# Patient Record
Sex: MALE | Race: WHITE
[De-identification: names, ages, dates, MRNs, and addresses within clinical notes are randomized per-mention and may not be internally consistent; named-entity substitution may affect disease eponyms.]

---

## 2018-06-17 ENCOUNTER — HOSPITAL ENCOUNTER (EMERGENCY)
Dept: HOSPITAL 54 - ER | Age: 68
LOS: 1 days | Discharge: HOME | End: 2018-06-18
Payer: MEDICARE

## 2018-06-17 VITALS — HEIGHT: 72 IN | BODY MASS INDEX: 20.32 KG/M2 | WEIGHT: 150 LBS

## 2018-06-17 VITALS — SYSTOLIC BLOOD PRESSURE: 132 MMHG | DIASTOLIC BLOOD PRESSURE: 82 MMHG
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
## 2018-06-17 DIAGNOSIS — S32.029A: Primary | ICD-10-CM
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
## 2018-06-17 DIAGNOSIS — S22.069A: ICD-10-CM
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
## 2018-06-17 PROCEDURE — A4606 OXYGEN PROBE USED W OXIMETER: HCPCS

## 2018-06-17 PROCEDURE — 99284 EMERGENCY DEPT VISIT MOD MDM: CPT

## 2018-06-17 PROCEDURE — 72131 CT LUMBAR SPINE W/O DYE: CPT

## 2018-06-17 PROCEDURE — Z7610: HCPCS

## 2018-06-17 PROCEDURE — 72128 CT CHEST SPINE W/O DYE: CPT

## 2018-06-17 NOTE — NUR
PT TO ER BED 14. BIB EMS FROM [facility] FOR LOW BACK PAIN S/P FALL INTO 
WHEELCHAIR. -LOC/DENIES HEAD TRAUMA. PT PLACED IN GOWN ON CARDIAC MONITOR. PT 
VSS/NAD NOTED/RESP EVEN UNLABORED/SKIN WARM AND DRY/DENIES N-V-D/AFEBRILE/AOX4. 
AWAITING MD HUFF.

## 2018-07-02 ENCOUNTER — HOSPITAL ENCOUNTER (INPATIENT)
Dept: HOSPITAL 54 - ER | Age: 68
LOS: 3 days | Discharge: NURSING, REHAB OR LONG-TERM CARE | DRG: 383 | End: 2018-07-05
Attending: INTERNAL MEDICINE | Admitting: INTERNAL MEDICINE
Payer: COMMERCIAL

## 2018-07-02 VITALS — BODY MASS INDEX: 23.1 KG/M2 | HEIGHT: 71 IN | WEIGHT: 165 LBS

## 2018-07-02 VITALS — DIASTOLIC BLOOD PRESSURE: 74 MMHG | SYSTOLIC BLOOD PRESSURE: 127 MMHG
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
## 2018-07-02 DIAGNOSIS — Z86.73: ICD-10-CM

## 2018-07-02 DIAGNOSIS — I25.10: ICD-10-CM

## 2018-07-02 DIAGNOSIS — D63.8: ICD-10-CM

## 2018-07-02 DIAGNOSIS — J44.9: ICD-10-CM

## 2018-07-02 DIAGNOSIS — Z79.899: ICD-10-CM

## 2018-07-02 DIAGNOSIS — N40.0: ICD-10-CM

## 2018-07-02 DIAGNOSIS — Z79.82: ICD-10-CM

## 2018-07-02 DIAGNOSIS — M48.54XA: ICD-10-CM

## 2018-07-02 DIAGNOSIS — G40.409: ICD-10-CM

## 2018-07-02 DIAGNOSIS — F03.90: ICD-10-CM

## 2018-07-02 DIAGNOSIS — Z79.51: ICD-10-CM

## 2018-07-02 DIAGNOSIS — L03.114: Primary | ICD-10-CM

## 2018-07-02 LAB
BASOPHILS # BLD AUTO: 0 /CMM (ref 0–0.2)
BASOPHILS NFR BLD AUTO: 0.6 % (ref 0–2)
BUN SERPL-MCNC: 13 MG/DL (ref 7–18)
CALCIUM SERPL-MCNC: 9.1 MG/DL (ref 8.5–10.1)
CHLORIDE SERPL-SCNC: 105 MMOL/L (ref 98–107)
CO2 SERPL-SCNC: 26 MMOL/L (ref 21–32)
CREAT SERPL-MCNC: 1.3 MG/DL (ref 0.6–1.3)
EOSINOPHIL NFR BLD AUTO: 2 % (ref 0–6)
GLUCOSE SERPL-MCNC: 92 MG/DL (ref 74–106)
HCT VFR BLD AUTO: 35 % (ref 39–51)
HGB BLD-MCNC: 11.7 G/DL (ref 13.5–17.5)
LYMPHOCYTES NFR BLD AUTO: 1.4 /CMM (ref 0.8–4.8)
LYMPHOCYTES NFR BLD AUTO: 17.6 % (ref 20–44)
MCH RBC QN AUTO: 30 PG (ref 26–33)
MCHC RBC AUTO-ENTMCNC: 34 G/DL (ref 31–36)
MCV RBC AUTO: 89 FL (ref 80–96)
MONOCYTES NFR BLD AUTO: 0.6 /CMM (ref 0.1–1.3)
MONOCYTES NFR BLD AUTO: 8.1 % (ref 2–12)
NEUTROPHILS # BLD AUTO: 5.6 /CMM (ref 1.8–8.9)
NEUTROPHILS NFR BLD AUTO: 71.7 % (ref 43–81)
PLATELET # BLD AUTO: 184 /CMM (ref 150–450)
POTASSIUM SERPL-SCNC: 4 MMOL/L (ref 3.5–5.1)
RBC # BLD AUTO: 3.92 MIL/UL (ref 4.5–6)
RDW COEFFICIENT OF VARIATION: 14.9 (ref 11.5–15)
SODIUM SERPL-SCNC: 139 MMOL/L (ref 136–145)
WBC NRBC COR # BLD AUTO: 7.8 K/UL (ref 4.3–11)

## 2018-07-02 PROCEDURE — A6402 STERILE GAUZE <= 16 SQ IN: HCPCS

## 2018-07-02 PROCEDURE — Z7610: HCPCS

## 2018-07-02 PROCEDURE — A4606 OXYGEN PROBE USED W OXIMETER: HCPCS

## 2018-07-02 RX ADMIN — OXYCODONE HYDROCHLORIDE AND ACETAMINOPHEN PRN UDTAB: 5; 325 TABLET ORAL at 22:18

## 2018-07-02 NOTE — NUR
MS RN NOTE - Percocet



Patient requested pain medication for 8/10 back pain (s/p fall and vertebral fracture 2-3 
wks ago). PO Percocet 5/325mg was administered as ordered. Will continue to monitor.

## 2018-07-02 NOTE — NUR
MS RN NOTE - call to Dr. Wolfe's Office



Phone call was made to Dr. Wolfe's Office to obtain orders for new admission. Spoke with NP 
Salvador Powers. Per Salvador Powers, continue all home medications, renew order for Rocephin that was 
given in ED, start on regular diet, order wound consult. Otherwise, wait for Dr. Wolfe to 
see patient tomorrow (7/3/18) morning.

## 2018-07-02 NOTE — NUR
MS RN OPENING NOTE



Patient was admitted to the unit just prior to shift change. Patient was seen in bed AAOx3, 
breathing on 2.5L O2 NC with no SOB and no signs of acute distress. SL IV is intact in the 
right forearm. Patient has multiple wounds and redness of his left arm/hand (admitted for 
cellulitis). Complete physical assessment and past medical history was obtained from the 
patient. Patient was oriented to the room and made comfortable. Bed is in the low/locked 
position, two side rails up, and call bell within reach. All patient's needs have been met 
at this time. Will continue to monitor.

## 2018-07-02 NOTE — NUR
REPORT GIVEN TO RNROWENA. PATIENT TRANSPORTED TO [facility] VIA STRETCHER.  
ROWENA TO PROVIDE ALLAN.

## 2018-07-02 NOTE — NUR
[facility] FOR LEFT HAND PAIN W/ SWELLING AND REDNESS WITH DRY WOUNDS. 
PATIENT IS A/OX 2, BREATHING EVEN AND UNLABORED. NO SOB, NAD, VITALS STABLE. 
SAFETY AND COMFORT MEAURES IN PLACE, AWAITING MD ORDERS.

## 2018-07-03 VITALS — SYSTOLIC BLOOD PRESSURE: 120 MMHG | DIASTOLIC BLOOD PRESSURE: 61 MMHG

## 2018-07-03 VITALS — SYSTOLIC BLOOD PRESSURE: 144 MMHG | DIASTOLIC BLOOD PRESSURE: 77 MMHG

## 2018-07-03 VITALS — DIASTOLIC BLOOD PRESSURE: 74 MMHG | SYSTOLIC BLOOD PRESSURE: 118 MMHG

## 2018-07-03 RX ADMIN — OXYCODONE HYDROCHLORIDE AND ACETAMINOPHEN PRN UDTAB: 5; 325 TABLET ORAL at 13:10

## 2018-07-03 RX ADMIN — OXYCODONE HYDROCHLORIDE AND ACETAMINOPHEN PRN UDTAB: 5; 325 TABLET ORAL at 23:59

## 2018-07-03 RX ADMIN — DEXTROSE MONOHYDRATE SCH MLS/HR: 50 INJECTION, SOLUTION INTRAVENOUS at 19:35

## 2018-07-03 RX ADMIN — MONTELUKAST SODIUM SCH MG: 10 TABLET, FILM COATED ORAL at 21:30

## 2018-07-03 RX ADMIN — OXYCODONE HYDROCHLORIDE AND ACETAMINOPHEN PRN UDTAB: 5; 325 TABLET ORAL at 05:58

## 2018-07-03 RX ADMIN — PREDNISOLONE ACETATE SCH ML: 10 SUSPENSION/ DROPS OPHTHALMIC at 19:35

## 2018-07-03 RX ADMIN — LEVETIRACETAM SCH MG: 250 TABLET, FILM COATED ORAL at 18:11

## 2018-07-03 RX ADMIN — OXYCODONE HYDROCHLORIDE AND ACETAMINOPHEN PRN UDTAB: 5; 325 TABLET ORAL at 19:28

## 2018-07-03 NOTE — NUR
FOLLOWED UP WITH PHARMACY REGARDING PREDISOLONE ACETATE EYEDROPS AND ANCEF ABX. PER ROWAN, 
THEY WILL BRING IT UP. WILL ENDORSED MEDICATION ADMIN TO NIGHT RN.

## 2018-07-03 NOTE — NUR
MS RN NOTE - wound care



I asked patient several times this shift if he was will to let me provide minor wound care 
(aka cleansing with normal saline, covering open wounds). Patient declined this shift.

## 2018-07-03 NOTE — NUR
MS RN NOTE - Percocet



Patient requested pain medication for back pain 8/10. PO Percocet 5/325mg administered as 
ordered. Will continue to monitor.

## 2018-07-03 NOTE — NUR
MS RN CLOSING NOTE



Patient was seen in bed AAOx3, breathing on 2.5L of O2 via simple mask with no SOB, and no 
signs of acute distress. Patient slept well overnight with no complaints or events. Patient 
remains in stable condition and has no immediate needs at this time. Bed is in the 
low/locked position, two side rails up, call bell within reach. Patient care is endorsed to 
day shift RN.

## 2018-07-03 NOTE — NUR
MS RN NOTE - O2



Patient asked if he could use an O2 mask instead of the nasal cannula. I educated him on the 
difference between the two, and also told him that a mask may not be necessary. The patient 
said that he tends to be a "mouth breather" and would feel more comfortable with a mask for 
now. Simple face mask was provided per patient request. O2 at 2 L.

## 2018-07-03 NOTE — NUR
RN CLOSING NOTES



PATIENT IN STABLE CONDITION. ALL NEEDS ATTENDED AND PROVIDED. KEPT PATIENT SAFE AND 
COMFORTABLE. BED IN LOW/LOCKED POSITION, CALL LIGHT IN REACH. ENDORSED TO NIGHT RN FOR ALLAN.

## 2018-07-03 NOTE — NUR
MS RN OPENING NOTE



Patient was seen lying in bed AAOx3, breathing on 2 L of O2 NC with no SOB, and no signs of 
acute distress. SL IV in right forearm remains intact. Urinal and bedside commode are within 
reach. Patient states that is having back pain and would like pain medication, otherwise, 
patient has no other concerns. Bed is in the low/locked position, two side rails up, and 
call bell within reach. Will continue to monitor.

## 2018-07-03 NOTE — NUR
MS RN NOTE - Call to assisted living facility



Call was made to [facility] on behalf of patient. Patient stated that his Case 
Manager (CM) Mark Tam was scheduled to see him there today; this CM travels to see him, 
and the patient was concerned that he would miss the visit. Per  at [facility], they will try to contact Mark Tam and ask that he visit the patient at [facility].

## 2018-07-03 NOTE — NUR
RN OPENING NOTES



RECEIVED PATIENT IN BED RESTING. A/OX4. NO ACUTE DISTRESS, NO SOB NOTED. IV SITE INTACT AND 
PATENT. KEPT PATIENT SAFE AND COMFORTABLE. BED IN LOW/LOCKED POSITION, SIDERAILS UPX2, CALL 
LIGHT IN REACH. WILL CONTINUE TO MONITOR ACCORDINGLY.

## 2018-07-03 NOTE — NUR
WOUND CARE CONSULT: PT PRESENTS WITH MULTIPLE DRY SCABS AND DRY ABRASIONS PRESENT ON 
ADMISSION. DRY LESIONS TO UPPER BACK, SHOULDER AREA AND ABDOMEN NOTED, PRESENT ON ADMISSION. 
DEFER TO MD. PT INDEPENDENT WITH BED MOBILITY AND CONTINENT. WILL SEE PRN.

## 2018-07-04 VITALS — DIASTOLIC BLOOD PRESSURE: 78 MMHG | SYSTOLIC BLOOD PRESSURE: 139 MMHG

## 2018-07-04 VITALS — SYSTOLIC BLOOD PRESSURE: 115 MMHG | DIASTOLIC BLOOD PRESSURE: 65 MMHG

## 2018-07-04 RX ADMIN — LEVETIRACETAM SCH MG: 250 TABLET, FILM COATED ORAL at 16:43

## 2018-07-04 RX ADMIN — DEXTROSE MONOHYDRATE SCH MLS/HR: 50 INJECTION, SOLUTION INTRAVENOUS at 21:24

## 2018-07-04 RX ADMIN — ASPIRIN 81 MG SCH MG: 81 TABLET ORAL at 08:19

## 2018-07-04 RX ADMIN — DIVALPROEX SODIUM SCH MG: 500 TABLET, DELAYED RELEASE ORAL at 08:19

## 2018-07-04 RX ADMIN — FLUTICASONE FUROATE AND VILANTEROL TRIFENATATE SCH EACH: 100; 25 POWDER RESPIRATORY (INHALATION) at 08:18

## 2018-07-04 RX ADMIN — OXYCODONE HYDROCHLORIDE AND ACETAMINOPHEN PRN UDTAB: 5; 325 TABLET ORAL at 05:50

## 2018-07-04 RX ADMIN — OXYCODONE HYDROCHLORIDE AND ACETAMINOPHEN PRN UDTAB: 5; 325 TABLET ORAL at 21:24

## 2018-07-04 RX ADMIN — MONTELUKAST SODIUM SCH MG: 10 TABLET, FILM COATED ORAL at 21:24

## 2018-07-04 RX ADMIN — TAMSULOSIN HYDROCHLORIDE SCH MG: 0.4 CAPSULE ORAL at 08:19

## 2018-07-04 RX ADMIN — PREDNISOLONE ACETATE SCH ML: 10 SUSPENSION/ DROPS OPHTHALMIC at 08:19

## 2018-07-04 RX ADMIN — OXYCODONE HYDROCHLORIDE AND ACETAMINOPHEN PRN UDTAB: 5; 325 TABLET ORAL at 11:09

## 2018-07-04 RX ADMIN — DEXTROSE MONOHYDRATE SCH MLS/HR: 50 INJECTION, SOLUTION INTRAVENOUS at 13:04

## 2018-07-04 RX ADMIN — Medication SCH MG: at 08:20

## 2018-07-04 RX ADMIN — PREDNISOLONE ACETATE SCH ML: 10 SUSPENSION/ DROPS OPHTHALMIC at 16:43

## 2018-07-04 RX ADMIN — DEXTROSE MONOHYDRATE SCH MLS/HR: 50 INJECTION, SOLUTION INTRAVENOUS at 05:37

## 2018-07-04 RX ADMIN — LEVETIRACETAM SCH MG: 250 TABLET, FILM COATED ORAL at 08:20

## 2018-07-04 NOTE — NUR
MS RN OPENING NOTES:



RECEIVED PT AWAKE IN BED IN NO ACUTE SIGNS OF DISTRESS. HOB ELEVATED. A/O X4. ABLE TO MAKE 
NEEDS KNOWN, DENIES ANY PAIN OR DISCOMFORTS AT THIS TIME. URINAL AND BEDSIDE COMMODE ARE 
WITHIN EASY REACH OF PT. ON ROOM AIR,  RESPIRATION EVEN AND UNLABORED. IV ACCESS ON RIGHT FA 
INTACT  AND PATENT, FLUSHES WELL. SAFETY PRECAUTIONS MAINTAINED. BED IN LOW/LOCKED POSITION 
WITH SIDE-RAILS UP X2. CALL LIGHT IN REACH. WILL CONTINUE MONITOR PT ACCORDINGLY.

## 2018-07-04 NOTE — NUR
MS RN NOTE - Pain



Patient requested pain medication for back pain 8/10. PO Percocet 5/325mg administered as 
ordered. Patient states that the use of Percocet has been successful thus far at relieving 
back pain to a tolerable level (about 4/10). Will continue to monitor.

## 2018-07-04 NOTE — NUR
RN NOTES



RECEIVED CALL FROM SONA [facility] OF [facility] REPORTING THAT PT 
IS POSITIVE FOR MRSA OF NARES. CONTACT ISOLATION ENFORCED IMMEDIATELY. MD AND CHARGE NURSE 
MADE AWARE. WILL CONTINUE TO MONITOR.

## 2018-07-04 NOTE — NUR
MS RN NOTE - Pain, Percocet



Patient reported 8/10 back pain due to lumbar fracture. PO Percocet 5/325mg was administered 
as ordered for pain relief. Will continue to monitor.

## 2018-07-04 NOTE — NUR
RN NOTES



PATIENT COMPLAINED OF ACHING PAIN ON HIS LOWER BACK WITH INTENSITY OF 8/10, REQUESTED PAIN 
MED, PRN PERCOCET 5/325MG TAB GIVEN AS ORDERED. WILL CONTINUE TO MONITOR

## 2018-07-04 NOTE — NUR
MS LVN INITIAL NOTES

 SEEN PT IN BED AFTER GOT REPORT FROM AM NURSE. PT RESTING AT THIS TIME WITH EYES CLOSED BUT 
AROUSE TO TOUCH. NOTICED WOUNDS AND SCABS ON HIS BODY, RESPIRATION EVEN AND NON-LABORED, NOT 
IN ANY ACUTE DISTRESS NOTED. KEPT HIM WARM AND COMFORTABLE AT ALL TIMES. HE'S ON ISOLATION 
PRECAUTION IMPLEMENTED AND OBSERVED. WILL CONTINUE MONITORING. PLACE CALL LIGHT AT REACH.

## 2018-07-04 NOTE — NUR
MS RN CLOSING NOTE



Patient is lying in bed AAOx3, breathing on 3L of O2 NC with no SOB, and no signs of acute 
distress. Patient slept well overnight, with no complications, and only requesting pain 
medication as needed. Patient remains in stable condition and has no immediate needs or 
concerns at this time. Bed is in the low/locked position, two side rails up, call bell 
within reach. Patient care endorsed to day shift nurse.

## 2018-07-04 NOTE — NUR
MS RN CLOSING NOTES:



PATIENT IN BED AWAKE AND RESTING AT MODERATE HIGH BACKREST POSITION. A/O X4, SAME VERBALLY 
RESPONSIVE. PT ON 02 VIA N/C AT 2LPM,  TOLERATING WELL WITH NO SIGNS OF ACUTE RESPIRATORY 
DISTRESS NOTED. CONTACT PRECAUTIONS MAINTAINED. IV ACCESS ON RIGHT FA INTACT AND PATENT, 
FLUSHES WELL. SAFETY PRECAUTIONS MAINTAINED. BED IN LOW/LOCKED POSITION WITH SIDE-RAILS UP 
X2. CALL LIGHT AND BEDSIDE TABLE WITHIN REACH OF PT. URINAL AND BEDSIDE COMMODE PLACED 
WITHIN EASY REACH OF PT. ALL NEEDS AND CARE PROVIDED WELL. WILL ENDORSE TO NIGHT SHIFT NURSE 
FOR ALLAN.

## 2018-07-05 VITALS — SYSTOLIC BLOOD PRESSURE: 119 MMHG | DIASTOLIC BLOOD PRESSURE: 64 MMHG

## 2018-07-05 RX ADMIN — PREDNISOLONE ACETATE SCH DROP: 10 SUSPENSION/ DROPS OPHTHALMIC at 08:38

## 2018-07-05 RX ADMIN — FLUTICASONE FUROATE AND VILANTEROL TRIFENATATE SCH EACH: 100; 25 POWDER RESPIRATORY (INHALATION) at 08:38

## 2018-07-05 RX ADMIN — OXYCODONE HYDROCHLORIDE AND ACETAMINOPHEN PRN UDTAB: 5; 325 TABLET ORAL at 04:58

## 2018-07-05 RX ADMIN — DEXTROSE MONOHYDRATE SCH MLS/HR: 50 INJECTION, SOLUTION INTRAVENOUS at 05:23

## 2018-07-05 RX ADMIN — DIVALPROEX SODIUM SCH MG: 500 TABLET, DELAYED RELEASE ORAL at 08:38

## 2018-07-05 RX ADMIN — DEXTROSE MONOHYDRATE SCH MLS/HR: 50 INJECTION, SOLUTION INTRAVENOUS at 12:36

## 2018-07-05 RX ADMIN — ASPIRIN 81 MG SCH MG: 81 TABLET ORAL at 08:38

## 2018-07-05 RX ADMIN — TAMSULOSIN HYDROCHLORIDE SCH MG: 0.4 CAPSULE ORAL at 08:38

## 2018-07-05 RX ADMIN — LEVETIRACETAM SCH MG: 250 TABLET, FILM COATED ORAL at 08:38

## 2018-07-05 RX ADMIN — Medication SCH MG: at 08:38

## 2018-07-05 RX ADMIN — OXYCODONE HYDROCHLORIDE AND ACETAMINOPHEN PRN UDTAB: 5; 325 TABLET ORAL at 13:03

## 2018-07-05 NOTE — NUR
MS RN NOTES



PATIENT SEEN AND EXAMINED BY DR. MYERS. WITH ORDERS TO DISCHARGE PATIENT TO [facility]. PATIENT AT BEDSIDE, AWARE AND VERBALIZED UNDERSTANDING. 
ORDER NOTED AND CARRIED OUT. CASE MANAGEMENT AWARE. WITH ARRANGED TRANSPORTATION  AT 
1400. PLACED CALL TO [facility] ([phone]) AND SPOKE WITH RODRÍGUEZ, REPORT GIVEN. 
WILL CONTINUE TO MONITOR

## 2018-07-05 NOTE — NUR
MS RN NOTES



PATIENT FOR DISCHARGE TODAY. DISCHARGE TEACHING AND EDUCATION PROVIDED TO PATIENT AND 
VERBALIZED UNDERSTANDING. ALL BELONGINGS COMPLETE, NO REPORT OF MISSING BELONGINGS. IV 
REMOVED WITH MINIMAL BLEEDING NOTED, PRESSURE DRESSING PLACED. PATIENT LEFT VIA GURNEY AT 
1400 IN STABLE CONDITION. BREATHING EVEN AND UNLABORED. NO SOB OR ACUTE DISTRESS NOTED. 
DENIES ANY PAIN OR DISCOMFORT. NO CHANGES IN LOC NOTED. PATIENT CALM AND RELAXED. NO EPISODE 
OF FALL OR INJURY DURING TRANSFER/DISCHARGE. MD MADE AWARE OF DISCHARGE.

## 2018-07-05 NOTE — NUR
MS LVN NOTES

 C/O PAIN ON HIS LOWER BACK , PERCOCET GIVEN AS ORDERED AND PT REQUESTED. PT REFUSED TO BE 
CLEAN UP AND STATED HE DOESN'T WANT TO BE CHANGE HE WANTS TO SLEEP MORE. JUST DO IT BY 8AM 
AFTER MY BREAKFAST AS HE STATED. CHARGE NURSE AWARE. KEPT HIM COMFORTABLE AT ALL TIMES. WILL 
CONTINUE TO MONITOR. PLACE CALL LIGHT AT REACH.

## 2018-07-05 NOTE — NUR
MS RN NOTES



PATIENT RECEIVED RESTING INSIDE ROOM, AWAKE, ALERT AND ORIENTED. VERBALLY RESPONSIVE AND 
RESPONDS TO VERBAL AND TACTILE STIMULI. PATIENT BREATHING EVEN AND UNLABORED. NO SOB OR 
ACUTE DISTRESS NOTED AT THIS TIME. PATIENT DENIES ANY PAIN OR DISCOMFORT. IV SITE INTACT AND 
PATENT. MAINTAINED ISOLATION PRECAUTION. WILL CONTINUE TO MONITOR. BED LOCKED AND IN LOW 
POSITION. BILATERAL UPPER SIDE RAILS UP AND LOCKED. CALL LIGHT WITHIN EASY REACH

## 2018-07-05 NOTE — NUR
MS LVN CLOSING NOTES 

 PT AWAKE AND ALERT , RESPIRATION EVEN AND NON-LABORED. NO SIGNS OF ANY DISCOMFORT OR ANY 
ACUTE DISTRESS NOTED. ALL DUE MEDS GIVEN . STILL REFUSING TO DO HIS BEDDINSG AND SPONGE 
BATH. HE REQUESTED TO DO IT BY 8 AM AFTER HIS BREAKFAST ENDORSE.

## 2019-01-21 ENCOUNTER — HOSPITAL ENCOUNTER (INPATIENT)
Dept: HOSPITAL 91 - 2NE | Age: 69
LOS: 29 days | DRG: 870 | End: 2019-02-19
Payer: MEDICARE

## 2019-01-21 ENCOUNTER — HOSPITAL ENCOUNTER (INPATIENT)
Dept: HOSPITAL 10 - E/R | Age: 69
LOS: 29 days | DRG: 870 | End: 2019-02-19
Attending: INTERNAL MEDICINE | Admitting: INTERNAL MEDICINE
Payer: MEDICARE

## 2019-01-21 VITALS
BODY MASS INDEX: 18.66 KG/M2 | BODY MASS INDEX: 18.66 KG/M2 | HEIGHT: 72 IN | WEIGHT: 137.79 LBS | HEIGHT: 72 IN | WEIGHT: 137.79 LBS

## 2019-01-21 DIAGNOSIS — G40.909: ICD-10-CM

## 2019-01-21 DIAGNOSIS — A41.9: Primary | ICD-10-CM

## 2019-01-21 DIAGNOSIS — E87.0: ICD-10-CM

## 2019-01-21 DIAGNOSIS — E87.2: ICD-10-CM

## 2019-01-21 DIAGNOSIS — J43.9: ICD-10-CM

## 2019-01-21 DIAGNOSIS — N39.0: ICD-10-CM

## 2019-01-21 DIAGNOSIS — I46.8: ICD-10-CM

## 2019-01-21 DIAGNOSIS — J69.0: ICD-10-CM

## 2019-01-21 DIAGNOSIS — G92: ICD-10-CM

## 2019-01-21 DIAGNOSIS — E87.5: ICD-10-CM

## 2019-01-21 DIAGNOSIS — D61.818: ICD-10-CM

## 2019-01-21 DIAGNOSIS — N40.0: ICD-10-CM

## 2019-01-21 DIAGNOSIS — E44.0: ICD-10-CM

## 2019-01-21 DIAGNOSIS — K66.8: ICD-10-CM

## 2019-01-21 DIAGNOSIS — J96.02: ICD-10-CM

## 2019-01-21 DIAGNOSIS — I82.622: ICD-10-CM

## 2019-01-21 DIAGNOSIS — N17.0: ICD-10-CM

## 2019-01-21 DIAGNOSIS — I21.4: ICD-10-CM

## 2019-01-21 DIAGNOSIS — E78.5: ICD-10-CM

## 2019-01-21 DIAGNOSIS — D69.6: ICD-10-CM

## 2019-01-21 DIAGNOSIS — J96.01: ICD-10-CM

## 2019-01-21 DIAGNOSIS — N18.9: ICD-10-CM

## 2019-01-21 DIAGNOSIS — F25.1: ICD-10-CM

## 2019-01-21 DIAGNOSIS — I46.9: ICD-10-CM

## 2019-01-21 DIAGNOSIS — R65.21: ICD-10-CM

## 2019-01-21 DIAGNOSIS — N25.1: ICD-10-CM

## 2019-01-21 DIAGNOSIS — R13.10: ICD-10-CM

## 2019-01-21 DIAGNOSIS — J93.9: ICD-10-CM

## 2019-01-21 DIAGNOSIS — I82.A12: ICD-10-CM

## 2019-01-21 DIAGNOSIS — D63.1: ICD-10-CM

## 2019-01-21 DIAGNOSIS — Z66: ICD-10-CM

## 2019-01-21 DIAGNOSIS — I50.30: ICD-10-CM

## 2019-01-21 LAB
ADD MAN DIFF?: NO
ADD UMIC: YES
ALANINE AMINOTRANSFERASE: 122 IU/L (ref 13–69)
ALBUMIN/GLOBULIN RATIO: 1.05
ALBUMIN: 4 G/DL (ref 3.3–4.9)
ALKALINE PHOSPHATASE: 86 IU/L (ref 42–121)
ANION GAP: 8 (ref 5–13)
ARTERIAL BASE EXCESS: -1.1 MMOL/L (ref -3–3)
ARTERIAL BLOOD GAS OXYGEN SAT: 98.9 MMHG (ref 95–98)
ARTERIAL COHB: 2.3 % (ref 0–3)
ARTERIAL FRACTION OF OXYHGB: 96.5 % (ref 93–99)
ARTERIAL HCO3: 28.7 MMOL/L (ref 22–26)
ARTERIAL METHB: 0.1 % (ref 0–1.5)
ARTERIAL PCO2: 74.7 MMHG (ref 35–45)
ASPARTATE AMINO TRANSFERASE: 173 IU/L (ref 15–46)
BASOPHIL #: 0.1 10^3/UL (ref 0–0.1)
BASOPHILS %: 0.4 % (ref 0–2)
BILIRUBIN,DIRECT: 0 MG/DL (ref 0–0.2)
BILIRUBIN,TOTAL: 0 MG/DL (ref 0.2–1.3)
BLOOD GAS TIDAL VOLUME: 500 ML
BLOOD UREA NITROGEN: 38 MG/DL (ref 7–20)
CALCIUM: 9.4 MG/DL (ref 8.4–10.2)
CARBON DIOXIDE: 34 MMOL/L (ref 21–31)
CHLORIDE: 94 MMOL/L (ref 97–110)
CREATININE: 1.68 MG/DL (ref 0.61–1.24)
EOSINOPHILS #: 0 10^3/UL (ref 0–0.5)
EOSINOPHILS %: 0 % (ref 0–7)
FIO2: 100 %
GLOBULIN: 3.8 G/DL (ref 1.3–3.2)
GLUCOSE: 173 MG/DL (ref 70–220)
HEMATOCRIT: 46.5 % (ref 42–52)
HEMOGLOBIN: 13.5 G/DL (ref 14–18)
IMMATURE GRANS #M: 0.54 10^3/UL (ref 0–0.03)
IMMATURE GRANS % (M): 2.7 % (ref 0–0.43)
INR: 0.91
LYMPHOCYTES #: 1 10^3/UL (ref 0.8–2.9)
LYMPHOCYTES %: 4.8 % (ref 15–51)
MEAN CORPUSCULAR HEMOGLOBIN: 28 PG (ref 29–33)
MEAN CORPUSCULAR HGB CONC: 29 G/DL (ref 32–37)
MEAN CORPUSCULAR VOLUME: 96.3 FL (ref 82–101)
MEAN PLATELET VOLUME: 11.6 FL (ref 7.4–10.4)
MODE: (no result)
MONOCYTE #: 1 10^3/UL (ref 0.3–0.9)
MONOCYTES %: 4.8 % (ref 0–11)
NEUTROPHIL #: 17.7 10^3/UL (ref 1.6–7.5)
NEUTROPHILS %: 87.3 % (ref 39–77)
NUCLEATED RED BLOOD CELLS #: 0 10^3/UL (ref 0–0)
NUCLEATED RED BLOOD CELLS%: 0.1 /100WBC (ref 0–0)
O2 A-A PPRESDIFF RESPIRATORY: 481.4 MMHG (ref 7–24)
PARTIAL THROMBOPLASTIN TIME: 40.8 SEC (ref 23–35)
PLATELET COUNT: 124 10^3/UL (ref 140–415)
POSITIVE DIFF: (no result)
POTASSIUM: 5.4 MMOL/L (ref 3.5–5.1)
PROTIME: 12.4 SEC (ref 11.9–14.9)
PT RATIO: 1
RED BLOOD COUNT: 4.83 10^6/UL (ref 4.7–6.1)
RED CELL DISTRIBUTION WIDTH: 15.2 % (ref 11.5–14.5)
SODIUM: 136 MMOL/L (ref 135–144)
TOTAL PROTEIN: 7.8 G/DL (ref 6.1–8.1)
TROPONIN-I: 0.25 NG/ML (ref 0–0.12)
UR AMORPHOUS CRYSTAL: (no result) /HPF
UR ASCORBIC ACID: 40 MG/DL
UR BACTERIA: (no result) /HPF
UR BILIRUBIN (DIP): NEGATIVE MG/DL
UR BLOOD (DIP): NEGATIVE MG/DL
UR CLARITY: (no result)
UR COLOR: (no result)
UR GLUCOSE (DIP): NEGATIVE MG/DL
UR HYALINE CAST: (no result) /HPF
UR KETONES (DIP): NEGATIVE MG/DL
UR LEUKOCYTE ESTERASE (DIP): (no result) LEU/UL
UR MUCUS: (no result) /HPF
UR NITRITE (DIP): NEGATIVE MG/DL
UR PH (DIP): 5 (ref 5–9)
UR RBC: 4 /HPF (ref 0–5)
UR SPECIFIC GRAVITY (DIP): 1.02 (ref 1–1.03)
UR TOTAL PROTEIN (DIP): (no result) MG/DL
UR UROBILINOGEN (DIP): (no result) MG/DL
UR WBC: 33 /HPF (ref 0–5)
WHITE BLOOD COUNT: 20.2 10^3/UL (ref 4.8–10.8)

## 2019-01-21 PROCEDURE — 36600 WITHDRAWAL OF ARTERIAL BLOOD: CPT

## 2019-01-21 PROCEDURE — C9113 INJ PANTOPRAZOLE SODIUM, VIA: HCPCS

## 2019-01-21 PROCEDURE — 94640 AIRWAY INHALATION TREATMENT: CPT

## 2019-01-21 PROCEDURE — 93005 ELECTROCARDIOGRAM TRACING: CPT

## 2019-01-21 PROCEDURE — 80048 BASIC METABOLIC PNL TOTAL CA: CPT

## 2019-01-21 PROCEDURE — 85025 COMPLETE CBC W/AUTO DIFF WBC: CPT

## 2019-01-21 PROCEDURE — 89220 SPUTUM SPECIMEN COLLECTION: CPT

## 2019-01-21 PROCEDURE — 31500 INSERT EMERGENCY AIRWAY: CPT

## 2019-01-21 PROCEDURE — L3260 AMBULATORY SURGICAL BOOT EAC: HCPCS

## 2019-01-21 PROCEDURE — 80202 ASSAY OF VANCOMYCIN: CPT

## 2019-01-21 PROCEDURE — 36430 TRANSFUSION BLD/BLD COMPNT: CPT

## 2019-01-21 PROCEDURE — 99291 CRITICAL CARE FIRST HOUR: CPT

## 2019-01-21 PROCEDURE — 84295 ASSAY OF SERUM SODIUM: CPT

## 2019-01-21 PROCEDURE — 97161 PT EVAL LOW COMPLEX 20 MIN: CPT

## 2019-01-21 PROCEDURE — 94002 VENT MGMT INPAT INIT DAY: CPT

## 2019-01-21 PROCEDURE — 94770: CPT

## 2019-01-21 PROCEDURE — 83605 ASSAY OF LACTIC ACID: CPT

## 2019-01-21 PROCEDURE — 74176 CT ABD & PELVIS W/O CONTRAST: CPT

## 2019-01-21 PROCEDURE — 86900 BLOOD TYPING SEROLOGIC ABO: CPT

## 2019-01-21 PROCEDURE — 82803 BLOOD GASES ANY COMBINATION: CPT

## 2019-01-21 PROCEDURE — 97164 PT RE-EVAL EST PLAN CARE: CPT

## 2019-01-21 PROCEDURE — 02HV33Z INSERTION OF INFUSION DEVICE INTO SUPERIOR VENA CAVA, PERCUTANEOUS APPROACH: ICD-10-PCS

## 2019-01-21 PROCEDURE — 81001 URINALYSIS AUTO W/SCOPE: CPT

## 2019-01-21 PROCEDURE — 94664 DEMO&/EVAL PT USE INHALER: CPT

## 2019-01-21 PROCEDURE — 93306 TTE W/DOPPLER COMPLETE: CPT

## 2019-01-21 PROCEDURE — 81003 URINALYSIS AUTO W/O SCOPE: CPT

## 2019-01-21 PROCEDURE — 92950 HEART/LUNG RESUSCITATION CPR: CPT

## 2019-01-21 PROCEDURE — 0W9B30Z DRAINAGE OF LEFT PLEURAL CAVITY WITH DRAINAGE DEVICE, PERCUTANEOUS APPROACH: ICD-10-PCS

## 2019-01-21 PROCEDURE — 83735 ASSAY OF MAGNESIUM: CPT

## 2019-01-21 PROCEDURE — 96366 THER/PROPH/DIAG IV INF ADDON: CPT

## 2019-01-21 PROCEDURE — 0BH18EZ INSERTION OF ENDOTRACHEAL AIRWAY INTO TRACHEA, VIA NATURAL OR ARTIFICIAL OPENING ENDOSCOPIC: ICD-10-PCS

## 2019-01-21 PROCEDURE — 83935 ASSAY OF URINE OSMOLALITY: CPT

## 2019-01-21 PROCEDURE — 97110 THERAPEUTIC EXERCISES: CPT

## 2019-01-21 PROCEDURE — 86901 BLOOD TYPING SEROLOGIC RH(D): CPT

## 2019-01-21 PROCEDURE — 74018 RADEX ABDOMEN 1 VIEW: CPT

## 2019-01-21 PROCEDURE — 83036 HEMOGLOBIN GLYCOSYLATED A1C: CPT

## 2019-01-21 PROCEDURE — 94003 VENT MGMT INPAT SUBQ DAY: CPT

## 2019-01-21 PROCEDURE — 70450 CT HEAD/BRAIN W/O DYE: CPT

## 2019-01-21 PROCEDURE — 97530 THERAPEUTIC ACTIVITIES: CPT

## 2019-01-21 PROCEDURE — 90686 IIV4 VACC NO PRSV 0.5 ML IM: CPT

## 2019-01-21 PROCEDURE — 82043 UR ALBUMIN QUANTITATIVE: CPT

## 2019-01-21 PROCEDURE — 86850 RBC ANTIBODY SCREEN: CPT

## 2019-01-21 PROCEDURE — 85730 THROMBOPLASTIN TIME PARTIAL: CPT

## 2019-01-21 PROCEDURE — 36415 COLL VENOUS BLD VENIPUNCTURE: CPT

## 2019-01-21 PROCEDURE — 87086 URINE CULTURE/COLONY COUNT: CPT

## 2019-01-21 PROCEDURE — 87040 BLOOD CULTURE FOR BACTERIA: CPT

## 2019-01-21 PROCEDURE — 87070 CULTURE OTHR SPECIMN AEROBIC: CPT

## 2019-01-21 PROCEDURE — 96367 TX/PROPH/DG ADDL SEQ IV INF: CPT

## 2019-01-21 PROCEDURE — 84100 ASSAY OF PHOSPHORUS: CPT

## 2019-01-21 PROCEDURE — P9035 PLATELET PHERES LEUKOREDUCED: HCPCS

## 2019-01-21 PROCEDURE — 80053 COMPREHEN METABOLIC PANEL: CPT

## 2019-01-21 PROCEDURE — 94668 MNPJ CHEST WALL SBSQ: CPT

## 2019-01-21 PROCEDURE — 93971 EXTREMITY STUDY: CPT

## 2019-01-21 PROCEDURE — 84484 ASSAY OF TROPONIN QUANT: CPT

## 2019-01-21 PROCEDURE — 96365 THER/PROPH/DIAG IV INF INIT: CPT

## 2019-01-21 PROCEDURE — 85610 PROTHROMBIN TIME: CPT

## 2019-01-21 PROCEDURE — 92526 ORAL FUNCTION THERAPY: CPT

## 2019-01-21 PROCEDURE — 84300 ASSAY OF URINE SODIUM: CPT

## 2019-01-21 PROCEDURE — 71045 X-RAY EXAM CHEST 1 VIEW: CPT

## 2019-01-21 PROCEDURE — 74177 CT ABD & PELVIS W/CONTRAST: CPT

## 2019-01-21 PROCEDURE — 84155 ASSAY OF PROTEIN SERUM: CPT

## 2019-01-21 PROCEDURE — 92610 EVALUATE SWALLOWING FUNCTION: CPT

## 2019-01-21 PROCEDURE — 5A1955Z RESPIRATORY VENTILATION, GREATER THAN 96 CONSECUTIVE HOURS: ICD-10-PCS

## 2019-01-21 PROCEDURE — 87081 CULTURE SCREEN ONLY: CPT

## 2019-01-21 PROCEDURE — P9047 ALBUMIN (HUMAN), 25%, 50ML: HCPCS

## 2019-01-21 PROCEDURE — 5A12012 PERFORMANCE OF CARDIAC OUTPUT, SINGLE, MANUAL: ICD-10-PCS

## 2019-01-21 RX ADMIN — THIAMINE HYDROCHLORIDE 1 ML: 100 INJECTION, SOLUTION INTRAMUSCULAR; INTRAVENOUS at 18:50

## 2019-01-21 RX ADMIN — VANCOMYCIN HYDROCHLORIDE 1 MLS/HR: 1 INJECTION, POWDER, LYOPHILIZED, FOR SOLUTION INTRAVENOUS at 19:37

## 2019-01-21 RX ADMIN — PIPERACILLIN SODIUM AND TAZOBACTAM SODIUM 1 MLS/HR: 3; .375 INJECTION, POWDER, LYOPHILIZED, FOR SOLUTION INTRAVENOUS at 18:50

## 2019-01-21 RX ADMIN — PANTOPRAZOLE SODIUM 1 MLS/HR: 40 INJECTION, POWDER, FOR SOLUTION INTRAVENOUS at 00:08

## 2019-01-21 NOTE — ERD
ER Documentation


Chief Complaint


Chief Complaint





aloc/ resp distress





HPI


68-year-old male brought in from his boarding care and respiratory distress.  


Reportedly the patient has been more confused than usual for the past 3 hours.  


Patient was hypoxic upon EMS arrival.  Other vitals were stable.





ROS


Unable to obtain due to altered mental status





Medications


Home Meds


Reported Medications


Docusate Sodium* (Colace*) 100 Mg Capsule, 100 MG PO Q24H PRN for CONSTIPATION, 


#30 CAP


   1/21/19


Polyethylene Glycol* (Miralax*) 17 Gm Powd.pack, 17 GM PO DAILY PRN for AS 


NEEDED, #30 PACKET


   1/21/19


Acetaminophen* (Acetaminophen*) 325 Mg Tablet, 650 MG PO Q4H PRN for PAIN 1-


10/10, #30 TAB


   AND FEVER>101F


   1/21/19


Sennosides* (Senna Lax*) 8.6 Mg Tablet, 1 TAB PO AS NEEDED, TAB


   1/21/19


Mv,Minerals/Fa/Lycopene/Ginkgo (ONE DAILY MEN'S 50+ TABLET) 1 Each Tablet, 1 


EACH PO DAILY, TAB


   1/21/19


Divalproex Sodium* (Depakote*) 125 Mg Tablet.dr, 250 MG PO Q8H, #90 TAB


   1/21/19


Quetiapine Fumarate* (Seroquel*) 50 Mg Tablet, 50 MG PO Q8H, TAB


   1/21/19


Ipratropium-Albuterol (Ipratropium-Albuterol) 0.5-3 Mg/3 Ml Ampul.neb, 3 ML 


INHALATION Q4H PRN for WHEEZING AND SOB, #30 VIAL


   1/21/19


Budesonide-Formoterol Fumarate* (Symbicort*) 160-4.5 Hfa.aer.ad, 2 PUFF 


INHALATION BID, #1 EACH


   1/21/19





Allergies


Allergies:  


Coded Allergies:  


     No Known Allergy (Unverified , 1/21/19)





PMhx/Soc


Anesthesia Reaction:  No


Hx Cardiac Disorders:  No


Hx Psychiatric Problems:  Yes (bipolar disorder, depressive disorder, psychosis)


Hx Miscellaneous Medical Probl:  Yes (hyperlipidemia, BPH, weakness, abnormal 


posture)


Smoking Status:  Unknown if ever smoked





FmHx


Unable to obtain





Physical Exam


Vitals





Vital Signs


  Date      Temp  Pulse  Resp  B/P (MAP)   Pulse Ox  O2          O2 Flow    FiO2


Time                                                 Delivery    Rate


   1/21/19           56    22      117/84       100  Mechanical


     22:30                           (95)            Ventilator


   1/21/19           59    26                   100                          100


     22:25


   1/21/19  93.2     56    16      106/81       100  Mechanical


     21:45                           (89)            Ventilator


   1/21/19           62    16      111/86       100  Mechanical


     21:30                           (94)            Ventilator


   1/21/19           66    16  88/76 (80)        97  Mechanical


     20:30                                           Ventilator


   1/21/19           45    16                   100                          100


     20:15


   1/21/19           74            106/56       100  Mechanical


     19:32                           (73)            Ventilator


   1/21/19          101    16                   100                          100


     18:42


   1/21/19                                           Non                15


     18:34                                           Rebreather


   1/21/19  94.9


     18:30


   1/21/19  94.9     92    34      114/79        90


     18:30                           (91)





Physical Exam


INITIAL VITAL SIGNS: Reviewed by me


GENERAL: Patient is lying on gurney, agonal respirations.  Cachectic


HEAD: Normocephalic


EYES:  PERRL. No icterus. No pallor. Lids, lashes, and conjunctiva clear.


ENT:  dry mucous membranes, airway patent 


NECK: Supple. No masses. Full range of motion.  No meningismus


RESPIRATORY: Tachypneic with agonal respirations.  No obvious wheezing, rales, 


or rhonchi.  Using accessory muscles.  


CV: Regular rate and rhythm. No murmurs, No rubs or gallops.  


ABDOMEN: Soft, non-distended


BACK: No wounds noted.  Rectal exam without blood.


EXTREMITIES: No edema. No clubbing or cyanosis. Pulses symmetric.


SKIN: Very cool, dry.  Pale.  Decreased turgor. No obvious rash, petechiae or 


purpura. 


NEUROLOGIC: Obtunded, not arousable, occasionally withdraws to painful stimuli. 


No verbal response.  No eye opening.


Result Diagram:  


1/21/19 1825 1/21/19 1825





Results 24 hrs





Laboratory Tests


Test
             1/21/19
18:25    1/21/19
19:13  1/21/19
19:23    1/21/19
20:30


White Blood       20.2 10^3/ul


Count


Red Blood Count   4.83 10^6/ul


Hemoglobin           13.5 g/dl


Hematocrit              46.5 %


Mean                   96.3 fl


Corpuscular


Volume


Mean                   28.0 pg


Corpuscular


Hemoglobin


Mean                29.0 g/dl 
  
                
              



Corpuscular


Hemoglobin
Conc


ent


Red Cell                15.2 %


Distribution


Width


Platelet Count     124 10^3/UL


Mean Platelet          11.6 fl


Volume


Immature               2.700 %


Granulocytes %


Neutrophils %           87.3 %


Lymphocytes %            4.8 %


Monocytes %              4.8 %


Eosinophils %            0.0 %


Basophils %              0.4 %


Nucleated Red      0.1 /100WBC


Blood Cells %


Immature         0.540 10^3/ul


Granulocytes #


Neutrophils #     17.7 10^3/ul


Lymphocytes #      1.0 10^3/ul


Monocytes #        1.0 10^3/ul


Eosinophils #      0.0 10^3/ul


Basophils #        0.1 10^3/ul


Nucleated Red      0.0 10^3/ul


Blood Cells #


Prothrombin           12.4 Sec


Time


Prothrombin                1.0


Time Ratio


INR                      0.91 
  
                
              



International


Normalized
Rati


o


Activated            40.8 Sec 
  
                
              



Partial
Thrombo


plast Time


Sodium Level        136 mmol/L


Potassium Level     5.4 mmol/L


Chloride Level       94 mmol/L


Carbon Dioxide       34 mmol/L


Level


Anion Gap                    8


Blood Urea            38 mg/dl


Nitrogen


Creatinine          1.68 mg/dl


Est Glomerular      41 mL/min 
  
                
              



Filtrat


Rate
mL/min


Glucose Level        173 mg/dl


POC Venous          2.0 mmol/L                      2.0 mmol/L


Lactate


Calcium Level        9.4 mg/dl


Total Bilirubin      0.0 mg/dl


Direct              0.00 mg/dl


Bilirubin


Indirect             0.0 mg/dl


Bilirubin


Aspartate Amino      173 IU/L 
  
                
              



Transf
(AST/SGO


T)


Alanine              122 IU/L 
  
                
              



Aminotransferas


e
(ALT/SGPT)


Alkaline               86 IU/L


Phosphatase


Troponin I         0.248 ng/ml


Total Protein         7.8 g/dl


Albumin               4.0 g/dl


Globulin             3.80 g/dl


Albumin/Globuli           1.05


n Ratio


Urine Color                      TEVIN


Urine Clarity                    CLOUDY


Urine pH                                    5.0


Urine Specific                            1.016


Gravity


Urine Ketones                    NEGATIVE mg/dL


Urine Nitrite                    NEGATIVE mg/dL


Urine Bilirubin                  NEGATIVE mg/dL


Urine                                  1+ mg/dL


Urobilinogen


Urine Leukocyte                       1+ Willy/ul


Esterase


Urine                                    4 /HPF


Microscopic RBC


Urine                                   33 /HPF


Microscopic WBC


Urine Amorphous                  MANY /HPF


Crystals


Urine Bacteria                   FEW /HPF


Urine Hyaline                    FEW /HPF


Casts


Urine Mucus                      FEW /HPF


Urine                            NEGATIVE mg/dL


Hemoglobin


Urine Glucose                    NEGATIVE mg/dL


Urine Total                            2+ mg/dl


Protein


Blood Gas                                                        Blood arterial


Specimen Source


Arterial Blood   
               
                
              1/21/2019
10:00


Date Drawn
                                                               :42 PM


Arterial Blood   
               
                
                      7.203 



pH


(Temp
corrected


)


Arterial Blood   
               
                
                  74.7 mmhg 



pCO2


(Temp
correct)


Arterial Blood   
               
                
                 156.9 mmHG 



pO2


(Temp
corrected


)


Arterial Blood                                                      28.7 mmol/L


HCO3


Arterial Blood                                                      -1.1 mmol/L


Base Excess


Arterial Blood   
               
                
                  98.9 mmHG 



Oxygen
Saturati


on


Antoine Test                                                       N/A


Arterial Blood   
               
                
              Right Brachial


Gas                                                              



Puncture
Site


Arterial         
               
                
                      2.3 % 



Blood
Carboxyhe


moglobin


Arterial Blood                                                            0.1 %


Methemoglobin


Blood Gas A-a                                                        481.4 mmHg


O2 Differential


Oxyhemoglobin                                                            96.5 %


Percent


Blood Gas                                                                37.0 C


Temperature


Blood Gas                                                                  16.0


Respiration


Rate


Blood Gas        
               
                
                         16 



Actual


Respiration
Rat


e


Blood Gas                                                        VENT - AC


Modality


FiO2                                                                    100.0 %


Blood Gas Tidal                                                        500.0 mL


Volume


Blood Gas        
               
                
              N JOSÉ MIGUEL TINSLEY


Critical Value                                                   



Read
Back


Blood Gas                                                        UP


Notified Whom


Blood Gas        
               
                
              1/21/2019
10:14


Notified Time
                                                            :35 PM





Current Medications


 Medications
   Dose
          Sig/Ceci
       Start Time
   Status  Last


 (Trade)       Ordered        Route
 PRN     Stop Time              Admin
Dose


                              Reason                                Admin


 Sodium         2,250 ml       BOLUS OVER 2   1/21/19       DC           1/21/19


Chloride
                     HOURS STAT
    18:29
                       18:50



(NS)                          IV*
           1/21/19 18:33


 Vancomycin     250 ml @ 
     ONCE  STAT
    1/21/19       DC           1/21/19


HCl            125 mls/hr     IVPB
          18:29
                       19:37



                                             1/21/19 20:28


 Piperacillin   100 ml @ 
     ONCE  STAT
    1/21/19       DC           1/21/19


Sod/
          200 mls/hr     IVPB
          18:29
                       18:50



Tazobactam                                   1/21/19 18:58


Sod


 Pantoprazole   100 ml @ 
     ONCE  STAT
    1/21/19       DC       



80
 mg/Sodium  400 mls/hr     IVPB
          19:07



Chloride                                     1/21/19 19:21


 Sodium         1,000 ml @ 
   Q10H
 IV
      1/21/19       UNV      



Chloride       100 mls/hr                    23:03



 Ondansetron    4 mg           Q6H  PRN
      1/21/19       UNV      



HCl
  (Zofran                 IV
 NAUSEA     23:30



Inj)                          AND/OR


                              VOMITING


                650 mg         Q4H  PRN
      1/21/19       UNV      



Acetaminophen                 [place]
 PAIN       23:30




  (Tylenol                   LEVEL 1-3 OR


Supp)                         FEVER


 Morphine       2 mg           Q4H  PRN
      1/21/19       UNV      



Sulfate
                      IV
 PAIN       23:30



(morphine)                    LEVEL 7-10


 Famotidine
    20 mg          Q12
 IV
       1/22/19       UNV      



(Pepcid Iv)                                  09:00



 Enoxaparin     30 mg          DAILY
 SC
     1/22/19       UNV      



Sodium
                                      09:00



(Lovenox)


 Piperacillin   100 ml @ 
     Q6
 IVPB
      1/22/19       UNV      



Sod/
          200 mls/hr                    00:00



Tazobactam


Sod








Procedures/MDM


EMERGENT LABS AND DIAGNOSTIC STUDIES: 


Lab Results above were reviewed and interpreted by me. 





CBC: Leukocytosis, consistent with acute infection.  Thrombocytopenia, unclear 


etiology


CMP: Evidence of acute renal failure with elevated BUN and creatinine with mild 


hyperkalemia.


ABG shows evidence of respiratory acidosis


Troponin elevated, consistent with acute myocardial ischemia


Lactate within normal limits without evidence of sepsis or tissue hypoperfusion


UA: Possibly consistent with infection





12-lead EKG was interpreted by NEERAJ Carroll MD: 


Sinus tachycardia at 109 bpm


Right bundle branch block


Septal Q waves


No acute ST or T wave changes suggestive of acute ischemia or STEMI. 


___________________________________________________________ 


Radiology Results as interpreted by Radiology below were reviewed by VASQUEZ Carroll MD: 





Chest x-ray post-intubation:





IMPRESSION:


New endotracheal tube in appropriate position.


Right lower lobe infiltrate and moderate right pleural effusion.


Patchy linear atelectatic changes and increased interstitial changes in the left


lung base.


_____________________________________________ 


.Trino Wallace MD, MD           Date    Time 


Electronically viewed and signed by .Trino Wallace MD, MD on 01/21/2019 


18:48 





KUB: 


IMPRESSION:


New large left-sided pneumothorax.


New large amount of free intraperitoneal air.


Nasogastric tube extending below the diaphragm, likely within the stomach.





Chest Xray post-chest tube:


IMPRESSION:


Placement of a left-sided chest tube with re-expansion of the left lung. No 


evidence of pneumothorax.


Free intraperitoneal air again noted.





___________________________________________________________ 


Initial Nursing notes reviewed. 


Previous Medical Records requested via the Electronic Health Record. 





EMERGENCY DEPARTMENT COURSE / MEDICAL DECISION MAKING: 





Endotracheal Intubation by me: 


Pre assessment performed.  See preceding note for details.  


Pre-oxygenation performed with 100% oxygen


   RSI:            Performed w/o complication or hypoxic events. Medications as 


ordered.


   Blade:         Mac 4


   ET Tube:      7.5 cm


   Depth:         22 cm at the lip





Intubation confirmed by colorimetric CO2, equal breath sounds, quiet over the 


stomach.





Central Line Placement by me:


Patient consented, sterilely draped, full prep, gown, glove, mask, time out 


performed.  





Anesthesia:     1% lidocaine locally


Location:     Right IJ


Device:      Multiple lumen


Technique:     Seldinger technique. Secured with suture. 


Results:         Venous return from all ports with easy saline flush. No 


complications.  





Guide wire retrieved and disposed of.





Chest X-ray 1V Interpreted by me: Central line in SVC, Normal soft tissue, No 


evidence of pneumothorax.








MDM





Patient is presenting with hypothermia and altered mental status with evidence 


of acute respiratory failure.  He was intubated due to his mental status and for


airway protection.  NG tube was placed with subsequent dark large amount of 


output.  This is concerning for possible GI bleed.  Sepsis workup was initiated.


 Warming measures were also initiated.  During his ED course, the patient did 


suffer from a cardiac arrest and after ACLS was performed, had return of 


spontaneous circulation.  Subsequent to this, his abdomen became distended.  KUB


was ordered to evaluate for NG tube placement and the pneumothorax was found.  


Chest tube was placed on the left side with improvement of his distention, 


however there was evidence of possible intra-abdominal free air.  For this reaso


n CT abdomen and pelvis was ordered.  Patient was started on a Levophed drip.  


He does have evidence of NSTEMI but no clear evidence of STEMI.  Aspirin was not


given as the patient has signs of a GI bleed. 





 Patient's infectious symptoms have not stabilized and the patient is at risk of


rapid decompensation.  The patient will be admitted for careful hydration, 


antibiotic therapy, and infectious source control.  Based on my assessment, this


patient's prognosis seems to be very poor.  He has not required any sedation 


after being intubated.





I did try to get a hold of the emergency contact but the number in his record is


not the correct working number.  I am unable to get a hold of any family.





Severe Sepsis Assessment:


Infectious Source:   Pneumonia


End organ damage indicated by:


Hypotension( SBP < 90 or >40 mmHG drop or MAP < 65)


Acute Resp Failure (sat < 92% w/o oxygen)





Severe Sepsis Managment: Blood Cultures X 2 before broad spectrum antibiotics 


initiated within 3 hours of recognition.


   


30 ml/kg NS bolus   Completed


Initial Lactate:           normal


Repeat Lactate        normal at 2.0





Critical Care:


   Time:          70 minutes


   Treatments/Evaluations:    Emergent fluid management, while maintaining close


respiratory support.  Immediate broad spectrum antibiotic therapy.  Simultaneous


assessment for possible sources in order to direct therapy.  Consideration for 


invasive and chemical support to prevent respiratory or cardiac collapse.


                                                           


Septic Shock Assessment (1 hour post 30 ml/kg fluid bolus):


Hypotension (SBP < 90 or 40 mmHg drop, MAP < 65):        yes


Lactic acid > 4.0                                                              


   No





Perfusion Reassessment for Septic Shock:


Temp 93.2F, Pulse 56,  RR 16, /81


Heart Exam:             Bradycardic


Lung Exam:              No Crackles


Capillary Refill:          Delayed


Peripheral Pulses:   Radially present


Skin:                         Mottled, pale





Hypotensive Treatment (not required for isolated lactic acid elevation):


Comfort Care:             No


Central LIne:                 yes


Vasopressor started:   norepinephrine   





Accepting Care Team:


Current data and ongoing care discussed.


Time:                      Time of admission


Primary Provider:      Dr. Anju Bolton





Departure


Diagnosis:  


   Primary Impression:  


   Septic shock


   Additional Impressions:  


   Altered level of consciousness


   Acute respiratory failure


   Respiratory failure complication:  hypoxia and hypercapnia  Qualified Codes: 


   J96.01 - Acute respiratory failure with hypoxia; J96.02 - Acute respiratory f


   ailure with hypercapnia


   Pneumonia


   Pneumonia type:  due to unspecified organism  Laterality:  unspecified 


   laterality  Lung location:  unspecified part of lung  Qualified Codes:  J18.9


   - Pneumonia, unspecified organism


   Acute renal failure


   Acute renal failure type:  unspecified  Qualified Codes:  N17.9 - Acute 


   kidney failure, unspecified


   Hyperkalemia


   Hypothermia


   Encounter type:  initial encounter  Qualified Codes:  T68.XXXA - Hypothermia,


   initial encounter


   Cardiac arrest with successful resuscitation


   Pneumothorax on left


Condition:  Critical











LEO CARROLL MD         Jan 21, 2019 20:04

## 2019-01-22 VITALS — HEART RATE: 65 BPM | SYSTOLIC BLOOD PRESSURE: 107 MMHG | RESPIRATION RATE: 26 BRPM | DIASTOLIC BLOOD PRESSURE: 79 MMHG

## 2019-01-22 VITALS — RESPIRATION RATE: 26 BRPM | HEART RATE: 54 BPM | DIASTOLIC BLOOD PRESSURE: 88 MMHG | SYSTOLIC BLOOD PRESSURE: 130 MMHG

## 2019-01-22 VITALS — SYSTOLIC BLOOD PRESSURE: 99 MMHG | DIASTOLIC BLOOD PRESSURE: 73 MMHG | HEART RATE: 86 BPM | RESPIRATION RATE: 26 BRPM

## 2019-01-22 VITALS — DIASTOLIC BLOOD PRESSURE: 79 MMHG | RESPIRATION RATE: 26 BRPM | HEART RATE: 71 BPM | SYSTOLIC BLOOD PRESSURE: 114 MMHG

## 2019-01-22 VITALS — SYSTOLIC BLOOD PRESSURE: 107 MMHG | HEART RATE: 81 BPM | RESPIRATION RATE: 26 BRPM | DIASTOLIC BLOOD PRESSURE: 77 MMHG

## 2019-01-22 VITALS — HEART RATE: 68 BPM | DIASTOLIC BLOOD PRESSURE: 77 MMHG | SYSTOLIC BLOOD PRESSURE: 116 MMHG | RESPIRATION RATE: 26 BRPM

## 2019-01-22 VITALS — HEART RATE: 75 BPM | SYSTOLIC BLOOD PRESSURE: 112 MMHG | RESPIRATION RATE: 26 BRPM | DIASTOLIC BLOOD PRESSURE: 76 MMHG

## 2019-01-22 VITALS — RESPIRATION RATE: 26 BRPM | SYSTOLIC BLOOD PRESSURE: 108 MMHG | DIASTOLIC BLOOD PRESSURE: 78 MMHG | HEART RATE: 80 BPM

## 2019-01-22 VITALS — HEART RATE: 81 BPM | RESPIRATION RATE: 26 BRPM | DIASTOLIC BLOOD PRESSURE: 82 MMHG | SYSTOLIC BLOOD PRESSURE: 101 MMHG

## 2019-01-22 VITALS — DIASTOLIC BLOOD PRESSURE: 72 MMHG | SYSTOLIC BLOOD PRESSURE: 97 MMHG | HEART RATE: 86 BPM | RESPIRATION RATE: 26 BRPM

## 2019-01-22 VITALS — RESPIRATION RATE: 26 BRPM | HEART RATE: 79 BPM | SYSTOLIC BLOOD PRESSURE: 106 MMHG | DIASTOLIC BLOOD PRESSURE: 82 MMHG

## 2019-01-22 VITALS — RESPIRATION RATE: 26 BRPM | HEART RATE: 65 BPM | SYSTOLIC BLOOD PRESSURE: 102 MMHG | DIASTOLIC BLOOD PRESSURE: 75 MMHG

## 2019-01-22 VITALS — HEART RATE: 65 BPM | RESPIRATION RATE: 26 BRPM | DIASTOLIC BLOOD PRESSURE: 57 MMHG | SYSTOLIC BLOOD PRESSURE: 79 MMHG

## 2019-01-22 VITALS — SYSTOLIC BLOOD PRESSURE: 109 MMHG | RESPIRATION RATE: 26 BRPM | HEART RATE: 79 BPM | DIASTOLIC BLOOD PRESSURE: 77 MMHG

## 2019-01-22 VITALS — SYSTOLIC BLOOD PRESSURE: 108 MMHG | HEART RATE: 79 BPM | RESPIRATION RATE: 26 BRPM | DIASTOLIC BLOOD PRESSURE: 79 MMHG

## 2019-01-22 VITALS — SYSTOLIC BLOOD PRESSURE: 102 MMHG | DIASTOLIC BLOOD PRESSURE: 83 MMHG | RESPIRATION RATE: 26 BRPM | HEART RATE: 83 BPM

## 2019-01-22 VITALS — HEART RATE: 73 BPM | DIASTOLIC BLOOD PRESSURE: 78 MMHG | RESPIRATION RATE: 26 BRPM | SYSTOLIC BLOOD PRESSURE: 117 MMHG

## 2019-01-22 VITALS — SYSTOLIC BLOOD PRESSURE: 99 MMHG | HEART RATE: 81 BPM | RESPIRATION RATE: 26 BRPM | DIASTOLIC BLOOD PRESSURE: 78 MMHG

## 2019-01-22 VITALS — HEART RATE: 61 BPM | DIASTOLIC BLOOD PRESSURE: 80 MMHG | SYSTOLIC BLOOD PRESSURE: 131 MMHG | RESPIRATION RATE: 26 BRPM

## 2019-01-22 VITALS — DIASTOLIC BLOOD PRESSURE: 76 MMHG | HEART RATE: 73 BPM | RESPIRATION RATE: 26 BRPM | SYSTOLIC BLOOD PRESSURE: 98 MMHG

## 2019-01-22 VITALS — HEART RATE: 76 BPM | DIASTOLIC BLOOD PRESSURE: 96 MMHG | RESPIRATION RATE: 22 BRPM | SYSTOLIC BLOOD PRESSURE: 108 MMHG

## 2019-01-22 VITALS — HEART RATE: 74 BPM | RESPIRATION RATE: 26 BRPM | DIASTOLIC BLOOD PRESSURE: 76 MMHG | SYSTOLIC BLOOD PRESSURE: 108 MMHG

## 2019-01-22 VITALS — HEART RATE: 68 BPM | SYSTOLIC BLOOD PRESSURE: 103 MMHG | DIASTOLIC BLOOD PRESSURE: 74 MMHG | RESPIRATION RATE: 17 BRPM

## 2019-01-22 VITALS — HEART RATE: 69 BPM | SYSTOLIC BLOOD PRESSURE: 109 MMHG | DIASTOLIC BLOOD PRESSURE: 77 MMHG | RESPIRATION RATE: 26 BRPM

## 2019-01-22 VITALS — HEART RATE: 78 BPM | RESPIRATION RATE: 26 BRPM | DIASTOLIC BLOOD PRESSURE: 84 MMHG | SYSTOLIC BLOOD PRESSURE: 113 MMHG

## 2019-01-22 VITALS — DIASTOLIC BLOOD PRESSURE: 73 MMHG | SYSTOLIC BLOOD PRESSURE: 105 MMHG | RESPIRATION RATE: 26 BRPM | HEART RATE: 60 BPM

## 2019-01-22 VITALS — HEART RATE: 78 BPM | DIASTOLIC BLOOD PRESSURE: 77 MMHG | RESPIRATION RATE: 26 BRPM | SYSTOLIC BLOOD PRESSURE: 106 MMHG

## 2019-01-22 VITALS — HEART RATE: 79 BPM | SYSTOLIC BLOOD PRESSURE: 101 MMHG | DIASTOLIC BLOOD PRESSURE: 77 MMHG | RESPIRATION RATE: 26 BRPM

## 2019-01-22 VITALS — HEART RATE: 70 BPM | RESPIRATION RATE: 30 BRPM | SYSTOLIC BLOOD PRESSURE: 117 MMHG | DIASTOLIC BLOOD PRESSURE: 78 MMHG

## 2019-01-22 VITALS — SYSTOLIC BLOOD PRESSURE: 104 MMHG | RESPIRATION RATE: 26 BRPM | DIASTOLIC BLOOD PRESSURE: 82 MMHG | HEART RATE: 86 BPM

## 2019-01-22 VITALS — RESPIRATION RATE: 26 BRPM | DIASTOLIC BLOOD PRESSURE: 78 MMHG | SYSTOLIC BLOOD PRESSURE: 113 MMHG | HEART RATE: 68 BPM

## 2019-01-22 VITALS — DIASTOLIC BLOOD PRESSURE: 91 MMHG | RESPIRATION RATE: 26 BRPM | SYSTOLIC BLOOD PRESSURE: 136 MMHG

## 2019-01-22 VITALS — HEART RATE: 84 BPM | DIASTOLIC BLOOD PRESSURE: 78 MMHG | SYSTOLIC BLOOD PRESSURE: 111 MMHG | RESPIRATION RATE: 26 BRPM

## 2019-01-22 VITALS — SYSTOLIC BLOOD PRESSURE: 99 MMHG | DIASTOLIC BLOOD PRESSURE: 73 MMHG | RESPIRATION RATE: 26 BRPM | HEART RATE: 72 BPM

## 2019-01-22 VITALS — SYSTOLIC BLOOD PRESSURE: 110 MMHG | DIASTOLIC BLOOD PRESSURE: 79 MMHG | HEART RATE: 81 BPM | RESPIRATION RATE: 26 BRPM

## 2019-01-22 VITALS — HEART RATE: 65 BPM | SYSTOLIC BLOOD PRESSURE: 107 MMHG | DIASTOLIC BLOOD PRESSURE: 76 MMHG | RESPIRATION RATE: 26 BRPM

## 2019-01-22 VITALS — HEART RATE: 77 BPM | SYSTOLIC BLOOD PRESSURE: 98 MMHG | DIASTOLIC BLOOD PRESSURE: 77 MMHG | RESPIRATION RATE: 26 BRPM

## 2019-01-22 VITALS — DIASTOLIC BLOOD PRESSURE: 80 MMHG | HEART RATE: 63 BPM | SYSTOLIC BLOOD PRESSURE: 104 MMHG | RESPIRATION RATE: 26 BRPM

## 2019-01-22 VITALS — RESPIRATION RATE: 26 BRPM | SYSTOLIC BLOOD PRESSURE: 106 MMHG | HEART RATE: 81 BPM | DIASTOLIC BLOOD PRESSURE: 78 MMHG

## 2019-01-22 VITALS — RESPIRATION RATE: 26 BRPM

## 2019-01-22 VITALS — DIASTOLIC BLOOD PRESSURE: 79 MMHG | RESPIRATION RATE: 26 BRPM | HEART RATE: 80 BPM | SYSTOLIC BLOOD PRESSURE: 104 MMHG

## 2019-01-22 VITALS — HEART RATE: 74 BPM | DIASTOLIC BLOOD PRESSURE: 79 MMHG | SYSTOLIC BLOOD PRESSURE: 113 MMHG | RESPIRATION RATE: 26 BRPM

## 2019-01-22 VITALS — SYSTOLIC BLOOD PRESSURE: 119 MMHG | DIASTOLIC BLOOD PRESSURE: 83 MMHG | HEART RATE: 77 BPM | RESPIRATION RATE: 26 BRPM

## 2019-01-22 VITALS — RESPIRATION RATE: 26 BRPM | HEART RATE: 87 BPM | SYSTOLIC BLOOD PRESSURE: 109 MMHG | DIASTOLIC BLOOD PRESSURE: 79 MMHG

## 2019-01-22 VITALS — SYSTOLIC BLOOD PRESSURE: 101 MMHG | DIASTOLIC BLOOD PRESSURE: 74 MMHG | HEART RATE: 67 BPM | RESPIRATION RATE: 26 BRPM

## 2019-01-22 VITALS — SYSTOLIC BLOOD PRESSURE: 106 MMHG | RESPIRATION RATE: 26 BRPM | DIASTOLIC BLOOD PRESSURE: 80 MMHG | HEART RATE: 78 BPM

## 2019-01-22 VITALS — HEART RATE: 77 BPM | SYSTOLIC BLOOD PRESSURE: 103 MMHG | DIASTOLIC BLOOD PRESSURE: 87 MMHG | RESPIRATION RATE: 26 BRPM

## 2019-01-22 VITALS — HEART RATE: 73 BPM | DIASTOLIC BLOOD PRESSURE: 76 MMHG | SYSTOLIC BLOOD PRESSURE: 102 MMHG | RESPIRATION RATE: 26 BRPM

## 2019-01-22 VITALS — DIASTOLIC BLOOD PRESSURE: 87 MMHG | HEART RATE: 57 BPM | RESPIRATION RATE: 26 BRPM | SYSTOLIC BLOOD PRESSURE: 136 MMHG

## 2019-01-22 VITALS — DIASTOLIC BLOOD PRESSURE: 85 MMHG | RESPIRATION RATE: 26 BRPM | SYSTOLIC BLOOD PRESSURE: 125 MMHG | HEART RATE: 76 BPM

## 2019-01-22 VITALS — RESPIRATION RATE: 26 BRPM | DIASTOLIC BLOOD PRESSURE: 79 MMHG | SYSTOLIC BLOOD PRESSURE: 108 MMHG | HEART RATE: 75 BPM

## 2019-01-22 VITALS — DIASTOLIC BLOOD PRESSURE: 76 MMHG | HEART RATE: 65 BPM | RESPIRATION RATE: 26 BRPM | SYSTOLIC BLOOD PRESSURE: 113 MMHG

## 2019-01-22 VITALS — DIASTOLIC BLOOD PRESSURE: 80 MMHG | SYSTOLIC BLOOD PRESSURE: 103 MMHG | HEART RATE: 87 BPM | RESPIRATION RATE: 26 BRPM

## 2019-01-22 VITALS — DIASTOLIC BLOOD PRESSURE: 75 MMHG | SYSTOLIC BLOOD PRESSURE: 95 MMHG | HEART RATE: 83 BPM | RESPIRATION RATE: 26 BRPM

## 2019-01-22 VITALS — RESPIRATION RATE: 26 BRPM | DIASTOLIC BLOOD PRESSURE: 87 MMHG | SYSTOLIC BLOOD PRESSURE: 111 MMHG | HEART RATE: 81 BPM

## 2019-01-22 VITALS — SYSTOLIC BLOOD PRESSURE: 131 MMHG | HEART RATE: 59 BPM | DIASTOLIC BLOOD PRESSURE: 83 MMHG | RESPIRATION RATE: 26 BRPM

## 2019-01-22 VITALS — DIASTOLIC BLOOD PRESSURE: 70 MMHG | SYSTOLIC BLOOD PRESSURE: 101 MMHG | RESPIRATION RATE: 17 BRPM | HEART RATE: 72 BPM

## 2019-01-22 VITALS — DIASTOLIC BLOOD PRESSURE: 70 MMHG | SYSTOLIC BLOOD PRESSURE: 82 MMHG | RESPIRATION RATE: 28 BRPM | HEART RATE: 85 BPM

## 2019-01-22 VITALS — HEART RATE: 72 BPM | SYSTOLIC BLOOD PRESSURE: 108 MMHG | DIASTOLIC BLOOD PRESSURE: 75 MMHG | RESPIRATION RATE: 26 BRPM

## 2019-01-22 VITALS — DIASTOLIC BLOOD PRESSURE: 79 MMHG | HEART RATE: 80 BPM | SYSTOLIC BLOOD PRESSURE: 112 MMHG | RESPIRATION RATE: 26 BRPM

## 2019-01-22 VITALS — SYSTOLIC BLOOD PRESSURE: 106 MMHG | RESPIRATION RATE: 26 BRPM | HEART RATE: 82 BPM | DIASTOLIC BLOOD PRESSURE: 78 MMHG

## 2019-01-22 VITALS — HEART RATE: 78 BPM | DIASTOLIC BLOOD PRESSURE: 86 MMHG | RESPIRATION RATE: 26 BRPM | SYSTOLIC BLOOD PRESSURE: 106 MMHG

## 2019-01-22 VITALS — SYSTOLIC BLOOD PRESSURE: 110 MMHG | HEART RATE: 85 BPM | RESPIRATION RATE: 26 BRPM | DIASTOLIC BLOOD PRESSURE: 79 MMHG

## 2019-01-22 VITALS — SYSTOLIC BLOOD PRESSURE: 105 MMHG | DIASTOLIC BLOOD PRESSURE: 75 MMHG | HEART RATE: 70 BPM

## 2019-01-22 VITALS — HEART RATE: 64 BPM | RESPIRATION RATE: 26 BRPM | SYSTOLIC BLOOD PRESSURE: 103 MMHG | DIASTOLIC BLOOD PRESSURE: 73 MMHG

## 2019-01-22 VITALS — SYSTOLIC BLOOD PRESSURE: 104 MMHG | DIASTOLIC BLOOD PRESSURE: 77 MMHG | HEART RATE: 63 BPM | RESPIRATION RATE: 26 BRPM

## 2019-01-22 VITALS — DIASTOLIC BLOOD PRESSURE: 82 MMHG | HEART RATE: 66 BPM | SYSTOLIC BLOOD PRESSURE: 109 MMHG | RESPIRATION RATE: 26 BRPM

## 2019-01-22 VITALS — DIASTOLIC BLOOD PRESSURE: 83 MMHG | HEART RATE: 82 BPM | SYSTOLIC BLOOD PRESSURE: 109 MMHG | RESPIRATION RATE: 26 BRPM

## 2019-01-22 VITALS — DIASTOLIC BLOOD PRESSURE: 80 MMHG | HEART RATE: 66 BPM | RESPIRATION RATE: 23 BRPM | SYSTOLIC BLOOD PRESSURE: 110 MMHG

## 2019-01-22 VITALS — HEART RATE: 65 BPM | RESPIRATION RATE: 18 BRPM | DIASTOLIC BLOOD PRESSURE: 71 MMHG | SYSTOLIC BLOOD PRESSURE: 100 MMHG

## 2019-01-22 VITALS — SYSTOLIC BLOOD PRESSURE: 115 MMHG | HEART RATE: 71 BPM | DIASTOLIC BLOOD PRESSURE: 77 MMHG | RESPIRATION RATE: 26 BRPM

## 2019-01-22 VITALS — RESPIRATION RATE: 13 BRPM | SYSTOLIC BLOOD PRESSURE: 100 MMHG | DIASTOLIC BLOOD PRESSURE: 77 MMHG | HEART RATE: 66 BPM

## 2019-01-22 VITALS — HEART RATE: 64 BPM | DIASTOLIC BLOOD PRESSURE: 73 MMHG | RESPIRATION RATE: 26 BRPM | SYSTOLIC BLOOD PRESSURE: 100 MMHG

## 2019-01-22 VITALS — HEART RATE: 86 BPM | DIASTOLIC BLOOD PRESSURE: 79 MMHG | RESPIRATION RATE: 26 BRPM | SYSTOLIC BLOOD PRESSURE: 107 MMHG

## 2019-01-22 VITALS — HEART RATE: 65 BPM | RESPIRATION RATE: 26 BRPM | DIASTOLIC BLOOD PRESSURE: 75 MMHG | SYSTOLIC BLOOD PRESSURE: 103 MMHG

## 2019-01-22 VITALS — DIASTOLIC BLOOD PRESSURE: 76 MMHG | SYSTOLIC BLOOD PRESSURE: 133 MMHG | RESPIRATION RATE: 26 BRPM | HEART RATE: 87 BPM

## 2019-01-22 VITALS — SYSTOLIC BLOOD PRESSURE: 120 MMHG | RESPIRATION RATE: 26 BRPM | HEART RATE: 71 BPM | DIASTOLIC BLOOD PRESSURE: 90 MMHG

## 2019-01-22 VITALS — DIASTOLIC BLOOD PRESSURE: 74 MMHG | HEART RATE: 72 BPM | RESPIRATION RATE: 20 BRPM | SYSTOLIC BLOOD PRESSURE: 109 MMHG

## 2019-01-22 VITALS — DIASTOLIC BLOOD PRESSURE: 79 MMHG | HEART RATE: 81 BPM | SYSTOLIC BLOOD PRESSURE: 99 MMHG | RESPIRATION RATE: 26 BRPM

## 2019-01-22 VITALS — SYSTOLIC BLOOD PRESSURE: 107 MMHG | RESPIRATION RATE: 26 BRPM | HEART RATE: 79 BPM | DIASTOLIC BLOOD PRESSURE: 75 MMHG

## 2019-01-22 VITALS — RESPIRATION RATE: 26 BRPM | SYSTOLIC BLOOD PRESSURE: 136 MMHG | DIASTOLIC BLOOD PRESSURE: 82 MMHG | HEART RATE: 61 BPM

## 2019-01-22 VITALS — SYSTOLIC BLOOD PRESSURE: 104 MMHG | HEART RATE: 64 BPM | DIASTOLIC BLOOD PRESSURE: 77 MMHG | RESPIRATION RATE: 26 BRPM

## 2019-01-22 LAB
ABNORMAL IP MESSAGE: 1
ADD MAN DIFF?: NO
ALLEN TEST: (no result)
ALLEN TEST: (no result)
ANION GAP: 6 (ref 5–13)
ARTERIAL BASE EXCESS: 0.2 MMOL/L (ref -3–3)
ARTERIAL BASE EXCESS: 1.1 MMOL/L (ref -3–3)
ARTERIAL BLOOD GAS OXYGEN SAT: 77.2 MMHG (ref 95–98)
ARTERIAL BLOOD GAS OXYGEN SAT: 99.7 MMHG (ref 95–98)
ARTERIAL COHB: 0.8 % (ref 0–3)
ARTERIAL COHB: 1.6 % (ref 0–3)
ARTERIAL FRACTION OF OXYHGB: 76 % (ref 93–99)
ARTERIAL FRACTION OF OXYHGB: 98.6 % (ref 93–99)
ARTERIAL HCO3: 26.1 MMOL/L (ref 22–26)
ARTERIAL HCO3: 26.3 MMOL/L (ref 22–26)
ARTERIAL METHB: 0 % (ref 0–1.5)
ARTERIAL METHB: 0.3 % (ref 0–1.5)
ARTERIAL PCO2: 44.3 MMHG (ref 35–45)
ARTERIAL PCO2: 47.1 MMHG (ref 35–45)
BASOPHIL #: 0 10^3/UL (ref 0–0.1)
BASOPHILS %: 0.1 % (ref 0–2)
BLOOD GAS LOW PEEP SETTING: 5 CMH2O
BLOOD GAS LOW PEEP SETTING: 5 CMH2O
BLOOD GAS TIDAL VOLUME: 500 ML
BLOOD GAS TIDAL VOLUME: 500 ML
BLOOD UREA NITROGEN: 41 MG/DL (ref 7–20)
CALCIUM: 9.2 MG/DL (ref 8.4–10.2)
CARBON DIOXIDE: 30 MMOL/L (ref 21–31)
CHLORIDE: 103 MMOL/L (ref 97–110)
CREATININE: 1.47 MG/DL (ref 0.61–1.24)
EOSINOPHILS #: 0 10^3/UL (ref 0–0.5)
EOSINOPHILS %: 0 % (ref 0–7)
FIO2: 100 %
FIO2: 100 %
GLUCOSE: 138 MG/DL (ref 70–220)
HEMATOCRIT: 40 % (ref 42–52)
HEMOGLOBIN A1C: 5.6 % (ref 0–5.9)
HEMOGLOBIN: 12.1 G/DL (ref 14–18)
IMMATURE GRANS #M: 0.22 10^3/UL (ref 0–0.03)
IMMATURE GRANS % (M): 1 % (ref 0–0.43)
LACTIC ACID: 2.6 MMOL/L (ref 0.5–2)
LYMPHOCYTES #: 1.9 10^3/UL (ref 0.8–2.9)
LYMPHOCYTES %: 8.8 % (ref 15–51)
MEAN CORPUSCULAR HEMOGLOBIN: 27.9 PG (ref 29–33)
MEAN CORPUSCULAR HGB CONC: 30.3 G/DL (ref 32–37)
MEAN CORPUSCULAR VOLUME: 92.4 FL (ref 82–101)
MEAN PLATELET VOLUME: 11 FL (ref 7.4–10.4)
MODE: (no result)
MODE: (no result)
MONOCYTE #: 1.8 10^3/UL (ref 0.3–0.9)
MONOCYTES %: 8.5 % (ref 0–11)
NEUTROPHIL #: 17.3 10^3/UL (ref 1.6–7.5)
NEUTROPHILS %: 81.6 % (ref 39–77)
NUCLEATED RED BLOOD CELLS #: 0.1 10^3/UL (ref 0–0)
NUCLEATED RED BLOOD CELLS%: 0.4 /100WBC (ref 0–0)
O2 A-A PPRESDIFF RESPIRATORY: 404.3 MMHG (ref 7–24)
O2 A-A PPRESDIFF RESPIRATORY: 624 MMHG (ref 7–24)
PLATELET COUNT: 138 10^3/UL (ref 140–415)
POSITIVE DIFF: (no result)
POTASSIUM: 4.7 MMOL/L (ref 3.5–5.1)
RED BLOOD COUNT: 4.33 10^6/UL (ref 4.7–6.1)
RED CELL DISTRIBUTION WIDTH: 15.4 % (ref 11.5–14.5)
SODIUM: 139 MMOL/L (ref 135–144)
TROPONIN-I: 0.31 NG/ML (ref 0–0.12)
WHITE BLOOD COUNT: 21.3 10^3/UL (ref 4.8–10.8)

## 2019-01-22 RX ADMIN — IOHEXOL 1 ML: 300 INJECTION, SOLUTION INTRAVENOUS at 00:56

## 2019-01-22 RX ADMIN — Medication SCH MLS/HR: at 09:48

## 2019-01-22 RX ADMIN — FOLIC ACID SCH MLS/HR: 5 INJECTION, SOLUTION INTRAMUSCULAR; INTRAVENOUS; SUBCUTANEOUS at 18:37

## 2019-01-22 RX ADMIN — VASOPRESSIN 1 ML/SEC: 20 INJECTION, SOLUTION INTRAMUSCULAR; SUBCUTANEOUS at 00:56

## 2019-01-22 RX ADMIN — PIPERACILLIN SODIUM AND TAZOBACTAM SODIUM 1 MLS/HR: 3; .375 INJECTION, POWDER, LYOPHILIZED, FOR SOLUTION INTRAVENOUS at 11:31

## 2019-01-22 RX ADMIN — PROPOFOL SCH MLS/HR: 10 INJECTION, EMULSION INTRAVENOUS at 15:07

## 2019-01-22 RX ADMIN — THIAMINE HYDROCHLORIDE 1 MLS/HR: 100 INJECTION, SOLUTION INTRAMUSCULAR; INTRAVENOUS at 17:11

## 2019-01-22 RX ADMIN — THIAMINE HYDROCHLORIDE 1 MLS/HR: 100 INJECTION, SOLUTION INTRAMUSCULAR; INTRAVENOUS at 01:59

## 2019-01-22 RX ADMIN — FAMOTIDINE 1 MG: 10 INJECTION, SOLUTION INTRAVENOUS at 08:24

## 2019-01-22 RX ADMIN — ENOXAPARIN SODIUM 1 MG: 100 INJECTION SUBCUTANEOUS at 09:00

## 2019-01-22 RX ADMIN — IOHEXOL 1 ML: 300 INJECTION, SOLUTION INTRAVENOUS at 08:06

## 2019-01-22 RX ADMIN — PIPERACILLIN SODIUM AND TAZOBACTAM SODIUM 1 MLS/HR: 3; .375 INJECTION, POWDER, LYOPHILIZED, FOR SOLUTION INTRAVENOUS at 17:24

## 2019-01-22 RX ADMIN — PIPERACILLIN SODIUM AND TAZOBACTAM SODIUM 1 MLS/HR: 3; .375 INJECTION, POWDER, LYOPHILIZED, FOR SOLUTION INTRAVENOUS at 23:51

## 2019-01-22 RX ADMIN — PIPERACILLIN SODIUM AND TAZOBACTAM SODIUM SCH MLS/HR: 3; .375 INJECTION, POWDER, LYOPHILIZED, FOR SOLUTION INTRAVENOUS at 17:24

## 2019-01-22 RX ADMIN — PIPERACILLIN SODIUM AND TAZOBACTAM SODIUM SCH MLS/HR: 3; .375 INJECTION, POWDER, LYOPHILIZED, FOR SOLUTION INTRAVENOUS at 06:06

## 2019-01-22 RX ADMIN — PROPOFOL 1 MLS/HR: 10 INJECTION, EMULSION INTRAVENOUS at 00:09

## 2019-01-22 RX ADMIN — PROPOFOL SCH MLS/HR: 10 INJECTION, EMULSION INTRAVENOUS at 06:20

## 2019-01-22 RX ADMIN — Medication 1 MLS/HR: at 00:22

## 2019-01-22 RX ADMIN — PIPERACILLIN SODIUM AND TAZOBACTAM SODIUM SCH MLS/HR: 3; .375 INJECTION, POWDER, LYOPHILIZED, FOR SOLUTION INTRAVENOUS at 11:31

## 2019-01-22 RX ADMIN — PROPOFOL 1 MLS/HR: 10 INJECTION, EMULSION INTRAVENOUS at 22:56

## 2019-01-22 RX ADMIN — VANCOMYCIN HYDROCHLORIDE SCH MLS/HR: 1 INJECTION, POWDER, LYOPHILIZED, FOR SOLUTION INTRAVENOUS at 17:24

## 2019-01-22 RX ADMIN — PROPOFOL 1 MLS/HR: 10 INJECTION, EMULSION INTRAVENOUS at 18:30

## 2019-01-22 RX ADMIN — PIPERACILLIN SODIUM AND TAZOBACTAM SODIUM SCH MLS/HR: 3; .375 INJECTION, POWDER, LYOPHILIZED, FOR SOLUTION INTRAVENOUS at 00:00

## 2019-01-22 RX ADMIN — DOPAMINE HYDROCHLORIDE 1 MLS/HR: 160 INJECTION, SOLUTION INTRAVENOUS at 18:00

## 2019-01-22 RX ADMIN — Medication 1 MLS/HR: at 09:48

## 2019-01-22 RX ADMIN — IOHEXOL 1 ML: 300 INJECTION, SOLUTION INTRAVENOUS at 08:21

## 2019-01-22 RX ADMIN — FAMOTIDINE 1 MG: 10 INJECTION, SOLUTION INTRAVENOUS at 21:16

## 2019-01-22 RX ADMIN — PIPERACILLIN SODIUM AND TAZOBACTAM SODIUM 1 MLS/HR: 3; .375 INJECTION, POWDER, LYOPHILIZED, FOR SOLUTION INTRAVENOUS at 00:00

## 2019-01-22 RX ADMIN — DOPAMINE HYDROCHLORIDE SCH MLS/HR: 160 INJECTION, SOLUTION INTRAVENOUS at 01:58

## 2019-01-22 RX ADMIN — DIPHENHYDRAMINE HYDROCHLORIDE SCH MG: 50 INJECTION, SOLUTION INTRAMUSCULAR; INTRAVENOUS at 08:24

## 2019-01-22 RX ADMIN — PIPERACILLIN SODIUM AND TAZOBACTAM SODIUM SCH MLS/HR: 3; .375 INJECTION, POWDER, LYOPHILIZED, FOR SOLUTION INTRAVENOUS at 23:51

## 2019-01-22 RX ADMIN — PROPOFOL SCH MLS/HR: 10 INJECTION, EMULSION INTRAVENOUS at 22:56

## 2019-01-22 RX ADMIN — PIPERACILLIN SODIUM AND TAZOBACTAM SODIUM 1 MLS/HR: 3; .375 INJECTION, POWDER, LYOPHILIZED, FOR SOLUTION INTRAVENOUS at 06:06

## 2019-01-22 RX ADMIN — PROPOFOL 1 MLS/HR: 10 INJECTION, EMULSION INTRAVENOUS at 15:07

## 2019-01-22 RX ADMIN — FOLIC ACID SCH MLS/HR: 5 INJECTION, SOLUTION INTRAMUSCULAR; INTRAVENOUS; SUBCUTANEOUS at 06:08

## 2019-01-22 RX ADMIN — VANCOMYCIN HYDROCHLORIDE 1 MLS/HR: 1 INJECTION, POWDER, LYOPHILIZED, FOR SOLUTION INTRAVENOUS at 17:24

## 2019-01-22 RX ADMIN — PROPOFOL SCH MLS/HR: 10 INJECTION, EMULSION INTRAVENOUS at 18:30

## 2019-01-22 RX ADMIN — FOLIC ACID SCH MLS/HR: 5 INJECTION, SOLUTION INTRAMUSCULAR; INTRAVENOUS; SUBCUTANEOUS at 17:11

## 2019-01-22 RX ADMIN — ENOXAPARIN SODIUM SCH MG: 100 INJECTION SUBCUTANEOUS at 09:00

## 2019-01-22 RX ADMIN — THIAMINE HYDROCHLORIDE 1 MLS/HR: 100 INJECTION, SOLUTION INTRAMUSCULAR; INTRAVENOUS at 18:37

## 2019-01-22 RX ADMIN — FOLIC ACID SCH MLS/HR: 5 INJECTION, SOLUTION INTRAMUSCULAR; INTRAVENOUS; SUBCUTANEOUS at 01:59

## 2019-01-22 RX ADMIN — Medication SCH MLS/HR: at 00:22

## 2019-01-22 RX ADMIN — DOPAMINE HYDROCHLORIDE SCH MLS/HR: 160 INJECTION, SOLUTION INTRAVENOUS at 18:00

## 2019-01-22 RX ADMIN — DIPHENHYDRAMINE HYDROCHLORIDE SCH MG: 50 INJECTION, SOLUTION INTRAMUSCULAR; INTRAVENOUS at 21:16

## 2019-01-22 RX ADMIN — DOPAMINE HYDROCHLORIDE 1 MLS/HR: 160 INJECTION, SOLUTION INTRAVENOUS at 01:58

## 2019-01-22 RX ADMIN — PROPOFOL 1 MLS/HR: 10 INJECTION, EMULSION INTRAVENOUS at 06:20

## 2019-01-22 RX ADMIN — THIAMINE HYDROCHLORIDE 1 MLS/HR: 100 INJECTION, SOLUTION INTRAMUSCULAR; INTRAVENOUS at 06:08

## 2019-01-22 NOTE — EN
Date/Time of Note


Date/Time of Note


DATE: 1/22/19 


TIME: 01:49





ER Progress Note


Discussed CT findings with Dr. Aponte who is on-call for surgery.  Made aware.  


Patient will be admitted to ICU.











LION HURST             Jan 22, 2019 01:50

## 2019-01-22 NOTE — CONS
Date/Time of Note


Date/Time of Note


DATE: 1/22/19 


TIME: 10:35





Assessment/Plan


Multiple chest x-rays as well as CT imaging of the abdomen were reviewed.  


Patient does have pneumoperitoneum.  Severe bullous emphysema is present 


bilaterally.


Ventilator setting; AC of 26, tidal volume 500, PEEP of 10, 100% FiO2.





Assessment and recommendations;





1.  Patient admitted with altered mental status and hypothermia possibly bowel 


perforation as the patient did have significant coffee-ground material suctioned


through a nasogastric tube with pneumoperitoneum seen on CT imaging of the 


abdomen.


2.  Significant leukocytosis.  Patient currently on appropriate antimicrobial 


regimen.


3.  Status post placement of left-sided chest tube .


4.  Severe bullous emphysema.


5.  Persistent shock.  Status post adequate fluid resuscitation.


6.  Bibasilar infiltrates seen on CT imaging of the abdomen.


Possibly aspiration pneumonia.


7.  Severe hypoxemia.





Continue current supportive care.  Patient likely will need abdominal 


exploration.  Meanwhile obtain a stat ABG.  Continue current antibiotics and pr


essor support.  Obtain daily chest x-ray.  Further recommendations once ABG is 


performed.


40 minutes of critical care time was spent evaluating the patient.


Result Diagram:  


1/22/19 0218                                                                    


           1/22/19 0218





Results 24hrs





Laboratory Tests


Test
               1/21/19
18:25  1/21/19
19:13  1/21/19
19:23    1/21/19
20:30


White Blood               20.2  H


Count


Red Blood Count            4.83


Hemoglobin                13.5  L


Hematocrit                 46.5


Mean Corpuscular           96.3


Volume


Mean Corpuscular          28.0  L


Hemoglobin


Mean Corpuscular         29.0  L
  
              
              



Hemoglobin
Marisel


nt


Red Cell                  15.2  H


Distribution


Width


Platelet Count             124  L


Mean Platelet             11.6  H


Volume


Immature                 2.700  H


Granulocytes %


Neutrophils %             87.3  H


Lymphocytes %              4.8  L


Monocytes %                 4.8


Eosinophils %               0.0


Basophils %                 0.4


Nucleated Red              0.1  H


Blood Cells %


Immature                 0.540  H


Granulocytes #


Neutrophils #             17.7  H


Lymphocytes #               1.0


Monocytes #                1.0  H


Eosinophils #               0.0


Basophils #                 0.1


Nucleated Red               0.0


Blood Cells #


Prothrombin Time           12.4


Prothrombin Time            1.0


Ratio


INR                       0.91  
  
              
              



International


Normalized
Ratio


Activated                40.8  H
  
              
              



Partial
Thrombop


last Time


Sodium Level                136


Potassium Level            5.4  H


Chloride Level              94  L


Carbon Dioxide              34  H


Level


Anion Gap                     8


Blood Urea                  38  H


Nitrogen


Creatinine                1.68  H


Est Glomerular             41  L
  
              
              



Filtrat


Rate
mL/min


Glucose Level               173


POC Venous                  2.0                           2.0


Lactate


Calcium Level               9.4


Total Bilirubin            0.0  L


Direct Bilirubin           0.00


Indirect                    0.0


Bilirubin


Aspartate Amino           173  H
  
              
              



Transf
(AST/SGOT


)


Alanine                   122  H
  
              
              



Aminotransferase



(ALT/SGPT)


Alkaline                     86


Phosphatase


Troponin I              0.248  *H


Total Protein               7.8


Albumin                     4.0


Globulin                  3.80  H


Albumin/Globulin           1.05


Ratio


Urine Color                        TEVIN


Urine Clarity                      CLOUDY  A


Urine pH                                   5.0


Urine Specific                           1.016


Gravity


Urine Ketones                      NEGATIVE


Urine Nitrite                      NEGATIVE


Urine Bilirubin                    NEGATIVE


Urine                                      1+  H


Urobilinogen


Urine Leukocyte                            1+  H


Esterase


Urine                                        4


Microscopic RBC


Urine                                      33  H


Microscopic WBC


Urine Amorphous                    MANY  A


Crystals


Urine Bacteria                     FEW  A


Urine Hyaline                      FEW  A


Casts


Urine Mucus                        FEW  A


Urine Hemoglobin                   NEGATIVE


Urine Glucose                      NEGATIVE


Urine Total                                2+  H


Protein


Blood Gas         
                
              
              Blood arterial



Specimen Source



Arterial Blood    
                
              
              1/21/2019
10:00


Date Drawn
                                                               :42 PM


Arterial Blood    
                
              
                   7.203  *L



pH


(Temp
corrected)


Arterial Blood    
                
              
                     74.7  H



pCO2


(Temp
correct)


Arterial Blood    
                
              
                    156.9  H



pO2


(Temp
corrected)


Arterial Blood                                                           28.7  H


HCO3


Arterial Blood                                                            -1.1


Base Excess


Arterial Blood    
                
              
                     98.9  H



Oxygen
Saturatio


n


Antoine Test                                                       N/A


Arterial Blood    
                
              
              Right Brachial



Gas


Puncture
Site


Arterial          
                
              
                       2.3  



Blood
Carboxyhem


oglobin


Arterial Blood                                                             0.1


Methemoglobin


Blood Gas A-a O2                                                        481.4  H


Differential


Oxyhemoglobin                                                             96.5


Percent


Blood Gas                                                                 37.0


Temperature


Blood Gas                                                                 16.0


Respiration Rate


Blood Gas Actual  
                
              
                        16  



Respiration
Rate


Blood Gas                                                        VENT - AC


Modality


FiO2                                                                     100.0


Blood Gas Tidal                                                          500.0


Volume


Blood Gas         
                
              
              N JOSÉ MIGUEL TINSLEY



Critical Value


Read
Back


Blood Gas                                                        UP


Notified Whom


Blood Gas         
                
              
              1/21/2019
10:14


Notified Time
                                                            :35 PM


Test
               1/22/19
00:50  1/22/19
02:18  
              



Blood Gas         Blood arterial
  
              
              



Specimen Source



Arterial Blood    1/22/2019
1:15:  
              
              



Date Drawn
                 59 AM


Arterial Blood           7.362  
  
              
              



pH


(Temp
corrected)


Arterial Blood           47.1  H
  
              
              



pCO2


(Temp
correct)


Arterial Blood          41.9  *L
  
              
              



pO2


(Temp
corrected)


Arterial Blood            26.1  H


HCO3


Arterial Blood              0.2


Base Excess


Arterial Blood           77.2  L
  
              
              



Oxygen
Saturatio


n


Antoine Test        ACCEPTAB


Arterial Blood    Right Radial  
  
              
              



Gas


Puncture
Site


Arterial                   1.6  
  
              
              



Blood
Carboxyhem


oglobin


Arterial Blood                0


Methemoglobin


Blood Gas A-a O2         624.0  H


Differential


Oxyhemoglobin             76.0  L


Percent


Blood Gas                  37.0


Temperature


Blood Gas                  26.0


Respiration Rate


Blood Gas Actual            26  
  
              
              



Respiration
Rate


Blood Gas         VENT - AC


Modality


FiO2                      100.0


Blood Gas Tidal           500.0


Volume


Blood Gas Low               5.0


PEEP Setting


Blood Gas         D NATALI TINSLEY    
              
              



Critical Value    



Read
Back


Blood Gas         UP


Notified Whom


Blood Gas         1/22/2019
1:30:  
              
              



Notified Time
              16 AM


White Blood                              21.3  H


Count


Red Blood Count                          4.33  L


Hemoglobin                               12.1  L


Hematocrit                               40.0  L


Mean Corpuscular                          92.4


Volume


Mean Corpuscular                         27.9  L


Hemoglobin


Mean Corpuscular  
                     30.3  L
  
              



Hemoglobin
Marisel


nt


Red Cell                                 15.4  H


Distribution


Width


Platelet Count                            138  L


Mean Platelet                            11.0  H


Volume


Immature                                1.000  H


Granulocytes %


Neutrophils %                            81.6  H


Lymphocytes %                             8.8  L


Monocytes %                                8.5


Eosinophils %                              0.0


Basophils %                                0.1


Nucleated Red                             0.4  H


Blood Cells %


Immature                                0.220  H


Granulocytes #


Neutrophils #                            17.3  H


Lymphocytes #                              1.9


Monocytes #                               1.8  H


Eosinophils #                              0.0


Basophils #                                0.0


Nucleated Red                             0.1  H


Blood Cells #


Sodium Level                               139


Potassium Level                            4.7


Chloride Level                             103


Carbon Dioxide                              30


Level


Anion Gap                                    6


Blood Urea                                 41  H


Nitrogen


Creatinine                               1.47  H


Est Glomerular    
                       48  L
  
              



Filtrat


Rate
mL/min


Glucose Level                              138


Hemoglobin A1c                             5.6


Lactic Acid                              2.6  *H


Level


Calcium Level                              9.2








Consultation Date/Type/Reason


Admit Date/Time


Jan 22, 2019 at 00:03


Date of Consultation:  Jan 22, 2019


Type of Consult


Pulmonary/critical care


Patient is a 68-year-old male who is a nursing home resident was sent over to 


the hospital because of hypothermia and altered mental status.  In the emergency


room, patient developed cardiac arrest requiring CPR and intubation.  In the 


course of workup the patient also required a chest tube placement on the left 


side.  CT imaging of the abdomen showed pneumoperitoneum with possibly 


perforated viscus.  By the time I saw the patient in ICU, patient is orally 


intubated and sedated.  History was obtained from medical records.





Past medical history; essentially unremarkable except for possibly COPD.


Medications; reviewed.  Patient is currently on dopamine at 6 mics per kilogram 


per minute, Levophed 20 mics per minute, propofol 25 mics per kilogram per 


minute.  Other medications were also reviewed.


Allergies; none.


Social history; not available.


Family history and occupational history is also not available.


Review of system; unable to be obtained.


General exam; elderly male, orally intubated, sedated, currently in no distress.





Past Medical History


Medications





Current Medications


Sodium Chloride 1,000 ml @  100 mls/hr Q10H IV  Last administered on 1/22/19at 


06:08; Admin Dose 100 MLS/HR;  Start 1/21/19 at 23:03


Ondansetron HCl (Zofran Inj) 4 mg Q6H  PRN IV NAUSEA AND/OR VOMITING;  Start 


1/21/19 at 23:30


Acetaminophen (Tylenol Supp) 650 mg Q4H  PRN SC PAIN LEVEL 1-3 OR FEVER;  Start 


1/21/19 at 23:30


Morphine Sulfate (morphine) 2 mg Q4H  PRN IV PAIN LEVEL 7-10;  Start 1/21/19 at 


23:30


Famotidine (Pepcid Iv) 20 mg Q12 IV  Last administered on 1/22/19at 08:24; Admin


Dose 20 MG;  Start 1/22/19 at 09:00


Enoxaparin Sodium (Lovenox) 30 mg DAILY SC ;  Start 1/22/19 at 09:00


Piperacillin Sod/ Tazobactam Sod 100 ml @  200 mls/hr Q6 IVPB  Last administered


on 1/22/19at 06:06; Admin Dose 200 MLS/HR;  Start 1/22/19 at 00:00


Norepinephrine 250 ml @  1.875 mls/ hr TITRATE IV  Last administered on 1/22/19a


t 09:48; Admin Dose 37.5 MLS/HR;  Start 1/22/19 at 00:30


Dopamine HCl/ Dextrose 250 ml @  5.625 mls/ hr TITRATE IV  Last administered on 


1/22/19at 01:58; Admin Dose 5.625 MLS/HR;  Start 1/22/19 at 02:00


Influenza Virus Vaccine Quadrival (Fluzone) 0.5 ml ONCE ONCE IM* ;  Start 


1/23/19 at 10:00;  Stop 1/23/19 at 10:01


Propofol 100 ml @  1.875 mls/ hr Q12H IV  Last administered on 1/22/19at 06:20; 


Admin Dose 9.375 MLS/HR;  Start 1/22/19 at 06:30


Allergies:  


Coded Allergies:  


     No Known Allergy (Unverified , 1/21/19)





Social History


Smoking Status:  Unknown if ever smoked





Exam/Review of Systems


Vital Signs


Vitals





Vital Signs


  Date      Temp  Pulse  Resp  B/P (MAP)   Pulse Ox  O2          O2 Flow    FiO2


Time                                                 Delivery    Rate


   1/22/19           57    26      136/87       100  Mechanical


     10:15                          (103)            Ventilator


   1/22/19  97.6


     08:00


   1/22/19                                                                   100


     08:00


   1/21/19                                                              15


     18:34








Intake and Output





1/21/19 1/21/19 1/22/19





1515:00


23:00


07:00





IntakeIntake Total


745.625 ml





OutputOutput Total


375 ml





BalanceBalance


370.625 ml














Exam


H EENT exam; supple neck, no JVD.  No lymphadenopathy.  Midline trachea.  No 


thyromegaly.  Patient is edentulous.  Orally intubated.  Pupils are midsize.  No


neck masses.


Chest exam; diminished breath sounds bilaterally.  S1-S2 audible, no murmurs.  


Regular rhythm.  Left-sided chest tube in place.


Abdomen exam; soft, no organomegaly.  Bowel sounds are sluggish.


Extremity exam; no peripheral edema.


CNS exam; patient is sedated.





Medications


Medications





Current Medications


Sodium Chloride 1,000 ml @  100 mls/hr Q10H IV  Last administered on 1/22/19at 


06:08; Admin Dose 100 MLS/HR;  Start 1/21/19 at 23:03


Ondansetron HCl (Zofran Inj) 4 mg Q6H  PRN IV NAUSEA AND/OR VOMITING;  Start 


1/21/19 at 23:30


Acetaminophen (Tylenol Supp) 650 mg Q4H  PRN SC PAIN LEVEL 1-3 OR FEVER;  Start 


1/21/19 at 23:30


Morphine Sulfate (morphine) 2 mg Q4H  PRN IV PAIN LEVEL 7-10;  Start 1/21/19 at 


23:30


Famotidine (Pepcid Iv) 20 mg Q12 IV  Last administered on 1/22/19at 08:24; Admin


Dose 20 MG;  Start 1/22/19 at 09:00


Enoxaparin Sodium (Lovenox) 30 mg DAILY SC ;  Start 1/22/19 at 09:00


Piperacillin Sod/ Tazobactam Sod 100 ml @  200 mls/hr Q6 IVPB  Last administered


on 1/22/19at 06:06; Admin Dose 200 MLS/HR;  Start 1/22/19 at 00:00


Norepinephrine 250 ml @  1.875 mls/ hr TITRATE IV  Last administered on 


1/22/19at 09:48; Admin Dose 37.5 MLS/HR;  Start 1/22/19 at 00:30


Dopamine HCl/ Dextrose 250 ml @  5.625 mls/ hr TITRATE IV  Last administered on 


1/22/19at 01:58; Admin Dose 5.625 MLS/HR;  Start 1/22/19 at 02:00


Influenza Virus Vaccine Quadrival (Fluzone) 0.5 ml ONCE ONCE IM* ;  Start 


1/23/19 at 10:00;  Stop 1/23/19 at 10:01


Propofol 100 ml @  1.875 mls/ hr Q12H IV  Last administered on 1/22/19at 06:20; 


Admin Dose 9.375 MLS/HR;  Start 1/22/19 at 06:30











SHAGUFTA HUANG                    Jan 22, 2019 10:40

## 2019-01-22 NOTE — CONS
DATE OF ADMISSION: 01/22/2019

DATE OF CONSULTATION:  01/22/2019

 

 

 

TYPE OF CONSULTATION:  Nephrology.

 

REASON FOR CONSULTATION:  Acute kidney injury.

 

PHYSICIAN REQUESTING CONSULT:  Donaldo Miller MD

 

HISTORY OF PRESENT ILLNESS:  This is a 68-year-old male with a past medical history of COPD, history 
of bipolar disorder, BPH, depression, seizure disorder, dyslipidemia, who was brought in from his [facility] facility to [facility] Room for confusion and hypoxemia.  The patient 
in the emergency room underwent cardiac arrest, requiring CPR, intubation with eventual spontaneous r
eturn of circulation.  The patient also had a chest tube placed.  CT scan of the abdomen and pelvis w
as performed which showed evidence of pneumoperitoneum.  The patient was seen by general surgery, Dr. Aponte, who felt that the pneumoperitoneum was likely due to cardiac resuscitation.  The patient fol
lowing a code arrest was noted to be in shock, was admitted to intensive care unit where he has been 
on IV fluids and pressor support.

 

In terms of patient's renal history, the patient was on admission noted to have a creatinine of 1.68 
mg/dL.  Baseline renal function is currently unknown.  The patient has had excellent urinary output. 
 There have been no reports of any hemoptysis, hematemesis or hematochezia.

 

PAST MEDICAL HISTORY:  As stated above, history of BPH, history of depression, history of COPD, bipol
ar disorder, history of dyslipidemia.

 

PAST SURGICAL HISTORY:  Unknown.

 

SOCIAL HISTORY:  Unknown.

 

FAMILY HISTORY:  Unknown.

 

REVIEW OF SYSTEMS:  Unable to do adequate review of systems as the patient is intubated and sedated. 
 Pertinent positives as obtained by reviewing medical records, speaking to hospital staff, stated in 
HPI, otherwise negative.

 

PHYSICAL EXAMINATION:

VITAL SIGNS:  Blood pressure is 112/76, respiration 26, pulse 75, temperature 98.6.

HEENT:  Head is normocephalic.

NECK:  Supple.

HEART:  Regular rate.

LUNGS:  Show diminished breath sounds at the base.

ABDOMEN:  Soft, nontender to palpation without rebound or guarding.

EXTREMITIES:  Negative for clubbing, cyanosis.  No edema.

DERMATOLOGIC:  No rashes.

MUSCULOSKELETAL:  No joint effusions.

NEUROLOGIC:  Limited exam as the patient is sedated and intubated.

 

LABORATORY DATA:  Show sodium of 139, potassium 4.7, chloride 103, bicarb 30, BUN 41, creatinine 1.47
.  Lactic acid 2.6.  The patient's ABG was reviewed, which showed pH of 7.32, pCO2 of 44.  White coun
t 21.3, hemoglobin 10.1, platelet count is 138.

 

IMAGING STUDIES:  CT abdomen and pelvis was reviewed.  Chest x-ray shows left-sided chest tube, hazy 
airspace disease and pneumoperitoneum.

 

ASSESSMENT AND PLAN:  This is a 68-year-old male who presents with:

1.  Nonoliguric acute kidney injury with unknown baseline creatinine.  Etiology of acute kidney injur
y is multifactorial, likely secondary to hemodynamics, acute tubular necrosis due to shock, septic ac
mayco kidney injury.  The patient's initial urinalysis does show evidence of hyaline casts and proteinu
varun which can be seen in tubular injury.  The patient does have underlying pyuria but no hematuria.  
Lower suspicion for acute glomerulonephritis or vasculitis given patient's clinical presentation.  Re
nal function has been improving with IV hydration.  Additionally, the patient's imaging studies did s
uggest increased echogenicity of bilateral kidneys consistent with possible chronic kidney disease.  
Recommendation at this point is to continue current medical management.  Continue pressor support to 
maintain MAP above 65.  Continue IV fluids.  Continue antibiotic therapy.  We will continue supportiv
e care, renally dose meds, avoid nephrotoxins.

2.  Anemia.  Monitor hemoglobin and hematocrit levels.

3.  Mineral bone disorder.  Monitor calcium and phosphorus levels.

4.  Lactic acidosis.  Etiology is likely secondary to septic shock.  The patient's ABG was reviewed, 
currently compensated.  No need for bicarbonate therapy.  We will continue to monitor.  Continue to t
reat underlying shock.

5.  Ventilator-dependent respiratory failure.  Vent settings and ABG was reviewed.  Continue to monit
or.

6.  Septic shock secondary to pneumonia.  Continue medical management with IV fluids and pressor supp
ort.

7.  Status post cardiac arrest, likely secondary to sepsis.  Continue to monitor.  Follow up with car
diology.

8.  Left-sided pneumothorax, status post chest tube placement.

9.  Pneumoperitoneum, questionable perforated viscus.  The patient was seen by general surgery.  Cont
inue to monitor.

10.  Encephalopathy, etiology is toxic metabolic.

 

Thank you, Dr. Miller, for this interesting consult.  It will be a pleasure to follow the patient w
ith you throughout the hospital course.

 

 

Dictated By: MARYAN RUCKER/PEREZ

DD:    01/22/2019 18:26:15

DT:    01/22/2019 18:45:30

Conf#: [id number]

DID#:  [id number]

CC: MIMI ZAVALA MD; MAURI GRIGSBY MD;*End*

## 2019-01-22 NOTE — CONS
Date/Time of Note


Date/Time of Note


DATE: 19 


TIME: 14:14





Assessment/Plan


Pneumoperitoneum of uncertain etiology


Based on the history and clinical exam, I do not feel that this patient has a 


perforated viscus.  Pneumoperitoneum likely from trauma of chest compressions 


versus ruptured bleb


Will follow with close follow-up and repeat imaging in the morning.


Continue  current care


Result Diagram:  


19 0218                                                                    


           19 0218





Results 24hrs





Laboratory Tests


Test
              19
18:25  19
19:13    19
19:23   19
20:30


White Blood              20.2  H


Count


Red Blood Count           4.83


Hemoglobin               13.5  L


Hematocrit                46.5


Mean                      96.3


Corpuscular


Volume


Mean                     28.0  L


Corpuscular


Hemoglobin


Mean                    29.0  L
  
              
                



Corpuscular


Hemoglobin
Conc


ent


Red Cell                 15.2  H


Distribution


Width


Platelet Count            124  L


Mean Platelet            11.6  H


Volume


Immature                2.700  H


Granulocytes %


Neutrophils %            87.3  H


Lymphocytes %             4.8  L


Monocytes %                4.8


Eosinophils %              0.0


Basophils %                0.4


Nucleated Red             0.1  H


Blood Cells %


Immature                0.540  H


Granulocytes #


Neutrophils #            17.7  H


Lymphocytes #              1.0


Monocytes #               1.0  H


Eosinophils #              0.0


Basophils #                0.1


Nucleated Red              0.0


Blood Cells #


Prothrombin               12.4


Time


Prothrombin                1.0


Time Ratio


INR                      0.91  
  
              
                



International


Normalized
Rati


o


Activated               40.8  H
  
              
                



Partial
Thrombo


plast Time


Sodium Level               136


Potassium Level           5.4  H


Chloride Level             94  L


Carbon Dioxide             34  H


Level


Anion Gap                    8


Blood Urea                 38  H


Nitrogen


Creatinine               1.68  H


Est Glomerular            41  L
  
              
                



Filtrat


Rate
mL/min


Glucose Level              173


POC Venous                 2.0                             2.0


Lactate


Calcium Level              9.4


Total Bilirubin           0.0  L


Direct                    0.00


Bilirubin


Indirect                   0.0


Bilirubin


Aspartate Amino          173  H
  
              
                



Transf
(AST/SGO


T)


Alanine                  122  H
  
              
                



Aminotransferas


e
(ALT/SGPT)


Alkaline                    86


Phosphatase


Troponin I             0.248  *H


Total Protein              7.8


Albumin                    4.0


Globulin                 3.80  H


Albumin/Globuli           1.05


n Ratio


Urine Color                       TEVIN


Urine Clarity                     CLOUDY  A


Urine pH                                  5.0


Urine Specific                          1.016


Gravity


Urine Ketones                     NEGATIVE


Urine Nitrite                     NEGATIVE


Urine Bilirubin                   NEGATIVE


Urine                                     1+  H


Urobilinogen


Urine Leukocyte                           1+  H


Esterase


Urine                                       4


Microscopic RBC


Urine                                     33  H


Microscopic WBC


Urine Amorphous                   MANY  A


Crystals


Urine Bacteria                    FEW  A


Urine Hyaline                     FEW  A


Casts


Urine Mucus                       FEW  A


Urine                             NEGATIVE


Hemoglobin


Urine Glucose                     NEGATIVE


Urine Total                               2+  H


Protein


Blood Gas        
                
              
                Blood


Specimen                                                          arterial



Source



Arterial Blood   
                
              
                2019
10:0


Date Drawn
                                                              0:42 PM


Arterial Blood   
                
              
                    7.203  *L



pH


(Temp
corrected


)


Arterial Blood   
                
              
                      74.7  H



pCO2


(Temp
correct)


Arterial Blood   
                
              
                     156.9  H



pO2


(Temp
corrected


)


Arterial Blood                                                           28.7  H


HCO3


Arterial Blood                                                            -1.1


Base Excess


Arterial Blood   
                
              
                      98.9  H



Oxygen
Saturati


on


Antoine Test                                                        N/A


Arterial Blood   
                
              
                Right


Gas                                                               Brachial



Puncture
Site


Arterial         
                
              
                        2.3  



Blood
Carboxyhe


moglobin


Arterial Blood                                                             0.1


Methemoglobin


Blood Gas A-a                                                           481.4  H


O2 Differential


Oxyhemoglobin                                                             96.5


Percent


Blood Gas                                                                 37.0


Temperature


Blood Gas                                                                 16.0


Respiration


Rate


Blood Gas        
                
              
                         16  



Actual


Respiration
Rat


e


Blood Gas                                                         VENT - AC


Modality


FiO2                                                                     100.0


Blood Gas Tidal                                                          500.0


Volume


Blood Gas        
                
              
                N EKJASON


Critical Value                                                    MD



Read
Back


Blood Gas                                                         UP


Notified Whom


Blood Gas        
                
              
                2019
10:1


Notified Time
                                                           4:35 PM


Test
              19
00:50  19
02:18    19
10:31  



Blood Gas        Blood arterial
  
              Blood arterial
  



Specimen


Source



Arterial Blood   2019
1:15:  
              2019
10:54  



Date Drawn
                59 AM                          :53 AM


Arterial Blood          7.362  
  
                     7.392  
  



pH


(Temp
corrected


)


Arterial Blood          47.1  H
  
                      44.3  
  



pCO2


(Temp
correct)


Arterial Blood         41.9  *L
  
                    264.4  H
  



pO2


(Temp
corrected


)


Arterial Blood           26.1  H                         26.3  H


HCO3


Arterial Blood             0.2                             1.1


Base Excess


Arterial Blood          77.2  L
  
                     99.7  H
  



Oxygen
Saturati


on


Antoine Test       ACCEPTAB                        ACCEPTAB


Arterial Blood   Right Radial  
  
              Right Radial  
  



Gas


Puncture
Site


Arterial                  1.6  
  
                       0.8  
  



Blood
Carboxyhe


moglobin


Arterial Blood               0                             0.3


Methemoglobin


Blood Gas A-a           624.0  H                        404.3  H


O2 Differential


Oxyhemoglobin            76.0  L                          98.6


Percent


Blood Gas                 37.0                            37.0


Temperature


Blood Gas                 26.0                            26.0


Respiration


Rate


Blood Gas                  26  
  
                        26  
  



Actual


Respiration
Rat


e


Blood Gas        VENT - AC                       VENT - AC


Modality


FiO2                     100.0                           100.0


Blood Gas Tidal          500.0                           500.0


Volume


Blood Gas Low              5.0                             5.0


PEEP Setting


Blood Gas        D NATALI TINSLEY    
              
                



Critical Value   



Read
Back


Blood Gas        UP                              TM


Notified Whom


Blood Gas        2019
1:30:  
              2019
11:12  



Notified Time
             16 AM                          :36 AM


White Blood                             21.3  H


Count


Red Blood Count                         4.33  L


Hemoglobin                              12.1  L


Hematocrit                              40.0  L


Mean                                     92.4


Corpuscular


Volume


Mean                                    27.9  L


Corpuscular


Hemoglobin


Mean             
                     30.3  L
  
                



Corpuscular


Hemoglobin
Conc


ent


Red Cell                                15.4  H


Distribution


Width


Platelet Count                           138  L


Mean Platelet                           11.0  H


Volume


Immature                               1.000  H


Granulocytes %


Neutrophils %                           81.6  H


Lymphocytes %                            8.8  L


Monocytes %                               8.5


Eosinophils %                             0.0


Basophils %                               0.1


Nucleated Red                            0.4  H


Blood Cells %


Immature                               0.220  H


Granulocytes #


Neutrophils #                           17.3  H


Lymphocytes #                             1.9


Monocytes #                              1.8  H


Eosinophils #                             0.0


Basophils #                               0.0


Nucleated Red                            0.1  H


Blood Cells #


Sodium Level                              139


Potassium Level                           4.7


Chloride Level                            103


Carbon Dioxide                             30


Level


Anion Gap                                   6


Blood Urea                                41  H


Nitrogen


Creatinine                              1.47  H


Est Glomerular   
                       48  L
  
                



Filtrat


Rate
mL/min


Glucose Level                             138


Hemoglobin A1c                            5.6


Lactic Acid                             2.6  *H


Level


Calcium Level                             9.2








Consultation Date/Type/Reason


Admit Date/Time


2019 at 00:03





Hx of Present Illness


The patient is a 68-year-old male recently discharged from assisted living due 


to home O2 requirements.  Patient came to the ER and was immediately intubated 


for saturation of 50%.  No history is obtainable.  Patient does not have family.


 Patient then had cardiac arrest and required chest compressions.  A CT after 


chest compressions did revealed a pneumothorax and a pneumoperitoneum.  A chest 


x-ray prior to intubation did not reveal a pneumoperitoneum.


14 point review of systems was performed.  Pertinent negatives a possible HPI





Past Medical History


Medical History:  other (COPD, BPH, schizophrenia)


Medications





Current Medications


Sodium Chloride 1,000 ml @  100 mls/hr Q10H IV  Last administered on 19at 


06:08; Admin Dose 100 MLS/HR;  Start 19 at 23:03


Ondansetron HCl (Zofran Inj) 4 mg Q6H  PRN IV NAUSEA AND/OR VOMITING;  Start 


19 at 23:30


Acetaminophen (Tylenol Supp) 650 mg Q4H  PRN ID PAIN LEVEL 1-3 OR FEVER;  Start 


19 at 23:30


Morphine Sulfate (morphine) 2 mg Q4H  PRN IV PAIN LEVEL 7-10;  Start 19 at 


23:30


Famotidine (Pepcid Iv) 20 mg Q12 IV  Last administered on 19at 08:24; Admin


Dose 20 MG;  Start 19 at 09:00


Enoxaparin Sodium (Lovenox) 30 mg DAILY SC ;  Start 19 at 09:00


Piperacillin Sod/ Tazobactam Sod 100 ml @  200 mls/hr Q6 IVPB  Last administered


on 19at 11:31; Admin Dose 200 MLS/HR;  Start 19 at 00:00


Norepinephrine 250 ml @  1.875 mls/ hr TITRATE IV  Last administered on 


19at 09:48; Admin Dose 37.5 MLS/HR;  Start 19 at 00:30


Dopamine HCl/ Dextrose 250 ml @  5.625 mls/ hr TITRATE IV  Last administered on 


19at 01:58; Admin Dose 5.625 MLS/HR;  Start 19 at 02:00


Influenza Virus Vaccine Quadrival (Fluzone) 0.5 ml ONCE ONCE IM* ;  Start 


19 at 10:00;  Stop 19 at 10:01


Propofol 100 ml @  1.875 mls/ hr Q12H IV  Last administered on 19at 06:20; 


Admin Dose 9.375 MLS/HR;  Start 19 at 06:30


Vancomycin HCl (Vanco Iv Per Pharmacy) VANCOMYCIN PER PHARMACY PER PROTOCOL XX ;


 Start 19 at 14:00;  Status UNV


Allergies:  


Coded Allergies:  


     No Known Allergy (Unverified , 19)





Social History


Smoking Status:  Unknown if ever smoked





Exam/Review of Systems


Vital Signs


Vitals





Vital Signs


  Date      Temp  Pulse  Resp  B/P (MAP)   Pulse Ox  O2          O2 Flow    FiO2


Time                                                 Delivery    Rate


   19           82    26      109/83        98  Mechanical


     13:00                           (92)            Ventilator


   19                                                                   100


     12:55


   19  97.6


     08:00


   19                                                              15


     18:34








Intake and Output





19





1515:00


23:00


07:00





IntakeIntake Total


745.625 ml





OutputOutput Total


435 ml





BalanceBalance


310.625 ml














Exam


Constitutional:  other (Intubated and sedated but respondeds to painful stimuli)


Head:  normocephalic


Eyes:  nl conjunctiva


ENMT:  nl external ears & nose


Neck:  supple, non-tender


Respiratory:  other (Creased breath sounds bilaterally)


Cardiovascular:  regular rate and rhythm


Gastrointestinal:  soft, other (Elderly distended but nontender and specifically


no peritoneal findings)


Extremities:  normal pulses


Neurological:  CNS II-XII intact, nl mental status


Skin:  nl turgor, rash or lesions


Lymph:  nl lymph nodes





Medications


Medications





Current Medications


Sodium Chloride 1,000 ml @  100 mls/hr Q10H IV  Last administered on 19at 


06:08; Admin Dose 100 MLS/HR;  Start 19 at 23:03


Ondansetron HCl (Zofran Inj) 4 mg Q6H  PRN IV NAUSEA AND/OR VOMITING;  Start 


19 at 23:30


Acetaminophen (Tylenol Supp) 650 mg Q4H  PRN ID PAIN LEVEL 1-3 OR FEVER;  Start 


19 at 23:30


Morphine Sulfate (morphine) 2 mg Q4H  PRN IV PAIN LEVEL 7-10;  Start 19 at 


23:30


Famotidine (Pepcid Iv) 20 mg Q12 IV  Last administered on 19at 08:24; Admin


Dose 20 MG;  Start 19 at 09:00


Enoxaparin Sodium (Lovenox) 30 mg DAILY SC ;  Start 19 at 09:00


Piperacillin Sod/ Tazobactam Sod 100 ml @  200 mls/hr Q6 IVPB  Last administered


on 19at 11:31; Admin Dose 200 MLS/HR;  Start 19 at 00:00


Norepinephrine 250 ml @  1.875 mls/ hr TITRATE IV  Last administered on 


19at 09:48; Admin Dose 37.5 MLS/HR;  Start 19 at 00:30


Dopamine HCl/ Dextrose 250 ml @  5.625 mls/ hr TITRATE IV  Last administered on 


19at 01:58; Admin Dose 5.625 MLS/HR;  Start 19 at 02:00


Influenza Virus Vaccine Quadrival (Fluzone) 0.5 ml ONCE ONCE IM* ;  Start 


19 at 10:00;  Stop 19 at 10:01


Propofol 100 ml @  1.875 mls/ hr Q12H IV  Last administered on 19at 06:20; 


Admin Dose 9.375 MLS/HR;  Start 19 at 06:30


Vancomycin HCl (Vanco Iv Per Pharmacy) VANCOMYCIN PER PHARMACY PER PROTOCOL XX ;


 Start 19 at 14:00;  Status UNV





Imaging


Imaging





Patient: CHRISSY NAJERA   : 1950   Age: 68  Sex: M                      


 


       MR #:    [id number]   Acct #:   [id number]    DOS: 19 0000


Ordering MD: LEELA WOODALL MD   Location:  ICU   Room/Bed:  [facility]          


                               


                                        


AMENDMENT:


2019 10:42:24 AM Lei Ramachandran M.D


Following discussion with Dr. Woodall, it is noted that oral contrast 


administered reaches the mid small bowel level. There is no evidence of oral 


contrast extravasation or leakage. The possibility of pneumoperitoneum 


developing from dissection of air from the chest/pneumothorax through the 


retroperitoneum into the abdomen during code blue rather than a perforated 


hollow viscus was raised.


 


 


Call report:  Findings discussed with Dr. Woodall on 2019 10:39:19 AM.


 


 


PROCEDURE:   CT Abdomen and Pelvis without intravenous contrast. 


 


CLINICAL INDICATION:   Pneumoperitoneum . Rule out perforated viscus. 


 


TECHNIQUE:   CT scan of the abdomen and pelvis without intravenous contrast was 


performed on a multi-slice CT scanner. Coronal and sagittal reformatted images 


were obtained from the axial source images. Images were reviewed on a high-


resolution PACS workstation. DICOM images are available.


 


Total DLP =  683.4 mGy-cm. CTDIvol = 10.9 mGy.


One or more of the following dose reduction techniques were used:


Automated exposure control.


Adjustment of the mA and/or kV according to patient size.


Use of iterative reconstruction technique.


 


COMPARISON:   CT 2019 


 


FINDINGS:


 


CT abdomen and pelvis:


 


The lung bases demonstrates extensive bullous emphysematous changes at the lung 


bases. There is a left-sided pleural catheter in place with trace pneumothorax. 


Bilateral basilar atelectasis or consolidations are seen. Calcified pleural 


plaque is noted in the right lung base..  The heart size is normal in size.  The


liver is normal in size and density without focal mass or intrahepatic biliary 


dilatation. There is a small cyst in the left lobe of the liver..  The spleen is


normal in size and homogeneous in density.   The pancreas as visualized is 


normal.  The gallbladder  shows no evidence of stones or distension .  The 


adrenal glands demonstrates bilateral hypertrophy or nodularity..  The kidneys 


are atrophic with cortical scarring and thinning. There is no evidence of 


obstructive uropathy or urolithiasis..  


 


The stomach is partially collapsed, but is grossly unremarkable. There is a 


nasogastric tube in place. Oral contrast is seen in the stomach and proximal sma


ll bowel loops.. The small bowels are unremarkable. The colon and rectum are 


normal.   There is no evidence of appendicitis or diverticulitis. The prostate 


is enlarged.. The bladder is decompressed with balloon tip Blake catheter in 


place.. There is large pneumoperitoneum in the anterior abdomen similar to prior


exam. Interstitial area in the left omental and peritoneal fat is again noted. 


There is no abdominal or pelvic adenopathy. Trace free fluid is seen in the 


abdomen.


 


The aorta is normal in caliber with calcific atherosclerosis . The osseous 


structures  showing degenerative enthesopathy of the spine. No osteolytic or 


osteoblastic lesions are identified.  There are moderate compression fractures 


of the T12 and L2 vertebra unchanged. There is a right hip arthroplasty in 


place. There is sclerosis of the left femoral head consistent with avascular 


necrosis.. Soft tissue emphysema is seen in the left lower anterior pelvis 


extending into the left scrotum unchanged.  Small amount of subcutaneous 


emphysema is also seen in the left upper quadrant of the abdominal wall. Lack of


IV and oral contrast limits sensitivity of exam. 


 


IMPRESSION:


 


1.  Large pneumoperitoneum with interstitial air in the left omental fat 


unchanged. Findings are consistent with perforated hollow viscus. Site of 


perforation is unclear.


2.  Soft tissue emphysema in the left inguinal region extending into the scrotum


as well as the left upper quadrant of the anterior abdominal wall.


3.  Left-sided chest tube in place with small left pneumothorax.


4.  Extensive bullous emphysematous changes of the lung bases with bibasilar 


consolidations and infiltrates.


5.  Bilateral adrenal hyperplasia and nodularity.


6.  Moderate compression fractures of the T12 and L2 vertebra unchanged.


7.  Nasogastric tube and Blake catheter in place.


8.  Avascular necrosis of the left femoral head. Status post right hip arthr


oplasty.


9.  Prostatomegaly.


 


RPTAT: [facility]


_____________________________________________ 


.Lei Ramachandran MD, MD           Date    Time 


Electronically viewed and signed by .Lei Ramachandran MD, MD on 2019 10:42 


 


D:  2019 10:42  T:  2019 10:42


.L/





CC: LEELA WOODALL MD





[id number]











LEELA WOODALL MD           2019 14:18

## 2019-01-22 NOTE — CONS
DATE OF ADMISSION: 01/22/2019

DATE OF CONSULTATION:  01/22/2019

 

 

 

TYPE OF CONSULTATION:  Cardiology.

 

REFERRING PHYSICIAN:  Donaldo Miller MD

 

REASON FOR EVALUATION:  Elevated troponins, tachycardia, hypertension.

 

HISTORY OF PRESENT ILLNESS:  Mr. Johnson is a 68-year-old gentleman who comes from a nursing home af
ter cardiac arrest.  The etiology of his arrest is unclear as the patient is currently intubated and 
not able to provide any history.  Apparently, he received CPR in the emergency department.  The patie
nt had a pneumothorax to follow which required a chest tube.  On presentation, the patient was with r
ight bundle branch block and had some nonspecific ST-T changes somewhat suggestive ST elevation in se
ptal leads.  The patient's initial troponin was borderline and there is no followup troponin done.  W
e are going to obtain one shortly.  There is another EKG here which shows very deep T-wave inversions
 also suggestive of ____.  In the setting of this event, the plan is for patient to have continuous m
edical surveillance.  He will get another set of troponin.  We will obtain a 2D echo now to establish
 ejection fraction and follow the patient expectantly.  There is also concern for perforated viscus, 
which is being addressed by the primary team.  I think for now conservative therapy and optimization 
will be needed.

 

PAST MEDICAL HISTORY:  Psychiatric illness, history of hypertension, possible history of tobacco use,
 ____, acute on chronic.

 

ALLERGIES:  NO KNOWN DRUG ALLERGIES.

 

SOCIAL HISTORY:  The patient does not smoke, does not drink, does not use any drugs.

 

FAMILY HISTORY:  Negative for sudden cardiac death.

 

MEDICATIONS:  Here include:

1.  Vancomycin.

2.  Famotidine.

3.  Subcutaneous Lovenox.

4.  Propofol.

5.  Dopamine.

6.  Norepinephrine.

7.  Zosyn.

8.  Ondansetron.

9.  Sodium chloride.

 

REVIEW OF SYSTEMS:

CONSTITUTIONAL:  No fevers, no chills.  Per discussion with nurses, the patient has cardiac arrest in
 the emergency room.

HEENT:  No changes in vision or hearing.

CARDIAC:  No chest pain currently.

RESPIRATORY:  The patient is with chest tube with some drainage.

GASTROINTESTINAL:  No nausea, vomiting noted.

GENITOURINARY:  No dysuria, hematuria.  Decreased urine output.

NEUROLOGIC:  Now sedated.

PSYCHIATRIC:  History of psychiatric disorder.

 

PHYSICAL EXAMINATION:

VITAL SIGNS:  Temperature is 98.2, heart rate is 82, blood pressure 109/83.

GENERAL:  He is a well-nourished gentleman in no acute distress, alert and oriented, does not appear 
to be aware of his condition.

HEENT:  Head is normocephalic.  Extraocular muscles are intact.  He is intubated.

NECK:  Supple.  JVD is 6 to 7 cm.  There is no lymphadenopathy.

HEART:  Regular, soft I/VI systolic murmur.  PMI is nondisplaced.

LUNGS:  Coarse to bases.

ABDOMEN:  Distended, bowel sounds are present.  There is no hepatosplenomegaly.

GENITOURINARY:  Grossly intact.

EXTREMITIES:  No clubbing, cyanosis.  Trace edema.

 

LABORATORY DATA:  Sodium 139, potassium 4.7, BUN 41, creatinine 1.4.  Hemoglobin A1c is 5.6.  Lactic 
acid is 2.6.  His initial troponin is 0.248.  INR is 0.9.  White blood cell 21.4, hemoglobin 7.4, amadeo
telets 138.

 

ASSESSMENT AND PLAN:

1.  Elevated troponin.  The patient has elevated troponin, etiology of that is still unclear to me.  
We will check another set of troponins.  He had some nonspecific ST-T changes in his septal leads, wh
ich is discordant to the bundle branch block and the patient also has some ST depression on prior EKG
s which could be just recovery changes.  I think for now, another set of troponins will be warranted.
  We will continue to follow with significant titrating dopamine as quickly as possible.

2.  Hypertension.  Continue Levophed now.

3.  Pneumothorax, which was treated with chest tube.  Continue respiratory support.  The patient is i
ntubated ____ unclear whether viscus perforation noted.  Surgical team follows.

4.  Psychiatric illness.  Defer to primary team.

 

I would like to thank Dr. Miller for referring this patient for my evaluation.

 

 

Dictated By: HARDY GUZMÁN MD

 

ML/NTS

DD:    01/22/2019 14:00:04

DT:    01/22/2019 16:59:24

Conf#: [id number]

DID#:  [id number]

CC: MAURI GRIGSBY MD; MIMI ZAVALA MD;*End*

## 2019-01-22 NOTE — CONS
DATE OF ADMISSION: 01/22/2019

DATE OF CONSULTATION:  01/22/2019

 

 

 

INFECTIOUS DISEASE CONSULTATION

 

REASON FOR CONSULTATION:  Antibiotic management.

 

HISTORY OF PRESENT ILLNESS:  Travis Johnson is a 68-year-old male who was admitted on the 21st with al
tered levels of consciousness and respiratory distress.  He was brought in from a board and care in r
espiratory distress, confused and hypoxic.  His past problems include:

1.  Bipolar disorder.

2.  Psychosis with depressive disorder.

3.  Hyperlipidemia.

4.  BPH.

5.  Weakness.

 

On admission, his white count was 20.2, H and H of 13.5 and 46.5, platelet count 124.  BUN and creati
nine 38/1.68.  The patient was lying in the gurney in agonal respirations, cachectic.  He was started
 on vancomycin and Zosyn.  As noted, his white count was 20.2 with 87% polys.  The patient was intuba
alexus.  Chest x-ray showed new endotracheal tube in an appropriate position, right lower lobe infiltrat
e, moderate right pleural effusion, patchy linear atelectatic changes and increased interstitial wallace
ges in the left lung.  The patient had a new large left pneumothorax on the KUB, new large amount of 
free intraperitoneal air.  An NG tube was extending below the diaphragm, actually an OG tube within t
he stomach.  There was, therefore, placement of a left-sided chest tube with reexpansion of the left 
lung, no evidence of pneumothorax, free intraperitoneal air again present.  A central line was placed
 as well.  The patient was presenting with hypothermia, altered mental status with evidence of acute 
respiratory failure.  As noted, he has an ET tube and an NG tube.  He has a chest tube and currently 
is on Levophed.  He has evidence of a non-STEMI.  The patient's symptoms did not stabilize.

 

HOSPITAL COURSE:  The patient comes in in septic shock, respiratory failure with pneumonia, hyperkale
jose, hypothermia.

 

PAST MEDICAL HISTORY:  As outlined.

 

FAMILY HISTORY:  Noncontributory.

 

SOCIAL HISTORY:  He does not smoke, drink or abuse drugs.

 

ALLERGIES:  NONE TO PENICILLIN, SULFA OR FOODS.

 

MEDICATIONS:  Per chart.

 

REVIEW OF SYSTEMS:  Noncontributory.

 

PHYSICAL EXAMINATION:

GENERAL:  The patient is a cachectic-appearing male who is intubated on a respirator.  He has an ET t
ube, NG tube, Blake catheter.  He has a central line and a left chest tube.

SKIN:  Without generalized rash.

HEENT:  Within normal limits.

NECK:  Supple.

LYMPH NODES:  None palpable.

CHEST:  Decreased breath sounds at the bases.

HEART:  Tachycardic without murmur or gallop.

ABDOMEN:  Soft, nontender, without organosplenomegaly or masses.

EXTREMITIES:  Without cyanosis, clubbing or edema.

RECTAL/GENITAL:  Deferred.

NEUROLOGIC:  The patient is obtunded and he is sedated.

 

IMPRESSION AND PLAN:  The patient comes in with essentially cardiac arrest with septic shock, current
ly on norepinephrine, dopamine, propofol and, of course, vancomycin and Zosyn.  We will await his cul
tures.  He currently has large pneumoperitoneum and interstitial air within the left abdominal omentu
m and mesentery.  According to Dr. Aponte, he does not feel that the patient has a ruptured viscus.  
Pneumoperitoneum likely from the trauma of chest compressions versus ruptured bleb.  I will dictate m
y findings to Dr. Miller and Nurse Talon as well as the consultants,  _____, Dr. Grande and 
Dr. Aponte.

 

 

Dictated By: JERROLD DREYER MD JD/PEREZ

DD:    01/22/2019 14:42:06

DT:    01/22/2019 17:44:27

Conf#: [id number]

DID#:  [id number]

CC: MAURI GRIGSBY MD;*EndCC*

## 2019-01-22 NOTE — CONS
Assessment/Plan


S/p cardiac arrest - etiology unclear - con'r r/o MI, echo - wean off dopa gtt 


asap # [id number]


Result Diagram:  


1/22/19 0218                                                                    


           1/22/19 0218





Results 24hrs





Laboratory Tests


Test
              1/21/19
18:25  1/21/19
19:13    1/21/19
19:23   1/21/19
20:30


White Blood              20.2  H


Count


Red Blood Count           4.83


Hemoglobin               13.5  L


Hematocrit                46.5


Mean                      96.3


Corpuscular


Volume


Mean                     28.0  L


Corpuscular


Hemoglobin


Mean                    29.0  L
  
              
                



Corpuscular


Hemoglobin
Conc


ent


Red Cell                 15.2  H


Distribution


Width


Platelet Count            124  L


Mean Platelet            11.6  H


Volume


Immature                2.700  H


Granulocytes %


Neutrophils %            87.3  H


Lymphocytes %             4.8  L


Monocytes %                4.8


Eosinophils %              0.0


Basophils %                0.4


Nucleated Red             0.1  H


Blood Cells %


Immature                0.540  H


Granulocytes #


Neutrophils #            17.7  H


Lymphocytes #              1.0


Monocytes #               1.0  H


Eosinophils #              0.0


Basophils #                0.1


Nucleated Red              0.0


Blood Cells #


Prothrombin               12.4


Time


Prothrombin                1.0


Time Ratio


INR                      0.91  
  
              
                



International


Normalized
Rati


o


Activated               40.8  H
  
              
                



Partial
Thrombo


plast Time


Sodium Level               136


Potassium Level           5.4  H


Chloride Level             94  L


Carbon Dioxide             34  H


Level


Anion Gap                    8


Blood Urea                 38  H


Nitrogen


Creatinine               1.68  H


Est Glomerular            41  L
  
              
                



Filtrat


Rate
mL/min


Glucose Level              173


POC Venous                 2.0                             2.0


Lactate


Calcium Level              9.4


Total Bilirubin           0.0  L


Direct                    0.00


Bilirubin


Indirect                   0.0


Bilirubin


Aspartate Amino          173  H
  
              
                



Transf
(AST/SGO


T)


Alanine                  122  H
  
              
                



Aminotransferas


e
(ALT/SGPT)


Alkaline                    86


Phosphatase


Troponin I             0.248  *H


Total Protein              7.8


Albumin                    4.0


Globulin                 3.80  H


Albumin/Globuli           1.05


n Ratio


Urine Color                       TEVIN


Urine Clarity                     CLOUDY  A


Urine pH                                  5.0


Urine Specific                          1.016


Gravity


Urine Ketones                     NEGATIVE


Urine Nitrite                     NEGATIVE


Urine Bilirubin                   NEGATIVE


Urine                                     1+  H


Urobilinogen


Urine Leukocyte                           1+  H


Esterase


Urine                                       4


Microscopic RBC


Urine                                     33  H


Microscopic WBC


Urine Amorphous                   MANY  A


Crystals


Urine Bacteria                    FEW  A


Urine Hyaline                     FEW  A


Casts


Urine Mucus                       FEW  A


Urine                             NEGATIVE


Hemoglobin


Urine Glucose                     NEGATIVE


Urine Total                               2+  H


Protein


Blood Gas        
                
              
                Blood


Specimen                                                          arterial



Source



Arterial Blood   
                
              
                1/21/2019
10:0


Date Drawn
                                                              0:42 PM


Arterial Blood   
                
              
                    7.203  *L



pH


(Temp
corrected


)


Arterial Blood   
                
              
                      74.7  H



pCO2


(Temp
correct)


Arterial Blood   
                
              
                     156.9  H



pO2


(Temp
corrected


)


Arterial Blood                                                           28.7  H


HCO3


Arterial Blood                                                            -1.1


Base Excess


Arterial Blood   
                
              
                      98.9  H



Oxygen
Saturati


on


Antoine Test                                                        N/A


Arterial Blood   
                
              
                Right


Gas                                                               Brachial



Puncture
Site


Arterial         
                
              
                        2.3  



Blood
Carboxyhe


moglobin


Arterial Blood                                                             0.1


Methemoglobin


Blood Gas A-a                                                           481.4  H


O2 Differential


Oxyhemoglobin                                                             96.5


Percent


Blood Gas                                                                 37.0


Temperature


Blood Gas                                                                 16.0


Respiration


Rate


Blood Gas        
                
              
                         16  



Actual


Respiration
Rat


e


Blood Gas                                                         VENT - AC


Modality


FiO2                                                                     100.0


Blood Gas Tidal                                                          500.0


Volume


Blood Gas        
                
              
                N JOSÉ MIGUEL


Critical Value                                                    MD



Read
Back


Blood Gas                                                         UP


Notified Whom


Blood Gas        
                
              
                1/21/2019
10:1


Notified Time
                                                           4:35 PM


Test
              1/22/19
00:50  1/22/19
02:18    1/22/19
10:31  



Blood Gas        Blood arterial
  
              Blood arterial
  



Specimen


Source



Arterial Blood   1/22/2019
1:15:  
              1/22/2019
10:54  



Date Drawn
                59 AM                          :53 AM


Arterial Blood          7.362  
  
                     7.392  
  



pH


(Temp
corrected


)


Arterial Blood          47.1  H
  
                      44.3  
  



pCO2


(Temp
correct)


Arterial Blood         41.9  *L
  
                    264.4  H
  



pO2


(Temp
corrected


)


Arterial Blood           26.1  H                         26.3  H


HCO3


Arterial Blood             0.2                             1.1


Base Excess


Arterial Blood          77.2  L
  
                     99.7  H
  



Oxygen
Saturati


on


Antoine Test       ACCEPTAB                        ACCEPTAB


Arterial Blood   Right Radial  
  
              Right Radial  
  



Gas


Puncture
Site


Arterial                  1.6  
  
                       0.8  
  



Blood
Carboxyhe


moglobin


Arterial Blood               0                             0.3


Methemoglobin


Blood Gas A-a           624.0  H                        404.3  H


O2 Differential


Oxyhemoglobin            76.0  L                          98.6


Percent


Blood Gas                 37.0                            37.0


Temperature


Blood Gas                 26.0                            26.0


Respiration


Rate


Blood Gas                  26  
  
                        26  
  



Actual


Respiration
Rat


e


Blood Gas        VENT - AC                       VENT - AC


Modality


FiO2                     100.0                           100.0


Blood Gas Tidal          500.0                           500.0


Volume


Blood Gas Low              5.0                             5.0


PEEP Setting


Blood Gas        D NATALI TINSLEY    
              
                



Critical Value   



Read
Back


Blood Gas        UP                              TM


Notified Whom


Blood Gas        1/22/2019
1:30:  
              1/22/2019
11:12  



Notified Time
             16 AM                          :36 AM


White Blood                             21.3  H


Count


Red Blood Count                         4.33  L


Hemoglobin                              12.1  L


Hematocrit                              40.0  L


Mean                                     92.4


Corpuscular


Volume


Mean                                    27.9  L


Corpuscular


Hemoglobin


Mean             
                     30.3  L
  
                



Corpuscular


Hemoglobin
Conc


ent


Red Cell                                15.4  H


Distribution


Width


Platelet Count                           138  L


Mean Platelet                           11.0  H


Volume


Immature                               1.000  H


Granulocytes %


Neutrophils %                           81.6  H


Lymphocytes %                            8.8  L


Monocytes %                               8.5


Eosinophils %                             0.0


Basophils %                               0.1


Nucleated Red                            0.4  H


Blood Cells %


Immature                               0.220  H


Granulocytes #


Neutrophils #                           17.3  H


Lymphocytes #                             1.9


Monocytes #                              1.8  H


Eosinophils #                             0.0


Basophils #                               0.0


Nucleated Red                            0.1  H


Blood Cells #


Sodium Level                              139


Potassium Level                           4.7


Chloride Level                            103


Carbon Dioxide                             30


Level


Anion Gap                                   6


Blood Urea                                41  H


Nitrogen


Creatinine                              1.47  H


Est Glomerular   
                       48  L
  
                



Filtrat


Rate
mL/min


Glucose Level                             138


Hemoglobin A1c                            5.6


Lactic Acid                             2.6  *H


Level


Calcium Level                             9.2








Consultation Date/Type/Reason


Admit Date/Time


Jan 22, 2019 at 00:03


Initial Consult Date


1/22/19





Exam/Review of Systems


Vital Signs


Vitals





Vital Signs


  Date      Temp  Pulse  Resp  B/P (MAP)   Pulse Ox  O2          O2 Flow    FiO2


Time                                                 Delivery    Rate


   1/22/19           82    26      109/83        98  Mechanical


     13:00                           (92)            Ventilator


   1/22/19                                                                   100


     12:55


   1/22/19  97.6


     08:00


   1/21/19                                                              15


     18:34








Intake and Output





1/21/19 1/21/19 1/22/19





1515:00


23:00


07:00





IntakeIntake Total


745.625 ml





OutputOutput Total


435 ml





BalanceBalance


310.625 ml














Medications


Medications





Current Medications


Sodium Chloride 1,000 ml @  100 mls/hr Q10H IV  Last administered on 1/22/19at 


06:08; Admin Dose 100 MLS/HR;  Start 1/21/19 at 23:03


Ondansetron HCl (Zofran Inj) 4 mg Q6H  PRN IV NAUSEA AND/OR VOMITING;  Start 


1/21/19 at 23:30


Acetaminophen (Tylenol Supp) 650 mg Q4H  PRN MO PAIN LEVEL 1-3 OR FEVER;  Start 


1/21/19 at 23:30


Morphine Sulfate (morphine) 2 mg Q4H  PRN IV PAIN LEVEL 7-10;  Start 1/21/19 at 


23:30


Famotidine (Pepcid Iv) 20 mg Q12 IV  Last administered on 1/22/19at 08:24; Admin


Dose 20 MG;  Start 1/22/19 at 09:00


Enoxaparin Sodium (Lovenox) 30 mg DAILY SC ;  Start 1/22/19 at 09:00


Piperacillin Sod/ Tazobactam Sod 100 ml @  200 mls/hr Q6 IVPB  Last administered


on 1/22/19at 11:31; Admin Dose 200 MLS/HR;  Start 1/22/19 at 00:00


Norepinephrine 250 ml @  1.875 mls/ hr TITRATE IV  Last administered on 


1/22/19at 09:48; Admin Dose 37.5 MLS/HR;  Start 1/22/19 at 00:30


Dopamine HCl/ Dextrose 250 ml @  5.625 mls/ hr TITRATE IV  Last administered on 


1/22/19at 01:58; Admin Dose 5.625 MLS/HR;  Start 1/22/19 at 02:00


Influenza Virus Vaccine Quadrival (Fluzone) 0.5 ml ONCE ONCE IM* ;  Start 


1/23/19 at 10:00;  Stop 1/23/19 at 10:01


Propofol 100 ml @  1.875 mls/ hr Q12H IV  Last administered on 1/22/19at 06:20; 


Admin Dose 9.375 MLS/HR;  Start 1/22/19 at 06:30


Vancomycin HCl (Vanco Iv Per Pharmacy) VANCOMYCIN PER PHARMACY PER PROTOCOL XX ;


 Start 1/22/19 at 14:00;  Status UNV





Date/Time of Note


Date/Time of Note


DATE: 1/22/19 


TIME: 14:00











HARDY GUZMÁN MD                 Jan 22, 2019 14:01

## 2019-01-23 VITALS — RESPIRATION RATE: 18 BRPM | HEART RATE: 65 BPM | DIASTOLIC BLOOD PRESSURE: 61 MMHG | SYSTOLIC BLOOD PRESSURE: 89 MMHG

## 2019-01-23 VITALS — HEART RATE: 77 BPM | RESPIRATION RATE: 26 BRPM | DIASTOLIC BLOOD PRESSURE: 86 MMHG | SYSTOLIC BLOOD PRESSURE: 101 MMHG

## 2019-01-23 VITALS — RESPIRATION RATE: 26 BRPM | SYSTOLIC BLOOD PRESSURE: 118 MMHG | HEART RATE: 61 BPM | DIASTOLIC BLOOD PRESSURE: 86 MMHG

## 2019-01-23 VITALS — SYSTOLIC BLOOD PRESSURE: 109 MMHG | RESPIRATION RATE: 26 BRPM | HEART RATE: 74 BPM | DIASTOLIC BLOOD PRESSURE: 82 MMHG

## 2019-01-23 VITALS — SYSTOLIC BLOOD PRESSURE: 131 MMHG | RESPIRATION RATE: 18 BRPM | DIASTOLIC BLOOD PRESSURE: 80 MMHG | HEART RATE: 72 BPM

## 2019-01-23 VITALS — RESPIRATION RATE: 27 BRPM | SYSTOLIC BLOOD PRESSURE: 102 MMHG | HEART RATE: 61 BPM | DIASTOLIC BLOOD PRESSURE: 75 MMHG

## 2019-01-23 VITALS — HEART RATE: 58 BPM | SYSTOLIC BLOOD PRESSURE: 91 MMHG | DIASTOLIC BLOOD PRESSURE: 67 MMHG | RESPIRATION RATE: 20 BRPM

## 2019-01-23 VITALS — SYSTOLIC BLOOD PRESSURE: 100 MMHG | DIASTOLIC BLOOD PRESSURE: 76 MMHG | RESPIRATION RATE: 30 BRPM | HEART RATE: 78 BPM

## 2019-01-23 VITALS — HEART RATE: 74 BPM | RESPIRATION RATE: 18 BRPM | DIASTOLIC BLOOD PRESSURE: 81 MMHG | SYSTOLIC BLOOD PRESSURE: 128 MMHG

## 2019-01-23 VITALS — DIASTOLIC BLOOD PRESSURE: 82 MMHG | RESPIRATION RATE: 27 BRPM | HEART RATE: 68 BPM | SYSTOLIC BLOOD PRESSURE: 117 MMHG

## 2019-01-23 VITALS — SYSTOLIC BLOOD PRESSURE: 100 MMHG | DIASTOLIC BLOOD PRESSURE: 71 MMHG | RESPIRATION RATE: 21 BRPM | HEART RATE: 66 BPM

## 2019-01-23 VITALS — HEART RATE: 67 BPM | SYSTOLIC BLOOD PRESSURE: 90 MMHG | DIASTOLIC BLOOD PRESSURE: 68 MMHG | RESPIRATION RATE: 18 BRPM

## 2019-01-23 VITALS — RESPIRATION RATE: 26 BRPM | DIASTOLIC BLOOD PRESSURE: 74 MMHG | HEART RATE: 69 BPM | SYSTOLIC BLOOD PRESSURE: 92 MMHG

## 2019-01-23 VITALS — SYSTOLIC BLOOD PRESSURE: 121 MMHG | RESPIRATION RATE: 18 BRPM | HEART RATE: 77 BPM | DIASTOLIC BLOOD PRESSURE: 76 MMHG

## 2019-01-23 VITALS — RESPIRATION RATE: 18 BRPM

## 2019-01-23 VITALS — RESPIRATION RATE: 19 BRPM | DIASTOLIC BLOOD PRESSURE: 69 MMHG | HEART RATE: 59 BPM | SYSTOLIC BLOOD PRESSURE: 96 MMHG

## 2019-01-23 VITALS — SYSTOLIC BLOOD PRESSURE: 115 MMHG | RESPIRATION RATE: 26 BRPM | DIASTOLIC BLOOD PRESSURE: 90 MMHG | HEART RATE: 66 BPM

## 2019-01-23 VITALS — DIASTOLIC BLOOD PRESSURE: 87 MMHG | HEART RATE: 74 BPM | SYSTOLIC BLOOD PRESSURE: 111 MMHG | RESPIRATION RATE: 27 BRPM

## 2019-01-23 VITALS — DIASTOLIC BLOOD PRESSURE: 97 MMHG | SYSTOLIC BLOOD PRESSURE: 134 MMHG | HEART RATE: 63 BPM | RESPIRATION RATE: 26 BRPM

## 2019-01-23 VITALS — RESPIRATION RATE: 26 BRPM | DIASTOLIC BLOOD PRESSURE: 84 MMHG | SYSTOLIC BLOOD PRESSURE: 110 MMHG | HEART RATE: 68 BPM

## 2019-01-23 VITALS — SYSTOLIC BLOOD PRESSURE: 96 MMHG | HEART RATE: 80 BPM | DIASTOLIC BLOOD PRESSURE: 75 MMHG | RESPIRATION RATE: 26 BRPM

## 2019-01-23 VITALS — HEART RATE: 60 BPM | RESPIRATION RATE: 20 BRPM | DIASTOLIC BLOOD PRESSURE: 69 MMHG | SYSTOLIC BLOOD PRESSURE: 97 MMHG

## 2019-01-23 VITALS — HEART RATE: 77 BPM | SYSTOLIC BLOOD PRESSURE: 133 MMHG | DIASTOLIC BLOOD PRESSURE: 90 MMHG | RESPIRATION RATE: 16 BRPM

## 2019-01-23 VITALS — HEART RATE: 71 BPM | DIASTOLIC BLOOD PRESSURE: 110 MMHG | SYSTOLIC BLOOD PRESSURE: 128 MMHG | RESPIRATION RATE: 25 BRPM

## 2019-01-23 VITALS — HEART RATE: 66 BPM | DIASTOLIC BLOOD PRESSURE: 72 MMHG | SYSTOLIC BLOOD PRESSURE: 100 MMHG | RESPIRATION RATE: 20 BRPM

## 2019-01-23 VITALS — HEART RATE: 77 BPM | DIASTOLIC BLOOD PRESSURE: 71 MMHG | SYSTOLIC BLOOD PRESSURE: 96 MMHG | RESPIRATION RATE: 26 BRPM

## 2019-01-23 VITALS — DIASTOLIC BLOOD PRESSURE: 77 MMHG | RESPIRATION RATE: 18 BRPM | HEART RATE: 62 BPM | SYSTOLIC BLOOD PRESSURE: 108 MMHG

## 2019-01-23 VITALS — DIASTOLIC BLOOD PRESSURE: 76 MMHG | SYSTOLIC BLOOD PRESSURE: 105 MMHG | RESPIRATION RATE: 29 BRPM | HEART RATE: 70 BPM

## 2019-01-23 VITALS — RESPIRATION RATE: 26 BRPM

## 2019-01-23 VITALS — RESPIRATION RATE: 26 BRPM | HEART RATE: 76 BPM | SYSTOLIC BLOOD PRESSURE: 96 MMHG | DIASTOLIC BLOOD PRESSURE: 79 MMHG

## 2019-01-23 VITALS — SYSTOLIC BLOOD PRESSURE: 110 MMHG | DIASTOLIC BLOOD PRESSURE: 81 MMHG | RESPIRATION RATE: 23 BRPM | HEART RATE: 78 BPM

## 2019-01-23 VITALS — HEART RATE: 68 BPM | RESPIRATION RATE: 26 BRPM | DIASTOLIC BLOOD PRESSURE: 89 MMHG | SYSTOLIC BLOOD PRESSURE: 114 MMHG

## 2019-01-23 VITALS — RESPIRATION RATE: 18 BRPM | DIASTOLIC BLOOD PRESSURE: 65 MMHG | SYSTOLIC BLOOD PRESSURE: 99 MMHG | HEART RATE: 66 BPM

## 2019-01-23 VITALS — SYSTOLIC BLOOD PRESSURE: 120 MMHG | DIASTOLIC BLOOD PRESSURE: 73 MMHG | RESPIRATION RATE: 17 BRPM | HEART RATE: 67 BPM

## 2019-01-23 VITALS — HEART RATE: 69 BPM | RESPIRATION RATE: 21 BRPM | SYSTOLIC BLOOD PRESSURE: 106 MMHG | DIASTOLIC BLOOD PRESSURE: 68 MMHG

## 2019-01-23 VITALS — DIASTOLIC BLOOD PRESSURE: 75 MMHG | HEART RATE: 65 BPM | SYSTOLIC BLOOD PRESSURE: 108 MMHG | RESPIRATION RATE: 19 BRPM

## 2019-01-23 VITALS — DIASTOLIC BLOOD PRESSURE: 88 MMHG | HEART RATE: 63 BPM | SYSTOLIC BLOOD PRESSURE: 126 MMHG | RESPIRATION RATE: 26 BRPM

## 2019-01-23 VITALS — DIASTOLIC BLOOD PRESSURE: 72 MMHG | RESPIRATION RATE: 29 BRPM | SYSTOLIC BLOOD PRESSURE: 108 MMHG | HEART RATE: 60 BPM

## 2019-01-23 VITALS — DIASTOLIC BLOOD PRESSURE: 68 MMHG | HEART RATE: 69 BPM | SYSTOLIC BLOOD PRESSURE: 117 MMHG | RESPIRATION RATE: 19 BRPM

## 2019-01-23 VITALS — DIASTOLIC BLOOD PRESSURE: 69 MMHG | HEART RATE: 62 BPM | SYSTOLIC BLOOD PRESSURE: 99 MMHG | RESPIRATION RATE: 19 BRPM

## 2019-01-23 VITALS — HEART RATE: 80 BPM | DIASTOLIC BLOOD PRESSURE: 74 MMHG | SYSTOLIC BLOOD PRESSURE: 89 MMHG | RESPIRATION RATE: 26 BRPM

## 2019-01-23 VITALS — SYSTOLIC BLOOD PRESSURE: 108 MMHG | HEART RATE: 61 BPM | RESPIRATION RATE: 18 BRPM | DIASTOLIC BLOOD PRESSURE: 73 MMHG

## 2019-01-23 VITALS — HEART RATE: 58 BPM | SYSTOLIC BLOOD PRESSURE: 104 MMHG | RESPIRATION RATE: 19 BRPM | DIASTOLIC BLOOD PRESSURE: 71 MMHG

## 2019-01-23 VITALS — RESPIRATION RATE: 26 BRPM | SYSTOLIC BLOOD PRESSURE: 105 MMHG | DIASTOLIC BLOOD PRESSURE: 81 MMHG | HEART RATE: 74 BPM

## 2019-01-23 VITALS — DIASTOLIC BLOOD PRESSURE: 69 MMHG | SYSTOLIC BLOOD PRESSURE: 102 MMHG | HEART RATE: 63 BPM | RESPIRATION RATE: 21 BRPM

## 2019-01-23 VITALS — RESPIRATION RATE: 20 BRPM | DIASTOLIC BLOOD PRESSURE: 71 MMHG | SYSTOLIC BLOOD PRESSURE: 106 MMHG | HEART RATE: 59 BPM

## 2019-01-23 VITALS — RESPIRATION RATE: 26 BRPM | DIASTOLIC BLOOD PRESSURE: 79 MMHG | SYSTOLIC BLOOD PRESSURE: 104 MMHG | HEART RATE: 78 BPM

## 2019-01-23 VITALS — HEART RATE: 62 BPM | SYSTOLIC BLOOD PRESSURE: 99 MMHG | DIASTOLIC BLOOD PRESSURE: 72 MMHG | RESPIRATION RATE: 18 BRPM

## 2019-01-23 VITALS — DIASTOLIC BLOOD PRESSURE: 84 MMHG | SYSTOLIC BLOOD PRESSURE: 111 MMHG | HEART RATE: 62 BPM | RESPIRATION RATE: 26 BRPM

## 2019-01-23 VITALS — SYSTOLIC BLOOD PRESSURE: 104 MMHG | RESPIRATION RATE: 26 BRPM | HEART RATE: 73 BPM | DIASTOLIC BLOOD PRESSURE: 79 MMHG

## 2019-01-23 VITALS — RESPIRATION RATE: 22 BRPM | DIASTOLIC BLOOD PRESSURE: 73 MMHG | HEART RATE: 69 BPM | SYSTOLIC BLOOD PRESSURE: 116 MMHG

## 2019-01-23 VITALS — HEART RATE: 76 BPM | SYSTOLIC BLOOD PRESSURE: 116 MMHG | DIASTOLIC BLOOD PRESSURE: 81 MMHG | RESPIRATION RATE: 26 BRPM

## 2019-01-23 VITALS — DIASTOLIC BLOOD PRESSURE: 66 MMHG | SYSTOLIC BLOOD PRESSURE: 120 MMHG | RESPIRATION RATE: 18 BRPM | HEART RATE: 56 BPM

## 2019-01-23 VITALS — SYSTOLIC BLOOD PRESSURE: 116 MMHG | HEART RATE: 60 BPM | DIASTOLIC BLOOD PRESSURE: 78 MMHG | RESPIRATION RATE: 18 BRPM

## 2019-01-23 VITALS — SYSTOLIC BLOOD PRESSURE: 115 MMHG | RESPIRATION RATE: 26 BRPM | HEART RATE: 74 BPM | DIASTOLIC BLOOD PRESSURE: 94 MMHG

## 2019-01-23 VITALS — HEART RATE: 48 BPM | RESPIRATION RATE: 18 BRPM | SYSTOLIC BLOOD PRESSURE: 116 MMHG | DIASTOLIC BLOOD PRESSURE: 77 MMHG

## 2019-01-23 VITALS — DIASTOLIC BLOOD PRESSURE: 83 MMHG | RESPIRATION RATE: 26 BRPM | SYSTOLIC BLOOD PRESSURE: 103 MMHG | HEART RATE: 74 BPM

## 2019-01-23 VITALS — DIASTOLIC BLOOD PRESSURE: 68 MMHG | SYSTOLIC BLOOD PRESSURE: 109 MMHG | RESPIRATION RATE: 18 BRPM | HEART RATE: 68 BPM

## 2019-01-23 VITALS — HEART RATE: 72 BPM | DIASTOLIC BLOOD PRESSURE: 79 MMHG | SYSTOLIC BLOOD PRESSURE: 94 MMHG | RESPIRATION RATE: 26 BRPM

## 2019-01-23 VITALS — DIASTOLIC BLOOD PRESSURE: 83 MMHG | HEART RATE: 81 BPM | RESPIRATION RATE: 31 BRPM | SYSTOLIC BLOOD PRESSURE: 112 MMHG

## 2019-01-23 VITALS — RESPIRATION RATE: 18 BRPM | DIASTOLIC BLOOD PRESSURE: 73 MMHG | HEART RATE: 63 BPM | SYSTOLIC BLOOD PRESSURE: 106 MMHG

## 2019-01-23 VITALS — SYSTOLIC BLOOD PRESSURE: 100 MMHG | DIASTOLIC BLOOD PRESSURE: 80 MMHG | HEART RATE: 74 BPM | RESPIRATION RATE: 26 BRPM

## 2019-01-23 VITALS — DIASTOLIC BLOOD PRESSURE: 79 MMHG | HEART RATE: 74 BPM | SYSTOLIC BLOOD PRESSURE: 100 MMHG | RESPIRATION RATE: 26 BRPM

## 2019-01-23 VITALS — HEART RATE: 76 BPM | RESPIRATION RATE: 26 BRPM | DIASTOLIC BLOOD PRESSURE: 82 MMHG | SYSTOLIC BLOOD PRESSURE: 105 MMHG

## 2019-01-23 VITALS — DIASTOLIC BLOOD PRESSURE: 82 MMHG | SYSTOLIC BLOOD PRESSURE: 101 MMHG | RESPIRATION RATE: 26 BRPM | HEART RATE: 64 BPM

## 2019-01-23 VITALS — RESPIRATION RATE: 23 BRPM | HEART RATE: 62 BPM | DIASTOLIC BLOOD PRESSURE: 66 MMHG | SYSTOLIC BLOOD PRESSURE: 95 MMHG

## 2019-01-23 VITALS — HEART RATE: 67 BPM | RESPIRATION RATE: 21 BRPM | SYSTOLIC BLOOD PRESSURE: 110 MMHG | DIASTOLIC BLOOD PRESSURE: 75 MMHG

## 2019-01-23 VITALS — HEART RATE: 77 BPM | SYSTOLIC BLOOD PRESSURE: 87 MMHG | DIASTOLIC BLOOD PRESSURE: 77 MMHG | RESPIRATION RATE: 26 BRPM

## 2019-01-23 VITALS — HEART RATE: 54 BPM | RESPIRATION RATE: 17 BRPM | SYSTOLIC BLOOD PRESSURE: 133 MMHG | DIASTOLIC BLOOD PRESSURE: 81 MMHG

## 2019-01-23 VITALS — HEART RATE: 53 BPM | RESPIRATION RATE: 18 BRPM | DIASTOLIC BLOOD PRESSURE: 65 MMHG | SYSTOLIC BLOOD PRESSURE: 130 MMHG

## 2019-01-23 VITALS — DIASTOLIC BLOOD PRESSURE: 74 MMHG | SYSTOLIC BLOOD PRESSURE: 104 MMHG | RESPIRATION RATE: 21 BRPM | HEART RATE: 65 BPM

## 2019-01-23 VITALS — HEART RATE: 73 BPM | DIASTOLIC BLOOD PRESSURE: 81 MMHG | SYSTOLIC BLOOD PRESSURE: 113 MMHG | RESPIRATION RATE: 26 BRPM

## 2019-01-23 VITALS — DIASTOLIC BLOOD PRESSURE: 72 MMHG | HEART RATE: 72 BPM | RESPIRATION RATE: 20 BRPM | SYSTOLIC BLOOD PRESSURE: 106 MMHG

## 2019-01-23 VITALS — HEART RATE: 60 BPM | SYSTOLIC BLOOD PRESSURE: 113 MMHG | RESPIRATION RATE: 18 BRPM | DIASTOLIC BLOOD PRESSURE: 77 MMHG

## 2019-01-23 VITALS — HEART RATE: 61 BPM | SYSTOLIC BLOOD PRESSURE: 104 MMHG | DIASTOLIC BLOOD PRESSURE: 78 MMHG | RESPIRATION RATE: 26 BRPM

## 2019-01-23 VITALS — SYSTOLIC BLOOD PRESSURE: 123 MMHG | HEART RATE: 68 BPM | RESPIRATION RATE: 29 BRPM | DIASTOLIC BLOOD PRESSURE: 81 MMHG

## 2019-01-23 VITALS — HEART RATE: 73 BPM | DIASTOLIC BLOOD PRESSURE: 84 MMHG | RESPIRATION RATE: 26 BRPM | SYSTOLIC BLOOD PRESSURE: 113 MMHG

## 2019-01-23 VITALS — DIASTOLIC BLOOD PRESSURE: 97 MMHG | SYSTOLIC BLOOD PRESSURE: 126 MMHG | HEART RATE: 67 BPM | RESPIRATION RATE: 26 BRPM

## 2019-01-23 VITALS — HEART RATE: 74 BPM | DIASTOLIC BLOOD PRESSURE: 85 MMHG | SYSTOLIC BLOOD PRESSURE: 110 MMHG | RESPIRATION RATE: 26 BRPM

## 2019-01-23 VITALS — RESPIRATION RATE: 20 BRPM | DIASTOLIC BLOOD PRESSURE: 72 MMHG | SYSTOLIC BLOOD PRESSURE: 103 MMHG | HEART RATE: 54 BPM

## 2019-01-23 VITALS — SYSTOLIC BLOOD PRESSURE: 100 MMHG | HEART RATE: 65 BPM | DIASTOLIC BLOOD PRESSURE: 80 MMHG | RESPIRATION RATE: 26 BRPM

## 2019-01-23 VITALS — HEART RATE: 62 BPM | DIASTOLIC BLOOD PRESSURE: 84 MMHG | RESPIRATION RATE: 26 BRPM | SYSTOLIC BLOOD PRESSURE: 116 MMHG

## 2019-01-23 VITALS — SYSTOLIC BLOOD PRESSURE: 97 MMHG | RESPIRATION RATE: 18 BRPM | HEART RATE: 63 BPM | DIASTOLIC BLOOD PRESSURE: 64 MMHG

## 2019-01-23 VITALS — RESPIRATION RATE: 20 BRPM | HEART RATE: 67 BPM | SYSTOLIC BLOOD PRESSURE: 95 MMHG | DIASTOLIC BLOOD PRESSURE: 66 MMHG

## 2019-01-23 VITALS — HEART RATE: 84 BPM | SYSTOLIC BLOOD PRESSURE: 120 MMHG | RESPIRATION RATE: 15 BRPM | DIASTOLIC BLOOD PRESSURE: 76 MMHG

## 2019-01-23 VITALS — SYSTOLIC BLOOD PRESSURE: 95 MMHG | RESPIRATION RATE: 26 BRPM | HEART RATE: 70 BPM | DIASTOLIC BLOOD PRESSURE: 75 MMHG

## 2019-01-23 VITALS — HEART RATE: 73 BPM | DIASTOLIC BLOOD PRESSURE: 81 MMHG | SYSTOLIC BLOOD PRESSURE: 97 MMHG | RESPIRATION RATE: 26 BRPM

## 2019-01-23 VITALS — DIASTOLIC BLOOD PRESSURE: 74 MMHG | SYSTOLIC BLOOD PRESSURE: 130 MMHG | HEART RATE: 46 BPM | RESPIRATION RATE: 18 BRPM

## 2019-01-23 VITALS — RESPIRATION RATE: 19 BRPM | SYSTOLIC BLOOD PRESSURE: 110 MMHG | HEART RATE: 64 BPM | DIASTOLIC BLOOD PRESSURE: 68 MMHG

## 2019-01-23 VITALS — DIASTOLIC BLOOD PRESSURE: 76 MMHG | HEART RATE: 64 BPM | RESPIRATION RATE: 26 BRPM | SYSTOLIC BLOOD PRESSURE: 104 MMHG

## 2019-01-23 VITALS — DIASTOLIC BLOOD PRESSURE: 80 MMHG | RESPIRATION RATE: 19 BRPM | HEART RATE: 64 BPM | SYSTOLIC BLOOD PRESSURE: 124 MMHG

## 2019-01-23 VITALS — DIASTOLIC BLOOD PRESSURE: 88 MMHG | SYSTOLIC BLOOD PRESSURE: 107 MMHG | HEART RATE: 76 BPM | RESPIRATION RATE: 26 BRPM

## 2019-01-23 VITALS — SYSTOLIC BLOOD PRESSURE: 108 MMHG | DIASTOLIC BLOOD PRESSURE: 85 MMHG | HEART RATE: 70 BPM | RESPIRATION RATE: 26 BRPM

## 2019-01-23 VITALS — SYSTOLIC BLOOD PRESSURE: 127 MMHG | HEART RATE: 63 BPM | DIASTOLIC BLOOD PRESSURE: 66 MMHG | RESPIRATION RATE: 18 BRPM

## 2019-01-23 VITALS — HEART RATE: 70 BPM | RESPIRATION RATE: 23 BRPM | DIASTOLIC BLOOD PRESSURE: 93 MMHG | SYSTOLIC BLOOD PRESSURE: 123 MMHG

## 2019-01-23 VITALS — HEART RATE: 63 BPM | RESPIRATION RATE: 19 BRPM | SYSTOLIC BLOOD PRESSURE: 111 MMHG | DIASTOLIC BLOOD PRESSURE: 74 MMHG

## 2019-01-23 VITALS — SYSTOLIC BLOOD PRESSURE: 107 MMHG | HEART RATE: 50 BPM | RESPIRATION RATE: 19 BRPM | DIASTOLIC BLOOD PRESSURE: 72 MMHG

## 2019-01-23 VITALS — RESPIRATION RATE: 26 BRPM | SYSTOLIC BLOOD PRESSURE: 95 MMHG | HEART RATE: 75 BPM | DIASTOLIC BLOOD PRESSURE: 80 MMHG

## 2019-01-23 VITALS — HEART RATE: 53 BPM

## 2019-01-23 LAB
ADD MAN DIFF?: NO
ADD UMIC: NO
ALLEN TEST: (no result)
ANION GAP: 11 (ref 5–13)
ARTERIAL BASE EXCESS: -1.9 MMOL/L (ref -3–3)
ARTERIAL BLOOD GAS OXYGEN SAT: 94.9 MMHG (ref 95–98)
ARTERIAL COHB: 0.3 % (ref 0–3)
ARTERIAL FRACTION OF OXYHGB: 94.6 % (ref 93–99)
ARTERIAL HCO3: 24.4 MMOL/L (ref 22–26)
ARTERIAL METHB: 0 % (ref 0–1.5)
ARTERIAL PCO2: 48.3 MMHG (ref 35–45)
BASOPHIL #: 0 10^3/UL (ref 0–0.1)
BASOPHILS %: 0.4 % (ref 0–2)
BLOOD GAS LOW PEEP SETTING: 5 CMH2O
BLOOD GAS TIDAL VOLUME: 500 ML
BLOOD UREA NITROGEN: 34 MG/DL (ref 7–20)
CALCIUM: 8.6 MG/DL (ref 8.4–10.2)
CARBON DIOXIDE: 26 MMOL/L (ref 21–31)
CHLORIDE: 105 MMOL/L (ref 97–110)
CREATININE,URINE RANDOM: 37.61 MG/DL (ref 20–370)
CREATININE: 1.51 MG/DL (ref 0.61–1.24)
EOSINOPHILS #: 0 10^3/UL (ref 0–0.5)
EOSINOPHILS %: 0.1 % (ref 0–7)
FIO2: 50 %
GLUCOSE: 94 MG/DL (ref 70–220)
HEMATOCRIT: 32.9 % (ref 42–52)
HEMOGLOBIN: 10.5 G/DL (ref 14–18)
IMMATURE GRANS #M: 0.04 10^3/UL (ref 0–0.03)
IMMATURE GRANS % (M): 0.4 % (ref 0–0.43)
LACTIC ACID: 1.4 MMOL/L (ref 0.5–2)
LYMPHOCYTES #: 1.8 10^3/UL (ref 0.8–2.9)
LYMPHOCYTES %: 15.9 % (ref 15–51)
MAGNESIUM: 2.3 MG/DL (ref 1.7–2.5)
MEAN CORPUSCULAR HEMOGLOBIN: 28.3 PG (ref 29–33)
MEAN CORPUSCULAR HGB CONC: 31.9 G/DL (ref 32–37)
MEAN CORPUSCULAR VOLUME: 88.7 FL (ref 82–101)
MEAN PLATELET VOLUME: 10.7 FL (ref 7.4–10.4)
MODE: (no result)
MONOCYTE #: 1.1 10^3/UL (ref 0.3–0.9)
MONOCYTES %: 9.6 % (ref 0–11)
NEUTROPHIL #: 8.3 10^3/UL (ref 1.6–7.5)
NEUTROPHILS %: 73.6 % (ref 39–77)
NUCLEATED RED BLOOD CELLS #: 0 10^3/UL (ref 0–0)
NUCLEATED RED BLOOD CELLS%: 0 /100WBC (ref 0–0)
O2 A-A PPRESDIFF RESPIRATORY: 215.7 MMHG (ref 7–24)
PHOSPHORUS: 3.8 MG/DL (ref 2.5–4.9)
PLATELET COUNT: 128 10^3/UL (ref 140–415)
POTASSIUM: 4.5 MMOL/L (ref 3.5–5.1)
PROTEIN URINE: 30 MG/DL (ref 0–11.9)
RED BLOOD COUNT: 3.71 10^6/UL (ref 4.7–6.1)
RED CELL DISTRIBUTION WIDTH: 16 % (ref 11.5–14.5)
SODIUM,URINE RANDOM: 30 MMOL/L (ref 30–90)
SODIUM: 142 MMOL/L (ref 135–144)
UR ASCORBIC ACID: NEGATIVE MG/DL
UR BILIRUBIN (DIP): NEGATIVE MG/DL
UR BLOOD (DIP): NEGATIVE MG/DL
UR CLARITY: CLEAR
UR COLOR: (no result)
UR GLUCOSE (DIP): NEGATIVE MG/DL
UR KETONES (DIP): NEGATIVE MG/DL
UR LEUKOCYTE ESTERASE (DIP): NEGATIVE LEU/UL
UR NITRITE (DIP): NEGATIVE MG/DL
UR PH (DIP): 5 (ref 5–9)
UR SPECIFIC GRAVITY (DIP): 1.01 (ref 1–1.03)
UR TOTAL PROTEIN (DIP): NEGATIVE MG/DL
UR UROBILINOGEN (DIP): NEGATIVE MG/DL
WHITE BLOOD COUNT: 11.3 10^3/UL (ref 4.8–10.8)

## 2019-01-23 RX ADMIN — IPRATROPIUM BROMIDE 1 PUFF: 17 AEROSOL, METERED RESPIRATORY (INHALATION) at 19:33

## 2019-01-23 RX ADMIN — FAMOTIDINE 1 MG: 10 INJECTION, SOLUTION INTRAVENOUS at 20:14

## 2019-01-23 RX ADMIN — FOLIC ACID SCH MLS/HR: 5 INJECTION, SOLUTION INTRAMUSCULAR; INTRAVENOUS; SUBCUTANEOUS at 04:42

## 2019-01-23 RX ADMIN — PROPOFOL SCH MLS/HR: 10 INJECTION, EMULSION INTRAVENOUS at 21:02

## 2019-01-23 RX ADMIN — PIPERACILLIN SODIUM AND TAZOBACTAM SODIUM SCH MLS/HR: 3; .375 INJECTION, POWDER, LYOPHILIZED, FOR SOLUTION INTRAVENOUS at 17:04

## 2019-01-23 RX ADMIN — PROPOFOL 1 MLS/HR: 10 INJECTION, EMULSION INTRAVENOUS at 21:02

## 2019-01-23 RX ADMIN — ENOXAPARIN SODIUM 1 MG: 100 INJECTION SUBCUTANEOUS at 09:00

## 2019-01-23 RX ADMIN — DIPHENHYDRAMINE HYDROCHLORIDE SCH MG: 50 INJECTION, SOLUTION INTRAMUSCULAR; INTRAVENOUS at 08:45

## 2019-01-23 RX ADMIN — THIAMINE HYDROCHLORIDE 1 MLS/HR: 100 INJECTION, SOLUTION INTRAMUSCULAR; INTRAVENOUS at 04:42

## 2019-01-23 RX ADMIN — ALBUTEROL SULFATE 1 MG: 2.5 SOLUTION RESPIRATORY (INHALATION) at 19:33

## 2019-01-23 RX ADMIN — VANCOMYCIN HYDROCHLORIDE 1 MLS/HR: 1 INJECTION, POWDER, LYOPHILIZED, FOR SOLUTION INTRAVENOUS at 17:04

## 2019-01-23 RX ADMIN — PROPOFOL 1 MLS/HR: 10 INJECTION, EMULSION INTRAVENOUS at 12:57

## 2019-01-23 RX ADMIN — VECURONIUM BROMIDE 1 MLS/HR: 1 INJECTION, POWDER, LYOPHILIZED, FOR SOLUTION INTRAVENOUS at 11:44

## 2019-01-23 RX ADMIN — VASOPRESSIN SCH MLS/HR: 20 INJECTION, SOLUTION INTRAMUSCULAR; SUBCUTANEOUS at 11:44

## 2019-01-23 RX ADMIN — PROPOFOL SCH MLS/HR: 10 INJECTION, EMULSION INTRAVENOUS at 12:57

## 2019-01-23 RX ADMIN — THIAMINE HYDROCHLORIDE 1 MLS/HR: 100 INJECTION, SOLUTION INTRAMUSCULAR; INTRAVENOUS at 15:38

## 2019-01-23 RX ADMIN — ENOXAPARIN SODIUM SCH MG: 100 INJECTION SUBCUTANEOUS at 09:00

## 2019-01-23 RX ADMIN — PIPERACILLIN SODIUM AND TAZOBACTAM SODIUM SCH MLS/HR: 3; .375 INJECTION, POWDER, LYOPHILIZED, FOR SOLUTION INTRAVENOUS at 11:19

## 2019-01-23 RX ADMIN — PROPOFOL SCH MLS/HR: 10 INJECTION, EMULSION INTRAVENOUS at 04:42

## 2019-01-23 RX ADMIN — METHYLPREDNISOLONE SODIUM SUCCINATE 1 MG: 40 INJECTION, POWDER, FOR SOLUTION INTRAMUSCULAR; INTRAVENOUS at 11:31

## 2019-01-23 RX ADMIN — METHYLPREDNISOLONE SODIUM SUCCINATE 1 MG: 40 INJECTION, POWDER, FOR SOLUTION INTRAMUSCULAR; INTRAVENOUS at 17:32

## 2019-01-23 RX ADMIN — DIPHENHYDRAMINE HYDROCHLORIDE SCH MG: 50 INJECTION, SOLUTION INTRAMUSCULAR; INTRAVENOUS at 20:14

## 2019-01-23 RX ADMIN — PIPERACILLIN SODIUM AND TAZOBACTAM SODIUM 1 MLS/HR: 3; .375 INJECTION, POWDER, LYOPHILIZED, FOR SOLUTION INTRAVENOUS at 11:19

## 2019-01-23 RX ADMIN — PROPOFOL 1 MLS/HR: 10 INJECTION, EMULSION INTRAVENOUS at 04:42

## 2019-01-23 RX ADMIN — FOLIC ACID SCH MLS/HR: 5 INJECTION, SOLUTION INTRAMUSCULAR; INTRAVENOUS; SUBCUTANEOUS at 15:38

## 2019-01-23 RX ADMIN — VANCOMYCIN HYDROCHLORIDE SCH MLS/HR: 1 INJECTION, POWDER, LYOPHILIZED, FOR SOLUTION INTRAVENOUS at 17:04

## 2019-01-23 RX ADMIN — PIPERACILLIN SODIUM AND TAZOBACTAM SODIUM 1 MLS/HR: 3; .375 INJECTION, POWDER, LYOPHILIZED, FOR SOLUTION INTRAVENOUS at 06:12

## 2019-01-23 RX ADMIN — PIPERACILLIN SODIUM AND TAZOBACTAM SODIUM 1 MLS/HR: 3; .375 INJECTION, POWDER, LYOPHILIZED, FOR SOLUTION INTRAVENOUS at 17:04

## 2019-01-23 RX ADMIN — FAMOTIDINE 1 MG: 10 INJECTION, SOLUTION INTRAVENOUS at 08:45

## 2019-01-23 RX ADMIN — PIPERACILLIN SODIUM AND TAZOBACTAM SODIUM SCH MLS/HR: 3; .375 INJECTION, POWDER, LYOPHILIZED, FOR SOLUTION INTRAVENOUS at 06:12

## 2019-01-23 NOTE — PN
Date/Time of Note


Date/Time of Note


DATE: 1/23/19 


TIME: 12:38





Assessment/Plan


VTE Prophylaxis


Risk score (from Ns)>0 risk:  11


SCD applied (from Ns):  Yes


Pharmacological prophylaxis:  LMWH





Lines/Catheters


IV Catheter Type (from [facility]):  Central Line


Central line still needed:  Yes


Urinary Cath still in place:  Yes


Reason Cath still needed:  urinary retention





Assessment/Plan


Hospital Course


Patient is continues on ventilatory support and propofol patient gets agitated 


during sedation vacation most likely due to pain, in the process of getting 


started on fentanyl drip.  Patient is on Levophed drip for blood pressure 


support dopamine was stopped overnight.  Good urine output Via Blake catheter.  


Minimal amount of drainage through chest tube.  Continue ICU care.


Assessment/Plan


-Sepsis with shock, continue broad-spectrum antibiotics, IV fluids, pressors, 


ICU care.  Dr. Dreyer is following in infection disease consultation.


-Status post cardiac arrest.   Dr. Jon is following in cardiology 


consultation.


-Left-sided pneumothorax, status post left side chest tube placement.


-Acute respiratory failure, continue ventilatory support bronchodilators.  Dr. Lim is following in pulmonology consultation.


-Pneumoperitoneum most likely due to cardiac arrest.  Dr. Lai is following in 


general surgery consultation. 


-Left lower lobe pneumonia


-Severe emphysema


-NATALIE with possible chronic kidney disease. Dr Hanson is following in nephrology


consultation.





Further recommendations based on clinical course.  Plan of care discussed with 


Dr. Miller.


Result Diagram:  


1/23/19 0340                                                                    


           1/23/19 0340





Results 24hrs





Laboratory Tests


Test
                         1/22/19
14:03  1/23/19
03:40         1/23/19
09:11


Troponin I                        0.311  *H


White Blood Count                                 11.3  #H


Red Blood Count                                    3.71  L


Hemoglobin                                         10.5  L


Hematocrit                                         32.9  L


Mean Corpuscular Volume                             88.7


Mean Corpuscular Hemoglobin                        28.3  L


Mean Corpuscular              
                   31.9  L
  



Hemoglobin
Concent


Red Cell Distribution Width                        16.0  H


Platelet Count                                      128  L


Mean Platelet Volume                               10.7  H


Immature Granulocytes %                            0.400


Neutrophils %                                       73.6


Lymphocytes %                                       15.9


Monocytes %                                          9.6


Eosinophils %                                        0.1


Basophils %                                          0.4


Nucleated Red Blood Cells %                          0.0


Immature Granulocytes #                           0.040  H


Neutrophils #                                       8.3  H


Lymphocytes #                                        1.8


Monocytes #                                         1.1  H


Eosinophils #                                        0.0


Basophils #                                          0.0


Nucleated Red Blood Cells #                          0.0


Sodium Level                                         142


Potassium Level                                      4.5


Chloride Level                                       105


Carbon Dioxide Level                                  26


Anion Gap                                             11


Blood Urea Nitrogen                                  34  H


Creatinine                                         1.51  H


Est Glomerular Filtrat        
                     46  L
  



Rate
mL/min


Glucose Level                                        94  #


Lactic Acid Level                                    1.4


Calcium Level                                        8.6


Phosphorus Level                                     3.8


Magnesium Level                                      2.3


Blood Gas Specimen Source
    
              
              Blood arterial



Arterial Blood Date Drawn
    
              
              1/23/2019
9:44:19 AM


Arterial Blood pH             
              
                         7.322  L



(Temp
corrected)


Arterial Blood pCO2           
              
                          48.3  H



(Temp
correct)


Arterial Blood pO2            
              
                           86.5  



(Temp
corrected)


Arterial Blood HCO3                                                       24.4


Arterial Blood Base Excess                                                -1.9


Arterial Blood                
              
                          94.9  L



Oxygen
Saturation


Antoine Test                                                  ACCEPTAB


Arterial Blood Gas            
              
              Right Radial  



Puncture
Site


Arterial                      
              
                            0.3  



Blood
Carboxyhemoglobin


Arterial Blood Methemoglobin                                                 0


Blood Gas A-a O2                                                        215.7  H


Differential


Oxyhemoglobin Percent                                                     94.6


Blood Gas Temperature                                                     37.0


Blood Gas Respiration Rate                                                18.0


Blood Gas Actual              
              
                             18  



Respiration
Rate


Blood Gas Modality                                          VENT - AC


FiO2                                                                      50.0


Blood Gas Tidal Volume                                                   500.0


Blood Gas Low PEEP Setting                                                 5.0


Blood Gas Notified Whom                                     TM


Blood Gas Notified Time
      
              
              1/23/2019
9:59:08 AM








Exam/Review of Systems


Vital Signs


Vitals





Vital Signs


  Date      Temp  Pulse  Resp  B/P (MAP)   Pulse Ox  O2          O2 Flow    FiO2


Time                                                 Delivery    Rate


   1/23/19                                                                    50


     12:13


   1/23/19           77    26  96/71 (79)        98


     10:30


   1/23/19                                           Mechanical


     10:00                                           Ventilator


   1/23/19  98.5


     08:00


   1/21/19                                                              15


     18:34








Intake and Output





1/22/19 1/22/19 1/23/19





1515:00


23:00


07:00





IntakeIntake Total


1366.876 ml


1202.313 ml


1128.625 ml





OutputOutput Total


640 ml


700 ml


420 ml





BalanceBalance


726.876 ml


502.313 ml


708.625 ml














Exam


Constitutional:  frail, other (Sedated on propofol)


Head:  normocephalic


Eyes:  other (Right eye blind, left eye PERRLA)


Neck:  supple


Respiratory:  diminished breath sounds, other (Left chest tube)


Cardiovascular:  regular rate and rhythm


Gastrointestinal:  soft, other (hypoactive bowel sounds)


Genitourinary - Male:  other (Blake catheter)


Musculoskeletal:  nl extremities to inspection


Extremities:  normal pulses


Neurological:  other (sedated)





Medications


Medications





Current Medications


Sodium Chloride 1,000 ml @  100 mls/hr Q10H IV  Last administered on 1/23/19at 


04:42; Admin Dose 100 MLS/HR;  Start 1/21/19 at 23:03


Ondansetron HCl (Zofran Inj) 4 mg Q6H  PRN IV NAUSEA AND/OR VOMITING;  Start 


1/21/19 at 23:30


Acetaminophen (Tylenol Supp) 650 mg Q4H  PRN NE PAIN LEVEL 1-3 OR FEVER;  Start 


1/21/19 at 23:30


Morphine Sulfate (morphine) 2 mg Q4H  PRN IV PAIN LEVEL 7-10;  Start 1/21/19 at 


23:30


Famotidine (Pepcid Iv) 20 mg Q12 IV  Last administered on 1/23/19at 08:45; Admin


Dose 20 MG;  Start 1/22/19 at 09:00


Enoxaparin Sodium (Lovenox) 30 mg DAILY SC ;  Start 1/22/19 at 09:00


Piperacillin Sod/ Tazobactam Sod 100 ml @  200 mls/hr Q6 IVPB  Last administered


on 1/23/19at 11:19; Admin Dose 200 MLS/HR;  Start 1/22/19 at 00:00


Norepinephrine 250 ml @  1.875 mls/ hr TITRATE IV  Last administered on 


1/22/19at 09:48; Admin Dose 37.5 MLS/HR;  Start 1/22/19 at 00:30


Dopamine HCl/ Dextrose 250 ml @  5.625 mls/ hr TITRATE IV  Last administered on 


1/22/19at 18:00; Admin Dose 16.875 MLS/HR;  Start 1/22/19 at 02:00


Propofol 100 ml @  1.875 mls/ hr Q12H IV  Last administered on 1/23/19at 04:42; 


Admin Dose 15 MLS/HR;  Start 1/22/19 at 06:30


Vancomycin HCl (Vanco Iv Per Pharmacy) VANCOMYCIN PER PHARMACY PER PROTOCOL XX ;


 Start 1/22/19 at 14:00


Vancomycin HCl 250 ml @  125 mls/hr Q24H IVPB  Last administered on 1/22/19at 


17:24; Admin Dose 125 MLS/HR;  Start 1/22/19 at 17:00


Ipratropium Bromide (Atrovent Hfa) 4 puff Q6H RESP  THERAPY INH ;  Start 1/23/19


at 10:00


Albuterol (Proventil 0.083% (Neb)) 2.5 mg Q6HWA RESP  THERAPY HHN ;  Start 


1/23/19 at 14:00


Methylprednisolone Sodium Succinate (Solu-Medrol) 40 mg Q6 IV  Last administered


on 1/23/19at 11:31; Admin Dose 40 MG;  Start 1/23/19 at 12:00


Fentanyl 1000 mcg/ Dextrose 100 ml @  2.5 mls/hr TITRATE IV  Last administered 


on 1/23/19at 11:44; Admin Dose 2.5 MLS/HR;  Start 1/23/19 at 12:30


Aspirin (Aspirin) 81 mg DAILY PO ;  Start 1/24/19 at 09:00











EVARISTO BELTRAN             Jan 23, 2019 12:48

## 2019-01-23 NOTE — CONS
Assessment/Plan


Abdominal x-ray showing decreased pneumoperitoneum.


Chest x-ray is pending from today.  ABG is pending as well.


Patient is currently on Levophed 30 mics per minute, propofol 40 mics per 


kilogram per minute.





Assessment recommendations; next





1.  Patient admitted with acute respiratory failure due to acute hypercapnia 


likely on the basis of underlying severe emphysema required brief CPR with 


intubation.


2.  Pneumoperitoneum of uncertain etiology.  Possibly CPR related.  Based upon 


examination there is no evidence of any acute abdomen.


3.  Placement of left chest tube.


4.  Severe bullous emphysema.


5.  Ongoing sepsis, patient requiring high-dose Levophed.  Currently on 


appropriate antimicrobial regimen.


6.  Mixed acidosis with interval improvement.


7.  UTI.


8.  Likely baseline renal insufficiency.


9.  Anemia and thrombocytopenia.





Hold further sedation to assess mental status.  Decrease PEEP to 5.  Decrease 


FiO2 to keep O2 saturation around 94%.  Decrease assist control rate to 16.  


Obtain chest x-ray as well as ABG.  Continue current antimicrobial regimen.  


Further recommendations once chest x-ray and ABG are performed.


35 minutes of critical care time was spent evaluating the patient.


Result Diagram:  


1/23/19 0340                                                                    


           1/23/19 0340





Results 24hrs





Laboratory Tests


Test
                                1/22/19
10:31  1/22/19
14:03  1/23/19
03:40


Blood Gas Specimen Source
   Blood arterial
        
              



Arterial Blood Date Drawn
   1/22/2019
10:54:53 AM  
              



Arterial Blood pH                         7.392  
  
              



(Temp
corrected)


Arterial Blood pCO2                        44.3  
  
              



(Temp
correct)


Arterial Blood pO2                       264.4  H
  
              



(Temp
corrected)


Arterial Blood HCO3                        26.3  H


Arterial Blood Base Excess                   1.1


Arterial Blood                            99.7  H
  
              



Oxygen
Saturation


Antoine Test                   ACCEPTAB


Arterial Blood Gas           Right Radial  
        
              



Puncture
Site


Arterial                                    0.8  
  
              



Blood
Carboxyhemoglobin


Arterial Blood                               0.3


Methemoglobin


Blood Gas A-a O2                          404.3  H


Differential


Oxyhemoglobin Percent                       98.6


Blood Gas Temperature                       37.0


Blood Gas Respiration Rate                  26.0


Blood Gas Actual                             26  
  
              



Respiration
Rate


Blood Gas Modality           VENT - AC


FiO2                                       100.0


Blood Gas Tidal Volume                     500.0


Blood Gas Low PEEP Setting                   5.0


Blood Gas Notified Whom      TM


Blood Gas Notified Time
     1/22/2019
11:12:36 AM  
              



Troponin I                                              0.311  *H


White Blood Count                                                       11.3  #H


Red Blood Count                                                          3.71  L


Hemoglobin                                                               10.5  L


Hematocrit                                                               32.9  L


Mean Corpuscular Volume                                                   88.7


Mean Corpuscular Hemoglobin                                              28.3  L


Mean Corpuscular             
                      
                   31.9  L



Hemoglobin
Concent


Red Cell Distribution Width                                              16.0  H


Platelet Count                                                            128  L


Mean Platelet Volume                                                     10.7  H


Immature Granulocytes %                                                  0.400


Neutrophils %                                                             73.6


Lymphocytes %                                                             15.9


Monocytes %                                                                9.6


Eosinophils %                                                              0.1


Basophils %                                                                0.4


Nucleated Red Blood Cells %                                                0.0


Immature Granulocytes #                                                 0.040  H


Neutrophils #                                                             8.3  H


Lymphocytes #                                                              1.8


Monocytes #                                                               1.1  H


Eosinophils #                                                              0.0


Basophils #                                                                0.0


Nucleated Red Blood Cells #                                                0.0


Sodium Level                                                               142


Potassium Level                                                            4.5


Chloride Level                                                             105


Carbon Dioxide Level                                                        26


Anion Gap                                                                   11


Blood Urea Nitrogen                                                        34  H


Creatinine                                                               1.51  H


Est Glomerular Filtrat       
                      
                     46  L



Rate
mL/min


Glucose Level                                                              94  #


Lactic Acid Level                                                          1.4


Calcium Level                                                              8.6


Phosphorus Level                                                           3.8


Magnesium Level                                                            2.3








Consultation Date/Type/Reason


Admit Date/Time


Jan 22, 2019 at 00:03


Initial Consult Date


1/22/19


Type of Consult


Pulmonary/critical care


Patient is a 68-year-old male who is a nursing home resident was sent over to 


the hospital because of hypothermia and altered mental status.  In the emergency


room, patient developed cardiac arrest requiring CPR and intubation.  In the 


course of workup the patient also required a chest tube placement on the left 


side.  CT imaging of the abdomen showed pneumoperitoneum with possibly pe


rforated viscus.  By the time I saw the patient in ICU, patient is orally 


intubated and sedated.  History was obtained from medical records.





Past medical history; essentially unremarkable except for possibly COPD.


Medications; reviewed.  Patient is currently on dopamine at 6 mics per kilogram 


per minute, Levophed 20 mics per minute, propofol 25 mics per kilogram per 


minute.  Other medications were also reviewed.


Allergies; none.


Social history; not available.


Family history and occupational history is also not available.


Review of system; unable to be obtained.


General exam; elderly male, orally intubated, sedated, currently in no distress.





24 HR Interval Summary


Free Text/Dictation


Patient's condition remains critical.  Still requiring high-dose Levophed for 


hypotension.


General exam; elderly male, orally intubated.  Currently no distress.  Sedated.





Exam/Review of Systems


Vital Signs


Vitals





Vital Signs


  Date      Temp  Pulse  Resp  B/P (MAP)   Pulse Ox  O2          O2 Flow    FiO2


Time                                                 Delivery    Rate


   1/23/19           62


     08:00


   1/23/19  98.5           26      134/97       100  Mechanical


     08:00                          (109)            Ventilator


   1/23/19                                                                    50


     05:30


   1/21/19                                                              15


     18:34








Intake and Output





1/22/19 1/22/19 1/23/19





1515:00


23:00


07:00





IntakeIntake Total


1366.876 ml


1202.313 ml


1128.625 ml





OutputOutput Total


640 ml


700 ml


420 ml





BalanceBalance


726.876 ml


502.313 ml


708.625 ml














Exam


H EENT exam; supple neck, no JVD.  No lymphadenopathy.  Midline trachea.  No 


thyromegaly.  Patient is edentulous.  Orally intubated.  There is a right 


corneal opacity.  Patient has a left surgical pupil.


Chest exam; diminished but clear breath sounds.  S1-S2 audible, no murmurs.  


Regular rhythm.  Left-sided chest tube in place.  Has drained 20 mL overnight.


Abdomen exam; soft, no organomegaly.  Nondistended.  Bowel sounds audible.


Extremity exam; no peripheral edema or clubbing.  Pulses 1+.


CNS exam; patient is sedated.





Medications


Medications





Current Medications


Sodium Chloride 1,000 ml @  100 mls/hr Q10H IV  Last administered on 1/23/19at 


04:42; Admin Dose 100 MLS/HR;  Start 1/21/19 at 23:03


Ondansetron HCl (Zofran Inj) 4 mg Q6H  PRN IV NAUSEA AND/OR VOMITING;  Start 


1/21/19 at 23:30


Acetaminophen (Tylenol Supp) 650 mg Q4H  PRN [place] PAIN LEVEL 1-3 OR FEVER;  Start 


1/21/19 at 23:30


Morphine Sulfate (morphine) 2 mg Q4H  PRN IV PAIN LEVEL 7-10;  Start 1/21/19 at 


23:30


Famotidine (Pepcid Iv) 20 mg Q12 IV  Last administered on 1/23/19at 08:45; Admin


Dose 20 MG;  Start 1/22/19 at 09:00


Enoxaparin Sodium (Lovenox) 30 mg DAILY SC ;  Start 1/22/19 at 09:00


Piperacillin Sod/ Tazobactam Sod 100 ml @  200 mls/hr Q6 IVPB  Last administered


on 1/23/19at 06:12; Admin Dose 200 MLS/HR;  Start 1/22/19 at 00:00


Norepinephrine 250 ml @  1.875 mls/ hr TITRATE IV  Last administered on 


1/22/19at 09:48; Admin Dose 37.5 MLS/HR;  Start 1/22/19 at 00:30


Dopamine HCl/ Dextrose 250 ml @  5.625 mls/ hr TITRATE IV  Last administered on 


1/22/19at 18:00; Admin Dose 16.875 MLS/HR;  Start 1/22/19 at 02:00


Influenza Virus Vaccine Quadrival (Fluzone) 0.5 ml ONCE ONCE IM* ;  Start 


1/23/19 at 10:00;  Stop 1/23/19 at 10:01


Propofol 100 ml @  1.875 mls/ hr Q12H IV  Last administered on 1/23/19at 04:42; 


Admin Dose 15 MLS/HR;  Start 1/22/19 at 06:30


Vancomycin HCl (Vanco Iv Per Pharmacy) VANCOMYCIN PER PHARMACY PER PROTOCOL XX ;


 Start 1/22/19 at 14:00


Vancomycin HCl 250 ml @  125 mls/hr Q24H IVPB  Last administered on 1/22/19at 


17:24; Admin Dose 125 MLS/HR;  Start 1/22/19 at 17:00





Date/Time of Note


Date/Time of Note


DATE: 1/23/19 


TIME: 09:31











SHAGUFTA HUANG                    Jan 23, 2019 09:37

## 2019-01-23 NOTE — RADRPT
Echocardiogram Report

 

Patient Name:  CHRISSY NAJERA  Gender:       Male

MRN:           [id number]         Accession #:  [id number]

Birth Date:    1950     Study Date:   22-Jan-2019

Sonographer:   TRUNG Nix RDCS    Location:     [id number]

 

Ref. Physician: HARDY GUZMÁN

Quality: Technically Difficult Study

 

Procedures: Transthoracic echocardiogram with complete 2D, M-Mode, and 

doppler examination.

Indications: Chest Pain.

 

2D/M Mode                          Doppler

Measurement  Value  Normal Ranges  Measurement    Value  Normal Ranges

LVIDd 2D     3.3    3.5 - 5.6 cm   AV Peak Gerald    1.4    m/sec

LVIDs 2D     1.8    2.1 - 4.1 cm   AV Peak PG     8.0    mmHg

FS 2D        47.1   %              LVOT Peak Gerald  0.8    m/sec

LVPWd 2D     1.0    0.6 - 1.1 cm   LVOT Peak PG   2.0    mmHg

IVSd 2D      1.1    0.6 - 1.1 cm   MV E Peak Gerald  0.3    m/sec

IVS/LVPW 2D  1.1                   MV A Peak Gerald  0.7    m/sec

AoR Diam 2D  2.9    2.0 - 3.7 cm   MV E/A         0.5

LA/Ao 2D     1      0 - 1          MV Decel Time  215    msec

EDV 2D       36.9   cm3            MV E/A         0.5

ESV 2D       5.4    cm3            TR Peak Gerald    3.0    m/sec

LA Dimen 2D  2.7    2.3 - 4.0 cm   TR Peak PG     36.0   mmHg

RVSP           44.0   mmHg

RA Pressure    8.0

 

Findings

Left Ventricle: Normal left ventricular systolic function.  Normal left 

ventricular cavity size.  Mild concentric left ventricular hypertrophy.  

Ejection fraction is visually estimated at 60 %.

Right Ventricle: Normal right ventricular size.  Normal right 

ventricular systolic function.

Left Atrium: The left atrium is normal in size.

Right Atrium: The right atrium is normal in size.

Mitral Valve: Normal appearance and function of the mitral valve with 

trace physiologic regurgitation.

Aortic Valve: No significant aortic stenosis or insufficiency.  Aortic 

cusps appear mildly calcified.

Tricuspid Valve: Normal appearance of the tricuspid valve.  Estimated 

peak PA systolic pressure 44 mmHg.  There is mild tricuspid 

regurgitation.

Pulmonic Valve: Normal pulmonic valve appearance.

Pericardium: Normal pericardium with no significant pericardial effusion.

Aorta: Normal aortic root.

IVC: Inferior vena cava without respiratory collapse, however, patient 

on ventilator.

 

Conclusions

Normal left ventricular systolic function.  Normal left ventricular 

cavity size.  Mild concentric left ventricular hypertrophy.  Ejection 

fraction is visually estimated at 60 %.

Normal appearance and function of the mitral valve with trace 

physiologic regurgitation.

Normal appearance of the tricuspid valve.  Estimated peak PA systolic 

pressure 44 mmHg.  There is mild tricuspid regurgitation.

 

Electronically Signed By:

Carlos Jon

23-Jan-2019 16:08:25  -0800

 

Patient Name: CHRISSY NAJERA

MRN: [id number]

Study Date: 22-Jan-2019

 

[id number]

## 2019-01-23 NOTE — PN
DATE:  01/23/2019

 

 

SUBJECTIVE:  The patient remains critically ill on pressor support, on full ventilatory support.  Uri
nary output has been adequate.  No other events noted.

 

OBJECTIVE:

VITAL SIGNS:  Blood pressure is 101/82, respirations 26, pulse 64, temperature 98.6.

HEENT:  Head is normocephalic.

NECK:  Supple.

HEART:  Regular rate.

LUNGS:  Show diminished breath sounds at the base.

ABDOMEN:  Soft, nontender to palpation.  No rebound or guarding.

EXTREMITIES:  Negative for clubbing, cyanosis, no edema.

DERMATOLOGIC:  No rashes.

MUSCULOSKELETAL:  No joint effusions.

NEUROLOGIC:  No change in exam.

 

MEDICATIONS:  The patient's medications have been reviewed.

 

LABORATORY DATA:  Shows sodium 142, BUN 34, creatinine 1.51.  White count 11.3, hemoglobin 10.5, plat
elet count is 128.

 

ASSESSMENT AND PLAN:

1.  Nonoliguric acute kidney injury with unknown baseline creatinine, possible chronic kidney disease
.  Etiology is multifactorial, likely secondary to acute tubular necrosis due to septic acute kidney 
injury, shock causing ischemic hyperperfusion.  The patient's renal function has been fluctuating but
 overall stable in the last 72 hours.  At this point, we would continue current treatment plan.  Cont
inue pressor support to maintain MAP of 65.  Continue IV hydration.  At this time, we will deescalate
 as pressor support continues to be weaned off.  We would otherwise continue to renally dose all medi
cines and avoid nephrotoxins.  Please note we will also check renal ultrasound to better evaluate teofilo
al parenchyma.

2.  Anemia.  Continue to monitor hemoglobin and hematocrit levels.

3.  Mineral bone disorder.  Monitor calcium and phosphorus levels.

4.  Possible chronic kidney disease.  The patient's CT scan suggested increased echogenicity of kidne
ys consistent with chronic disease.  The patient is currently in acute kidney injury as stated above.
  Continue to monitor.

5.  Lactic acidosis secondary to septic shock.  The patient's lactic acid levels have resolved.  Cont
inue to monitor.  ABG was reviewed.  The patient is compensated.

6.  Ventilator dependent respiratory failure.  Vent settings and ABG was reviewed.  Continue to monit
or.

7.  Septic shock secondary to pneumonia.  Continue medical management.  Continue broad spectrum antib
iotics.  Continue wean off pressors.  Continue IV hydration.

8.  Left-sided pneumothorax, status post chest tube placement.

9.  Pneumoperitoneum likely due to cardiac arrest.  Continue to monitor.  The patient has been seen b
y general surgery without perforated viscous.

10.  Status post cardiac arrest.  Continue to monitor.

11.  Encephalopathy, etiology is toxic metabolic.

 

Please note I spent over 30 minutes of critical care time with this patient.

 

 

Dictated By: MARYAN CHAMPAGNE DO

 

NR/NTS

DD:    01/23/2019 07:28:16

DT:    01/23/2019 07:46:20

Conf#: [id number]

DID#:  [id number]

CC: DONALDO CARRILLO MD; MIMI ZAVALA MD; MAURI GRIGSBY MD;*EndCC*

## 2019-01-23 NOTE — CONS
Assessment/Plan


Assessment/Plan


Hospital Course (Demo Recall)


IMP:


1.Hypotension-on levo


2.resp failure s/p intubation


3.cardiac arrest


4.pneumoperitoneum


5.Positive troponin


6.renal failure


7.Leukocytosis


8. anemia


9, Thrombocytopenia





Recc:


-ICU


-wean pressors


-trend cardiac enzynes


-F/U echo


-start asa


-Continue abx's and f/u cx data


-ongoing surgical eval





Consultation Date/Type/Reason


Admit Date/Time


Jan 22, 2019 at 00:03


Initial Consult Date


1/22/19


Type of Consult


Cardiology


Reason for Consultation


cardiac arrest


Requesting Provider:  DONALDO CARRILLO MD


Date/Time of Note


DATE: 1/23/19 


TIME: 11:24





Exam/Review of Systems


Vital Signs


Vitals





Vital Signs


  Date      Temp  Pulse  Resp  B/P (MAP)   Pulse Ox  O2          O2 Flow    FiO2


Time                                                 Delivery    Rate


   1/23/19           77    26  96/71 (79)        98


     10:30


   1/23/19                                           Mechanical


     10:00                                           Ventilator


   1/23/19                                                                    50


     09:10


   1/23/19  98.5


     08:00


   1/21/19                                                              15


     18:34








Intake and Output





1/22/19 1/22/19 1/23/19





1414:59


22:59


06:59





IntakeIntake Total


1478.126 ml


1234.813 ml


1126.125 ml





OutputOutput Total


600 ml


740 ml


430 ml





BalanceBalance


878.126 ml


494.813 ml


696.125 ml














Exam


Exam


Review of Systems:


CONSTITUTIONAL:  No fevers, chills.


PULMONARY:  No sob


CARDIOVASCULAR: No chest pain/palpitations


GASTROINTESTINAL:  No nausea/vomiting.


GENITOURINARY:  No hematuria/dysuria.


MUSCULOSKELETAL:  No myagias/arthalgias.


PSYCHIATRIC:  The patient denies depression.


NEUROLOGIC:   No weakness


Constitutional:  alert


Psych:  no complaints


Head:  normocephalic


ENMT:  mucosa pink and moist


Neck:  supple, jvd (9 cm water)


Respiratory:  diminished breath sounds


Cardiovascular:  regular rate and rhythm


Gastrointestinal:  soft, non-tender


Musculoskeletal:  muscle tone (normal)


Extremities:  edema (none)


Neurological:  other (No focal deficits)





Labs


Result Diagram:  


1/23/19 0340                                                                    


           1/23/19 0340





Results 24hrs





Laboratory Tests


Test
                         1/22/19
14:03  1/23/19
03:40         1/23/19
09:11


Troponin I                        0.311  *H


White Blood Count                                 11.3  #H


Red Blood Count                                    3.71  L


Hemoglobin                                         10.5  L


Hematocrit                                         32.9  L


Mean Corpuscular Volume                             88.7


Mean Corpuscular Hemoglobin                        28.3  L


Mean Corpuscular              
                   31.9  L
  



Hemoglobin
Concent


Red Cell Distribution Width                        16.0  H


Platelet Count                                      128  L


Mean Platelet Volume                               10.7  H


Immature Granulocytes %                            0.400


Neutrophils %                                       73.6


Lymphocytes %                                       15.9


Monocytes %                                          9.6


Eosinophils %                                        0.1


Basophils %                                          0.4


Nucleated Red Blood Cells %                          0.0


Immature Granulocytes #                           0.040  H


Neutrophils #                                       8.3  H


Lymphocytes #                                        1.8


Monocytes #                                         1.1  H


Eosinophils #                                        0.0


Basophils #                                          0.0


Nucleated Red Blood Cells #                          0.0


Sodium Level                                         142


Potassium Level                                      4.5


Chloride Level                                       105


Carbon Dioxide Level                                  26


Anion Gap                                             11


Blood Urea Nitrogen                                  34  H


Creatinine                                         1.51  H


Est Glomerular Filtrat        
                     46  L
  



Rate
mL/min


Glucose Level                                        94  #


Lactic Acid Level                                    1.4


Calcium Level                                        8.6


Phosphorus Level                                     3.8


Magnesium Level                                      2.3


Blood Gas Specimen Source
    
              
              Blood arterial



Arterial Blood Date Drawn
    
              
              1/23/2019
9:44:19 AM


Arterial Blood pH             
              
                         7.322  L



(Temp
corrected)


Arterial Blood pCO2           
              
                          48.3  H



(Temp
correct)


Arterial Blood pO2            
              
                           86.5  



(Temp
corrected)


Arterial Blood HCO3                                                       24.4


Arterial Blood Base Excess                                                -1.9


Arterial Blood                
              
                          94.9  L



Oxygen
Saturation


Antoine Test                                                  ACCEPTAB


Arterial Blood Gas            
              
              Right Radial  



Puncture
Site


Arterial                      
              
                            0.3  



Blood
Carboxyhemoglobin


Arterial Blood Methemoglobin                                                 0


Blood Gas A-a O2                                                        215.7  H


Differential


Oxyhemoglobin Percent                                                     94.6


Blood Gas Temperature                                                     37.0


Blood Gas Respiration Rate                                                18.0


Blood Gas Actual              
              
                             18  



Respiration
Rate


Blood Gas Modality                                          VENT - AC


FiO2                                                                      50.0


Blood Gas Tidal Volume                                                   500.0


Blood Gas Low PEEP Setting                                                 5.0


Blood Gas Notified Whom                                     TM


Blood Gas Notified Time
      
              
              1/23/2019
9:59:08 AM














CARISA IZAGUIRRE               Jan 23, 2019 11:29

## 2019-01-23 NOTE — PN
Date/Time of Note


Date/Time of Note


DATE: 19 


TIME: 20:28





Assessment/Plan


Lines/Catheters


IV Catheter Type (from Nrs):  Central Line


Blake in Place (from Nrs):  Yes





Assessment/Plan


Assessment/Plan


Hospital day #1 for respiratory arrest


Abdominal series day shows contrast in the colon with decreased 


pneumoperitoneum.


No acute surgical issues


May start tube feeds at any time


Reconsult as needed





Subjective


24 Hr Interval Summary


Subjective hx not possible:  other (Intubated and sedated.  Responding minimally


to pain)





Exam/Review of Systems


Vital Signs


Vitals





Vital Signs


  Date      Temp  Pulse  Resp  B/P (MAP)   Pulse Ox  O2          O2 Flow    FiO2


Time                                                 Delivery    Rate


   19           62    23  95/66 (76)        96  Mechanical


     19:00                                           Ventilator


   19                                                                    40


     17:00


   19  98.4


     16:00


   19                                                              15


     18:34








Intake and Output





19





1515:00


23:00


07:00





IntakeIntake Total


1366.876 ml


1202.313 ml


1128.625 ml





OutputOutput Total


640 ml


700 ml


420 ml





BalanceBalance


726.876 ml


502.313 ml


708.625 ml














Exam


Respiratory:  other (Decreased breath sounds bilaterally)


Cardiovascular:  regular rate and rhythm


Gastrointestinal:  other (Soft, decreased distention)





Results


Free Text/Dictation


Patient: CHRISSY NAJERA   : 1950   Age: 68  Sex: M                      


 


       MR #:    [id number]   Acct #:   [id number]    DOS: 19 0700


Ordering MD: LEELA WOODALL MD   Location:  ICU   Room/Bed:  [facility]          


                               


                                        


PROCEDURE:   XR Abdomen. 


 


CLINICAL INDICATION:   Possible viscous perforation


 


TECHNIQUE:   AP abdomen x-ray. 


 


COMPARISON:    ABDOMEN 2019. 


 


FINDINGS:


There is pulmonary hyperinflation and blunting of the right costophrenic sulcus.


Chest tube positioned adjacent to the left hilum. Enteric tube projects below 


the diaphragm. There is residual oral contrast within the proximal large bowel. 


Bowel gas pattern is nonspecific. Generalized lucency throughout the abdomen is 


consistent with pneumoperitoneum. Both kidneys appear hyperdense.


 


IMPRESSION:


 


1. Generalized lucency throughout the abdomen consistent with pneumoperitoneum, 


diminished compared with previous radiograph of 2019.


2. Hyperdense appearance of both kidneys likely indicates retained contrast 


within the renal parenchyma, correlate for renal insufficiency or contrast 


nephropathy.


3. Left chest tube in place. Pulmonary hyperinflation.


 


 


RPTAT: HJBB


_____________________________________________ 


Physician Lashon           Date    Time 


Electronically viewed and signed by Physician Lashon on 2019 


07:58 


 


D:  2019 07:58  T:  2019 07:58


xB/





CC: LEELA WOODALL MD





[id number]


Result Diagram:  


19














LEELA WOODALL MD           2019 20:30

## 2019-01-24 VITALS — HEART RATE: 54 BPM | DIASTOLIC BLOOD PRESSURE: 76 MMHG | SYSTOLIC BLOOD PRESSURE: 118 MMHG | RESPIRATION RATE: 18 BRPM

## 2019-01-24 VITALS — SYSTOLIC BLOOD PRESSURE: 116 MMHG | RESPIRATION RATE: 18 BRPM | HEART RATE: 66 BPM | DIASTOLIC BLOOD PRESSURE: 83 MMHG

## 2019-01-24 VITALS — RESPIRATION RATE: 25 BRPM | DIASTOLIC BLOOD PRESSURE: 96 MMHG | SYSTOLIC BLOOD PRESSURE: 108 MMHG | HEART RATE: 70 BPM

## 2019-01-24 VITALS — DIASTOLIC BLOOD PRESSURE: 94 MMHG | HEART RATE: 65 BPM | SYSTOLIC BLOOD PRESSURE: 157 MMHG

## 2019-01-24 VITALS — HEART RATE: 58 BPM | DIASTOLIC BLOOD PRESSURE: 73 MMHG | SYSTOLIC BLOOD PRESSURE: 124 MMHG | RESPIRATION RATE: 18 BRPM

## 2019-01-24 VITALS — HEART RATE: 52 BPM | DIASTOLIC BLOOD PRESSURE: 78 MMHG | SYSTOLIC BLOOD PRESSURE: 139 MMHG | RESPIRATION RATE: 17 BRPM

## 2019-01-24 VITALS — RESPIRATION RATE: 22 BRPM | HEART RATE: 76 BPM | DIASTOLIC BLOOD PRESSURE: 83 MMHG | SYSTOLIC BLOOD PRESSURE: 124 MMHG

## 2019-01-24 VITALS — RESPIRATION RATE: 18 BRPM | DIASTOLIC BLOOD PRESSURE: 73 MMHG | SYSTOLIC BLOOD PRESSURE: 122 MMHG | HEART RATE: 46 BPM

## 2019-01-24 VITALS — DIASTOLIC BLOOD PRESSURE: 74 MMHG | HEART RATE: 62 BPM | SYSTOLIC BLOOD PRESSURE: 123 MMHG | RESPIRATION RATE: 17 BRPM

## 2019-01-24 VITALS — HEART RATE: 57 BPM | DIASTOLIC BLOOD PRESSURE: 74 MMHG | RESPIRATION RATE: 18 BRPM | SYSTOLIC BLOOD PRESSURE: 110 MMHG

## 2019-01-24 VITALS — SYSTOLIC BLOOD PRESSURE: 113 MMHG | HEART RATE: 57 BPM | RESPIRATION RATE: 20 BRPM | DIASTOLIC BLOOD PRESSURE: 77 MMHG

## 2019-01-24 VITALS — DIASTOLIC BLOOD PRESSURE: 71 MMHG | HEART RATE: 119 BPM | RESPIRATION RATE: 10 BRPM | SYSTOLIC BLOOD PRESSURE: 135 MMHG

## 2019-01-24 VITALS — RESPIRATION RATE: 18 BRPM | DIASTOLIC BLOOD PRESSURE: 70 MMHG | HEART RATE: 45 BPM | SYSTOLIC BLOOD PRESSURE: 130 MMHG

## 2019-01-24 VITALS — DIASTOLIC BLOOD PRESSURE: 75 MMHG | RESPIRATION RATE: 16 BRPM | HEART RATE: 77 BPM | SYSTOLIC BLOOD PRESSURE: 111 MMHG

## 2019-01-24 VITALS — DIASTOLIC BLOOD PRESSURE: 71 MMHG | RESPIRATION RATE: 18 BRPM | SYSTOLIC BLOOD PRESSURE: 134 MMHG | HEART RATE: 51 BPM

## 2019-01-24 VITALS — RESPIRATION RATE: 18 BRPM | HEART RATE: 39 BPM | DIASTOLIC BLOOD PRESSURE: 77 MMHG | SYSTOLIC BLOOD PRESSURE: 164 MMHG

## 2019-01-24 VITALS — DIASTOLIC BLOOD PRESSURE: 71 MMHG | HEART RATE: 58 BPM | RESPIRATION RATE: 18 BRPM | SYSTOLIC BLOOD PRESSURE: 100 MMHG

## 2019-01-24 VITALS — HEART RATE: 77 BPM | SYSTOLIC BLOOD PRESSURE: 116 MMHG | RESPIRATION RATE: 15 BRPM | DIASTOLIC BLOOD PRESSURE: 76 MMHG

## 2019-01-24 VITALS — SYSTOLIC BLOOD PRESSURE: 121 MMHG | HEART RATE: 44 BPM | DIASTOLIC BLOOD PRESSURE: 67 MMHG | RESPIRATION RATE: 10 BRPM

## 2019-01-24 VITALS — HEART RATE: 79 BPM | DIASTOLIC BLOOD PRESSURE: 76 MMHG | RESPIRATION RATE: 19 BRPM | SYSTOLIC BLOOD PRESSURE: 119 MMHG

## 2019-01-24 VITALS — RESPIRATION RATE: 19 BRPM | SYSTOLIC BLOOD PRESSURE: 138 MMHG | HEART RATE: 51 BPM | DIASTOLIC BLOOD PRESSURE: 78 MMHG

## 2019-01-24 VITALS — HEART RATE: 52 BPM | RESPIRATION RATE: 16 BRPM | DIASTOLIC BLOOD PRESSURE: 77 MMHG | SYSTOLIC BLOOD PRESSURE: 137 MMHG

## 2019-01-24 VITALS — SYSTOLIC BLOOD PRESSURE: 154 MMHG | DIASTOLIC BLOOD PRESSURE: 80 MMHG | RESPIRATION RATE: 15 BRPM | HEART RATE: 42 BPM

## 2019-01-24 VITALS — SYSTOLIC BLOOD PRESSURE: 132 MMHG | RESPIRATION RATE: 18 BRPM | DIASTOLIC BLOOD PRESSURE: 70 MMHG | HEART RATE: 42 BPM

## 2019-01-24 VITALS — DIASTOLIC BLOOD PRESSURE: 86 MMHG | SYSTOLIC BLOOD PRESSURE: 132 MMHG | HEART RATE: 61 BPM | RESPIRATION RATE: 18 BRPM

## 2019-01-24 VITALS — DIASTOLIC BLOOD PRESSURE: 78 MMHG | HEART RATE: 59 BPM | SYSTOLIC BLOOD PRESSURE: 120 MMHG | RESPIRATION RATE: 14 BRPM

## 2019-01-24 VITALS — DIASTOLIC BLOOD PRESSURE: 70 MMHG | SYSTOLIC BLOOD PRESSURE: 123 MMHG | HEART RATE: 44 BPM | RESPIRATION RATE: 18 BRPM

## 2019-01-24 VITALS — DIASTOLIC BLOOD PRESSURE: 72 MMHG | RESPIRATION RATE: 21 BRPM | HEART RATE: 53 BPM | SYSTOLIC BLOOD PRESSURE: 121 MMHG

## 2019-01-24 VITALS — RESPIRATION RATE: 16 BRPM | HEART RATE: 43 BPM | DIASTOLIC BLOOD PRESSURE: 76 MMHG | SYSTOLIC BLOOD PRESSURE: 140 MMHG

## 2019-01-24 VITALS — SYSTOLIC BLOOD PRESSURE: 147 MMHG | RESPIRATION RATE: 15 BRPM | DIASTOLIC BLOOD PRESSURE: 89 MMHG | HEART RATE: 64 BPM

## 2019-01-24 VITALS — DIASTOLIC BLOOD PRESSURE: 86 MMHG | SYSTOLIC BLOOD PRESSURE: 134 MMHG | RESPIRATION RATE: 12 BRPM | HEART RATE: 68 BPM

## 2019-01-24 VITALS — RESPIRATION RATE: 18 BRPM | HEART RATE: 66 BPM | SYSTOLIC BLOOD PRESSURE: 141 MMHG | DIASTOLIC BLOOD PRESSURE: 82 MMHG

## 2019-01-24 VITALS — HEART RATE: 50 BPM | SYSTOLIC BLOOD PRESSURE: 136 MMHG | RESPIRATION RATE: 12 BRPM | DIASTOLIC BLOOD PRESSURE: 79 MMHG

## 2019-01-24 VITALS — HEART RATE: 41 BPM | SYSTOLIC BLOOD PRESSURE: 127 MMHG | DIASTOLIC BLOOD PRESSURE: 65 MMHG | RESPIRATION RATE: 18 BRPM

## 2019-01-24 VITALS — HEART RATE: 63 BPM | SYSTOLIC BLOOD PRESSURE: 129 MMHG | RESPIRATION RATE: 18 BRPM | DIASTOLIC BLOOD PRESSURE: 80 MMHG

## 2019-01-24 VITALS — HEART RATE: 60 BPM | DIASTOLIC BLOOD PRESSURE: 73 MMHG | SYSTOLIC BLOOD PRESSURE: 124 MMHG

## 2019-01-24 VITALS — SYSTOLIC BLOOD PRESSURE: 132 MMHG | RESPIRATION RATE: 22 BRPM | DIASTOLIC BLOOD PRESSURE: 74 MMHG | HEART RATE: 71 BPM

## 2019-01-24 VITALS — HEART RATE: 45 BPM | DIASTOLIC BLOOD PRESSURE: 78 MMHG | RESPIRATION RATE: 21 BRPM | SYSTOLIC BLOOD PRESSURE: 133 MMHG

## 2019-01-24 VITALS — RESPIRATION RATE: 18 BRPM | SYSTOLIC BLOOD PRESSURE: 126 MMHG | HEART RATE: 57 BPM | DIASTOLIC BLOOD PRESSURE: 79 MMHG

## 2019-01-24 VITALS — DIASTOLIC BLOOD PRESSURE: 114 MMHG | SYSTOLIC BLOOD PRESSURE: 132 MMHG | HEART RATE: 67 BPM | RESPIRATION RATE: 17 BRPM

## 2019-01-24 VITALS — RESPIRATION RATE: 18 BRPM

## 2019-01-24 VITALS — RESPIRATION RATE: 18 BRPM | HEART RATE: 54 BPM | SYSTOLIC BLOOD PRESSURE: 119 MMHG | DIASTOLIC BLOOD PRESSURE: 67 MMHG

## 2019-01-24 VITALS — DIASTOLIC BLOOD PRESSURE: 73 MMHG | HEART RATE: 60 BPM | RESPIRATION RATE: 18 BRPM | SYSTOLIC BLOOD PRESSURE: 103 MMHG

## 2019-01-24 VITALS — DIASTOLIC BLOOD PRESSURE: 86 MMHG | SYSTOLIC BLOOD PRESSURE: 123 MMHG | RESPIRATION RATE: 18 BRPM | HEART RATE: 68 BPM

## 2019-01-24 VITALS — RESPIRATION RATE: 11 BRPM | HEART RATE: 55 BPM | SYSTOLIC BLOOD PRESSURE: 143 MMHG | DIASTOLIC BLOOD PRESSURE: 84 MMHG

## 2019-01-24 VITALS — RESPIRATION RATE: 17 BRPM | DIASTOLIC BLOOD PRESSURE: 77 MMHG | SYSTOLIC BLOOD PRESSURE: 119 MMHG | HEART RATE: 54 BPM

## 2019-01-24 VITALS — SYSTOLIC BLOOD PRESSURE: 112 MMHG | DIASTOLIC BLOOD PRESSURE: 74 MMHG | RESPIRATION RATE: 18 BRPM | HEART RATE: 60 BPM

## 2019-01-24 VITALS — DIASTOLIC BLOOD PRESSURE: 73 MMHG | SYSTOLIC BLOOD PRESSURE: 142 MMHG | HEART RATE: 38 BPM | RESPIRATION RATE: 18 BRPM

## 2019-01-24 VITALS — SYSTOLIC BLOOD PRESSURE: 133 MMHG | DIASTOLIC BLOOD PRESSURE: 77 MMHG | RESPIRATION RATE: 21 BRPM | HEART RATE: 66 BPM

## 2019-01-24 VITALS — HEART RATE: 42 BPM | RESPIRATION RATE: 18 BRPM | DIASTOLIC BLOOD PRESSURE: 67 MMHG | SYSTOLIC BLOOD PRESSURE: 127 MMHG

## 2019-01-24 VITALS — DIASTOLIC BLOOD PRESSURE: 70 MMHG | RESPIRATION RATE: 18 BRPM | SYSTOLIC BLOOD PRESSURE: 138 MMHG | HEART RATE: 42 BPM

## 2019-01-24 VITALS — SYSTOLIC BLOOD PRESSURE: 128 MMHG | RESPIRATION RATE: 18 BRPM | DIASTOLIC BLOOD PRESSURE: 69 MMHG | HEART RATE: 43 BPM

## 2019-01-24 VITALS — SYSTOLIC BLOOD PRESSURE: 144 MMHG | DIASTOLIC BLOOD PRESSURE: 86 MMHG | RESPIRATION RATE: 10 BRPM | HEART RATE: 68 BPM

## 2019-01-24 VITALS — RESPIRATION RATE: 19 BRPM | HEART RATE: 74 BPM | DIASTOLIC BLOOD PRESSURE: 82 MMHG | SYSTOLIC BLOOD PRESSURE: 117 MMHG

## 2019-01-24 VITALS — HEART RATE: 40 BPM

## 2019-01-24 VITALS — RESPIRATION RATE: 18 BRPM | DIASTOLIC BLOOD PRESSURE: 71 MMHG | SYSTOLIC BLOOD PRESSURE: 140 MMHG | HEART RATE: 41 BPM

## 2019-01-24 VITALS — DIASTOLIC BLOOD PRESSURE: 119 MMHG | HEART RATE: 68 BPM | SYSTOLIC BLOOD PRESSURE: 139 MMHG | RESPIRATION RATE: 19 BRPM

## 2019-01-24 VITALS — DIASTOLIC BLOOD PRESSURE: 89 MMHG | HEART RATE: 70 BPM | RESPIRATION RATE: 16 BRPM | SYSTOLIC BLOOD PRESSURE: 136 MMHG

## 2019-01-24 VITALS — SYSTOLIC BLOOD PRESSURE: 137 MMHG | RESPIRATION RATE: 18 BRPM | DIASTOLIC BLOOD PRESSURE: 70 MMHG | HEART RATE: 43 BPM

## 2019-01-24 VITALS — RESPIRATION RATE: 20 BRPM

## 2019-01-24 VITALS — SYSTOLIC BLOOD PRESSURE: 129 MMHG | HEART RATE: 46 BPM | RESPIRATION RATE: 18 BRPM | DIASTOLIC BLOOD PRESSURE: 75 MMHG

## 2019-01-24 VITALS — RESPIRATION RATE: 17 BRPM | HEART RATE: 71 BPM

## 2019-01-24 LAB
ABNORMAL IP MESSAGE: 1
ADD MAN DIFF?: NO
ALLEN TEST: (no result)
ALLEN TEST: (no result)
ANION GAP: 12 (ref 5–13)
ARTERIAL BASE EXCESS: -1.5 MMOL/L (ref -3–3)
ARTERIAL BASE EXCESS: -3.1 MMOL/L (ref -3–3)
ARTERIAL BLOOD GAS OXYGEN SAT: 95.9 MMHG (ref 95–98)
ARTERIAL BLOOD GAS OXYGEN SAT: 99.7 MMHG (ref 95–98)
ARTERIAL COHB: 0.1 % (ref 0–3)
ARTERIAL COHB: 0.2 % (ref 0–3)
ARTERIAL FRACTION OF OXYHGB: 95.6 % (ref 93–99)
ARTERIAL FRACTION OF OXYHGB: 99.4 % (ref 93–99)
ARTERIAL HCO3: 23.2 MMOL/L (ref 22–26)
ARTERIAL HCO3: 24.9 MMOL/L (ref 22–26)
ARTERIAL METHB: 0.1 % (ref 0–1.5)
ARTERIAL METHB: 0.2 % (ref 0–1.5)
ARTERIAL PCO2: 46.3 MMHG (ref 35–45)
ARTERIAL PCO2: 49.5 MMHG (ref 35–45)
BASOPHIL #: 0 10^3/UL (ref 0–0.1)
BASOPHILS %: 0 % (ref 0–2)
BLOOD GAS LOW PEEP SETTING: 5 CMH2O
BLOOD GAS TIDAL VOLUME: 500 ML
BLOOD UREA NITROGEN: 33 MG/DL (ref 7–20)
CALCIUM: 8.8 MG/DL (ref 8.4–10.2)
CARBON DIOXIDE: 26 MMOL/L (ref 21–31)
CHLORIDE: 110 MMOL/L (ref 97–110)
CREATININE: 1.29 MG/DL (ref 0.61–1.24)
EOSINOPHILS #: 0 10^3/UL (ref 0–0.5)
EOSINOPHILS %: 0 % (ref 0–7)
FIO2: 100 %
FIO2: 65 %
GLUCOSE: 120 MG/DL (ref 70–220)
HEMATOCRIT: 33.4 % (ref 42–52)
HEMOGLOBIN: 10.1 G/DL (ref 14–18)
IMMATURE GRANS #M: 0.01 10^3/UL (ref 0–0.03)
IMMATURE GRANS % (M): 0.3 % (ref 0–0.43)
LYMPHOCYTES #: 0.4 10^3/UL (ref 0.8–2.9)
LYMPHOCYTES %: 12.7 % (ref 15–51)
MAGNESIUM: 2.4 MG/DL (ref 1.7–2.5)
MEAN CORPUSCULAR HEMOGLOBIN: 27.5 PG (ref 29–33)
MEAN CORPUSCULAR HGB CONC: 30.2 G/DL (ref 32–37)
MEAN CORPUSCULAR VOLUME: 91 FL (ref 82–101)
MEAN PLATELET VOLUME: 10.5 FL (ref 7.4–10.4)
MICROALBUMIN/CREATININE RATIO: 55 (ref ?–30)
MICROALBUMIN: 2.2 MG/DL
MODE: (no result)
MODE: (no result)
MONOCYTE #: 0 10^3/UL (ref 0.3–0.9)
MONOCYTES %: 1.3 % (ref 0–11)
NEUTROPHIL #: 2.6 10^3/UL (ref 1.6–7.5)
NEUTROPHILS %: 85.7 % (ref 39–77)
NUCLEATED RED BLOOD CELLS #: 0 10^3/UL (ref 0–0)
NUCLEATED RED BLOOD CELLS%: 0 /100WBC (ref 0–0)
O2 A-A PPRESDIFF RESPIRATORY: 215.3 MMHG (ref 7–24)
O2 A-A PPRESDIFF RESPIRATORY: 320.2 MMHG (ref 7–24)
PHOSPHORUS: 4.6 MG/DL (ref 2.5–4.9)
PLATELET COUNT: 87 10^3/UL (ref 140–415)
POSITIVE DIFF: (no result)
POTASSIUM: 4.4 MMOL/L (ref 3.5–5.1)
RED BLOOD COUNT: 3.67 10^6/UL (ref 4.7–6.1)
RED CELL DISTRIBUTION WIDTH: 15.9 % (ref 11.5–14.5)
SODIUM: 148 MMOL/L (ref 135–144)
TROPONIN-I: 0.11 NG/ML (ref 0–0.12)
WHITE BLOOD COUNT: 3 10^3/UL (ref 4.8–10.8)

## 2019-01-24 RX ADMIN — VASOPRESSIN SCH MLS/HR: 20 INJECTION, SOLUTION INTRAMUSCULAR; SUBCUTANEOUS at 11:42

## 2019-01-24 RX ADMIN — ALBUTEROL SULFATE SCH PUFF: 90 AEROSOL, METERED RESPIRATORY (INHALATION) at 08:40

## 2019-01-24 RX ADMIN — PIPERACILLIN SODIUM AND TAZOBACTAM SODIUM 1 MLS/HR: 3; .375 INJECTION, POWDER, LYOPHILIZED, FOR SOLUTION INTRAVENOUS at 12:07

## 2019-01-24 RX ADMIN — PIPERACILLIN SODIUM AND TAZOBACTAM SODIUM SCH MLS/HR: 3; .375 INJECTION, POWDER, LYOPHILIZED, FOR SOLUTION INTRAVENOUS at 00:35

## 2019-01-24 RX ADMIN — PIPERACILLIN SODIUM AND TAZOBACTAM SODIUM 1 MLS/HR: 3; .375 INJECTION, POWDER, LYOPHILIZED, FOR SOLUTION INTRAVENOUS at 00:35

## 2019-01-24 RX ADMIN — METHYLPREDNISOLONE SODIUM SUCCINATE 1 MG: 40 INJECTION, POWDER, FOR SOLUTION INTRAMUSCULAR; INTRAVENOUS at 17:46

## 2019-01-24 RX ADMIN — DIPHENHYDRAMINE HYDROCHLORIDE SCH MG: 50 INJECTION, SOLUTION INTRAMUSCULAR; INTRAVENOUS at 08:31

## 2019-01-24 RX ADMIN — ALBUTEROL SULFATE 1 MG: 2.5 SOLUTION RESPIRATORY (INHALATION) at 23:15

## 2019-01-24 RX ADMIN — ALBUTEROL SULFATE 1 PUFF: 90 AEROSOL, METERED RESPIRATORY (INHALATION) at 08:40

## 2019-01-24 RX ADMIN — PIPERACILLIN SODIUM AND TAZOBACTAM SODIUM 1 MLS/HR: 3; .375 INJECTION, POWDER, LYOPHILIZED, FOR SOLUTION INTRAVENOUS at 05:22

## 2019-01-24 RX ADMIN — ENOXAPARIN SODIUM SCH MG: 100 INJECTION SUBCUTANEOUS at 09:52

## 2019-01-24 RX ADMIN — IPRATROPIUM BROMIDE 1 MG: 0.5 SOLUTION RESPIRATORY (INHALATION) at 21:53

## 2019-01-24 RX ADMIN — IPRATROPIUM BROMIDE 1 PUFF: 17 AEROSOL, METERED RESPIRATORY (INHALATION) at 20:00

## 2019-01-24 RX ADMIN — FAMOTIDINE 1 MG: 10 INJECTION, SOLUTION INTRAVENOUS at 08:31

## 2019-01-24 RX ADMIN — METHYLPREDNISOLONE SODIUM SUCCINATE 1 MG: 40 INJECTION, POWDER, FOR SOLUTION INTRAMUSCULAR; INTRAVENOUS at 00:00

## 2019-01-24 RX ADMIN — METHYLPREDNISOLONE SODIUM SUCCINATE 1 MG: 40 INJECTION, POWDER, FOR SOLUTION INTRAMUSCULAR; INTRAVENOUS at 05:22

## 2019-01-24 RX ADMIN — ALBUTEROL SULFATE 1 PUFF: 90 AEROSOL, METERED RESPIRATORY (INHALATION) at 20:00

## 2019-01-24 RX ADMIN — CALCIUM GLUCONATE SCH MLS/HR: 94 INJECTION, SOLUTION INTRAVENOUS at 22:03

## 2019-01-24 RX ADMIN — ENOXAPARIN SODIUM 1 MG: 100 INJECTION SUBCUTANEOUS at 09:52

## 2019-01-24 RX ADMIN — PIPERACILLIN SODIUM AND TAZOBACTAM SODIUM SCH MLS/HR: 3; .375 INJECTION, POWDER, LYOPHILIZED, FOR SOLUTION INTRAVENOUS at 05:22

## 2019-01-24 RX ADMIN — POTASSIUM ACETATE 1 MLS/HR: 3.93 INJECTION, SOLUTION, CONCENTRATE INTRAVENOUS at 22:03

## 2019-01-24 RX ADMIN — FAMOTIDINE 1 MG: 20 TABLET ORAL at 21:28

## 2019-01-24 RX ADMIN — METHYLPREDNISOLONE SODIUM SUCCINATE 1 MG: 40 INJECTION, POWDER, FOR SOLUTION INTRAMUSCULAR; INTRAVENOUS at 23:43

## 2019-01-24 RX ADMIN — MEROPENEM SCH MLS/HR: 1 INJECTION, POWDER, FOR SOLUTION INTRAVENOUS at 20:38

## 2019-01-24 RX ADMIN — PROPOFOL 1 MLS/HR: 10 INJECTION, EMULSION INTRAVENOUS at 09:45

## 2019-01-24 RX ADMIN — FOLIC ACID SCH MLS/HR: 5 INJECTION, SOLUTION INTRAMUSCULAR; INTRAVENOUS; SUBCUTANEOUS at 05:22

## 2019-01-24 RX ADMIN — VECURONIUM BROMIDE 1 MLS/HR: 1 INJECTION, POWDER, LYOPHILIZED, FOR SOLUTION INTRAVENOUS at 11:42

## 2019-01-24 RX ADMIN — IPRATROPIUM BROMIDE 1 PUFF: 17 AEROSOL, METERED RESPIRATORY (INHALATION) at 01:34

## 2019-01-24 RX ADMIN — PIPERACILLIN SODIUM AND TAZOBACTAM SODIUM SCH MLS/HR: 3; .375 INJECTION, POWDER, LYOPHILIZED, FOR SOLUTION INTRAVENOUS at 12:07

## 2019-01-24 RX ADMIN — MEROPENEM 1 MLS/HR: 1 INJECTION, POWDER, FOR SOLUTION INTRAVENOUS at 20:38

## 2019-01-24 RX ADMIN — POTASSIUM ACETATE 1 MLS/HR: 3.93 INJECTION, SOLUTION, CONCENTRATE INTRAVENOUS at 08:33

## 2019-01-24 RX ADMIN — PROPOFOL SCH MLS/HR: 10 INJECTION, EMULSION INTRAVENOUS at 09:45

## 2019-01-24 RX ADMIN — FAMOTIDINE SCH MG: 20 TABLET ORAL at 21:28

## 2019-01-24 RX ADMIN — ASPIRIN 81 MG CHEWABLE TABLET 1 MG: 81 TABLET CHEWABLE at 08:31

## 2019-01-24 RX ADMIN — ALBUTEROL SULFATE 1 PUFF: 90 AEROSOL, METERED RESPIRATORY (INHALATION) at 01:34

## 2019-01-24 RX ADMIN — THIAMINE HYDROCHLORIDE 1 MLS/HR: 100 INJECTION, SOLUTION INTRAMUSCULAR; INTRAVENOUS at 05:22

## 2019-01-24 RX ADMIN — METHYLPREDNISOLONE SODIUM SUCCINATE 1 MG: 40 INJECTION, POWDER, FOR SOLUTION INTRAMUSCULAR; INTRAVENOUS at 12:07

## 2019-01-24 RX ADMIN — CALCIUM GLUCONATE SCH MLS/HR: 94 INJECTION, SOLUTION INTRAVENOUS at 08:33

## 2019-01-24 RX ADMIN — ALBUTEROL SULFATE SCH PUFF: 90 AEROSOL, METERED RESPIRATORY (INHALATION) at 13:30

## 2019-01-24 RX ADMIN — ALBUTEROL SULFATE 1 MG: 2.5 SOLUTION RESPIRATORY (INHALATION) at 21:52

## 2019-01-24 RX ADMIN — ALBUTEROL SULFATE SCH PUFF: 90 AEROSOL, METERED RESPIRATORY (INHALATION) at 20:00

## 2019-01-24 RX ADMIN — ALBUTEROL SULFATE 1 PUFF: 90 AEROSOL, METERED RESPIRATORY (INHALATION) at 13:30

## 2019-01-24 RX ADMIN — ASPIRIN 81 MG SCH MG: 81 TABLET ORAL at 08:31

## 2019-01-24 RX ADMIN — IPRATROPIUM BROMIDE 1 PUFF: 17 AEROSOL, METERED RESPIRATORY (INHALATION) at 13:30

## 2019-01-24 RX ADMIN — ALBUTEROL SULFATE SCH PUFF: 90 AEROSOL, METERED RESPIRATORY (INHALATION) at 01:34

## 2019-01-24 RX ADMIN — IPRATROPIUM BROMIDE 1 PUFF: 17 AEROSOL, METERED RESPIRATORY (INHALATION) at 08:40

## 2019-01-24 NOTE — RADRPT
Vent Rate: 63 bpm

RR Interval: 0 msec

WA Interval: 140 msec

QRS Duration: 138 msec

QT Interval: 470 msec

QTC Interval: 480 msec

P-R-T Axis: 80 - 123 - 67 degrees

 

 Normal sinus rhythm

 Right bundle branch block

 Abnormal ECG

 

Electronically Signed By: Carlos Jon

[id number]

## 2019-01-24 NOTE — CONS
Assessment/Plan


Assessment/Plan


Hospital Course (Demo Recall)


No acute changes overnight patient is awake and comfortable on vent no fevers.  


He is on dopamine drip for hypotension and bradycardia.  





WBC today 3 H&H 10.1 and 33.4 platelets 87 BUN 33 creatinine 1.29





Chest x-ray this morning revealed stable position of the tubes and lines stable 


small right pleural effusion with atelectasis versus pneumonia stable left lower


lobe patchy airspace disease pneumoperitoneum is again noted





Microbiology: Blood cultures negative MRSA swab negative urine culture growing 


staph species





Indwelling: Right IJ triple-lumen catheter endotracheal tube NG tube left chest 


tube, Blake catheter





Antimicrobials: Vancomycin, Zosyn





Physical examination: Well-developed chronically ill-appearing fragile elderly 


man who is intubated sedated in no distress.  Head atraumatic normocephalic 


sclera nonicteric vehicle mucosa dry neck is supple chest rise symmetrical keerthi


th sounds diminished bases.  Heart: S1-S2.  Abdomen soft bowel sounds present.  


Extremities cyanotic





Assessment:


1.  Septic shock


2.  Symptomatic bradycardia, patient is on dopamine drip


3.  Acute hypoxemic respiratory failure with questionable aspiration


4.  Pneumoperitoneum of unclear etiology, no perforation per surgical note


5.  Pancytopenia


5.  Anemia


6.  Non-ST elevation MI


7.  Status post cardiac arrest





Plan: Clinically doing better, more awake, we will will change antibiotics to 


meropenem given significant pancytopenia





Consultation Date/Type/Reason


Admit Date/Time


Jan 22, 2019 at 00:03


Initial Consult Date


1/22/19


Type of Consult


ID


Requesting Provider:  DONALDO CARRILLO MD


Date/Time of Note


DATE: 1/24/19 


TIME: 14:43





Exam/Review of Systems


Exam


Vitals





Vital Signs


  Date      Temp  Pulse  Resp  B/P (MAP)   Pulse Ox  O2          O2 Flow    FiO2


Time                                                 Delivery    Rate


   1/24/19           42    18      132/70       100


     13:00                           (90)


   1/24/19                                                                    50


     12:00


   1/24/19  98.4                                     Mechanical


     08:00                                           Ventilator


   1/21/19                                                              15


     18:34








Intake and Output





1/23/19 1/23/19 1/24/19





1515:00


23:00


07:00





IntakeIntake Total


1059.750 ml


1113.125 ml


921.500 ml





OutputOutput Total


580 ml


745 ml


720 ml





BalanceBalance


479.750 ml


368.125 ml


201.500 ml














Results


Result Diagram:  


1/24/19 0435                                                                    


           1/24/19 0424





Results 24hrs





Laboratory Tests


Test
              1/24/19
04:24  1/24/19
04:35  1/24/19
04:43     1/24/19
08:17


Sodium Level              148  H


Potassium Level            4.4


Chloride Level             110


Carbon Dioxide              26


Level


Anion Gap                   12


Blood Urea                 33  H


Nitrogen


Creatinine               1.29  H


Est Glomerular            56  L
  
              
              



Filtrat


Rate
mL/min


Glucose Level              120


Calcium Level              8.8


Phosphorus Level           4.6


Magnesium Level            2.4


White Blood Count                       3.0  #L


Red Blood Count                         3.67  L


Hemoglobin                              10.1  L


Hematocrit                              33.4  L


Mean Corpuscular                         91.0


Volume


Mean Corpuscular                        27.5  L


Hemoglobin


Mean Corpuscular   
                   30.2  L
  
              



Hemoglobin
Concen


t


Red Cell                                15.9  H


Distribution


Width


Platelet Count                           87  #L


Mean Platelet                           10.5  H


Volume


Immature                                0.300


Granulocytes %


Neutrophils %                           85.7  H


Lymphocytes %                           12.7  L


Monocytes %                               1.3


Eosinophils %                             0.0


Basophils %                               0.0


Nucleated Red                             0.0


Blood Cells %


Immature                                0.010


Granulocytes #


Neutrophils #                             2.6


Lymphocytes #                            0.4  L


Monocytes #                              0.0  L


Eosinophils #                             0.0


Basophils #                               0.0


Nucleated Red                             0.0


Blood Cells #


Troponin I                                             0.110


Blood Gas          
              
              
              Blood arterial



Specimen Source



Arterial Blood     
              
              
              1/24/2019
8:30:0


Date Drawn
                                                                 3 AM


Arterial Blood pH  
              
              
                     7.317  L



(Temp
corrected)


Arterial Blood     
              
              
                      46.3  H



pCO2


(Temp
correct)


Arterial Blood     
              
              
                       92.9  



pO2


(Temp
corrected)


Arterial Blood                                                            23.2


HCO3


Arterial Blood                                                           -3.1  L


Base Excess


Arterial Blood     
              
              
                       95.9  



Oxygen
Saturation


Antoine Test                                                      ACCEPTAB


Arterial Blood     
              
              
              Right Radial  



Gas Puncture
Site


Arterial           
              
              
                        0.2  



Blood
Carboxyhemo


globin


Arterial Blood                                                             0.1


Methemoglobin


Blood Gas A-a O2                                                        320.2  H


Differential


Oxyhemoglobin                                                             95.6


Percent


Blood Gas                                                                 37.0


Temperature


Blood Gas                                                                 18.0


Respiration Rate


Blood Gas Actual   
              
              
                         18  



Respiration
Rate


Blood Gas                                                       VENT - AC


Modality


FiO2                                                                      65.0


Blood Gas Tidal                                                          500.0


Volume


Blood Gas Low                                                              5.0


PEEP Setting


Blood Gas                                                       TM


Notified Whom


Blood Gas          
              
              
              1/24/2019
8:37:3


Notified Time
                                                              3 AM














DAVID VÁZQUEZ NP            Jan 24, 2019 14:45

## 2019-01-24 NOTE — PN
Date/Time of Note


Date/Time of Note


DATE: 1/24/19 


TIME: 12:46





Assessment/Plan


VTE Prophylaxis


Risk score (from [facility])>0 risk:  10


SCD applied (from Ns):  Yes


Pharmacological prophylaxis:  LMWH





Lines/Catheters


IV Catheter Type (from [facility]):  Central Line


Central line still needed:  Yes


Urinary Cath still in place:  Yes


Reason Cath still needed:  urinary retention





Assessment/Plan


Hospital Course


Patient is restarted on dopamine drip due to bradycardia today in the morning, 


patient is "off levo fed, continued on propofol and fentanyl, a ventilatory 


support.  Patient follow commands during the sedation vacation.  Start tube 


feeding.


Assessment/Plan


-Sepsis with shock, continue broad-spectrum antibiotics, IV fluids, pressors, 


ICU care.  Dr. Dreyer is following in infection disease consultation.


-Status post cardiac arrest.   Dr. Jon is following in cardiology 


consultation.


-Left-sided pneumothorax, status post left side chest tube placement.


-Acute respiratory failure, continue ventilatory support bronchodilators.  Dr. Lim is following in pulmonology consultation.


-Pneumoperitoneum most likely due to cardiac arrest.  Dr. Lai is following in 


general surgery consultation. 


-Left lower lobe pneumonia


-Severe emphysema


-NATALIE with possible chronic kidney disease. Dr Hanson is following in nephrology


consultation.





Further recommendations based on clinical course.  Plan of care discussed with 


Dr. Miller.


Result Diagram:  


1/24/19 0435                                                                    


           1/24/19 0424





Results 24hrs





Laboratory Tests


Test
                 1/23/19
14:00  1/24/19
04:24  1/24/19
04:35  1/24/19
04:43


Urine Random                37.61


Creatinine


Urine Random                   30


Sodium


Urine Total                 30.0  H


Protein


Sodium Level                                148  H


Potassium Level                              4.4


Chloride Level                               110


Carbon Dioxide                                26


Level


Anion Gap                                     12


Blood Urea                                   33  H


Nitrogen


Creatinine                                 1.29  H


Est Glomerular     
                        56  L
  
              



Filtrat


Rate
mL/min


Glucose Level                                120


Calcium Level                                8.8


Phosphorus Level                             4.6


Magnesium Level                              2.4


White Blood Count                                         3.0  #L


Red Blood Count                                           3.67  L


Hemoglobin                                                10.1  L


Hematocrit                                                33.4  L


Mean Corpuscular                                           91.0


Volume


Mean Corpuscular                                          27.5  L


Hemoglobin


Mean Corpuscular   
                 
                   30.2  L
  



Hemoglobin
Concen


t


Red Cell                                                  15.9  H


Distribution


Width


Platelet Count                                             87  #L


Mean Platelet                                             10.5  H


Volume


Immature                                                  0.300


Granulocytes %


Neutrophils %                                             85.7  H


Lymphocytes %                                             12.7  L


Monocytes %                                                 1.3


Eosinophils %                                               0.0


Basophils %                                                 0.0


Nucleated Red                                               0.0


Blood Cells %


Immature                                                  0.010


Granulocytes #


Neutrophils #                                               2.6


Lymphocytes #                                              0.4  L


Monocytes #                                                0.0  L


Eosinophils #                                               0.0


Basophils #                                                 0.0


Nucleated Red                                               0.0


Blood Cells #


Troponin I                                                               0.110


Test
                 1/24/19
08:17  
              
              



Blood Gas          Blood arterial
   
              
              



Specimen Source



Arterial Blood     1/24/2019
8:30:0  
              
              



Date Drawn
                    3 AM


Arterial Blood pH         7.317  L
  
              
              



(Temp
corrected)


Arterial Blood             46.3  H
  
              
              



pCO2


(Temp
correct)


Arterial Blood              92.9  
  
              
              



pO2


(Temp
corrected)


Arterial Blood               23.2


HCO3


Arterial Blood              -3.1  L


Base Excess


Arterial Blood              95.9  
  
              
              



Oxygen
Saturation


Antoine Test         ACCEPTAB


Arterial Blood     Right Radial  
   
              
              



Gas Puncture
Site


Arterial                     0.2  
  
              
              



Blood
Carboxyhemo


globin


Arterial Blood                0.1


Methemoglobin


Blood Gas A-a O2           320.2  H


Differential


Oxyhemoglobin                95.6


Percent


Blood Gas                    37.0


Temperature


Blood Gas                    18.0


Respiration Rate


Blood Gas Actual              18  
  
              
              



Respiration
Rate


Blood Gas          VENT - AC


Modality


FiO2                         65.0


Blood Gas Tidal             500.0


Volume


Blood Gas Low                 5.0


PEEP Setting


Blood Gas          TM


Notified Whom


Blood Gas          1/24/2019
8:37:3  
              
              



Notified Time
                 3 AM








Subjective


24 Hr Interval Summary


Additional Comments


Constitutional:  frail, other (Sedated on propofol and Fentanyl)


Head:  normocephalic


Eyes:  other (Right eye blind, left eye PERRLA)


Neck:  supple


Respiratory:  diminished breath sounds, other (Left chest tube)


Cardiovascular:  regular rate and rhythm


Gastrointestinal:  soft, other (hypoactive bowel sounds)


Genitourinary - Male:  other (Blake catheter)


Musculoskeletal:  nl extremities to inspection


Extremities:  normal pulses


Neurological:  other (sedated)





Exam/Review of Systems


Exam


Vitals





Vital Signs


  Date      Temp  Pulse  Resp  B/P (MAP)   Pulse Ox  O2          O2 Flow    FiO2


Time                                                 Delivery    Rate


   1/24/19                                                                    65


     08:19


   1/24/19           52


     08:00


   1/24/19  98.4           18      126/79            Mechanical


     08:00                           (95)            Ventilator


   1/24/19                                      100


     07:00


   1/21/19                                                              15


     18:34








Intake and Output





1/23/19 1/23/19 1/24/19





1515:00


23:00


07:00





IntakeIntake Total


1059.750 ml


1113.125 ml


921.500 ml





OutputOutput Total


580 ml


745 ml


720 ml





BalanceBalance


479.750 ml


368.125 ml


201.500 ml














Results


Results 24hrs





Laboratory Tests


Test
                 1/23/19
14:00  1/24/19
04:24  1/24/19
04:35  1/24/19
04:43


Urine Random                37.61


Creatinine


Urine Random                   30


Sodium


Urine Total                 30.0  H


Protein


Sodium Level                                148  H


Potassium Level                              4.4


Chloride Level                               110


Carbon Dioxide                                26


Level


Anion Gap                                     12


Blood Urea                                   33  H


Nitrogen


Creatinine                                 1.29  H


Est Glomerular     
                        56  L
  
              



Filtrat


Rate
mL/min


Glucose Level                                120


Calcium Level                                8.8


Phosphorus Level                             4.6


Magnesium Level                              2.4


White Blood Count                                         3.0  #L


Red Blood Count                                           3.67  L


Hemoglobin                                                10.1  L


Hematocrit                                                33.4  L


Mean Corpuscular                                           91.0


Volume


Mean Corpuscular                                          27.5  L


Hemoglobin


Mean Corpuscular   
                 
                   30.2  L
  



Hemoglobin
Concen


t


Red Cell                                                  15.9  H


Distribution


Width


Platelet Count                                             87  #L


Mean Platelet                                             10.5  H


Volume


Immature                                                  0.300


Granulocytes %


Neutrophils %                                             85.7  H


Lymphocytes %                                             12.7  L


Monocytes %                                                 1.3


Eosinophils %                                               0.0


Basophils %                                                 0.0


Nucleated Red                                               0.0


Blood Cells %


Immature                                                  0.010


Granulocytes #


Neutrophils #                                               2.6


Lymphocytes #                                              0.4  L


Monocytes #                                                0.0  L


Eosinophils #                                               0.0


Basophils #                                                 0.0


Nucleated Red                                               0.0


Blood Cells #


Troponin I                                                               0.110


Test
                 1/24/19
08:17  
              
              



Blood Gas          Blood arterial
   
              
              



Specimen Source



Arterial Blood     1/24/2019
8:30:0  
              
              



Date Drawn
                    3 AM


Arterial Blood pH         7.317  L
  
              
              



(Temp
corrected)


Arterial Blood             46.3  H
  
              
              



pCO2


(Temp
correct)


Arterial Blood              92.9  
  
              
              



pO2


(Temp
corrected)


Arterial Blood               23.2


HCO3


Arterial Blood              -3.1  L


Base Excess


Arterial Blood              95.9  
  
              
              



Oxygen
Saturation


Antoine Test         ACCEPTAB


Arterial Blood     Right Radial  
   
              
              



Gas Puncture
Site


Arterial                     0.2  
  
              
              



Blood
Carboxyhemo


globin


Arterial Blood                0.1


Methemoglobin


Blood Gas A-a O2           320.2  H


Differential


Oxyhemoglobin                95.6


Percent


Blood Gas                    37.0


Temperature


Blood Gas                    18.0


Respiration Rate


Blood Gas Actual              18  
  
              
              



Respiration
Rate


Blood Gas          VENT - AC


Modality


FiO2                         65.0


Blood Gas Tidal             500.0


Volume


Blood Gas Low                 5.0


PEEP Setting


Blood Gas          TM


Notified Whom


Blood Gas          1/24/2019
8:37:3  
              
              



Notified Time
                 3 AM














EVARISTO BELTRAN             Jan 24, 2019 12:52

## 2019-01-24 NOTE — CONS
Assessment/Plan


Assessment/Plan


Hospital Course (Demo Recall)


IMP:


1.Hypotension-on levo. NL EF by echo this admit


2.resp failure s/p intubation


3.cardiac arrest


4.pneumoperitoneum


5.Positive troponin-now trended neg


6.renal failure


7.Leukocytosis


8. anemia


9, Thrombocytopenia





Recc:


-ICU


-wean pressors as tolerated


-start asa


-Continue abx's and f/u cx data


-ongoing surgical eval





Consultation Date/Type/Reason


Admit Date/Time


Jan 22, 2019 at 00:03


Initial Consult Date


1/22/19


Type of Consult


Cardiology


Reason for Consultation


hypotension


Requesting Provider:  DONALDO CARRILLO MD


Date/Time of Note


DATE: 1/24/19 


TIME: 11:34





Exam/Review of Systems


Vital Signs


Vitals





Vital Signs


  Date      Temp  Pulse  Resp  B/P (MAP)   Pulse Ox  O2          O2 Flow    FiO2


Time                                                 Delivery    Rate


   1/24/19                                                                    65


     08:19


   1/24/19           52


     08:00


   1/24/19  98.4           18      126/79            Mechanical


     08:00                           (95)            Ventilator


   1/24/19                                      100


     07:00


   1/21/19                                                              15


     18:34








Intake and Output





1/23/19 1/23/19 1/24/19





1515:00


23:00


07:00





IntakeIntake Total


1059.750 ml


1113.125 ml


921.500 ml





OutputOutput Total


580 ml


745 ml


720 ml





BalanceBalance


479.750 ml


368.125 ml


201.500 ml














Exam


Exam


Review of Systems:


CONSTITUTIONAL:  No fevers, chills.


PULMONARY:  No sob


CARDIOVASCULAR: No chest pain/palpitations


GASTROINTESTINAL:  No nausea/vomiting.


GENITOURINARY:  No hematuria/dysuria.


MUSCULOSKELETAL:  No myagias/arthalgias.


PSYCHIATRIC:  The patient denies depression.


NEUROLOGIC:   No weakness


Constitutional:  alert


Psych:  no complaints


Head:  normocephalic


ENMT:  mucosa pink and moist


Neck:  supple, jvd (9 cm water)


Respiratory:  diminished breath sounds


Cardiovascular:  regular rate and rhythm


Gastrointestinal:  soft, non-tender


Musculoskeletal:  muscle tone (normal)


Extremities:  edema (none)


Neurological:  other (No focal deficits)





Labs


Result Diagram:  


1/24/19 0435                                                                    


           1/24/19 0424





Results 24hrs





Laboratory Tests


Test
                 1/23/19
14:00  1/24/19
04:24  1/24/19
04:35  1/24/19
04:43


Urine Random                37.61


Creatinine


Urine Random                   30


Sodium


Urine Total                 30.0  H


Protein


Sodium Level                                148  H


Potassium Level                              4.4


Chloride Level                               110


Carbon Dioxide                                26


Level


Anion Gap                                     12


Blood Urea                                   33  H


Nitrogen


Creatinine                                 1.29  H


Est Glomerular     
                        56  L
  
              



Filtrat


Rate
mL/min


Glucose Level                                120


Calcium Level                                8.8


Phosphorus Level                             4.6


Magnesium Level                              2.4


White Blood Count                                         3.0  #L


Red Blood Count                                           3.67  L


Hemoglobin                                                10.1  L


Hematocrit                                                33.4  L


Mean Corpuscular                                           91.0


Volume


Mean Corpuscular                                          27.5  L


Hemoglobin


Mean Corpuscular   
                 
                   30.2  L
  



Hemoglobin
Concen


t


Red Cell                                                  15.9  H


Distribution


Width


Platelet Count                                             87  #L


Mean Platelet                                             10.5  H


Volume


Immature                                                  0.300


Granulocytes %


Neutrophils %                                             85.7  H


Lymphocytes %                                             12.7  L


Monocytes %                                                 1.3


Eosinophils %                                               0.0


Basophils %                                                 0.0


Nucleated Red                                               0.0


Blood Cells %


Immature                                                  0.010


Granulocytes #


Neutrophils #                                               2.6


Lymphocytes #                                              0.4  L


Monocytes #                                                0.0  L


Eosinophils #                                               0.0


Basophils #                                                 0.0


Nucleated Red                                               0.0


Blood Cells #


Troponin I                                                               0.110


Test
                 1/24/19
08:17  
              
              



Blood Gas          Blood arterial
   
              
              



Specimen Source



Arterial Blood     1/24/2019
8:30:0  
              
              



Date Drawn
                    3 AM


Arterial Blood pH         7.317  L
  
              
              



(Temp
corrected)


Arterial Blood             46.3  H
  
              
              



pCO2


(Temp
correct)


Arterial Blood              92.9  
  
              
              



pO2


(Temp
corrected)


Arterial Blood               23.2


HCO3


Arterial Blood              -3.1  L


Base Excess


Arterial Blood              95.9  
  
              
              



Oxygen
Saturation


Antoine Test         ACCEPTAB


Arterial Blood     Right Radial  
   
              
              



Gas Puncture
Site


Arterial                     0.2  
  
              
              



Blood
Carboxyhemo


globin


Arterial Blood                0.1


Methemoglobin


Blood Gas A-a O2           320.2  H


Differential


Oxyhemoglobin                95.6


Percent


Blood Gas                    37.0


Temperature


Blood Gas                    18.0


Respiration Rate


Blood Gas Actual              18  
  
              
              



Respiration
Rate


Blood Gas          VENT - AC


Modality


FiO2                         65.0


Blood Gas Tidal             500.0


Volume


Blood Gas Low                 5.0


PEEP Setting


Blood Gas          TM


Notified Whom


Blood Gas          1/24/2019
8:37:3  
              
              



Notified Time
                 3 AM














CARISA IZAGUIRRE               Jan 24, 2019 11:38

## 2019-01-24 NOTE — PN
DATE:  01/24/2019

 

 

SUBJECTIVE:  The patient is critically ill on full ventilatory support.  The patient's urinary output
 has been adequate.  There have been no reports of any hemoptysis, hematemesis or hematochezia.  The 
patient has been weaned off pressor support.

 

OBJECTIVE:

VITAL SIGNS:  Blood pressure is 100/71, respirations 18, pulse 58, temperature 98.6.

HEENT:  Head is normocephalic.

NECK:  Supple.

HEART:  Regular rate.

LUNGS:  Show diminished breath sounds at the base.

ABDOMEN:  Soft, nontender to palpation without rebound or guarding.

EXTREMITIES:  Negative for clubbing, cyanosis, no edema.

DERMATOLOGIC:  No rashes.

MUSCULOSKELETAL:  No joint effusion.

NEUROLOGIC:  No change in exam.

 

MEDICATIONS:  Reviewed.

 

LABORATORY DATA:  Shows sodium 148, potassium 4.4, BUN 33, creatinine 1.29.  White count 3.0, hemoglo
bin 10.1, platelet count is 87.  

 

IMAGING STUDIES:  The patient's imaging studies were reviewed.  

 

ASSESSMENT AND PLAN:

1.  Nonoliguric acute kidney injury with unknown baseline creatinine, possible chronic kidney disease
.

Etiology of acute kidney injury is secondary to hemodynamics, possible tubular injury, acute tubular 
necrosis due to shock, sepsis.  The patient's renal function has been fluctuating, but overall stable
.  Plan, at this point, is to continue current treatment plans, supportive care, renally dose all med
icines.  We will change IV fluids as the patient is currently off pressor support.  Continue IV antib
iotics, treat underlying sepsis.  Continue to monitor renal function closely.

2.  Hypernatremia.  The patient has free water deficit approximately 2 liters.  We will start the pat
ient on hypertonic fluids, monitor sodium levels closely.

3.  Anemia.  Continue to monitor hemoglobin and hematocrit levels.

4.  Mineral bone disorder.  Continue to monitor calcium and phosphorus levels.

5.  Chronic kidney disease.  The patient's CT scan shows increased echogenicity of the kidneys consis
tent with chronic kidney disease.  The patient is currently in acute kidney injury as stated above.  
Continue to monitor.

6.  Lactic acidosis secondary to septic shock, improved.  Continue to monitor.

7.  Ventilator dependent respiratory failure.  Vent settings and ABG was reviewed.  Continue to monit
or.

8.  Sepsis, status post shock secondary to pneumonia.  Continue current medical management.  Continue
 antibiotic therapy.  We will deescalate IV fluids.

9.  Pneumothorax status post chest tube placement.

10.  Pneumoperitoneum, likely due to cardiac arrest.  Continue to monitor.

11.  Status post cardiac arrest.

12.  Encephalopathy, etiology is toxic metabolic.

 

Please note, I spent over 30 minutes of critical care time with this patient.

 

 

Dictated By: MARYAN CHAMPAGNE DO

 

NR/NTS

DD:    01/24/2019 07:44:35

DT:    01/24/2019 08:19:24

Conf#: [id number]

DID#:  [id number]

CC: MAURI GRIGSBY MD; DONALDO CARRILLO MD; MIMI ZAVALA MD;*EndCC*

## 2019-01-24 NOTE — CONS
Assessment/Plan


Ventilator setting; AC of 18, tidal volume 500, PEEP of 5, 65% FiO2.


Chest x-ray is again showing severe emphysematous changes without any acute 


infiltrates.  Left-sided chest tube in place.  There is no pneumothorax.  


Endotracheal tube is at an adequate level.


Patient is currently on propofol 20 mics per kilogram per minute, fentanyl drip 


40 mics per hour.


Assessment and recommendations; 





1.  Patient admitted with hypoxemic and hypercapnic respiratory failure due to 


underlying severe bullous emphysema, status post CPR in ER requiring intubation.


2.  Persistent hypercapnia indicative of advanced end-stage COPD.


3.  Placement of left chest tube for pneumothorax.


4.  Pneumoperitoneum, etiology is unclear.  Possibly related to CPR.  However 


clinically there is no sign of any acute abdomen.


5.  Anemia and severe thrombocytopenia.


6.  Acute renal injury with continually improving renal function.  Possibly some


element of baseline renal insufficiency.


7.  Possibly right lower lobe pneumonia.





Continue current supportive care.  Obtain ABG.  Once ABG is obtained I will make


further recommendations.  Prognosis appears guarded.


35 minutes of critical care time was spent evaluating the patient.


Result Diagram:  


1/24/19 0435                                                                    


           1/24/19 0424





Results 24hrs





Laboratory Tests


Test
               1/23/19
09:11    1/23/19
14:00  1/24/19
04:24  1/24/19
04:35


Blood Gas         Blood arterial
  
                
              



Specimen Source



Arterial Blood    1/23/2019
9:44:  
                
              



Date Drawn
                 19 AM


Arterial Blood          7.322  L
  
                
              



pH


(Temp
corrected)


Arterial Blood           48.3  H
  
                
              



pCO2


(Temp
correct)


Arterial Blood            86.5  
  
                
              



pO2


(Temp
corrected)


Arterial Blood             24.4


HCO3


Arterial Blood             -1.9


Base Excess


Arterial Blood           94.9  L
  
                
              



Oxygen
Saturatio


n


Antoine Test        ACCEPTAB


Arterial Blood    Right Radial  
  
                
              



Gas


Puncture
Site


Arterial                   0.3  
  
                
              



Blood
Carboxyhem


oglobin


Arterial Blood                0


Methemoglobin


Blood Gas A-a O2         215.7  H


Differential


Oxyhemoglobin              94.6


Percent


Blood Gas                  37.0


Temperature


Blood Gas                  18.0


Respiration Rate


Blood Gas Actual            18  
  
                
              



Respiration
Rate


Blood Gas         VENT - AC


Modality


FiO2                       50.0


Blood Gas Tidal           500.0


Volume


Blood Gas Low               5.0


PEEP Setting


Blood Gas         TM


Notified Whom


Blood Gas         1/23/2019
9:59:  
                
              



Notified Time
              08 AM


Urine Random                               37.61


Creatinine


Urine Random                                  30


Sodium


Urine Total                                30.0  H


Protein


Sodium Level                                               148  H


Potassium Level                                             4.4


Chloride Level                                              110


Carbon Dioxide                                               26


Level


Anion Gap                                                    12


Blood Urea                                                  33  H


Nitrogen


Creatinine                                                1.29  H


Est Glomerular    
                
                       56  L
  



Filtrat


Rate
mL/min


Glucose Level                                               120


Calcium Level                                               8.8


Phosphorus Level                                            4.6


Magnesium Level                                             2.4


White Blood                                                              3.0  #L


Count


Red Blood Count                                                          3.67  L


Hemoglobin                                                               10.1  L


Hematocrit                                                               33.4  L


Mean Corpuscular                                                          91.0


Volume


Mean Corpuscular                                                         27.5  L


Hemoglobin


Mean Corpuscular  
                
                
                   30.2  L



Hemoglobin
Marisel


nt


Red Cell                                                                 15.9  H


Distribution


Width


Platelet Count                                                            87  #L


Mean Platelet                                                            10.5  H


Volume


Immature                                                                 0.300


Granulocytes %


Neutrophils %                                                            85.7  H


Lymphocytes %                                                            12.7  L


Monocytes %                                                                1.3


Eosinophils %                                                              0.0


Basophils %                                                                0.0


Nucleated Red                                                              0.0


Blood Cells %


Immature                                                                 0.010


Granulocytes #


Neutrophils #                                                              2.6


Lymphocytes #                                                             0.4  L


Monocytes #                                                               0.0  L


Eosinophils #                                                              0.0


Basophils #                                                                0.0


Nucleated Red                                                              0.0


Blood Cells #


Test
               1/24/19
04:43    1/24/19
08:17  
              



Troponin I                0.110


Blood Gas         
                Blood arterial
  
              



Specimen Source



Arterial Blood    
                1/24/2019
8:30:  
              



Date Drawn
                                  03 AM


Arterial Blood    
                      7.317  L
  
              



pH


(Temp
corrected)


Arterial Blood    
                       46.3  H
  
              



pCO2


(Temp
correct)


Arterial Blood    
                        92.9  
  
              



pO2


(Temp
corrected)


Arterial Blood                              23.2


HCO3


Arterial Blood                             -3.1  L


Base Excess


Arterial Blood    
                        95.9  
  
              



Oxygen
Saturatio


n


Antoine Test                         ACCEPTAB


Arterial Blood    
                Right Radial  
  
              



Gas


Puncture
Site


Arterial          
                         0.2  
  
              



Blood
Carboxyhem


oglobin


Arterial Blood                               0.1


Methemoglobin


Blood Gas A-a O2                          320.2  H


Differential


Oxyhemoglobin                               95.6


Percent


Blood Gas                                   37.0


Temperature


Blood Gas                                   18.0


Respiration Rate


Blood Gas Actual  
                          18  
  
              



Respiration
Rate


Blood Gas                          VENT - AC


Modality


FiO2                                        65.0


Blood Gas Tidal                            500.0


Volume


Blood Gas Low                                5.0


PEEP Setting


Blood Gas                          TM


Notified Whom


Blood Gas         
                1/24/2019
8:37:  
              



Notified Time
                               33 AM








Consultation Date/Type/Reason


Admit Date/Time


Jan 22, 2019 at 00:03


Initial Consult Date


1/22/19


Type of Consult


Pulmonary/critical care


Patient is a 68-year-old male who is a nursing home resident was sent over to 


the hospital because of hypothermia and altered mental status.  In the emergency


room, patient developed cardiac arrest requiring CPR and intubation.  In the 


course of workup the patient also required a chest tube placement on the left 


side.  CT imaging of the abdomen showed pneumoperitoneum with possibly 


perforated viscus.  By the time I saw the patient in ICU, patient is orally 


intubated and sedated.  History was obtained from medical records.





Past medical history; essentially unremarkable except for possibly COPD.


Medications; reviewed.  Patient is currently on dopamine at 6 mics per kilogram 


per minute, Levophed 20 mics per minute, propofol 25 mics per kilogram per 


minute.  Other medications were also reviewed.


Allergies; none.


Social history; not available.


Family history and occupational history is also not available.


Review of system; unable to be obtained.


General exam; elderly male, orally intubated, sedated, currently in no distress.


Requesting Provider:  DONALDO CARRILLO MD





24 HR Interval Summary


Free Text/Dictation


Patient's condition remains critical.  Patient however has remained 


hemodynamically stable.


General exam; elderly male, orally intubated, sedated, currently in no distress.





Exam/Review of Systems


Vital Signs


Vitals





Vital Signs


  Date      Temp  Pulse  Resp  B/P (MAP)   Pulse Ox  O2          O2 Flow    FiO2


Time                                                 Delivery    Rate


   1/24/19                                                                    65


     08:19


   1/24/19  98.4     57    18      126/79            Mechanical


     08:00                           (95)            Ventilator


   1/24/19                                      100


     07:00


   1/21/19                                                              15


     18:34








Intake and Output





1/23/19 1/23/19 1/24/19





1515:00


23:00


07:00





IntakeIntake Total


1059.750 ml


1113.125 ml


810.000 ml





OutputOutput Total


580 ml


745 ml


720 ml





BalanceBalance


479.750 ml


368.125 ml


90.000 ml














Exam


H EENT exam; supple neck, no JVD.  No lymphadenopathy.  Midline trachea.  No 


thyromegaly.  Tracheostomy in place.  Insertion site is clean.  Patient is 


edentulous.


Chest exam; diminished breath sounds throughout.  S1-S2 audible, no murmurs.  


Regular rhythm.  Left-sided chest tube in place.  No air leak in Pleur-evac 


chamber.


Abdomen exam; soft, non-distended, no organomegaly.  Bowel sounds audible.


Extremity exam; no peripheral edema or clubbing.


CNS exam; patient is sedated.





Medications


Medications





Current Medications


Ondansetron HCl (Zofran Inj) 4 mg Q6H  PRN IV NAUSEA AND/OR VOMITING;  Start 


1/21/19 at 23:30


Acetaminophen (Tylenol Supp) 650 mg Q4H  PRN TN PAIN LEVEL 1-3 OR FEVER;  Start 


1/21/19 at 23:30


Morphine Sulfate (morphine) 2 mg Q4H  PRN IV PAIN LEVEL 7-10;  Start 1/21/19 at 


23:30


Famotidine (Pepcid Iv) 20 mg Q12 IV  Last administered on 1/23/19at 20:14; Admin


Dose 20 MG;  Start 1/22/19 at 09:00


Enoxaparin Sodium (Lovenox) 30 mg DAILY SC ;  Start 1/22/19 at 09:00


Piperacillin Sod/ Tazobactam Sod 100 ml @  200 mls/hr Q6 IVPB  Last administered


on 1/24/19at 05:22; Admin Dose 200 MLS/HR;  Start 1/22/19 at 00:00


Norepinephrine 250 ml @  1.875 mls/ hr TITRATE IV  Last administered on 


1/22/19at 09:48; Admin Dose 37.5 MLS/HR;  Start 1/22/19 at 00:30


Dopamine HCl/ Dextrose 250 ml @  5.625 mls/ hr TITRATE IV  Last administered on 


1/22/19at 18:00; Admin Dose 16.875 MLS/HR;  Start 1/22/19 at 02:00


Propofol 100 ml @  1.875 mls/ hr Q12H IV  Last administered on 1/23/19at 21:02; 


Admin Dose 7.5 MLS/HR;  Start 1/22/19 at 06:30


Vancomycin HCl (Vanco Iv Per Pharmacy) VANCOMYCIN PER PHARMACY PER PROTOCOL XX ;


 Start 1/22/19 at 14:00


Vancomycin HCl 250 ml @  125 mls/hr Q24H IVPB  Last administered on 1/23/19at 


17:04; Admin Dose 125 MLS/HR;  Start 1/22/19 at 17:00


Ipratropium Bromide (Atrovent Hfa) 4 puff Q6H RESP  THERAPY INH  Last 


administered on 1/24/19at 01:34; Admin Dose 4 PUFF;  Start 1/23/19 at 10:00


Methylprednisolone Sodium Succinate (Solu-Medrol) 40 mg Q6 IV  Last administered


on 1/24/19at 05:22; Admin Dose 40 MG;  Start 1/23/19 at 12:00


Fentanyl 1000 mcg/ Dextrose 100 ml @  2.5 mls/hr TITRATE IV  Last administered 


on 1/23/19at 11:44; Admin Dose 2.5 MLS/HR;  Start 1/23/19 at 12:30


Aspirin (Aspirin) 81 mg DAILY PO ;  Start 1/24/19 at 09:00


Albuterol (Ventolin Hfa) 4 puff Q6H RESP  THERAPY INH  Last administered on 


1/24/19at 01:34; Admin Dose 4 PUFF;  Start 1/24/19 at 02:00


Sodium Chloride 1,000 ml @  75 mls/hr [id number] IV ;  Start 1/24/19 at 08:00





Date/Time of Note


Date/Time of Note


DATE: 1/24/19 


TIME: 08:39











SHAGUFTA HUANG                    Jan 24, 2019 08:42

## 2019-01-25 VITALS — DIASTOLIC BLOOD PRESSURE: 84 MMHG | SYSTOLIC BLOOD PRESSURE: 134 MMHG | HEART RATE: 58 BPM | RESPIRATION RATE: 10 BRPM

## 2019-01-25 VITALS — SYSTOLIC BLOOD PRESSURE: 121 MMHG | HEART RATE: 65 BPM | DIASTOLIC BLOOD PRESSURE: 71 MMHG | RESPIRATION RATE: 17 BRPM

## 2019-01-25 VITALS — HEART RATE: 61 BPM | DIASTOLIC BLOOD PRESSURE: 71 MMHG | SYSTOLIC BLOOD PRESSURE: 127 MMHG | RESPIRATION RATE: 18 BRPM

## 2019-01-25 VITALS — RESPIRATION RATE: 13 BRPM | DIASTOLIC BLOOD PRESSURE: 87 MMHG | HEART RATE: 73 BPM | SYSTOLIC BLOOD PRESSURE: 145 MMHG

## 2019-01-25 VITALS — DIASTOLIC BLOOD PRESSURE: 82 MMHG | HEART RATE: 65 BPM | RESPIRATION RATE: 14 BRPM | SYSTOLIC BLOOD PRESSURE: 131 MMHG

## 2019-01-25 VITALS — SYSTOLIC BLOOD PRESSURE: 154 MMHG | DIASTOLIC BLOOD PRESSURE: 83 MMHG | HEART RATE: 65 BPM | RESPIRATION RATE: 23 BRPM

## 2019-01-25 VITALS — RESPIRATION RATE: 22 BRPM | DIASTOLIC BLOOD PRESSURE: 86 MMHG | SYSTOLIC BLOOD PRESSURE: 142 MMHG | HEART RATE: 75 BPM

## 2019-01-25 VITALS — HEART RATE: 72 BPM | DIASTOLIC BLOOD PRESSURE: 83 MMHG | RESPIRATION RATE: 19 BRPM | SYSTOLIC BLOOD PRESSURE: 115 MMHG

## 2019-01-25 VITALS — RESPIRATION RATE: 14 BRPM | HEART RATE: 66 BPM | SYSTOLIC BLOOD PRESSURE: 139 MMHG | DIASTOLIC BLOOD PRESSURE: 84 MMHG

## 2019-01-25 VITALS — HEART RATE: 70 BPM | RESPIRATION RATE: 15 BRPM | DIASTOLIC BLOOD PRESSURE: 75 MMHG | SYSTOLIC BLOOD PRESSURE: 125 MMHG

## 2019-01-25 VITALS — SYSTOLIC BLOOD PRESSURE: 147 MMHG | DIASTOLIC BLOOD PRESSURE: 91 MMHG | RESPIRATION RATE: 16 BRPM | HEART RATE: 66 BPM

## 2019-01-25 VITALS — RESPIRATION RATE: 18 BRPM | DIASTOLIC BLOOD PRESSURE: 87 MMHG | HEART RATE: 62 BPM | SYSTOLIC BLOOD PRESSURE: 140 MMHG

## 2019-01-25 VITALS — HEART RATE: 65 BPM | SYSTOLIC BLOOD PRESSURE: 132 MMHG | DIASTOLIC BLOOD PRESSURE: 71 MMHG | RESPIRATION RATE: 15 BRPM

## 2019-01-25 VITALS — SYSTOLIC BLOOD PRESSURE: 133 MMHG | DIASTOLIC BLOOD PRESSURE: 80 MMHG | RESPIRATION RATE: 14 BRPM | HEART RATE: 58 BPM

## 2019-01-25 VITALS — SYSTOLIC BLOOD PRESSURE: 156 MMHG | RESPIRATION RATE: 11 BRPM | HEART RATE: 49 BPM | DIASTOLIC BLOOD PRESSURE: 75 MMHG

## 2019-01-25 VITALS — HEART RATE: 62 BPM | RESPIRATION RATE: 18 BRPM | DIASTOLIC BLOOD PRESSURE: 82 MMHG | SYSTOLIC BLOOD PRESSURE: 137 MMHG

## 2019-01-25 VITALS — HEART RATE: 64 BPM | SYSTOLIC BLOOD PRESSURE: 126 MMHG | DIASTOLIC BLOOD PRESSURE: 76 MMHG | RESPIRATION RATE: 23 BRPM

## 2019-01-25 VITALS — DIASTOLIC BLOOD PRESSURE: 86 MMHG | SYSTOLIC BLOOD PRESSURE: 143 MMHG | RESPIRATION RATE: 17 BRPM | HEART RATE: 65 BPM

## 2019-01-25 VITALS — DIASTOLIC BLOOD PRESSURE: 93 MMHG | HEART RATE: 61 BPM | RESPIRATION RATE: 14 BRPM | SYSTOLIC BLOOD PRESSURE: 153 MMHG

## 2019-01-25 VITALS — DIASTOLIC BLOOD PRESSURE: 73 MMHG | RESPIRATION RATE: 15 BRPM | SYSTOLIC BLOOD PRESSURE: 127 MMHG | HEART RATE: 64 BPM

## 2019-01-25 VITALS — DIASTOLIC BLOOD PRESSURE: 84 MMHG | SYSTOLIC BLOOD PRESSURE: 133 MMHG | HEART RATE: 83 BPM | RESPIRATION RATE: 20 BRPM

## 2019-01-25 VITALS — RESPIRATION RATE: 12 BRPM | SYSTOLIC BLOOD PRESSURE: 146 MMHG | DIASTOLIC BLOOD PRESSURE: 71 MMHG | HEART RATE: 48 BPM

## 2019-01-25 VITALS — HEART RATE: 58 BPM | SYSTOLIC BLOOD PRESSURE: 147 MMHG | DIASTOLIC BLOOD PRESSURE: 79 MMHG | RESPIRATION RATE: 20 BRPM

## 2019-01-25 VITALS — DIASTOLIC BLOOD PRESSURE: 74 MMHG | RESPIRATION RATE: 18 BRPM | SYSTOLIC BLOOD PRESSURE: 129 MMHG | HEART RATE: 60 BPM

## 2019-01-25 LAB
ABNORMAL IP MESSAGE: 1
ADD MAN DIFF?: NO
ALLEN TEST: (no result)
ANION GAP: 12 (ref 5–13)
ARTERIAL BASE EXCESS: -3 MMOL/L (ref -3–3)
ARTERIAL BLOOD GAS OXYGEN SAT: 91.1 MMHG (ref 95–98)
ARTERIAL COHB: 0.6 % (ref 0–3)
ARTERIAL FRACTION OF OXYHGB: 90.6 % (ref 93–99)
ARTERIAL HCO3: 21.6 MMOL/L (ref 22–26)
ARTERIAL METHB: 0 % (ref 0–1.5)
ARTERIAL PCO2: 36.8 MMHG (ref 35–45)
BASOPHIL #: 0 10^3/UL (ref 0–0.1)
BASOPHILS %: 0 % (ref 0–2)
BLOOD UREA NITROGEN: 38 MG/DL (ref 7–20)
CALCIUM: 9 MG/DL (ref 8.4–10.2)
CARBON DIOXIDE: 25 MMOL/L (ref 21–31)
CHLORIDE: 113 MMOL/L (ref 97–110)
CREATININE: 1.07 MG/DL (ref 0.61–1.24)
EOSINOPHILS #: 0 10^3/UL (ref 0–0.5)
EOSINOPHILS %: 0 % (ref 0–7)
FIO2: 36 %
GLUCOSE: 107 MG/DL (ref 70–220)
HEMATOCRIT: 35.3 % (ref 42–52)
HEMOGLOBIN: 10.9 G/DL (ref 14–18)
IMMATURE GRANS #M: 0.04 10^3/UL (ref 0–0.03)
IMMATURE GRANS % (M): 0.9 % (ref 0–0.43)
LYMPHOCYTES #: 0.4 10^3/UL (ref 0.8–2.9)
LYMPHOCYTES %: 7.6 % (ref 15–51)
MAGNESIUM: 2.4 MG/DL (ref 1.7–2.5)
MEAN CORPUSCULAR HEMOGLOBIN: 28 PG (ref 29–33)
MEAN CORPUSCULAR HGB CONC: 30.9 G/DL (ref 32–37)
MEAN CORPUSCULAR VOLUME: 90.7 FL (ref 82–101)
MEAN PLATELET VOLUME: 10.7 FL (ref 7.4–10.4)
MODE: (no result)
MONOCYTE #: 0.1 10^3/UL (ref 0.3–0.9)
MONOCYTES %: 3 % (ref 0–11)
NEUTROPHIL #: 4.1 10^3/UL (ref 1.6–7.5)
NEUTROPHILS %: 88.5 % (ref 39–77)
NUCLEATED RED BLOOD CELLS #: 0 10^3/UL (ref 0–0)
NUCLEATED RED BLOOD CELLS%: 0 /100WBC (ref 0–0)
O2 A-A PPRESDIFF RESPIRATORY: 147.7 MMHG (ref 7–24)
PHOSPHORUS: 3.4 MG/DL (ref 2.5–4.9)
PLATELET COUNT: 108 10^3/UL (ref 140–415)
POSITIVE DIFF: (no result)
POTASSIUM: 3.9 MMOL/L (ref 3.5–5.1)
RED BLOOD COUNT: 3.89 10^6/UL (ref 4.7–6.1)
RED CELL DISTRIBUTION WIDTH: 16.2 % (ref 11.5–14.5)
SODIUM: 150 MMOL/L (ref 135–144)
WHITE BLOOD COUNT: 4.6 10^3/UL (ref 4.8–10.8)

## 2019-01-25 RX ADMIN — MEROPENEM SCH MLS/HR: 1 INJECTION, POWDER, FOR SOLUTION INTRAVENOUS at 20:59

## 2019-01-25 RX ADMIN — POTASSIUM CHLORIDE SCH MLS/HR: 2 INJECTION, SOLUTION, CONCENTRATE INTRAVENOUS at 09:36

## 2019-01-25 RX ADMIN — ALBUTEROL SULFATE 1 MG: 2.5 SOLUTION RESPIRATORY (INHALATION) at 14:59

## 2019-01-25 RX ADMIN — FAMOTIDINE 1 MG: 20 TABLET ORAL at 09:00

## 2019-01-25 RX ADMIN — FAMOTIDINE SCH MG: 20 TABLET ORAL at 20:58

## 2019-01-25 RX ADMIN — ASPIRIN 81 MG SCH MG: 81 TABLET ORAL at 09:00

## 2019-01-25 RX ADMIN — ALBUTEROL SULFATE 1 MG: 2.5 SOLUTION RESPIRATORY (INHALATION) at 09:50

## 2019-01-25 RX ADMIN — ASCORBIC ACID, VITAMIN A PALMITATE, CHOLECALCIFEROL, THIAMINE HYDROCHLORIDE, RIBOFLAVIN-5 PHOSPHATE SODIUM, PYRIDOXINE HYDROCHLORIDE, NIACINAMIDE, DEXPANTHENOL, ALPHA-TOCOPHEROL ACETATE, VITAMIN K1, FOLIC ACID, BIOTIN, CYANOCOBALAMIN 1 MLS/HR: 200; 3300; 200; 6; 3.6; 6; 40; 15; 10; 150; 600; 60; 5 INJECTION, SOLUTION INTRAVENOUS at 09:36

## 2019-01-25 RX ADMIN — IPRATROPIUM BROMIDE 1 MG: 0.5 SOLUTION RESPIRATORY (INHALATION) at 09:50

## 2019-01-25 RX ADMIN — ASCORBIC ACID, VITAMIN A PALMITATE, CHOLECALCIFEROL, THIAMINE HYDROCHLORIDE, RIBOFLAVIN-5 PHOSPHATE SODIUM, PYRIDOXINE HYDROCHLORIDE, NIACINAMIDE, DEXPANTHENOL, ALPHA-TOCOPHEROL ACETATE, VITAMIN K1, FOLIC ACID, BIOTIN, CYANOCOBALAMIN 1 MLS/HR: 200; 3300; 200; 6; 3.6; 6; 40; 15; 10; 150; 600; 60; 5 INJECTION, SOLUTION INTRAVENOUS at 20:50

## 2019-01-25 RX ADMIN — ASPIRIN 81 MG CHEWABLE TABLET 1 MG: 81 TABLET CHEWABLE at 09:00

## 2019-01-25 RX ADMIN — ASCORBIC ACID, VITAMIN A PALMITATE, CHOLECALCIFEROL, THIAMINE HYDROCHLORIDE, RIBOFLAVIN-5 PHOSPHATE SODIUM, PYRIDOXINE HYDROCHLORIDE, NIACINAMIDE, DEXPANTHENOL, ALPHA-TOCOPHEROL ACETATE, VITAMIN K1, FOLIC ACID, BIOTIN, CYANOCOBALAMIN 1 MLS/HR: 200; 3300; 200; 6; 3.6; 6; 40; 15; 10; 150; 600; 60; 5 INJECTION, SOLUTION INTRAVENOUS at 22:54

## 2019-01-25 RX ADMIN — METHYLPREDNISOLONE SODIUM SUCCINATE 1 MG: 40 INJECTION, POWDER, FOR SOLUTION INTRAMUSCULAR; INTRAVENOUS at 05:18

## 2019-01-25 RX ADMIN — ALBUTEROL SULFATE 1 MG: 2.5 SOLUTION RESPIRATORY (INHALATION) at 02:21

## 2019-01-25 RX ADMIN — MEROPENEM 1 MLS/HR: 1 INJECTION, POWDER, FOR SOLUTION INTRAVENOUS at 09:28

## 2019-01-25 RX ADMIN — ENOXAPARIN SODIUM SCH MG: 100 INJECTION SUBCUTANEOUS at 09:29

## 2019-01-25 RX ADMIN — ALBUTEROL SULFATE 1 MG: 2.5 SOLUTION RESPIRATORY (INHALATION) at 20:09

## 2019-01-25 RX ADMIN — MEROPENEM SCH MLS/HR: 1 INJECTION, POWDER, FOR SOLUTION INTRAVENOUS at 09:28

## 2019-01-25 RX ADMIN — ENOXAPARIN SODIUM 1 MG: 100 INJECTION SUBCUTANEOUS at 09:29

## 2019-01-25 RX ADMIN — POTASSIUM CHLORIDE SCH MLS/HR: 2 INJECTION, SOLUTION, CONCENTRATE INTRAVENOUS at 22:54

## 2019-01-25 RX ADMIN — FAMOTIDINE SCH MG: 20 TABLET ORAL at 09:00

## 2019-01-25 RX ADMIN — FAMOTIDINE 1 MG: 20 TABLET ORAL at 20:58

## 2019-01-25 RX ADMIN — MEROPENEM 1 MLS/HR: 1 INJECTION, POWDER, FOR SOLUTION INTRAVENOUS at 20:59

## 2019-01-25 RX ADMIN — IPRATROPIUM BROMIDE 1 MG: 0.5 SOLUTION RESPIRATORY (INHALATION) at 14:59

## 2019-01-25 RX ADMIN — METHYLPREDNISOLONE SODIUM SUCCINATE 1 MG: 40 INJECTION, POWDER, FOR SOLUTION INTRAMUSCULAR; INTRAVENOUS at 20:58

## 2019-01-25 RX ADMIN — POTASSIUM CHLORIDE SCH MLS/HR: 2 INJECTION, SOLUTION, CONCENTRATE INTRAVENOUS at 20:50

## 2019-01-25 RX ADMIN — IPRATROPIUM BROMIDE 1 MG: 0.5 SOLUTION RESPIRATORY (INHALATION) at 20:09

## 2019-01-25 RX ADMIN — IPRATROPIUM BROMIDE 1 MG: 0.5 SOLUTION RESPIRATORY (INHALATION) at 00:07

## 2019-01-25 RX ADMIN — IPRATROPIUM BROMIDE 1 MG: 0.5 SOLUTION RESPIRATORY (INHALATION) at 02:21

## 2019-01-25 NOTE — PN
Date/Time of Note


Date/Time of Note


DATE: 1/25/19 


TIME: 20:49





Assessment/Plan


VTE Prophylaxis


[place] [facility] risk:  15


SCD applied ([place] [facility]  Yes


SCD contraindicated:  other


Pharmacological prophylaxis:  other





Lines/Catheters


IV Catheter Type (from [facility]):  Central Line


Urinary Cath still in place:  Yes


Reason Cath still needed:  urinary retention





Assessment/Plan


Assessment/Plan


-Sepsis with shock, continue broad-spectrum antibiotics, IV fluids, pressors, 


ICU care.  Dr. Dreyer is following in infection disease consultation.


-Status post cardiac arrest.   Dr. Jon is following in cardiology 


consultation.


-Left-sided pneumothorax, status post left side chest tube placement.


-Acute respiratory failure, continue ventilatory support bronchodilators.  Dr. Lim is following in pulmonology consultation.


-Pneumoperitoneum most likely due to cardiac arrest.  Dr. Lai is following in 


general surgery consultation. 


-Left lower lobe pneumonia


-Severe emphysema


-NATALIE with possible chronic kidney disease. Dr Hanson is following in nephrology


consultation.





Further recommendations based on clinical course.  Plan of care discussed with 


Dr. Miller.


Result Diagram:  


1/25/19 0425                                                                    


           1/25/19 0432





Results 24hrs





Laboratory Tests


Test
                         1/25/19
04:25  1/25/19
04:32         1/25/19
07:00


White Blood Count                   4.6  #L


Red Blood Count                     3.89  L


Hemoglobin                          10.9  L


Hematocrit                          35.3  L


Mean Corpuscular Volume              90.7


Mean Corpuscular Hemoglobin         28.0  L


Mean Corpuscular                   30.9  L
  
              



Hemoglobin
Concent


Red Cell Distribution Width         16.2  H


Platelet Count                      108  #L


Mean Platelet Volume                10.7  H


Immature Granulocytes %            0.900  H


Neutrophils %                       88.5  H


Lymphocytes %                        7.6  L


Monocytes %                           3.0


Eosinophils %                         0.0


Basophils %                           0.0


Nucleated Red Blood Cells %           0.0


Immature Granulocytes #            0.040  H


Neutrophils #                         4.1


Lymphocytes #                        0.4  L


Monocytes #                          0.1  L


Eosinophils #                         0.0


Basophils #                           0.0


Nucleated Red Blood Cells #           0.0


Sodium Level                                        150  H


Potassium Level                                      3.9


Chloride Level                                      113  H


Carbon Dioxide Level                                  25


Anion Gap                                             12


Blood Urea Nitrogen                                  38  H


Creatinine                                          1.07


Est Glomerular Filtrat        
              > 60  
        



Rate
mL/min


Glucose Level                                        107


Calcium Level                                        9.0


Phosphorus Level                                     3.4


Magnesium Level                                      2.4


Blood Gas Specimen Source
    
              
              Blood arterial



Arterial Blood Date Drawn
    
              
              1/25/2019
7:10:08 AM


Arterial Blood pH             
              
                          7.386  



(Temp
corrected)


Arterial Blood pCO2           
              
                           36.8  



(Temp
correct)


Arterial Blood pO2            
              
                          66.3  L



(Temp
corrected)


Arterial Blood HCO3                                                      21.6  L


Arterial Blood Base Excess                                                -3.0


Arterial Blood                
              
                          91.1  L



Oxygen
Saturation


Antoine Test                                                  ACCEPTAB


Arterial Blood Gas            
              
              Right Radial  



Puncture
Site


Arterial                      
              
                            0.6  



Blood
Carboxyhemoglobin


Arterial Blood Methemoglobin                                                 0


Blood Gas A-a O2                                                        147.7  H


Differential


Oxyhemoglobin Percent                                                    90.6  L


Blood Gas Temperature                                                     37.0


Blood Gas Modality                                          NASAL CANNULA


FiO2                                                                      36.0


Blood Gas Notified Whom                                     CW


Blood Gas Notified Time
      
              
              1/25/2019
7:30:05 AM








Subjective


24 Hr Interval Summary


Free Text/Dictation


ICU


- LUE- swelling , will do stat doppler- fu


Constitutional:  requiring O2


ENT:  no complaints


Respiratory:  no complaints


Cardiovascular:  no complaints


Gastrointestinal:  no complaints


Genitourinary:  no complaints


Musculoskeletal:  no complaints


Skin:  no complaints


Neurologic:  no complaints


Endocrine:  no complaints


Lymphatic:  no complaints





Exam/Review of Systems


Exam


Vitals





Vital Signs


  Date      Temp  Pulse  Resp  B/P (MAP)   Pulse Ox  O2          O2 Flow    FiO2


Time                                                 Delivery    Rate


   1/25/19  97.7     61    18      127/71        99  Mechanical


     20:00                           (89)            Ventilator


   1/25/19                                                             5.0


     16:25


   1/24/19                                                                    50


     15:30








Intake and Output





1/24/19 1/24/19 1/25/19





1515:00


23:00


07:00





IntakeIntake Total


841.02 ml


646 ml


452 ml





OutputOutput Total


730 ml


640 ml


805 ml





BalanceBalance


111.02 ml


6 ml


-353 ml











Constitutional:  alert, well developed


Psych:  nl mood/affect


Eyes:  nl lids


ENMT:  nl external ears & nose


Neck:  non-tender


Respiratory:  diminished breath sounds, other


Cardiovascular:  nl pulses


Gastrointestinal:  soft, non-tender


Musculoskeletal:  nl extremities to inspection


Extremities:  edema


Skin:  other





Results


Results 24hrs





Laboratory Tests


Test
                         1/25/19
04:25  1/25/19
04:32         1/25/19
07:00


White Blood Count                   4.6  #L


Red Blood Count                     3.89  L


Hemoglobin                          10.9  L


Hematocrit                          35.3  L


Mean Corpuscular Volume              90.7


Mean Corpuscular Hemoglobin         28.0  L


Mean Corpuscular                   30.9  L
  
              



Hemoglobin
Concent


Red Cell Distribution Width         16.2  H


Platelet Count                      108  #L


Mean Platelet Volume                10.7  H


Immature Granulocytes %            0.900  H


Neutrophils %                       88.5  H


Lymphocytes %                        7.6  L


Monocytes %                           3.0


Eosinophils %                         0.0


Basophils %                           0.0


Nucleated Red Blood Cells %           0.0


Immature Granulocytes #            0.040  H


Neutrophils #                         4.1


Lymphocytes #                        0.4  L


Monocytes #                          0.1  L


Eosinophils #                         0.0


Basophils #                           0.0


Nucleated Red Blood Cells #           0.0


Sodium Level                                        150  H


Potassium Level                                      3.9


Chloride Level                                      113  H


Carbon Dioxide Level                                  25


Anion Gap                                             12


Blood Urea Nitrogen                                  38  H


Creatinine                                          1.07


Est Glomerular Filtrat        
              > 60  
        



Rate
mL/min


Glucose Level                                        107


Calcium Level                                        9.0


Phosphorus Level                                     3.4


Magnesium Level                                      2.4


Blood Gas Specimen Source
    
              
              Blood arterial



Arterial Blood Date Drawn
    
              
              1/25/2019
7:10:08 AM


Arterial Blood pH             
              
                          7.386  



(Temp
corrected)


Arterial Blood pCO2           
              
                           36.8  



(Temp
correct)


Arterial Blood pO2            
              
                          66.3  L



(Temp
corrected)


Arterial Blood HCO3                                                      21.6  L


Arterial Blood Base Excess                                                -3.0


Arterial Blood                
              
                          91.1  L



Oxygen
Saturation


Antoine Test                                                  ACCEPTAB


Arterial Blood Gas            
              
              Right Radial  



Puncture
Site


Arterial                      
              
                            0.6  



Blood
Carboxyhemoglobin


Arterial Blood Methemoglobin                                                 0


Blood Gas A-a O2                                                        147.7  H


Differential


Oxyhemoglobin Percent                                                    90.6  L


Blood Gas Temperature                                                     37.0


Blood Gas Modality                                          NASAL CANNULA


FiO2                                                                      36.0


Blood Gas Notified Whom                                     CW


Blood Gas Notified Time
      
              
              1/25/2019
7:30:05 AM








Medications


Medication





Current Medications


Ondansetron HCl (Zofran Inj) 4 mg Q6H  PRN IV NAUSEA AND/OR VOMITING;  Start 


1/21/19 at 23:30


Acetaminophen (Tylenol Supp) 650 mg Q4H  PRN [place] PAIN LEVEL 1-3 OR FEVER;  Start 


1/21/19 at 23:30


Morphine Sulfate (morphine) 2 mg Q4H  PRN IV PAIN LEVEL 7-10;  Start 1/21/19 at 


23:30


Enoxaparin Sodium (Lovenox) 30 mg DAILY SC  Last administered on 1/25/19at 09:29


; Admin Dose 30 MG;  Start 1/22/19 at 09:00


Norepinephrine 250 ml @  1.875 mls/ hr TITRATE IV  Last administered on 


1/22/19at 09:48; Admin Dose 37.5 MLS/HR;  Start 1/22/19 at 00:30


Dopamine HCl/ Dextrose 250 ml @  5.625 mls/ hr TITRATE IV  Last administered on 


1/22/19at 18:00; Admin Dose 16.875 MLS/HR;  Start 1/22/19 at 02:00


Aspirin (Aspirin) 81 mg DAILY PO  Last administered on 1/24/19at 08:31; Admin 


Dose 81 MG;  Start 1/24/19 at 09:00


Famotidine (Pepcid) 20 mg Q12 PO  Last administered on 1/24/19at 21:28; Admin 


Dose 20 MG;  Start 1/24/19 at 21:00


Meropenem/Sodium Chloride 50 ml @  100 mls/hr Q12 IVPB  Last administered on 


1/25/19at 09:28; Admin Dose 100 MLS/HR;  Start 1/24/19 at 21:00


Albuterol (Proventil 0.083% (Neb)) 2.5 mg Q6H RESP  THERAPY HHN  Last 


administered on 1/25/19at 20:09; Admin Dose 2.5 MG;  Start 1/24/19 at 21:52


Ipratropium Bromide (Atrovent 0.02% (Neb)) 0.5 mg Q6H RESP  THERAPY HHN  Last 


administered on 1/25/19at 20:09; Admin Dose 0.5 MG;  Start 1/24/19 at 21:53


Dextrose 1,000 ml @  75 mls/hr [id number] IV  Last administered on 1/25/19at 09:36;


Admin Dose 75 MLS/HR;  Start 1/25/19 at 07:30


Methylprednisolone Sodium Succinate (Solu-Medrol) 40 mg Q12 IV ;  Start 1/25/19 


at 21:00











LETY MONSON                 Jan 25, 2019 20:53

## 2019-01-25 NOTE — CONS
Consult Date/Type/Reason


Admit Date/Time


Jan 22, 2019 at 00:03


Initial Consult Date


1/22/19


Type of Consult


Pulmonary


Requesting Provider:  DONALDO CARRILLO MD


Date/Time of Note


DATE: 1/25/19 


TIME: 09:20





Subjective


Patient awake alert oriented remained stable no respiratory distress





Objective





Vital Signs


  Date      Temp  Pulse  Resp  B/P (MAP)   Pulse Ox  O2          O2 Flow    FiO2


Time                                                 Delivery    Rate


   1/25/19           58


     08:00


   1/25/19                 11      156/75        97  Nasal


     06:00                          (102)            Cannula


   1/25/19  98.3


     04:00


   1/25/19                                                             5.0


     02:33


   1/24/19                                                                    50


     15:30








Intake and Output





1/24/19 1/24/19 1/25/19





1515:00


23:00


07:00





IntakeIntake Total


841.02 ml


646 ml


452 ml





OutputOutput Total


730 ml


640 ml


605 ml





BalanceBalance


111.02 ml


6 ml


-153 ml











Exam


GENERAL: Frail elderly  gentleman appears comfortable at rest no acute 


distress


VITAL SIGNS:  per chart


NECK:  Supple.  No JVD or lymphadenopathy.


CARDIAC EXAM:  S1, S2. No added sounds or murmurs.


CHEST: Diminished air entry bilaterally right side worse than left


ABDOMEN:  Soft, nontender.  No guarding or rebound.


EXTREMITIES:  No cyanosis, clubbing or edema.


NEUROLOGIC:  Generalized weakness.  No focal deficits.





Vent Setting


Fraction of Inspired Oxygen pe:  50


Positive End Expiratory Pressu:  5.0





Results/Medications


Result Diagram:  


1/25/19 0425                                                                    


           1/25/19 0432





Results 24 hrs


Chest x-ray shows right sided infiltrate








Laboratory Tests


Test
               1/24/19
19:53  1/25/19
04:25  1/25/19
04:32    1/25/19
07:00


Blood Gas         Blood arterial
  
              
              Blood arterial



Specimen Source



Arterial Blood    1/24/2019
8:00:  
              
              1/25/2019
7:10:


Date Drawn
                 52 PM                                          08 AM


Arterial Blood          7.320  L
  
              
                     7.386  



pH


(Temp
corrected)


Arterial Blood           49.5  H
  
              
                      36.8  



pCO2


(Temp
correct)


Arterial Blood          448.2  H
  
              
                     66.3  L



pO2


(Temp
corrected)


Arterial Blood             24.9                                          21.6  L


HCO3


Arterial Blood             -1.5                                           -3.0


Base Excess


Arterial Blood           99.7  H
  
              
                     91.1  L



Oxygen
Saturatio


n


Antoine Test        ACCEPTAB                                       ACCEPTAB


Arterial Blood    Right Radial  
  
              
              Right Radial  



Gas


Puncture
Site


Arterial                   0.1  
  
              
                       0.6  



Blood
Carboxyhem


oglobin


Arterial Blood              0.2                                              0


Methemoglobin


Blood Gas A-a O2         215.3  H                                       147.7  H


Differential


Oxyhemoglobin             99.4  H                                        90.6  L


Percent


Blood Gas                  37.0                                           37.0


Temperature


Blood Gas Actual            28  
  
              
              



Respiration
Rate


Blood Gas         MASK - NRB                                     NASAL CANNULA


Modality


FiO2                      100.0                                           36.0


Blood Gas         YAIMA ESTRADA


Notified Whom


Blood Gas         1/24/2019
8:07:  
              
              1/25/2019
7:30:


Notified Time
              36 PM                                          05 AM


White Blood                              4.6  #L


Count


Red Blood Count                          3.89  L


Hemoglobin                               10.9  L


Hematocrit                               35.3  L


Mean Corpuscular                          90.7


Volume


Mean Corpuscular                         28.0  L


Hemoglobin


Mean Corpuscular  
                     30.9  L
  
              



Hemoglobin
Marisel


nt


Red Cell                                 16.2  H


Distribution


Width


Platelet Count                           108  #L


Mean Platelet                            10.7  H


Volume


Immature                                0.900  H


Granulocytes %


Neutrophils %                            88.5  H


Lymphocytes %                             7.6  L


Monocytes %                                3.0


Eosinophils %                              0.0


Basophils %                                0.0


Nucleated Red                              0.0


Blood Cells %


Immature                                0.040  H


Granulocytes #


Neutrophils #                              4.1


Lymphocytes #                             0.4  L


Monocytes #                               0.1  L


Eosinophils #                              0.0


Basophils #                                0.0


Nucleated Red                              0.0


Blood Cells #


Sodium Level                                             150  H


Potassium Level                                           3.9


Chloride Level                                           113  H


Carbon Dioxide                                             25


Level


Anion Gap                                                  12


Blood Urea                                                38  H


Nitrogen


Creatinine                                               1.07


Est Glomerular    
                
              > 60  
        



Filtrat


Rate
mL/min


Glucose Level                                             107


Calcium Level                                             9.0


Phosphorus Level                                          3.4


Magnesium Level                                           2.4





Medications





Current Medications


Ondansetron HCl (Zofran Inj) 4 mg Q6H  PRN IV NAUSEA AND/OR VOMITING;  Start 


1/21/19 at 23:30


Acetaminophen (Tylenol Supp) 650 mg Q4H  PRN [place] PAIN LEVEL 1-3 OR FEVER;  Start 


1/21/19 at 23:30


Morphine Sulfate (morphine) 2 mg Q4H  PRN IV PAIN LEVEL 7-10;  Start 1/21/19 at 


23:30


Enoxaparin Sodium (Lovenox) 30 mg DAILY SC  Last administered on 1/24/19at 


09:52; Admin Dose 30 MG;  Start 1/22/19 at 09:00


Norepinephrine 250 ml @  1.875 mls/ hr TITRATE IV  Last administered on 


1/22/19at 09:48; Admin Dose 37.5 MLS/HR;  Start 1/22/19 at 00:30


Dopamine HCl/ Dextrose 250 ml @  5.625 mls/ hr TITRATE IV  Last administered on 


1/22/19at 18:00; Admin Dose 16.875 MLS/HR;  Start 1/22/19 at 02:00


Propofol 100 ml @  1.875 mls/ hr Q12H IV  Last administered on 1/24/19at 09:45; 


Admin Dose 7.5 MLS/HR;  Start 1/22/19 at 06:30


Methylprednisolone Sodium Succinate (Solu-Medrol) 40 mg Q6 IV  Last administered


on 1/25/19at 05:18; Admin Dose 40 MG;  Start 1/23/19 at 12:00


Fentanyl 1000 mcg/ Dextrose 100 ml @  2.5 mls/hr TITRATE IV  Last administered 


on 1/24/19at 11:42; Admin Dose 2.5 MLS/HR;  Start 1/23/19 at 12:30


Aspirin (Aspirin) 81 mg DAILY PO  Last administered on 1/24/19at 08:31; Admin 


Dose 81 MG;  Start 1/24/19 at 09:00


Famotidine (Pepcid) 20 mg Q12 PO  Last administered on 1/24/19at 21:28; Admin 


Dose 20 MG;  Start 1/24/19 at 21:00


Meropenem/Sodium Chloride 50 ml @  100 mls/hr Q12 IVPB  Last administered on 


1/24/19at 20:38; Admin Dose 100 MLS/HR;  Start 1/24/19 at 21:00


Albuterol (Proventil 0.083% (Neb)) 2.5 mg Q6H RESP  THERAPY HHN  Last 


administered on 1/25/19at 02:21; Admin Dose 2.5 MG;  Start 1/24/19 at 21:52


Ipratropium Bromide (Atrovent 0.02% (Neb)) 0.5 mg Q6H RESP  THERAPY HHN  Last 


administered on 1/25/19at 02:21; Admin Dose 0.5 MG;  Start 1/24/19 at 21:53


Dextrose 1,000 ml @  75 mls/hr [id number] IV ;  Start 1/25/19 at 07:30





Assessment/Plan


Hospital Course (Demo Recall)


Assessment





1.  Status post hypoxemic respiratory failure but no safely extubated


2.  Pneumoperitoneum status post chest tube which patient pulled out


3.  Encephalopathy toxic metabolic resolving


4.  Advanced COPD


5.  Hypernatremia likely free water deficit





Plan





1.  Encourage free water


2.  Incentive spirometry


3.  Bronchodilators


4.  PT eval


5.  Transfer to telemetry





Critical care time 40 minutes











MIMI ZAVALA MD, [facility]     Jan 25, 2019 09:22

## 2019-01-25 NOTE — CONS
Assessment/Plan


Assessment/Plan


Assessment/Plan (Daily)


1.Hypotension-on levo. NL EF by echo this admit - better now, con't to adjust 


med rx as needed. 


2.resp failure s/p intubation - now self extubated - better overall. 


3.cardiac arrest - etiology unclear - con't supportive RX now. 


4.Pneumoperitoneum - self d/c chest tube  - stable SOB now. 


5.Positive troponin-now trended neg - will monitor clinically for now. 


6.renal failure


7.Leukocytosis - on anti-Bx, con't med rx. 


8. anemia - H/H stable - no bleeding noted. 


9, Thrombocytopenia





Consultation Date/Type/Reason


Admit Date/Time


Jan 22, 2019 at 00:03


Initial Consult Date


1/22/19


Requesting Provider:  DONALDO CARRILLO MD


Date/Time of Note


DATE: 1/25/19 


TIME: 12:20





24 HR Interval Summary


Free Text/Dictation


No acute events - pt now extubated, chest tube out - no fevers. Con't med Rx. 





ROS: No fever, no chills, no nausea, no vomiting, no diarrhea/constipation


        No recent weight changes


        No chest pain, no PND, no orthopnea - improved SOB. 


        No dizziness, blurred vision


        No thirst, no heat or cold intolerance





Exam/Review of Systems


Exam


Vitals





Vital Signs


  Date      Temp  Pulse  Resp  B/P (MAP)   Pulse Ox  O2          O2 Flow    FiO2


Time                                                 Delivery    Rate


   1/25/19           66    16      147/91            Nasal


     10:00                          (109)            Cannula


   1/25/19                                                             6.0


     08:00


   1/25/19  97.8                                 95


     08:00


   1/24/19                                                                    50


     15:30








Intake and Output





1/24/19 1/24/19 1/25/19





1515:00


23:00


07:00





IntakeIntake Total


841.02 ml


646 ml


452 ml





OutputOutput Total


730 ml


640 ml


605 ml





BalanceBalance


111.02 ml


6 ml


-153 ml











Additional Comments


General: WN/WD/NAD, AOx 2-3


HEENT: Unicetric/atraumatic/EOMI (follow commands)


NECK: JVD elevated, no thyromegaly


Lymph: no lymphadenopathy


HEART: regular with no S3, II/VI systolic murmur at apex


LUNGS: Coarse sounds


ABD: soft, NT, ND, +BS


: Intact


Neuro: non focal


SKIN: chronic changes


EXT: trace edema





Results


Result Diagram:  


1/25/19 0425                                                                    


           1/25/19 0432





Results 24hrs





Laboratory Tests


Test
               1/24/19
19:53  1/25/19
04:25  1/25/19
04:32    1/25/19
07:00


Blood Gas         Blood arterial
  
              
              Blood arterial



Specimen Source



Arterial Blood    1/24/2019
8:00:  
              
              1/25/2019
7:10:


Date Drawn
                 52 PM                                          08 AM


Arterial Blood          7.320  L
  
              
                     7.386  



pH


(Temp
corrected)


Arterial Blood           49.5  H
  
              
                      36.8  



pCO2


(Temp
correct)


Arterial Blood          448.2  H
  
              
                     66.3  L



pO2


(Temp
corrected)


Arterial Blood             24.9                                          21.6  L


HCO3


Arterial Blood             -1.5                                           -3.0


Base Excess


Arterial Blood           99.7  H
  
              
                     91.1  L



Oxygen
Saturatio


n


Antoine Test        ACCEPTAB                                       ACCEPTAB


Arterial Blood    Right Radial  
  
              
              Right Radial  



Gas


Puncture
Site


Arterial                   0.1  
  
              
                       0.6  



Blood
Carboxyhem


oglobin


Arterial Blood              0.2                                              0


Methemoglobin


Blood Gas A-a O2         215.3  H                                       147.7  H


Differential


Oxyhemoglobin             99.4  H                                        90.6  L


Percent


Blood Gas                  37.0                                           37.0


Temperature


Blood Gas Actual            28  
  
              
              



Respiration
Rate


Blood Gas         MASK - NRB                                     NASAL CANNULA


Modality


FiO2                      100.0                                           36.0


Blood Gas         YAIMA ESTRADA                                   CW


Notified Whom


Blood Gas         1/24/2019
8:07:  
              
              1/25/2019
7:30:


Notified Time
              36 PM                                          05 AM


White Blood                              4.6  #L


Count


Red Blood Count                          3.89  L


Hemoglobin                               10.9  L


Hematocrit                               35.3  L


Mean Corpuscular                          90.7


Volume


Mean Corpuscular                         28.0  L


Hemoglobin


Mean Corpuscular  
                     30.9  L
  
              



Hemoglobin
Marisel


nt


Red Cell                                 16.2  H


Distribution


Width


Platelet Count                           108  #L


Mean Platelet                            10.7  H


Volume


Immature                                0.900  H


Granulocytes %


Neutrophils %                            88.5  H


Lymphocytes %                             7.6  L


Monocytes %                                3.0


Eosinophils %                              0.0


Basophils %                                0.0


Nucleated Red                              0.0


Blood Cells %


Immature                                0.040  H


Granulocytes #


Neutrophils #                              4.1


Lymphocytes #                             0.4  L


Monocytes #                               0.1  L


Eosinophils #                              0.0


Basophils #                                0.0


Nucleated Red                              0.0


Blood Cells #


Sodium Level                                             150  H


Potassium Level                                           3.9


Chloride Level                                           113  H


Carbon Dioxide                                             25


Level


Anion Gap                                                  12


Blood Urea                                                38  H


Nitrogen


Creatinine                                               1.07


Est Glomerular    
                
              > 60  
        



Filtrat


Rate
mL/min


Glucose Level                                             107


Calcium Level                                             9.0


Phosphorus Level                                          3.4


Magnesium Level                                           2.4














HARDY GUZMÁN MD                 Jan 25, 2019 12:29

## 2019-01-25 NOTE — PN
DATE:  01/25/2019

 

 

SUBJECTIVE:  The patient self-extubated himself yesterday and removed his chest tube.  No other acute
 events noted overnight.  No hemoptysis, hematemesis or hematochezia.

 

OBJECTIVE:

VITAL SIGNS:  Blood pressure is 156/75, respirations 18, pulse 49, temperature 98.3.

HEENT:  Head is normocephalic.

NECK:  Supple.

HEART:  Regular rate.

LUNGS:  Show diminished breath sounds at the base.

ABDOMEN:  Soft, nontender to palpation.  No rebound or guarding.

EXTREMITIES:  Negative for clubbing, cyanosis, no edema.

DERMATOLOGIC:  No rashes.

MUSCULOSKELETAL:  No joint effusion.

NEUROLOGIC:  No change in exam.

 

MEDICATIONS:  Reviewed.

 

LABORATORY DATA:  Shows a white count 4.6, hemoglobin 10.8, platelet count is 108.  Sodium 150, potas
sium 3.9, BUN 30, creatinine 1.7.

 

ASSESSMENT AND PLAN:

1.  Nonoliguric acute kidney injury with unknown baseline creatinine.  Etiology of acute kidney injur
y is secondary to hemodynamics, possible tubular injury.  The patient's renal function has slowly bee
n improving.  At this point, continue current treatment plan, supportive care, renally dose all medic
abril.  We will change the patient's IV fluids to hypertonic D5 water.  Continue IV antibiotics to russell
at underlying sepsis.

2.  Hypernatremia.  The patient has free water deficit of approximately 2.5 liters.  We will start th
e patient on D5 water at 75 mL an hour and monitor sodium levels closely.

3.  Anemia.  Continue to monitor hemoglobin and hematocrit levels.

4.  Mineral bone disorder.  Monitor calcium and phosphorus levels.

5.  Chronic kidney disease.  The patient is currently in acute kidney injury as stated above.  Contin
ue current treatment plan.  Otherwise, continue disease factor modification.

6.  Respiratory failure.  The patient is status post self-extubation.  Currently stable on nasal jennifer
earl.  Continue to monitor.

7.  Sepsis, status post shock secondary to pneumonia.  Continue current antibiotic regimen.

8.  Pneumothorax.  The patient's chest tube was removed.  Continue to monitor.  Follow up chest x-ray
.

9.  Pneumoperitoneum, likely due to cardiac arrest.

10.  Status post cardiac arrest.

11.  Encephalopathy.  Continue to monitor.

 

 

Dictated By: MARYAN RUCKER/PEREZ

DD:    01/25/2019 07:26:52

DT:    01/25/2019 07:51:45

Conf#: [id number]

DID#:  [id number]

CC: DONALDO CARRILLO MD; MIMI ZAVALA MD; MAURI GRIGSBY MD;*End*

## 2019-01-25 NOTE — CONS
Assessment/Plan


Assessment/Plan


Hospital Course (Demo Recall)


Patient self extubated himself and also pulled chest tube is awake looks 


comfortable in no distress no fevers. 





Chest x-ray this morning revealed no pneumothorax





Microbiology: Blood cultures negative MRSA swab negative urine culture growing 


staph species





Indwelling: Right IJ triple-lumen catheter endotracheal tube NG tube left chest 


tube, Blake catheter





Antimicrobials: Merrem





Physical examination: Well-developed chronically ill-appearing fragile elderly 


man who is intubated sedated in no distress.  Head atraumatic normocephalic 


sclera nonicteric vehicle mucosa dry neck is supple chest rise symmetrical 


breath sounds diminished bases.  Heart: S1-S2.  Abdomen soft bowel sounds presen


t.  Extremities cyanotic





Assessment:


1.  Septic shock


2.  Bradycardia 


3.  Acute hypoxemic respiratory failure with questionable aspiration


4.  Pneumoperitoneum of unclear etiology, no perforation per surgical note


5.  Pancytopenia


5.  Anemia


6.  Non-ST elevation MI


7.  Status post cardiac arrest





Plan: Stable status post self extubation, continue present care, aspiration 


precautions





Consultation Date/Type/Reason


Admit Date/Time


Jan 22, 2019 at 00:03


Initial Consult Date


1/22/19


Type of Consult


ID


Requesting Provider:  DONALDO CARRILLO MD


Date/Time of Note


DATE: 1/25/19 


TIME: 13:46





Exam/Review of Systems


Exam


Vitals





Vital Signs


  Date      Temp  Pulse  Resp  B/P (MAP)   Pulse Ox  O2          O2 Flow    FiO2


Time                                                 Delivery    Rate


   1/25/19           66    16      147/91            Nasal


     10:00                          (109)            Cannula


   1/25/19                                                             6.0


     08:00


   1/25/19  97.8                                 95


     08:00


   1/24/19                                                                    50


     15:30








Intake and Output





1/24/19 1/24/19 1/25/19





1515:00


23:00


07:00





IntakeIntake Total


841.02 ml


646 ml


452 ml





OutputOutput Total


730 ml


640 ml


805 ml





BalanceBalance


111.02 ml


6 ml


-353 ml














Results


Result Diagram:  


1/25/19 0425                                                                    


           1/25/19 0432





Results 24hrs





Laboratory Tests


Test
               1/24/19
19:53  1/25/19
04:25  1/25/19
04:32    1/25/19
07:00


Blood Gas         Blood arterial
  
              
              Blood arterial



Specimen Source



Arterial Blood    1/24/2019
8:00:  
              
              1/25/2019
7:10:


Date Drawn
                 52 PM                                          08 AM


Arterial Blood          7.320  L
  
              
                     7.386  



pH


(Temp
corrected)


Arterial Blood           49.5  H
  
              
                      36.8  



pCO2


(Temp
correct)


Arterial Blood          448.2  H
  
              
                     66.3  L



pO2


(Temp
corrected)


Arterial Blood             24.9                                          21.6  L


HCO3


Arterial Blood             -1.5                                           -3.0


Base Excess


Arterial Blood           99.7  H
  
              
                     91.1  L



Oxygen
Saturatio


n


Antoine Test        ACCEPTAB                                       ACCEPTAB


Arterial Blood    Right Radial  
  
              
              Right Radial  



Gas


Puncture
Site


Arterial                   0.1  
  
              
                       0.6  



Blood
Carboxyhem


oglobin


Arterial Blood              0.2                                              0


Methemoglobin


Blood Gas A-a O2         215.3  H                                       147.7  H


Differential


Oxyhemoglobin             99.4  H                                        90.6  L


Percent


Blood Gas                  37.0                                           37.0


Temperature


Blood Gas Actual            28  
  
              
              



Respiration
Rate


Blood Gas         MASK - NRB                                     NASAL CANNULA


Modality


FiO2                      100.0                                           36.0


Blood Gas         YAIMA ESTRADA                                   CW


Notified Whom


Blood Gas         1/24/2019
8:07:  
              
              1/25/2019
7:30:


Notified Time
              36 PM                                          05 AM


White Blood                              4.6  #L


Count


Red Blood Count                          3.89  L


Hemoglobin                               10.9  L


Hematocrit                               35.3  L


Mean Corpuscular                          90.7


Volume


Mean Corpuscular                         28.0  L


Hemoglobin


Mean Corpuscular  
                     30.9  L
  
              



Hemoglobin
Marisel


nt


Red Cell                                 16.2  H


Distribution


Width


Platelet Count                           108  #L


Mean Platelet                            10.7  H


Volume


Immature                                0.900  H


Granulocytes %


Neutrophils %                            88.5  H


Lymphocytes %                             7.6  L


Monocytes %                                3.0


Eosinophils %                              0.0


Basophils %                                0.0


Nucleated Red                              0.0


Blood Cells %


Immature                                0.040  H


Granulocytes #


Neutrophils #                              4.1


Lymphocytes #                             0.4  L


Monocytes #                               0.1  L


Eosinophils #                              0.0


Basophils #                                0.0


Nucleated Red                              0.0


Blood Cells #


Sodium Level                                             150  H


Potassium Level                                           3.9


Chloride Level                                           113  H


Carbon Dioxide                                             25


Level


Anion Gap                                                  12


Blood Urea                                                38  H


Nitrogen


Creatinine                                               1.07


Est Glomerular    
                
              > 60  
        



Filtrat


Rate
mL/min


Glucose Level                                             107


Calcium Level                                             9.0


Phosphorus Level                                          3.4


Magnesium Level                                           2.4














DAVID VÁZQUEZ NP            Jan 25, 2019 13:47

## 2019-01-26 VITALS — HEART RATE: 53 BPM | SYSTOLIC BLOOD PRESSURE: 124 MMHG | DIASTOLIC BLOOD PRESSURE: 76 MMHG | RESPIRATION RATE: 16 BRPM

## 2019-01-26 VITALS — SYSTOLIC BLOOD PRESSURE: 120 MMHG | RESPIRATION RATE: 15 BRPM | DIASTOLIC BLOOD PRESSURE: 65 MMHG | HEART RATE: 60 BPM

## 2019-01-26 VITALS — HEART RATE: 52 BPM | SYSTOLIC BLOOD PRESSURE: 122 MMHG | DIASTOLIC BLOOD PRESSURE: 76 MMHG | RESPIRATION RATE: 17 BRPM

## 2019-01-26 VITALS — RESPIRATION RATE: 20 BRPM | HEART RATE: 65 BPM | SYSTOLIC BLOOD PRESSURE: 134 MMHG | DIASTOLIC BLOOD PRESSURE: 108 MMHG

## 2019-01-26 VITALS — RESPIRATION RATE: 19 BRPM | SYSTOLIC BLOOD PRESSURE: 114 MMHG | DIASTOLIC BLOOD PRESSURE: 77 MMHG | HEART RATE: 57 BPM

## 2019-01-26 VITALS — RESPIRATION RATE: 18 BRPM | HEART RATE: 65 BPM | SYSTOLIC BLOOD PRESSURE: 145 MMHG | DIASTOLIC BLOOD PRESSURE: 77 MMHG

## 2019-01-26 VITALS — SYSTOLIC BLOOD PRESSURE: 134 MMHG | RESPIRATION RATE: 19 BRPM | HEART RATE: 65 BPM | DIASTOLIC BLOOD PRESSURE: 72 MMHG

## 2019-01-26 VITALS — RESPIRATION RATE: 17 BRPM | HEART RATE: 59 BPM | SYSTOLIC BLOOD PRESSURE: 133 MMHG | DIASTOLIC BLOOD PRESSURE: 79 MMHG

## 2019-01-26 VITALS — DIASTOLIC BLOOD PRESSURE: 75 MMHG | HEART RATE: 61 BPM | RESPIRATION RATE: 17 BRPM | SYSTOLIC BLOOD PRESSURE: 146 MMHG

## 2019-01-26 VITALS — DIASTOLIC BLOOD PRESSURE: 67 MMHG | SYSTOLIC BLOOD PRESSURE: 142 MMHG | RESPIRATION RATE: 18 BRPM | HEART RATE: 70 BPM

## 2019-01-26 VITALS — DIASTOLIC BLOOD PRESSURE: 71 MMHG | SYSTOLIC BLOOD PRESSURE: 150 MMHG | HEART RATE: 51 BPM | RESPIRATION RATE: 16 BRPM

## 2019-01-26 VITALS — SYSTOLIC BLOOD PRESSURE: 141 MMHG | RESPIRATION RATE: 20 BRPM | DIASTOLIC BLOOD PRESSURE: 80 MMHG | HEART RATE: 67 BPM

## 2019-01-26 VITALS — DIASTOLIC BLOOD PRESSURE: 69 MMHG | SYSTOLIC BLOOD PRESSURE: 124 MMHG | RESPIRATION RATE: 15 BRPM | HEART RATE: 57 BPM

## 2019-01-26 VITALS — SYSTOLIC BLOOD PRESSURE: 151 MMHG | HEART RATE: 56 BPM | RESPIRATION RATE: 24 BRPM | DIASTOLIC BLOOD PRESSURE: 92 MMHG

## 2019-01-26 VITALS — DIASTOLIC BLOOD PRESSURE: 71 MMHG | SYSTOLIC BLOOD PRESSURE: 120 MMHG | HEART RATE: 70 BPM | RESPIRATION RATE: 20 BRPM

## 2019-01-26 VITALS — HEART RATE: 50 BPM | DIASTOLIC BLOOD PRESSURE: 64 MMHG | SYSTOLIC BLOOD PRESSURE: 134 MMHG | RESPIRATION RATE: 21 BRPM

## 2019-01-26 VITALS — HEART RATE: 64 BPM | DIASTOLIC BLOOD PRESSURE: 83 MMHG | SYSTOLIC BLOOD PRESSURE: 160 MMHG | RESPIRATION RATE: 22 BRPM

## 2019-01-26 VITALS — HEART RATE: 72 BPM | RESPIRATION RATE: 19 BRPM | SYSTOLIC BLOOD PRESSURE: 134 MMHG | DIASTOLIC BLOOD PRESSURE: 76 MMHG

## 2019-01-26 VITALS — DIASTOLIC BLOOD PRESSURE: 73 MMHG | RESPIRATION RATE: 23 BRPM | HEART RATE: 70 BPM | SYSTOLIC BLOOD PRESSURE: 129 MMHG

## 2019-01-26 VITALS — DIASTOLIC BLOOD PRESSURE: 44 MMHG | HEART RATE: 55 BPM | RESPIRATION RATE: 16 BRPM | SYSTOLIC BLOOD PRESSURE: 114 MMHG

## 2019-01-26 LAB
ABNORMAL IP MESSAGE: 1
ADD MAN DIFF?: NO
ALANINE AMINOTRANSFERASE: 35 IU/L (ref 13–69)
ALBUMIN/GLOBULIN RATIO: 1
ALBUMIN: 2.7 G/DL (ref 3.3–4.9)
ALKALINE PHOSPHATASE: 46 IU/L (ref 42–121)
ANION GAP: 8 (ref 5–13)
ASPARTATE AMINO TRANSFERASE: 16 IU/L (ref 15–46)
BASOPHIL #: 0 10^3/UL (ref 0–0.1)
BASOPHILS %: 0 % (ref 0–2)
BILIRUBIN,DIRECT: 0 MG/DL (ref 0–0.2)
BILIRUBIN,TOTAL: 0 MG/DL (ref 0.2–1.3)
BLOOD UREA NITROGEN: 36 MG/DL (ref 7–20)
CALCIUM: 8.8 MG/DL (ref 8.4–10.2)
CARBON DIOXIDE: 28 MMOL/L (ref 21–31)
CHLORIDE: 109 MMOL/L (ref 97–110)
CREATININE: 0.95 MG/DL (ref 0.61–1.24)
EOSINOPHILS #: 0 10^3/UL (ref 0–0.5)
EOSINOPHILS %: 0 % (ref 0–7)
GLOBULIN: 2.7 G/DL (ref 1.3–3.2)
GLUCOSE: 200 MG/DL (ref 70–220)
HEMATOCRIT: 33.1 % (ref 42–52)
HEMOGLOBIN: 10.3 G/DL (ref 14–18)
IMMATURE GRANS #M: 0.01 10^3/UL (ref 0–0.03)
IMMATURE GRANS % (M): 0.3 % (ref 0–0.43)
LYMPHOCYTES #: 0.5 10^3/UL (ref 0.8–2.9)
LYMPHOCYTES %: 13.5 % (ref 15–51)
MAGNESIUM: 2.4 MG/DL (ref 1.7–2.5)
MEAN CORPUSCULAR HEMOGLOBIN: 28 PG (ref 29–33)
MEAN CORPUSCULAR HGB CONC: 31.1 G/DL (ref 32–37)
MEAN CORPUSCULAR VOLUME: 89.9 FL (ref 82–101)
MEAN PLATELET VOLUME: 10.8 FL (ref 7.4–10.4)
MONOCYTE #: 0.2 10^3/UL (ref 0.3–0.9)
MONOCYTES %: 4.4 % (ref 0–11)
NEUTROPHIL #: 2.8 10^3/UL (ref 1.6–7.5)
NEUTROPHILS %: 81.8 % (ref 39–77)
NUCLEATED RED BLOOD CELLS #: 0 10^3/UL (ref 0–0)
NUCLEATED RED BLOOD CELLS%: 0 /100WBC (ref 0–0)
PHOSPHORUS: 2.9 MG/DL (ref 2.5–4.9)
PLATELET COUNT: 104 10^3/UL (ref 140–415)
POSITIVE DIFF: (no result)
POTASSIUM: 3.6 MMOL/L (ref 3.5–5.1)
RED BLOOD COUNT: 3.68 10^6/UL (ref 4.7–6.1)
RED CELL DISTRIBUTION WIDTH: 16 % (ref 11.5–14.5)
SODIUM: 145 MMOL/L (ref 135–144)
TOTAL PROTEIN: 5.4 G/DL (ref 6.1–8.1)
WHITE BLOOD COUNT: 3.4 10^3/UL (ref 4.8–10.8)

## 2019-01-26 RX ADMIN — MEROPENEM SCH MLS/HR: 1 INJECTION, POWDER, FOR SOLUTION INTRAVENOUS at 20:46

## 2019-01-26 RX ADMIN — ASPIRIN 81 MG CHEWABLE TABLET 1 MG: 81 TABLET CHEWABLE at 09:55

## 2019-01-26 RX ADMIN — IPRATROPIUM BROMIDE 1 MG: 0.5 SOLUTION RESPIRATORY (INHALATION) at 14:00

## 2019-01-26 RX ADMIN — MEROPENEM 1 MLS/HR: 1 INJECTION, POWDER, FOR SOLUTION INTRAVENOUS at 20:46

## 2019-01-26 RX ADMIN — FAMOTIDINE SCH MG: 20 TABLET ORAL at 09:54

## 2019-01-26 RX ADMIN — FAMOTIDINE 1 MG: 20 TABLET ORAL at 09:54

## 2019-01-26 RX ADMIN — ENOXAPARIN SODIUM SCH MG: 100 INJECTION SUBCUTANEOUS at 09:55

## 2019-01-26 RX ADMIN — ASPIRIN 81 MG SCH MG: 81 TABLET ORAL at 09:55

## 2019-01-26 RX ADMIN — IPRATROPIUM BROMIDE 1 MG: 0.5 SOLUTION RESPIRATORY (INHALATION) at 10:14

## 2019-01-26 RX ADMIN — METHYLPREDNISOLONE SODIUM SUCCINATE 1 MG: 40 INJECTION, POWDER, FOR SOLUTION INTRAMUSCULAR; INTRAVENOUS at 20:46

## 2019-01-26 RX ADMIN — MEROPENEM SCH MLS/HR: 1 INJECTION, POWDER, FOR SOLUTION INTRAVENOUS at 09:54

## 2019-01-26 RX ADMIN — ALBUTEROL SULFATE 1 MG: 2.5 SOLUTION RESPIRATORY (INHALATION) at 19:37

## 2019-01-26 RX ADMIN — IPRATROPIUM BROMIDE 1 MG: 0.5 SOLUTION RESPIRATORY (INHALATION) at 02:21

## 2019-01-26 RX ADMIN — IPRATROPIUM BROMIDE 1 MG: 0.5 SOLUTION RESPIRATORY (INHALATION) at 19:37

## 2019-01-26 RX ADMIN — ENOXAPARIN SODIUM 1 MG: 100 INJECTION SUBCUTANEOUS at 09:55

## 2019-01-26 RX ADMIN — ALBUTEROL SULFATE 1 MG: 2.5 SOLUTION RESPIRATORY (INHALATION) at 14:00

## 2019-01-26 RX ADMIN — ALBUTEROL SULFATE 1 MG: 2.5 SOLUTION RESPIRATORY (INHALATION) at 10:14

## 2019-01-26 RX ADMIN — METHYLPREDNISOLONE SODIUM SUCCINATE 1 MG: 40 INJECTION, POWDER, FOR SOLUTION INTRAMUSCULAR; INTRAVENOUS at 09:54

## 2019-01-26 RX ADMIN — FAMOTIDINE SCH MG: 20 TABLET ORAL at 20:46

## 2019-01-26 RX ADMIN — MEROPENEM 1 MLS/HR: 1 INJECTION, POWDER, FOR SOLUTION INTRAVENOUS at 09:54

## 2019-01-26 RX ADMIN — FAMOTIDINE 1 MG: 20 TABLET ORAL at 20:46

## 2019-01-26 RX ADMIN — ALBUTEROL SULFATE 1 MG: 2.5 SOLUTION RESPIRATORY (INHALATION) at 02:21

## 2019-01-26 NOTE — CONS
Consultation Date/Type/Reason


Admit Date/Time


Jan 22, 2019 at 00:03


Initial Consult Date


SUBJECTIVE: Patient self extubated himself and also pulled chest tube out. 


Awake,afebrile, looks comfortable. 





VS: stable T: 98.2


LABS: reviewed. WBC- 3.4





CXR today:


IMPRESSION:


No alveolar pneumonia. Increased interstitial markings with confluence left pe


rihilar mid lung and right lung base unchanged. Right pleural effusion.


 No significant change





Microbiology: Blood cultures negative MRSA swab negative urine culture growing 


staph species





Indwelling: Right IJ triple-lumen catheter endotracheal tube NG tube left chest 


tube, Blake catheter





Antimicrobials: Merrem





Physical examination: 


GEN: Well-developed chronically ill-appearing fragile elderly man who is 


extubated now, in no distress.  


HENT: Head atraumatic normocephalic, sclera nonicteric vehicle mucosa dry, neck 


is supple


PULM:  chest rise symmetrical, breath sounds diminished bases.  


Heart: S1-S2.  


Abdomen soft bowel sounds present.  


Extremities cyanotic





Assessment:


1.  Septic shock


2.  Bradycardia 


3.  Acute hypoxemic respiratory failure with questionable aspiration


4.  Pneumoperitoneum of unclear etiology, no perforation per surgical note


5.  Pancytopenia


5.  Anemia


6.  Non-ST elevation MI


7.  Status post cardiac arrest





Plan: Stable status post self extubation.  Continue current antbx treatment. 


Aspiration precautions. CXR noted.


Requesting Provider:  DONALDO CARRILLO MD


Date/Time of Note


DATE: 1/26/19 


TIME: 14:48





Exam/Review of Systems


Exam


Vitals





Vital Signs


  Date      Temp  Pulse  Resp  B/P (MAP)   Pulse Ox  O2          O2 Flow    FiO2


Time                                                 Delivery    Rate


   1/26/19           67    20      141/80        95  Nasal


     12:30                          (100)            Cannula


   1/26/19  98.2


     12:00


   1/26/19                                                             2.0


     10:17


   1/24/19                                                                    50


     15:30








Intake and Output





1/25/19 1/25/19 1/26/19





1515:00


23:00


07:00





IntakeIntake Total


765 ml


1485 ml


1750 ml





OutputOutput Total


1550 ml


850 ml


1125 ml





BalanceBalance


-785 ml


635 ml


625 ml














Results


Result Diagram:  


1/26/19 0345                                                                    


           1/26/19 0345





Results 24hrs





Laboratory Tests


               Test
                                1/26/19
03:45


               White Blood Count                          3.4  #L


               Red Blood Count                            3.68  L


               Hemoglobin                                 10.3  L


               Hematocrit                                 33.1  L


               Mean Corpuscular Volume                     89.9


               Mean Corpuscular Hemoglobin                28.0  L


               Mean Corpuscular Hemoglobin
Concent       31.1  L



               Red Cell Distribution Width                16.0  H


               Platelet Count                              104  L


               Mean Platelet Volume                       10.8  H


               Immature Granulocytes %                    0.300


               Neutrophils %                              81.8  H


               Lymphocytes %                              13.5  L


               Monocytes %                                  4.4


               Eosinophils %                                0.0


               Basophils %                                  0.0


               Nucleated Red Blood Cells %                  0.0


               Immature Granulocytes #                    0.010


               Neutrophils #                                2.8


               Lymphocytes #                               0.5  L


               Monocytes #                                 0.2  L


               Eosinophils #                                0.0


               Basophils #                                  0.0


               Nucleated Red Blood Cells #                  0.0


               Sodium Level                                145  H


               Potassium Level                              3.6


               Chloride Level                               109


               Carbon Dioxide Level                          28


               Anion Gap                                      8


               Blood Urea Nitrogen                          36  H


               Creatinine                                  0.95


               Est Glomerular Filtrat Rate
mL/min   > 60  



               Glucose Level                                200


               Calcium Level                                8.8


               Phosphorus Level                             2.9


               Magnesium Level                              2.4


               Total Bilirubin                             0.0  L


               Direct Bilirubin                            0.00


               Indirect Bilirubin                           0.0


               Aspartate Amino Transf
(AST/SGOT)            16  



               Alanine Aminotransferase
(ALT/SGPT)          35  



               Alkaline Phosphatase                          46


               Total Protein                               5.4  L


               Albumin                                     2.7  L


               Globulin                                    2.70


               Albumin/Globulin Ratio                      1.00








Medications


Medication





Current Medications


Ondansetron HCl (Zofran Inj) 4 mg Q6H  PRN IV NAUSEA AND/OR VOMITING;  Start 


1/21/19 at 23:30


Acetaminophen (Tylenol Supp) 650 mg Q4H  PRN CO PAIN LEVEL 1-3 OR FEVER;  Start 


1/21/19 at 23:30


Morphine Sulfate (morphine) 2 mg Q4H  PRN IV PAIN LEVEL 7-10;  Start 1/21/19 at 


23:30


Enoxaparin Sodium (Lovenox) 30 mg DAILY SC  Last administered on 1/26/19at 


09:55; Admin Dose 30 MG;  Start 1/22/19 at 09:00


Dopamine HCl/ Dextrose 250 ml @  5.625 mls/ hr TITRATE IV  Last administered on 


1/22/19at 18:00; Admin Dose 16.875 MLS/HR;  Start 1/22/19 at 02:00


Aspirin (Aspirin) 81 mg DAILY PO  Last administered on 1/26/19at 09:55; Admin 


Dose 81 MG;  Start 1/24/19 at 09:00


Famotidine (Pepcid) 20 mg Q12 PO  Last administered on 1/26/19at 09:54; Admin 


Dose 20 MG;  Start 1/24/19 at 21:00


Meropenem/Sodium Chloride 50 ml @  100 mls/hr Q12 IVPB  Last administered on 


1/26/19at 09:54; Admin Dose 100 MLS/HR;  Start 1/24/19 at 21:00


Albuterol (Proventil 0.083% (Neb)) 2.5 mg Q6H RESP  THERAPY HHN  Last 


administered on 1/26/19at 10:14; Admin Dose 2.5 MG;  Start 1/24/19 at 21:52


Ipratropium Bromide (Atrovent 0.02% (Neb)) 0.5 mg Q6H RESP  THERAPY HHN  Last 


administered on 1/26/19at 10:14; Admin Dose 0.5 MG;  Start 1/24/19 at 21:53


Dextrose 1,000 ml @  50 mls/hr Q20H IV  Last administered on 1/25/19at 22:54; 


Admin Dose 75 MLS/HR;  Start 1/25/19 at 07:30


Methylprednisolone Sodium Succinate (Solu-Medrol) 40 mg Q12 IV  Last adminis


tered on 1/26/19at 09:54; Admin Dose 40 MG;  Start 1/25/19 at 21:00











MARY ORTIZ              Jan 26, 2019 14:52

## 2019-01-26 NOTE — PN
Date/Time of Note


Date/Time of Note


DATE: 1/26/19 


TIME: 11:11





Assessment/Plan


VTE Prophylaxis


[place] [facility] [place]:  11


[place]CD [place] [facility] [place]  [place]


Pharmacological prophylaxis:  LMWH





Lines/Catheters


IV Catheter Type (from [facility]):  Central Line


Central line still needed:  Yes


Urinary Cath still in place:  Yes


Reason Cath still needed:  skin wounds contaminated by urine





Assessment/Plan


Hospital Course


-Sepsis with shock, continue broad-spectrum antibiotics, IV fluids, pressors, 


ICU care.  Dr. Dreyer is following in infection disease consultation.


-Status post cardiac arrest.   Dr. Jon is following in cardiology 


consultation.


-Left-sided pneumothorax, status post left side chest tube placement.


-Acute respiratory failure, continue ventilatory support bronchodilators.  Dr. Lim is following in pulmonology consultation.


-Pneumoperitoneum most likely due to cardiac arrest.  Dr. Lai is following in 


general surgery consultation. 


-Left lower lobe pneumonia


-Severe emphysema


-NATALIE with possible chronic kidney disease. Dr Hanson is following in nephrology


consultation.


Result Diagram:  


1/26/19 0345                                                                    


           1/26/19 0345





Results 24hrs





Laboratory Tests


               Test
                                1/26/19
03:45


               White Blood Count                          3.4  #L


               Red Blood Count                            3.68  L


               Hemoglobin                                 10.3  L


               Hematocrit                                 33.1  L


               Mean Corpuscular Volume                     89.9


               Mean Corpuscular Hemoglobin                28.0  L


               Mean Corpuscular Hemoglobin
Concent       31.1  L



               Red Cell Distribution Width                16.0  H


               Platelet Count                              104  L


               Mean Platelet Volume                       10.8  H


               Immature Granulocytes %                    0.300


               Neutrophils %                              81.8  H


               Lymphocytes %                              13.5  L


               Monocytes %                                  4.4


               Eosinophils %                                0.0


               Basophils %                                  0.0


               Nucleated Red Blood Cells %                  0.0


               Immature Granulocytes #                    0.010


               Neutrophils #                                2.8


               Lymphocytes #                               0.5  L


               Monocytes #                                 0.2  L


               Eosinophils #                                0.0


               Basophils #                                  0.0


               Nucleated Red Blood Cells #                  0.0


               Sodium Level                                145  H


               Potassium Level                              3.6


               Chloride Level                               109


               Carbon Dioxide Level                          28


               Anion Gap                                      8


               Blood Urea Nitrogen                          36  H


               Creatinine                                  0.95


               Est Glomerular Filtrat Rate
mL/min   > 60  



               Glucose Level                                200


               Calcium Level                                8.8


               Phosphorus Level                             2.9


               Magnesium Level                              2.4


               Total Bilirubin                             0.0  L


               Direct Bilirubin                            0.00


               Indirect Bilirubin                           0.0


               Aspartate Amino Transf
(AST/SGOT)            16  



               Alanine Aminotransferase
(ALT/SGPT)          35  



               Alkaline Phosphatase                          46


               Total Protein                               5.4  L


               Albumin                                     2.7  L


               Globulin                                    2.70


               Albumin/Globulin Ratio                      1.00








Subjective


24 Hr Interval Summary


Free Text/Dictation


Patient awake, has no complaints





Exam/Review of Systems


Exam


Vitals





Vital Signs


  Date      Temp  Pulse  Resp  B/P (MAP)   Pulse Ox  O2          O2 Flow    FiO2


Time                                                 Delivery    Rate


   1/26/19           87    19                    95  Nasal             2.0


     10:17                                           Cannula


   1/26/19                         134/72


     09:30                           (92)


   1/26/19  98.0


     08:00


   1/24/19                                                                    50


     15:30








Intake and Output





1/25/19 1/25/19 1/26/19





1515:00


23:00


07:00





IntakeIntake Total


765 ml


1485 ml


1675 ml





OutputOutput Total


1550 ml


850 ml


1125 ml





BalanceBalance


-785 ml


635 ml


550 ml











Constitutional:  well developed


Head:  normocephalic, atraumatic


Neck:  supple


Respiratory:  diminished breath sounds


Cardiovascular:  regular rate and rhythm


Gastrointestinal:  soft, non-tender


Extremities:  normal pulses





Results


Results 24hrs





Laboratory Tests


               Test
                                1/26/19
03:45


               White Blood Count                          3.4  #L


               Red Blood Count                            3.68  L


               Hemoglobin                                 10.3  L


               Hematocrit                                 33.1  L


               Mean Corpuscular Volume                     89.9


               Mean Corpuscular Hemoglobin                28.0  L


               Mean Corpuscular Hemoglobin
Concent       31.1  L



               Red Cell Distribution Width                16.0  H


               Platelet Count                              104  L


               Mean Platelet Volume                       10.8  H


               Immature Granulocytes %                    0.300


               Neutrophils %                              81.8  H


               Lymphocytes %                              13.5  L


               Monocytes %                                  4.4


               Eosinophils %                                0.0


               Basophils %                                  0.0


               Nucleated Red Blood Cells %                  0.0


               Immature Granulocytes #                    0.010


               Neutrophils #                                2.8


               Lymphocytes #                               0.5  L


               Monocytes #                                 0.2  L


               Eosinophils #                                0.0


               Basophils #                                  0.0


               Nucleated Red Blood Cells #                  0.0


               Sodium Level                                145  H


               Potassium Level                              3.6


               Chloride Level                               109


               Carbon Dioxide Level                          28


               Anion Gap                                      8


               Blood Urea Nitrogen                          36  H


               Creatinine                                  0.95


               Est Glomerular Filtrat Rate
mL/min   > 60  



               Glucose Level                                200


               Calcium Level                                8.8


               Phosphorus Level                             2.9


               Magnesium Level                              2.4


               Total Bilirubin                             0.0  L


               Direct Bilirubin                            0.00


               Indirect Bilirubin                           0.0


               Aspartate Amino Transf
(AST/SGOT)            16  



               Alanine Aminotransferase
(ALT/SGPT)          35  



               Alkaline Phosphatase                          46


               Total Protein                               5.4  L


               Albumin                                     2.7  L


               Globulin                                    2.70


               Albumin/Globulin Ratio                      1.00








Medications


Medication





Current Medications


Ondansetron HCl (Zofran Inj) 4 mg Q6H  PRN IV NAUSEA AND/OR VOMITING;  Start 


1/21/19 at 23:30


Acetaminophen (Tylenol Supp) 650 mg Q4H  PRN [place] PAIN LEVEL 1-3 OR FEVER;  Start 


1/21/19 at 23:30


Morphine Sulfate (morphine) 2 mg Q4H  PRN IV PAIN LEVEL 7-10;  Start 1/21/19 at 


23:30


Enoxaparin Sodium (Lovenox) 30 mg DAILY SC  Last administered on 1/26/19at 


09:55; Admin Dose 30 MG;  Start 1/22/19 at 09:00


Norepinephrine 250 ml @  1.875 mls/ hr TITRATE IV  Last administered on 


1/22/19at 09:48; Admin Dose 37.5 MLS/HR;  Start 1/22/19 at 00:30


Dopamine HCl/ Dextrose 250 ml @  5.625 mls/ hr TITRATE IV  Last administered on 


1/22/19at 18:00; Admin Dose 16.875 MLS/HR;  Start 1/22/19 at 02:00


Aspirin (Aspirin) 81 mg DAILY PO  Last administered on 1/26/19at 09:55; Admin 


Dose 81 MG;  Start 1/24/19 at 09:00


Famotidine (Pepcid) 20 mg Q12 PO  Last administered on 1/26/19at 09:54; Admin 


Dose 20 MG;  Start 1/24/19 at 21:00


Meropenem/Sodium Chloride 50 ml @  100 mls/hr Q12 IVPB  Last administered on 


1/26/19at 09:54; Admin Dose 100 MLS/HR;  Start 1/24/19 at 21:00


Albuterol (Proventil 0.083% (Neb)) 2.5 mg Q6H RESP  THERAPY HHN  Last 


administered on 1/26/19at 10:14; Admin Dose 2.5 MG;  Start 1/24/19 at 21:52


Ipratropium Bromide (Atrovent 0.02% (Neb)) 0.5 mg Q6H RESP  THERAPY HHN  Last 


administered on 1/26/19at 10:14; Admin Dose 0.5 MG;  Start 1/24/19 at 21:53


Dextrose 1,000 ml @  50 mls/hr Q20H IV  Last administered on 1/25/19at 22:54; 


Admin Dose 75 MLS/HR;  Start 1/25/19 at 07:30


Methylprednisolone Sodium Succinate (Solu-Medrol) 40 mg Q12 IV  Last 


administered on 1/26/19at 09:54; Admin Dose 40 MG;  Start 1/25/19 at 21:00











MAURI GRIGSBY                   Jan 26, 2019 11:11

## 2019-01-26 NOTE — CONS
Assessment/Plan


Assessment/Plan


Assessment/Plan (Daily)


Hypotension resolved off pressors


Hypoxic respiratory failure


s/p cardiac arrest


pneumoperitoneum


renal failure


Leukocytosis


Anemia


Thrombocytopenia





Transfer out of ICU


Continue Antibiotics


Continue Nebs as scheduled


Continue GI and DVT Prophylaxis





Consultation Date/Type/Reason


Admit Date/Time


Jan 22, 2019 at 00:03


Date/Time of Note


DATE: 1/26/19 


TIME: 12:27


Constitutional:  no complaints


Eyes:  no complaints


Cardiovascular:  no complaints





Past Medical History


Medical History:  other (COPD, BPH, schizophrenia)


Home Meds


Reported Medications


Docusate Sodium* (Colace*) 100 Mg Capsule, 100 MG PO Q24H PRN for CONSTIPATION, 


#30 CAP


   1/21/19


Polyethylene Glycol* (Miralax*) 17 Gm Powd.pack, 17 GM PO DAILY PRN for AS 


NEEDED, #30 PACKET


   1/21/19


Acetaminophen* (Acetaminophen*) 325 Mg Tablet, 650 MG PO Q4H PRN for PAIN 1-


10/10, #30 TAB


   AND FEVER>101F


   1/21/19


Sennosides* (Senna Lax*) 8.6 Mg Tablet, 1 TAB PO AS NEEDED, TAB


   1/21/19


Mv,Minerals/Fa/Lycopene/Ginkgo (ONE DAILY MEN'S 50+ TABLET) 1 Each Tablet, 1 


EACH PO DAILY, TAB


   1/21/19


Divalproex Sodium* (Depakote*) 125 Mg Tablet.dr, 250 MG PO Q8H, #90 TAB


   1/21/19


Quetiapine Fumarate* (Seroquel*) 50 Mg Tablet, 50 MG PO Q8H, TAB


   1/21/19


Ipratropium-Albuterol (Ipratropium-Albuterol) 0.5-3 Mg/3 Ml Ampul.neb, 3 ML 


INHALATION Q4H PRN for WHEEZING AND SOB, #30 VIAL


   1/21/19


Budesonide-Formoterol Fumarate* (Symbicort*) 160-4.5 Hfa.aer.ad, 2 PUFF 


INHALATION BID, #1 EACH


   1/21/19


Medications





Current Medications


Ondansetron HCl (Zofran Inj) 4 mg Q6H  PRN IV NAUSEA AND/OR VOMITING;  Start 


1/21/19 at 23:30


Acetaminophen (Tylenol Supp) 650 mg Q4H  PRN [place] PAIN LEVEL 1-3 OR FEVER;  Start 


1/21/19 at 23:30


Morphine Sulfate (morphine) 2 mg Q4H  PRN IV PAIN LEVEL 7-10;  Start 1/21/19 at 


23:30


Enoxaparin Sodium (Lovenox) 30 mg DAILY SC  Last administered on 1/26/19at 


09:55; Admin Dose 30 MG;  Start 1/22/19 at 09:00


Dopamine HCl/ Dextrose 250 ml @  5.625 mls/ hr TITRATE IV  Last administered on 


1/22/19at 18:00; Admin Dose 16.875 MLS/HR;  Start 1/22/19 at 02:00


Aspirin (Aspirin) 81 mg DAILY PO  Last administered on 1/26/19at 09:55; Admin 


Dose 81 MG;  Start 1/24/19 at 09:00


Famotidine (Pepcid) 20 mg Q12 PO  Last administered on 1/26/19at 09:54; Admin 


Dose 20 MG;  Start 1/24/19 at 21:00


Meropenem/Sodium Chloride 50 ml @  100 mls/hr Q12 IVPB  Last administered on 


1/26/19at 09:54; Admin Dose 100 MLS/HR;  Start 1/24/19 at 21:00


Albuterol (Proventil 0.083% (Neb)) 2.5 mg Q6H RESP  THERAPY HHN  Last 


administered on 1/26/19at 10:14; Admin Dose 2.5 MG;  Start 1/24/19 at 21:52


Ipratropium Bromide (Atrovent 0.02% (Neb)) 0.5 mg Q6H RESP  THERAPY HHN  Last 


administered on 1/26/19at 10:14; Admin Dose 0.5 MG;  Start 1/24/19 at 21:53


Dextrose 1,000 ml @  50 mls/hr Q20H IV  Last administered on 1/25/19at 22:54; 


Admin Dose 75 MLS/HR;  Start 1/25/19 at 07:30


Methylprednisolone Sodium Succinate (Solu-Medrol) 40 mg Q12 IV  Last 


administered on 1/26/19at 09:54; Admin Dose 40 MG;  Start 1/25/19 at 21:00


Allergies:  


Coded Allergies:  


     No Known Allergy (Unverified , 1/21/19)





Social History


Smoking Status:  Unknown if ever smoked





Exam/Review of Systems


Exam


Vitals





Vital Signs


  Date      Temp  Pulse  Resp  B/P (MAP)   Pulse Ox  O2          O2 Flow    FiO2


Time                                                 Delivery    Rate


   1/26/19           65


     12:00


   1/26/19 19                    95  Nasal             2.0


     10:17                                           Cannula


   1/26/19                         134/72


     09:30                           (92)


   1/26/19  98.0


     08:00


   1/24/19                                                                    50


     15:30








Intake and Output





1/25/19 1/25/19 1/26/19





1515:00


23:00


07:00





IntakeIntake Total


765 ml


1485 ml


1675 ml





OutputOutput Total


1550 ml


850 ml


1125 ml





BalanceBalance


-785 ml


635 ml


550 ml











Constitutional:  alert


Neck:  supple, non-tender


Respiratory:  clear to auscultation


Cardiovascular:  regular rate and rhythm (no m/r/g)


Gastrointestinal:  soft, nl liver, spleen


Extremities:  normal pulses





Results


Result Diagram:  


1/26/19 0345                                                                    


           1/26/19 0345





Results 24hrs





Laboratory Tests


               Test
                                1/26/19
03:45


               White Blood Count                          3.4  #L


               Red Blood Count                            3.68  L


               Hemoglobin                                 10.3  L


               Hematocrit                                 33.1  L


               Mean Corpuscular Volume                     89.9


               Mean Corpuscular Hemoglobin                28.0  L


               Mean Corpuscular Hemoglobin
Concent       31.1  L



               Red Cell Distribution Width                16.0  H


               Platelet Count                              104  L


               Mean Platelet Volume                       10.8  H


               Immature Granulocytes %                    0.300


               Neutrophils %                              81.8  H


               Lymphocytes %                              13.5  L


               Monocytes %                                  4.4


               Eosinophils %                                0.0


               Basophils %                                  0.0


               Nucleated Red Blood Cells %                  0.0


               Immature Granulocytes #                    0.010


               Neutrophils #                                2.8


               Lymphocytes #                               0.5  L


               Monocytes #                                 0.2  L


               Eosinophils #                                0.0


               Basophils #                                  0.0


               Nucleated Red Blood Cells #                  0.0


               Sodium Level                                145  H


               Potassium Level                              3.6


               Chloride Level                               109


               Carbon Dioxide Level                          28


               Anion Gap                                      8


               Blood Urea Nitrogen                          36  H


               Creatinine                                  0.95


               Est Glomerular Filtrat Rate
mL/min   > 60  



               Glucose Level                                200


               Calcium Level                                8.8


               Phosphorus Level                             2.9


               Magnesium Level                              2.4


               Total Bilirubin                             0.0  L


               Direct Bilirubin                            0.00


               Indirect Bilirubin                           0.0


               Aspartate Amino Transf
(AST/SGOT)            16  



               Alanine Aminotransferase
(ALT/SGPT)          35  



               Alkaline Phosphatase                          46


               Total Protein                               5.4  L


               Albumin                                     2.7  L


               Globulin                                    2.70


               Albumin/Globulin Ratio                      1.00








Medications


Medication





Current Medications


Ondansetron HCl (Zofran Inj) 4 mg Q6H  PRN IV NAUSEA AND/OR VOMITING;  Start 


1/21/19 at 23:30


Acetaminophen (Tylenol Supp) 650 mg Q4H  PRN [place] PAIN LEVEL 1-3 OR FEVER;  Start 


1/21/19 at 23:30


Morphine Sulfate (morphine) 2 mg Q4H  PRN IV PAIN LEVEL 7-10;  Start 1/21/19 at 


23:30


Enoxaparin Sodium (Lovenox) 30 mg DAILY SC  Last administered on 1/26/19at 


09:55; Admin Dose 30 MG;  Start 1/22/19 at 09:00


Dopamine HCl/ Dextrose 250 ml @  5.625 mls/ hr TITRATE IV  Last administered on 


1/22/19at 18:00; Admin Dose 16.875 MLS/HR;  Start 1/22/19 at 02:00


Aspirin (Aspirin) 81 mg DAILY PO  Last administered on 1/26/19at 09:55; Admin 


Dose 81 MG;  Start 1/24/19 at 09:00


Famotidine (Pepcid) 20 mg Q12 PO  Last administered on 1/26/19at 09:54; Admin 


Dose 20 MG;  Start 1/24/19 at 21:00


Meropenem/Sodium Chloride 50 ml @  100 mls/hr Q12 IVPB  Last administered on 


1/26/19at 09:54; Admin Dose 100 MLS/HR;  Start 1/24/19 at 21:00


Albuterol (Proventil 0.083% (Neb)) 2.5 mg Q6H RESP  THERAPY HHN  Last 


administered on 1/26/19at 10:14; Admin Dose 2.5 MG;  Start 1/24/19 at 21:52


Ipratropium Bromide (Atrovent 0.02% (Neb)) 0.5 mg Q6H RESP  THERAPY HHN  Last 


administered on 1/26/19at 10:14; Admin Dose 0.5 MG;  Start 1/24/19 at 21:53


Dextrose 1,000 ml @  50 mls/hr Q20H IV  Last administered on 1/25/19at 22:54; 


Admin Dose 75 MLS/HR;  Start 1/25/19 at 07:30


Methylprednisolone Sodium Succinate (Solu-Medrol) 40 mg Q12 IV  Last 


administered on 1/26/19at 09:54; Admin Dose 40 MG;  Start 1/25/19 at 21:00











JAVAD PINTO M.D.            Jan 26, 2019 12:30

## 2019-01-26 NOTE — PN
DATE:  01/26/2019

 

 

SUBJECTIVE:  The patient is clinically stable, remains in intensive care unit.  The patient is tolera
ting p.o., remains on D5 water.  No other events noted.

 

OBJECTIVE:

VITAL SIGNS:  Blood pressure is 113/79, respirations 17, pulse 59, temperature 97.7.

HEENT:  Head is normocephalic.

NECK:  Supple.

HEART:  Regular rate.

LUNGS:  Show diminished breath sounds at the base.

ABDOMEN:  Soft, nontender to palpation without rebound or guarding.

EXTREMITIES:  Negative for clubbing, cyanosis, no edema.

DERMATOLOGIC:  No rashes.

MUSCULOSKELETAL:  No joint effusion.

NEUROLOGIC:  No change in exam.

 

MEDICATIONS:  Reviewed.

 

LABORATORY DATA:  Shows sodium 145, potassium 3.6, BUN 36, creatinine 0.95.  White count is 3.4, hemo
globin 10.3, platelet count is 104.

 

IMAGING STUDIES:  The patient's Doppler study shows a DVT on the left axillary and brachial veins.

 

ASSESSMENT AND PLAN:

1.  Nonoliguric acute kidney injury with unknown baseline creatinine.  Etiology was secondary to hemo
dynamics, possible tubular injury.  Renal function continues to improve.  Continue current treatment 
plan, supportive care, renally dose all medication.

2.  Hypernatremia.  The patient has a free water deficit of approximately 1 liter.  Continue to encou
rage free water intake.  Will continue D5 water, monitor sodium levels closely.

3.  Anemia.  Monitor hemoglobin and hematocrit levels.

4.  Mineral bone disorder, monitor calcium and phosphorus levels.

5.  History of chronic kidney disease.  The patient is currently in acute kidney injury as stated abo
ve.  Continue current treatment plan.  Continue disease factor modification.

6.  Respiratory failure.  Patient is status post extubation, currently stable on nasal cannula.

7.  Sepsis, status post shock.  Etiology secondary to pneumonia.  Patient completing antibiotic cours
e.

8.  Pneumothorax.  Continue to monitor.  Patient is status post chest tube removal.

9.  Pneumoperitoneum.

10.  Status post cardiac arrest.

11.  Encephalopathy, resolving.

 

 

Dictated By: MARYAN CHAMPAGNE DO

 

NR/NTS

DD:    01/26/2019 07:06:31

DT:    01/26/2019 08:04:50

Conf#: [id number]

DID#:  [id number]

CC: DONALDO CARRILLO MD;*EndCC* Patient presents for her routine OB visit. Patient would like communication of their results via:        Cell Phone:   Telephone Information:   Mobile [phone] to leave a message containing results? Yes  Patient's current [facility] status: Active.

## 2019-01-26 NOTE — CONS
Assessment/Plan


Assessment/Plan


Assessment/Plan (Daily)


Assessment and recommendations;





1.  Patient admitted with acute respiratory failure due to severe hypercapnia 


likely from underlying COPD exacerbation with marked overall interval 


improvement.  Status post self extubation with self removal of left side chest 


tube with stable clinical status.


2.  Underlying severe bullous emphysema.


3.  Right lower lobe pneumonia.


4.  Anemia and thrombocytopenia.


5.  Acute renal injury with continually improving renal function.





Continue current supportive care.  Further steroid taper in 24 hours.  Transfer 


to medical floor.





Consultation Date/Type/Reason


Admit Date/Time


Jan 22, 2019 at 00:03


Initial Consult Date


1/22/19


Type of Consult


Pulmonary/critical care


Patient is a 68-year-old male who is a nursing home resident was sent over to 


the hospital because of hypothermia and altered mental status.  In the emergency


room, patient developed cardiac arrest requiring CPR and intubation.  In the 


course of workup the patient also required a chest tube placement on the left 


side.  CT imaging of the abdomen showed pneumoperitoneum with possibly 


perforated viscus.  By the time I saw the patient in ICU, patient is orally 


intubated and sedated.  History was obtained from medical records.





Past medical history; essentially unremarkable except for possibly COPD.


Medications; reviewed.  Patient is currently on dopamine at 6 mics per kilogram 


per minute, Levophed 20 mics per minute, propofol 25 mics per kilogram per 


minute.  Other medications were also reviewed.


Allergies; none.


Social history; not available.


Family history and occupational history is also not available.


Review of system; unable to be obtained.


General exam; elderly male, orally intubated, sedated, currently in no distress.


Requesting Provider:  DONALDO CARRILLO MD


Date/Time of Note


DATE: 1/26/19 


TIME: 09:02





24 HR Interval Summary


Free Text/Dictation


Patient's condition is stable.  Remains completely awake and alert.  Denies any 


shortness of breath, coughing, wheezing.  Chest pain.


General exam; elderly male, awake alert, currently no distress.





Exam/Review of Systems


Exam


Vitals





Vital Signs


  Date      Temp  Pulse  Resp  B/P (MAP)   Pulse Ox  O2          O2 Flow    FiO2


Time                                                 Delivery    Rate


   1/26/19           54


     08:00


   1/26/19                 17      122/76        92  Mechanical


     07:00                           (91)            Ventilator


   1/26/19  97.7


     04:00


   1/26/19                                                             3.0


     02:31


   1/24/19                                                                    50


     15:30








Intake and Output





1/25/19 1/25/19 1/26/19





1414:59


22:59


06:59





IntakeIntake Total


690 ml


1485 ml


1650 ml





OutputOutput Total


1600 ml


900 ml


1075 ml





BalanceBalance


-910 ml


585 ml


575 ml











Exam


HEENT exam; supple neck, no JVD.  No lymphadenopathy.  Midline trachea.  No 


thyromegaly.  There is a right corneal opacity.


Chest exam; diminished but clear breath sounds.  S1-S2 audible, no murmurs.  


Regular rhythm.


Abdomen exam; soft, nontender.  Nondistended.  No organomegaly.  Bowel sounds 


audible.


Extremity exam; no peripheral edema or clubbing.


CNS exam; no focal deficit.





Results


Result Diagram:  


1/26/19 0345                                                                    


           1/26/19 0345





Results 24hrs





Laboratory Tests


               Test
                                1/26/19
03:45


               White Blood Count                          3.4  #L


               Red Blood Count                            3.68  L


               Hemoglobin                                 10.3  L


               Hematocrit                                 33.1  L


               Mean Corpuscular Volume                     89.9


               Mean Corpuscular Hemoglobin                28.0  L


               Mean Corpuscular Hemoglobin
Concent       31.1  L



               Red Cell Distribution Width                16.0  H


               Platelet Count                              104  L


               Mean Platelet Volume                       10.8  H


               Immature Granulocytes %                    0.300


               Neutrophils %                              81.8  H


               Lymphocytes %                              13.5  L


               Monocytes %                                  4.4


               Eosinophils %                                0.0


               Basophils %                                  0.0


               Nucleated Red Blood Cells %                  0.0


               Immature Granulocytes #                    0.010


               Neutrophils #                                2.8


               Lymphocytes #                               0.5  L


               Monocytes #                                 0.2  L


               Eosinophils #                                0.0


               Basophils #                                  0.0


               Nucleated Red Blood Cells #                  0.0


               Sodium Level                                145  H


               Potassium Level                              3.6


               Chloride Level                               109


               Carbon Dioxide Level                          28


               Anion Gap                                      8


               Blood Urea Nitrogen                          36  H


               Creatinine                                  0.95


               Est Glomerular Filtrat Rate
mL/min   > 60  



               Glucose Level                                200


               Calcium Level                                8.8


               Phosphorus Level                             2.9


               Magnesium Level                              2.4


               Total Bilirubin                             0.0  L


               Direct Bilirubin                            0.00


               Indirect Bilirubin                           0.0


               Aspartate Amino Transf
(AST/SGOT)            16  



               Alanine Aminotransferase
(ALT/SGPT)          35  



               Alkaline Phosphatase                          46


               Total Protein                               5.4  L


               Albumin                                     2.7  L


               Globulin                                    2.70


               Albumin/Globulin Ratio                      1.00








Medications


Medication





Current Medications


Ondansetron HCl (Zofran Inj) 4 mg Q6H  PRN IV NAUSEA AND/OR VOMITING;  Start 


1/21/19 at 23:30


Acetaminophen (Tylenol Supp) 650 mg Q4H  PRN [place] PAIN LEVEL 1-3 OR FEVER;  Start 


1/21/19 at 23:30


Morphine Sulfate (morphine) 2 mg Q4H  PRN IV PAIN LEVEL 7-10;  Start 1/21/19 at 


23:30


Enoxaparin Sodium (Lovenox) 30 mg DAILY SC  Last administered on 1/25/19at 


09:29; Admin Dose 30 MG;  Start 1/22/19 at 09:00


Norepinephrine 250 ml @  1.875 mls/ hr TITRATE IV  Last administered on 


1/22/19at 09:48; Admin Dose 37.5 MLS/HR;  Start 1/22/19 at 00:30


Dopamine HCl/ Dextrose 250 ml @  5.625 mls/ hr TITRATE IV  Last administered on 


1/22/19at 18:00; Admin Dose 16.875 MLS/HR;  Start 1/22/19 at 02:00


Aspirin (Aspirin) 81 mg DAILY PO  Last administered on 1/24/19at 08:31; Admin 


Dose 81 MG;  Start 1/24/19 at 09:00


Famotidine (Pepcid) 20 mg Q12 PO  Last administered on 1/25/19at 20:58; Admin 


Dose 20 MG;  Start 1/24/19 at 21:00


Meropenem/Sodium Chloride 50 ml @  100 mls/hr Q12 IVPB  Last administered on 


1/25/19at 20:59; Admin Dose 100 MLS/HR;  Start 1/24/19 at 21:00


Albuterol (Proventil 0.083% (Neb)) 2.5 mg Q6H RESP  THERAPY HHN  Last 


administered on 1/26/19at 02:21; Admin Dose 2.5 MG;  Start 1/24/19 at 21:52


Ipratropium Bromide (Atrovent 0.02% (Neb)) 0.5 mg Q6H RESP  THERAPY HHN  Last 


administered on 1/26/19at 02:21; Admin Dose 0.5 MG;  Start 1/24/19 at 21:53


Dextrose 1,000 ml @  50 mls/hr Q20H IV  Last administered on 1/25/19at 22:54; 


Admin Dose 75 MLS/HR;  Start 1/25/19 at 07:30


Methylprednisolone Sodium Succinate (Solu-Medrol) 40 mg Q12 IV  Last 


administered on 1/25/19at 20:58; Admin Dose 40 MG;  Start 1/25/19 at 21:00











SHAGUFTA HUANG                    Jan 26, 2019 09:04

## 2019-01-27 VITALS — HEART RATE: 60 BPM | RESPIRATION RATE: 18 BRPM | DIASTOLIC BLOOD PRESSURE: 80 MMHG | SYSTOLIC BLOOD PRESSURE: 135 MMHG

## 2019-01-27 VITALS — HEART RATE: 59 BPM | DIASTOLIC BLOOD PRESSURE: 77 MMHG | RESPIRATION RATE: 18 BRPM | SYSTOLIC BLOOD PRESSURE: 140 MMHG

## 2019-01-27 VITALS — HEART RATE: 76 BPM | DIASTOLIC BLOOD PRESSURE: 78 MMHG | SYSTOLIC BLOOD PRESSURE: 133 MMHG | RESPIRATION RATE: 18 BRPM

## 2019-01-27 VITALS — DIASTOLIC BLOOD PRESSURE: 89 MMHG | HEART RATE: 65 BPM | SYSTOLIC BLOOD PRESSURE: 153 MMHG | RESPIRATION RATE: 19 BRPM

## 2019-01-27 LAB
ANION GAP: 7 (ref 5–13)
BLOOD UREA NITROGEN: 31 MG/DL (ref 7–20)
CALCIUM: 8.9 MG/DL (ref 8.4–10.2)
CARBON DIOXIDE: 30 MMOL/L (ref 21–31)
CHLORIDE: 108 MMOL/L (ref 97–110)
CREATININE: 0.75 MG/DL (ref 0.61–1.24)
GLUCOSE: 133 MG/DL (ref 70–220)
MAGNESIUM: 2.3 MG/DL (ref 1.7–2.5)
PHOSPHORUS: 2.8 MG/DL (ref 2.5–4.9)
POTASSIUM: 4 MMOL/L (ref 3.5–5.1)
SODIUM: 145 MMOL/L (ref 135–144)

## 2019-01-27 RX ADMIN — ZOLPIDEM TARTRATE PRN MG: 5 TABLET, FILM COATED ORAL at 21:42

## 2019-01-27 RX ADMIN — ASPIRIN 81 MG CHEWABLE TABLET 1 MG: 81 TABLET CHEWABLE at 08:10

## 2019-01-27 RX ADMIN — METHYLPREDNISOLONE SODIUM SUCCINATE 1 MG: 40 INJECTION, POWDER, FOR SOLUTION INTRAMUSCULAR; INTRAVENOUS at 20:09

## 2019-01-27 RX ADMIN — MEROPENEM 1 MLS/HR: 1 INJECTION, POWDER, FOR SOLUTION INTRAVENOUS at 08:10

## 2019-01-27 RX ADMIN — AMLODIPINE BESYLATE SCH MG: 10 TABLET ORAL at 15:16

## 2019-01-27 RX ADMIN — FAMOTIDINE SCH MG: 20 TABLET ORAL at 08:10

## 2019-01-27 RX ADMIN — IPRATROPIUM BROMIDE 1 MG: 0.5 SOLUTION RESPIRATORY (INHALATION) at 02:27

## 2019-01-27 RX ADMIN — ZOLPIDEM TARTRATE PRN MG: 5 TABLET, FILM COATED ORAL at 00:08

## 2019-01-27 RX ADMIN — IPRATROPIUM BROMIDE 1 MG: 0.5 SOLUTION RESPIRATORY (INHALATION) at 07:30

## 2019-01-27 RX ADMIN — ASPIRIN 81 MG SCH MG: 81 TABLET ORAL at 08:10

## 2019-01-27 RX ADMIN — ALBUTEROL SULFATE 1 MG: 2.5 SOLUTION RESPIRATORY (INHALATION) at 19:19

## 2019-01-27 RX ADMIN — ZOLPIDEM TARTRATE 1 MG: 5 TABLET, FILM COATED ORAL at 21:42

## 2019-01-27 RX ADMIN — FAMOTIDINE 1 MG: 20 TABLET ORAL at 20:09

## 2019-01-27 RX ADMIN — ENOXAPARIN SODIUM 1 MG: 100 INJECTION SUBCUTANEOUS at 08:19

## 2019-01-27 RX ADMIN — ENOXAPARIN SODIUM SCH MG: 100 INJECTION SUBCUTANEOUS at 08:19

## 2019-01-27 RX ADMIN — METHYLPREDNISOLONE SODIUM SUCCINATE 1 MG: 40 INJECTION, POWDER, FOR SOLUTION INTRAMUSCULAR; INTRAVENOUS at 08:09

## 2019-01-27 RX ADMIN — IPRATROPIUM BROMIDE 1 MG: 0.5 SOLUTION RESPIRATORY (INHALATION) at 19:19

## 2019-01-27 RX ADMIN — ALBUTEROL SULFATE 1 MG: 2.5 SOLUTION RESPIRATORY (INHALATION) at 02:27

## 2019-01-27 RX ADMIN — FAMOTIDINE SCH MG: 20 TABLET ORAL at 20:09

## 2019-01-27 RX ADMIN — ALBUTEROL SULFATE 1 MG: 2.5 SOLUTION RESPIRATORY (INHALATION) at 13:27

## 2019-01-27 RX ADMIN — FAMOTIDINE 1 MG: 20 TABLET ORAL at 08:10

## 2019-01-27 RX ADMIN — MEROPENEM SCH MLS/HR: 1 INJECTION, POWDER, FOR SOLUTION INTRAVENOUS at 08:10

## 2019-01-27 RX ADMIN — GUAIFENESIN AND DEXTROMETHORPHAN 1 ML: 100; 10 SYRUP ORAL at 20:09

## 2019-01-27 RX ADMIN — IPRATROPIUM BROMIDE 1 MG: 0.5 SOLUTION RESPIRATORY (INHALATION) at 13:27

## 2019-01-27 RX ADMIN — ZOLPIDEM TARTRATE 1 MG: 5 TABLET, FILM COATED ORAL at 00:08

## 2019-01-27 RX ADMIN — ALBUTEROL SULFATE 1 MG: 2.5 SOLUTION RESPIRATORY (INHALATION) at 07:29

## 2019-01-27 RX ADMIN — AMLODIPINE BESYLATE 1 MG: 10 TABLET ORAL at 15:16

## 2019-01-27 NOTE — CONS
Consultation Date/Type/Reason


Admit Date/Time


Jan 22, 2019 at 00:03


Initial Consult Date


SUBJECTIVE: Patient is awake,afebrile, looks comfortable. 





VS: stable T: 97.8


LABS: reviewed. 





CXR today:


IMPRESSION:


No alveolar pneumonia. Increased interstitial markings with confluence left 


perihilar mid lung and right lung base unchanged. Right pleural effusion.


 No significant change





Microbiology: Blood cultures negative MRSA swab negative urine culture growing 


staph species


                                                                                


        


  GRAM STAIN  Final  


        POLYMORPH. LEUKOCYTE        1+


        EPITHELIAL CELLS            RARE


        GRAM POS COCCI IN PAIRS     RARE





  RESPIRATORY CULTURE  Final  


     Organism 1                     JEROD ALBICANS


        QUANTITY                    RARE





RINE CULTURE  Final  


     Organism 1                     COAGULASE NEGATIVE STAPH


        COLONY COUNT                <10,000 CFU/ml





                                    COAG  NEG        


                                    M.I.C.    RX     


                                    --------- ---    


     CEFAZOLIN                                 R     


     CIPROFLOXACIN                  4          R     


     DOXYCYCLINE                               S     


     LEVOFLOXACIN                   4          R     


     NITROFURANTOIN                 <=16       S     


     OXACILLIN                      >=4        R     


     PENICILLIN-G                   >=0.5      R     


     RIFAMPIN                       <=0.5      S     


     VANCOMYCIN                     2          S     


     TRIMETHOPRIM/SULFAMETHOXAZOLE  80         R     





Indwelling: Right IJ triple-lumen catheter endotracheal tube NG tube left chest 


tube, Blake catheter





Antimicrobials: Merrem





Physical examination: 


GEN: Well-developed chronically ill-appearing fragile elderly man who is 


extubated now, in no distress.  


HENT: Head atraumatic normocephalic, sclera nonicteric vehicle mucosa dry, neck 


is supple


PULM:  chest rise symmetrical, breath sounds diminished bases.  


Heart: S1-S2.  


Abdomen soft bowel sounds present.  


Extremities cyanotic





Assessment:


1.  Septic shock


2.  Bradycardia 


3.  Acute hypoxemic respiratory failure with questionable aspiration


4.  Pneumoperitoneum of unclear etiology, no perforation per surgical note


5.  Pancytopenia


5.  Anemia


6.  Non-ST elevation MI


7.  Status post cardiac arrest





Plan: Stable status post self extubation.  Will DC Merrem at this time and 


monitor off of antbx. Aspiration precautions. Pulm recommendations noted.


Requesting Provider:  DONALDO CARRILLO MD


Date/Time of Note


DATE: 1/27/19 


TIME: 12:54





Exam/Review of Systems


Exam


Vitals





Vital Signs


  Date      Temp  Pulse  Resp  B/P (MAP)   Pulse Ox  O2          O2 Flow    FiO2


Time                                                 Delivery    Rate


   1/27/19  97.8     59    18      140/77        99


     08:05                           (98)


   1/27/19                                           Nasal             3.0


     08:00                                           Cannula


   1/24/19                                                                    50


     15:30








Intake and Output





1/26/19 1/26/19 1/27/19





1414:59


22:59


06:59





IntakeIntake Total


920 ml


950 ml


840 ml





OutputOutput Total


1030 ml


3450 ml





BalanceBalance


-110 ml


950 ml


-2610 ml














Results


Result Diagram:  


1/26/19 0345                                                                    


           1/27/19 0444





Results 24hrs





Laboratory Tests


               Test
                               1/27/19
04:44


               Sodium Level                               145  H


               Potassium Level                             4.0


               Chloride Level                              108


               Carbon Dioxide Level                         30


               Anion Gap                                     7


               Blood Urea Nitrogen                         31  H


               Creatinine                                 0.75


               Est Glomerular Filtrat Rate
mL/min  > 60  



               Glucose Level                              133  #


               Calcium Level                               8.9


               Phosphorus Level                            2.8


               Magnesium Level                             2.3








Medications


Medication





Current Medications


Ondansetron HCl (Zofran Inj) 4 mg Q6H  PRN IV NAUSEA AND/OR VOMITING;  Start 


1/21/19 at 23:30


Acetaminophen (Tylenol Supp) 650 mg Q4H  PRN [place] PAIN LEVEL 1-3 OR FEVER;  Start 


1/21/19 at 23:30


Morphine Sulfate (morphine) 2 mg Q4H  PRN IV PAIN LEVEL 7-10;  Start 1/21/19 at 


23:30


Enoxaparin Sodium (Lovenox) 30 mg DAILY SC  Last administered on 1/27/19at 


08:19; Admin Dose 30 MG;  Start 1/22/19 at 09:00


Dopamine HCl/ Dextrose 250 ml @  5.625 mls/ hr TITRATE IV  Last administered on 


1/22/19at 18:00; Admin Dose 16.875 MLS/HR;  Start 1/22/19 at 02:00


Aspirin (Aspirin) 81 mg DAILY PO  Last administered on 1/27/19at 08:10; Admin 


Dose 81 MG;  Start 1/24/19 at 09:00


Famotidine (Pepcid) 20 mg Q12 PO  Last administered on 1/27/19at 08:10; Admin 


Dose 20 MG;  Start 1/24/19 at 21:00


Meropenem/Sodium Chloride 50 ml @  100 mls/hr Q12 IVPB  Last administered on 


1/27/19at 08:10; Admin Dose 100 MLS/HR;  Start 1/24/19 at 21:00


Albuterol (Proventil 0.083% (Neb)) 2.5 mg Q6H RESP  THERAPY HHN  Last 


administered on 1/27/19at 07:29; Admin Dose 2.5 MG;  Start 1/24/19 at 21:52


Ipratropium Bromide (Atrovent 0.02% (Neb)) 0.5 mg Q6H RESP  THERAPY HHN  Last 


administered on 1/27/19at 07:30; Admin Dose 0.5 MG;  Start 1/24/19 at 21:53


Methylprednisolone Sodium Succinate (Solu-Medrol) 40 mg Q12 IV  Last 


administered on 1/27/19at 08:09; Admin Dose 40 MG;  Start 1/25/19 at 21:00


Zolpidem Tartrate (Ambien) 5 mg HS  PRN PO INSOMNIA Last administered on 


1/27/19at 00:08; Admin Dose 5 MG;  Start 1/27/19 at 00:00











MARY ORTIZ              Jan 27, 2019 12:57

## 2019-01-27 NOTE — PN
Date/Time of Note


Date/Time of Note


DATE: 1/27/19 


TIME: 13:40





Assessment/Plan


VTE Prophylaxis


Risk score (from [facility])>0 risk:  11


SCD applied (from Ns):  Yes


Pharmacological prophylaxis:  LMWH





Lines/Catheters


IV Catheter Type (from [facility]):  Central Line


Central line still needed:  Yes


Urinary Cath still in place:  Yes


Reason Cath still needed:  skin wounds contaminated by urine





Assessment/Plan


Hospital Course


-Sepsis with shock, continue broad-spectrum antibiotics, IV fluids, pressors, 


ICU care.  Dr. Dreyer is following in infection disease consultation.


-Status post cardiac arrest.   Dr. Jon is following in cardiology 


consultation.


-Left-sided pneumothorax, status post left side chest tube placement.


-Acute respiratory failure, continue ventilatory support bronchodilators.  Dr. Lim is following in pulmonology consultation.


-Pneumoperitoneum most likely due to cardiac arrest.  Dr. Lai is following in 


general surgery consultation. 


-Left lower lobe pneumonia


-Severe emphysema


-NATALIE with possible chronic kidney disease. Dr Hanson is following in nephrology


consultation.


Result Diagram:  


1/26/19 0345                                                                    


           1/27/19 0444





Results 24hrs





Laboratory Tests


               Test
                               1/27/19
04:44


               Sodium Level                               145  H


               Potassium Level                             4.0


               Chloride Level                              108


               Carbon Dioxide Level                         30


               Anion Gap                                     7


               Blood Urea Nitrogen                         31  H


               Creatinine                                 0.75


               Est Glomerular Filtrat Rate
mL/min  > 60  



               Glucose Level                              133  #


               Calcium Level                               8.9


               Phosphorus Level                            2.8


               Magnesium Level                             2.3








Subjective


24 Hr Interval Summary


Free Text/Dictation


Patient doing well, has no complaints





Exam/Review of Systems


Exam


Vitals





Vital Signs


  Date      Temp  Pulse  Resp  B/P (MAP)   Pulse Ox  O2          O2 Flow    FiO2


Time                                                 Delivery    Rate


   1/27/19                                       98                    2.0


     13:27


   1/27/19           62    20                        Nasal


     13:27                                           Cannula


   1/27/19  97.8                   140/77


     08:05                           (98)


   1/24/19                                                                    50


     15:30








Intake and Output





1/26/19 1/26/19 1/27/19





1515:00


23:00


07:00





IntakeIntake Total


745 ml


950 ml


840 ml





OutputOutput Total


880 ml


3450 ml





BalanceBalance


-135 ml


950 ml


-2610 ml











Constitutional:  well developed


Head:  normocephalic, atraumatic


Neck:  supple


Respiratory:  diminished breath sounds


Cardiovascular:  regular rate and rhythm


Gastrointestinal:  soft, non-tender


Extremities:  normal pulses





Results


Results 24hrs





Laboratory Tests


               Test
                               1/27/19
04:44


               Sodium Level                               145  H


               Potassium Level                             4.0


               Chloride Level                              108


               Carbon Dioxide Level                         30


               Anion Gap                                     7


               Blood Urea Nitrogen                         31  H


               Creatinine                                 0.75


               Est Glomerular Filtrat Rate
mL/min  > 60  



               Glucose Level                              133  #


               Calcium Level                               8.9


               Phosphorus Level                            2.8


               Magnesium Level                             2.3








Medications


Medication





Current Medications


Ondansetron HCl (Zofran Inj) 4 mg Q6H  PRN IV NAUSEA AND/OR VOMITING;  Start 


1/21/19 at 23:30


Acetaminophen (Tylenol Supp) 650 mg Q4H  PRN [place] PAIN LEVEL 1-3 OR FEVER;  Start 


1/21/19 at 23:30


Morphine Sulfate (morphine) 2 mg Q4H  PRN IV PAIN LEVEL 7-10;  Start 1/21/19 at 


23:30


Enoxaparin Sodium (Lovenox) 30 mg DAILY SC  Last administered on 1/27/19at 08:


19; Admin Dose 30 MG;  Start 1/22/19 at 09:00


Dopamine HCl/ Dextrose 250 ml @  5.625 mls/ hr TITRATE IV  Last administered on 


1/22/19at 18:00; Admin Dose 16.875 MLS/HR;  Start 1/22/19 at 02:00


Aspirin (Aspirin) 81 mg DAILY PO  Last administered on 1/27/19at 08:10; Admin 


Dose 81 MG;  Start 1/24/19 at 09:00


Famotidine (Pepcid) 20 mg Q12 PO  Last administered on 1/27/19at 08:10; Admin 


Dose 20 MG;  Start 1/24/19 at 21:00


Albuterol (Proventil 0.083% (Neb)) 2.5 mg Q6H RESP  THERAPY HHN  Last administe


red on 1/27/19at 13:27; Admin Dose 2.5 MG;  Start 1/24/19 at 21:52


Ipratropium Bromide (Atrovent 0.02% (Neb)) 0.5 mg Q6H RESP  THERAPY HHN  Last 


administered on 1/27/19at 13:27; Admin Dose 0.5 MG;  Start 1/24/19 at 21:53


Methylprednisolone Sodium Succinate (Solu-Medrol) 40 mg Q12 IV  Last administer


ed on 1/27/19at 08:09; Admin Dose 40 MG;  Start 1/25/19 at 21:00


Zolpidem Tartrate (Ambien) 5 mg HS  PRN PO INSOMNIA Last administered on 


1/27/19at 00:08; Admin Dose 5 MG;  Start 1/27/19 at 00:00











MAURI GRIGSBY                   Jan 27, 2019 13:41

## 2019-01-27 NOTE — CONS
Assessment/Plan


Assessment/Plan


Assessment/Plan (Daily)


Assessment recommendations;





1.  Patient admitted with cardiac arrest status post CPR with marked overall 


clinical improvement.


2.  Pneumoperitoneum of unknown etiology.  Patient has not required any 


intervention, with marked overall clinical improvement.  Abdomen exam is totally


benign.


3.  Status post self removal of left-sided chest tube.


4.  Advanced emphysema.


5.  Possibly some element of pneumonia with significant clinical improvement as 


well.





Consider stopping antibiotics.. Consider discharge.





Consultation Date/Type/Reason


Admit Date/Time


Jan 22, 2019 at 00:03


Initial Consult Date


1/22/19


Type of Consult


Pulmonary/critical care


Patient is a 68-year-old male who is a nursing home resident was sent over to 


the hospital because of hypothermia and altered mental status.  In the emergency


room, patient developed cardiac arrest requiring CPR and intubation.  In the 


course of workup the patient also required a chest tube placement on the left 


side.  CT imaging of the abdomen showed pneumoperitoneum with possibly 


perforated viscus.  By the time I saw the patient in ICU, patient is orally 


intubated and sedated.  History was obtained from medical records.





Past medical history; essentially unremarkable except for possibly COPD.


Medications; reviewed.  Patient is currently on dopamine at 6 mics per kilogram 


per minute, Levophed 20 mics per minute, propofol 25 mics per kilogram per 


minute.  Other medications were also reviewed.


Allergies; none.


Social history; not available.


Family history and occupational history is also not available.


Review of system; unable to be obtained.


General exam; elderly male, orally intubated, sedated, currently in no distress.


Requesting Provider:  DONALDO CARRILLO MD


Date/Time of Note


DATE: 1/27/19 


TIME: 10:39





24 HR Interval Summary


Free Text/Dictation


Patient's condition is stable.  Has been transferred out of ICU to medical 


floor.  Patient denies any shortness of breath at rest.  Any coughing or 


wheezing.  


General exam; elderly male, awake alert, currently no distress.  Laying comf


ortably in bed.





Exam/Review of Systems


Exam


Vitals





Vital Signs


  Date      Temp  Pulse  Resp  B/P (MAP)   Pulse Ox  O2          O2 Flow    FiO2


Time                                                 Delivery    Rate


   1/27/19  97.8     59    18      140/77        99


     08:05                           (98)


   1/27/19                                           Nasal             3.0


     08:00                                           Cannula


   1/24/19                                                                    50


     15:30








Intake and Output





1/26/19 1/26/19 1/27/19





1515:00


23:00


07:00





IntakeIntake Total


745 ml


950 ml


840 ml





OutputOutput Total


880 ml


3450 ml





BalanceBalance


-135 ml


950 ml


-2610 ml











Exam


HEENT exam; supple neck, no JVD.  No lymphadenopathy.  Midline trachea.  No 


thyromegaly.  No neck masses.


Chest exam; diminished but clear breath sounds.  S1-S2 audible, no murmurs.  


Regular rhythm.


Abdomen exam; soft, no organomegaly.  Bowel sounds audible.


Extremity exam; no peripheral edema clubbing.


CNS exam; no focal deficit.





Results


Result Diagram:  


1/26/19 0345                                                                    


           1/27/19 0444





Results 24hrs





Laboratory Tests


               Test
                               1/27/19
04:44


               Sodium Level                               145  H


               Potassium Level                             4.0


               Chloride Level                              108


               Carbon Dioxide Level                         30


               Anion Gap                                     7


               Blood Urea Nitrogen                         31  H


               Creatinine                                 0.75


               Est Glomerular Filtrat Rate
mL/min  > 60  



               Glucose Level                              133  #


               Calcium Level                               8.9


               Phosphorus Level                            2.8


               Magnesium Level                             2.3








Medications


Medication





Current Medications


Ondansetron HCl (Zofran Inj) 4 mg Q6H  PRN IV NAUSEA AND/OR VOMITING;  Start 


1/21/19 at 23:30


Acetaminophen (Tylenol Supp) 650 mg Q4H  PRN AL PAIN LEVEL 1-3 OR FEVER;  Start 


1/21/19 at 23:30


Morphine Sulfate (morphine) 2 mg Q4H  PRN IV PAIN LEVEL 7-10;  Start 1/21/19 at 


23:30


Enoxaparin Sodium (Lovenox) 30 mg DAILY SC  Last administered on 1/27/19at 08:


19; Admin Dose 30 MG;  Start 1/22/19 at 09:00


Dopamine HCl/ Dextrose 250 ml @  5.625 mls/ hr TITRATE IV  Last administered on 


1/22/19at 18:00; Admin Dose 16.875 MLS/HR;  Start 1/22/19 at 02:00


Aspirin (Aspirin) 81 mg DAILY PO  Last administered on 1/27/19at 08:10; Admin 


Dose 81 MG;  Start 1/24/19 at 09:00


Famotidine (Pepcid) 20 mg Q12 PO  Last administered on 1/27/19at 08:10; Admin 


Dose 20 MG;  Start 1/24/19 at 21:00


Meropenem/Sodium Chloride 50 ml @  100 mls/hr Q12 IVPB  Last administered on 1/ 27/19at 08:10; Admin Dose 100 MLS/HR;  Start 1/24/19 at 21:00


Albuterol (Proventil 0.083% (Neb)) 2.5 mg Q6H RESP  THERAPY HHN  Last 


administered on 1/27/19at 07:29; Admin Dose 2.5 MG;  Start 1/24/19 at 21:52


Ipratropium Bromide (Atrovent 0.02% (Neb)) 0.5 mg Q6H RESP  THERAPY HHN  Last 


administered on 1/27/19at 07:30; Admin Dose 0.5 MG;  Start 1/24/19 at 21:53


Methylprednisolone Sodium Succinate (Solu-Medrol) 40 mg Q12 IV  Last 


administered on 1/27/19at 08:09; Admin Dose 40 MG;  Start 1/25/19 at 21:00


Zolpidem Tartrate (Ambien) 5 mg HS  PRN PO INSOMNIA Last administered on 


1/27/19at 00:08; Admin Dose 5 MG;  Start 1/27/19 at 00:00











SHAGUFTA HUANG                    Jan 27, 2019 10:41

## 2019-01-27 NOTE — PN
DATE:  01/27/2019

 

 

SUBJECTIVE:  The patient is stable, no events overnight.  No fevers, chills, nausea, vomiting.

 

OBJECTIVE:

VITAL SIGNS:  Blood pressure is 135/80, respiration 18, pulse 60, temperature 97.5.

HEENT:  Head is normocephalic.

NECK:  Supple.

HEART:  Regular rate.

LUNGS:  Show diminished breath sounds at the base.

ABDOMEN:  Soft, nontender to palpation without rebound or guarding.

EXTREMITIES:  Negative for clubbing, cyanosis, no edema.

DERMATOLOGIC:  No rashes.

MUSCULOSKELETAL:  No joint effusions.

NEUROLOGIC:  No change in exam.

 

MEDICATIONS:  The patient's medications have been reviewed.

 

LABORATORY DATA:  Shows sodium 145, BUN 31, creatinine 0.75.  White count 3.4, hemoglobin 10.3, plate
let count is 104.

 

ASSESSMENT AND PLAN:

1.  Nonoliguric acute kidney injury with previously unknown baseline creatinine.  Etiology of acute k
idney injury is secondary to hemodynamics.  Patient's renal function has improved with supportive car
e.  At this point, continue current treatment plan, supportive care, renally dose all meds.

2.  Hypernatremia.  The patient's sodium levels are improving.  Continue to encourage free water inta
ke.  The patient's D5 water has been discontinued.

3.  Anemia.  Monitor hemoglobin and hematocrit levels.

4.  Mineral bone disorder.  Monitor calcium and phosphorus levels.

5.  Respiratory failure.  The patient is status post extubation, currently stable on nasal cannula.  
Continue to monitor.

6.  Sepsis, status post shock.  Etiology secondary to pneumonia.  The patient is completing antibioti
c course.

7.  Status post pneumothorax.

8.  Pneumoperitoneum.  Continue to monitor.

9.  Status post cardiac arrest.

10.  Encephalopathy, resolving.

 

 

Dictated By: MARYAN CHAMPAGNE DO

 

NR/NTS

DD:    01/27/2019 07:56:55

DT:    01/27/2019 11:15:25

Conf#: [id number]

DID#:  [id number]

CC: MAURI GRIGSBY MD; MIMI ZAVALA MD; DONALDO CARRILLO MD;*EndCC*

## 2019-01-27 NOTE — CONS
Assessment/Plan


Assessment/Plan


Assessment/Plan (Daily)





Hypoxic respiratory failure


s/p cardiac arrest


Pneumoperitoneum


renal failure


Anemia


Thrombocytopenia





Hypertensive





Started Norvasc


Continue Antibiotics


Continue Nebs as scheduled


Continue GI and DVT Prophylaxis





Consultation Date/Type/Reason


Admit Date/Time


Jan 22, 2019 at 00:03


Initial Consult Date


1/22/19


Type of Consult


Cardiology


Requesting Provider:  DONALDO CARRILLO MD


Date/Time of Note


DATE: 1/27/19 


TIME: 14:55





Exam/Review of Systems


Vital Signs


Vitals





Vital Signs


  Date      Temp  Pulse  Resp  B/P (MAP)   Pulse Ox  O2          O2 Flow    FiO2


Time                                                 Delivery    Rate


   1/27/19  97.6     65    19      153/89        95


     14:19                          (110)


   1/27/19                                                             2.0


     13:27


   1/27/19                                           Nasal


     13:27                                           Cannula


   1/24/19                                                                    50


     15:30








Intake and Output





1/26/19 1/26/19 1/27/19





1414:59


22:59


06:59





IntakeIntake Total


920 ml


950 ml


840 ml





OutputOutput Total


1030 ml


3450 ml





BalanceBalance


-110 ml


950 ml


-2610 ml














Exam


Constitutional:  alert, oriented


Respiratory:  clear to auscultation


Cardiovascular:  regular rate and rhythm (no m/r/g)


Gastrointestinal:  soft, nl liver, spleen


Extremities:  normal pulses (no edema)





Labs


Result Diagram:  


1/26/19 0345                                                                    


           1/27/19 0444





Results 24hrs





Laboratory Tests


               Test
                               1/27/19
04:44


               Sodium Level                               145  H


               Potassium Level                             4.0


               Chloride Level                              108


               Carbon Dioxide Level                         30


               Anion Gap                                     7


               Blood Urea Nitrogen                         31  H


               Creatinine                                 0.75


               Est Glomerular Filtrat Rate
mL/min  > 60  



               Glucose Level                              133  #


               Calcium Level                               8.9


               Phosphorus Level                            2.8


               Magnesium Level                             2.3








Medications


Medications





Current Medications


Ondansetron HCl (Zofran Inj) 4 mg Q6H  PRN IV NAUSEA AND/OR VOMITING;  Start 


1/21/19 at 23:30


Acetaminophen (Tylenol Supp) 650 mg Q4H  PRN [place] PAIN LEVEL 1-3 OR FEVER;  Start 


1/21/19 at 23:30


Morphine Sulfate (morphine) 2 mg Q4H  PRN IV PAIN LEVEL 7-10;  Start 1/21/19 at 


23:30


Enoxaparin Sodium (Lovenox) 30 mg DAILY SC  Last administered on 1/27/19at 


08:19; Admin Dose 30 MG;  Start 1/22/19 at 09:00


Dopamine HCl/ Dextrose 250 ml @  5.625 mls/ hr TITRATE IV  Last administered on 


1/22/19at 18:00; Admin Dose 16.875 MLS/HR;  Start 1/22/19 at 02:00


Aspirin (Aspirin) 81 mg DAILY PO  Last administered on 1/27/19at 08:10; Admin Do


se 81 MG;  Start 1/24/19 at 09:00


Famotidine (Pepcid) 20 mg Q12 PO  Last administered on 1/27/19at 08:10; Admin 


Dose 20 MG;  Start 1/24/19 at 21:00


Albuterol (Proventil 0.083% (Neb)) 2.5 mg Q6H RESP  THERAPY HHN  Last 


administered on 1/27/19at 13:27; Admin Dose 2.5 MG;  Start 1/24/19 at 21:52


Ipratropium Bromide (Atrovent 0.02% (Neb)) 0.5 mg Q6H RESP  THERAPY HHN  Last 


administered on 1/27/19at 13:27; Admin Dose 0.5 MG;  Start 1/24/19 at 21:53


Methylprednisolone Sodium Succinate (Solu-Medrol) 40 mg Q12 IV  Last 


administered on 1/27/19at 08:09; Admin Dose 40 MG;  Start 1/25/19 at 21:00


Zolpidem Tartrate (Ambien) 5 mg HS  PRN PO INSOMNIA Last administered on 


1/27/19at 00:08; Admin Dose 5 MG;  Start 1/27/19 at 00:00











JAVAD PINTO M.D.            Jan 27, 2019 14:57

## 2019-01-28 VITALS — SYSTOLIC BLOOD PRESSURE: 154 MMHG | RESPIRATION RATE: 16 BRPM | DIASTOLIC BLOOD PRESSURE: 83 MMHG | HEART RATE: 69 BPM

## 2019-01-28 VITALS — DIASTOLIC BLOOD PRESSURE: 83 MMHG | RESPIRATION RATE: 20 BRPM | HEART RATE: 63 BPM | SYSTOLIC BLOOD PRESSURE: 139 MMHG

## 2019-01-28 VITALS — HEART RATE: 65 BPM | DIASTOLIC BLOOD PRESSURE: 91 MMHG | SYSTOLIC BLOOD PRESSURE: 143 MMHG | RESPIRATION RATE: 20 BRPM

## 2019-01-28 VITALS — DIASTOLIC BLOOD PRESSURE: 75 MMHG | HEART RATE: 61 BPM | RESPIRATION RATE: 18 BRPM | SYSTOLIC BLOOD PRESSURE: 136 MMHG

## 2019-01-28 VITALS — SYSTOLIC BLOOD PRESSURE: 134 MMHG | HEART RATE: 64 BPM | DIASTOLIC BLOOD PRESSURE: 99 MMHG | RESPIRATION RATE: 16 BRPM

## 2019-01-28 LAB
ANION GAP: 6 (ref 5–13)
BLOOD UREA NITROGEN: 26 MG/DL (ref 7–20)
CALCIUM: 9.1 MG/DL (ref 8.4–10.2)
CARBON DIOXIDE: 36 MMOL/L (ref 21–31)
CHLORIDE: 103 MMOL/L (ref 97–110)
CREATININE: 0.73 MG/DL (ref 0.61–1.24)
GLUCOSE: 101 MG/DL (ref 70–220)
MAGNESIUM: 2.2 MG/DL (ref 1.7–2.5)
PHOSPHORUS: 2.8 MG/DL (ref 2.5–4.9)
POTASSIUM: 4.3 MMOL/L (ref 3.5–5.1)
SODIUM: 145 MMOL/L (ref 135–144)

## 2019-01-28 RX ADMIN — FAMOTIDINE SCH MG: 20 TABLET ORAL at 20:18

## 2019-01-28 RX ADMIN — IPRATROPIUM BROMIDE 1 MG: 0.5 SOLUTION RESPIRATORY (INHALATION) at 20:00

## 2019-01-28 RX ADMIN — IPRATROPIUM BROMIDE 1 MG: 0.5 SOLUTION RESPIRATORY (INHALATION) at 07:51

## 2019-01-28 RX ADMIN — CASTOR OIL AND BALSAM, PERU 1 APPLIC: 788; 87 OINTMENT TOPICAL at 15:55

## 2019-01-28 RX ADMIN — FAMOTIDINE SCH MG: 20 TABLET ORAL at 08:25

## 2019-01-28 RX ADMIN — ALBUTEROL SULFATE 1 MG: 2.5 SOLUTION RESPIRATORY (INHALATION) at 20:00

## 2019-01-28 RX ADMIN — FAMOTIDINE 1 MG: 20 TABLET ORAL at 08:25

## 2019-01-28 RX ADMIN — IPRATROPIUM BROMIDE 1 MG: 0.5 SOLUTION RESPIRATORY (INHALATION) at 01:10

## 2019-01-28 RX ADMIN — ENOXAPARIN SODIUM 1 MG: 100 INJECTION SUBCUTANEOUS at 08:27

## 2019-01-28 RX ADMIN — ALBUTEROL SULFATE 1 MG: 2.5 SOLUTION RESPIRATORY (INHALATION) at 01:10

## 2019-01-28 RX ADMIN — AMLODIPINE BESYLATE SCH MG: 10 TABLET ORAL at 08:25

## 2019-01-28 RX ADMIN — METHYLPREDNISOLONE SODIUM SUCCINATE 1 MG: 40 INJECTION, POWDER, FOR SOLUTION INTRAMUSCULAR; INTRAVENOUS at 20:18

## 2019-01-28 RX ADMIN — ASPIRIN 81 MG SCH MG: 81 TABLET ORAL at 08:25

## 2019-01-28 RX ADMIN — ASPIRIN 81 MG CHEWABLE TABLET 1 MG: 81 TABLET CHEWABLE at 08:25

## 2019-01-28 RX ADMIN — ALBUTEROL SULFATE 1 MG: 2.5 SOLUTION RESPIRATORY (INHALATION) at 13:59

## 2019-01-28 RX ADMIN — IPRATROPIUM BROMIDE 1 MG: 0.5 SOLUTION RESPIRATORY (INHALATION) at 13:59

## 2019-01-28 RX ADMIN — DIVALPROEX SODIUM 1 MG: 250 TABLET, DELAYED RELEASE ORAL at 17:31

## 2019-01-28 RX ADMIN — DIVALPROEX SODIUM SCH MG: 250 TABLET, DELAYED RELEASE ORAL at 17:31

## 2019-01-28 RX ADMIN — ALBUTEROL SULFATE 1 MG: 2.5 SOLUTION RESPIRATORY (INHALATION) at 07:51

## 2019-01-28 RX ADMIN — GUAIFENESIN AND DEXTROMETHORPHAN 1 ML: 100; 10 SYRUP ORAL at 04:59

## 2019-01-28 RX ADMIN — METHYLPREDNISOLONE SODIUM SUCCINATE 1 MG: 40 INJECTION, POWDER, FOR SOLUTION INTRAMUSCULAR; INTRAVENOUS at 08:25

## 2019-01-28 RX ADMIN — AMLODIPINE BESYLATE 1 MG: 10 TABLET ORAL at 08:25

## 2019-01-28 RX ADMIN — FAMOTIDINE 1 MG: 20 TABLET ORAL at 20:18

## 2019-01-28 RX ADMIN — ENOXAPARIN SODIUM SCH MG: 100 INJECTION SUBCUTANEOUS at 08:27

## 2019-01-28 NOTE — PN
Date/Time of Note


Date/Time of Note


DATE: 1/28/19 


TIME: 16:48





Assessment/Plan


VTE Prophylaxis


Risk score (from Ns)>0 risk:  7


SCD applied (from Ns):  Yes


Pharmacological prophylaxis:  LMWH





Lines/Catheters


IV Catheter Type (from [facility]):  Central Line


Central line still needed:  Yes


Urinary Cath still in place:  Yes


Reason Cath still needed:  urinary retention





Assessment/Plan


Hospital Course


Patient is awake alert and oriented to name and situation, continues on 


supplemental oxygen via nasal cannula.  Patient self extubated himself and pull 


out chest tube while in intensive care unit.  Patient is currently on medical 


surgical floor.  Tolerates pured diet well, continue physical therapy.


Assessment/Plan


-Sepsis with shock, resolving.  Dr. Dreyer is following in infection disease 


consultation.


-Status post cardiac arrest.   Dr. Jon is following in cardiology 


consultation.


-Left-sided pneumothorax, status post left side chest tube placement, s/p self 


d/c CT.


-Acute respiratory failure, continue ventilatory support bronchodilators.  Dr. Lim is following in pulmonology consultation.


-Pneumoperitoneum most likely due to cardiac arrest.  Dr. Lai is following in 


general surgery consultation. 


-Left lower lobe pneumonia


-Severe emphysema


-NATALIE with possible chronic kidney disease. Dr Hanson is following in nephrology


consultation.





Further recommendations based on clinical course.  Plan of care discussed with 


Dr. Miller.


Result Diagram:  


1/26/19 0345                                                                    


           1/28/19 0508





Results 24hrs





Laboratory Tests


               Test
                               1/28/19
05:08


               Sodium Level                               145  H


               Potassium Level                             4.3


               Chloride Level                              103


               Carbon Dioxide Level                        36  H


               Anion Gap                                     6


               Blood Urea Nitrogen                         26  H


               Creatinine                                 0.73


               Est Glomerular Filtrat Rate
mL/min  > 60  



               Glucose Level                               101


               Calcium Level                               9.1


               Phosphorus Level                            2.8


               Magnesium Level                             2.2








Exam/Review of Systems


Exam


Vitals





Vital Signs


  Date      Temp  Pulse  Resp  B/P (MAP)   Pulse Ox  O2          O2 Flow    FiO2


Time                                                 Delivery    Rate


   1/28/19           74    20                    94  Nasal             3.0


     13:59                                           Cannula


   1/28/19  97.4                   143/91


     13:59                          (108)


   1/24/19                                                                    50


     15:30








Intake and Output





1/27/19 1/27/19 1/28/19





1515:00


23:00


07:00





IntakeIntake Total


1280 ml


720 ml


1780 ml





OutputOutput Total


900 ml


3600 ml





BalanceBalance


1280 ml


-180 ml


-1820 ml











Constitutional:  alert, oriented


Respiratory:  diminished breath sounds, wheezing


Cardiovascular:  nl pulses


Gastrointestinal:  soft, non-tender


Extremities:  normal pulses


Neurological:  nl mental status





Results


Results 24hrs





Laboratory Tests


               Test
                               1/28/19
05:08


               Sodium Level                               145  H


               Potassium Level                             4.3


               Chloride Level                              103


               Carbon Dioxide Level                        36  H


               Anion Gap                                     6


               Blood Urea Nitrogen                         26  H


               Creatinine                                 0.73


               Est Glomerular Filtrat Rate
mL/min  > 60  



               Glucose Level                               101


               Calcium Level                               9.1


               Phosphorus Level                            2.8


               Magnesium Level                             2.2








Medications


Medication





Current Medications


Ondansetron HCl (Zofran Inj) 4 mg Q6H  PRN IV NAUSEA AND/OR VOMITING;  Start 


1/21/19 at 23:30


Acetaminophen (Tylenol Supp) 650 mg Q4H  PRN [place] PAIN LEVEL 1-3 OR FEVER;  Start 


1/21/19 at 23:30


Enoxaparin Sodium (Lovenox) 30 mg DAILY SC  Last administered on 1/28/19at 


08:27; Admin Dose 30 MG;  Start 1/22/19 at 09:00


Aspirin (Aspirin) 81 mg DAILY PO  Last administered on 1/28/19at 08:25; Admin 


Dose 81 MG;  Start 1/24/19 at 09:00


Famotidine (Pepcid) 20 mg Q12 PO  Last administered on 1/28/19at 08:25; Admin 


Dose 20 MG;  Start 1/24/19 at 21:00


Albuterol (Proventil 0.083% (Neb)) 2.5 mg Q6H RESP  THERAPY HHN  Last 


administered on 1/28/19at 13:59; Admin Dose 2.5 MG;  Start 1/24/19 at 21:52


Ipratropium Bromide (Atrovent 0.02% (Neb)) 0.5 mg Q6H RESP  THERAPY HHN  Last 


administered on 1/28/19at 13:59; Admin Dose 0.5 MG;  Start 1/24/19 at 21:53


Methylprednisolone Sodium Succinate (Solu-Medrol) 40 mg Q12 IV  Last 


administered on 1/28/19at 08:25; Admin Dose 40 MG;  Start 1/25/19 at 21:00


Zolpidem Tartrate (Ambien) 5 mg HS  PRN PO INSOMNIA Last administered on 


1/27/19at 21:42; Admin Dose 5 MG;  Start 1/27/19 at 00:00


Amlodipine Besylate (Norvasc) 10 mg DAILY PO  Last administered on 1/28/19at 


08:25; Admin Dose 10 MG;  Start 1/27/19 at 15:00


Guaifenesin/ Dextromethorphan (Robitussin Dm Liquid Cup) 5 ml Q6H  PRN PO COUGH 


Last administered on 1/28/19at 04:59; Admin Dose 5 ML;  Start 1/27/19 at 20:00


Morphine Sulfate (morphine) 2 mg Q4H  PRN IV PAIN LEVEL 7-10 Last administered 


on 1/28/19at 00:35; Admin Dose 2 MG;  Start 1/27/19 at 20:30











EVARISTO BELTRAN             Jan 28, 2019 16:53

## 2019-01-28 NOTE — CONS
Assessment/Plan


Assessment/Plan


Hospital Course (Demo Recall)


Alert denies pain looks comfortable no fevers overnight.  No labs.  Patient is 


off antibiotics, remains on Solu-Medrol.


Physical examination: Well-developed chronically ill-appearing fragile elderly 


man who is intubated sedated in no distress.  Head atraumatic normocephalic 


sclera nonicteric vehicle mucosa dry neck is supple chest rise symmetrical 


breath sounds diminished bases.  Heart: S1-S2.  Abdomen soft bowel sounds 


present.  Extremities cyanotic





Assessment:


1.  S/p septic shock


2.  Bradycardia 


3.  Status post acute hypoxemic respiratory failure


4.  Pneumoperitoneum of unclear etiology, no perforation per surgical note


5.  Pancytopenia


5.  Anemia


6.  Non-ST elevation MI


7.  Status post cardiac arrest





Plan: Patient remained stable, continue present care, observe off antibiotics





Consultation Date/Type/Reason


Admit Date/Time


Jan 22, 2019 at 00:03


Initial Consult Date


1/22/19


Type of Consult


ID


Requesting Provider:  DONALDO CARRILLO MD


Date/Time of Note


DATE: 1/28/19 


TIME: 14:12





Exam/Review of Systems


Exam


Vitals





Vital Signs


  Date      Temp  Pulse  Resp  B/P (MAP)   Pulse Ox  O2          O2 Flow    FiO2


Time                                                 Delivery    Rate


   1/28/19           74    20                    94  Nasal             3.0


     13:59                                           Cannula


   1/28/19  97.4                   143/91


     13:59                          (108)


   1/24/19                                                                    50


     15:30








Intake and Output





1/27/19 1/27/19 1/28/19





1515:00


23:00


07:00





IntakeIntake Total


1280 ml


720 ml


1780 ml





OutputOutput Total


900 ml


3600 ml





BalanceBalance


1280 ml


-180 ml


-1820 ml














Results


Result Diagram:  


1/26/19 0345                                                                    


           1/28/19 0508





Results 24hrs





Laboratory Tests


               Test
                               1/28/19
05:08


               Sodium Level                               145  H


               Potassium Level                             4.3


               Chloride Level                              103


               Carbon Dioxide Level                        36  H


               Anion Gap                                     6


               Blood Urea Nitrogen                         26  H


               Creatinine                                 0.73


               Est Glomerular Filtrat Rate
mL/min  > 60  



               Glucose Level                               101


               Calcium Level                               9.1


               Phosphorus Level                            2.8


               Magnesium Level                             2.2








Medications


Medication





Current Medications


Ondansetron HCl (Zofran Inj) 4 mg Q6H  PRN IV NAUSEA AND/OR VOMITING;  Start 


1/21/19 at 23:30


Acetaminophen (Tylenol Supp) 650 mg Q4H  PRN SD PAIN LEVEL 1-3 OR FEVER;  Start 


1/21/19 at 23:30


Enoxaparin Sodium (Lovenox) 30 mg DAILY SC  Last administered on 1/28/19at 


08:27; Admin Dose 30 MG;  Start 1/22/19 at 09:00


Aspirin (Aspirin) 81 mg DAILY PO  Last administered on 1/28/19at 08:25; Admin 


Dose 81 MG;  Start 1/24/19 at 09:00


Famotidine (Pepcid) 20 mg Q12 PO  Last administered on 1/28/19at 08:25; Admin 


Dose 20 MG;  Start 1/24/19 at 21:00


Albuterol (Proventil 0.083% (Neb)) 2.5 mg Q6H RESP  THERAPY HHN  Last 


administered on 1/28/19at 13:59; Admin Dose 2.5 MG;  Start 1/24/19 at 21:52


Ipratropium Bromide (Atrovent 0.02% (Neb)) 0.5 mg Q6H RESP  THERAPY HHN  Last 


administered on 1/28/19at 13:59; Admin Dose 0.5 MG;  Start 1/24/19 at 21:53


Methylprednisolone Sodium Succinate (Solu-Medrol) 40 mg Q12 IV  Last 


administered on 1/28/19at 08:25; Admin Dose 40 MG;  Start 1/25/19 at 21:00


Zolpidem Tartrate (Ambien) 5 mg HS  PRN PO INSOMNIA Last administered on 


1/27/19at 21:42; Admin Dose 5 MG;  Start 1/27/19 at 00:00


Amlodipine Besylate (Norvasc) 10 mg DAILY PO  Last administered on 1/28/19at 


08:25; Admin Dose 10 MG;  Start 1/27/19 at 15:00


Guaifenesin/ Dextromethorphan (Robitussin Dm Liquid Cup) 5 ml Q6H  PRN PO COUGH 


Last administered on 1/28/19at 04:59; Admin Dose 5 ML;  Start 1/27/19 at 20:00


Morphine Sulfate (morphine) 2 mg Q4H  PRN IV PAIN LEVEL 7-10 Last administered 


on 1/28/19at 00:35; Admin Dose 2 MG;  Start 1/27/19 at 20:30











DAVID VÁZQUEZ NP            Jan 28, 2019 14:13

## 2019-01-29 VITALS — SYSTOLIC BLOOD PRESSURE: 124 MMHG | DIASTOLIC BLOOD PRESSURE: 87 MMHG | HEART RATE: 71 BPM | RESPIRATION RATE: 18 BRPM

## 2019-01-29 VITALS — SYSTOLIC BLOOD PRESSURE: 132 MMHG | HEART RATE: 86 BPM | DIASTOLIC BLOOD PRESSURE: 70 MMHG | RESPIRATION RATE: 16 BRPM

## 2019-01-29 VITALS — HEART RATE: 72 BPM | SYSTOLIC BLOOD PRESSURE: 123 MMHG | RESPIRATION RATE: 20 BRPM | DIASTOLIC BLOOD PRESSURE: 66 MMHG

## 2019-01-29 RX ADMIN — ENOXAPARIN SODIUM SCH MG: 100 INJECTION SUBCUTANEOUS at 08:45

## 2019-01-29 RX ADMIN — AMLODIPINE BESYLATE SCH MG: 10 TABLET ORAL at 08:44

## 2019-01-29 RX ADMIN — DIVALPROEX SODIUM 1 MG: 250 TABLET, DELAYED RELEASE ORAL at 08:40

## 2019-01-29 RX ADMIN — IPRATROPIUM BROMIDE 1 MG: 0.5 SOLUTION RESPIRATORY (INHALATION) at 14:00

## 2019-01-29 RX ADMIN — GUAIFENESIN AND DEXTROMETHORPHAN 1 ML: 100; 10 SYRUP ORAL at 02:20

## 2019-01-29 RX ADMIN — DIVALPROEX SODIUM 1 MG: 250 TABLET, DELAYED RELEASE ORAL at 00:07

## 2019-01-29 RX ADMIN — ALBUTEROL SULFATE 1 MG: 2.5 SOLUTION RESPIRATORY (INHALATION) at 14:00

## 2019-01-29 RX ADMIN — AMLODIPINE BESYLATE 1 MG: 10 TABLET ORAL at 08:44

## 2019-01-29 RX ADMIN — CASTOR OIL AND BALSAM, PERU 1 APPLIC: 788; 87 OINTMENT TOPICAL at 08:50

## 2019-01-29 RX ADMIN — METHYLPREDNISOLONE SODIUM SUCCINATE 1 MG: 40 INJECTION, POWDER, FOR SOLUTION INTRAMUSCULAR; INTRAVENOUS at 20:31

## 2019-01-29 RX ADMIN — ALBUTEROL SULFATE 1 MG: 2.5 SOLUTION RESPIRATORY (INHALATION) at 19:57

## 2019-01-29 RX ADMIN — FAMOTIDINE SCH MG: 20 TABLET ORAL at 20:44

## 2019-01-29 RX ADMIN — FAMOTIDINE 1 MG: 20 TABLET ORAL at 20:44

## 2019-01-29 RX ADMIN — DIVALPROEX SODIUM SCH MG: 250 TABLET, DELAYED RELEASE ORAL at 18:52

## 2019-01-29 RX ADMIN — ALBUTEROL SULFATE 1 MG: 2.5 SOLUTION RESPIRATORY (INHALATION) at 02:08

## 2019-01-29 RX ADMIN — ALBUTEROL SULFATE 1 MG: 2.5 SOLUTION RESPIRATORY (INHALATION) at 10:07

## 2019-01-29 RX ADMIN — ASPIRIN 81 MG CHEWABLE TABLET 1 MG: 81 TABLET CHEWABLE at 08:40

## 2019-01-29 RX ADMIN — IPRATROPIUM BROMIDE 1 MG: 0.5 SOLUTION RESPIRATORY (INHALATION) at 02:08

## 2019-01-29 RX ADMIN — DIVALPROEX SODIUM SCH MG: 250 TABLET, DELAYED RELEASE ORAL at 00:07

## 2019-01-29 RX ADMIN — IPRATROPIUM BROMIDE 1 MG: 0.5 SOLUTION RESPIRATORY (INHALATION) at 10:07

## 2019-01-29 RX ADMIN — DIVALPROEX SODIUM SCH MG: 250 TABLET, DELAYED RELEASE ORAL at 08:40

## 2019-01-29 RX ADMIN — DIVALPROEX SODIUM 1 MG: 250 TABLET, DELAYED RELEASE ORAL at 18:52

## 2019-01-29 RX ADMIN — ENOXAPARIN SODIUM 1 MG: 100 INJECTION SUBCUTANEOUS at 08:45

## 2019-01-29 RX ADMIN — ASPIRIN 81 MG SCH MG: 81 TABLET ORAL at 08:40

## 2019-01-29 RX ADMIN — FAMOTIDINE 1 MG: 20 TABLET ORAL at 08:41

## 2019-01-29 RX ADMIN — METHYLPREDNISOLONE SODIUM SUCCINATE 1 MG: 40 INJECTION, POWDER, FOR SOLUTION INTRAMUSCULAR; INTRAVENOUS at 08:40

## 2019-01-29 RX ADMIN — INFLUENZA A VIRUS A/MICHIGAN/45/2015 X-275 (H1N1) ANTIGEN (FORMALDEHYDE INACTIVATED), INFLUENZA A VIRUS A/HONG KONG/4801/2014 X-263B (H3N2) ANTIGEN (FORMALDEHYDE INACTIVATED), INFLUENZA B VIRUS B/PHUKET/3073/2013 ANTIGEN (FORMALDEHYDE INACTIVATED), AND INFLUENZA B VIRUS B/BRISBANE/60/2008 ANTIGEN (FORMALDEHYDE INACTIVATED) 1 ML: 15; 15; 15; 15 INJECTION, SUSPENSION INTRAMUSCULAR at 16:15

## 2019-01-29 RX ADMIN — FAMOTIDINE SCH MG: 20 TABLET ORAL at 08:41

## 2019-01-29 RX ADMIN — IPRATROPIUM BROMIDE 1 MG: 0.5 SOLUTION RESPIRATORY (INHALATION) at 19:57

## 2019-01-29 NOTE — PN
DATE:  01/29/2019

 

 

SUBJECTIVE:  The patient is stable.  No events overnight.

 

OBJECTIVE:

VITAL SIGNS:  Blood pressure is 154/83, pulse 86, respirations 24.

HEENT:  Head is normocephalic.

NECK:  Supple.

HEART:  Regular rate.

LUNGS:  Show diminished breath sounds at the base.

ABDOMEN:  Soft, nontender to palpation without rebound or guarding.

EXTREMITIES:  Negative for clubbing, cyanosis, no edema.

DERMATOLOGIC:  No rashes.

MUSCULOSKELETAL:  No joint effusion.

NEUROLOGIC:  No change in exam.

 

MEDICATIONS:  Reviewed.

 

LABORATORY DATA:  Reviewed.

 

ASSESSMENT AND PLAN:

1.  Nonoliguric acute kidney injury with unknown baseline creatinine.  Etiology is secondary to hemod
ynamics.  Renal function is improved.  Continue current treatment plan, supportive care, renally dose
 all medicines.

2.  Hypernatremia.  The patient's sodium levels remain elevated.  Continue to encourage free water in
take.

3.  Anemia.  Monitor hemoglobin and hematocrit levels. 

4.  Mineral bone disorder.  Monitor calcium and phosphorus levels.

5.  Sepsis, status post shock.  The patient is completing antibiotic course.

6.  Status post respiratory failure.

7.  Status post cardiac arrest.

8.  Encephalopathy, resolved.  Continue to monitor.

9.  Status post pneumothorax.

 

 

Dictated By: MARYAN CHAMPAGNE DO

 

NR/NTS

DD:    01/29/2019 07:45:12

DT:    01/29/2019 08:13:05

Conf#: [id number]

DID#:  [id number]

CC: MIMI ZAVALA MD; DONALDO CARRILLO MD; MAURI GRIGSBY MD;*EndCC*

## 2019-01-29 NOTE — CONS
Consult Date/Type/Reason


Admit Date/Time


Jan 22, 2019 at 00:03


Initial Consult Date


1/22/19


Requesting Provider:  DONALDO CARRILLO MD


Date/Time of Note


DATE: 1/29/19 


TIME: 10:59





Subjective


NO acute events - pt off tele now - agitated - BP well Rx. NO P noted. 





ROS: No fever, no chills, no nausea, no vomiting, no diarrhea/constipation


        No recent weight changes


        No chest pain, no PND, no orthopnea - mildSOB/chronic 


        No dizziness, blurred vision


        No thirst, no heat or cold intolerance





Objective


Vitals





Vital Signs


  Date      Temp  Pulse  Resp  B/P (MAP)   Pulse Ox  O2          O2 Flow    FiO2


Time                                                 Delivery    Rate


   1/29/19                                           Nasal             3.0


     09:12                                           Cannula


   1/29/19           83    24                    80


     02:09


   1/29/19  98.1                   132/70


     01:21                           (90)








Intake and Output





1/28/19 1/28/19 1/29/19





1515:00


23:00


07:00





IntakeIntake Total


1040 ml


1480 ml


800 ml





OutputOutput Total


3500 ml


3000 ml





BalanceBalance


1040 ml


-2020 ml


-2200 ml











Exam


General: WN/WD/NAD, AOx 1-2 Psych 


HEENT: Unicetric/atraumatic/EOMI (does not follow commands)


NECK: JVD elevated, no thyromegaly


Lymph: no lymphadenopathy


HEART: regular with no S3, II/VI systolic murmur at apex


LUNGS: Coarse sounds


ABD: soft, NT, ND, +BS


: Intact


Neuro: non focal


SKIN: chronic changes


EXT: trace edema





Results/Medications


Result Diagram:  


1/26/19 0345                                                                    


           1/28/19 0508





Home Meds


Reported Medications


Docusate Sodium* (Colace*) 100 Mg Capsule, 100 MG PO Q24H PRN for CONSTIPATION, 


#30 CAP


   1/21/19


Polyethylene Glycol* (Miralax*) 17 Gm Powd.pack, 17 GM PO DAILY PRN for AS 


NEEDED, #30 PACKET


   1/21/19


Acetaminophen* (Acetaminophen*) 325 Mg Tablet, 650 MG PO Q4H PRN for PAIN 1-


10/10, #30 TAB


   AND FEVER>101F


   1/21/19


Sennosides* (Senna Lax*) 8.6 Mg Tablet, 1 TAB PO AS NEEDED, TAB


   1/21/19


Mv,Minerals/Fa/Lycopene/Ginkgo (ONE DAILY MEN'S 50+ TABLET) 1 Each Tablet, 1 


EACH PO DAILY, TAB


   1/21/19


Divalproex Sodium* (Depakote*) 125 Mg Tablet.dr, 250 MG PO Q8H, #90 TAB


   1/21/19


Quetiapine Fumarate* (Seroquel*) 50 Mg Tablet, 50 MG PO Q8H, TAB


   1/21/19


Ipratropium-Albuterol (Ipratropium-Albuterol) 0.5-3 Mg/3 Ml Ampul.neb, 3 ML 


INHALATION Q4H PRN for WHEEZING AND SOB, #30 VIAL


   1/21/19


Budesonide-Formoterol Fumarate* (Symbicort*) 160-4.5 Hfa.aer.ad, 2 PUFF 


INHALATION BID, #1 EACH


   1/21/19


Medications





Current Medications


Ondansetron HCl (Zofran Inj) 4 mg Q6H  PRN IV NAUSEA AND/OR VOMITING;  Start 


1/21/19 at 23:30


Acetaminophen (Tylenol Supp) 650 mg Q4H  PRN [place] PAIN LEVEL 1-3 OR FEVER;  Start 


1/21/19 at 23:30


Enoxaparin Sodium (Lovenox) 30 mg DAILY SC  Last administered on 1/29/19at 08:


45; Admin Dose 30 MG;  Start 1/22/19 at 09:00


Aspirin (Aspirin) 81 mg DAILY PO  Last administered on 1/29/19at 08:40; Admin 


Dose 81 MG;  Start 1/24/19 at 09:00


Famotidine (Pepcid) 20 mg Q12 PO  Last administered on 1/29/19at 08:41; Admin 


Dose 20 MG;  Start 1/24/19 at 21:00


Albuterol (Proventil 0.083% (Neb)) 2.5 mg Q6H RESP  THERAPY HHN  Last 


administered on 1/29/19at 02:08; Admin Dose 2.5 MG;  Start 1/24/19 at 21:52


Ipratropium Bromide (Atrovent 0.02% (Neb)) 0.5 mg Q6H RESP  THERAPY HHN  Last ad


ministered on 1/29/19at 02:08; Admin Dose 0.5 MG;  Start 1/24/19 at 21:53


Methylprednisolone Sodium Succinate (Solu-Medrol) 40 mg Q12 IV  Last 


administered on 1/29/19at 08:40; Admin Dose 40 MG;  Start 1/25/19 at 21:00


Zolpidem Tartrate (Ambien) 5 mg HS  PRN PO INSOMNIA Last administered on 


1/27/19at 21:42; Admin Dose 5 MG;  Start 1/27/19 at 00:00


Amlodipine Besylate (Norvasc) 10 mg DAILY PO  Last administered on 1/29/19at 


08:44; Admin Dose 10 MG;  Start 1/27/19 at 15:00


Guaifenesin/ Dextromethorphan (Robitussin Dm Liquid Cup) 5 ml Q6H  PRN PO COUGH 


Last administered on 1/29/19at 02:20; Admin Dose 5 ML;  Start 1/27/19 at 20:00


Morphine Sulfate (morphine) 2 mg Q4H  PRN IV PAIN LEVEL 7-10 Last administered 


on 1/28/19at 00:35; Admin Dose 2 MG;  Start 1/27/19 at 20:30


Divalproex Sodium (Depakote) 250 mg Q8H PO  Last administered on 1/29/19at 


08:40; Admin Dose 250 MG;  Start 1/28/19 at 17:00





Assessment/Plan


Hospital Course (Demo Recall)


1.Hypotension-off levo - better now. 


2.resp failure s/p intubation - now self extubated - better overall.  Con't COPD


rx. 


3.cardiac arrest - etiology unclear - con't supportive RX now. Stable now - of 


tele. 


4.Pneumoperitoneum - self d/c chest tube  - stable SOB now. 


5.Positive troponin-now trended neg - will monitor clinically for now. No 


intervention planned. 


6.renal failure - good urine output. 


7.Leukocytosis - on anti-Bx, con't med rx. 


8. anemia - H/H stable - no bleeding noted. 


9, Thrombocytopenia











HARDY GUZMÁN MD                 Jan 29, 2019 11:00

## 2019-01-29 NOTE — PN
Date/Time of Note


Date/Time of Note


DATE: 1/29/19 


TIME: 22:43





Assessment/Plan


VTE Prophylaxis


Risk score (from Ns)>0 risk:  7


SCD applied (from Ns):  Yes


Pharmacological prophylaxis:  LMWH





Lines/Catheters


IV Catheter Type (from [facility]):  IJ


Urinary Cath still in place:  Yes


Reason Cath still needed:  urinary retention





Assessment/Plan


Hospital Course


Patient with diminished lung sounds, wheezing, continue breathing treatment, 


oxygen. Pt is awake, alert, able to answer questions appropriately however 


confused and delusional at times per nursing staff. Will obtain psychiatric 


evaluation.


Assessment/Plan


-Sepsis with shock, resolving.  Dr. Dreyer is following in infection disease 


consultation.


-Status post cardiac arrest.   Dr. Jon is following in cardiology 


consultation.


-Left-sided pneumothorax, status post left side chest tube placement, s/p self 


d/c CT.


-Acute respiratory failure, continue ventilatory support bronchodilators.  Dr. Lim is following in pulmonology consultation.


-Pneumoperitoneum most likely due to cardiac arrest.  Dr. Lai is following in 


general surgery consultation. 


-Left axillary and brachial DVT, continue Lovenox


-Left lower lobe pneumonia


-Severe emphysema


-NATALIE with possible chronic kidney disease. Dr Hanson is following in nephrology


consultation.


-Bipolar disorder





Further recommendations based on clinical course.  Plan of care discussed with 


Dr. Miller.


Result Diagram:  


1/26/19 0345                                                                    


           1/28/19 0508








Exam/Review of Systems


Exam


Vitals





Vital Signs


  Date      Temp  Pulse  Resp  B/P (MAP)   Pulse Ox  O2          O2 Flow    FiO2


Time                                                 Delivery    Rate


   1/29/19                                                             3.0


     19:57


   1/29/19           86    26                    99  Nasal


     19:57                                           Cannula


   1/29/19  97.9                   124/87


     14:00                           (99)








Intake and Output





1/28/19 1/28/19 1/29/19





1515:00


23:00


07:00





IntakeIntake Total


1040 ml


1480 ml


800 ml





OutputOutput Total


3500 ml


3000 ml





BalanceBalance


1040 ml


-2020 ml


-2200 ml











Exam


Constitutional:  alert, oriented


Respiratory:  diminished breath sounds, wheezing


Cardiovascular:  nl pulses


Gastrointestinal:  soft, non-tender


Extremities:  normal pulses


Neurological:  nl mental status





Medications


Medication





Current Medications


Ondansetron HCl (Zofran Inj) 4 mg Q6H  PRN IV NAUSEA AND/OR VOMITING;  Start 


1/21/19 at 23:30


Acetaminophen (Tylenol Supp) 650 mg Q4H  PRN [place] PAIN LEVEL 1-3 OR FEVER;  Start 


1/21/19 at 23:30


Enoxaparin Sodium (Lovenox) 30 mg DAILY SC  Last administered on 1/29/19at 


08:45; Admin Dose 30 MG;  Start 1/22/19 at 09:00


Aspirin (Aspirin) 81 mg DAILY PO  Last administered on 1/29/19at 08:40; Admin 


Dose 81 MG;  Start 1/24/19 at 09:00


Famotidine (Pepcid) 20 mg Q12 PO  Last administered on 1/29/19at 20:44; Admin 


Dose 20 MG;  Start 1/24/19 at 21:00


Albuterol (Proventil 0.083% (Neb)) 2.5 mg Q6H RESP  THERAPY HHN  Last 


administered on 1/29/19at 19:57; Admin Dose 2.5 MG;  Start 1/24/19 at 21:52


Ipratropium Bromide (Atrovent 0.02% (Neb)) 0.5 mg Q6H RESP  THERAPY HHN  Last 


administered on 1/29/19at 19:57; Admin Dose 0.5 MG;  Start 1/24/19 at 21:53


Methylprednisolone Sodium Succinate (Solu-Medrol) 40 mg Q12 IV  Last 


administered on 1/29/19at 20:31; Admin Dose 40 MG;  Start 1/25/19 at 21:00


Zolpidem Tartrate (Ambien) 5 mg HS  PRN PO INSOMNIA Last administered on 


1/27/19at 21:42; Admin Dose 5 MG;  Start 1/27/19 at 00:00


Amlodipine Besylate (Norvasc) 10 mg DAILY PO  Last administered on 1/29/19at 


08:44; Admin Dose 10 MG;  Start 1/27/19 at 15:00


Guaifenesin/ Dextromethorphan (Robitussin Dm Liquid Cup) 5 ml Q6H  PRN PO COUGH 


Last administered on 1/29/19at 02:20; Admin Dose 5 ML;  Start 1/27/19 at 20:00


Morphine Sulfate (morphine) 2 mg Q4H  PRN IV PAIN LEVEL 7-10 Last administered 


on 1/28/19at 00:35; Admin Dose 2 MG;  Start 1/27/19 at 20:30


Divalproex Sodium (Depakote) 250 mg Q8H PO  Last administered on 1/29/19at 


18:52; Admin Dose 250 MG;  Start 1/28/19 at 17:00











EVARISTO BELTRAN             Jan 29, 2019 22:47

## 2019-01-29 NOTE — CONS
Assessment/Plan


Assessment/Plan


Hospital Course (Demo Recall)


No events, no fevers





Physical examination: Well-developed chronically ill-appearing fragile elderly 


man who is intubated sedated in no distress.  Head atraumatic normocephalic 


sclera nonicteric vehicle mucosa dry neck is supple chest rise symmetrical 


breath sounds diminished bases.  Heart: S1-S2.  Abdomen soft bowel sounds 


present.  Extremities cyanotic





Assessment:


1.  S/p septic shock


2.  Bradycardia 


3.  Status post acute hypoxemic respiratory failure


4.  Pneumoperitoneum of unclear etiology, no perforation per surgical note


5.  Pancytopenia


5.  Anemia


6.  Non-ST elevation MI


7.  Status post cardiac arrest





Plan: Patient remained stable, continue present care, observe off antibiotics





Consultation Date/Type/Reason


Admit Date/Time


Jan 22, 2019 at 00:03


Initial Consult Date


1/22/19


Type of Consult


ID


Requesting Provider:  DONALDO CARRILLO MD


Date/Time of Note


DATE: 1/29/19 


TIME: 09:33





Exam/Review of Systems


Exam


Vitals





Vital Signs


  Date      Temp  Pulse  Resp  B/P (MAP)   Pulse Ox  O2          O2 Flow    FiO2


Time                                                 Delivery    Rate


   1/29/19                                           Nasal             3.0


     09:12                                           Cannula


   1/29/19           83    24                    80


     02:09


   1/29/19  98.1                   132/70


     01:21                           (90)








Intake and Output





1/28/19 1/28/19 1/29/19





1414:59


22:59


06:59





IntakeIntake Total


1040 ml


1480 ml


800 ml





OutputOutput Total


3500 ml


3000 ml





BalanceBalance


1040 ml


-2020 ml


-2200 ml














Results


Result Diagram:  


1/26/19 0345                                                                    


           1/28/19 0508








Medications


Medication





Current Medications


Ondansetron HCl (Zofran Inj) 4 mg Q6H  PRN IV NAUSEA AND/OR VOMITING;  Start 


1/21/19 at 23:30


Acetaminophen (Tylenol Supp) 650 mg Q4H  PRN [place] PAIN LEVEL 1-3 OR FEVER;  Start 


1/21/19 at 23:30


Enoxaparin Sodium (Lovenox) 30 mg DAILY SC  Last administered on 1/29/19at 


08:45; Admin Dose 30 MG;  Start 1/22/19 at 09:00


Aspirin (Aspirin) 81 mg DAILY PO  Last administered on 1/29/19at 08:40; Admin 


Dose 81 MG;  Start 1/24/19 at 09:00


Famotidine (Pepcid) 20 mg Q12 PO  Last administered on 1/29/19at 08:41; Admin 


Dose 20 MG;  Start 1/24/19 at 21:00


Albuterol (Proventil 0.083% (Neb)) 2.5 mg Q6H RESP  THERAPY HHN  Last 


administered on 1/29/19at 02:08; Admin Dose 2.5 MG;  Start 1/24/19 at 21:52


Ipratropium Bromide (Atrovent 0.02% (Neb)) 0.5 mg Q6H RESP  THERAPY HHN  Last 


administered on 1/29/19at 02:08; Admin Dose 0.5 MG;  Start 1/24/19 at 21:53


Methylprednisolone Sodium Succinate (Solu-Medrol) 40 mg Q12 IV  Last 


administered on 1/29/19at 08:40; Admin Dose 40 MG;  Start 1/25/19 at 21:00


Zolpidem Tartrate (Ambien) 5 mg HS  PRN PO INSOMNIA Last administered on 


1/27/19at 21:42; Admin Dose 5 MG;  Start 1/27/19 at 00:00


Amlodipine Besylate (Norvasc) 10 mg DAILY PO  Last administered on 1/29/19at 


08:44; Admin Dose 10 MG;  Start 1/27/19 at 15:00


Guaifenesin/ Dextromethorphan (Robitussin Dm Liquid Cup) 5 ml Q6H  PRN PO COUGH 


Last administered on 1/29/19at 02:20; Admin Dose 5 ML;  Start 1/27/19 at 20:00


Morphine Sulfate (morphine) 2 mg Q4H  PRN IV PAIN LEVEL 7-10 Last administered 


on 1/28/19at 00:35; Admin Dose 2 MG;  Start 1/27/19 at 20:30


Divalproex Sodium (Depakote) 250 mg Q8H PO  Last administered on 1/29/19at 


08:40; Admin Dose 250 MG;  Start 1/28/19 at 17:00











DAVID VÁZQUEZ NP            Jan 29, 2019 09:33

## 2019-01-30 VITALS — DIASTOLIC BLOOD PRESSURE: 75 MMHG | RESPIRATION RATE: 18 BRPM | SYSTOLIC BLOOD PRESSURE: 135 MMHG | HEART RATE: 79 BPM

## 2019-01-30 VITALS — RESPIRATION RATE: 20 BRPM | HEART RATE: 95 BPM | DIASTOLIC BLOOD PRESSURE: 73 MMHG | SYSTOLIC BLOOD PRESSURE: 131 MMHG

## 2019-01-30 VITALS — SYSTOLIC BLOOD PRESSURE: 128 MMHG | DIASTOLIC BLOOD PRESSURE: 82 MMHG | RESPIRATION RATE: 20 BRPM | HEART RATE: 70 BPM

## 2019-01-30 VITALS — DIASTOLIC BLOOD PRESSURE: 68 MMHG | RESPIRATION RATE: 18 BRPM | HEART RATE: 64 BPM | SYSTOLIC BLOOD PRESSURE: 114 MMHG

## 2019-01-30 LAB
ANION GAP: 7 (ref 5–13)
BLOOD UREA NITROGEN: 40 MG/DL (ref 7–20)
CALCIUM: 9.5 MG/DL (ref 8.4–10.2)
CARBON DIOXIDE: 33 MMOL/L (ref 21–31)
CHLORIDE: 102 MMOL/L (ref 97–110)
CREATININE: 0.84 MG/DL (ref 0.61–1.24)
GLUCOSE: 111 MG/DL (ref 70–220)
MAGNESIUM: 2.4 MG/DL (ref 1.7–2.5)
PHOSPHORUS: 3.7 MG/DL (ref 2.5–4.9)
POTASSIUM: 3.9 MMOL/L (ref 3.5–5.1)
SODIUM: 142 MMOL/L (ref 135–144)

## 2019-01-30 RX ADMIN — DIVALPROEX SODIUM SCH MG: 250 TABLET, DELAYED RELEASE ORAL at 08:21

## 2019-01-30 RX ADMIN — MORPHINE SULFATE 1 MG: 10 SOLUTION ORAL at 22:56

## 2019-01-30 RX ADMIN — FAMOTIDINE 1 MG: 20 TABLET ORAL at 20:53

## 2019-01-30 RX ADMIN — DIVALPROEX SODIUM 1 MG: 250 TABLET, DELAYED RELEASE ORAL at 17:12

## 2019-01-30 RX ADMIN — METHYLPREDNISOLONE SODIUM SUCCINATE 1 MG: 40 INJECTION, POWDER, FOR SOLUTION INTRAMUSCULAR; INTRAVENOUS at 08:21

## 2019-01-30 RX ADMIN — DIVALPROEX SODIUM 1 MG: 250 TABLET, DELAYED RELEASE ORAL at 01:54

## 2019-01-30 RX ADMIN — ASPIRIN 81 MG CHEWABLE TABLET 1 MG: 81 TABLET CHEWABLE at 08:21

## 2019-01-30 RX ADMIN — FAMOTIDINE SCH MG: 20 TABLET ORAL at 08:21

## 2019-01-30 RX ADMIN — IPRATROPIUM BROMIDE 1 MG: 0.5 SOLUTION RESPIRATORY (INHALATION) at 14:39

## 2019-01-30 RX ADMIN — ZOLPIDEM TARTRATE 1 MG: 5 TABLET, FILM COATED ORAL at 23:03

## 2019-01-30 RX ADMIN — DIVALPROEX SODIUM SCH MG: 250 TABLET, DELAYED RELEASE ORAL at 17:12

## 2019-01-30 RX ADMIN — FAMOTIDINE 1 MG: 20 TABLET ORAL at 08:21

## 2019-01-30 RX ADMIN — ENOXAPARIN SODIUM 1 MG: 100 INJECTION SUBCUTANEOUS at 08:25

## 2019-01-30 RX ADMIN — ASPIRIN 81 MG SCH MG: 81 TABLET ORAL at 08:21

## 2019-01-30 RX ADMIN — ALBUTEROL SULFATE 1 MG: 2.5 SOLUTION RESPIRATORY (INHALATION) at 02:04

## 2019-01-30 RX ADMIN — RISPERIDONE SCH MG: 0.25 TABLET ORAL at 20:53

## 2019-01-30 RX ADMIN — FAMOTIDINE SCH MG: 20 TABLET ORAL at 20:53

## 2019-01-30 RX ADMIN — ALBUTEROL SULFATE 1 MG: 2.5 SOLUTION RESPIRATORY (INHALATION) at 08:00

## 2019-01-30 RX ADMIN — ENOXAPARIN SODIUM SCH MG: 100 INJECTION SUBCUTANEOUS at 08:25

## 2019-01-30 RX ADMIN — DIVALPROEX SODIUM SCH MG: 250 TABLET, DELAYED RELEASE ORAL at 01:54

## 2019-01-30 RX ADMIN — MORPHINE SULFATE PRN MG: 10 SOLUTION ORAL at 22:56

## 2019-01-30 RX ADMIN — ZOLPIDEM TARTRATE PRN MG: 5 TABLET, FILM COATED ORAL at 23:03

## 2019-01-30 RX ADMIN — IPRATROPIUM BROMIDE 1 MG: 0.5 SOLUTION RESPIRATORY (INHALATION) at 08:00

## 2019-01-30 RX ADMIN — CASTOR OIL AND BALSAM, PERU 1 APPLIC: 788; 87 OINTMENT TOPICAL at 08:26

## 2019-01-30 RX ADMIN — IPRATROPIUM BROMIDE 1 MG: 0.5 SOLUTION RESPIRATORY (INHALATION) at 02:04

## 2019-01-30 RX ADMIN — IPRATROPIUM BROMIDE 1 MG: 0.5 SOLUTION RESPIRATORY (INHALATION) at 19:38

## 2019-01-30 RX ADMIN — AMLODIPINE BESYLATE 1 MG: 10 TABLET ORAL at 09:07

## 2019-01-30 RX ADMIN — RISPERIDONE 1 MG: 0.25 TABLET ORAL at 20:53

## 2019-01-30 RX ADMIN — DIVALPROEX SODIUM 1 MG: 250 TABLET, DELAYED RELEASE ORAL at 08:21

## 2019-01-30 RX ADMIN — AMLODIPINE BESYLATE SCH MG: 10 TABLET ORAL at 09:07

## 2019-01-30 RX ADMIN — ALBUTEROL SULFATE 1 MG: 2.5 SOLUTION RESPIRATORY (INHALATION) at 14:39

## 2019-01-30 RX ADMIN — ALBUTEROL SULFATE 1 MG: 2.5 SOLUTION RESPIRATORY (INHALATION) at 19:38

## 2019-01-30 NOTE — CONS
Assessment/Plan


Assessment/Plan


Hospital Course (Demo Recall)


IMP:


1.Hypotension-Now improved off levo and tolerating norvasc. NL EF by echo this 


admit


2.resp failure s/p extubation


3.cardiac arrest


4.pneumoperitoneum-resolved


5.Positive troponin-now trended neg


6.renal failure-improved


7.Leukocytosis


8. anemia


9, Thrombocytopenia-ongoing some mild improvement





Recc:


-ICU


-Continue norvasc


-start asa


-s/p abx's


-started on risperdal


-follow MS closely





Consultation Date/Type/Reason


Admit Date/Time


Jan 22, 2019 at 00:03


Initial Consult Date


1/22/19


Type of Consult


Cardiology


Reason for Consultation


positive troponin


Requesting Provider:  DONALDO CARRILLO MD


Date/Time of Note


DATE: 1/30/19 


TIME: 18:04





Exam/Review of Systems


Vital Signs


Vitals





Vital Signs


  Date      Temp  Pulse  Resp  B/P (MAP)   Pulse Ox  O2          O2 Flow    FiO2


Time                                                 Delivery    Rate


   1/30/19                                                             3.0


     14:43


   1/30/19           88    20                    95  Nasal                    32


     14:41                                           Cannula


   1/30/19  98.0                   135/75


     14:00                           (95)








Intake and Output





1/29/19 1/29/19 1/30/19





1414:59


22:59


06:59





IntakeIntake Total


480 ml


320 ml





OutputOutput Total


900 ml





BalanceBalance


-420 ml


320 ml














Exam


Exam


Review of Systems:


CONSTITUTIONAL:  No fevers, chills.


PULMONARY:  mild sob


CARDIOVASCULAR: No obvious chest pain/palpitations


GASTROINTESTINAL:  No nausea/vomiting.


GENITOURINARY:  No hematuria/dysuria.


MUSCULOSKELETAL:  No myagias/arthalgias.


PSYCHIATRIC:  The patient denies depression.


NEUROLOGIC: ? confused


Constitutional:  alert


Psych:  no complaints, confusion


Head:  normocephalic


ENMT:  mucosa pink and moist


Neck:  supple, jvd (8 cm watyer)


Respiratory:  diminished breath sounds (at bases/B)


Cardiovascular:  regular rate and rhythm


Gastrointestinal:  soft, non-tender


Musculoskeletal:  muscle weakness (mild generalized)


Extremities:  edema (none)





Labs


Result Diagram:  


1/26/19 0345                                                                    


           1/30/19 0622





Results 24hrs





Laboratory Tests


               Test
                               1/30/19
06:22


               Sodium Level                                142


               Potassium Level                             3.9


               Chloride Level                              102


               Carbon Dioxide Level                        33  H


               Anion Gap                                     7


               Blood Urea Nitrogen                         40  H


               Creatinine                                 0.84


               Est Glomerular Filtrat Rate
mL/min  > 60  



               Glucose Level                               111


               Calcium Level                               9.5


               Phosphorus Level                            3.7


               Magnesium Level                             2.4








Medications


Medications





Current Medications


Ondansetron HCl (Zofran Inj) 4 mg Q6H  PRN IV NAUSEA AND/OR VOMITING;  Start 


1/21/19 at 23:30


Acetaminophen (Tylenol Supp) 650 mg Q4H  PRN [place] PAIN LEVEL 1-3 OR FEVER;  Start 


1/21/19 at 23:30


Enoxaparin Sodium (Lovenox) 30 mg DAILY SC  Last administered on 1/30/19at 


08:25; Admin Dose 30 MG;  Start 1/22/19 at 09:00


Aspirin (Aspirin) 81 mg DAILY PO  Last administered on 1/30/19at 08:21; Admin 


Dose 81 MG;  Start 1/24/19 at 09:00


Famotidine (Pepcid) 20 mg Q12 PO  Last administered on 1/30/19at 08:21; Admin 


Dose 20 MG;  Start 1/24/19 at 21:00


Albuterol (Proventil 0.083% (Neb)) 2.5 mg Q6H RESP  THERAPY HHN  Last 


administered on 1/30/19at 14:39; Admin Dose 2.5 MG;  Start 1/24/19 at 21:52


Ipratropium Bromide (Atrovent 0.02% (Neb)) 0.5 mg Q6H RESP  THERAPY HHN  Last 


administered on 1/30/19at 14:39; Admin Dose 0.5 MG;  Start 1/24/19 at 21:53


Zolpidem Tartrate (Ambien) 5 mg HS  PRN PO INSOMNIA Last administered on 


1/27/19at 21:42; Admin Dose 5 MG;  Start 1/27/19 at 00:00


Amlodipine Besylate (Norvasc) 10 mg DAILY PO  Last administered on 1/30/19at 


09:07; Admin Dose 10 MG;  Start 1/27/19 at 15:00


Guaifenesin/ Dextromethorphan (Robitussin Dm Liquid Cup) 5 ml Q6H  PRN PO COUGH 


Last administered on 1/29/19at 02:20; Admin Dose 5 ML;  Start 1/27/19 at 20:00


Morphine Sulfate (morphine) 2 mg Q4H  PRN IV PAIN LEVEL 7-10 Last administered 


on 1/28/19at 00:35; Admin Dose 2 MG;  Start 1/27/19 at 20:30


Divalproex Sodium (Depakote) 250 mg Q8H PO  Last administered on 1/30/19at 


17:12; Admin Dose 250 MG;  Start 1/28/19 at 17:00


Risperidone (Risperdal) 0.5 mg BID PO ;  Start 1/30/19 at 21:00











CARISA IZAGUIRRE               Jan 30, 2019 18:07

## 2019-01-30 NOTE — PSY
Date/Time of Note


Date/Time of Note


DATE: 1/30/19 


TIME: 15:30





Psychiatric Subjective Eval


Subjective Evaluation


Chief Complaint:  aloc/ resp distress


History of present illness


Patient is a 68-year-old male with medical history of  COPD,BPH, seizure 


disorder and hyperlipidemia.  On a face-to-face evaluation, patient reports 


hearing voices and seeing things, patient states he has had hallucinations since


he was young and has been on psychiatric medication he however denies suicidal 


ideation and homicidal ideation and contracted for safety.  Patient reperfused 


to the to divulge the contents of his hallucination but agreed to take 


medications.  Risk and benefits of Risperdal explained and patient agreed that 


he will take the medication


Past psychiatric history


Long history of mental illness


Hospitalization:  other


Medical history


Problems


Medical Problems:


(1) Acute renal failure


Status: Acute  





(2) Acute respiratory failure


Status: Acute  





(3) Altered level of consciousness


Status: Acute  





(4) Cardiac arrest with successful resuscitation


Status: Acute  





(5) Hyperkalemia


Status: Acute  





(6) Hypothermia


Status: Acute  





(7) Pneumonia


Status: Acute  





(8) Pneumothorax on left


Status: Acute  





(9) Septic shock


Status: Acute  








Allergies:  


Coded Allergies:  


     No Known Allergy (Unverified , 1/21/19)





Substance Abuse


Substance abuse history:  No


Prior substance abuse treatmen:  No





Social History


Marital status:  single


DPA/Conservatorship:  No





Psychiatric Objective Eval


Review of Systems:


Review of Systems:  Not Applicable





Physical Examination:


Physical Examination:  Not Applicable


Appetite:  Decreased





Mental Status Examination:


Appearance:  Poor Hygiene


Eye Contact:  Fair


Behavior:  Cooperative


Speech:  Clear


AFFECT:  Constricted


Mood:  Anxious


Though Process:  Tangential


Thought Content:  Hallucinations


Orientation:  x2


Insight:  Mild


Judgement:  Mild


Attention Span:  Distractible


Laboratory Results





Laboratory Tests


               Test
                               1/30/19
06:22


               Sodium Level                          142 mmol/L


               Potassium Level                       3.9 mmol/L


               Chloride Level                        102 mmol/L


               Carbon Dioxide Level                   33 mmol/L


               Anion Gap                                      7


               Blood Urea Nitrogen                     40 mg/dl


               Creatinine                            0.84 mg/dl


               Est Glomerular Filtrat Rate
mL/min  > 60 mL/min 



               Glucose Level                          111 mg/dl


               Calcium Level                          9.5 mg/dl


               Phosphorus Level                       3.7 mg/dl


               Magnesium Level                        2.4 mg/dl








Assessment and Plan


Assessment/Diagnosis


Diagnosis


Schizoaffective disorder depressed type





Recommendation/Plan


Medication Management


Respiratory 0.5 mg twice daily, Depakote 250 mg every 8 hours, Ambien 5 mg 


nightly as needed.


Multiple antipsychotics:  No (Patient does not meets criteria for 5150 hold.)


Discharge Disposition:  Other


Legal Status:  Voluntary











LUIS ENRIQUE DIETRICH NP          Jan 30, 2019 15:33

## 2019-01-30 NOTE — PN
DATE:  01/30/2019

 

 

SUBJECTIVE:  The patient is stable, no events overnight.  No fevers, chills, nausea, or vomiting.

 

OBJECTIVE:

VITAL SIGNS:  Blood pressure is 140/68, respirations 18, pulse 64, temperature 97.6.

HEENT:  Head is normocephalic.

NECK:  Supple.

HEART:  Regular rate.

LUNGS:  Show diminished breath sounds at the base.

ABDOMEN:  Soft, nontender to palpation without rebound or guarding.

EXTREMITIES:  Negative for clubbing, cyanosis.  No edema.

DERMATOLOGIC:  No rashes.

MUSCULOSKELETAL:  No joint effusions.

NEUROLOGIC:  No change in exam.

 

MEDICATIONS:  The patient's medications have been reviewed.

 

LABORATORY DATA:  Laboratory data has been reviewed showed sodium 142, BUN 40, creatinine 0.84.  Whit
e count 3.4, hemoglobin 10.3, platelet count is 104.

 

ASSESSMENT AND PLAN:

1.  Nonoliguric acute kidney injury with unknown baseline creatinine.  Etiology is secondary to hemod
ynamics.  Renal function is improved.  Continue current treatment plans, supportive care, renally dos
e all medicines.

2.  Hypernatremia.  The patient's levels are improving.  Continue to encourage free water intake.

3.  Anemia.  Monitor hemoglobin and hematocrit levels.

4.  Mineral bone disorder, monitor calcium and phosphorus levels.

5.  Sepsis, status post shock.  The patient is completing antibiotic course.

6.  Status post respiratory failure.

7.  Status post cardiac arrest.

8.  Encephalopathy, resolved.

9.  Status post pneumothorax.

 

 

Dictated By: MARYAN RUCKER/NTS

DD:    01/30/2019 08:05:02

DT:    01/30/2019 08:43:24

Conf#: [id number]

DID#:  [id number]

CC: MAURI GRIGSBY MD; MIMI ZAVALA MD; DONALDO CARRILLO MD;*EndCC*

## 2019-01-30 NOTE — CONS
Assessment/Plan


Assessment/Plan


Assessment/Plan (Daily)


Assessment and recommendations;





1.  Patient admitted with respiratory failure status post extubation with marked


overall clinical improvement.


2.  Pneumoperitoneum of unknown etiology with spontaneous resolution.  Patient 


has not required any intervention.


3.  Advanced emphysema.


4.  Status post self removal of left-sided chest tube as well.





Continue current supportive care.  Discontinue Solu-Medrol.  Consider discharge.





Consultation Date/Type/Reason


Admit Date/Time


Jan 22, 2019 at 00:03


Initial Consult Date


1/22/19


Type of Consult


Pulmonary/critical care


Patient is a 68-year-old male who is a nursing home resident was sent over to 


the hospital because of hypothermia and altered mental status.  In the emergency


room, patient developed cardiac arrest requiring CPR and intubation.  In the 


course of workup the patient also required a chest tube placement on the left 


side.  CT imaging of the abdomen showed pneumoperitoneum with possibly 


perforated viscus.  By the time I saw the patient in ICU, patient is orally 


intubated and sedated.  History was obtained from medical records.





Past medical history; essentially unremarkable except for possibly COPD.


Medications; reviewed.  Patient is currently on dopamine at 6 mics per kilogram 


per minute, Levophed 20 mics per minute, propofol 25 mics per kilogram per josé miguel


te.  Other medications were also reviewed.


Allergies; none.


Social history; not available.


Family history and occupational history is also not available.


Review of system; unable to be obtained.


General exam; elderly male, orally intubated, sedated, currently in no distress.


Requesting Provider:  DONALDO CARRILLO MD


Date/Time of Note


DATE: 1/30/19 


TIME: 11:13





24 HR Interval Summary


Free Text/Dictation


Patient's condition is stable.  Denies any shortness of breath, chest pain, 


coughing or wheezing.


General exam; elderly male, laying comfortably in bed.  Awake and alert.  


Currently in no distress.





Exam/Review of Systems


Exam


Vitals





Vital Signs


  Date      Temp  Pulse  Resp  B/P (MAP)   Pulse Ox  O2          O2 Flow    FiO2


Time                                                 Delivery    Rate


   1/30/19           86    18                    96                           21


     08:34


   1/30/19  97.6                   114/68            Nasal


     08:00                           (83)            Cannula


   1/30/19                                                             3.0


     07:39








Intake and Output





1/29/19 1/29/19 1/30/19





1515:00


23:00


07:00





IntakeIntake Total


480 ml


320 ml





OutputOutput Total


900 ml





BalanceBalance


-420 ml


320 ml











Exam


H HEENT exam; supple neck, no JVD.  No lymphadenopathy.  Midline trachea.  No 


thyromegaly.


Chest exam; diminished but clear breath sounds.  S1-S2 audible, no murmurs.  


Regular rhythm.


Abdomen exam; soft, nontender.  No organomegaly.  Bowel sounds audible.


Extremity exam; no peripheral edema or clubbing.  


CNS exam; no focal deficit.





Results


Result Diagram:  


1/26/19 0345                                                                    


           1/30/19 0622





Results 24hrs





Laboratory Tests


               Test
                               1/30/19
06:22


               Sodium Level                                142


               Potassium Level                             3.9


               Chloride Level                              102


               Carbon Dioxide Level                        33  H


               Anion Gap                                     7


               Blood Urea Nitrogen                         40  H


               Creatinine                                 0.84


               Est Glomerular Filtrat Rate
mL/min  > 60  



               Glucose Level                               111


               Calcium Level                               9.5


               Phosphorus Level                            3.7


               Magnesium Level                             2.4








Medications


Medication





Current Medications


Ondansetron HCl (Zofran Inj) 4 mg Q6H  PRN IV NAUSEA AND/OR VOMITING;  Start 


1/21/19 at 23:30


Acetaminophen (Tylenol Supp) 650 mg Q4H  PRN [place] PAIN LEVEL 1-3 OR FEVER;  Start 


1/21/19 at 23:30


Enoxaparin Sodium (Lovenox) 30 mg DAILY SC  Last administered on 1/30/19at 


08:25; Admin Dose 30 MG;  Start 1/22/19 at 09:00


Aspirin (Aspirin) 81 mg DAILY PO  Last administered on 1/30/19at 08:21; Admin 


Dose 81 MG;  Start 1/24/19 at 09:00


Famotidine (Pepcid) 20 mg Q12 PO  Last administered on 1/30/19at 08:21; Admin Do


se 20 MG;  Start 1/24/19 at 21:00


Albuterol (Proventil 0.083% (Neb)) 2.5 mg Q6H RESP  THERAPY HHN  Last 


administered on 1/30/19at 02:04; Admin Dose 2.5 MG;  Start 1/24/19 at 21:52


Ipratropium Bromide (Atrovent 0.02% (Neb)) 0.5 mg Q6H RESP  THERAPY HHN  Last 


administered on 1/30/19at 02:04; Admin Dose 0.5 MG;  Start 1/24/19 at 21:53


Methylprednisolone Sodium Succinate (Solu-Medrol) 40 mg Q12 IV  Last 


administered on 1/30/19at 08:21; Admin Dose 40 MG;  Start 1/25/19 at 21:00


Zolpidem Tartrate (Ambien) 5 mg HS  PRN PO INSOMNIA Last administered on 


1/27/19at 21:42; Admin Dose 5 MG;  Start 1/27/19 at 00:00


Amlodipine Besylate (Norvasc) 10 mg DAILY PO  Last administered on 1/30/19at 


09:07; Admin Dose 10 MG;  Start 1/27/19 at 15:00


Guaifenesin/ Dextromethorphan (Robitussin Dm Liquid Cup) 5 ml Q6H  PRN PO COUGH 


Last administered on 1/29/19at 02:20; Admin Dose 5 ML;  Start 1/27/19 at 20:00


Morphine Sulfate (morphine) 2 mg Q4H  PRN [place] PAIN LEVEL 7-10 Last administered 


on 1/28/19at 00:35; Admin Dose 2 MG;  Start 1/27/19 at 20:30


Divalproex Sodium (Depakote) 250 mg Q8H PO  Last administered on 1/30/19at 


08:21; Admin Dose 250 MG;  Start 1/28/19 at 17:00











SHAGUFTA HUANG                    Jan 30, 2019 11:15

## 2019-01-30 NOTE — PN
Date/Time of Note


Date/Time of Note


DATE: 1/30/19 


TIME: 14:08





Assessment/Plan


VTE Prophylaxis


Risk score (from Ns)>0 risk:  5


SCD applied (from [facility]  No


SCD contraindicated:  low risk/ambulating


Pharmacological prophylaxis:  LMWH





Lines/Catheters


IV Catheter Type (from [facility]):  IJ CENTRAL LINE


Urinary Cath still in place:  Yes


Reason Cath still needed:  urinary retention





Assessment/Plan


Hospital Course


Patient is awake alert continues on supplemental oxygen without distress, 


pending psychiatric evaluation.


Assessment/Plan


-Sepsis with shock, resolving.  Dr. Dreyer is following in infection disease 


consultation.


-Status post cardiac arrest.   Dr. Jon is following in cardiology 


consultation.


-Left-sided pneumothorax, status post left side chest tube placement, s/p self 


d/c CT.


-Acute respiratory failure, continue ventilatory support bronchodilators.  Dr. Lim is following in pulmonology consultation.


-Pneumoperitoneum most likely due to cardiac arrest.  Dr. Lai is following in 


general surgery consultation. 


-Left axillary and brachial DVT, continue Lovenox


-Left lower lobe pneumonia


-Severe emphysema


-NATALIE with possible chronic kidney disease. Dr Hanson is following in nephrology


consultation.


-Bipolar disorder





Further recommendations based on clinical course.  Plan of care discussed with 


Dr. Miller.


Result Diagram:  


1/26/19 0345                                                                    


           1/30/19 0622





Results 24hrs





Laboratory Tests


               Test
                               1/30/19
06:22


               Sodium Level                                142


               Potassium Level                             3.9


               Chloride Level                              102


               Carbon Dioxide Level                        33  H


               Anion Gap                                     7


               Blood Urea Nitrogen                         40  H


               Creatinine                                 0.84


               Est Glomerular Filtrat Rate
mL/min  > 60  



               Glucose Level                               111


               Calcium Level                               9.5


               Phosphorus Level                            3.7


               Magnesium Level                             2.4








Exam/Review of Systems


Exam


Vitals





Vital Signs


  Date      Temp  Pulse  Resp  B/P (MAP)   Pulse Ox  O2          O2 Flow    FiO2


Time                                                 Delivery    Rate


   1/30/19           86    18                    96                           21


     08:34


   1/30/19  97.6                   114/68            Nasal


     08:00                           (83)            Cannula


   1/30/19                                                             3.0


     07:39








Intake and Output





1/29/19 1/29/19 1/30/19





1515:00


23:00


07:00





IntakeIntake Total


480 ml


320 ml





OutputOutput Total


900 ml





BalanceBalance


-420 ml


320 ml











Exam


Constitutional:  alert, oriented


Respiratory:  diminished breath sounds, wheezing


Cardiovascular:  nl pulses


Gastrointestinal:  soft, non-tender


Extremities:  normal pulses


Neurological:  nl mental status





Results


Results 24hrs





Laboratory Tests


               Test
                               1/30/19
06:22


               Sodium Level                                142


               Potassium Level                             3.9


               Chloride Level                              102


               Carbon Dioxide Level                        33  H


               Anion Gap                                     7


               Blood Urea Nitrogen                         40  H


               Creatinine                                 0.84


               Est Glomerular Filtrat Rate
mL/min  > 60  



               Glucose Level                               111


               Calcium Level                               9.5


               Phosphorus Level                            3.7


               Magnesium Level                             2.4








Medications


Medication





Current Medications


Ondansetron HCl (Zofran Inj) 4 mg Q6H  PRN IV NAUSEA AND/OR VOMITING;  Start 


1/21/19 at 23:30


Acetaminophen (Tylenol Supp) 650 mg Q4H  PRN [place] PAIN LEVEL 1-3 OR FEVER;  Start 


1/21/19 at 23:30


Enoxaparin Sodium (Lovenox) 30 mg DAILY SC  Last administered on 1/30/19at 


08:25; Admin Dose 30 MG;  Start 1/22/19 at 09:00


Aspirin (Aspirin) 81 mg DAILY PO  Last administered on 1/30/19at 08:21; Admin 


Dose 81 MG;  Start 1/24/19 at 09:00


Famotidine (Pepcid) 20 mg Q12 PO  Last administered on 1/30/19at 08:21; Admin 


Dose 20 MG;  Start 1/24/19 at 21:00


Albuterol (Proventil 0.083% (Neb)) 2.5 mg Q6H RESP  THERAPY HHN  Last ad


ministered on 1/30/19at 02:04; Admin Dose 2.5 MG;  Start 1/24/19 at 21:52


Ipratropium Bromide (Atrovent 0.02% (Neb)) 0.5 mg Q6H RESP  THERAPY HHN  Last 


administered on 1/30/19at 02:04; Admin Dose 0.5 MG;  Start 1/24/19 at 21:53


Zolpidem Tartrate (Ambien) 5 mg HS  PRN PO INSOMNIA Last administered on 


1/27/19at 21:42; Admin Dose 5 MG;  Start 1/27/19 at 00:00


Amlodipine Besylate (Norvasc) 10 mg DAILY PO  Last administered on 1/30/19at 


09:07; Admin Dose 10 MG;  Start 1/27/19 at 15:00


Guaifenesin/ Dextromethorphan (Robitussin Dm Liquid Cup) 5 ml Q6H  PRN PO COUGH 


Last administered on 1/29/19at 02:20; Admin Dose 5 ML;  Start 1/27/19 at 20:00


Morphine Sulfate (morphine) 2 mg Q4H  PRN [place] PAIN LEVEL 7-10 Last administered 


on 1/28/19at 00:35; Admin Dose 2 MG;  Start 1/27/19 at 20:30


Divalproex Sodium (Depakote) 250 mg Q8H PO  Last administered on 1/30/19at 


08:21; Admin Dose 250 MG;  Start 1/28/19 at 17:00











EVARISTO BELTRAN             Jan 30, 2019 14:10

## 2019-01-31 VITALS — SYSTOLIC BLOOD PRESSURE: 134 MMHG | HEART RATE: 75 BPM | RESPIRATION RATE: 20 BRPM | DIASTOLIC BLOOD PRESSURE: 69 MMHG

## 2019-01-31 VITALS — RESPIRATION RATE: 18 BRPM | HEART RATE: 76 BPM | DIASTOLIC BLOOD PRESSURE: 70 MMHG | SYSTOLIC BLOOD PRESSURE: 107 MMHG

## 2019-01-31 VITALS — RESPIRATION RATE: 20 BRPM | SYSTOLIC BLOOD PRESSURE: 103 MMHG | HEART RATE: 75 BPM | DIASTOLIC BLOOD PRESSURE: 61 MMHG

## 2019-01-31 VITALS — RESPIRATION RATE: 18 BRPM | DIASTOLIC BLOOD PRESSURE: 79 MMHG | SYSTOLIC BLOOD PRESSURE: 116 MMHG

## 2019-01-31 LAB
ADD MAN DIFF?: NO
ANION GAP: 5 (ref 5–13)
BASOPHIL #: 0 10^3/UL (ref 0–0.1)
BASOPHILS %: 0.1 % (ref 0–2)
BLOOD UREA NITROGEN: 34 MG/DL (ref 7–20)
CALCIUM: 9.2 MG/DL (ref 8.4–10.2)
CARBON DIOXIDE: 35 MMOL/L (ref 21–31)
CHLORIDE: 104 MMOL/L (ref 97–110)
CREATININE: 0.68 MG/DL (ref 0.61–1.24)
EOSINOPHILS #: 0 10^3/UL (ref 0–0.5)
EOSINOPHILS %: 0.1 % (ref 0–7)
GLUCOSE: 75 MG/DL (ref 70–220)
HEMATOCRIT: 38.8 % (ref 42–52)
HEMOGLOBIN: 12 G/DL (ref 14–18)
IMMATURE GRANS #M: 0.08 10^3/UL (ref 0–0.03)
IMMATURE GRANS % (M): 0.7 % (ref 0–0.43)
LYMPHOCYTES #: 1 10^3/UL (ref 0.8–2.9)
LYMPHOCYTES %: 9 % (ref 15–51)
MEAN CORPUSCULAR HEMOGLOBIN: 27.7 PG (ref 29–33)
MEAN CORPUSCULAR HGB CONC: 30.9 G/DL (ref 32–37)
MEAN CORPUSCULAR VOLUME: 89.6 FL (ref 82–101)
MEAN PLATELET VOLUME: 10.9 FL (ref 7.4–10.4)
MONOCYTE #: 1 10^3/UL (ref 0.3–0.9)
MONOCYTES %: 8.7 % (ref 0–11)
NEUTROPHIL #: 9 10^3/UL (ref 1.6–7.5)
NEUTROPHILS %: 81.4 % (ref 39–77)
NUCLEATED RED BLOOD CELLS #: 0 10^3/UL (ref 0–0)
NUCLEATED RED BLOOD CELLS%: 0 /100WBC (ref 0–0)
PLATELET COUNT: 141 10^3/UL (ref 140–415)
POTASSIUM: 4 MMOL/L (ref 3.5–5.1)
RED BLOOD COUNT: 4.33 10^6/UL (ref 4.7–6.1)
RED CELL DISTRIBUTION WIDTH: 16.2 % (ref 11.5–14.5)
SODIUM: 144 MMOL/L (ref 135–144)
WHITE BLOOD COUNT: 11 10^3/UL (ref 4.8–10.8)

## 2019-01-31 RX ADMIN — DIVALPROEX SODIUM 1 MG: 250 TABLET, DELAYED RELEASE ORAL at 01:57

## 2019-01-31 RX ADMIN — FUROSEMIDE 1 MG: 10 INJECTION, SOLUTION INTRAVENOUS at 09:05

## 2019-01-31 RX ADMIN — ALBUTEROL SULFATE 1 MG: 2.5 SOLUTION RESPIRATORY (INHALATION) at 07:41

## 2019-01-31 RX ADMIN — RISPERIDONE SCH MG: 0.25 TABLET ORAL at 20:33

## 2019-01-31 RX ADMIN — ALBUTEROL SULFATE 1 MG: 2.5 SOLUTION RESPIRATORY (INHALATION) at 21:00

## 2019-01-31 RX ADMIN — DIVALPROEX SODIUM SCH MG: 250 TABLET, DELAYED RELEASE ORAL at 01:57

## 2019-01-31 RX ADMIN — DIVALPROEX SODIUM 1 MG: 250 TABLET, DELAYED RELEASE ORAL at 09:05

## 2019-01-31 RX ADMIN — FAMOTIDINE 1 MG: 20 TABLET ORAL at 20:33

## 2019-01-31 RX ADMIN — ASPIRIN 81 MG SCH MG: 81 TABLET ORAL at 09:05

## 2019-01-31 RX ADMIN — GUAIFENESIN AND DEXTROMETHORPHAN 1 ML: 100; 10 SYRUP ORAL at 09:04

## 2019-01-31 RX ADMIN — RISPERIDONE 1 MG: 0.25 TABLET ORAL at 09:08

## 2019-01-31 RX ADMIN — FAMOTIDINE 1 MG: 20 TABLET ORAL at 09:05

## 2019-01-31 RX ADMIN — CASTOR OIL AND BALSAM, PERU 1 APPLIC: 788; 87 OINTMENT TOPICAL at 09:21

## 2019-01-31 RX ADMIN — RISPERIDONE 1 MG: 0.25 TABLET ORAL at 20:33

## 2019-01-31 RX ADMIN — ALBUTEROL SULFATE 1 MG: 2.5 SOLUTION RESPIRATORY (INHALATION) at 01:49

## 2019-01-31 RX ADMIN — ENOXAPARIN SODIUM SCH MG: 100 INJECTION SUBCUTANEOUS at 09:12

## 2019-01-31 RX ADMIN — ENOXAPARIN SODIUM 1 MG: 100 INJECTION SUBCUTANEOUS at 09:12

## 2019-01-31 RX ADMIN — FAMOTIDINE SCH MG: 20 TABLET ORAL at 20:33

## 2019-01-31 RX ADMIN — MORPHINE SULFATE 1 MG: 10 SOLUTION ORAL at 09:05

## 2019-01-31 RX ADMIN — MORPHINE SULFATE PRN MG: 10 SOLUTION ORAL at 09:05

## 2019-01-31 RX ADMIN — ASPIRIN 81 MG CHEWABLE TABLET 1 MG: 81 TABLET CHEWABLE at 09:05

## 2019-01-31 RX ADMIN — IPRATROPIUM BROMIDE 1 MG: 0.5 SOLUTION RESPIRATORY (INHALATION) at 14:49

## 2019-01-31 RX ADMIN — AMLODIPINE BESYLATE 1 MG: 10 TABLET ORAL at 09:07

## 2019-01-31 RX ADMIN — IPRATROPIUM BROMIDE 1 MG: 0.5 SOLUTION RESPIRATORY (INHALATION) at 01:49

## 2019-01-31 RX ADMIN — AMLODIPINE BESYLATE SCH MG: 10 TABLET ORAL at 09:07

## 2019-01-31 RX ADMIN — FAMOTIDINE SCH MG: 20 TABLET ORAL at 09:05

## 2019-01-31 RX ADMIN — RISPERIDONE SCH MG: 0.25 TABLET ORAL at 09:08

## 2019-01-31 RX ADMIN — DIVALPROEX SODIUM 1 MG: 250 TABLET, DELAYED RELEASE ORAL at 17:13

## 2019-01-31 RX ADMIN — IPRATROPIUM BROMIDE 1 MG: 0.5 SOLUTION RESPIRATORY (INHALATION) at 21:00

## 2019-01-31 RX ADMIN — DIVALPROEX SODIUM SCH MG: 250 TABLET, DELAYED RELEASE ORAL at 17:13

## 2019-01-31 RX ADMIN — ALBUTEROL SULFATE 1 MG: 2.5 SOLUTION RESPIRATORY (INHALATION) at 14:49

## 2019-01-31 RX ADMIN — IPRATROPIUM BROMIDE 1 MG: 0.5 SOLUTION RESPIRATORY (INHALATION) at 07:41

## 2019-01-31 RX ADMIN — DIVALPROEX SODIUM SCH MG: 250 TABLET, DELAYED RELEASE ORAL at 09:05

## 2019-01-31 NOTE — CONS
Assessment/Plan


Assessment/Plan


Hospital Course (Demo Recall)


IMP:


1.Hypotension-Now improved off levo and tolerating norvasc. NL EF by echo this 


admit


2.resp failure s/p extubation


3.cardiac arrest


4.pneumoperitoneum-resolved


5.Positive troponin-now trended neg


6.renal failure-improved


7.Leukocytosis


8. anemia


9, Thrombocytopenia-ongoing some mild improvement





Recc:


-Now on med-surg


-Continue norvasc


-Continue asa


-s/p abx's


-started on risperdal


-follow MS closely


-s/p dose of lasix with good output





Consultation Date/Type/Reason


Admit Date/Time


Jan 22, 2019 at 00:03


Initial Consult Date


1/22/19


Type of Consult


Cardiology


Reason for Consultation


positive troponin


Requesting Provider:  DONALDO CARRILLO MD


Date/Time of Note


DATE: 1/31/19 


TIME: 12:38





Exam/Review of Systems


Vital Signs


Vitals





Vital Signs


  Date      Temp  Pulse  Resp  B/P (MAP)   Pulse Ox  O2          O2 Flow    FiO2


Time                                                 Delivery    Rate


   1/31/19                                           Nasal             3.0


     08:00                                           Cannula


   1/31/19  97.9     76    18      107/70        96


     08:00                           (82)


   1/31/19                                                                    21


     07:41








Intake and Output





1/30/19 1/30/19 1/31/19





1515:00


23:00


07:00





IntakeIntake Total


620 ml


950 ml





OutputOutput Total


1300 ml


1600 ml


2250 ml





BalanceBalance


-680 ml


-1600 ml


-1300 ml














Exam


Exam


Review of Systems:


CONSTITUTIONAL:  No fevers, chills.


PULMONARY:  No sob


CARDIOVASCULAR: No chest pain/palpitations


GASTROINTESTINAL:  No nausea/vomiting.


GENITOURINARY:  No hematuria/dysuria.


MUSCULOSKELETAL:  No myagias/arthalgias.


PSYCHIATRIC:  The patient denies depression.


NEUROLOGIC:  confused


Constitutional:  alert, oriented


Psych:  no complaints


Head:  normocephalic


ENMT:  mucosa pink and moist


Neck:  supple, jvd (9 cm water)


Respiratory:  clear to auscultation


Cardiovascular:  regular rate and rhythm


Gastrointestinal:  soft, non-tender


Musculoskeletal:  muscle tone (normal)


Extremities:  edema (none)


Neurological:  confused





Labs


Result Diagram:  


1/31/19 0704                                                                    


           1/31/19 0704





Results 24hrs





Laboratory Tests


               Test
                                1/31/19
07:04


               White Blood Count                         11.0  #H


               Red Blood Count                            4.33  L


               Hemoglobin                                 12.0  L


               Hematocrit                                 38.8  L


               Mean Corpuscular Volume                     89.6


               Mean Corpuscular Hemoglobin                27.7  L


               Mean Corpuscular Hemoglobin
Concent       30.9  L



               Red Cell Distribution Width                16.2  H


               Platelet Count                              141  #


               Mean Platelet Volume                       10.9  H


               Immature Granulocytes %                   0.700  H


               Neutrophils %                              81.4  H


               Lymphocytes %                               9.0  L


               Monocytes %                                  8.7


               Eosinophils %                                0.1


               Basophils %                                  0.1


               Nucleated Red Blood Cells %                  0.0


               Immature Granulocytes #                   0.080  H


               Neutrophils #                               9.0  H


               Lymphocytes #                                1.0


               Monocytes #                                 1.0  H


               Eosinophils #                                0.0


               Basophils #                                  0.0


               Nucleated Red Blood Cells #                  0.0


               Sodium Level                                 144


               Potassium Level                              4.0


               Chloride Level                               104


               Carbon Dioxide Level                         35  H


               Anion Gap                                      5


               Blood Urea Nitrogen                          34  H


               Creatinine                                  0.68


               Est Glomerular Filtrat Rate
mL/min   > 60  



               Glucose Level                                 75


               Calcium Level                                9.2








Medications


Medications





Current Medications


Ondansetron HCl (Zofran Inj) 4 mg Q6H  PRN IV NAUSEA AND/OR VOMITING;  Start 


1/21/19 at 23:30


Acetaminophen (Tylenol Supp) 650 mg Q4H  PRN [place] PAIN LEVEL 1-3 OR FEVER;  Start 


1/21/19 at 23:30


Enoxaparin Sodium (Lovenox) 30 mg DAILY SC  Last administered on 1/31/19at 


09:12; Admin Dose 30 MG;  Start 1/22/19 at 09:00


Aspirin (Aspirin) 81 mg DAILY PO  Last administered on 1/31/19at 09:05; Admin 


Dose 81 MG;  Start 1/24/19 at 09:00


Famotidine (Pepcid) 20 mg Q12 PO  Last administered on 1/31/19at 09:05; Admin 


Dose 20 MG;  Start 1/24/19 at 21:00


Albuterol (Proventil 0.083% (Neb)) 2.5 mg Q6H RESP  THERAPY HHN  Last 


administered on 1/31/19at 07:41; Admin Dose 2.5 MG;  Start 1/24/19 at 21:52


Ipratropium Bromide (Atrovent 0.02% (Neb)) 0.5 mg Q6H RESP  THERAPY HHN  Last 


administered on 1/31/19at 07:41; Admin Dose 0.5 MG;  Start 1/24/19 at 21:53


Zolpidem Tartrate (Ambien) 5 mg HS  PRN PO INSOMNIA Last administered on 


1/30/19at 23:03; Admin Dose 5 MG;  Start 1/27/19 at 00:00


Amlodipine Besylate (Norvasc) 10 mg DAILY PO  Last administered on 1/31/19at 


09:07; Admin Dose 10 MG;  Start 1/27/19 at 15:00


Guaifenesin/ Dextromethorphan (Robitussin Dm Liquid Cup) 5 ml Q6H  PRN PO COUGH 


Last administered on 1/31/19at 09:04; Admin Dose 5 ML;  Start 1/27/19 at 20:00


Divalproex Sodium (Depakote) 250 mg Q8H PO  Last administered on 1/31/19at 


09:05; Admin Dose 250 MG;  Start 1/28/19 at 17:00


Risperidone (Risperdal) 0.5 mg BID PO  Last administered on 1/31/19at 09:08; 


Admin Dose 0.5 MG;  Start 1/30/19 at 21:00


Morphine Sulfate (morphine) 6 mg Q4H  PRN PO [place] PAIN LEVEL 7-10 Last 


administered on 1/31/19at 09:05; Admin Dose 6 MG;  Start 1/30/19 at 21:30











CARISA IZAGUIRRE               Jan 31, 2019 12:40

## 2019-01-31 NOTE — PN
DATE:  01/31/2019

 

 

SUBJECTIVE:  The patient is stable.  The patient remains on 3 liters nasal cannula.  No other events 
noted.

 

OBJECTIVE:

VITAL SIGNS:  Blood pressure is 107/70, respirations 18, pulse 76, temperature 97.9.

HEENT:  Head is normocephalic.

NECK:  Supple.

HEART:  Regular rate.

LUNGS:  Show diminished breath sounds at the base.

ABDOMEN:  Soft, nontender to palpation without rebound or guarding.

EXTREMITIES:  Negative for clubbing, cyanosis, positive edema.

DERMATOLOGIC:  No rashes.

MUSCULOSKELETAL:  No joint effusion.

NEUROLOGIC:  No change in exam.

 

MEDICATIONS:  The patient's medications have been reviewed.

 

LABORATORY DATA:  From 01/31/2019 is pending.

 

ASSESSMENT AND PLAN:

1.  Nonoliguric acute kidney injury with unknown baseline creatinine.  Etiology is secondary to hemod
ynamics.  Renal function is improved.  Continue current treatment plan.

2.  Hypernatremia, improved.  Continue current free water intake.

3.  Anemia.  Monitor hemoglobin and hematocrit levels.

4.  Mineral bone disorder.  Monitor calcium and phosphorus levels.

5.  Volume overload.  Etiology is secondary to recent IV hydration, questionable heart failure.  We w
ill start the patient on low-dose diuretic therapy, monitor I's and O's closely.

6.  Sepsis, status post shock.

7.  Respiratory failure.  The patient remains on 3 liters nasal cannula.  Continue to monitor.  We wi
ll check chest x-ray.  Continue diuretic therapy as stated above.  Unclear if this is a component of 
heart failure.

8.  Status post cardiac arrest.

9.  Encephalopathy.

10.  Status post pneumothorax.

 

 

Dictated By: MARYAN RUCKER/NTS

DD:    01/31/2019 08:19:46

DT:    01/31/2019 09:04:35

Conf#: [id number]

DID#:  [id number]

CC: KARLO DUDLEY NP; MAURI GRIGSBY MD; DONALDO CARRILLO MD;*EndCC*

## 2019-01-31 NOTE — PN
Date/Time of Note


Date/Time of Note


DATE: 1/31/19 


TIME: 19:40





Assessment/Plan


VTE Prophylaxis


[place] [facility] risk:  9


SCD applied [place] [facility]  No


SCD contraindicated:  other


Pharmacological prophylaxis:  LMWH





Lines/Catheters


IV Catheter Type (from [facility]):  IJ CENTRAL LINE


Urinary Cath still in place:  Yes


Reason Cath still needed:  urinary retention





Assessment/Plan


Hospital Course


Patient continues on supplemental oxygen and breathing treatments.  Chest x-ray 


with worsened aeration with increased bibasilar patchy opacities, worse on the 


right.  That is post Lasix tonight.  Continue to monitor.


Assessment/Plan


-Sepsis with shock, resolving.  Dr. Dreyer is following in infection disease 


consultation.


-Status post cardiac arrest.   Dr. Jon is following in cardiology 


consultation.


-Left-sided pneumothorax, status post left side chest tube placement, s/p self 


d/c CT.


-Acute respiratory failure, continue ventilatory support bronchodilators.  Dr. Lim is following in pulmonology consultation.


-Pneumoperitoneum most likely due to cardiac arrest.  Dr. Lai is following in 


general surgery consultation. 


-Left axillary and brachial DVT, continue Lovenox


-Left lower lobe pneumonia


-Severe emphysema


-NATALIE with possible chronic kidney disease. Dr Hanson is following in nephrology


consultation.


-Schizoaffective disorder depressed type.  Psychiatric consult is appreciated, 


continue Risperdal Depakote





Further recommendations based on clinical course.  Plan of care discussed with 


Dr. Miller.


Result Diagram:  


1/31/19 0704                                                                    


           1/31/19 0704





Results 24hrs





Laboratory Tests


               Test
                                1/31/19
07:04


               White Blood Count                         11.0  #H


               Red Blood Count                            4.33  L


               Hemoglobin                                 12.0  L


               Hematocrit                                 38.8  L


               Mean Corpuscular Volume                     89.6


               Mean Corpuscular Hemoglobin                27.7  L


               Mean Corpuscular Hemoglobin
Concent       30.9  L



               Red Cell Distribution Width                16.2  H


               Platelet Count                              141  #


               Mean Platelet Volume                       10.9  H


               Immature Granulocytes %                   0.700  H


               Neutrophils %                              81.4  H


               Lymphocytes %                               9.0  L


               Monocytes %                                  8.7


               Eosinophils %                                0.1


               Basophils %                                  0.1


               Nucleated Red Blood Cells %                  0.0


               Immature Granulocytes #                   0.080  H


               Neutrophils #                               9.0  H


               Lymphocytes #                                1.0


               Monocytes #                                 1.0  H


               Eosinophils #                                0.0


               Basophils #                                  0.0


               Nucleated Red Blood Cells #                  0.0


               Sodium Level                                 144


               Potassium Level                              4.0


               Chloride Level                               104


               Carbon Dioxide Level                         35  H


               Anion Gap                                      5


               Blood Urea Nitrogen                          34  H


               Creatinine                                  0.68


               Est Glomerular Filtrat Rate
mL/min   > 60  



               Glucose Level                                 75


               Calcium Level                                9.2








Exam/Review of Systems


Exam


Vitals





Vital Signs


  Date      Temp  Pulse  Resp  B/P (MAP)   Pulse Ox  O2          O2 Flow    FiO2


Time                                                 Delivery    Rate


   1/31/19           79    20                    92  Nasal             5.0


     14:50                                           Cannula


   1/31/19                         116/79


     14:00                           (91)


   1/31/19  97.9


     08:00


   1/31/19                                                                    21


     07:41








Intake and Output





1/30/19 1/30/19 1/31/19





1515:00


23:00


07:00





IntakeIntake Total


620 ml


950 ml





OutputOutput Total


1300 ml


1600 ml


2250 ml





BalanceBalance


-680 ml


-1600 ml


-1300 ml














Results


Results 24hrs





Laboratory Tests


               Test
                                1/31/19
07:04


               White Blood Count                         11.0  #H


               Red Blood Count                            4.33  L


               Hemoglobin                                 12.0  L


               Hematocrit                                 38.8  L


               Mean Corpuscular Volume                     89.6


               Mean Corpuscular Hemoglobin                27.7  L


               Mean Corpuscular Hemoglobin
Concent       30.9  L



               Red Cell Distribution Width                16.2  H


               Platelet Count                              141  #


               Mean Platelet Volume                       10.9  H


               Immature Granulocytes %                   0.700  H


               Neutrophils %                              81.4  H


               Lymphocytes %                               9.0  L


               Monocytes %                                  8.7


               Eosinophils %                                0.1


               Basophils %                                  0.1


               Nucleated Red Blood Cells %                  0.0


               Immature Granulocytes #                   0.080  H


               Neutrophils #                               9.0  H


               Lymphocytes #                                1.0


               Monocytes #                                 1.0  H


               Eosinophils #                                0.0


               Basophils #                                  0.0


               Nucleated Red Blood Cells #                  0.0


               Sodium Level                                 144


               Potassium Level                              4.0


               Chloride Level                               104


               Carbon Dioxide Level                         35  H


               Anion Gap                                      5


               Blood Urea Nitrogen                          34  H


               Creatinine                                  0.68


               Est Glomerular Filtrat Rate
mL/min   > 60  



               Glucose Level                                 75


               Calcium Level                                9.2








Medications


Medication





Current Medications


Ondansetron HCl (Zofran Inj) 4 mg Q6H  PRN IV NAUSEA AND/OR VOMITING;  Start 


1/21/19 at 23:30


Acetaminophen (Tylenol Supp) 650 mg Q4H  PRN [place] PAIN LEVEL 1-3 OR FEVER;  Start 


1/21/19 at 23:30


Enoxaparin Sodium (Lovenox) 30 mg DAILY SC  Last administered on 1/31/19at 


09:12; Admin Dose 30 MG;  Start 1/22/19 at 09:00


Aspirin (Aspirin) 81 mg DAILY PO  Last administered on 1/31/19at 09:05; Admin 


Dose 81 MG;  Start 1/24/19 at 09:00


Famotidine (Pepcid) 20 mg Q12 PO  Last administered on 1/31/19at 09:05; Admin 


Dose 20 MG;  Start 1/24/19 at 21:00


Albuterol (Proventil 0.083% (Neb)) 2.5 mg Q6H RESP  THERAPY HHN  Last 


administered on 1/31/19at 14:49; Admin Dose 2.5 MG;  Start 1/24/19 at 21:52


Ipratropium Bromide (Atrovent 0.02% (Neb)) 0.5 mg Q6H RESP  THERAPY HHN  Last 


administered on 1/31/19at 14:49; Admin Dose 0.5 MG;  Start 1/24/19 at 21:53


Zolpidem Tartrate (Ambien) 5 mg HS  PRN PO INSOMNIA Last administered on 


1/30/19at 23:03; Admin Dose 5 MG;  Start 1/27/19 at 00:00


Amlodipine Besylate (Norvasc) 10 mg DAILY PO  Last administered on 1/31/19at 


09:07; Admin Dose 10 MG;  Start 1/27/19 at 15:00


Guaifenesin/ Dextromethorphan (Robitussin Dm Liquid Cup) 5 ml Q6H  PRN PO COUGH 


Last administered on 1/31/19at 09:04; Admin Dose 5 ML;  Start 1/27/19 at 20:00


Divalproex Sodium (Depakote) 250 mg Q8H PO  Last administered on 1/31/19at 


17:13; Admin Dose 250 MG;  Start 1/28/19 at 17:00


Risperidone (Risperdal) 0.5 mg BID PO  Last administered on 1/31/19at 09:08; 


Admin Dose 0.5 MG;  Start 1/30/19 at 21:00


Morphine Sulfate (morphine) 6 mg Q4H  PRN PO SEVERE PAIN LEVEL 7-10 Last admin


istered on 1/31/19at 09:05; Admin Dose 6 MG;  Start 1/30/19 at 21:30











EVARISTO BELTRAN 31, 2019 19:46

## 2019-02-01 VITALS — RESPIRATION RATE: 20 BRPM | DIASTOLIC BLOOD PRESSURE: 79 MMHG | SYSTOLIC BLOOD PRESSURE: 119 MMHG | HEART RATE: 92 BPM

## 2019-02-01 VITALS — SYSTOLIC BLOOD PRESSURE: 108 MMHG | HEART RATE: 81 BPM | DIASTOLIC BLOOD PRESSURE: 60 MMHG

## 2019-02-01 VITALS — HEART RATE: 97 BPM | DIASTOLIC BLOOD PRESSURE: 76 MMHG | RESPIRATION RATE: 16 BRPM | SYSTOLIC BLOOD PRESSURE: 119 MMHG

## 2019-02-01 VITALS — SYSTOLIC BLOOD PRESSURE: 115 MMHG | DIASTOLIC BLOOD PRESSURE: 74 MMHG | RESPIRATION RATE: 18 BRPM | HEART RATE: 80 BPM

## 2019-02-01 LAB
ADD MAN DIFF?: NO
ANION GAP: 1 (ref 5–13)
BASOPHIL #: 0 10^3/UL (ref 0–0.1)
BASOPHILS %: 0.3 % (ref 0–2)
BLOOD UREA NITROGEN: 30 MG/DL (ref 7–20)
CALCIUM: 9.1 MG/DL (ref 8.4–10.2)
CARBON DIOXIDE: 35 MMOL/L (ref 21–31)
CHLORIDE: 102 MMOL/L (ref 97–110)
CREATININE: 0.75 MG/DL (ref 0.61–1.24)
EOSINOPHILS #: 0.2 10^3/UL (ref 0–0.5)
EOSINOPHILS %: 1.6 % (ref 0–7)
GLUCOSE: 77 MG/DL (ref 70–220)
HEMATOCRIT: 38.8 % (ref 42–52)
HEMOGLOBIN: 12.1 G/DL (ref 14–18)
IMMATURE GRANS #M: 0.04 10^3/UL (ref 0–0.03)
IMMATURE GRANS % (M): 0.4 % (ref 0–0.43)
LYMPHOCYTES #: 1.6 10^3/UL (ref 0.8–2.9)
LYMPHOCYTES %: 13.8 % (ref 15–51)
MAGNESIUM: 2.1 MG/DL (ref 1.7–2.5)
MEAN CORPUSCULAR HEMOGLOBIN: 28.1 PG (ref 29–33)
MEAN CORPUSCULAR HGB CONC: 31.2 G/DL (ref 32–37)
MEAN CORPUSCULAR VOLUME: 90.2 FL (ref 82–101)
MEAN PLATELET VOLUME: 11.1 FL (ref 7.4–10.4)
MONOCYTE #: 0.8 10^3/UL (ref 0.3–0.9)
MONOCYTES %: 7 % (ref 0–11)
NEUTROPHIL #: 8.7 10^3/UL (ref 1.6–7.5)
NEUTROPHILS %: 76.9 % (ref 39–77)
NUCLEATED RED BLOOD CELLS #: 0 10^3/UL (ref 0–0)
NUCLEATED RED BLOOD CELLS%: 0 /100WBC (ref 0–0)
PHOSPHORUS: 2.6 MG/DL (ref 2.5–4.9)
PLATELET COUNT: 132 10^3/UL (ref 140–415)
POSITIVE DIFF: (no result)
POTASSIUM: 4.4 MMOL/L (ref 3.5–5.1)
RED BLOOD COUNT: 4.3 10^6/UL (ref 4.7–6.1)
RED CELL DISTRIBUTION WIDTH: 16.3 % (ref 11.5–14.5)
SODIUM: 138 MMOL/L (ref 135–144)
WHITE BLOOD COUNT: 11.3 10^3/UL (ref 4.8–10.8)

## 2019-02-01 RX ADMIN — IPRATROPIUM BROMIDE 1 MG: 0.5 SOLUTION RESPIRATORY (INHALATION) at 19:21

## 2019-02-01 RX ADMIN — ALBUTEROL SULFATE 1 MG: 2.5 SOLUTION RESPIRATORY (INHALATION) at 19:21

## 2019-02-01 RX ADMIN — ALBUTEROL SULFATE 1 MG: 2.5 SOLUTION RESPIRATORY (INHALATION) at 09:04

## 2019-02-01 RX ADMIN — RISPERIDONE 1 MG: 0.25 TABLET ORAL at 08:35

## 2019-02-01 RX ADMIN — MEROPENEM SCH MLS/HR: 1 INJECTION, POWDER, FOR SOLUTION INTRAVENOUS at 20:51

## 2019-02-01 RX ADMIN — IPRATROPIUM BROMIDE 1 MG: 0.5 SOLUTION RESPIRATORY (INHALATION) at 02:14

## 2019-02-01 RX ADMIN — DIVALPROEX SODIUM 1 MG: 250 TABLET, DELAYED RELEASE ORAL at 01:36

## 2019-02-01 RX ADMIN — ONDANSETRON HYDROCHLORIDE 1 MG: 2 INJECTION, SOLUTION INTRAMUSCULAR; INTRAVENOUS at 18:46

## 2019-02-01 RX ADMIN — IPRATROPIUM BROMIDE 1 MG: 0.5 SOLUTION RESPIRATORY (INHALATION) at 09:04

## 2019-02-01 RX ADMIN — FAMOTIDINE SCH MG: 20 TABLET ORAL at 08:36

## 2019-02-01 RX ADMIN — DIVALPROEX SODIUM 1 MG: 250 TABLET, DELAYED RELEASE ORAL at 08:35

## 2019-02-01 RX ADMIN — MEROPENEM 1 MLS/HR: 1 INJECTION, POWDER, FOR SOLUTION INTRAVENOUS at 20:51

## 2019-02-01 RX ADMIN — IPRATROPIUM BROMIDE 1 MG: 0.5 SOLUTION RESPIRATORY (INHALATION) at 19:42

## 2019-02-01 RX ADMIN — ENOXAPARIN SODIUM SCH MG: 100 INJECTION SUBCUTANEOUS at 08:38

## 2019-02-01 RX ADMIN — DIVALPROEX SODIUM SCH MG: 250 TABLET, DELAYED RELEASE ORAL at 16:33

## 2019-02-01 RX ADMIN — FUROSEMIDE 1 MG: 10 INJECTION, SOLUTION INTRAVENOUS at 16:36

## 2019-02-01 RX ADMIN — FAMOTIDINE SCH MG: 20 TABLET ORAL at 20:51

## 2019-02-01 RX ADMIN — DIVALPROEX SODIUM 1 MG: 250 TABLET, DELAYED RELEASE ORAL at 16:33

## 2019-02-01 RX ADMIN — DIVALPROEX SODIUM SCH MG: 250 TABLET, DELAYED RELEASE ORAL at 01:36

## 2019-02-01 RX ADMIN — VANCOMYCIN HYDROCHLORIDE 1 MLS/HR: 500 INJECTION, POWDER, LYOPHILIZED, FOR SOLUTION INTRAVENOUS at 16:26

## 2019-02-01 RX ADMIN — CASTOR OIL AND BALSAM, PERU 1 APPLIC: 788; 87 OINTMENT TOPICAL at 08:36

## 2019-02-01 RX ADMIN — ALBUTEROL SULFATE 1 MG: 2.5 SOLUTION RESPIRATORY (INHALATION) at 15:09

## 2019-02-01 RX ADMIN — IPRATROPIUM BROMIDE 1 MG: 0.5 SOLUTION RESPIRATORY (INHALATION) at 15:09

## 2019-02-01 RX ADMIN — ALBUTEROL SULFATE 1 MG: 2.5 SOLUTION RESPIRATORY (INHALATION) at 02:14

## 2019-02-01 RX ADMIN — AMLODIPINE BESYLATE SCH MG: 10 TABLET ORAL at 08:36

## 2019-02-01 RX ADMIN — ASPIRIN 81 MG CHEWABLE TABLET 1 MG: 81 TABLET CHEWABLE at 08:36

## 2019-02-01 RX ADMIN — ENOXAPARIN SODIUM 1 MG: 100 INJECTION SUBCUTANEOUS at 08:38

## 2019-02-01 RX ADMIN — RISPERIDONE SCH MG: 0.25 TABLET ORAL at 08:35

## 2019-02-01 RX ADMIN — MEROPENEM 1 MLS/HR: 1 INJECTION, POWDER, FOR SOLUTION INTRAVENOUS at 15:34

## 2019-02-01 RX ADMIN — RISPERIDONE 1 MG: 1 TABLET ORAL at 20:51

## 2019-02-01 RX ADMIN — FAMOTIDINE 1 MG: 20 TABLET ORAL at 20:51

## 2019-02-01 RX ADMIN — MEROPENEM SCH MLS/HR: 1 INJECTION, POWDER, FOR SOLUTION INTRAVENOUS at 15:34

## 2019-02-01 RX ADMIN — ASPIRIN 81 MG SCH MG: 81 TABLET ORAL at 08:36

## 2019-02-01 RX ADMIN — DIVALPROEX SODIUM SCH MG: 250 TABLET, DELAYED RELEASE ORAL at 08:35

## 2019-02-01 RX ADMIN — FAMOTIDINE 1 MG: 20 TABLET ORAL at 08:36

## 2019-02-01 RX ADMIN — ALBUTEROL SULFATE 1 MG: 2.5 SOLUTION RESPIRATORY (INHALATION) at 19:42

## 2019-02-01 RX ADMIN — AMLODIPINE BESYLATE 1 MG: 10 TABLET ORAL at 08:36

## 2019-02-01 NOTE — PN
Date/Time of Note


Date/Time of Note


DATE: 2/1/19 


TIME: 17:50





Assessment/Plan


VTE Prophylaxis


Risk score (from [facility]0 risk:  10


SCD applied (from [facility]  No


SCD contraindicated:  other


Pharmacological prophylaxis:  other





Lines/Catheters


IV Catheter Type (from [facility]):  IJ CENTRAL LINE


Urinary Cath still in place:  Yes


Reason Cath still needed:  urinary retention





Assessment/Plan


Assessment/Plan


-Sepsis with shock, resolving.  Dr. Dreyer is following in infection disease 


consultation.


-Status post cardiac arrest.   Dr. Jon is following in cardiology 


consultation.


-Left-sided pneumothorax, status post left side chest tube placement, s/p self d


/c CT.


-Acute respiratory failure, continue ventilatory support bronchodilators.  Dr. Lim is following in pulmonology consultation.


-Pneumoperitoneum most likely due to cardiac arrest.  Dr. Lai is following in 


general surgery consultation. 


-Left axillary and brachial DVT, continue Lovenox


-Left lower lobe pneumonia


-Severe emphysema


-NATALIE with possible chronic kidney disease. Dr Hanson is following in nephrology


consultation.


-Schizoaffective disorder depressed type.  Psychiatric consult is appreciated, 


continue Risperdal Depakote





Further recommendations based on clinical course.  Plan of care discussed with 


Dr. Miller.


Result Diagram:  


2/1/19 0633                                                                     


          2/1/19 0633





Results 24hrs





Laboratory Tests


               Test
                                2/1/19
06:33


               White Blood Count                         11.3  H


               Red Blood Count                           4.30  L


               Hemoglobin                                12.1  L


               Hematocrit                                38.8  L


               Mean Corpuscular Volume                    90.2


               Mean Corpuscular Hemoglobin               28.1  L


               Mean Corpuscular Hemoglobin
Concent      31.2  L



               Red Cell Distribution Width               16.3  H


               Platelet Count                             132  L


               Mean Platelet Volume                      11.1  H


               Immature Granulocytes %                   0.400


               Neutrophils %                              76.9


               Lymphocytes %                             13.8  L


               Monocytes %                                 7.0


               Eosinophils %                               1.6


               Basophils %                                 0.3


               Nucleated Red Blood Cells %                 0.0


               Immature Granulocytes #                  0.040  H


               Neutrophils #                              8.7  H


               Lymphocytes #                               1.6


               Monocytes #                                 0.8


               Eosinophils #                               0.2


               Basophils #                                 0.0


               Nucleated Red Blood Cells #                 0.0


               Sodium Level                                138


               Potassium Level                             4.4


               Chloride Level                              102


               Carbon Dioxide Level                        35  H


               Anion Gap                                    1  L


               Blood Urea Nitrogen                         30  H


               Creatinine                                 0.75


               Est Glomerular Filtrat Rate
mL/min   > 60  



               Glucose Level                                77


               Calcium Level                               9.1


               Phosphorus Level                            2.6


               Magnesium Level                             2.1








Subjective


24 Hr Interval Summary


Constitutional:  requiring O2


Eyes:  no complaints


ENT:  no complaints


Respiratory:  shortness of breath


Cardiovascular:  no complaints


Gastrointestinal:  no complaints


Genitourinary:  no complaints





Exam/Review of Systems


Exam


Vitals





Vital Signs


  Date      Temp  Pulse  Resp  B/P (MAP)   Pulse Ox  O2          O2 Flow    FiO2


Time                                                 Delivery    Rate


    2/1/19           85    20                    88  Nasal             5.0


     15:11                                           Cannula


    2/1/19  98.8                   108/60


     14:47                           (76)


   1/31/19                                                                    21


     07:41








Intake and Output





1/31/19 1/31/19 2/1/19





1515:00


23:00


07:00





IntakeIntake Total


240 ml


380 ml





OutputOutput Total


1400 ml





BalanceBalance


-1160 ml


380 ml











Constitutional:  alert, frail


Psych:  no complaints


Head:  normocephalic


Eyes:  EOMI, nl lids, nl sclera


ENMT:  nl external ears & nose


Neck:  supple


Respiratory:  diminished breath sounds


Cardiovascular:  nl pulses, other (S1S2)


Gastrointestinal:  soft, non-tender


Musculoskeletal:  muscle weakness


Extremities:  normal pulses


Neurological:  nl speech, other (alert/responsive)


Lymph:  nontender





Results


Results 24hrs





Laboratory Tests


               Test
                                2/1/19
06:33


               White Blood Count                         11.3  H


               Red Blood Count                           4.30  L


               Hemoglobin                                12.1  L


               Hematocrit                                38.8  L


               Mean Corpuscular Volume                    90.2


               Mean Corpuscular Hemoglobin               28.1  L


               Mean Corpuscular Hemoglobin
Concent      31.2  L



               Red Cell Distribution Width               16.3  H


               Platelet Count                             132  L


               Mean Platelet Volume                      11.1  H


               Immature Granulocytes %                   0.400


               Neutrophils %                              76.9


               Lymphocytes %                             13.8  L


               Monocytes %                                 7.0


               Eosinophils %                               1.6


               Basophils %                                 0.3


               Nucleated Red Blood Cells %                 0.0


               Immature Granulocytes #                  0.040  H


               Neutrophils #                              8.7  H


               Lymphocytes #                               1.6


               Monocytes #                                 0.8


               Eosinophils #                               0.2


               Basophils #                                 0.0


               Nucleated Red Blood Cells #                 0.0


               Sodium Level                                138


               Potassium Level                             4.4


               Chloride Level                              102


               Carbon Dioxide Level                        35  H


               Anion Gap                                    1  L


               Blood Urea Nitrogen                         30  H


               Creatinine                                 0.75


               Est Glomerular Filtrat Rate
mL/min   > 60  



               Glucose Level                                77


               Calcium Level                               9.1


               Phosphorus Level                            2.6


               Magnesium Level                             2.1








Medications


Medication





Current Medications


Ondansetron HCl (Zofran Inj) 4 mg Q6H  PRN IV NAUSEA AND/OR VOMITING;  Start 


1/21/19 at 23:30


Acetaminophen (Tylenol Supp) 650 mg Q4H  PRN [place] PAIN LEVEL 1-3 OR FEVER;  Start 


1/21/19 at 23:30


Enoxaparin Sodium (Lovenox) 30 mg DAILY SC  Last administered on 2/1/19at 08:38;


Admin Dose 30 MG;  Start 1/22/19 at 09:00


Aspirin (Aspirin) 81 mg DAILY PO  Last administered on 2/1/19at 08:36; Admin 


Dose 81 MG;  Start 1/24/19 at 09:00


Famotidine (Pepcid) 20 mg Q12 PO  Last administered on 2/1/19at 08:36; Admin 


Dose 20 MG;  Start 1/24/19 at 21:00


Albuterol (Proventil 0.083% (Neb)) 2.5 mg Q6H RESP  THERAPY HHN  Last 


administered on 2/1/19at 15:09; Admin Dose 2.5 MG;  Start 1/24/19 at 21:52


Ipratropium Bromide (Atrovent 0.02% (Neb)) 0.5 mg Q6H RESP  THERAPY HHN  Last 


administered on 2/1/19at 15:09; Admin Dose 0.5 MG;  Start 1/24/19 at 21:53


Zolpidem Tartrate (Ambien) 5 mg HS  PRN PO INSOMNIA Last administered on 


1/30/19at 23:03; Admin Dose 5 MG;  Start 1/27/19 at 00:00


Amlodipine Besylate (Norvasc) 10 mg DAILY PO  Last administered on 2/1/19at 


08:36; Admin Dose 10 MG;  Start 1/27/19 at 15:00


Guaifenesin/ Dextromethorphan (Robitussin Dm Liquid Cup) 5 ml Q6H  PRN PO COUGH 


Last administered on 1/31/19at 09:04; Admin Dose 5 ML;  Start 1/27/19 at 20:00


Divalproex Sodium (Depakote) 250 mg Q8H PO  Last administered on 2/1/19at 16:33;


Admin Dose 250 MG;  Start 1/28/19 at 17:00


Morphine Sulfate (morphine) 6 mg Q4H  PRN PO SEVERE PAIN LEVEL 7-10 Last 


administered on 1/31/19at 09:05; Admin Dose 6 MG;  Start 1/30/19 at 21:30


Albuterol/ Ipratropium (Duoneb) 3 ml Q3H RESP THERAPY  PRN HHN SHORTNESS OF 


BREATH;  Start 1/31/19 at 20:00


Risperidone (Risperdal) 1 mg BID PO ;  Start 2/1/19 at 21:00


Vancomycin HCl (Vanco Iv Per Pharmacy) VANCOMYCIN PER PHARMACY PER PROTOCOL XX ;


 Start 2/1/19 at 15:00


Meropenem/Sodium Chloride 50 ml @  100 mls/hr Q12 IVPB  Last administered on 


2/1/19at 15:34; Admin Dose 100 MLS/HR;  Start 2/1/19 at 15:00


Vancomycin HCl 1.25 gm/Sodium Chloride 250 ml @  83.333 mls/ hr LOADING DOSE 


ONCE IVPB  Last administered on 2/1/19at 16:26; Admin Dose 83.333 MLS/HR;  Start


2/1/19 at 16:00;  Stop 2/1/19 at 18:59











LETY MONSON                  Feb 1, 2019 17:51

## 2019-02-01 NOTE — CONS
Assessment/Plan


Assessment/Plan


Hospital Course (Demo Recall)


IMP:


1.Hypotension-Now improved off levo and tolerating norvasc. NL EF by echo this 


admit


2.resp failure s/p extubation


3.cardiac arrest


4.pneumoperitoneum-resolved


5.Positive troponin-now trended neg


6.renal failure-improved


7.Leukocytosis


8. anemia


9, Thrombocytopenia-ongoing some mild improvement





Recc:


-Now on med-surg


-Continue norvasc


-Continue asa


-s/p abx's


-started on risperdal


-follow MS closely


-Follow volume status clsoely and give dose of lasix





Consultation Date/Type/Reason


Admit Date/Time


Jan 22, 2019 at 00:03


Initial Consult Date


1/22/19


Type of Consult


Cardiology


Reason for Consultation


positive troponin


Requesting Provider:  DONALDO CARRILLO MD


Date/Time of Note


DATE: 2/1/19 


TIME: 16:02





Exam/Review of Systems


Vital Signs


Vitals





Vital Signs


  Date      Temp  Pulse  Resp  B/P (MAP)   Pulse Ox  O2          O2 Flow    FiO2


Time                                                 Delivery    Rate


    2/1/19           85    20                    88  Nasal             5.0


     15:11                                           Cannula


    2/1/19  98.8                   108/60


     14:47                           (76)


   1/31/19                                                                    21


     07:41








Intake and Output





1/31/19 1/31/19 2/1/19





1515:00


23:00


07:00





IntakeIntake Total


240 ml


380 ml





OutputOutput Total


1400 ml





BalanceBalance


-1160 ml


380 ml














Exam


Exam


Review of Systems:


CONSTITUTIONAL:  No fevers, chills.


PULMONARY:  No sob


CARDIOVASCULAR: No chest pain/palpitations


GASTROINTESTINAL:  No nausea/vomiting.


GENITOURINARY:  No hematuria/dysuria.


MUSCULOSKELETAL:  No myagias/arthalgias.


PSYCHIATRIC:  The patient denies depression.


NEUROLOGIC:   No weakness


Constitutional:  other (sleeping)


Psych:  no complaints


Head:  normocephalic


ENMT:  mucosa pink and moist


Neck:  supple, jvd (9 cm water)


Respiratory:  clear to auscultation


Cardiovascular:  regular rate and rhythm


Gastrointestinal:  soft


Musculoskeletal:  muscle tone (normal)


Extremities:  edema (none)


Neurological:  other (No focal deficits)





Labs


Result Diagram:  


2/1/19 0633                                                                     


          2/1/19 0633





Results 24hrs





Laboratory Tests


               Test
                                2/1/19
06:33


               White Blood Count                         11.3  H


               Red Blood Count                           4.30  L


               Hemoglobin                                12.1  L


               Hematocrit                                38.8  L


               Mean Corpuscular Volume                    90.2


               Mean Corpuscular Hemoglobin               28.1  L


               Mean Corpuscular Hemoglobin
Concent      31.2  L



               Red Cell Distribution Width               16.3  H


               Platelet Count                             132  L


               Mean Platelet Volume                      11.1  H


               Immature Granulocytes %                   0.400


               Neutrophils %                              76.9


               Lymphocytes %                             13.8  L


               Monocytes %                                 7.0


               Eosinophils %                               1.6


               Basophils %                                 0.3


               Nucleated Red Blood Cells %                 0.0


               Immature Granulocytes #                  0.040  H


               Neutrophils #                              8.7  H


               Lymphocytes #                               1.6


               Monocytes #                                 0.8


               Eosinophils #                               0.2


               Basophils #                                 0.0


               Nucleated Red Blood Cells #                 0.0


               Sodium Level                                138


               Potassium Level                             4.4


               Chloride Level                              102


               Carbon Dioxide Level                        35  H


               Anion Gap                                    1  L


               Blood Urea Nitrogen                         30  H


               Creatinine                                 0.75


               Est Glomerular Filtrat Rate
mL/min   > 60  



               Glucose Level                                77


               Calcium Level                               9.1


               Phosphorus Level                            2.6


               Magnesium Level                             2.1








Medications


Medications





Current Medications


Ondansetron HCl (Zofran Inj) 4 mg Q6H  PRN IV NAUSEA AND/OR VOMITING;  Start 


1/21/19 at 23:30


Acetaminophen (Tylenol Supp) 650 mg Q4H  PRN MD PAIN LEVEL 1-3 OR FEVER;  Start 


1/21/19 at 23:30


Enoxaparin Sodium (Lovenox) 30 mg DAILY SC  Last administered on 2/1/19at 08:38;


Admin Dose 30 MG;  Start 1/22/19 at 09:00


Aspirin (Aspirin) 81 mg DAILY PO  Last administered on 2/1/19at 08:36; Admin 


Dose 81 MG;  Start 1/24/19 at 09:00


Famotidine (Pepcid) 20 mg Q12 PO  Last administered on 2/1/19at 08:36; Admin 


Dose 20 MG;  Start 1/24/19 at 21:00


Albuterol (Proventil 0.083% (Neb)) 2.5 mg Q6H RESP  THERAPY HHN  Last 


administered on 2/1/19at 15:09; Admin Dose 2.5 MG;  Start 1/24/19 at 21:52


Ipratropium Bromide (Atrovent 0.02% (Neb)) 0.5 mg Q6H RESP  THERAPY HHN  Last 


administered on 2/1/19at 15:09; Admin Dose 0.5 MG;  Start 1/24/19 at 21:53


Zolpidem Tartrate (Ambien) 5 mg HS  PRN PO INSOMNIA Last administered on 


1/30/19at 23:03; Admin Dose 5 MG;  Start 1/27/19 at 00:00


Amlodipine Besylate (Norvasc) 10 mg DAILY PO  Last administered on 2/1/19at 


08:36; Admin Dose 10 MG;  Start 1/27/19 at 15:00


Guaifenesin/ Dextromethorphan (Robitussin Dm Liquid Cup) 5 ml Q6H  PRN PO COUGH 


Last administered on 1/31/19at 09:04; Admin Dose 5 ML;  Start 1/27/19 at 20:00


Divalproex Sodium (Depakote) 250 mg Q8H PO  Last administered on 2/1/19at 08:35;


Admin Dose 250 MG;  Start 1/28/19 at 17:00


Morphine Sulfate (morphine) 6 mg Q4H  PRN PO SEVERE PAIN LEVEL 7-10 Last 


administered on 1/31/19at 09:05; Admin Dose 6 MG;  Start 1/30/19 at 21:30


Albuterol/ Ipratropium (Duoneb) 3 ml Q3H RESP THERAPY  PRN HHN SHORTNESS OF 


BREATH;  Start 1/31/19 at 20:00


Risperidone (Risperdal) 1 mg BID PO ;  Start 2/1/19 at 21:00


Vancomycin HCl (Vanco Iv Per Pharmacy) VANCOMYCIN PER PHARMACY PER PROTOCOL XX ;


 Start 2/1/19 at 15:00


Meropenem/Sodium Chloride 50 ml @  100 mls/hr Q12 IVPB  Last administered on 


2/1/19at 15:34; Admin Dose 100 MLS/HR;  Start 2/1/19 at 15:00


Vancomycin HCl 1.25 gm/Sodium Chloride 250 ml @  83.333 mls/ hr LOADING DOSE 


ONCE IVPB ;  Start 2/1/19 at 16:00;  Stop 2/1/19 at 18:59











CARISA IZAGUIRRE                Feb 1, 2019 16:05

## 2019-02-01 NOTE — CONS
Assessment/Plan


Assessment/Plan


Hospital Course (Demo Recall)


Patient is more confused today and more congested with lots of secretions.  No 


fevers is saturating 88-90% on nasal cannula WBC 11.3 platelets 132 neutrophils 


76.9 BUN 30 creatinine 0.75





Chest x-ray from yesterday revealed worsening aeration with increased bibasilar 


basilar patchy opacities, worse on the right





Physical examination: Well-developed chronically ill-appearing cachectic elderly


man who is very confused and sound congested.  Head atraumatic normocephalic 


sclera nonicteric vehicle mucosa dry neck is supple chest rise symmetrical 


breath sounds diminished bases.  Heart: S1-S2.  Abdomen soft bowel sounds 


present.  Extremities cyanotic





Assessment:


1.  Hypoxemia/HCAP, poss aspiration


1.  S/p septic shock


3.  Status post acute hypoxemic respiratory failure requiring intubation


4.  Pneumoperitoneum of unclear etiology, no perforation per surgical note


5.  Pancytopenia


5.  Anemia


6.  Non-ST elevation MI


7.  Status post cardiac arrest


8.  RIJ TLC





Plan: Start brd sp abx, consider dc central line, f/u cxr in am





Consultation Date/Type/Reason


Admit Date/Time


Jan 22, 2019 at 00:03


Initial Consult Date


1/22/19


Type of Consult


ID


Requesting Provider:  DONALDO CARRILLO MD


Date/Time of Note


DATE: 2/1/19 


TIME: 15:06





Exam/Review of Systems


Exam


Vitals





Vital Signs


  Date      Temp  Pulse  Resp  B/P (MAP)   Pulse Ox  O2          O2 Flow    FiO2


Time                                                 Delivery    Rate


    2/1/19  98.8     81            108/60        87  Nasal


     14:47                           (76)            Cannula


    2/1/19                                                             3.0


     09:09


    2/1/19                 18


     07:21


   1/31/19                                                                    21


     07:41








Intake and Output





1/31/19 1/31/19 2/1/19





1515:00


23:00


07:00





IntakeIntake Total


240 ml


380 ml





OutputOutput Total


1400 ml





BalanceBalance


-1160 ml


380 ml














Results


Result Diagram:  


2/1/19 0633                                                                     


          2/1/19 0633





Results 24hrs





Laboratory Tests


               Test
                                2/1/19
06:33


               White Blood Count                         11.3  H


               Red Blood Count                           4.30  L


               Hemoglobin                                12.1  L


               Hematocrit                                38.8  L


               Mean Corpuscular Volume                    90.2


               Mean Corpuscular Hemoglobin               28.1  L


               Mean Corpuscular Hemoglobin
Concent      31.2  L



               Red Cell Distribution Width               16.3  H


               Platelet Count                             132  L


               Mean Platelet Volume                      11.1  H


               Immature Granulocytes %                   0.400


               Neutrophils %                              76.9


               Lymphocytes %                             13.8  L


               Monocytes %                                 7.0


               Eosinophils %                               1.6


               Basophils %                                 0.3


               Nucleated Red Blood Cells %                 0.0


               Immature Granulocytes #                  0.040  H


               Neutrophils #                              8.7  H


               Lymphocytes #                               1.6


               Monocytes #                                 0.8


               Eosinophils #                               0.2


               Basophils #                                 0.0


               Nucleated Red Blood Cells #                 0.0


               Sodium Level                                138


               Potassium Level                             4.4


               Chloride Level                              102


               Carbon Dioxide Level                        35  H


               Anion Gap                                    1  L


               Blood Urea Nitrogen                         30  H


               Creatinine                                 0.75


               Est Glomerular Filtrat Rate
mL/min   > 60  



               Glucose Level                                77


               Calcium Level                               9.1


               Phosphorus Level                            2.6


               Magnesium Level                             2.1








Medications


Medication





Current Medications


Ondansetron HCl (Zofran Inj) 4 mg Q6H  PRN IV NAUSEA AND/OR VOMITING;  Start 


1/21/19 at 23:30


Acetaminophen (Tylenol Supp) 650 mg Q4H  PRN CA PAIN LEVEL 1-3 OR FEVER;  Start 


1/21/19 at 23:30


Enoxaparin Sodium (Lovenox) 30 mg DAILY SC  Last administered on 2/1/19at 08:38;


Admin Dose 30 MG;  Start 1/22/19 at 09:00


Aspirin (Aspirin) 81 mg DAILY PO  Last administered on 2/1/19at 08:36; Admin 


Dose 81 MG;  Start 1/24/19 at 09:00


Famotidine (Pepcid) 20 mg Q12 PO  Last administered on 2/1/19at 08:36; Admin 


Dose 20 MG;  Start 1/24/19 at 21:00


Albuterol (Proventil 0.083% (Neb)) 2.5 mg Q6H RESP  THERAPY HHN  Last a


dministered on 2/1/19at 09:04; Admin Dose 2.5 MG;  Start 1/24/19 at 21:52


Ipratropium Bromide (Atrovent 0.02% (Neb)) 0.5 mg Q6H RESP  THERAPY HHN  Last 


administered on 2/1/19at 09:04; Admin Dose 0.5 MG;  Start 1/24/19 at 21:53


Zolpidem Tartrate (Ambien) 5 mg HS  PRN PO INSOMNIA Last administered on 


1/30/19at 23:03; Admin Dose 5 MG;  Start 1/27/19 at 00:00


Amlodipine Besylate (Norvasc) 10 mg DAILY PO  Last administered on 2/1/19at 


08:36; Admin Dose 10 MG;  Start 1/27/19 at 15:00


Guaifenesin/ Dextromethorphan (Robitussin Dm Liquid Cup) 5 ml Q6H  PRN PO COUGH 


Last administered on 1/31/19at 09:04; Admin Dose 5 ML;  Start 1/27/19 at 20:00


Divalproex Sodium (Depakote) 250 mg Q8H PO  Last administered on 2/1/19at 08:35;


Admin Dose 250 MG;  Start 1/28/19 at 17:00


Morphine Sulfate (morphine) 6 mg Q4H  PRN PO SEVERE PAIN LEVEL 7-10 Last 


administered on 1/31/19at 09:05; Admin Dose 6 MG;  Start 1/30/19 at 21:30


Albuterol/ Ipratropium (Duoneb) 3 ml Q3H RESP THERAPY  PRN HHN SHORTNESS OF 


BREATH;  Start 1/31/19 at 20:00


Risperidone (Risperdal) 1 mg BID PO ;  Start 2/1/19 at 21:00


Vancomycin HCl (Vanco Iv Per Pharmacy) VANCOMYCIN PER PHARMACY PER PROTOCOL XX ;


 Start 2/1/19 at 15:00


Meropenem/Sodium Chloride 50 ml @  100 mls/hr Q12 IVPB ;  Start 2/1/19 at 15:00


Vancomycin HCl 1.25 gm/Sodium Chloride 250 ml @  83.333 mls/ hr LOADING DOSE 


ONCE IVPB ;  Start 2/1/19 at 16:00;  Stop 2/1/19 at 18:59











DAVID VÁZQUEZ NP             Feb 1, 2019 15:08

## 2019-02-01 NOTE — PN
DATE:  02/01/2019

 

 

SUBJECTIVE:  The patient is stable, no events noted.

 

OBJECTIVE:

VITAL SIGNS:  Blood pressure is 115/74, pulse 80, respiration 19, temperature 98.6.

HEENT:  Head is normocephalic.

NECK:  Supple.

HEART:  Regular rate.

LUNGS:  Show diminished breath sounds at base.

ABDOMEN:  Soft, nontender to palpation without rebound or guarding.

EXTREMITIES:  Negative for clubbing, cyanosis.  Trace edema.

DERMATOLOGIC:  No rashes.

MUSCULOSKELETAL:  No joint effusion.

NEUROLOGIC:  No change in exam.

 

MEDICATIONS:  The patient's medications have been reviewed.  Chest x-ray was reviewed.

 

LABORATORY DATA:  Shows white count 11.3, hemoglobin 12.1, platelet count is 132.  Sodium 144, potass
ium 4.0, BUN 34, creatinine 0.68.

 

ASSESSMENT AND PLAN:

1.  Nonoliguric acute kidney injury with unknown baseline creatinine.  Etiology is secondary to hemod
ynamics.  Renal function is improved.  Continue current treatment plan.

2.  Hyponatremia, improved.  Continue to encourage free water intake.

3.  Anemia.  Monitor hemoglobin and hematocrit levels.

4.  Mineral bone disorder.  Monitor calcium and phosphorus levels.

5.  Volume overload.  Etiology may be secondary to diastolic heart failure, recent IV hydration.  The
 patient is on intermittent diuretic therapy.  We will continue to monitor.

6.  Respiratory failure.  The patient is on 3 liters nasal cannula.  Continue current medical managem
ent.  Continue nebulizers, intermittent diuretic therapy.

7.  Sepsis, status post shock.  The patient is completing antibiotic course.

8.  Status post cardiac arrest.

9.  Encephalopathy.

10.  Status post pneumothorax.

 

 

Dictated By: MARYAN RUCKER/PEREZ

DD:    02/01/2019 08:21:28

DT:    02/01/2019 10:12:57

Conf#: [id number]

DID#:  [id number]

CC: MAURI GRIGSBY MD;*EndCC*

## 2019-02-02 VITALS — HEART RATE: 79 BPM | RESPIRATION RATE: 20 BRPM | SYSTOLIC BLOOD PRESSURE: 89 MMHG | DIASTOLIC BLOOD PRESSURE: 68 MMHG

## 2019-02-02 VITALS — SYSTOLIC BLOOD PRESSURE: 105 MMHG | DIASTOLIC BLOOD PRESSURE: 75 MMHG | HEART RATE: 70 BPM | RESPIRATION RATE: 20 BRPM

## 2019-02-02 VITALS — SYSTOLIC BLOOD PRESSURE: 105 MMHG | DIASTOLIC BLOOD PRESSURE: 79 MMHG | HEART RATE: 69 BPM | RESPIRATION RATE: 20 BRPM

## 2019-02-02 VITALS — DIASTOLIC BLOOD PRESSURE: 67 MMHG | HEART RATE: 72 BPM | RESPIRATION RATE: 20 BRPM | SYSTOLIC BLOOD PRESSURE: 96 MMHG

## 2019-02-02 VITALS — SYSTOLIC BLOOD PRESSURE: 84 MMHG | RESPIRATION RATE: 20 BRPM | HEART RATE: 71 BPM | DIASTOLIC BLOOD PRESSURE: 67 MMHG

## 2019-02-02 VITALS — HEART RATE: 68 BPM | RESPIRATION RATE: 16 BRPM | SYSTOLIC BLOOD PRESSURE: 85 MMHG | DIASTOLIC BLOOD PRESSURE: 69 MMHG

## 2019-02-02 VITALS — RESPIRATION RATE: 20 BRPM | DIASTOLIC BLOOD PRESSURE: 70 MMHG | SYSTOLIC BLOOD PRESSURE: 90 MMHG | HEART RATE: 78 BPM

## 2019-02-02 VITALS — RESPIRATION RATE: 16 BRPM | SYSTOLIC BLOOD PRESSURE: 96 MMHG | DIASTOLIC BLOOD PRESSURE: 70 MMHG | HEART RATE: 76 BPM

## 2019-02-02 VITALS — DIASTOLIC BLOOD PRESSURE: 69 MMHG | RESPIRATION RATE: 20 BRPM | SYSTOLIC BLOOD PRESSURE: 95 MMHG | HEART RATE: 81 BPM

## 2019-02-02 VITALS — RESPIRATION RATE: 24 BRPM | DIASTOLIC BLOOD PRESSURE: 68 MMHG | HEART RATE: 89 BPM | SYSTOLIC BLOOD PRESSURE: 98 MMHG

## 2019-02-02 VITALS — SYSTOLIC BLOOD PRESSURE: 91 MMHG | DIASTOLIC BLOOD PRESSURE: 74 MMHG | RESPIRATION RATE: 20 BRPM | HEART RATE: 72 BPM

## 2019-02-02 VITALS — RESPIRATION RATE: 16 BRPM | HEART RATE: 74 BPM

## 2019-02-02 VITALS — RESPIRATION RATE: 20 BRPM

## 2019-02-02 VITALS — DIASTOLIC BLOOD PRESSURE: 70 MMHG | RESPIRATION RATE: 20 BRPM | SYSTOLIC BLOOD PRESSURE: 92 MMHG | HEART RATE: 78 BPM

## 2019-02-02 VITALS — HEART RATE: 69 BPM | DIASTOLIC BLOOD PRESSURE: 74 MMHG | SYSTOLIC BLOOD PRESSURE: 102 MMHG | RESPIRATION RATE: 20 BRPM

## 2019-02-02 VITALS — DIASTOLIC BLOOD PRESSURE: 61 MMHG | SYSTOLIC BLOOD PRESSURE: 96 MMHG | RESPIRATION RATE: 20 BRPM | HEART RATE: 88 BPM

## 2019-02-02 VITALS — SYSTOLIC BLOOD PRESSURE: 84 MMHG | DIASTOLIC BLOOD PRESSURE: 66 MMHG | RESPIRATION RATE: 16 BRPM | HEART RATE: 68 BPM

## 2019-02-02 VITALS — DIASTOLIC BLOOD PRESSURE: 76 MMHG | SYSTOLIC BLOOD PRESSURE: 101 MMHG | HEART RATE: 76 BPM | RESPIRATION RATE: 15 BRPM

## 2019-02-02 VITALS — DIASTOLIC BLOOD PRESSURE: 62 MMHG | HEART RATE: 83 BPM | SYSTOLIC BLOOD PRESSURE: 95 MMHG | RESPIRATION RATE: 20 BRPM

## 2019-02-02 VITALS — SYSTOLIC BLOOD PRESSURE: 91 MMHG | DIASTOLIC BLOOD PRESSURE: 70 MMHG | HEART RATE: 62 BPM | RESPIRATION RATE: 16 BRPM

## 2019-02-02 VITALS — HEART RATE: 92 BPM | RESPIRATION RATE: 22 BRPM | SYSTOLIC BLOOD PRESSURE: 100 MMHG | DIASTOLIC BLOOD PRESSURE: 72 MMHG

## 2019-02-02 VITALS — HEART RATE: 72 BPM | SYSTOLIC BLOOD PRESSURE: 91 MMHG | DIASTOLIC BLOOD PRESSURE: 63 MMHG | RESPIRATION RATE: 20 BRPM

## 2019-02-02 VITALS — SYSTOLIC BLOOD PRESSURE: 82 MMHG | HEART RATE: 79 BPM | RESPIRATION RATE: 20 BRPM | DIASTOLIC BLOOD PRESSURE: 61 MMHG

## 2019-02-02 VITALS — SYSTOLIC BLOOD PRESSURE: 89 MMHG | RESPIRATION RATE: 20 BRPM | HEART RATE: 85 BPM | DIASTOLIC BLOOD PRESSURE: 65 MMHG

## 2019-02-02 VITALS — SYSTOLIC BLOOD PRESSURE: 84 MMHG | HEART RATE: 81 BPM | DIASTOLIC BLOOD PRESSURE: 70 MMHG | RESPIRATION RATE: 20 BRPM

## 2019-02-02 VITALS — SYSTOLIC BLOOD PRESSURE: 95 MMHG | HEART RATE: 75 BPM | DIASTOLIC BLOOD PRESSURE: 68 MMHG | RESPIRATION RATE: 20 BRPM

## 2019-02-02 VITALS — DIASTOLIC BLOOD PRESSURE: 76 MMHG | SYSTOLIC BLOOD PRESSURE: 106 MMHG | RESPIRATION RATE: 20 BRPM | HEART RATE: 69 BPM

## 2019-02-02 VITALS — SYSTOLIC BLOOD PRESSURE: 95 MMHG | HEART RATE: 72 BPM | DIASTOLIC BLOOD PRESSURE: 75 MMHG | RESPIRATION RATE: 20 BRPM

## 2019-02-02 VITALS — HEART RATE: 80 BPM | RESPIRATION RATE: 20 BRPM | DIASTOLIC BLOOD PRESSURE: 64 MMHG | SYSTOLIC BLOOD PRESSURE: 86 MMHG

## 2019-02-02 VITALS — SYSTOLIC BLOOD PRESSURE: 115 MMHG | DIASTOLIC BLOOD PRESSURE: 79 MMHG | HEART RATE: 69 BPM | RESPIRATION RATE: 20 BRPM

## 2019-02-02 VITALS — RESPIRATION RATE: 19 BRPM | HEART RATE: 83 BPM | DIASTOLIC BLOOD PRESSURE: 68 MMHG | SYSTOLIC BLOOD PRESSURE: 95 MMHG

## 2019-02-02 VITALS — HEART RATE: 70 BPM | RESPIRATION RATE: 16 BRPM | DIASTOLIC BLOOD PRESSURE: 57 MMHG | SYSTOLIC BLOOD PRESSURE: 77 MMHG

## 2019-02-02 VITALS — RESPIRATION RATE: 20 BRPM | SYSTOLIC BLOOD PRESSURE: 86 MMHG | DIASTOLIC BLOOD PRESSURE: 63 MMHG | HEART RATE: 79 BPM

## 2019-02-02 VITALS — RESPIRATION RATE: 18 BRPM | HEART RATE: 89 BPM | SYSTOLIC BLOOD PRESSURE: 112 MMHG | DIASTOLIC BLOOD PRESSURE: 77 MMHG

## 2019-02-02 VITALS — RESPIRATION RATE: 20 BRPM | SYSTOLIC BLOOD PRESSURE: 102 MMHG | DIASTOLIC BLOOD PRESSURE: 73 MMHG | HEART RATE: 70 BPM

## 2019-02-02 VITALS — HEART RATE: 65 BPM | SYSTOLIC BLOOD PRESSURE: 95 MMHG | RESPIRATION RATE: 16 BRPM | DIASTOLIC BLOOD PRESSURE: 74 MMHG

## 2019-02-02 VITALS — SYSTOLIC BLOOD PRESSURE: 99 MMHG | HEART RATE: 70 BPM | RESPIRATION RATE: 20 BRPM | DIASTOLIC BLOOD PRESSURE: 74 MMHG

## 2019-02-02 VITALS — DIASTOLIC BLOOD PRESSURE: 72 MMHG | SYSTOLIC BLOOD PRESSURE: 96 MMHG | RESPIRATION RATE: 20 BRPM | HEART RATE: 74 BPM

## 2019-02-02 VITALS — SYSTOLIC BLOOD PRESSURE: 87 MMHG | HEART RATE: 85 BPM | DIASTOLIC BLOOD PRESSURE: 64 MMHG | RESPIRATION RATE: 20 BRPM

## 2019-02-02 VITALS — SYSTOLIC BLOOD PRESSURE: 110 MMHG | RESPIRATION RATE: 18 BRPM | HEART RATE: 76 BPM | DIASTOLIC BLOOD PRESSURE: 64 MMHG

## 2019-02-02 VITALS — DIASTOLIC BLOOD PRESSURE: 62 MMHG | HEART RATE: 71 BPM | SYSTOLIC BLOOD PRESSURE: 150 MMHG | RESPIRATION RATE: 17 BRPM

## 2019-02-02 VITALS — RESPIRATION RATE: 20 BRPM | DIASTOLIC BLOOD PRESSURE: 57 MMHG | HEART RATE: 72 BPM | SYSTOLIC BLOOD PRESSURE: 71 MMHG

## 2019-02-02 VITALS — HEART RATE: 69 BPM | SYSTOLIC BLOOD PRESSURE: 75 MMHG | RESPIRATION RATE: 18 BRPM | DIASTOLIC BLOOD PRESSURE: 56 MMHG

## 2019-02-02 VITALS — RESPIRATION RATE: 20 BRPM | HEART RATE: 82 BPM | SYSTOLIC BLOOD PRESSURE: 93 MMHG | DIASTOLIC BLOOD PRESSURE: 63 MMHG

## 2019-02-02 VITALS — SYSTOLIC BLOOD PRESSURE: 89 MMHG | DIASTOLIC BLOOD PRESSURE: 62 MMHG | RESPIRATION RATE: 20 BRPM | HEART RATE: 75 BPM

## 2019-02-02 VITALS — HEART RATE: 75 BPM | RESPIRATION RATE: 20 BRPM | DIASTOLIC BLOOD PRESSURE: 69 MMHG | SYSTOLIC BLOOD PRESSURE: 104 MMHG

## 2019-02-02 VITALS — SYSTOLIC BLOOD PRESSURE: 124 MMHG | RESPIRATION RATE: 16 BRPM | DIASTOLIC BLOOD PRESSURE: 75 MMHG | HEART RATE: 72 BPM

## 2019-02-02 VITALS — SYSTOLIC BLOOD PRESSURE: 89 MMHG | RESPIRATION RATE: 20 BRPM | DIASTOLIC BLOOD PRESSURE: 66 MMHG | HEART RATE: 80 BPM

## 2019-02-02 VITALS — RESPIRATION RATE: 20 BRPM | HEART RATE: 74 BPM | DIASTOLIC BLOOD PRESSURE: 70 MMHG | SYSTOLIC BLOOD PRESSURE: 92 MMHG

## 2019-02-02 VITALS — DIASTOLIC BLOOD PRESSURE: 73 MMHG | SYSTOLIC BLOOD PRESSURE: 100 MMHG | HEART RATE: 72 BPM | RESPIRATION RATE: 20 BRPM

## 2019-02-02 VITALS — RESPIRATION RATE: 20 BRPM | DIASTOLIC BLOOD PRESSURE: 65 MMHG | HEART RATE: 77 BPM | SYSTOLIC BLOOD PRESSURE: 90 MMHG

## 2019-02-02 VITALS — SYSTOLIC BLOOD PRESSURE: 101 MMHG | DIASTOLIC BLOOD PRESSURE: 76 MMHG | RESPIRATION RATE: 20 BRPM | HEART RATE: 70 BPM

## 2019-02-02 VITALS — SYSTOLIC BLOOD PRESSURE: 104 MMHG | RESPIRATION RATE: 20 BRPM | DIASTOLIC BLOOD PRESSURE: 78 MMHG | HEART RATE: 69 BPM

## 2019-02-02 VITALS — SYSTOLIC BLOOD PRESSURE: 91 MMHG | HEART RATE: 80 BPM | RESPIRATION RATE: 20 BRPM | DIASTOLIC BLOOD PRESSURE: 62 MMHG

## 2019-02-02 VITALS — HEART RATE: 78 BPM | DIASTOLIC BLOOD PRESSURE: 67 MMHG | RESPIRATION RATE: 20 BRPM | SYSTOLIC BLOOD PRESSURE: 92 MMHG

## 2019-02-02 VITALS — DIASTOLIC BLOOD PRESSURE: 70 MMHG | HEART RATE: 75 BPM | SYSTOLIC BLOOD PRESSURE: 96 MMHG | RESPIRATION RATE: 20 BRPM

## 2019-02-02 VITALS — RESPIRATION RATE: 16 BRPM

## 2019-02-02 LAB
ADD MAN DIFF?: NO
ALLEN TEST: (no result)
ALLEN TEST: (no result)
ANION GAP: 2 (ref 5–13)
ARTERIAL BASE EXCESS: 3.2 MMOL/L (ref -3–3)
ARTERIAL BASE EXCESS: 5.2 MMOL/L (ref -3–3)
ARTERIAL BLOOD GAS OXYGEN SAT: 83.7 MMHG (ref 95–98)
ARTERIAL BLOOD GAS OXYGEN SAT: 99.4 MMHG (ref 95–98)
ARTERIAL COHB: 0.7 % (ref 0–3)
ARTERIAL COHB: 0.8 % (ref 0–3)
ARTERIAL FRACTION OF OXYHGB: 82.9 % (ref 93–99)
ARTERIAL FRACTION OF OXYHGB: 98.6 % (ref 93–99)
ARTERIAL HCO3: 34.2 MMOL/L (ref 22–26)
ARTERIAL HCO3: 34.4 MMOL/L (ref 22–26)
ARTERIAL METHB: 0.1 % (ref 0–1.5)
ARTERIAL METHB: 0.1 % (ref 0–1.5)
ARTERIAL PCO2: 75.2 MMHG (ref 35–45)
ARTERIAL PCO2: 94.6 MMHG (ref 35–45)
BASOPHIL #: 0 10^3/UL (ref 0–0.1)
BASOPHILS %: 0 % (ref 0–2)
BLOOD GAS LOW PEEP SETTING: 5 CMH2O
BLOOD GAS TIDAL VOLUME: 450 ML
BLOOD UREA NITROGEN: 32 MG/DL (ref 7–20)
CALCIUM: 9.2 MG/DL (ref 8.4–10.2)
CARBON DIOXIDE: 38 MMOL/L (ref 21–31)
CHLORIDE: 100 MMOL/L (ref 97–110)
CREATININE: 0.9 MG/DL (ref 0.61–1.24)
EOSINOPHILS #: 0.2 10^3/UL (ref 0–0.5)
EOSINOPHILS %: 2.9 % (ref 0–7)
FIO2: 100 %
FIO2: 61 %
GLUCOSE: 75 MG/DL (ref 70–220)
HEMATOCRIT: 35.2 % (ref 42–52)
HEMOGLOBIN: 10.7 G/DL (ref 14–18)
IMMATURE GRANS #M: 0.03 10^3/UL (ref 0–0.03)
IMMATURE GRANS % (M): 0.4 % (ref 0–0.43)
LYMPHOCYTES #: 1.1 10^3/UL (ref 0.8–2.9)
LYMPHOCYTES %: 15.4 % (ref 15–51)
MEAN CORPUSCULAR HEMOGLOBIN: 27.4 PG (ref 29–33)
MEAN CORPUSCULAR HGB CONC: 30.4 G/DL (ref 32–37)
MEAN CORPUSCULAR VOLUME: 90 FL (ref 82–101)
MEAN PLATELET VOLUME: 11.3 FL (ref 7.4–10.4)
MODE: (no result)
MODE: (no result)
MONOCYTE #: 0.5 10^3/UL (ref 0.3–0.9)
MONOCYTES %: 6.2 % (ref 0–11)
NEUTROPHIL #: 5.4 10^3/UL (ref 1.6–7.5)
NEUTROPHILS %: 75.1 % (ref 39–77)
NUCLEATED RED BLOOD CELLS #: 0 10^3/UL (ref 0–0)
NUCLEATED RED BLOOD CELLS%: 0 /100WBC (ref 0–0)
O2 A-A PPRESDIFF RESPIRATORY: 271.6 MMHG (ref 7–24)
O2 A-A PPRESDIFF RESPIRATORY: 397.8 MMHG (ref 7–24)
PLATELET COUNT: 102 10^3/UL (ref 140–415)
POTASSIUM: 4.8 MMOL/L (ref 3.5–5.1)
RED BLOOD COUNT: 3.91 10^6/UL (ref 4.7–6.1)
RED CELL DISTRIBUTION WIDTH: 16 % (ref 11.5–14.5)
SODIUM: 140 MMOL/L (ref 135–144)
WHITE BLOOD COUNT: 7.2 10^3/UL (ref 4.8–10.8)

## 2019-02-02 RX ADMIN — ENOXAPARIN SODIUM SCH MG: 100 INJECTION SUBCUTANEOUS at 08:36

## 2019-02-02 RX ADMIN — METHYLPREDNISOLONE SODIUM SUCCINATE 1 MG: 40 INJECTION, POWDER, FOR SOLUTION INTRAMUSCULAR; INTRAVENOUS at 17:24

## 2019-02-02 RX ADMIN — VANCOMYCIN HYDROCHLORIDE 1 MLS/HR: 750 INJECTION, POWDER, LYOPHILIZED, FOR SOLUTION INTRAVENOUS at 17:32

## 2019-02-02 RX ADMIN — ALBUTEROL SULFATE 1 MG: 2.5 SOLUTION RESPIRATORY (INHALATION) at 07:59

## 2019-02-02 RX ADMIN — ALBUTEROL SULFATE 1 MG: 2.5 SOLUTION RESPIRATORY (INHALATION) at 20:38

## 2019-02-02 RX ADMIN — METHYLPREDNISOLONE SODIUM SUCCINATE 1 MG: 40 INJECTION, POWDER, FOR SOLUTION INTRAMUSCULAR; INTRAVENOUS at 23:52

## 2019-02-02 RX ADMIN — FAMOTIDINE 1 MG: 20 TABLET ORAL at 08:29

## 2019-02-02 RX ADMIN — AMLODIPINE BESYLATE SCH MG: 10 TABLET ORAL at 08:36

## 2019-02-02 RX ADMIN — DIVALPROEX SODIUM 1 MG: 250 TABLET, DELAYED RELEASE ORAL at 08:29

## 2019-02-02 RX ADMIN — PROPOFOL SCH MLS/HR: 10 INJECTION, EMULSION INTRAVENOUS at 22:38

## 2019-02-02 RX ADMIN — DIVALPROEX SODIUM SCH MG: 250 TABLET, DELAYED RELEASE ORAL at 20:56

## 2019-02-02 RX ADMIN — PROPOFOL 1 MLS/HR: 10 INJECTION, EMULSION INTRAVENOUS at 22:38

## 2019-02-02 RX ADMIN — MEROPENEM 1 MLS/HR: 1 INJECTION, POWDER, FOR SOLUTION INTRAVENOUS at 08:29

## 2019-02-02 RX ADMIN — AMLODIPINE BESYLATE 1 MG: 10 TABLET ORAL at 08:35

## 2019-02-02 RX ADMIN — ASPIRIN 81 MG CHEWABLE TABLET 1 MG: 81 TABLET CHEWABLE at 08:29

## 2019-02-02 RX ADMIN — MORPHINE SULFATE 1 MG: 10 SOLUTION ORAL at 07:18

## 2019-02-02 RX ADMIN — ALBUTEROL SULFATE 1 MG: 2.5 SOLUTION RESPIRATORY (INHALATION) at 01:05

## 2019-02-02 RX ADMIN — IPRATROPIUM BROMIDE 1 MG: 0.5 SOLUTION RESPIRATORY (INHALATION) at 07:59

## 2019-02-02 RX ADMIN — IPRATROPIUM BROMIDE 1 MG: 0.5 SOLUTION RESPIRATORY (INHALATION) at 01:04

## 2019-02-02 RX ADMIN — FAMOTIDINE SCH MG: 20 TABLET ORAL at 20:56

## 2019-02-02 RX ADMIN — RISPERIDONE 1 MG: 1 TABLET ORAL at 20:57

## 2019-02-02 RX ADMIN — ASPIRIN 81 MG SCH MG: 81 TABLET ORAL at 08:29

## 2019-02-02 RX ADMIN — AMLODIPINE BESYLATE 1 MG: 10 TABLET ORAL at 08:36

## 2019-02-02 RX ADMIN — RISPERIDONE 1 MG: 1 TABLET ORAL at 08:29

## 2019-02-02 RX ADMIN — FUROSEMIDE 1 MG: 10 INJECTION, SOLUTION INTRAVENOUS at 10:00

## 2019-02-02 RX ADMIN — METHYLPREDNISOLONE SODIUM SUCCINATE 1 MG: 40 INJECTION, POWDER, FOR SOLUTION INTRAMUSCULAR; INTRAVENOUS at 12:14

## 2019-02-02 RX ADMIN — Medication SCH MLS/HR: at 11:56

## 2019-02-02 RX ADMIN — MORPHINE SULFATE PRN MG: 10 SOLUTION ORAL at 07:18

## 2019-02-02 RX ADMIN — DIVALPROEX SODIUM 1 MG: 250 TABLET, DELAYED RELEASE ORAL at 01:03

## 2019-02-02 RX ADMIN — FAMOTIDINE 1 MG: 20 TABLET ORAL at 20:56

## 2019-02-02 RX ADMIN — MEROPENEM SCH MLS/HR: 1 INJECTION, POWDER, FOR SOLUTION INTRAVENOUS at 20:57

## 2019-02-02 RX ADMIN — DIVALPROEX SODIUM SCH MG: 250 TABLET, DELAYED RELEASE ORAL at 01:03

## 2019-02-02 RX ADMIN — IPRATROPIUM BROMIDE 1 MG: 0.5 SOLUTION RESPIRATORY (INHALATION) at 20:38

## 2019-02-02 RX ADMIN — VANCOMYCIN HYDROCHLORIDE SCH MLS/HR: 750 INJECTION, POWDER, LYOPHILIZED, FOR SOLUTION INTRAVENOUS at 17:32

## 2019-02-02 RX ADMIN — DIVALPROEX SODIUM SCH MG: 250 TABLET, DELAYED RELEASE ORAL at 08:29

## 2019-02-02 RX ADMIN — FAMOTIDINE SCH MG: 20 TABLET ORAL at 08:29

## 2019-02-02 RX ADMIN — ENOXAPARIN SODIUM 1 MG: 100 INJECTION SUBCUTANEOUS at 08:36

## 2019-02-02 RX ADMIN — MEROPENEM SCH MLS/HR: 1 INJECTION, POWDER, FOR SOLUTION INTRAVENOUS at 08:29

## 2019-02-02 RX ADMIN — AMLODIPINE BESYLATE SCH MG: 10 TABLET ORAL at 08:35

## 2019-02-02 RX ADMIN — VANCOMYCIN HYDROCHLORIDE 1 MLS/HR: 750 INJECTION, POWDER, LYOPHILIZED, FOR SOLUTION INTRAVENOUS at 06:32

## 2019-02-02 RX ADMIN — DIVALPROEX SODIUM 1 MG: 250 TABLET, DELAYED RELEASE ORAL at 20:56

## 2019-02-02 RX ADMIN — IPRATROPIUM BROMIDE 1 MG: 0.5 SOLUTION RESPIRATORY (INHALATION) at 16:03

## 2019-02-02 RX ADMIN — CASTOR OIL AND BALSAM, PERU 1 APPLIC: 788; 87 OINTMENT TOPICAL at 08:35

## 2019-02-02 RX ADMIN — PROPOFOL 1 MLS/HR: 10 INJECTION, EMULSION INTRAVENOUS at 11:00

## 2019-02-02 RX ADMIN — MEROPENEM 1 MLS/HR: 1 INJECTION, POWDER, FOR SOLUTION INTRAVENOUS at 20:57

## 2019-02-02 RX ADMIN — Medication 1 MLS/HR: at 11:56

## 2019-02-02 RX ADMIN — ALBUTEROL SULFATE 1 MG: 2.5 SOLUTION RESPIRATORY (INHALATION) at 16:02

## 2019-02-02 RX ADMIN — Medication SCH MLS/HR: at 10:45

## 2019-02-02 RX ADMIN — Medication 1 MLS/HR: at 10:45

## 2019-02-02 RX ADMIN — PROPOFOL SCH MLS/HR: 10 INJECTION, EMULSION INTRAVENOUS at 11:00

## 2019-02-02 NOTE — CONS
Assessment/Plan


Assessment/Plan


Assessment/Plan (Daily)


VDRF


Sepsis


s/p cardiac arrest


Pneumothorax


Anemia


Thrombocytopenia





Continue Vent support


Continue Levophed


Hold  Norvasc


Continue Salumedrol


Continue Antibiotics


Contineu ASA and Lovenox


Continue Nebs as scheduled


Continue GI and DVT Prophylaxis


Monitor in ICU





Consultation Date/Type/Reason


Admit Date/Time


Jan 22, 2019 at 00:03


Type of Consult


Cardiology


Date/Time of Note


DATE: 2/2/19 


TIME: 13:11





Past Medical History


Home Meds


Reported Medications


Docusate Sodium* (Colace*) 100 Mg Capsule, 100 MG PO Q24H PRN for CONSTIPATION, 


#30 CAP


   1/21/19


Polyethylene Glycol* (Miralax*) 17 Gm Powd.pack, 17 GM PO DAILY PRN for AS 


NEEDED, #30 PACKET


   1/21/19


Acetaminophen* (Acetaminophen*) 325 Mg Tablet, 650 MG PO Q4H PRN for PAIN 1-


10/10, #30 TAB


   AND FEVER>101F


   1/21/19


Sennosides* (Senna Lax*) 8.6 Mg Tablet, 1 TAB PO AS NEEDED, TAB


   1/21/19


Mv,Minerals/Fa/Lycopene/Ginkgo (ONE DAILY MEN'S 50+ TABLET) 1 Each Tablet, 1 


EACH PO DAILY, TAB


   1/21/19


Divalproex Sodium* (Depakote*) 125 Mg Tablet.dr, 250 MG PO Q8H, #90 TAB


   1/21/19


Quetiapine Fumarate* (Seroquel*) 50 Mg Tablet, 50 MG PO Q8H, TAB


   1/21/19


Ipratropium-Albuterol (Ipratropium-Albuterol) 0.5-3 Mg/3 Ml Ampul.neb, 3 ML 


INHALATION Q4H PRN for WHEEZING AND SOB, #30 VIAL


   1/21/19


Budesonide-Formoterol Fumarate* (Symbicort*) 160-4.5 Hfa.aer.ad, 2 PUFF 


INHALATION BID, #1 EACH


   1/21/19


Medications





Current Medications


Ondansetron HCl (Zofran Inj) 4 mg Q6H  PRN IV NAUSEA AND/OR VOMITING Last 


administered on 2/1/19at 18:46; Admin Dose 4 MG;  Start 1/21/19 at 23:30


Acetaminophen (Tylenol Supp) 650 mg Q4H  PRN ID PAIN LEVEL 1-3 OR FEVER;  Start 


1/21/19 at 23:30


Enoxaparin Sodium (Lovenox) 30 mg DAILY SC  Last administered on 2/1/19at 08:38;


Admin Dose 30 MG;  Start 1/22/19 at 09:00


Aspirin (Aspirin) 81 mg DAILY PO  Last administered on 2/2/19at 08:29; Admin 


Dose 81 MG;  Start 1/24/19 at 09:00


Famotidine (Pepcid) 20 mg Q12 PO  Last administered on 2/2/19at 08:29; Admin 


Dose 20 MG;  Start 1/24/19 at 21:00


Albuterol (Proventil 0.083% (Neb)) 2.5 mg Q6H RESP  THERAPY HHN  Last 


administered on 2/2/19at 07:59; Admin Dose 2.5 MG;  Start 1/24/19 at 21:52


Ipratropium Bromide (Atrovent 0.02% (Neb)) 0.5 mg Q6H RESP  THERAPY HHN  Last 


administered on 2/2/19at 07:59; Admin Dose 0.5 MG;  Start 1/24/19 at 21:53


Zolpidem Tartrate (Ambien) 5 mg HS  PRN PO INSOMNIA Last administered on 


1/30/19at 23:03; Admin Dose 5 MG;  Start 1/27/19 at 00:00


Amlodipine Besylate (Norvasc) 10 mg DAILY PO  Last administered on 2/1/19at 08:3


6; Admin Dose 10 MG;  Start 1/27/19 at 15:00


Guaifenesin/ Dextromethorphan (Robitussin Dm Liquid Cup) 5 ml Q6H  PRN PO COUGH 


Last administered on 1/31/19at 09:04; Admin Dose 5 ML;  Start 1/27/19 at 20:00


Divalproex Sodium (Depakote) 250 mg Q8H PO  Last administered on 2/2/19at 08:29;


Admin Dose 250 MG;  Start 1/28/19 at 17:00


Morphine Sulfate (morphine) 6 mg Q4H  PRN PO SEVERE PAIN LEVEL 7-10 Last 


administered on 2/2/19at 07:18; Admin Dose 6 MG;  Start 1/30/19 at 21:30


Albuterol/ Ipratropium (Duoneb) 3 ml Q3H RESP THERAPY  PRN HHN SHORTNESS OF 


BREATH;  Start 1/31/19 at 20:00


Risperidone (Risperdal) 1 mg BID PO  Last administered on 2/2/19at 08:29; Admin 


Dose 1 MG;  Start 2/1/19 at 21:00


Vancomycin HCl (Vanco Iv Per Pharmacy) VANCOMYCIN PER PHARMACY PER PROTOCOL XX ;


 Start 2/1/19 at 15:00


Meropenem/Sodium Chloride 50 ml @  100 mls/hr Q12 IVPB  Last administered on 


2/2/19at 08:29; Admin Dose 100 MLS/HR;  Start 2/1/19 at 15:00


Vancomycin/Sodium Chloride 250 ml @  125 mls/hr Q12H IVPB ;  Start 2/2/19 at 


18:00


Miscellaneous Information (*Rx Drug Level Order Reminder*) VANCO TROUGH 2/3  @ 


0,500 ONCE  ONCE XX ;  Start 2/3/19 at 05:00;  Stop 2/3/19 at 05:01


Methylprednisolone Sodium Succinate (Solu-Medrol) 40 mg Q6 IV  Last administered


on 2/2/19at 12:14; Admin Dose 40 MG;  Start 2/2/19 at 12:00


Propofol 100 ml @  1.875 mls/ hr Q12H IV  Last administered on 2/2/19at 11:00; 


Admin Dose 1.875 MLS/HR;  Start 2/2/19 at 11:00


Fentanyl 100 ml @  2.5 mls/hr TITRATE IV  Last administered on 2/2/19at 11:56; 


Admin Dose 2.5 MLS/HR;  Start 2/2/19 at 11:30


Norepinephrine 250 ml @  1.875 mls/ hr TITRATE IV  Last administered on 2/2/19at


10:45; Admin Dose 18.75 MLS/HR;  Start 2/2/19 at 11:00


Alteplase, Recombinant (Cathflo (Activase)) 2 mg MAY REPEAT X1 PRN CATHETER IF 


CATHETER REMAINS OCCULUDED;  Start 2/2/19 at 11:00


Allergies:  


Coded Allergies:  


     No Known Allergy (Unverified , 1/21/19)





Social History


Smoking Status:  Unknown if ever smoked





Exam/Review of Systems


Vital Signs


Vitals





Vital Signs


  Date      Temp  Pulse  Resp  B/P (MAP)   Pulse Ox  O2          O2 Flow    FiO2


Time                                                 Delivery    Rate


    2/2/19                                                                   100


     12:00


    2/2/19           89


     09:45


    2/2/19                                                             6.0


     08:06


    2/2/19                 22                    96  Nasal


     08:00                                           Cannula


    2/2/19  97.9               98/68 (78)


     08:00








Intake and Output





2/1/19 2/1/19 2/2/19





1515:00


23:00


07:00





IntakeIntake Total


970 ml


500 ml





OutputOutput Total


1200 ml





BalanceBalance


-230 ml


500 ml














Exam


Constitutional:  non-verbal


ENMT:  intubated


Respiratory:  diminished breath sounds


Cardiovascular:  regular rate and rhythm (no m/r/g)


Gastrointestinal:  soft, nl liver, spleen





Labs


Result Diagram:  


2/2/19 0531                                                                     


          2/2/19 0531





Results 24hrs





Laboratory Tests


Test
                   2/2/19
05:31          2/2/19
09:54          2/2/19
12:00


White Blood Count             7.2  #


Red Blood Count              3.91  L


Hemoglobin                   10.7  L


Hematocrit                   35.2  L


Mean Corpuscular              90.0


Volume


Mean Corpuscular             27.4  L


Hemoglobin


Mean Corpuscular            30.4  L
  
                     



Hemoglobin
Concent


Red Cell Distribution        16.0  H


Width


Platelet Count               102  #L


Mean Platelet Volume         11.3  H


Immature Granulocytes        0.400


%


Neutrophils %                 75.1


Lymphocytes %                 15.4


Monocytes %                    6.2


Eosinophils %                  2.9


Basophils %                    0.0


Nucleated Red Blood            0.0


Cells %


Immature Granulocytes        0.030


#


Neutrophils #                  5.4


Lymphocytes #                  1.1


Monocytes #                    0.5


Eosinophils #                  0.2


Basophils #                    0.0


Nucleated Red Blood            0.0


Cells #


Sodium Level                   140


Potassium Level                4.8


Chloride Level                 100


Carbon Dioxide Level           38  H


Anion Gap                       2  L


Blood Urea Nitrogen            32  H


Creatinine                    0.90


Est Glomerular Filtrat  > 60  
       
                     



Rate
mL/min


Glucose Level                   75


Calcium Level                  9.2


Blood Gas Specimen      
             Blood arterial
       Blood arterial



Source



Arterial Blood Date     
              2/2/2019
9:53:09 AM  2/2/2019
12:02:59 PM


Drawn



Arterial Blood pH       
                       7.179  *L
            7.276  *L



(Temp
corrected)


Arterial Blood pCO2     
                        94.6  *H
              75.2  H



(Temp
correct)


Arterial Blood pO2      
                         59.5  L
             240.0  H



(Temp
corrected)


Arterial Blood HCO3                                34.4  H               34.2  H


Arterial Blood Base                                 3.2  H                5.2  H


Excess


Arterial Blood          
                         83.7  L
              99.4  H



Oxygen
Saturation


Antoine Test                            ACCEPTAB              ACCEPTAB


Arterial Blood Gas      
             Right Radial  
       Right Radial  



Puncture
Site


Arterial                
                           0.8  
                0.7  



Blood
Carboxyhemoglobi


n


Arterial Blood                                       0.1                   0.1


Methemoglobin


Blood Gas A-a O2                                  271.6  H              397.8  H


Differential


Oxyhemoglobin Percent                              82.9  L                98.6


Blood Gas Temperature                               37.0                  37.0


Blood Gas Modality                    MASK - SIMPLE         VENT - AC


FiO2                                                61.0                 100.0


Blood Gas Critical      
             MARLENE GAY RN  



Value Read
Back


Blood Gas Notified                    SM                    SM


Whom


Blood Gas Notified      
             2/2/2019
10:00:43 AM  2/2/2019
12:25:59 PM


Time



Blood Gas Respiration                                                     16.0


Rate


Blood Gas Actual        
             
                                    16  



Respiration
Rate


Blood Gas Tidal Volume                                                   450.0


Blood Gas Low PEEP                                                         5.0


Setting








Medications


Medications





Current Medications


Ondansetron HCl (Zofran Inj) 4 mg Q6H  PRN IV NAUSEA AND/OR VOMITING Last 


administered on 2/1/19at 18:46; Admin Dose 4 MG;  Start 1/21/19 at 23:30


Acetaminophen (Tylenol Supp) 650 mg Q4H  PRN ID PAIN LEVEL 1-3 OR FEVER;  Start 


1/21/19 at 23:30


Enoxaparin Sodium (Lovenox) 30 mg DAILY SC  Last administered on 2/1/19at 08:38;


Admin Dose 30 MG;  Start 1/22/19 at 09:00


Aspirin (Aspirin) 81 mg DAILY PO  Last administered on 2/2/19at 08:29; Admin 


Dose 81 MG;  Start 1/24/19 at 09:00


Famotidine (Pepcid) 20 mg Q12 PO  Last administered on 2/2/19at 08:29; Admin 


Dose 20 MG;  Start 1/24/19 at 21:00


Albuterol (Proventil 0.083% (Neb)) 2.5 mg Q6H RESP  THERAPY HHN  Last 


administered on 2/2/19at 07:59; Admin Dose 2.5 MG;  Start 1/24/19 at 21:52


Ipratropium Bromide (Atrovent 0.02% (Neb)) 0.5 mg Q6H RESP  THERAPY HHN  Last 


administered on 2/2/19at 07:59; Admin Dose 0.5 MG;  Start 1/24/19 at 21:53


Zolpidem Tartrate (Ambien) 5 mg HS  PRN PO INSOMNIA Last administered on 


1/30/19at 23:03; Admin Dose 5 MG;  Start 1/27/19 at 00:00


Amlodipine Besylate (Norvasc) 10 mg DAILY PO  Last administered on 2/1/19at 


08:36; Admin Dose 10 MG;  Start 1/27/19 at 15:00


Guaifenesin/ Dextromethorphan (Robitussin Dm Liquid Cup) 5 ml Q6H  PRN PO COUGH 


Last administered on 1/31/19at 09:04; Admin Dose 5 ML;  Start 1/27/19 at 20:00


Divalproex Sodium (Depakote) 250 mg Q8H PO  Last administered on 2/2/19at 08:29;


Admin Dose 250 MG;  Start 1/28/19 at 17:00


Morphine Sulfate (morphine) 6 mg Q4H  PRN PO SEVERE PAIN LEVEL 7-10 Last 


administered on 2/2/19at 07:18; Admin Dose 6 MG;  Start 1/30/19 at 21:30


Albuterol/ Ipratropium (Duoneb) 3 ml Q3H RESP THERAPY  PRN HHN SHORTNESS OF 


BREATH;  Start 1/31/19 at 20:00


Risperidone (Risperdal) 1 mg BID PO  Last administered on 2/2/19at 08:29; Admin 


Dose 1 MG;  Start 2/1/19 at 21:00


Vancomycin HCl (Vanco Iv Per Pharmacy) VANCOMYCIN PER PHARMACY PER PROTOCOL XX ;


 Start 2/1/19 at 15:00


Meropenem/Sodium Chloride 50 ml @  100 mls/hr Q12 IVPB  Last administered on 


2/2/19at 08:29; Admin Dose 100 MLS/HR;  Start 2/1/19 at 15:00


Vancomycin/Sodium Chloride 250 ml @  125 mls/hr Q12H IVPB ;  Start 2/2/19 at 


18:00


Miscellaneous Information (*Rx Drug Level Order Reminder*) VANCO TROUGH 2/3  @ 


0,500 ONCE  ONCE XX ;  Start 2/3/19 at 05:00;  Stop 2/3/19 at 05:01


Methylprednisolone Sodium Succinate (Solu-Medrol) 40 mg Q6 IV  Last administered


on 2/2/19at 12:14; Admin Dose 40 MG;  Start 2/2/19 at 12:00


Propofol 100 ml @  1.875 mls/ hr Q12H IV  Last administered on 2/2/19at 11:00; 


Admin Dose 1.875 MLS/HR;  Start 2/2/19 at 11:00


Fentanyl 100 ml @  2.5 mls/hr TITRATE IV  Last administered on 2/2/19at 11:56; 


Admin Dose 2.5 MLS/HR;  Start 2/2/19 at 11:30


Norepinephrine 250 ml @  1.875 mls/ hr TITRATE IV  Last administered on 2/2/19at


10:45; Admin Dose 18.75 MLS/HR;  Start 2/2/19 at 11:00


Alteplase, Recombinant (Cathflo (Activase)) 2 mg MAY REPEAT X1 PRN CATHETER IF 


CATHETER REMAINS OCCULUDED;  Start 2/2/19 at 11:00











JAVAD PINTO M.D.             Feb 2, 2019 13:16

## 2019-02-02 NOTE — CONS
Assessment/Plan


Assessment/Plan


Assessment/Plan (Daily)


ABG showing severe respiratory acidosis.


Chest x-ray was reviewed from 31st of this month which is showing severe 


emphysematous changes.





Assessment recommendations;





1.  Patient initially admitted for respiratory failure was then sent to medical 


floor after he self extubated, patient also required a left-sided chest tube for


pneumothorax, which he also pulled out himself.


2.  Underlying severe emphysema.


3.  Pneumoperitoneum on admission, etiology uncertain.  Patient however did not 


require any intervention.


4.  Currently there is no indication of any ongoing infective process.





Patient will need to be intubated because of severe agitation as well as 


underlying severe emphysema.  Patient is very agitated and is unable to handle 


any kind of supportive devices.


Will resume Solu-Medrol 40 mg every 6 hours.  Patient also will require 


sedation.  Will initiate tube feeding.  Meanwhile consider stopping antibiotics.


35 minutes of critical care time was spent evaluating the patient.  Exclusive of


any procedures.





Consultation Date/Type/Reason


Admit Date/Time


Jan 22, 2019 at 00:03


Initial Consult Date


1/22/19


Type of Consult


Pulmonary/critical care


Patient is a 68-year-old male who is a nursing home resident was sent over to 


the hospital because of hypothermia and altered mental status.  In the emergency


room, patient developed cardiac arrest requiring CPR and intubation.  In the 


course of workup the patient also required a chest tube placement on the left 


side.  CT imaging of the abdomen showed pneumoperitoneum with possibly 


perforated viscus.  By the time I saw the patient in ICU, patient is orally 


intubated and sedated.  History was obtained from medical records.





Past medical history; essentially unremarkable except for possibly COPD.


Medications; reviewed.  Patient is currently on dopamine at 6 mics per kilogram 


per minute, Levophed 20 mics per minute, propofol 25 mics per kilogram per 


minute.  Other medications were also reviewed.


Allergies; none.


Social history; not available.


Family history and occupational history is also not available.


Review of system; unable to be obtained.


General exam; elderly male, orally intubated, sedated, currently in no distress.


Requesting Provider:  DONALDO CARRILLO MD


Date/Time of Note


DATE: 2/2/19 


TIME: 10:32





24 HR Interval Summary


Free Text/Dictation


Patient's condition became unstable short while ago, rapid response was called 


in.  Stat ABG was done which is showing severe respiratory acidosis with marked 


hypercapnia.  Patient is also quite agitated and refusing to wear oxygen or any 


other supportive devices.  Patient now has been transferred to ICU.  And in the 


process of being intubated.





General exam; elderly male, awake but agitated.





Exam/Review of Systems


Exam


Vitals





Vital Signs


  Date      Temp  Pulse  Resp  B/P (MAP)   Pulse Ox  O2          O2 Flow    FiO2


Time                                                 Delivery    Rate


    2/2/19                                                             6.0


     08:06


    2/2/19           85    22                    96  Nasal


     08:00                                           Cannula


    2/2/19  97.9               98/68 (78)


     08:00


   1/31/19                                                                    21


     07:41








Intake and Output





2/1/19 2/1/19 2/2/19





1515:00


23:00


07:00





IntakeIntake Total


970 ml


500 ml





OutputOutput Total


1200 ml





BalanceBalance


-230 ml


500 ml











Exam


HEENT exam; supple neck, no JVD.  No lymphadenopathy.  Midline trachea.  No 


thyromegaly.  No neck masses.


Chest exam; diminished breath sounds throughout.  S1-S2 audible, no murmurs.  


Regular rhythm.  Dressing applied over left chest wall.


Abdomen exam; soft, nondistended.  Scaphoid.  Bowel sounds audible.


Extremity exam; peripheral edema.


CNS exam; awake and follows simple commands.





Results


Result Diagram:  


2/2/19 0531                                                                     


          2/2/19 0531





Results 24hrs





Laboratory Tests


    Test
                                2/2/19
05:31          2/2/19
09:54


    White Blood Count                          7.2  #


    Red Blood Count                           3.91  L


    Hemoglobin                                10.7  L


    Hematocrit                                35.2  L


    Mean Corpuscular Volume                    90.0


    Mean Corpuscular Hemoglobin               27.4  L


    Mean Corpuscular Hemoglobin
Concent      30.4  L
  



    Red Cell Distribution Width               16.0  H


    Platelet Count                            102  #L


    Mean Platelet Volume                      11.3  H


    Immature Granulocytes %                   0.400


    Neutrophils %                              75.1


    Lymphocytes %                              15.4


    Monocytes %                                 6.2


    Eosinophils %                               2.9


    Basophils %                                 0.0


    Nucleated Red Blood Cells %                 0.0


    Immature Granulocytes #                   0.030


    Neutrophils #                               5.4


    Lymphocytes #                               1.1


    Monocytes #                                 0.5


    Eosinophils #                               0.2


    Basophils #                                 0.0


    Nucleated Red Blood Cells #                 0.0


    Sodium Level                                140


    Potassium Level                             4.8


    Chloride Level                              100


    Carbon Dioxide Level                        38  H


    Anion Gap                                    2  L


    Blood Urea Nitrogen                         32  H


    Creatinine                                 0.90


    Est Glomerular Filtrat Rate
mL/min   > 60  
       



    Glucose Level                                75


    Calcium Level                               9.2


    Blood Gas Specimen Source
           
             Blood arterial



    Arterial Blood Date Drawn
           
              2/2/2019
9:53:09 AM


    Arterial Blood pH (Temp
corrected)   
                       7.179  *L



    Arterial Blood pCO2 (Temp
correct)   
                        94.6  *H



    Arterial Blood pO2 (Temp
corrected)  
                         59.5  L



    Arterial Blood HCO3                                             34.4  H


    Arterial Blood Base Excess                                       3.2  H


    Arterial Blood Oxygen
Saturation     
                         83.7  L



    Antoine Test                                         ACCEPTAB


    Arterial Blood Gas Puncture
Site     
             Right Radial  



    Arterial Blood
Carboxyhemoglobin     
                           0.8  



    Arterial Blood Methemoglobin                                      0.1


    Blood Gas A-a O2 Differential                                  271.6  H


    Oxyhemoglobin Percent                                           82.9  L


    Blood Gas Temperature                                            37.0


    Blood Gas Modality                                 MASK - SIMPLE


    FiO2                                                             61.0


    Blood Gas Critical Value Read
Back   
             MARLENE ALLAN RN  



    Blood Gas Notified Whom                            


    Blood Gas Notified Time
             
             2/2/2019
10:00:43 AM








Medications


Medication





Current Medications


Ondansetron HCl (Zofran Inj) 4 mg Q6H  PRN IV NAUSEA AND/OR VOMITING Last 


administered on 2/1/19at 18:46; Admin Dose 4 MG;  Start 1/21/19 at 23:30


Acetaminophen (Tylenol Supp) 650 mg Q4H  PRN LA PAIN LEVEL 1-3 OR FEVER;  Start 


1/21/19 at 23:30


Enoxaparin Sodium (Lovenox) 30 mg DAILY SC  Last administered on 2/1/19at 08:38;


Admin Dose 30 MG;  Start 1/22/19 at 09:00


Aspirin (Aspirin) 81 mg DAILY PO  Last administered on 2/2/19at 08:29; Admin 


Dose 81 MG;  Start 1/24/19 at 09:00


Famotidine (Pepcid) 20 mg Q12 PO  Last administered on 2/2/19at 08:29; Admin 


Dose 20 MG;  Start 1/24/19 at 21:00


Albuterol (Proventil 0.083% (Neb)) 2.5 mg Q6H RESP  THERAPY HHN  Last administer


ed on 2/2/19at 07:59; Admin Dose 2.5 MG;  Start 1/24/19 at 21:52


Ipratropium Bromide (Atrovent 0.02% (Neb)) 0.5 mg Q6H RESP  THERAPY HHN  Last 


administered on 2/2/19at 07:59; Admin Dose 0.5 MG;  Start 1/24/19 at 21:53


Zolpidem Tartrate (Ambien) 5 mg HS  PRN PO INSOMNIA Last administered on 


1/30/19at 23:03; Admin Dose 5 MG;  Start 1/27/19 at 00:00


Amlodipine Besylate (Norvasc) 10 mg DAILY PO  Last administered on 2/1/19at 


08:36; Admin Dose 10 MG;  Start 1/27/19 at 15:00


Guaifenesin/ Dextromethorphan (Robitussin Dm Liquid Cup) 5 ml Q6H  PRN PO COUGH 


Last administered on 1/31/19at 09:04; Admin Dose 5 ML;  Start 1/27/19 at 20:00


Divalproex Sodium (Depakote) 250 mg Q8H PO  Last administered on 2/2/19at 08:29;


Admin Dose 250 MG;  Start 1/28/19 at 17:00


Morphine Sulfate (morphine) 6 mg Q4H  PRN PO SEVERE PAIN LEVEL 7-10 Last 


administered on 2/2/19at 07:18; Admin Dose 6 MG;  Start 1/30/19 at 21:30


Albuterol/ Ipratropium (Duoneb) 3 ml Q3H RESP THERAPY  PRN HHN SHORTNESS OF 


BREATH;  Start 1/31/19 at 20:00


Risperidone (Risperdal) 1 mg BID PO  Last administered on 2/2/19at 08:29; Admin 


Dose 1 MG;  Start 2/1/19 at 21:00


Vancomycin HCl (Vanco Iv Per Pharmacy) VANCOMYCIN PER PHARMACY PER PROTOCOL XX ;


 Start 2/1/19 at 15:00


Meropenem/Sodium Chloride 50 ml @  100 mls/hr Q12 IVPB  Last administered on 


2/2/19at 08:29; Admin Dose 100 MLS/HR;  Start 2/1/19 at 15:00


Vancomycin/Sodium Chloride 250 ml @  125 mls/hr Q12H IVPB ;  Start 2/2/19 at 


18:00


Miscellaneous Information (*Rx Drug Level Order Reminder*) VANCO TROUGH 2/3  @ 


0,500 ONCE  ONCE XX ;  Start 2/3/19 at 05:00;  Stop 2/3/19 at 05:01











SHAGUFTA HUANG                     Feb 2, 2019 10:35

## 2019-02-02 NOTE — PN
Date/Time of Note


Date/Time of Note


DATE: 2/2/19 


TIME: 11:40





Assessment/Plan


VTE Prophylaxis


Risk score (from Ns)>0 risk:  8


SCD applied (from Ns):  No





Lines/Catheters


IV Catheter Type (from [facility]):  IJ Central line


Urinary Cath still in place:  Yes





Assessment/Plan


Assessment/Plan


-Sepsis with shock, resolving.  Dr. Dreyer is following in infection disease 


consultation.


-Status post cardiac arrest.   Dr. Jon is following in cardiology 


consultation.


-Left-sided pneumothorax, status post left side chest tube placement, s/p self 


d/c CT.


-Acute respiratory failure, continue ventilatory support bronchodilators.  Dr. Lim is following in pulmonology consultation.


-Pneumoperitoneum most likely due to cardiac arrest.  Dr. Lai is following in 


general surgery consultation. 


-Left axillary and brachial DVT, continue Lovenox


-Left lower lobe pneumonia


-Severe emphysema


-NATALIE with possible chronic kidney disease. Dr Hanson is following in nephrology


consultation.


-Schizoaffective disorder depressed type.  Psychiatric consult is appreciated, 


continue Risperdal Depakote





Further recommendations based on clinical course.  Plan of care discussed with 


Dr. Miller.





Total critical care time spent 35 mins


Result Diagram:  


2/2/19 0531                                                                     


          2/2/19 0531





Results 24hrs





Laboratory Tests


    Test
                                2/2/19
05:31          2/2/19
09:54


    White Blood Count                          7.2  #


    Red Blood Count                           3.91  L


    Hemoglobin                                10.7  L


    Hematocrit                                35.2  L


    Mean Corpuscular Volume                    90.0


    Mean Corpuscular Hemoglobin               27.4  L


    Mean Corpuscular Hemoglobin
Concent      30.4  L
  



    Red Cell Distribution Width               16.0  H


    Platelet Count                            102  #L


    Mean Platelet Volume                      11.3  H


    Immature Granulocytes %                   0.400


    Neutrophils %                              75.1


    Lymphocytes %                              15.4


    Monocytes %                                 6.2


    Eosinophils %                               2.9


    Basophils %                                 0.0


    Nucleated Red Blood Cells %                 0.0


    Immature Granulocytes #                   0.030


    Neutrophils #                               5.4


    Lymphocytes #                               1.1


    Monocytes #                                 0.5


    Eosinophils #                               0.2


    Basophils #                                 0.0


    Nucleated Red Blood Cells #                 0.0


    Sodium Level                                140


    Potassium Level                             4.8


    Chloride Level                              100


    Carbon Dioxide Level                        38  H


    Anion Gap                                    2  L


    Blood Urea Nitrogen                         32  H


    Creatinine                                 0.90


    Est Glomerular Filtrat Rate
mL/min   > 60  
       



    Glucose Level                                75


    Calcium Level                               9.2


    Blood Gas Specimen Source
           
             Blood arterial



    Arterial Blood Date Drawn
           
              2/2/2019
9:53:09 AM


    Arterial Blood pH (Temp
corrected)   
                       7.179  *L



    Arterial Blood pCO2 (Temp
correct)   
                        94.6  *H



    Arterial Blood pO2 (Temp
corrected)  
                         59.5  L



    Arterial Blood HCO3                                             34.4  H


    Arterial Blood Base Excess                                       3.2  H


    Arterial Blood Oxygen
Saturation     
                         83.7  L



    Antoine Test                                         ACCEPTAB


    Arterial Blood Gas Puncture
Site     
             Right Radial  



    Arterial Blood
Carboxyhemoglobin     
                           0.8  



    Arterial Blood Methemoglobin                                      0.1


    Blood Gas A-a O2 Differential                                  271.6  H


    Oxyhemoglobin Percent                                           82.9  L


    Blood Gas Temperature                                            37.0


    Blood Gas Modality                                 MASK - SIMPLE


    FiO2                                                             61.0


    Blood Gas Critical Value Read
Back   
             MARLENE ALLAN RN  



    Blood Gas Notified Whom                            


    Blood Gas Notified Time
             
             2/2/2019
10:00:43 AM








Exam/Review of Systems


Exam


Vitals





Vital Signs


  Date      Temp  Pulse  Resp  B/P (MAP)   Pulse Ox  O2          O2 Flow    FiO2


Time                                                 Delivery    Rate


    2/2/19                                                             6.0


     08:06


    2/2/19           85    22                    96  Nasal


     08:00                                           Cannula


    2/2/19  97.9               98/68 (78)


     08:00


   1/31/19                                                                    21


     07:41








Intake and Output





2/1/19 2/1/19 2/2/19





1515:00


23:00


07:00





IntakeIntake Total


970 ml


500 ml





OutputOutput Total


1200 ml





BalanceBalance


-230 ml


500 ml














Results


Results 24hrs





Laboratory Tests


    Test
                                2/2/19
05:31          2/2/19
09:54


    White Blood Count                          7.2  #


    Red Blood Count                           3.91  L


    Hemoglobin                                10.7  L


    Hematocrit                                35.2  L


    Mean Corpuscular Volume                    90.0


    Mean Corpuscular Hemoglobin               27.4  L


    Mean Corpuscular Hemoglobin
Concent      30.4  L
  



    Red Cell Distribution Width               16.0  H


    Platelet Count                            102  #L


    Mean Platelet Volume                      11.3  H


    Immature Granulocytes %                   0.400


    Neutrophils %                              75.1


    Lymphocytes %                              15.4


    Monocytes %                                 6.2


    Eosinophils %                               2.9


    Basophils %                                 0.0


    Nucleated Red Blood Cells %                 0.0


    Immature Granulocytes #                   0.030


    Neutrophils #                               5.4


    Lymphocytes #                               1.1


    Monocytes #                                 0.5


    Eosinophils #                               0.2


    Basophils #                                 0.0


    Nucleated Red Blood Cells #                 0.0


    Sodium Level                                140


    Potassium Level                             4.8


    Chloride Level                              100


    Carbon Dioxide Level                        38  H


    Anion Gap                                    2  L


    Blood Urea Nitrogen                         32  H


    Creatinine                                 0.90


    Est Glomerular Filtrat Rate
mL/min   > 60  
       



    Glucose Level                                75


    Calcium Level                               9.2


    Blood Gas Specimen Source
           
             Blood arterial



    Arterial Blood Date Drawn
           
              2/2/2019
9:53:09 AM


    Arterial Blood pH (Temp
corrected)   
                       7.179  *L



    Arterial Blood pCO2 (Temp
correct)   
                        94.6  *H



    Arterial Blood pO2 (Temp
corrected)  
                         59.5  L



    Arterial Blood HCO3                                             34.4  H


    Arterial Blood Base Excess                                       3.2  H


    Arterial Blood Oxygen
Saturation     
                         83.7  L



    Antoine Test                                         ACCEPTAB


    Arterial Blood Gas Puncture
Site     
             Right Radial  



    Arterial Blood
Carboxyhemoglobin     
                           0.8  



    Arterial Blood Methemoglobin                                      0.1


    Blood Gas A-a O2 Differential                                  271.6  H


    Oxyhemoglobin Percent                                           82.9  L


    Blood Gas Temperature                                            37.0


    Blood Gas Modality                                 MASK - SIMPLE


    FiO2                                                             61.0


    Blood Gas Critical Value Read
Back   
             MARLENE ALLAN RN  



    Blood Gas Notified Whom                            


    Blood Gas Notified Time
             
             2/2/2019
10:00:43 AM








Medications


Medication





Current Medications


Ondansetron HCl (Zofran Inj) 4 mg Q6H  PRN IV NAUSEA AND/OR VOMITING Last 


administered on 2/1/19at 18:46; Admin Dose 4 MG;  Start 1/21/19 at 23:30


Acetaminophen (Tylenol Supp) 650 mg Q4H  PRN [place] PAIN LEVEL 1-3 OR FEVER;  Start 


1/21/19 at 23:30


Enoxaparin Sodium (Lovenox) 30 mg DAILY SC  Last administered on 2/1/19at 08:38;


Admin Dose 30 MG;  Start 1/22/19 at 09:00


Aspirin (Aspirin) 81 mg DAILY PO  Last administered on 2/2/19at 08:29; Admin 


Dose 81 MG;  Start 1/24/19 at 09:00


Famotidine (Pepcid) 20 mg Q12 PO  Last administered on 2/2/19at 08:29; Admin 


Dose 20 MG;  Start 1/24/19 at 21:00


Albuterol (Proventil 0.083% (Neb)) 2.5 mg Q6H RESP  THERAPY HHN  Last 


administered on 2/2/19at 07:59; Admin Dose 2.5 MG;  Start 1/24/19 at 21:52


Ipratropium Bromide (Atrovent 0.02% (Neb)) 0.5 mg Q6H RESP  THERAPY HHN  Last 


administered on 2/2/19at 07:59; Admin Dose 0.5 MG;  Start 1/24/19 at 21:53


Zolpidem Tartrate (Ambien) 5 mg HS  PRN PO INSOMNIA Last administered on 


1/30/19at 23:03; Admin Dose 5 MG;  Start 1/27/19 at 00:00


Amlodipine Besylate (Norvasc) 10 mg DAILY PO  Last administered on 2/1/19at 


08:36; Admin Dose 10 MG;  Start 1/27/19 at 15:00


Guaifenesin/ Dextromethorphan (Robitussin Dm Liquid Cup) 5 ml Q6H  PRN PO COUGH 


Last administered on 1/31/19at 09:04; Admin Dose 5 ML;  Start 1/27/19 at 20:00


Divalproex Sodium (Depakote) 250 mg Q8H PO  Last administered on 2/2/19at 08:29;


Admin Dose 250 MG;  Start 1/28/19 at 17:00


Morphine Sulfate (morphine) 6 mg Q4H  PRN PO SEVERE PAIN LEVEL 7-10 Last 


administered on 2/2/19at 07:18; Admin Dose 6 MG;  Start 1/30/19 at 21:30


Albuterol/ Ipratropium (Duoneb) 3 ml Q3H RESP THERAPY  PRN HHN SHORTNESS OF 


BREATH;  Start 1/31/19 at 20:00


Risperidone (Risperdal) 1 mg BID PO  Last administered on 2/2/19at 08:29; Admin 


Dose 1 MG;  Start 2/1/19 at 21:00


Vancomycin HCl (Vanco Iv Per Pharmacy) VANCOMYCIN PER PHARMACY PER PROTOCOL XX ;


 Start 2/1/19 at 15:00


Meropenem/Sodium Chloride 50 ml @  100 mls/hr Q12 IVPB  Last administered on 2/ 2/19at 08:29; Admin Dose 100 MLS/HR;  Start 2/1/19 at 15:00


Vancomycin/Sodium Chloride 250 ml @  125 mls/hr Q12H IVPB ;  Start 2/2/19 at 


18:00


Miscellaneous Information (*Rx Drug Level Order Reminder*) VANCO TROUGH 2/3  @ 


0,500 ONCE  ONCE XX ;  Start 2/3/19 at 05:00;  Stop 2/3/19 at 05:01


Methylprednisolone Sodium Succinate (Solu-Medrol) 40 mg Q6 IV ;  Start 2/2/19 at


12:00


Propofol 100 ml @  1.875 mls/ hr Q12H IV  Last administered on 2/2/19at 11:00; 


Admin Dose 1.875 MLS/HR;  Start 2/2/19 at 11:00


Fentanyl 100 ml @  2.5 mls/hr TITRATE IV ;  Start 2/2/19 at 11:30


Norepinephrine 250 ml @  1.875 mls/ hr TITRATE IV  Last administered on 2/2/19at


10:45; Admin Dose 18.75 MLS/HR;  Start 2/2/19 at 11:00


Alteplase, Recombinant (Cathflo (Activase)) 2 mg MAY REPEAT X1 PRN CATHETER IF 


CATHETER REMAINS OCCULUDED;  Start 2/2/19 at 11:00











LETY MONSON                  Feb 2, 2019 11:40

## 2019-02-03 VITALS — SYSTOLIC BLOOD PRESSURE: 87 MMHG | RESPIRATION RATE: 20 BRPM | DIASTOLIC BLOOD PRESSURE: 62 MMHG | HEART RATE: 76 BPM

## 2019-02-03 VITALS — DIASTOLIC BLOOD PRESSURE: 72 MMHG | RESPIRATION RATE: 20 BRPM | HEART RATE: 46 BPM | SYSTOLIC BLOOD PRESSURE: 114 MMHG

## 2019-02-03 VITALS — HEART RATE: 72 BPM | DIASTOLIC BLOOD PRESSURE: 65 MMHG | RESPIRATION RATE: 20 BRPM | SYSTOLIC BLOOD PRESSURE: 90 MMHG

## 2019-02-03 VITALS — DIASTOLIC BLOOD PRESSURE: 67 MMHG | HEART RATE: 64 BPM | SYSTOLIC BLOOD PRESSURE: 97 MMHG | RESPIRATION RATE: 20 BRPM

## 2019-02-03 VITALS — HEART RATE: 63 BPM | RESPIRATION RATE: 18 BRPM | SYSTOLIC BLOOD PRESSURE: 103 MMHG | DIASTOLIC BLOOD PRESSURE: 70 MMHG

## 2019-02-03 VITALS — HEART RATE: 55 BPM | DIASTOLIC BLOOD PRESSURE: 79 MMHG | RESPIRATION RATE: 20 BRPM | SYSTOLIC BLOOD PRESSURE: 121 MMHG

## 2019-02-03 VITALS — DIASTOLIC BLOOD PRESSURE: 63 MMHG | SYSTOLIC BLOOD PRESSURE: 91 MMHG | RESPIRATION RATE: 20 BRPM | HEART RATE: 81 BPM

## 2019-02-03 VITALS — HEART RATE: 76 BPM | RESPIRATION RATE: 20 BRPM | SYSTOLIC BLOOD PRESSURE: 90 MMHG | DIASTOLIC BLOOD PRESSURE: 62 MMHG

## 2019-02-03 VITALS — SYSTOLIC BLOOD PRESSURE: 111 MMHG | DIASTOLIC BLOOD PRESSURE: 71 MMHG | RESPIRATION RATE: 20 BRPM | HEART RATE: 49 BPM

## 2019-02-03 VITALS — SYSTOLIC BLOOD PRESSURE: 93 MMHG | DIASTOLIC BLOOD PRESSURE: 64 MMHG | RESPIRATION RATE: 20 BRPM | HEART RATE: 69 BPM

## 2019-02-03 VITALS — RESPIRATION RATE: 20 BRPM | DIASTOLIC BLOOD PRESSURE: 64 MMHG | HEART RATE: 66 BPM | SYSTOLIC BLOOD PRESSURE: 96 MMHG

## 2019-02-03 VITALS — HEART RATE: 76 BPM | RESPIRATION RATE: 20 BRPM | SYSTOLIC BLOOD PRESSURE: 92 MMHG | DIASTOLIC BLOOD PRESSURE: 63 MMHG

## 2019-02-03 VITALS — RESPIRATION RATE: 20 BRPM | SYSTOLIC BLOOD PRESSURE: 141 MMHG | HEART RATE: 56 BPM | DIASTOLIC BLOOD PRESSURE: 78 MMHG

## 2019-02-03 VITALS — DIASTOLIC BLOOD PRESSURE: 62 MMHG | SYSTOLIC BLOOD PRESSURE: 86 MMHG | RESPIRATION RATE: 20 BRPM | HEART RATE: 76 BPM

## 2019-02-03 VITALS — SYSTOLIC BLOOD PRESSURE: 91 MMHG | DIASTOLIC BLOOD PRESSURE: 60 MMHG | HEART RATE: 70 BPM | RESPIRATION RATE: 20 BRPM

## 2019-02-03 VITALS — RESPIRATION RATE: 20 BRPM | HEART RATE: 71 BPM | DIASTOLIC BLOOD PRESSURE: 63 MMHG | SYSTOLIC BLOOD PRESSURE: 88 MMHG

## 2019-02-03 VITALS — SYSTOLIC BLOOD PRESSURE: 122 MMHG | DIASTOLIC BLOOD PRESSURE: 74 MMHG | RESPIRATION RATE: 18 BRPM | HEART RATE: 54 BPM

## 2019-02-03 VITALS — DIASTOLIC BLOOD PRESSURE: 73 MMHG | SYSTOLIC BLOOD PRESSURE: 116 MMHG | RESPIRATION RATE: 20 BRPM | HEART RATE: 56 BPM

## 2019-02-03 VITALS — RESPIRATION RATE: 20 BRPM | SYSTOLIC BLOOD PRESSURE: 92 MMHG | HEART RATE: 73 BPM | DIASTOLIC BLOOD PRESSURE: 62 MMHG

## 2019-02-03 VITALS — SYSTOLIC BLOOD PRESSURE: 123 MMHG | DIASTOLIC BLOOD PRESSURE: 79 MMHG | HEART RATE: 93 BPM | RESPIRATION RATE: 21 BRPM

## 2019-02-03 VITALS — RESPIRATION RATE: 20 BRPM | HEART RATE: 67 BPM | SYSTOLIC BLOOD PRESSURE: 84 MMHG | DIASTOLIC BLOOD PRESSURE: 57 MMHG

## 2019-02-03 VITALS — HEART RATE: 46 BPM | RESPIRATION RATE: 20 BRPM | DIASTOLIC BLOOD PRESSURE: 67 MMHG | SYSTOLIC BLOOD PRESSURE: 106 MMHG

## 2019-02-03 VITALS — DIASTOLIC BLOOD PRESSURE: 70 MMHG | RESPIRATION RATE: 20 BRPM | SYSTOLIC BLOOD PRESSURE: 113 MMHG | HEART RATE: 50 BPM

## 2019-02-03 VITALS — SYSTOLIC BLOOD PRESSURE: 141 MMHG | RESPIRATION RATE: 18 BRPM | HEART RATE: 65 BPM | DIASTOLIC BLOOD PRESSURE: 74 MMHG

## 2019-02-03 VITALS — RESPIRATION RATE: 20 BRPM | DIASTOLIC BLOOD PRESSURE: 69 MMHG | SYSTOLIC BLOOD PRESSURE: 101 MMHG | HEART RATE: 56 BPM

## 2019-02-03 VITALS — RESPIRATION RATE: 20 BRPM | DIASTOLIC BLOOD PRESSURE: 82 MMHG | HEART RATE: 68 BPM | SYSTOLIC BLOOD PRESSURE: 113 MMHG

## 2019-02-03 VITALS — HEART RATE: 83 BPM | DIASTOLIC BLOOD PRESSURE: 66 MMHG | RESPIRATION RATE: 20 BRPM | SYSTOLIC BLOOD PRESSURE: 95 MMHG

## 2019-02-03 VITALS — HEART RATE: 68 BPM | SYSTOLIC BLOOD PRESSURE: 96 MMHG | DIASTOLIC BLOOD PRESSURE: 69 MMHG | RESPIRATION RATE: 20 BRPM

## 2019-02-03 VITALS — RESPIRATION RATE: 20 BRPM

## 2019-02-03 VITALS — SYSTOLIC BLOOD PRESSURE: 84 MMHG | RESPIRATION RATE: 20 BRPM | DIASTOLIC BLOOD PRESSURE: 60 MMHG | HEART RATE: 70 BPM

## 2019-02-03 VITALS — DIASTOLIC BLOOD PRESSURE: 66 MMHG | RESPIRATION RATE: 20 BRPM | SYSTOLIC BLOOD PRESSURE: 95 MMHG | HEART RATE: 74 BPM

## 2019-02-03 VITALS — DIASTOLIC BLOOD PRESSURE: 83 MMHG | RESPIRATION RATE: 20 BRPM | HEART RATE: 56 BPM | SYSTOLIC BLOOD PRESSURE: 120 MMHG

## 2019-02-03 VITALS — RESPIRATION RATE: 20 BRPM | SYSTOLIC BLOOD PRESSURE: 90 MMHG | DIASTOLIC BLOOD PRESSURE: 63 MMHG | HEART RATE: 72 BPM

## 2019-02-03 VITALS — DIASTOLIC BLOOD PRESSURE: 60 MMHG | RESPIRATION RATE: 20 BRPM | SYSTOLIC BLOOD PRESSURE: 85 MMHG | HEART RATE: 76 BPM

## 2019-02-03 VITALS — RESPIRATION RATE: 20 BRPM | HEART RATE: 67 BPM | DIASTOLIC BLOOD PRESSURE: 75 MMHG | SYSTOLIC BLOOD PRESSURE: 120 MMHG

## 2019-02-03 VITALS — RESPIRATION RATE: 20 BRPM | SYSTOLIC BLOOD PRESSURE: 87 MMHG | HEART RATE: 74 BPM | DIASTOLIC BLOOD PRESSURE: 62 MMHG

## 2019-02-03 VITALS — HEART RATE: 57 BPM | SYSTOLIC BLOOD PRESSURE: 125 MMHG | DIASTOLIC BLOOD PRESSURE: 74 MMHG | RESPIRATION RATE: 20 BRPM

## 2019-02-03 VITALS — RESPIRATION RATE: 20 BRPM | SYSTOLIC BLOOD PRESSURE: 131 MMHG | DIASTOLIC BLOOD PRESSURE: 77 MMHG | HEART RATE: 66 BPM

## 2019-02-03 VITALS — DIASTOLIC BLOOD PRESSURE: 80 MMHG | SYSTOLIC BLOOD PRESSURE: 124 MMHG | RESPIRATION RATE: 20 BRPM | HEART RATE: 81 BPM

## 2019-02-03 VITALS — HEART RATE: 48 BPM | DIASTOLIC BLOOD PRESSURE: 73 MMHG | SYSTOLIC BLOOD PRESSURE: 111 MMHG | RESPIRATION RATE: 20 BRPM

## 2019-02-03 VITALS — HEART RATE: 66 BPM | DIASTOLIC BLOOD PRESSURE: 75 MMHG | RESPIRATION RATE: 18 BRPM | SYSTOLIC BLOOD PRESSURE: 102 MMHG

## 2019-02-03 VITALS — SYSTOLIC BLOOD PRESSURE: 94 MMHG | HEART RATE: 65 BPM | RESPIRATION RATE: 20 BRPM | DIASTOLIC BLOOD PRESSURE: 63 MMHG

## 2019-02-03 VITALS — SYSTOLIC BLOOD PRESSURE: 99 MMHG | RESPIRATION RATE: 20 BRPM | HEART RATE: 68 BPM | DIASTOLIC BLOOD PRESSURE: 68 MMHG

## 2019-02-03 VITALS — DIASTOLIC BLOOD PRESSURE: 76 MMHG | HEART RATE: 60 BPM | SYSTOLIC BLOOD PRESSURE: 115 MMHG | RESPIRATION RATE: 20 BRPM

## 2019-02-03 VITALS — RESPIRATION RATE: 20 BRPM | DIASTOLIC BLOOD PRESSURE: 65 MMHG | SYSTOLIC BLOOD PRESSURE: 91 MMHG | HEART RATE: 80 BPM

## 2019-02-03 VITALS — SYSTOLIC BLOOD PRESSURE: 142 MMHG | RESPIRATION RATE: 20 BRPM | HEART RATE: 61 BPM | DIASTOLIC BLOOD PRESSURE: 75 MMHG

## 2019-02-03 VITALS — RESPIRATION RATE: 20 BRPM | HEART RATE: 62 BPM | SYSTOLIC BLOOD PRESSURE: 159 MMHG | DIASTOLIC BLOOD PRESSURE: 79 MMHG

## 2019-02-03 VITALS — DIASTOLIC BLOOD PRESSURE: 74 MMHG | SYSTOLIC BLOOD PRESSURE: 109 MMHG | HEART RATE: 55 BPM | RESPIRATION RATE: 20 BRPM

## 2019-02-03 VITALS — HEART RATE: 58 BPM | RESPIRATION RATE: 20 BRPM | SYSTOLIC BLOOD PRESSURE: 115 MMHG | DIASTOLIC BLOOD PRESSURE: 75 MMHG

## 2019-02-03 VITALS — RESPIRATION RATE: 20 BRPM | SYSTOLIC BLOOD PRESSURE: 104 MMHG | HEART RATE: 73 BPM | DIASTOLIC BLOOD PRESSURE: 62 MMHG

## 2019-02-03 VITALS — HEART RATE: 79 BPM | DIASTOLIC BLOOD PRESSURE: 66 MMHG | RESPIRATION RATE: 20 BRPM | SYSTOLIC BLOOD PRESSURE: 90 MMHG

## 2019-02-03 VITALS — DIASTOLIC BLOOD PRESSURE: 75 MMHG | HEART RATE: 70 BPM | SYSTOLIC BLOOD PRESSURE: 117 MMHG | RESPIRATION RATE: 20 BRPM

## 2019-02-03 VITALS — SYSTOLIC BLOOD PRESSURE: 88 MMHG | DIASTOLIC BLOOD PRESSURE: 63 MMHG | HEART RATE: 76 BPM | RESPIRATION RATE: 20 BRPM

## 2019-02-03 VITALS — SYSTOLIC BLOOD PRESSURE: 106 MMHG | HEART RATE: 46 BPM | DIASTOLIC BLOOD PRESSURE: 72 MMHG | RESPIRATION RATE: 20 BRPM

## 2019-02-03 VITALS — SYSTOLIC BLOOD PRESSURE: 121 MMHG | RESPIRATION RATE: 20 BRPM | DIASTOLIC BLOOD PRESSURE: 74 MMHG | HEART RATE: 56 BPM

## 2019-02-03 VITALS — SYSTOLIC BLOOD PRESSURE: 106 MMHG | DIASTOLIC BLOOD PRESSURE: 76 MMHG | RESPIRATION RATE: 20 BRPM | HEART RATE: 71 BPM

## 2019-02-03 VITALS — HEART RATE: 61 BPM

## 2019-02-03 VITALS — SYSTOLIC BLOOD PRESSURE: 105 MMHG | HEART RATE: 49 BPM | DIASTOLIC BLOOD PRESSURE: 69 MMHG | RESPIRATION RATE: 20 BRPM

## 2019-02-03 VITALS — DIASTOLIC BLOOD PRESSURE: 61 MMHG | SYSTOLIC BLOOD PRESSURE: 101 MMHG | HEART RATE: 61 BPM | RESPIRATION RATE: 20 BRPM

## 2019-02-03 VITALS — HEART RATE: 68 BPM | RESPIRATION RATE: 20 BRPM | SYSTOLIC BLOOD PRESSURE: 95 MMHG | DIASTOLIC BLOOD PRESSURE: 61 MMHG

## 2019-02-03 VITALS — RESPIRATION RATE: 20 BRPM | HEART RATE: 53 BPM | DIASTOLIC BLOOD PRESSURE: 74 MMHG | SYSTOLIC BLOOD PRESSURE: 117 MMHG

## 2019-02-03 VITALS — RESPIRATION RATE: 18 BRPM | DIASTOLIC BLOOD PRESSURE: 74 MMHG | HEART RATE: 62 BPM | SYSTOLIC BLOOD PRESSURE: 116 MMHG

## 2019-02-03 VITALS — SYSTOLIC BLOOD PRESSURE: 86 MMHG | HEART RATE: 71 BPM | DIASTOLIC BLOOD PRESSURE: 59 MMHG | RESPIRATION RATE: 20 BRPM

## 2019-02-03 VITALS — HEART RATE: 57 BPM | RESPIRATION RATE: 20 BRPM | DIASTOLIC BLOOD PRESSURE: 63 MMHG | SYSTOLIC BLOOD PRESSURE: 96 MMHG

## 2019-02-03 VITALS — DIASTOLIC BLOOD PRESSURE: 82 MMHG | RESPIRATION RATE: 20 BRPM | SYSTOLIC BLOOD PRESSURE: 122 MMHG | HEART RATE: 61 BPM

## 2019-02-03 VITALS — DIASTOLIC BLOOD PRESSURE: 73 MMHG | RESPIRATION RATE: 20 BRPM | HEART RATE: 58 BPM | SYSTOLIC BLOOD PRESSURE: 121 MMHG

## 2019-02-03 VITALS — SYSTOLIC BLOOD PRESSURE: 95 MMHG | RESPIRATION RATE: 20 BRPM | HEART RATE: 50 BPM | DIASTOLIC BLOOD PRESSURE: 67 MMHG

## 2019-02-03 VITALS — DIASTOLIC BLOOD PRESSURE: 64 MMHG | HEART RATE: 72 BPM | SYSTOLIC BLOOD PRESSURE: 92 MMHG | RESPIRATION RATE: 20 BRPM

## 2019-02-03 VITALS — RESPIRATION RATE: 20 BRPM | SYSTOLIC BLOOD PRESSURE: 100 MMHG | DIASTOLIC BLOOD PRESSURE: 64 MMHG | HEART RATE: 66 BPM

## 2019-02-03 VITALS — SYSTOLIC BLOOD PRESSURE: 109 MMHG | RESPIRATION RATE: 18 BRPM | DIASTOLIC BLOOD PRESSURE: 59 MMHG | HEART RATE: 57 BPM

## 2019-02-03 VITALS — HEART RATE: 66 BPM | RESPIRATION RATE: 20 BRPM | SYSTOLIC BLOOD PRESSURE: 138 MMHG | DIASTOLIC BLOOD PRESSURE: 77 MMHG

## 2019-02-03 VITALS — HEART RATE: 69 BPM | DIASTOLIC BLOOD PRESSURE: 63 MMHG | SYSTOLIC BLOOD PRESSURE: 89 MMHG | RESPIRATION RATE: 20 BRPM

## 2019-02-03 VITALS — RESPIRATION RATE: 20 BRPM | DIASTOLIC BLOOD PRESSURE: 65 MMHG | HEART RATE: 71 BPM | SYSTOLIC BLOOD PRESSURE: 92 MMHG

## 2019-02-03 VITALS — HEART RATE: 63 BPM | RESPIRATION RATE: 18 BRPM | DIASTOLIC BLOOD PRESSURE: 60 MMHG | SYSTOLIC BLOOD PRESSURE: 104 MMHG

## 2019-02-03 VITALS — DIASTOLIC BLOOD PRESSURE: 63 MMHG | HEART RATE: 68 BPM | RESPIRATION RATE: 20 BRPM | SYSTOLIC BLOOD PRESSURE: 97 MMHG

## 2019-02-03 VITALS — HEART RATE: 63 BPM | SYSTOLIC BLOOD PRESSURE: 85 MMHG | DIASTOLIC BLOOD PRESSURE: 56 MMHG | RESPIRATION RATE: 20 BRPM

## 2019-02-03 VITALS — HEART RATE: 86 BPM | DIASTOLIC BLOOD PRESSURE: 68 MMHG | SYSTOLIC BLOOD PRESSURE: 88 MMHG | RESPIRATION RATE: 20 BRPM

## 2019-02-03 VITALS — RESPIRATION RATE: 20 BRPM | DIASTOLIC BLOOD PRESSURE: 58 MMHG | SYSTOLIC BLOOD PRESSURE: 88 MMHG | HEART RATE: 75 BPM

## 2019-02-03 VITALS — SYSTOLIC BLOOD PRESSURE: 103 MMHG | DIASTOLIC BLOOD PRESSURE: 74 MMHG | HEART RATE: 46 BPM | RESPIRATION RATE: 20 BRPM

## 2019-02-03 VITALS — DIASTOLIC BLOOD PRESSURE: 70 MMHG | HEART RATE: 63 BPM | RESPIRATION RATE: 20 BRPM | SYSTOLIC BLOOD PRESSURE: 102 MMHG

## 2019-02-03 VITALS — DIASTOLIC BLOOD PRESSURE: 61 MMHG | HEART RATE: 60 BPM | RESPIRATION RATE: 20 BRPM | SYSTOLIC BLOOD PRESSURE: 91 MMHG

## 2019-02-03 VITALS — HEART RATE: 57 BPM | DIASTOLIC BLOOD PRESSURE: 64 MMHG | SYSTOLIC BLOOD PRESSURE: 102 MMHG | RESPIRATION RATE: 20 BRPM

## 2019-02-03 VITALS — SYSTOLIC BLOOD PRESSURE: 115 MMHG | DIASTOLIC BLOOD PRESSURE: 76 MMHG | RESPIRATION RATE: 20 BRPM | HEART RATE: 70 BPM

## 2019-02-03 VITALS — SYSTOLIC BLOOD PRESSURE: 86 MMHG | DIASTOLIC BLOOD PRESSURE: 56 MMHG | HEART RATE: 71 BPM | RESPIRATION RATE: 20 BRPM

## 2019-02-03 VITALS — HEART RATE: 69 BPM | SYSTOLIC BLOOD PRESSURE: 94 MMHG | DIASTOLIC BLOOD PRESSURE: 65 MMHG | RESPIRATION RATE: 20 BRPM

## 2019-02-03 VITALS — RESPIRATION RATE: 20 BRPM | DIASTOLIC BLOOD PRESSURE: 73 MMHG | SYSTOLIC BLOOD PRESSURE: 120 MMHG | HEART RATE: 58 BPM

## 2019-02-03 VITALS — RESPIRATION RATE: 20 BRPM | HEART RATE: 69 BPM | SYSTOLIC BLOOD PRESSURE: 88 MMHG | DIASTOLIC BLOOD PRESSURE: 60 MMHG

## 2019-02-03 VITALS — SYSTOLIC BLOOD PRESSURE: 98 MMHG | RESPIRATION RATE: 20 BRPM | DIASTOLIC BLOOD PRESSURE: 65 MMHG | HEART RATE: 67 BPM

## 2019-02-03 VITALS — RESPIRATION RATE: 20 BRPM | SYSTOLIC BLOOD PRESSURE: 125 MMHG | DIASTOLIC BLOOD PRESSURE: 82 MMHG | HEART RATE: 56 BPM

## 2019-02-03 VITALS — HEART RATE: 65 BPM | SYSTOLIC BLOOD PRESSURE: 113 MMHG | DIASTOLIC BLOOD PRESSURE: 75 MMHG | RESPIRATION RATE: 20 BRPM

## 2019-02-03 VITALS — DIASTOLIC BLOOD PRESSURE: 65 MMHG | RESPIRATION RATE: 20 BRPM | HEART RATE: 77 BPM | SYSTOLIC BLOOD PRESSURE: 84 MMHG

## 2019-02-03 VITALS — HEART RATE: 76 BPM | DIASTOLIC BLOOD PRESSURE: 65 MMHG | SYSTOLIC BLOOD PRESSURE: 86 MMHG | RESPIRATION RATE: 20 BRPM

## 2019-02-03 VITALS — DIASTOLIC BLOOD PRESSURE: 64 MMHG | HEART RATE: 70 BPM | RESPIRATION RATE: 20 BRPM | SYSTOLIC BLOOD PRESSURE: 94 MMHG

## 2019-02-03 VITALS — RESPIRATION RATE: 20 BRPM | SYSTOLIC BLOOD PRESSURE: 102 MMHG | DIASTOLIC BLOOD PRESSURE: 70 MMHG | HEART RATE: 50 BPM

## 2019-02-03 VITALS — HEART RATE: 66 BPM

## 2019-02-03 VITALS — SYSTOLIC BLOOD PRESSURE: 88 MMHG | HEART RATE: 70 BPM | DIASTOLIC BLOOD PRESSURE: 63 MMHG | RESPIRATION RATE: 20 BRPM

## 2019-02-03 VITALS — HEART RATE: 54 BPM

## 2019-02-03 LAB
ABNORMAL IP MESSAGE: 1
ADD MAN DIFF?: NO
ALLEN TEST: (no result)
ANION GAP: 5 (ref 5–13)
ARTERIAL BASE EXCESS: 7.9 MMOL/L (ref -3–3)
ARTERIAL COHB: 0.9 % (ref 0–3)
ARTERIAL FRACTION OF OXYHGB: 90.8 % (ref 93–99)
ARTERIAL HCO3: 32 MMOL/L (ref 22–26)
ARTERIAL METHB: 0.3 % (ref 0–1.5)
ARTERIAL PCO2: 42.5 MMHG (ref 35–45)
BASOPHIL #: 0 10^3/UL (ref 0–0.1)
BASOPHILS %: 0 % (ref 0–2)
BLOOD GAS LOW PEEP SETTING: 5 CMH2O
BLOOD GAS TIDAL VOLUME: 500 ML
BLOOD UREA NITROGEN: 30 MG/DL (ref 7–20)
CALCIUM: 8.9 MG/DL (ref 8.4–10.2)
CARBON DIOXIDE: 34 MMOL/L (ref 21–31)
CHLORIDE: 103 MMOL/L (ref 97–110)
CREATININE: 0.97 MG/DL (ref 0.61–1.24)
EOSINOPHILS #: 0 10^3/UL (ref 0–0.5)
EOSINOPHILS %: 0 % (ref 0–7)
FIO2: 35 %
GLUCOSE: 173 MG/DL (ref 70–220)
HEMATOCRIT: 32.3 % (ref 42–52)
HEMOGLOBIN: 10 G/DL (ref 14–18)
IMMATURE GRANS #M: 0.03 10^3/UL (ref 0–0.03)
IMMATURE GRANS % (M): 0.6 % (ref 0–0.43)
LYMPHOCYTES #: 0.5 10^3/UL (ref 0.8–2.9)
LYMPHOCYTES %: 9.2 % (ref 15–51)
MAGNESIUM: 2.4 MG/DL (ref 1.7–2.5)
MEAN CORPUSCULAR HEMOGLOBIN: 27.9 PG (ref 29–33)
MEAN CORPUSCULAR HGB CONC: 31 G/DL (ref 32–37)
MEAN CORPUSCULAR VOLUME: 90 FL (ref 82–101)
MEAN PLATELET VOLUME: 11.3 FL (ref 7.4–10.4)
MODE: (no result)
MONOCYTE #: 0.1 10^3/UL (ref 0.3–0.9)
MONOCYTES %: 1.3 % (ref 0–11)
NEUTROPHIL #: 4.8 10^3/UL (ref 1.6–7.5)
NEUTROPHILS %: 88.9 % (ref 39–77)
NUCLEATED RED BLOOD CELLS #: 0 10^3/UL (ref 0–0)
NUCLEATED RED BLOOD CELLS%: 0 /100WBC (ref 0–0)
O2 A-A PPRESDIFF RESPIRATORY: 138 MMHG (ref 7–24)
PHOSPHORUS: 2.6 MG/DL (ref 2.5–4.9)
PLATELET COUNT: 131 10^3/UL (ref 140–415)
POSITIVE DIFF: (no result)
POTASSIUM: 5 MMOL/L (ref 3.5–5.1)
RED BLOOD COUNT: 3.59 10^6/UL (ref 4.7–6.1)
RED CELL DISTRIBUTION WIDTH: 15.9 % (ref 11.5–14.5)
SODIUM: 142 MMOL/L (ref 135–144)
VANCOMYCIN,TROUGH: 17.6 UG/ML (ref 10–20)
WHITE BLOOD COUNT: 5.3 10^3/UL (ref 4.8–10.8)

## 2019-02-03 RX ADMIN — METHYLPREDNISOLONE SODIUM SUCCINATE 1 MG: 40 INJECTION, POWDER, FOR SOLUTION INTRAMUSCULAR; INTRAVENOUS at 23:04

## 2019-02-03 RX ADMIN — AMLODIPINE BESYLATE SCH MG: 10 TABLET ORAL at 09:00

## 2019-02-03 RX ADMIN — FAMOTIDINE SCH MG: 20 TABLET ORAL at 21:34

## 2019-02-03 RX ADMIN — DIVALPROEX SODIUM 1 MG: 250 TABLET, DELAYED RELEASE ORAL at 09:44

## 2019-02-03 RX ADMIN — DIVALPROEX SODIUM SCH MG: 250 TABLET, DELAYED RELEASE ORAL at 09:44

## 2019-02-03 RX ADMIN — METHYLPREDNISOLONE SODIUM SUCCINATE 1 MG: 40 INJECTION, POWDER, FOR SOLUTION INTRAMUSCULAR; INTRAVENOUS at 12:07

## 2019-02-03 RX ADMIN — MEROPENEM SCH MLS/HR: 1 INJECTION, POWDER, FOR SOLUTION INTRAVENOUS at 08:54

## 2019-02-03 RX ADMIN — Medication SCH MLS/HR: at 04:01

## 2019-02-03 RX ADMIN — ALBUTEROL SULFATE SCH PUFF: 90 AEROSOL, METERED RESPIRATORY (INHALATION) at 14:12

## 2019-02-03 RX ADMIN — MEROPENEM 1 MLS/HR: 1 INJECTION, POWDER, FOR SOLUTION INTRAVENOUS at 08:54

## 2019-02-03 RX ADMIN — ALBUTEROL SULFATE 1 PUFF: 90 AEROSOL, METERED RESPIRATORY (INHALATION) at 14:12

## 2019-02-03 RX ADMIN — ALBUMIN (HUMAN) 1 MLS/HR: 0.25 INJECTION, SOLUTION INTRAVENOUS at 12:07

## 2019-02-03 RX ADMIN — ALBUTEROL SULFATE 1 MG: 2.5 SOLUTION RESPIRATORY (INHALATION) at 02:00

## 2019-02-03 RX ADMIN — PROPOFOL 1 MLS/HR: 10 INJECTION, EMULSION INTRAVENOUS at 06:13

## 2019-02-03 RX ADMIN — ALBUMIN (HUMAN) SCH MLS/HR: 0.25 INJECTION, SOLUTION INTRAVENOUS at 12:07

## 2019-02-03 RX ADMIN — METHYLPREDNISOLONE SODIUM SUCCINATE 1 MG: 40 INJECTION, POWDER, FOR SOLUTION INTRAMUSCULAR; INTRAVENOUS at 17:30

## 2019-02-03 RX ADMIN — Medication 1 MLS/HR: at 02:08

## 2019-02-03 RX ADMIN — DIVALPROEX SODIUM SCH MG: 250 TABLET, DELAYED RELEASE ORAL at 17:17

## 2019-02-03 RX ADMIN — ALBUTEROL SULFATE 1 PUFF: 90 AEROSOL, METERED RESPIRATORY (INHALATION) at 20:35

## 2019-02-03 RX ADMIN — ALBUMIN (HUMAN) 1 MLS/HR: 0.25 INJECTION, SOLUTION INTRAVENOUS at 17:17

## 2019-02-03 RX ADMIN — Medication 1 MLS/HR: at 04:01

## 2019-02-03 RX ADMIN — ENOXAPARIN SODIUM SCH MG: 100 INJECTION SUBCUTANEOUS at 08:57

## 2019-02-03 RX ADMIN — Medication SCH MLS/HR: at 15:50

## 2019-02-03 RX ADMIN — IPRATROPIUM BROMIDE 1 PUFF: 17 AEROSOL, METERED RESPIRATORY (INHALATION) at 14:12

## 2019-02-03 RX ADMIN — FAMOTIDINE SCH MG: 20 TABLET ORAL at 09:44

## 2019-02-03 RX ADMIN — DIVALPROEX SODIUM SCH MG: 250 TABLET, DELAYED RELEASE ORAL at 02:06

## 2019-02-03 RX ADMIN — FAMOTIDINE 1 MG: 20 TABLET ORAL at 09:44

## 2019-02-03 RX ADMIN — ENOXAPARIN SODIUM 1 MG: 100 INJECTION SUBCUTANEOUS at 08:57

## 2019-02-03 RX ADMIN — ALBUMIN (HUMAN) SCH MLS/HR: 0.25 INJECTION, SOLUTION INTRAVENOUS at 23:02

## 2019-02-03 RX ADMIN — ASPIRIN 81 MG CHEWABLE TABLET 1 MG: 81 TABLET CHEWABLE at 09:44

## 2019-02-03 RX ADMIN — ALBUTEROL SULFATE SCH PUFF: 90 AEROSOL, METERED RESPIRATORY (INHALATION) at 20:35

## 2019-02-03 RX ADMIN — VANCOMYCIN HYDROCHLORIDE SCH MLS/HR: 750 INJECTION, POWDER, LYOPHILIZED, FOR SOLUTION INTRAVENOUS at 06:12

## 2019-02-03 RX ADMIN — CASTOR OIL AND BALSAM, PERU 1 APPLIC: 788; 87 OINTMENT TOPICAL at 09:45

## 2019-02-03 RX ADMIN — RISPERIDONE 1 MG: 1 TABLET ORAL at 09:44

## 2019-02-03 RX ADMIN — MEROPENEM 1 MLS/HR: 1 INJECTION, POWDER, FOR SOLUTION INTRAVENOUS at 21:34

## 2019-02-03 RX ADMIN — ALBUMIN (HUMAN) SCH MLS/HR: 0.25 INJECTION, SOLUTION INTRAVENOUS at 17:17

## 2019-02-03 RX ADMIN — METHYLPREDNISOLONE SODIUM SUCCINATE 1 MG: 40 INJECTION, POWDER, FOR SOLUTION INTRAMUSCULAR; INTRAVENOUS at 06:13

## 2019-02-03 RX ADMIN — ASPIRIN 81 MG SCH MG: 81 TABLET ORAL at 09:44

## 2019-02-03 RX ADMIN — RISPERIDONE 1 MG: 1 TABLET ORAL at 23:02

## 2019-02-03 RX ADMIN — IPRATROPIUM BROMIDE 1 PUFF: 17 AEROSOL, METERED RESPIRATORY (INHALATION) at 20:35

## 2019-02-03 RX ADMIN — DIVALPROEX SODIUM 1 MG: 250 TABLET, DELAYED RELEASE ORAL at 17:17

## 2019-02-03 RX ADMIN — PROPOFOL SCH MLS/HR: 10 INJECTION, EMULSION INTRAVENOUS at 15:46

## 2019-02-03 RX ADMIN — Medication SCH MLS/HR: at 02:08

## 2019-02-03 RX ADMIN — PROPOFOL SCH MLS/HR: 10 INJECTION, EMULSION INTRAVENOUS at 06:13

## 2019-02-03 RX ADMIN — PROPOFOL 1 MLS/HR: 10 INJECTION, EMULSION INTRAVENOUS at 15:46

## 2019-02-03 RX ADMIN — ALBUMIN (HUMAN) 1 MLS/HR: 0.25 INJECTION, SOLUTION INTRAVENOUS at 23:02

## 2019-02-03 RX ADMIN — IPRATROPIUM BROMIDE 1 MG: 0.5 SOLUTION RESPIRATORY (INHALATION) at 02:00

## 2019-02-03 RX ADMIN — FAMOTIDINE 1 MG: 20 TABLET ORAL at 21:34

## 2019-02-03 RX ADMIN — AMLODIPINE BESYLATE 1 MG: 10 TABLET ORAL at 09:00

## 2019-02-03 RX ADMIN — Medication 1 MLS/HR: at 15:50

## 2019-02-03 RX ADMIN — DIVALPROEX SODIUM 1 MG: 250 TABLET, DELAYED RELEASE ORAL at 02:06

## 2019-02-03 RX ADMIN — MEROPENEM SCH MLS/HR: 1 INJECTION, POWDER, FOR SOLUTION INTRAVENOUS at 21:34

## 2019-02-03 RX ADMIN — VANCOMYCIN HYDROCHLORIDE 1 MLS/HR: 750 INJECTION, POWDER, LYOPHILIZED, FOR SOLUTION INTRAVENOUS at 06:12

## 2019-02-03 NOTE — PN
Date/Time of Note


Date/Time of Note


DATE: 2/3/19 


TIME: 12:35





Assessment/Plan


VTE Prophylaxis


Risk score (from Ns)>0 risk:  14


SCD applied (from Ns):  Yes





Lines/Catheters


IV Catheter Type (from [facility] [[email or web address]  Central Line


Urinary Cath still in place:  Yes





Assessment/Plan


Assessment/Plan


-Sepsis with shock, resolving.  Dr. Dreyer is following in infection disease 


consultation.


-Status post cardiac arrest.   Dr. Jon is following in cardiology 


consultation.


-Left-sided pneumothorax, status post left side chest tube placement, s/p self 


d/c CT.


-Acute respiratory failure, continue ventilatory support bronchodilators.  Dr. Lim is following in pulmonology consultation.


-Pneumoperitoneum most likely due to cardiac arrest.  Dr. Lai is following in 


general surgery consultation. 


-Left axillary and brachial DVT, continue Lovenox


-Left lower lobe pneumonia


-Severe emphysema


-NATALIE with possible chronic kidney disease. Dr Hanson is following in nephrology


consultation.


-Schizoaffective disorder depressed type.  Psychiatric consult is appreciated, 


continue Risperdal Depakote





Further recommendations based on clinical course.  Plan of care discussed with 


Dr. Miller.





Total critical care time spent 35 mins


Result Diagram:  


2/3/19 0430                                                                     


          2/3/19 0430





Results 24hrs





Laboratory Tests


     Test
                                2/3/19
04:30         2/3/19
08:25


     White Blood Count                          5.3  #


     Red Blood Count                           3.59  L


     Hemoglobin                                10.0  L


     Hematocrit                                32.3  L


     Mean Corpuscular Volume                    90.0


     Mean Corpuscular Hemoglobin               27.9  L


     Mean Corpuscular Hemoglobin
Concent      31.0  L
  



     Red Cell Distribution Width               15.9  H


     Platelet Count                            131  #L


     Mean Platelet Volume                      11.3  H


     Immature Granulocytes %                  0.600  H


     Neutrophils %                             88.9  H


     Lymphocytes %                              9.2  L


     Monocytes %                                 1.3


     Eosinophils %                               0.0


     Basophils %                                 0.0


     Nucleated Red Blood Cells %                 0.0


     Immature Granulocytes #                   0.030


     Neutrophils #                               4.8


     Lymphocytes #                              0.5  L


     Monocytes #                                0.1  L


     Eosinophils #                               0.0


     Basophils #                                 0.0


     Nucleated Red Blood Cells #                 0.0


     Sodium Level                                142


     Potassium Level                             5.0


     Chloride Level                              103


     Carbon Dioxide Level                        34  H


     Anion Gap                                     5


     Blood Urea Nitrogen                         30  H


     Creatinine                                 0.97


     Est Glomerular Filtrat Rate
mL/min   > 60  
       



     Glucose Level                               173


     Calcium Level                               8.9


     Phosphorus Level                            2.6


     Magnesium Level                             2.4


     Vancomycin Level Trough                    17.6


     Blood Gas Specimen Source
           
             Blood arterial



     Arterial Blood Date Drawn
           
             2/3/2019
9:50:45 AM


     Arterial Blood pH (Temp
corrected)   
                       7.495  H



     Arterial Blood pCO2 (Temp
correct)   
                         42.5  



     Arterial Blood pO2 (Temp
corrected)  
                        62.1  L



     Arterial Blood HCO3                                            32.0  H


     Arterial Blood Base Excess                                      7.9  H


     Antoine Test                                         ACCEPTAB


     Arterial Blood Gas Puncture
Site     
             Right Radial  



     Arterial Blood
Carboxyhemoglobin     
                          0.9  



     Arterial Blood Methemoglobin                                     0.3


     Blood Gas A-a O2 Differential                                 138.0  H


     Oxyhemoglobin Percent                                          90.8  L


     Blood Gas Temperature                                           37.0


     Blood Gas Respiration Rate                                      20.0


     Blood Gas Actual Respiration
Rate    
                           20  



     Blood Gas Modality                                 VENT - AC


     FiO2                                                            35.0


     Blood Gas Tidal Volume                                         500.0


     Blood Gas Low PEEP Setting                                       5.0


     Blood Gas Notified Whom                            MDA


     Blood Gas Notified Time
             
             2/3/2019
9:55:51 AM








Exam/Review of Systems


Exam


Vitals





Vital Signs


  Date      Temp  Pulse  Resp  B/P (MAP)   Pulse Ox  O2          O2 Flow    FiO2


Time                                                 Delivery    Rate


    2/3/19           68    20  97/63 (74)        97  Mechanical


     10:00                                           Ventilator


    2/3/19  99.2


     08:00


    2/3/19                                                                    35


     08:00


    2/2/19                                                             6.0


     08:06








Intake and Output





2/2/19


2/2/19


2/3/19





1515:00


23:00


07:00





IntakeIntake Total


573.125 ml


632.625 ml


718.875 ml





OutputOutput Total


2625 ml


1205 ml


485 ml





BalanceBalance


-2051.875 ml


-572.375 ml


233.875 ml














Results


Results 24hrs





Laboratory Tests


     Test
                                2/3/19
04:30         2/3/19
08:25


     White Blood Count                          5.3  #


     Red Blood Count                           3.59  L


     Hemoglobin                                10.0  L


     Hematocrit                                32.3  L


     Mean Corpuscular Volume                    90.0


     Mean Corpuscular Hemoglobin               27.9  L


     Mean Corpuscular Hemoglobin
Concent      31.0  L
  



     Red Cell Distribution Width               15.9  H


     Platelet Count                            131  #L


     Mean Platelet Volume                      11.3  H


     Immature Granulocytes %                  0.600  H


     Neutrophils %                             88.9  H


     Lymphocytes %                              9.2  L


     Monocytes %                                 1.3


     Eosinophils %                               0.0


     Basophils %                                 0.0


     Nucleated Red Blood Cells %                 0.0


     Immature Granulocytes #                   0.030


     Neutrophils #                               4.8


     Lymphocytes #                              0.5  L


     Monocytes #                                0.1  L


     Eosinophils #                               0.0


     Basophils #                                 0.0


     Nucleated Red Blood Cells #                 0.0


     Sodium Level                                142


     Potassium Level                             5.0


     Chloride Level                              103


     Carbon Dioxide Level                        34  H


     Anion Gap                                     5


     Blood Urea Nitrogen                         30  H


     Creatinine                                 0.97


     Est Glomerular Filtrat Rate
mL/min   > 60  
       



     Glucose Level                               173


     Calcium Level                               8.9


     Phosphorus Level                            2.6


     Magnesium Level                             2.4


     Vancomycin Level Trough                    17.6


     Blood Gas Specimen Source
           
             Blood arterial



     Arterial Blood Date Drawn
           
             2/3/2019
9:50:45 AM


     Arterial Blood pH (Temp
corrected)   
                       7.495  H



     Arterial Blood pCO2 (Temp
correct)   
                         42.5  



     Arterial Blood pO2 (Temp
corrected)  
                        62.1  L



     Arterial Blood HCO3                                            32.0  H


     Arterial Blood Base Excess                                      7.9  H


     Antoine Test                                         ACCEPTAB


     Arterial Blood Gas Puncture
Site     
             Right Radial  



     Arterial Blood
Carboxyhemoglobin     
                          0.9  



     Arterial Blood Methemoglobin                                     0.3


     Blood Gas A-a O2 Differential                                 138.0  H


     Oxyhemoglobin Percent                                          90.8  L


     Blood Gas Temperature                                           37.0


     Blood Gas Respiration Rate                                      20.0


     Blood Gas Actual Respiration
Rate    
                           20  



     Blood Gas Modality                                 VENT - AC


     FiO2                                                            35.0


     Blood Gas Tidal Volume                                         500.0


     Blood Gas Low PEEP Setting                                       5.0


     Blood Gas Notified Whom                            MDA


     Blood Gas Notified Time
             
             2/3/2019
9:55:51 AM








Medications


Medication





Current Medications


Ondansetron HCl (Zofran Inj) 4 mg Q6H  PRN IV NAUSEA AND/OR VOMITING Last 


administered on 2/1/19at 18:46; Admin Dose 4 MG;  Start 1/21/19 at 23:30


Acetaminophen (Tylenol Supp) 650 mg Q4H  PRN [place] PAIN LEVEL 1-3 OR FEVER;  Start 


1/21/19 at 23:30


Enoxaparin Sodium (Lovenox) 30 mg DAILY SC  Last administered on 2/3/19at 08:57;


Admin Dose 30 MG;  Start 1/22/19 at 09:00


Aspirin (Aspirin) 81 mg DAILY PO  Last administered on 2/3/19at 09:44; Admin 


Dose 81 MG;  Start 1/24/19 at 09:00


Famotidine (Pepcid) 20 mg Q12 PO  Last administered on 2/3/19at 09:44; Admin Dos


e 20 MG;  Start 1/24/19 at 21:00


Zolpidem Tartrate (Ambien) 5 mg HS  PRN PO INSOMNIA Last administered on 


1/30/19at 23:03; Admin Dose 5 MG;  Start 1/27/19 at 00:00


Amlodipine Besylate (Norvasc) 10 mg DAILY PO  Last administered on 2/1/19at 


08:36; Admin Dose 10 MG;  Start 1/27/19 at 15:00


Guaifenesin/ Dextromethorphan (Robitussin Dm Liquid Cup) 5 ml Q6H  PRN PO COUGH 


Last administered on 1/31/19at 09:04; Admin Dose 5 ML;  Start 1/27/19 at 20:00


Divalproex Sodium (Depakote) 250 mg Q8H PO  Last administered on 2/3/19at 09:44;


Admin Dose 250 MG;  Start 1/28/19 at 17:00


Morphine Sulfate (morphine) 6 mg Q4H  PRN PO SEVERE PAIN LEVEL 7-10 Last 


administered on 2/2/19at 07:18; Admin Dose 6 MG;  Start 1/30/19 at 21:30


Albuterol/ Ipratropium (Duoneb) 3 ml Q3H RESP THERAPY  PRN HHN SHORTNESS OF 


BREATH;  Start 1/31/19 at 20:00


Risperidone (Risperdal) 1 mg BID PO  Last administered on 2/3/19at 09:44; Admin 


Dose 1 MG;  Start 2/1/19 at 21:00


Vancomycin HCl (Vanco Iv Per Pharmacy) VANCOMYCIN PER PHARMACY PER PROTOCOL XX ;


 Start 2/1/19 at 15:00


Meropenem/Sodium Chloride 50 ml @  100 mls/hr Q12 IVPB  Last administered on 


2/3/19at 08:54; Admin Dose 100 MLS/HR;  Start 2/1/19 at 15:00


Methylprednisolone Sodium Succinate (Solu-Medrol) 40 mg Q6 IV  Last administered


on 2/3/19at 12:07; Admin Dose 40 MG;  Start 2/2/19 at 12:00


Propofol 100 ml @  1.875 mls/ hr Q12H IV  Last administered on 2/3/19at 06:13; 


Admin Dose 3.75 MLS/HR;  Start 2/2/19 at 11:00


Fentanyl 100 ml @  2.5 mls/hr TITRATE IV  Last administered on 2/3/19at 04:01; 


Admin Dose 10 MLS/HR;  Start 2/2/19 at 11:30


Norepinephrine 250 ml @  1.875 mls/ hr TITRATE IV  Last administered on 2/3/19at


02:08; Admin Dose 11.25 MLS/HR;  Start 2/2/19 at 11:00


Alteplase, Recombinant (Cathflo (Activase)) 2 mg MAY REPEAT X1 PRN CATHETER IF 


CATHETER REMAINS OCCULUDED;  Start 2/2/19 at 11:00


Vancomycin HCl 1.5 gm/Sodium Chloride 250 ml @  83.333 mls/ hr Q24H IVPB ;  


Start 2/4/19 at 00:00


Albuterol (Ventolin Hfa) 4 puff Q6H RESP  THERAPY INH ;  Start 2/3/19 at 14:00


Ipratropium Bromide (Atrovent Hfa) 4 puff Q6H RESP  THERAPY INH ;  Start 2/3/19 


at 14:00


Albumin Human 100 ml @  100 mls/hr Q6 IV  Last administered on 2/3/19at 12:07; 


Admin Dose 100 MLS/HR;  Start 2/3/19 at 12:00;  Stop 2/4/19 at 06:59











LETY MONSON                  Feb 3, 2019 12:36

## 2019-02-03 NOTE — CONS
Assessment/Plan


Assessment/Plan


Assessment/Plan (Daily)


VDRF


Sepsis


s/p cardiac arrest


Pneumothorax


Anemia


Thrombocytopenia





Continue Vent support


Continue Levophed


Hold Norvasc


Continue Salumedrol


Continue Antibiotics


Continue ASA and Lovenox


Continue Nebs as scheduled


Continue GI and DVT Prophylaxis


Monitor in ICU





Consultation Date/Type/Reason


Admit Date/Time


Jan 22, 2019 at 00:03


Initial Consult Date


1/22/19


Type of Consult


Cardiology


Requesting Provider:  DONALDO CARRILLO MD


Date/Time of Note


DATE: 2/3/19 


TIME: 11:59





Exam/Review of Systems


Vital Signs


Vitals





Vital Signs


  Date      Temp  Pulse  Resp  B/P (MAP)   Pulse Ox  O2          O2 Flow    FiO2


Time                                                 Delivery    Rate


    2/3/19           68    20  97/63 (74)        97  Mechanical


     10:00                                           Ventilator


    2/3/19  99.2


     08:00


    2/3/19                                                                    35


     08:00


    2/2/19                                                             6.0


     08:06








Intake and Output





2/2/19


2/2/19


2/3/19





1414:59


22:59


06:59





IntakeIntake Total


559.375 ml


595.125 ml


715.125 ml





OutputOutput Total


2625 ml


1095 ml


535 ml





BalanceBalance


-2065.625 ml


-499.875 ml


180.125 ml














Labs


Result Diagram:  


2/3/19 0430                                                                     


          2/3/19 0430





Results 24hrs





Laboratory Tests


Test
                            2/2/19
12:00  2/3/19
04:30         2/3/19
08:25


Blood Gas Specimen       Blood arterial
       
             Blood arterial



Source



Arterial Blood Date      2/2/2019
12:02:59 PM  
             2/3/2019
9:50:45 AM


Drawn



Arterial Blood pH                  7.276  *L
  
                       7.495  H



(Temp
corrected)


Arterial Blood pCO2                  75.2  H
  
                         42.5  



(Temp
correct)


Arterial Blood pO2                  240.0  H
  
                        62.1  L



(Temp
corrected)


Arterial Blood HCO3                   34.2  H                            32.0  H


Arterial Blood Base                    5.2  H                             7.9  H


Excess


Arterial Blood                       99.4  H
  
             



Oxygen
Saturation


Antoine Test               ACCEPTAB                            ACCEPTAB


Arterial Blood Gas       Right Radial  
       
             Right Radial  



Puncture
Site


Arterial                               0.7  
  
                          0.9  



Blood
Carboxyhemoglobin


Arterial Blood                          0.1                                0.3


Methemoglobin


Blood Gas A-a O2                     397.8  H                           138.0  H


Differential


Oxyhemoglobin Percent                  98.6                              90.8  L


Blood Gas Temperature                  37.0                               37.0


Blood Gas Respiration                  16.0                               20.0


Rate


Blood Gas Actual                        16  
  
                           20  



Respiration
Rate


Blood Gas Modality       VENT - AC                           VENT - AC


FiO2                                  100.0                               35.0


Blood Gas Tidal Volume                450.0                              500.0


Blood Gas Low PEEP                      5.0                                5.0


Setting


Blood Gas Critical       NEERAJ GAY RN  
         
             



Value Read
Back


Blood Gas Notified Whom  LISA HADDAD


Blood Gas Notified       2/2/2019
12:25:59 PM  
             2/3/2019
9:55:51 AM


Time



White Blood Count                                    5.3  #


Red Blood Count                                     3.59  L


Hemoglobin                                          10.0  L


Hematocrit                                          32.3  L


Mean Corpuscular Volume                              90.0


Mean Corpuscular                                    27.9  L


Hemoglobin


Mean Corpuscular         
                         31.0  L
  



Hemoglobin
Concent


Red Cell Distribution                               15.9  H


Width


Platelet Count                                      131  #L


Mean Platelet Volume                                11.3  H


Immature Granulocytes %                            0.600  H


Neutrophils %                                       88.9  H


Lymphocytes %                                        9.2  L


Monocytes %                                           1.3


Eosinophils %                                         0.0


Basophils %                                           0.0


Nucleated Red Blood                                   0.0


Cells %


Immature Granulocytes #                             0.030


Neutrophils #                                         4.8


Lymphocytes #                                        0.5  L


Monocytes #                                          0.1  L


Eosinophils #                                         0.0


Basophils #                                           0.0


Nucleated Red Blood                                   0.0


Cells #


Sodium Level                                          142


Potassium Level                                       5.0


Chloride Level                                        103


Carbon Dioxide Level                                  34  H


Anion Gap                                               5


Blood Urea Nitrogen                                   30  H


Creatinine                                           0.97


Est Glomerular Filtrat   
                     > 60  
       



Rate
mL/min


Glucose Level                                         173


Calcium Level                                         8.9


Phosphorus Level                                      2.6


Magnesium Level                                       2.4


Vancomycin Level Trough                              17.6








Medications


Medications





Current Medications


Ondansetron HCl (Zofran Inj) 4 mg Q6H  PRN IV NAUSEA AND/OR VOMITING Last 


administered on 2/1/19at 18:46; Admin Dose 4 MG;  Start 1/21/19 at 23:30


Acetaminophen (Tylenol Supp) 650 mg Q4H  PRN [place] PAIN LEVEL 1-3 OR FEVER;  Start 


1/21/19 at 23:30


Enoxaparin Sodium (Lovenox) 30 mg DAILY SC  Last administered on 2/3/19at 08:57;


Admin Dose 30 MG;  Start 1/22/19 at 09:00


Aspirin (Aspirin) 81 mg DAILY PO  Last administered on 2/3/19at 09:44; Admin 


Dose 81 MG;  Start 1/24/19 at 09:00


Famotidine (Pepcid) 20 mg Q12 PO  Last administered on 2/3/19at 09:44; Admin 


Dose 20 MG;  Start 1/24/19 at 21:00


Zolpidem Tartrate (Ambien) 5 mg HS  PRN PO INSOMNIA Last administered on 


1/30/19at 23:03; Admin Dose 5 MG;  Start 1/27/19 at 00:00


Amlodipine Besylate (Norvasc) 10 mg DAILY PO  Last administered on 2/1/19at 


08:36; Admin Dose 10 MG;  Start 1/27/19 at 15:00


Guaifenesin/ Dextromethorphan (Robitussin Dm Liquid Cup) 5 ml Q6H  PRN PO COUGH 


Last administered on 1/31/19at 09:04; Admin Dose 5 ML;  Start 1/27/19 at 20:00


Divalproex Sodium (Depakote) 250 mg Q8H PO  Last administered on 2/3/19at 09:44;


Admin Dose 250 MG;  Start 1/28/19 at 17:00


Morphine Sulfate (morphine) 6 mg Q4H  PRN PO SEVERE PAIN LEVEL 7-10 Last 


administered on 2/2/19at 07:18; Admin Dose 6 MG;  Start 1/30/19 at 21:30


Albuterol/ Ipratropium (Duoneb) 3 ml Q3H RESP THERAPY  PRN HHN SHORTNESS OF 


BREATH;  Start 1/31/19 at 20:00


Risperidone (Risperdal) 1 mg BID PO  Last administered on 2/3/19at 09:44; Admin 


Dose 1 MG;  Start 2/1/19 at 21:00


Vancomycin HCl (Vanco Iv Per Pharmacy) VANCOMYCIN PER PHARMACY PER PROTOCOL XX ;


 Start 2/1/19 at 15:00


Meropenem/Sodium Chloride 50 ml @  100 mls/hr Q12 IVPB  Last administered on 


2/3/19at 08:54; Admin Dose 100 MLS/HR;  Start 2/1/19 at 15:00


Methylprednisolone Sodium Succinate (Solu-Medrol) 40 mg Q6 IV  Last administered


on 2/3/19at 06:13; Admin Dose 40 MG;  Start 2/2/19 at 12:00


Propofol 100 ml @  1.875 mls/ hr Q12H IV  Last administered on 2/3/19at 06:13; 


Admin Dose 3.75 MLS/HR;  Start 2/2/19 at 11:00


Fentanyl 100 ml @  2.5 mls/hr TITRATE IV  Last administered on 2/3/19at 04:01; 


Admin Dose 10 MLS/HR;  Start 2/2/19 at 11:30


Norepinephrine 250 ml @  1.875 mls/ hr TITRATE IV  Last administered on 2/3/19at


02:08; Admin Dose 11.25 MLS/HR;  Start 2/2/19 at 11:00


Alteplase, Recombinant (Cathflo (Activase)) 2 mg MAY REPEAT X1 PRN CATHETER IF 


CATHETER REMAINS OCCULUDED;  Start 2/2/19 at 11:00


Vancomycin HCl 1.5 gm/Sodium Chloride 250 ml @  83.333 mls/ hr Q24H IVPB ;  


Start 2/4/19 at 00:00


Albuterol (Ventolin Hfa) 4 puff Q6H RESP  THERAPY INH ;  Start 2/3/19 at 14:00


Ipratropium Bromide (Atrovent Hfa) 4 puff Q6H RESP  THERAPY INH ;  Start 2/3/19 


at 14:00


Albumin Human 100 ml @  100 mls/hr Q6 IV ;  Start 2/3/19 at 12:00;  Stop 2/4/19 


at 06:59











JAVAD PINTO M.D.             Feb 3, 2019 12:00

## 2019-02-03 NOTE — PN
DATE:  02/03/2019

 

 

SUBJECTIVE:  The patient remains critically ill, on full ventilatory support, on pressor support.  Th
e patient's urine output has been marginal.  No other events noted.

 

OBJECTIVE:

VITAL SIGNS:  Blood pressure is 97/63, respirations 20, pulse 68, temperature 98.6.  I's and O's revi
ewed.

HEENT:  Head is normocephalic.

NECK:  Supple.

HEART:  Regular rate.

LUNGS:  Show diminished breath sounds at the base.

ABDOMEN:  Soft, nontender to palpation.  No rebound or guarding.

EXTREMITIES:  Negative for clubbing, cyanosis.  Trace edema.

DERMATOLOGIC:  No rashes.

MUSCULOSKELETAL:  No joint effusion.

NEUROLOGIC:  No change in exam.

 

MEDICATIONS:  Reviewed.

 

LABORATORY DATA:  Shows a white count 5.3, hemoglobin 10.0, platelet count is 131.  Sodium 142, potas
sium 5.0, BUN 30, creatinine 0.97.  The patient's ABG shows pH 7.494, pCO2 of 42, base excess of 7.9.


 

IMAGING:  The patient's toxicology was reviewed.  Chest x-ray was reviewed, shows right basilar infil
trate.  Cultures were reviewed.

 

ASSESSMENT AND PLAN:  This is a 68-year-old male who presents with:

1.  Nonoliguric acute kidney injury with an unknown baseline creatinine.  Etiology is secondary to he
modynamics.  Renal function has improved.  Continue current treatment plan.  Continue pressor support
.  Keep MAP above 65.  Continue supportive care.  Renally dose all medications.  Continue antibiotic 
therapy.  The patient will be started on IV albumin for volume expansion.  Monitor closely.

2.  Hypernatremia, improved.  Continue to monitor.  Continue free water flushes.

3.  Anemia.  Monitor hemoglobin and hematocrit levels.

4.  Mineral bone disorder.  Monitor calcium and phosphorus levels.

5.  Metabolic alkalemia.  Etiology is secondary to rapid correction of underlying hypercapnia.  Would
 expect bicarbonate levels to improve and alkalemia to resolve in the next 1 to 2 days.  Monitor clos
ely.

6.  Ventilatory dependent respiratory failure.  Ventilator settings and arterial blood gases were rev
iewed.  Continue to monitor.

7.  Volume overload secondary to diastolic heart failure.  The patient appears euvolemic.  At this po
int, would hold diuretic therapy.

8.  Shock, presumed septic, possible hypovolemic.  Continue antibiotic therapy.  We will start the pa
tient on intravenous albumin for volume expansion.  Wean off pressors if possible.

9  Status post cardiac arrest.

10.  Encephalopathy.

11.  Status post pneumothorax.

 

Please note, I spent over 30 minutes of critical care time with this patient.

 

 

Dictated By: MARYAN CHAMPAGNE DO

 

NR/PEREZ

DD:    02/03/2019 10:35:34

DT:    02/03/2019 12:51:30

Conf#: [id number]

DID#:  [id number]

CC: MAURI GRIGSBY MD;*EndCC*

## 2019-02-03 NOTE — CONS
Assessment/Plan


Assessment/Plan


Assessment/Plan (Daily)


Ventilator setting; AC of 20, tidal volume 500, PEEP of 5, 35% FiO2.


Patient is currently on propofol 10 mics per kilogram per minute, fentanyl 100 


mics per hour, Levophed 6 mics per minute.





Assessment recommendations;





1.  Patient initially admitted with cardiac arrest status post CPR then self ex


tubated and was transferred to medical floor with stable clinical status with 


acute decompensation yesterday morning with severe hypercapnia requiring 


intubation.


2.  History of recent pneumoperitoneum of unknown etiology with spontaneous 


resolution, patient did not require any intervention.


3.  Status post placement of left chest tube with interval self removal by the 


patient.


4.  Underlying severe bullous emphysema.


5.  History of hypertension.


6.  History of stable seizure disorder.





Continue current supportive care.  Perform an ABG.  Consider stopping 


antibiotics.  Further recommendations once ABG is performed.  Tube feeding to be


started.  Prognosis is guarded.


35 minutes of critical care time was spent evaluating the patient.





Consultation Date/Type/Reason


Admit Date/Time


Jan 22, 2019 at 00:03


Initial Consult Date


1/22/19


Type of Consult


Pulmonary/critical care


Patient is a 68-year-old male who is a nursing home resident was sent over to 


the hospital because of hypothermia and altered mental status.  In the emergency


room, patient developed cardiac arrest requiring CPR and intubation.  In the 


course of workup the patient also required a chest tube placement on the left 


side.  CT imaging of the abdomen showed pneumoperitoneum with possibly 


perforated viscus.  By the time I saw the patient in ICU, patient is orally 


intubated and sedated.  History was obtained from medical records.





Past medical history; essentially unremarkable except for possibly COPD.


Medications; reviewed.  Patient is currently on dopamine at 6 mics per kilogram 


per minute, Levophed 20 mics per minute, propofol 25 mics per kilogram per 


minute.  Other medications were also reviewed.


Allergies; none.


Social history; not available.


Family history and occupational history is also not available.


Review of system; unable to be obtained.


General exam; elderly male, orally intubated, sedated, currently in no distress.


Requesting Provider:  DONALDO CARRILLO MD


Date/Time of Note


DATE: 2/3/19 


TIME: 08:26





24 HR Interval Summary


Free Text/Dictation


Patient's condition remains critical.  Had to be intubated yesterday for severe 


hypercapnic respiratory failure.


General exam; elderly male, appears cachectic, orally intubated, sedated, 


currently in no distress.





Exam/Review of Systems


Exam


Vitals





Vital Signs


  Date      Temp  Pulse  Resp  B/P (MAP)   Pulse Ox  O2          O2 Flow    FiO2


Time                                                 Delivery    Rate


    2/3/19           70    20  94/64 (74)        97


     06:45


    2/3/19                                           Mechanical


     06:00                                           Ventilator


    2/3/19                                                                    35


     05:15


    2/3/19  98.2


     04:00


    2/2/19                                                             6.0


     08:06








Intake and Output





2/2/19


2/2/19


2/3/19





1515:00


23:00


07:00





IntakeIntake Total


573.125 ml


632.625 ml


663.875 ml





OutputOutput Total


2625 ml


1205 ml


425 ml





BalanceBalance


-2051.875 ml


-572.375 ml


238.875 ml











Exam


H EENT exam; supple neck, no JVD.  No lymphadenopathy.  Midline trachea.  No 


thyromegaly.  Orally intubated.  Patient is edentulous.  No neck masses.


Chest exam; diminished breath sounds throughout.  S1-S2 audible, no murmurs.  


Regular rhythm.


Abdomen exam; soft, no organomegaly.  Scaphoid.  Bowel sounds audible.


Extremity exam; no peripheral edema or clubbing.


CNS exam; patient is sedated.





Results


Result Diagram:  


2/3/19 0430                                                                     


          2/3/19 0430





Results 24hrs





Laboratory Tests


Test
                           2/2/19
09:54          2/2/19
12:00  2/3/19
04:30


Blood Gas Specimen      Blood arterial
       Blood arterial
       



Source



Arterial Blood Date      2/2/2019
9:53:09 AM  2/2/2019
12:02:59 PM  



Drawn



Arterial Blood pH                 7.179  *L
            7.276  *L
  



(Temp
corrected)


Arterial Blood pCO2                94.6  *H
              75.2  H
  



(Temp
correct)


Arterial Blood pO2                  59.5  L
             240.0  H
  



(Temp
corrected)


Arterial Blood HCO3                  34.4  H               34.2  H


Arterial Blood Base                   3.2  H                5.2  H


Excess


Arterial Blood                      83.7  L
              99.4  H
  



Oxygen
Saturation


Antoine Test              ACCEPTAB              ACCEPTAB


Arterial Blood Gas      Right Radial  
       Right Radial  
       



Puncture
Site


Arterial                              0.8  
                0.7  
  



Blood
Carboxyhemoglobi


n


Arterial Blood                         0.1                   0.1


Methemoglobin


Blood Gas A-a O2                    271.6  H              397.8  H


Differential


Oxyhemoglobin Percent                82.9  L                98.6


Blood Gas Temperature                 37.0                  37.0


Blood Gas Modality      MASK - SIMPLE         VENT - AC


FiO2                                  61.0                 100.0


Blood Gas Critical      MARLENE GAY RN  
         



Value Read
Back


Blood Gas Notified                          SM


Whom


Blood Gas Notified      2/2/2019
10:00:43 AM  2/2/2019
12:25:59 PM  



Time



Blood Gas Respiration                                       16.0


Rate


Blood Gas Actual        
                                    16  
  



Respiration
Rate


Blood Gas Tidal Volume                                     450.0


Blood Gas Low PEEP                                           5.0


Setting


White Blood Count                                                         5.3  #


Red Blood Count                                                          3.59  L


Hemoglobin                                                               10.0  L


Hematocrit                                                               32.3  L


Mean Corpuscular                                                          90.0


Volume


Mean Corpuscular                                                         27.9  L


Hemoglobin


Mean Corpuscular        
                     
                         31.0  L



Hemoglobin
Concent


Red Cell Distribution                                                    15.9  H


Width


Platelet Count                                                           131  #L


Mean Platelet Volume                                                     11.3  H


Immature Granulocytes                                                   0.600  H


%


Neutrophils %                                                            88.9  H


Lymphocytes %                                                             9.2  L


Monocytes %                                                                1.3


Eosinophils %                                                              0.0


Basophils %                                                                0.0


Nucleated Red Blood                                                        0.0


Cells %


Immature Granulocytes                                                    0.030


#


Neutrophils #                                                              4.8


Lymphocytes #                                                             0.5  L


Monocytes #                                                               0.1  L


Eosinophils #                                                              0.0


Basophils #                                                                0.0


Nucleated Red Blood                                                        0.0


Cells #


Sodium Level                                                               142


Potassium Level                                                            5.0


Chloride Level                                                             103


Carbon Dioxide Level                                                       34  H


Anion Gap                                                                    5


Blood Urea Nitrogen                                                        30  H


Creatinine                                                                0.97


Est Glomerular Filtrat  
                     
                     > 60  



Rate
mL/min


Glucose Level                                                              173


Calcium Level                                                              8.9


Phosphorus Level                                                           2.6


Magnesium Level                                                            2.4


Vancomycin Level                                                          17.6


Trough








Medications


Medication





Current Medications


Ondansetron HCl (Zofran Inj) 4 mg Q6H  PRN IV NAUSEA AND/OR VOMITING Last 


administered on 2/1/19at 18:46; Admin Dose 4 MG;  Start 1/21/19 at 23:30


Acetaminophen (Tylenol Supp) 650 mg Q4H  PRN [place] PAIN LEVEL 1-3 OR FEVER;  Start 


1/21/19 at 23:30


Enoxaparin Sodium (Lovenox) 30 mg DAILY SC  Last administered on 2/1/19at 08:38;


Admin Dose 30 MG;  Start 1/22/19 at 09:00


Aspirin (Aspirin) 81 mg DAILY PO  Last administered on 2/2/19at 08:29; Admin 


Dose 81 MG;  Start 1/24/19 at 09:00


Famotidine (Pepcid) 20 mg Q12 PO  Last administered on 2/2/19at 20:56; Admin 


Dose 20 MG;  Start 1/24/19 at 21:00


Albuterol (Proventil 0.083% (Neb)) 2.5 mg Q6H RESP  THERAPY HHN  Last 


administered on 2/3/19at 02:00; Admin Dose 2.5 MG;  Start 1/24/19 at 21:52


Ipratropium Bromide (Atrovent 0.02% (Neb)) 0.5 mg Q6H RESP  THERAPY HHN  Last 


administered on 2/3/19at 02:00; Admin Dose 0.5 MG;  Start 1/24/19 at 21:53


Zolpidem Tartrate (Ambien) 5 mg HS  PRN PO INSOMNIA Last administered on 


1/30/19at 23:03; Admin Dose 5 MG;  Start 1/27/19 at 00:00


Amlodipine Besylate (Norvasc) 10 mg DAILY PO  Last administered on 2/1/19at 


08:36; Admin Dose 10 MG;  Start 1/27/19 at 15:00


Guaifenesin/ Dextromethorphan (Robitussin Dm Liquid Cup) 5 ml Q6H  PRN PO COUGH 


Last administered on 1/31/19at 09:04; Admin Dose 5 ML;  Start 1/27/19 at 20:00


Divalproex Sodium (Depakote) 250 mg Q8H PO  Last administered on 2/3/19at 02:06;


Admin Dose 250 MG;  Start 1/28/19 at 17:00


Morphine Sulfate (morphine) 6 mg Q4H  PRN PO SEVERE PAIN LEVEL 7-10 Last 


administered on 2/2/19at 07:18; Admin Dose 6 MG;  Start 1/30/19 at 21:30


Albuterol/ Ipratropium (Duoneb) 3 ml Q3H RESP THERAPY  PRN HHN SHORTNESS OF 


BREATH;  Start 1/31/19 at 20:00


Risperidone (Risperdal) 1 mg BID PO  Last administered on 2/2/19at 20:57; Admin 


Dose 1 MG;  Start 2/1/19 at 21:00


Vancomycin HCl (Vanco Iv Per Pharmacy) VANCOMYCIN PER PHARMACY PER PROTOCOL XX ;


 Start 2/1/19 at 15:00


Meropenem/Sodium Chloride 50 ml @  100 mls/hr Q12 IVPB  Last administered on 


2/2/19at 20:57; Admin Dose 100 MLS/HR;  Start 2/1/19 at 15:00


Methylprednisolone Sodium Succinate (Solu-Medrol) 40 mg Q6 IV  Last administered


on 2/3/19at 06:13; Admin Dose 40 MG;  Start 2/2/19 at 12:00


Propofol 100 ml @  1.875 mls/ hr Q12H IV  Last administered on 2/3/19at 06:13; 


Admin Dose 3.75 MLS/HR;  Start 2/2/19 at 11:00


Fentanyl 100 ml @  2.5 mls/hr TITRATE IV  Last administered on 2/3/19at 04:01; 


Admin Dose 10 MLS/HR;  Start 2/2/19 at 11:30


Norepinephrine 250 ml @  1.875 mls/ hr TITRATE IV  Last administered on 2/3/19at


02:08; Admin Dose 11.25 MLS/HR;  Start 2/2/19 at 11:00


Alteplase, Recombinant (Cathflo (Activase)) 2 mg MAY REPEAT X1 PRN CATHETER IF 


CATHETER REMAINS OCCULUDED;  Start 2/2/19 at 11:00


Vancomycin HCl 1.5 gm/Sodium Chloride 250 ml @  83.333 mls/ hr Q24H IVPB ;  


Start 2/3/19 at 21:00











SHAGUFTA HUANG                     Feb 3, 2019 08:29

## 2019-02-04 VITALS — SYSTOLIC BLOOD PRESSURE: 88 MMHG | DIASTOLIC BLOOD PRESSURE: 58 MMHG | HEART RATE: 55 BPM | RESPIRATION RATE: 20 BRPM

## 2019-02-04 VITALS — HEART RATE: 79 BPM | DIASTOLIC BLOOD PRESSURE: 69 MMHG | RESPIRATION RATE: 12 BRPM | SYSTOLIC BLOOD PRESSURE: 121 MMHG

## 2019-02-04 VITALS — SYSTOLIC BLOOD PRESSURE: 120 MMHG | RESPIRATION RATE: 16 BRPM | HEART RATE: 77 BPM | DIASTOLIC BLOOD PRESSURE: 65 MMHG

## 2019-02-04 VITALS — SYSTOLIC BLOOD PRESSURE: 141 MMHG | HEART RATE: 106 BPM | RESPIRATION RATE: 20 BRPM | DIASTOLIC BLOOD PRESSURE: 92 MMHG

## 2019-02-04 VITALS — DIASTOLIC BLOOD PRESSURE: 90 MMHG | HEART RATE: 89 BPM | RESPIRATION RATE: 13 BRPM | SYSTOLIC BLOOD PRESSURE: 141 MMHG

## 2019-02-04 VITALS — DIASTOLIC BLOOD PRESSURE: 108 MMHG | HEART RATE: 104 BPM | SYSTOLIC BLOOD PRESSURE: 142 MMHG | RESPIRATION RATE: 22 BRPM

## 2019-02-04 VITALS — RESPIRATION RATE: 23 BRPM | DIASTOLIC BLOOD PRESSURE: 103 MMHG | SYSTOLIC BLOOD PRESSURE: 153 MMHG | HEART RATE: 110 BPM

## 2019-02-04 VITALS — HEART RATE: 95 BPM | RESPIRATION RATE: 19 BRPM | DIASTOLIC BLOOD PRESSURE: 78 MMHG | SYSTOLIC BLOOD PRESSURE: 139 MMHG

## 2019-02-04 VITALS — SYSTOLIC BLOOD PRESSURE: 108 MMHG | DIASTOLIC BLOOD PRESSURE: 73 MMHG | RESPIRATION RATE: 18 BRPM | HEART RATE: 79 BPM

## 2019-02-04 VITALS — RESPIRATION RATE: 20 BRPM | DIASTOLIC BLOOD PRESSURE: 62 MMHG | HEART RATE: 63 BPM | SYSTOLIC BLOOD PRESSURE: 89 MMHG

## 2019-02-04 VITALS — RESPIRATION RATE: 20 BRPM

## 2019-02-04 VITALS — HEART RATE: 109 BPM | RESPIRATION RATE: 18 BRPM | SYSTOLIC BLOOD PRESSURE: 154 MMHG | DIASTOLIC BLOOD PRESSURE: 101 MMHG

## 2019-02-04 VITALS — DIASTOLIC BLOOD PRESSURE: 66 MMHG | HEART RATE: 58 BPM | SYSTOLIC BLOOD PRESSURE: 107 MMHG | RESPIRATION RATE: 20 BRPM

## 2019-02-04 VITALS — DIASTOLIC BLOOD PRESSURE: 101 MMHG | RESPIRATION RATE: 13 BRPM | SYSTOLIC BLOOD PRESSURE: 154 MMHG | HEART RATE: 105 BPM

## 2019-02-04 VITALS — RESPIRATION RATE: 20 BRPM | SYSTOLIC BLOOD PRESSURE: 121 MMHG | HEART RATE: 96 BPM | DIASTOLIC BLOOD PRESSURE: 71 MMHG

## 2019-02-04 VITALS — DIASTOLIC BLOOD PRESSURE: 62 MMHG | RESPIRATION RATE: 20 BRPM | HEART RATE: 67 BPM | SYSTOLIC BLOOD PRESSURE: 100 MMHG

## 2019-02-04 VITALS — DIASTOLIC BLOOD PRESSURE: 61 MMHG | RESPIRATION RATE: 20 BRPM | HEART RATE: 60 BPM | SYSTOLIC BLOOD PRESSURE: 100 MMHG

## 2019-02-04 VITALS — DIASTOLIC BLOOD PRESSURE: 65 MMHG | RESPIRATION RATE: 9 BRPM | HEART RATE: 76 BPM | SYSTOLIC BLOOD PRESSURE: 113 MMHG

## 2019-02-04 VITALS — HEART RATE: 92 BPM | SYSTOLIC BLOOD PRESSURE: 132 MMHG | RESPIRATION RATE: 14 BRPM | DIASTOLIC BLOOD PRESSURE: 66 MMHG

## 2019-02-04 VITALS — SYSTOLIC BLOOD PRESSURE: 114 MMHG | RESPIRATION RATE: 20 BRPM | DIASTOLIC BLOOD PRESSURE: 66 MMHG | HEART RATE: 69 BPM

## 2019-02-04 VITALS — RESPIRATION RATE: 20 BRPM | HEART RATE: 60 BPM | SYSTOLIC BLOOD PRESSURE: 104 MMHG | DIASTOLIC BLOOD PRESSURE: 68 MMHG

## 2019-02-04 VITALS — HEART RATE: 62 BPM | DIASTOLIC BLOOD PRESSURE: 65 MMHG | RESPIRATION RATE: 20 BRPM | SYSTOLIC BLOOD PRESSURE: 104 MMHG

## 2019-02-04 VITALS — HEART RATE: 102 BPM | RESPIRATION RATE: 17 BRPM | DIASTOLIC BLOOD PRESSURE: 80 MMHG | SYSTOLIC BLOOD PRESSURE: 145 MMHG

## 2019-02-04 VITALS — RESPIRATION RATE: 18 BRPM | DIASTOLIC BLOOD PRESSURE: 83 MMHG | HEART RATE: 103 BPM | SYSTOLIC BLOOD PRESSURE: 117 MMHG

## 2019-02-04 VITALS — SYSTOLIC BLOOD PRESSURE: 109 MMHG | DIASTOLIC BLOOD PRESSURE: 57 MMHG | RESPIRATION RATE: 20 BRPM | HEART RATE: 89 BPM

## 2019-02-04 VITALS — DIASTOLIC BLOOD PRESSURE: 62 MMHG | RESPIRATION RATE: 20 BRPM | SYSTOLIC BLOOD PRESSURE: 96 MMHG | HEART RATE: 63 BPM

## 2019-02-04 VITALS — SYSTOLIC BLOOD PRESSURE: 119 MMHG | DIASTOLIC BLOOD PRESSURE: 72 MMHG | HEART RATE: 74 BPM | RESPIRATION RATE: 16 BRPM

## 2019-02-04 VITALS — DIASTOLIC BLOOD PRESSURE: 81 MMHG | HEART RATE: 85 BPM | RESPIRATION RATE: 15 BRPM | SYSTOLIC BLOOD PRESSURE: 135 MMHG

## 2019-02-04 VITALS — HEART RATE: 103 BPM | DIASTOLIC BLOOD PRESSURE: 92 MMHG | RESPIRATION RATE: 17 BRPM | SYSTOLIC BLOOD PRESSURE: 141 MMHG

## 2019-02-04 VITALS — RESPIRATION RATE: 11 BRPM

## 2019-02-04 VITALS — DIASTOLIC BLOOD PRESSURE: 108 MMHG | RESPIRATION RATE: 19 BRPM | SYSTOLIC BLOOD PRESSURE: 132 MMHG | HEART RATE: 106 BPM

## 2019-02-04 VITALS — SYSTOLIC BLOOD PRESSURE: 111 MMHG | DIASTOLIC BLOOD PRESSURE: 65 MMHG | RESPIRATION RATE: 9 BRPM | HEART RATE: 69 BPM

## 2019-02-04 VITALS — DIASTOLIC BLOOD PRESSURE: 68 MMHG | SYSTOLIC BLOOD PRESSURE: 124 MMHG | RESPIRATION RATE: 12 BRPM | HEART RATE: 74 BPM

## 2019-02-04 VITALS — SYSTOLIC BLOOD PRESSURE: 149 MMHG | HEART RATE: 93 BPM | RESPIRATION RATE: 13 BRPM | DIASTOLIC BLOOD PRESSURE: 91 MMHG

## 2019-02-04 VITALS — DIASTOLIC BLOOD PRESSURE: 91 MMHG | RESPIRATION RATE: 23 BRPM | HEART RATE: 105 BPM | SYSTOLIC BLOOD PRESSURE: 161 MMHG

## 2019-02-04 VITALS — RESPIRATION RATE: 20 BRPM | HEART RATE: 74 BPM | SYSTOLIC BLOOD PRESSURE: 99 MMHG | DIASTOLIC BLOOD PRESSURE: 58 MMHG

## 2019-02-04 VITALS — DIASTOLIC BLOOD PRESSURE: 60 MMHG | SYSTOLIC BLOOD PRESSURE: 86 MMHG | RESPIRATION RATE: 20 BRPM | HEART RATE: 55 BPM

## 2019-02-04 VITALS — RESPIRATION RATE: 10 BRPM

## 2019-02-04 VITALS — DIASTOLIC BLOOD PRESSURE: 67 MMHG | RESPIRATION RATE: 21 BRPM | SYSTOLIC BLOOD PRESSURE: 110 MMHG | HEART RATE: 68 BPM

## 2019-02-04 LAB
ABNORMAL IP MESSAGE: 1
ADD MAN DIFF?: NO
ALLEN TEST: (no result)
ANION GAP: -1 (ref 5–13)
ARTERIAL BASE EXCESS: 9.3 MMOL/L (ref -3–3)
ARTERIAL BLOOD GAS OXYGEN SAT: 94.7 MMHG (ref 95–98)
ARTERIAL COHB: 0.5 % (ref 0–3)
ARTERIAL FRACTION OF OXYHGB: 94.2 % (ref 93–99)
ARTERIAL HCO3: 35 MMOL/L (ref 22–26)
ARTERIAL METHB: 0 % (ref 0–1.5)
ARTERIAL PCO2: 53.3 MMHG (ref 35–45)
BASOPHIL #: 0 10^3/UL (ref 0–0.1)
BASOPHILS %: 0 % (ref 0–2)
BLOOD GAS LOW PEEP SETTING: 5 CMH2O
BLOOD GAS PS: 10
BLOOD UREA NITROGEN: 38 MG/DL (ref 7–20)
CALCIUM: 9 MG/DL (ref 8.4–10.2)
CARBON DIOXIDE: 37 MMOL/L (ref 21–31)
CHLORIDE: 112 MMOL/L (ref 97–110)
CREATININE: 0.77 MG/DL (ref 0.61–1.24)
EOSINOPHILS #: 0 10^3/UL (ref 0–0.5)
EOSINOPHILS %: 0 % (ref 0–7)
FIO2: 35 %
GLUCOSE: 125 MG/DL (ref 70–220)
HEMATOCRIT: 28.5 % (ref 42–52)
HEMOGLOBIN: 8.8 G/DL (ref 14–18)
IMMATURE GRANS #M: 0.02 10^3/UL (ref 0–0.03)
IMMATURE GRANS % (M): 0.7 % (ref 0–0.43)
LYMPHOCYTES #: 0.5 10^3/UL (ref 0.8–2.9)
LYMPHOCYTES %: 16.2 % (ref 15–51)
MAGNESIUM: 2.6 MG/DL (ref 1.7–2.5)
MEAN CORPUSCULAR HEMOGLOBIN: 27.8 PG (ref 29–33)
MEAN CORPUSCULAR HGB CONC: 30.9 G/DL (ref 32–37)
MEAN CORPUSCULAR VOLUME: 90.2 FL (ref 82–101)
MEAN PLATELET VOLUME: 11.5 FL (ref 7.4–10.4)
MODE: (no result)
MONOCYTE #: 0.1 10^3/UL (ref 0.3–0.9)
MONOCYTES %: 3.1 % (ref 0–11)
NEUTROPHIL #: 2.3 10^3/UL (ref 1.6–7.5)
NEUTROPHILS %: 80 % (ref 39–77)
NUCLEATED RED BLOOD CELLS #: 0 10^3/UL (ref 0–0)
NUCLEATED RED BLOOD CELLS%: 0 /100WBC (ref 0–0)
O2 A-A PPRESDIFF RESPIRATORY: 112.1 MMHG (ref 7–24)
PHOSPHORUS: 2.1 MG/DL (ref 2.5–4.9)
PLATELET COUNT: 78 10^3/UL (ref 140–415)
POSITIVE DIFF: (no result)
POTASSIUM: 3.8 MMOL/L (ref 3.5–5.1)
RED BLOOD COUNT: 3.16 10^6/UL (ref 4.7–6.1)
RED CELL DISTRIBUTION WIDTH: 16.5 % (ref 11.5–14.5)
SODIUM: 148 MMOL/L (ref 135–144)
WHITE BLOOD COUNT: 2.9 10^3/UL (ref 4.8–10.8)

## 2019-02-04 RX ADMIN — FAMOTIDINE SCH MG: 20 TABLET ORAL at 08:10

## 2019-02-04 RX ADMIN — ASPIRIN 81 MG CHEWABLE TABLET 1 MG: 81 TABLET CHEWABLE at 08:10

## 2019-02-04 RX ADMIN — ALBUTEROL SULFATE SCH PUFF: 90 AEROSOL, METERED RESPIRATORY (INHALATION) at 01:59

## 2019-02-04 RX ADMIN — ZOLPIDEM TARTRATE PRN MG: 5 TABLET, FILM COATED ORAL at 20:38

## 2019-02-04 RX ADMIN — ASPIRIN 81 MG SCH MG: 81 TABLET ORAL at 08:10

## 2019-02-04 RX ADMIN — MEROPENEM SCH MLS/HR: 1 INJECTION, POWDER, FOR SOLUTION INTRAVENOUS at 20:38

## 2019-02-04 RX ADMIN — ALBUTEROL SULFATE SCH PUFF: 90 AEROSOL, METERED RESPIRATORY (INHALATION) at 07:57

## 2019-02-04 RX ADMIN — AMLODIPINE BESYLATE 1 MG: 10 TABLET ORAL at 09:00

## 2019-02-04 RX ADMIN — PROPOFOL SCH MLS/HR: 10 INJECTION, EMULSION INTRAVENOUS at 00:48

## 2019-02-04 RX ADMIN — RISPERIDONE 1 MG: 1 TABLET ORAL at 20:38

## 2019-02-04 RX ADMIN — DIVALPROEX SODIUM SCH MG: 250 TABLET, DELAYED RELEASE ORAL at 17:00

## 2019-02-04 RX ADMIN — METHYLPREDNISOLONE SODIUM SUCCINATE 1 MG: 40 INJECTION, POWDER, FOR SOLUTION INTRAMUSCULAR; INTRAVENOUS at 05:09

## 2019-02-04 RX ADMIN — VANCOMYCIN HYDROCHLORIDE 1 MLS/HR: 500 INJECTION, POWDER, LYOPHILIZED, FOR SOLUTION INTRAVENOUS at 06:18

## 2019-02-04 RX ADMIN — IPRATROPIUM BROMIDE 1 PUFF: 17 AEROSOL, METERED RESPIRATORY (INHALATION) at 13:10

## 2019-02-04 RX ADMIN — MEROPENEM SCH MLS/HR: 1 INJECTION, POWDER, FOR SOLUTION INTRAVENOUS at 09:45

## 2019-02-04 RX ADMIN — ALBUTEROL SULFATE SCH PUFF: 90 AEROSOL, METERED RESPIRATORY (INHALATION) at 13:10

## 2019-02-04 RX ADMIN — DIVALPROEX SODIUM 1 MG: 250 TABLET, DELAYED RELEASE ORAL at 01:30

## 2019-02-04 RX ADMIN — ALBUTEROL SULFATE 1 PUFF: 90 AEROSOL, METERED RESPIRATORY (INHALATION) at 13:10

## 2019-02-04 RX ADMIN — AMLODIPINE BESYLATE SCH MG: 10 TABLET ORAL at 09:00

## 2019-02-04 RX ADMIN — IPRATROPIUM BROMIDE 1 PUFF: 17 AEROSOL, METERED RESPIRATORY (INHALATION) at 07:57

## 2019-02-04 RX ADMIN — RISPERIDONE 1 MG: 1 TABLET ORAL at 08:10

## 2019-02-04 RX ADMIN — METHYLPREDNISOLONE SODIUM SUCCINATE 1 MG: 40 INJECTION, POWDER, FOR SOLUTION INTRAMUSCULAR; INTRAVENOUS at 12:27

## 2019-02-04 RX ADMIN — IPRATROPIUM BROMIDE AND ALBUTEROL SULFATE 1 ML: .5; 3 SOLUTION RESPIRATORY (INHALATION) at 20:57

## 2019-02-04 RX ADMIN — ALBUMIN (HUMAN) SCH MLS/HR: 0.25 INJECTION, SOLUTION INTRAVENOUS at 05:12

## 2019-02-04 RX ADMIN — PROPOFOL 1 MLS/HR: 10 INJECTION, EMULSION INTRAVENOUS at 00:48

## 2019-02-04 RX ADMIN — DIVALPROEX SODIUM 1 MG: 250 TABLET, DELAYED RELEASE ORAL at 17:00

## 2019-02-04 RX ADMIN — CASTOR OIL AND BALSAM, PERU 1 APPLIC: 788; 87 OINTMENT TOPICAL at 08:11

## 2019-02-04 RX ADMIN — MEROPENEM 1 MLS/HR: 1 INJECTION, POWDER, FOR SOLUTION INTRAVENOUS at 09:45

## 2019-02-04 RX ADMIN — ZOLPIDEM TARTRATE 1 MG: 5 TABLET, FILM COATED ORAL at 20:38

## 2019-02-04 RX ADMIN — IPRATROPIUM BROMIDE 1 PUFF: 17 AEROSOL, METERED RESPIRATORY (INHALATION) at 01:59

## 2019-02-04 RX ADMIN — ENOXAPARIN SODIUM SCH MG: 100 INJECTION SUBCUTANEOUS at 09:00

## 2019-02-04 RX ADMIN — DIVALPROEX SODIUM 1 MG: 250 TABLET, DELAYED RELEASE ORAL at 09:13

## 2019-02-04 RX ADMIN — FAMOTIDINE 1 MG: 20 TABLET ORAL at 08:10

## 2019-02-04 RX ADMIN — POTASSIUM & SODIUM PHOSPHATES POWDER PACK 280-160-250 MG 1 MG: 280-160-250 PACK at 08:10

## 2019-02-04 RX ADMIN — ALBUTEROL SULFATE 1 PUFF: 90 AEROSOL, METERED RESPIRATORY (INHALATION) at 01:59

## 2019-02-04 RX ADMIN — ALBUTEROL SULFATE 1 PUFF: 90 AEROSOL, METERED RESPIRATORY (INHALATION) at 07:57

## 2019-02-04 RX ADMIN — FAMOTIDINE 1 MG: 20 TABLET ORAL at 20:38

## 2019-02-04 RX ADMIN — FAMOTIDINE SCH MG: 20 TABLET ORAL at 20:38

## 2019-02-04 RX ADMIN — DIVALPROEX SODIUM SCH MG: 250 TABLET, DELAYED RELEASE ORAL at 01:30

## 2019-02-04 RX ADMIN — ENOXAPARIN SODIUM 1 MG: 100 INJECTION SUBCUTANEOUS at 09:00

## 2019-02-04 RX ADMIN — SODIUM CHLORIDE SCH MLS/HR: 0.9 INJECTION, SOLUTION INTRAVENOUS at 06:18

## 2019-02-04 RX ADMIN — Medication SCH MLS/HR: at 09:17

## 2019-02-04 RX ADMIN — METHYLPREDNISOLONE SODIUM SUCCINATE 1 MG: 40 INJECTION, POWDER, FOR SOLUTION INTRAMUSCULAR; INTRAVENOUS at 18:00

## 2019-02-04 RX ADMIN — DIVALPROEX SODIUM SCH MG: 250 TABLET, DELAYED RELEASE ORAL at 09:13

## 2019-02-04 RX ADMIN — ALBUMIN (HUMAN) 1 MLS/HR: 0.25 INJECTION, SOLUTION INTRAVENOUS at 05:12

## 2019-02-04 RX ADMIN — PROPOFOL SCH MLS/HR: 10 INJECTION, EMULSION INTRAVENOUS at 08:09

## 2019-02-04 RX ADMIN — PROPOFOL 1 MLS/HR: 10 INJECTION, EMULSION INTRAVENOUS at 08:09

## 2019-02-04 RX ADMIN — MEROPENEM 1 MLS/HR: 1 INJECTION, POWDER, FOR SOLUTION INTRAVENOUS at 20:38

## 2019-02-04 RX ADMIN — IPRATROPIUM BROMIDE AND ALBUTEROL SULFATE SCH ML: .5; 3 SOLUTION RESPIRATORY (INHALATION) at 20:57

## 2019-02-04 RX ADMIN — Medication 1 MLS/HR: at 09:17

## 2019-02-04 NOTE — CONS
Assessment/Plan


Assessment/Plan


Hospital Course (Demo Recall)


Patient is intubated awake and looks comfortable no fevers overnight, he 


tolerated CPAP.  WBC 2.9 neutrophils 80 BUN 38 creatinine 0.77





Antimicrobials: Vancomycin, meropenem





Indwelling: Right IJ triple-lumen catheter, endotracheal tube, NG tube, Blake





Physical examination: Well-developed chronically ill-appearing cachectic elderly


man who is in no distress very confused and sound congested.  Head atraumatic 


normocephalic sclera nonicteric, neck is supple chest rise symmetrical breath so


unds diminished bases.  Heart: S1-S2.  Abdomen soft bowel sounds present.  


Extremities cyanotic





Assessment:


1.  Acute hypoxemic respiratory failure 


2.  Pneumonia, possibly aspirated, patient is reintubated


4.  Pneumoperitoneum of unclear etiology, no perforation per surgical note, 


status post chest tube


5.  Dementia


5.  Anemia


6.  History of non-ST elevation MI


7.  Status post cardiac arrest


8.  RIJ TLC





Plan: Patient is stable, tolerates weaning trials, continue antibiotics, follow 


pulmonary recommendations





Consultation Date/Type/Reason


Admit Date/Time


Jan 22, 2019 at 00:03


Initial Consult Date


1/22/19


Type of Consult


ID


Requesting Provider:  DONALDO CARRILLO MD


Date/Time of Note


DATE: 2/4/19 


TIME: 12:45





Exam/Review of Systems


Exam


Vitals





Vital Signs


  Date      Temp  Pulse  Resp  B/P (MAP)   Pulse Ox  O2          O2 Flow    FiO2


Time                                                 Delivery    Rate


    2/4/19           74    12      124/68        97


     12:30                           (86)


    2/4/19  98.6                                     CPAP


     12:00


                                                     Mechanical


                                                     Ventilator


    2/4/19                                                                    35


     11:35


    2/2/19                                                             6.0


     08:06








Intake and Output





2/3/19


2/3/19


2/4/19





1515:00


23:00


07:00





IntakeIntake Total


801.875 ml


786.000 ml


999.625 ml





OutputOutput Total


625 ml


775 ml


1420 ml





BalanceBalance


176.875 ml


11.000 ml


-420.375 ml














Results


Result Diagram:  


2/4/19 0355                                                                     


          2/4/19 0355





Results 24hrs





Laboratory Tests


               Test
                                2/4/19
03:55


               White Blood Count                         2.9  #L


               Red Blood Count                           3.16  L


               Hemoglobin                                 8.8  L


               Hematocrit                                28.5  L


               Mean Corpuscular Volume                    90.2


               Mean Corpuscular Hemoglobin               27.8  L


               Mean Corpuscular Hemoglobin
Concent      30.9  L



               Red Cell Distribution Width               16.5  H


               Platelet Count                             78  #L


               Mean Platelet Volume                      11.5  H


               Immature Granulocytes %                  0.700  H


               Neutrophils %                             80.0  H


               Lymphocytes %                              16.2


               Monocytes %                                 3.1


               Eosinophils %                               0.0


               Basophils %                                 0.0


               Nucleated Red Blood Cells %                 0.0


               Immature Granulocytes #                   0.020


               Neutrophils #                               2.3


               Lymphocytes #                              0.5  L


               Monocytes #                                0.1  L


               Eosinophils #                               0.0


               Basophils #                                 0.0


               Nucleated Red Blood Cells #                 0.0


               Sodium Level                               148  H


               Potassium Level                             3.8


               Chloride Level                             112  H


               Carbon Dioxide Level                        37  H


               Anion Gap                                   -1  L


               Blood Urea Nitrogen                         38  H


               Creatinine                                 0.77


               Est Glomerular Filtrat Rate
mL/min   > 60  



               Glucose Level                              125  #


               Calcium Level                               9.0


               Phosphorus Level                           2.1  L


               Magnesium Level                            2.6  H








Medications


Medication





Current Medications


Ondansetron HCl (Zofran Inj) 4 mg Q6H  PRN IV NAUSEA AND/OR VOMITING Last 


administered on 2/1/19at 18:46; Admin Dose 4 MG;  Start 1/21/19 at 23:30


Acetaminophen (Tylenol Supp) 650 mg Q4H  PRN [place] PAIN LEVEL 1-3 OR FEVER;  Start 


1/21/19 at 23:30


Aspirin (Aspirin) 81 mg DAILY PO  Last administered on 2/4/19at 08:10; Admin 


Dose 81 MG;  Start 1/24/19 at 09:00


Famotidine (Pepcid) 20 mg Q12 PO  Last administered on 2/4/19at 08:10; Admin 


Dose 20 MG;  Start 1/24/19 at 21:00


Zolpidem Tartrate (Ambien) 5 mg HS  PRN PO INSOMNIA Last administered on 


1/30/19at 23:03; Admin Dose 5 MG;  Start 1/27/19 at 00:00


Guaifenesin/ Dextromethorphan (Robitussin Dm Liquid Cup) 5 ml Q6H  PRN PO COUGH 


Last administered on 1/31/19at 09:04; Admin Dose 5 ML;  Start 1/27/19 at 20:00


Divalproex Sodium (Depakote) 250 mg Q8H PO  Last administered on 2/4/19at 09:13;


Admin Dose 250 MG;  Start 1/28/19 at 17:00


Morphine Sulfate (morphine) 6 mg Q4H  PRN PO SEVERE PAIN LEVEL 7-10 Last ad


ministered on 2/2/19at 07:18; Admin Dose 6 MG;  Start 1/30/19 at 21:30


Albuterol/ Ipratropium (Duoneb) 3 ml Q3H RESP THERAPY  PRN HHN SHORTNESS OF 


BREATH;  Start 1/31/19 at 20:00


Risperidone (Risperdal) 1 mg BID PO  Last administered on 2/4/19at 08:10; Admin 


Dose 1 MG;  Start 2/1/19 at 21:00


Vancomycin HCl (Vanco Iv Per Pharmacy) VANCOMYCIN PER PHARMACY PER PROTOCOL XX ;


 Start 2/1/19 at 15:00


Meropenem/Sodium Chloride 50 ml @  100 mls/hr Q12 IVPB  Last administered on 


2/4/19at 09:45; Admin Dose 100 MLS/HR;  Start 2/1/19 at 15:00


Methylprednisolone Sodium Succinate (Solu-Medrol) 40 mg Q6 IV  Last administered


on 2/4/19at 12:27; Admin Dose 40 MG;  Start 2/2/19 at 12:00


Propofol 100 ml @  1.875 mls/ hr Q12H IV  Last administered on 2/4/19at 08:09; 


Admin Dose 15 MLS/HR;  Start 2/2/19 at 11:00


Fentanyl 100 ml @  2.5 mls/hr TITRATE IV  Last administered on 2/4/19at 09:17; 


Admin Dose 5 MLS/HR;  Start 2/2/19 at 11:30


Norepinephrine 250 ml @  1.875 mls/ hr TITRATE IV  Last administered on 2/3/19at


02:08; Admin Dose 11.25 MLS/HR;  Start 2/2/19 at 11:00


Alteplase, Recombinant (Cathflo (Activase)) 2 mg MAY REPEAT X1 PRN CATHETER IF 


CATHETER REMAINS OCCULUDED;  Start 2/2/19 at 11:00


Vancomycin HCl 1.5 gm/Sodium Chloride 250 ml @  83.333 mls/ hr Q24H IVPB  Last 


administered on 2/4/19at 06:18; Admin Dose 83.333 MLS/HR;  Start 2/4/19 at 06:00


Albuterol (Ventolin Hfa) 4 puff Q6H RESP  THERAPY INH  Last administered on 


2/4/19at 07:57; Admin Dose 4 PUFF;  Start 2/3/19 at 14:00


Ipratropium Bromide (Atrovent Hfa) 4 puff Q6H RESP  THERAPY INH  Last 


administered on 2/4/19at 07:57; Admin Dose 4 PUFF;  Start 2/3/19 at 14:00


Amlodipine Besylate (Norvasc) 5 mg DAILY PO ;  Start 2/5/19 at 09:00











DAVID VÁZQUEZ NP             Feb 4, 2019 12:48

## 2019-02-04 NOTE — PN
Date/Time of Note


Date/Time of Note


DATE: 2/4/19 


TIME: 13:24





Assessment/Plan


VTE Prophylaxis


Risk score (from [facility])>0 risk:  14


SCD applied (from [facility]):  Yes


Pharmacological prophylaxis:  NA/contraindicated


Pharm contraindication:  thrombocytopenia





Lines/Catheters


IV Catheter Type (from [facility]):  Central Line


Central line still needed:  Yes


Urinary Cath still in place:  Yes


Reason Cath still needed:  urinary retention





Assessment/Plan


Hospital Course


Patient is currently orally intubated on ventilatory support undergoing CPAP 


trial.  Patient is on fentanyl drip for pain.


Assessment/Plan


-Hypoxic respiratory failure requiring mechanical ventilation, Dr. English is 


following in pulmonology consultation.


-Sepsis with shock, resolving.  Dr. Dreyer is following in infection disease 


consultation.


-Status post cardiac arrest.   Dr. Jon is following in cardiology 


consultation.


-Left-sided pneumothorax, status post left side chest tube placement, s/p self 


d/c CT.


-S/p pneumoperitoneum most likely due to cardiac arrest.  Dr. Lai is following


in general surgery consultation. 


-Left axillary and brachial DVT, was on Lovenox which is currently held due to 


thrombocytopenia


-Left lower lobe pneumonia


-Severe emphysema


-NATALIE with possible chronic kidney disease. Dr Hanson is following in nephrology


consultation.


-Schizoaffective disorder depressed type.  Psychiatric consult is appreciated, 


continue Risperdal Depakote





Critical care time spent more than 30 minutes.


Further recommendations based on clinical course.  Plan of care discussed with 


Dr. Miller.


Result Diagram:  


2/4/19 0355                                                                     


          2/4/19 0355





Results 24hrs





Laboratory Tests


               Test
                                2/4/19
03:55


               White Blood Count                         2.9  #L


               Red Blood Count                           3.16  L


               Hemoglobin                                 8.8  L


               Hematocrit                                28.5  L


               Mean Corpuscular Volume                    90.2


               Mean Corpuscular Hemoglobin               27.8  L


               Mean Corpuscular Hemoglobin
Concent      30.9  L



               Red Cell Distribution Width               16.5  H


               Platelet Count                             78  #L


               Mean Platelet Volume                      11.5  H


               Immature Granulocytes %                  0.700  H


               Neutrophils %                             80.0  H


               Lymphocytes %                              16.2


               Monocytes %                                 3.1


               Eosinophils %                               0.0


               Basophils %                                 0.0


               Nucleated Red Blood Cells %                 0.0


               Immature Granulocytes #                   0.020


               Neutrophils #                               2.3


               Lymphocytes #                              0.5  L


               Monocytes #                                0.1  L


               Eosinophils #                               0.0


               Basophils #                                 0.0


               Nucleated Red Blood Cells #                 0.0


               Sodium Level                               148  H


               Potassium Level                             3.8


               Chloride Level                             112  H


               Carbon Dioxide Level                        37  H


               Anion Gap                                   -1  L


               Blood Urea Nitrogen                         38  H


               Creatinine                                 0.77


               Est Glomerular Filtrat Rate
mL/min   > 60  



               Glucose Level                              125  #


               Calcium Level                               9.0


               Phosphorus Level                           2.1  L


               Magnesium Level                            2.6  H








Exam/Review of Systems


Exam


Vitals





Vital Signs


  Date      Temp  Pulse  Resp  B/P (MAP)   Pulse Ox  O2          O2 Flow    FiO2


Time                                                 Delivery    Rate


    2/4/19           74    12      124/68        97


     12:30                           (86)


    2/4/19  98.6                                     CPAP


     12:00


                                                     Mechanical


                                                     Ventilator


    2/4/19                                                                    35


     11:35


    2/2/19                                                             6.0


     08:06








Intake and Output





2/3/19


2/3/19


2/4/19





1515:00


23:00


07:00





IntakeIntake Total


801.875 ml


786.000 ml


999.625 ml





OutputOutput Total


625 ml


775 ml


1420 ml





BalanceBalance


176.875 ml


11.000 ml


-420.375 ml











Constitutional:  other (Orally intubated)


Respiratory:  diminished breath sounds


Cardiovascular:  regular rate and rhythm


Gastrointestinal:  soft, non-tender


Musculoskeletal:  nl extremities to inspection


Extremities:  normal pulses


Neurological:  nl mental status





Results


Results 24hrs





Laboratory Tests


               Test
                                2/4/19
03:55


               White Blood Count                         2.9  #L


               Red Blood Count                           3.16  L


               Hemoglobin                                 8.8  L


               Hematocrit                                28.5  L


               Mean Corpuscular Volume                    90.2


               Mean Corpuscular Hemoglobin               27.8  L


               Mean Corpuscular Hemoglobin
Concent      30.9  L



               Red Cell Distribution Width               16.5  H


               Platelet Count                             78  #L


               Mean Platelet Volume                      11.5  H


               Immature Granulocytes %                  0.700  H


               Neutrophils %                             80.0  H


               Lymphocytes %                              16.2


               Monocytes %                                 3.1


               Eosinophils %                               0.0


               Basophils %                                 0.0


               Nucleated Red Blood Cells %                 0.0


               Immature Granulocytes #                   0.020


               Neutrophils #                               2.3


               Lymphocytes #                              0.5  L


               Monocytes #                                0.1  L


               Eosinophils #                               0.0


               Basophils #                                 0.0


               Nucleated Red Blood Cells #                 0.0


               Sodium Level                               148  H


               Potassium Level                             3.8


               Chloride Level                             112  H


               Carbon Dioxide Level                        37  H


               Anion Gap                                   -1  L


               Blood Urea Nitrogen                         38  H


               Creatinine                                 0.77


               Est Glomerular Filtrat Rate
mL/min   > 60  



               Glucose Level                              125  #


               Calcium Level                               9.0


               Phosphorus Level                           2.1  L


               Magnesium Level                            2.6  H








Medications


Medication





Current Medications


Ondansetron HCl (Zofran Inj) 4 mg Q6H  PRN IV NAUSEA AND/OR VOMITING Last 


administered on 2/1/19at 18:46; Admin Dose 4 MG;  Start 1/21/19 at 23:30


Acetaminophen (Tylenol Supp) 650 mg Q4H  PRN [place] PAIN LEVEL 1-3 OR FEVER;  Start 


1/21/19 at 23:30


Aspirin (Aspirin) 81 mg DAILY PO  Last administered on 2/4/19at 08:10; Admin 


Dose 81 MG;  Start 1/24/19 at 09:00


Famotidine (Pepcid) 20 mg Q12 PO  Last administered on 2/4/19at 08:10; Admin 


Dose 20 MG;  Start 1/24/19 at 21:00


Zolpidem Tartrate (Ambien) 5 mg HS  PRN PO INSOMNIA Last administered on 


1/30/19at 23:03; Admin Dose 5 MG;  Start 1/27/19 at 00:00


Guaifenesin/ Dextromethorphan (Robitussin Dm Liquid Cup) 5 ml Q6H  PRN PO COUGH 


Last administered on 1/31/19at 09:04; Admin Dose 5 ML;  Start 1/27/19 at 20:00


Divalproex Sodium (Depakote) 250 mg Q8H PO  Last administered on 2/4/19at 09:13;


Admin Dose 250 MG;  Start 1/28/19 at 17:00


Morphine Sulfate (morphine) 6 mg Q4H  PRN PO SEVERE PAIN LEVEL 7-10 Last 


administered on 2/2/19at 07:18; Admin Dose 6 MG;  Start 1/30/19 at 21:30


Albuterol/ Ipratropium (Duoneb) 3 ml Q3H RESP THERAPY  PRN HHN SHORTNESS OF 


BREATH;  Start 1/31/19 at 20:00


Risperidone (Risperdal) 1 mg BID PO  Last administered on 2/4/19at 08:10; Admin 


Dose 1 MG;  Start 2/1/19 at 21:00


Vancomycin HCl (Vanco Iv Per Pharmacy) VANCOMYCIN PER PHARMACY PER PROTOCOL XX ;


 Start 2/1/19 at 15:00


Meropenem/Sodium Chloride 50 ml @  100 mls/hr Q12 IVPB  Last administered on 


2/4/19at 09:45; Admin Dose 100 MLS/HR;  Start 2/1/19 at 15:00


Methylprednisolone Sodium Succinate (Solu-Medrol) 40 mg Q6 IV  Last administered


on 2/4/19at 12:27; Admin Dose 40 MG;  Start 2/2/19 at 12:00


Propofol 100 ml @  1.875 mls/ hr Q12H IV  Last administered on 2/4/19at 08:09; 


Admin Dose 15 MLS/HR;  Start 2/2/19 at 11:00


Fentanyl 100 ml @  2.5 mls/hr TITRATE IV  Last administered on 2/4/19at 09:17; 


Admin Dose 5 MLS/HR;  Start 2/2/19 at 11:30


Norepinephrine 250 ml @  1.875 mls/ hr TITRATE IV  Last administered on 2/3/19at


02:08; Admin Dose 11.25 MLS/HR;  Start 2/2/19 at 11:00


Alteplase, Recombinant (Cathflo (Activase)) 2 mg MAY REPEAT X1 PRN CATHETER IF 


CATHETER REMAINS OCCULUDED;  Start 2/2/19 at 11:00


Vancomycin HCl 1.5 gm/Sodium Chloride 250 ml @  83.333 mls/ hr Q24H IVPB  Last 


administered on 2/4/19at 06:18; Admin Dose 83.333 MLS/HR;  Start 2/4/19 at 06:00


Albuterol (Ventolin Hfa) 4 puff Q6H RESP  THERAPY INH  Last administered on 


2/4/19at 13:10; Admin Dose 4 PUFF;  Start 2/3/19 at 14:00


Ipratropium Bromide (Atrovent Hfa) 4 puff Q6H RESP  THERAPY INH  Last 


administered on 2/4/19at 13:10; Admin Dose 4 PUFF;  Start 2/3/19 at 14:00


Amlodipine Besylate (Norvasc) 5 mg DAILY PO ;  Start 2/5/19 at 09:00











EVARISTO BELTRAN              Feb 4, 2019 13:34

## 2019-02-04 NOTE — CONS
Assessment/Plan


Assessment/Plan


Hospital Course (Demo Recall)


IMP:


1.Hypotension-borderline norbasc held today. NL EF by echo this admit


2.resp failure s/p extubation


3.cardiac arrest


4.pneumoperitoneum-resolved


5.Positive troponin-now trended neg


6.renal failure-improved


7.Leukocytosis


8. anemia


9, Thrombocytopenia-ongoing some worsening


10.Resp failure-hypercapneic s/p intubation. Transferred to ICU for this


11.PNA-by cxr





Recc:


-IN ICU


-Continue norvasc as tolerated only as thus will decrease dose


-Continue asa


-continue steroids/bronchodilators


-Continue abx's and f/u cx data


-Continue risperdal


-follow MS closely


-Follow volume status clsoely


-wean vent as tolerated





Consultation Date/Type/Reason


Admit Date/Time


Jan 22, 2019 at 00:03


Initial Consult Date


1/22/19


Type of Consult


Cardiology


Reason for Consultation


positive troponin


Requesting Provider:  DONALDO CARRILLO MD


Date/Time of Note


DATE: 2/4/19 


TIME: 12:20





Exam/Review of Systems


Vital Signs


Vitals





Vital Signs


  Date      Temp  Pulse  Resp  B/P (MAP)   Pulse Ox  O2          O2 Flow    FiO2


Time                                                 Delivery    Rate


    2/4/19           70    11                    99                           35


     11:35


    2/4/19                         110/67


     08:30                           (81)


    2/4/19  98.4                                     Mechanical


     08:00                                           Ventilator


    2/2/19                                                             6.0


     08:06








Intake and Output





2/3/19


2/3/19


2/4/19





1515:00


23:00


07:00





IntakeIntake Total


801.875 ml


786.000 ml


999.625 ml





OutputOutput Total


625 ml


775 ml


1420 ml





BalanceBalance


176.875 ml


11.000 ml


-420.375 ml














Exam


Exam


Review of Systems:


CONSTITUTIONAL:  No fevers, chills.


PULMONARY: intubated


CARDIOVASCULAR: No chest pain/palpitations


GASTROINTESTINAL:  No nausea/vomiting.


GENITOURINARY:  No hematuria/dysuria.


MUSCULOSKELETAL:  No myagias/arthalgias.


PSYCHIATRIC:  The patient denies depression.


NEUROLOGIC:   No weakness


Constitutional:  alert


Psych:  no complaints


Head:  normocephalic


ENMT:  intubated


Neck:  supple, jvd (9 cm water)


Respiratory:  diminished breath sounds (at bases/b)


Cardiovascular:  regular rate and rhythm


Gastrointestinal:  soft, non-tender


Musculoskeletal:  muscle tone (normal)


Extremities:  edema (none)


Neurological:  other (No focal deficits)





Labs


Result Diagram:  


2/4/19 0355                                                                     


          2/4/19 0355





Results 24hrs





Laboratory Tests


               Test
                                2/4/19
03:55


               White Blood Count                         2.9  #L


               Red Blood Count                           3.16  L


               Hemoglobin                                 8.8  L


               Hematocrit                                28.5  L


               Mean Corpuscular Volume                    90.2


               Mean Corpuscular Hemoglobin               27.8  L


               Mean Corpuscular Hemoglobin
Concent      30.9  L



               Red Cell Distribution Width               16.5  H


               Platelet Count                             78  #L


               Mean Platelet Volume                      11.5  H


               Immature Granulocytes %                  0.700  H


               Neutrophils %                             80.0  H


               Lymphocytes %                              16.2


               Monocytes %                                 3.1


               Eosinophils %                               0.0


               Basophils %                                 0.0


               Nucleated Red Blood Cells %                 0.0


               Immature Granulocytes #                   0.020


               Neutrophils #                               2.3


               Lymphocytes #                              0.5  L


               Monocytes #                                0.1  L


               Eosinophils #                               0.0


               Basophils #                                 0.0


               Nucleated Red Blood Cells #                 0.0


               Sodium Level                               148  H


               Potassium Level                             3.8


               Chloride Level                             112  H


               Carbon Dioxide Level                        37  H


               Anion Gap                                   -1  L


               Blood Urea Nitrogen                         38  H


               Creatinine                                 0.77


               Est Glomerular Filtrat Rate
mL/min   > 60  



               Glucose Level                              125  #


               Calcium Level                               9.0


               Phosphorus Level                           2.1  L


               Magnesium Level                            2.6  H








Medications


Medications





Current Medications


Ondansetron HCl (Zofran Inj) 4 mg Q6H  PRN IV NAUSEA AND/OR VOMITING Last 


administered on 2/1/19at 18:46; Admin Dose 4 MG;  Start 1/21/19 at 23:30


Acetaminophen (Tylenol Supp) 650 mg Q4H  PRN [place] PAIN LEVEL 1-3 OR FEVER;  Start 


1/21/19 at 23:30


Aspirin (Aspirin) 81 mg DAILY PO  Last administered on 2/4/19at 08:10; Admin 


Dose 81 MG;  Start 1/24/19 at 09:00


Famotidine (Pepcid) 20 mg Q12 PO  Last administered on 2/4/19at 08:10; Admin 


Dose 20 MG;  Start 1/24/19 at 21:00


Zolpidem Tartrate (Ambien) 5 mg HS  PRN PO INSOMNIA Last administered on 


1/30/19at 23:03; Admin Dose 5 MG;  Start 1/27/19 at 00:00


Amlodipine Besylate (Norvasc) 10 mg DAILY PO  Last administered on 2/1/19at 


08:36; Admin Dose 10 MG;  Start 1/27/19 at 15:00


Guaifenesin/ Dextromethorphan (Robitussin Dm Liquid Cup) 5 ml Q6H  PRN PO COUGH 


Last administered on 1/31/19at 09:04; Admin Dose 5 ML;  Start 1/27/19 at 20:00


Divalproex Sodium (Depakote) 250 mg Q8H PO  Last administered on 2/4/19at 09:13;


Admin Dose 250 MG;  Start 1/28/19 at 17:00


Morphine Sulfate (morphine) 6 mg Q4H  PRN PO SEVERE PAIN LEVEL 7-10 Last 


administered on 2/2/19at 07:18; Admin Dose 6 MG;  Start 1/30/19 at 21:30


Albuterol/ Ipratropium (Duoneb) 3 ml Q3H RESP THERAPY  PRN HHN SHORTNESS OF 


BREATH;  Start 1/31/19 at 20:00


Risperidone (Risperdal) 1 mg BID PO  Last administered on 2/4/19at 08:10; Admin 


Dose 1 MG;  Start 2/1/19 at 21:00


Vancomycin HCl (Vanco Iv Per Pharmacy) VANCOMYCIN PER PHARMACY PER PROTOCOL XX ;


 Start 2/1/19 at 15:00


Meropenem/Sodium Chloride 50 ml @  100 mls/hr Q12 IVPB  Last administered on 


2/4/19at 09:45; Admin Dose 100 MLS/HR;  Start 2/1/19 at 15:00


Methylprednisolone Sodium Succinate (Solu-Medrol) 40 mg Q6 IV  Last administered


on 2/4/19at 05:09; Admin Dose 40 MG;  Start 2/2/19 at 12:00


Propofol 100 ml @  1.875 mls/ hr Q12H IV  Last administered on 2/4/19at 08:09; 


Admin Dose 15 MLS/HR;  Start 2/2/19 at 11:00


Fentanyl 100 ml @  2.5 mls/hr TITRATE IV  Last administered on 2/4/19at 09:17; 


Admin Dose 5 MLS/HR;  Start 2/2/19 at 11:30


Norepinephrine 250 ml @  1.875 mls/ hr TITRATE IV  Last administered on 2/3/19at


02:08; Admin Dose 11.25 MLS/HR;  Start 2/2/19 at 11:00


Alteplase, Recombinant (Cathflo (Activase)) 2 mg MAY REPEAT X1 PRN CATHETER IF 


CATHETER REMAINS OCCULUDED;  Start 2/2/19 at 11:00


Vancomycin HCl 1.5 gm/Sodium Chloride 250 ml @  83.333 mls/ hr Q24H IVPB  Last a


dministered on 2/4/19at 06:18; Admin Dose 83.333 MLS/HR;  Start 2/4/19 at 06:00


Albuterol (Ventolin Hfa) 4 puff Q6H RESP  THERAPY INH  Last administered on 


2/4/19at 07:57; Admin Dose 4 PUFF;  Start 2/3/19 at 14:00


Ipratropium Bromide (Atrovent Hfa) 4 puff Q6H RESP  THERAPY INH  Last 


administered on 2/4/19at 07:57; Admin Dose 4 PUFF;  Start 2/3/19 at 14:00











CARISA IZAGUIRRE                Feb 4, 2019 12:24

## 2019-02-04 NOTE — PN
DATE:  02/04/2019

 

 

SUBJECTIVE:  The patient is stable, critically ill.  Currently, off pressor support, on full ventilat
ory support.  No other events noted.

 

OBJECTIVE:

VITAL SIGNS:  Blood pressure is 100/61, respirations 20, pulse 60, temperature 98.0.

HEENT:  Head is normocephalic.

NECK:  Supple.

HEART:  Regular rate.

LUNGS:  Show diminished breath sounds at the base.

ABDOMEN:  Soft, nontender to palpation without rebound or guarding.

EXTREMITIES:  Negative for clubbing, cyanosis, positive edema.

DERMATOLOGIC:  No rashes.

MUSCULOSKELETAL:  No joint effusions.

NEUROLOGIC:  No change in exam.

 

MEDICATIONS:  Reviewed.

 

LABORATORY DATA:  Chest x-ray has been reviewed.  The patient has bibasilar infiltrate.  Laboratory d
apollo has been reviewed.  The patient's white count is 2.9, hemoglobin 8.8, platelet count 78, sodium 1
48, BUN 38, creatinine 0.77, phosphorus 2.1, magnesium 2.6.

 

ASSESSMENT AND PLAN:

1.  Nonoliguric acute kidney injury with unknown baseline creatinine.  Etiology is secondary to hemod
ynamics.  Renal function is improved.  At this point, continue current treatment plans, supportive ca
re, renally dose all medicines.

2.  Hypernatremia.  We will increase free water flushes at 200 mL q.4h.  Monitor sodium levels closel
y.

3.  Anemia.  Continue to monitor hemoglobin and hematocrit levels.

4.  Mineral bone disorder, monitor calcium and phosphorus levels.

5.  Metabolic acidemia, likely secondary to rapid correction of hypercapnia.  We will continue to mon
itor ABGs.

6.  Ventilator-dependent respiratory failure.  Vent settings and ABG was reviewed.  Continue to monit
or.

7.  History of diastolic heart failure.  Monitor I's and O's closely on diuretic therapy at this time
 in the setting of hypotension.

8.  Shock.  Etiology was presumed to be septic, questionable hypovolemic.  The patient is status post
 IV albumin for volume expansion.  Currently, off pressor support.  Continue antibiotic therapy and m
onitor.

9.  Status post cardiac arrest.

10.  Encephalopathy.

11.  Status post pneumothorax.

 

Please note, I spent over 30 minutes of critical care time with this patient.

 

 

Dictated By: MARYAN RUCKER/PEREZ

DD:    02/04/2019 07:37:24

DT:    02/04/2019 08:17:05

Conf#: [id number]

DID#:  [id number]

CC: KARLO DUDLEY NP; DONALDO CARRILLO MD; MAURI GRIGSBY MD;*EndCC*

## 2019-02-04 NOTE — CONS
Consult Date/Type/Reason


Admit Date/Time


Jan 22, 2019 at 00:03


Initial Consult Date


1/22/19


Type of Consult


Pulmonary


Requesting Provider:  DONALDO CARRILLO MD


Date/Time of Note


DATE: 2/4/19 


TIME: 09:51





Subjective


Patient remains on mechanical ventilation.  Continues mild sedation remains 


mostly somnolent this morning.  Minimal secretions continues to remain 


hemodynamically stable without vasopressors.  According to nursing staff patient


has significant agitation off sedation.





Objective





Vital Signs


  Date      Temp  Pulse  Resp  B/P (MAP)   Pulse Ox  O2          O2 Flow    FiO2


Time                                                 Delivery    Rate


    2/4/19           68    21      110/67        97


     08:30                           (81)


    2/4/19                                                                    30


     08:00


    2/4/19  98.4                                     Mechanical


     08:00                                           Ventilator


    2/2/19                                                             6.0


     08:06








Intake and Output





2/3/19


2/3/19


2/4/19





1414:59


22:59


06:59





IntakeIntake Total


800.000 ml


800.375 ml


747.750 ml





OutputOutput Total


610 ml


750 ml


1445 ml





BalanceBalance


190.000 ml


50.375 ml


-697.250 ml











Exam


GENERAL: Elderly appearing gentleman orally intubated on mechanical ventilation


VITAL SIGNS:  per chart


NECK:  Supple.  No JVD or lymphadenopathy.


CARDIAC EXAM:  S1, S2. No added sounds or murmurs.


CHEST: Diminished air entry bilaterally with few rales


ABDOMEN:  Soft, nontender.  No guarding or rebound.


EXTREMITIES:  No cyanosis, clubbing or edema.


NEUROLOGIC:  Generalized weakness.  No focal deficits.





Vent Setting


Ventilator Support Mode:  AC


Fraction of Inspired Oxygen pe:  30


Positive End Expiratory Pressu:  5.0





Results/Medications


Result Diagram:  


2/4/19 0355                                                                     


          2/4/19 0355





Results 24 hrs


Chest x-ray shows right lower lobe infiltrate





Laboratory Tests


               Test
                                2/4/19
03:55


               White Blood Count                         2.9  #L


               Red Blood Count                           3.16  L


               Hemoglobin                                 8.8  L


               Hematocrit                                28.5  L


               Mean Corpuscular Volume                    90.2


               Mean Corpuscular Hemoglobin               27.8  L


               Mean Corpuscular Hemoglobin
Concent      30.9  L



               Red Cell Distribution Width               16.5  H


               Platelet Count                             78  #L


               Mean Platelet Volume                      11.5  H


               Immature Granulocytes %                  0.700  H


               Neutrophils %                             80.0  H


               Lymphocytes %                              16.2


               Monocytes %                                 3.1


               Eosinophils %                               0.0


               Basophils %                                 0.0


               Nucleated Red Blood Cells %                 0.0


               Immature Granulocytes #                   0.020


               Neutrophils #                               2.3


               Lymphocytes #                              0.5  L


               Monocytes #                                0.1  L


               Eosinophils #                               0.0


               Basophils #                                 0.0


               Nucleated Red Blood Cells #                 0.0


               Sodium Level                               148  H


               Potassium Level                             3.8


               Chloride Level                             112  H


               Carbon Dioxide Level                        37  H


               Anion Gap                                   -1  L


               Blood Urea Nitrogen                         38  H


               Creatinine                                 0.77


               Est Glomerular Filtrat Rate
mL/min   > 60  



               Glucose Level                              125  #


               Calcium Level                               9.0


               Phosphorus Level                           2.1  L


               Magnesium Level                            2.6  H





Medications





Current Medications


Ondansetron HCl (Zofran Inj) 4 mg Q6H  PRN IV NAUSEA AND/OR VOMITING Last adm


inistered on 2/1/19at 18:46; Admin Dose 4 MG;  Start 1/21/19 at 23:30


Acetaminophen (Tylenol Supp) 650 mg Q4H  PRN HI PAIN LEVEL 1-3 OR FEVER;  Start 


1/21/19 at 23:30


Enoxaparin Sodium (Lovenox) 30 mg DAILY SC  Last administered on 2/3/19at 08:57;


Admin Dose 30 MG;  Start 1/22/19 at 09:00


Aspirin (Aspirin) 81 mg DAILY PO  Last administered on 2/4/19at 08:10; Admin 


Dose 81 MG;  Start 1/24/19 at 09:00


Famotidine (Pepcid) 20 mg Q12 PO  Last administered on 2/4/19at 08:10; Admin 


Dose 20 MG;  Start 1/24/19 at 21:00


Zolpidem Tartrate (Ambien) 5 mg HS  PRN PO INSOMNIA Last administered on 


1/30/19at 23:03; Admin Dose 5 MG;  Start 1/27/19 at 00:00


Amlodipine Besylate (Norvasc) 10 mg DAILY PO  Last administered on 2/1/19at 


08:36; Admin Dose 10 MG;  Start 1/27/19 at 15:00


Guaifenesin/ Dextromethorphan (Robitussin Dm Liquid Cup) 5 ml Q6H  PRN PO COUGH 


Last administered on 1/31/19at 09:04; Admin Dose 5 ML;  Start 1/27/19 at 20:00


Divalproex Sodium (Depakote) 250 mg Q8H PO  Last administered on 2/4/19at 09:13;


Admin Dose 250 MG;  Start 1/28/19 at 17:00


Morphine Sulfate (morphine) 6 mg Q4H  PRN PO SEVERE PAIN LEVEL 7-10 Last 


administered on 2/2/19at 07:18; Admin Dose 6 MG;  Start 1/30/19 at 21:30


Albuterol/ Ipratropium (Duoneb) 3 ml Q3H RESP THERAPY  PRN HHN SHORTNESS OF 


BREATH;  Start 1/31/19 at 20:00


Risperidone (Risperdal) 1 mg BID PO  Last administered on 2/4/19at 08:10; Admin 


Dose 1 MG;  Start 2/1/19 at 21:00


Vancomycin HCl (Vanco Iv Per Pharmacy) VANCOMYCIN PER PHARMACY PER PROTOCOL XX ;


 Start 2/1/19 at 15:00


Meropenem/Sodium Chloride 50 ml @  100 mls/hr Q12 IVPB  Last administered on 


2/4/19at 09:45; Admin Dose 100 MLS/HR;  Start 2/1/19 at 15:00


Methylprednisolone Sodium Succinate (Solu-Medrol) 40 mg Q6 IV  Last administered


on 2/4/19at 05:09; Admin Dose 40 MG;  Start 2/2/19 at 12:00


Propofol 100 ml @  1.875 mls/ hr Q12H IV  Last administered on 2/4/19at 08:09; 


Admin Dose 15 MLS/HR;  Start 2/2/19 at 11:00


Fentanyl 100 ml @  2.5 mls/hr TITRATE IV  Last administered on 2/4/19at 09:17; 


Admin Dose 5 MLS/HR;  Start 2/2/19 at 11:30


Norepinephrine 250 ml @  1.875 mls/ hr TITRATE IV  Last administered on 2/3/19at


02:08; Admin Dose 11.25 MLS/HR;  Start 2/2/19 at 11:00


Alteplase, Recombinant (Cathflo (Activase)) 2 mg MAY REPEAT X1 PRN CATHETER IF 


CATHETER REMAINS OCCULUDED;  Start 2/2/19 at 11:00


Vancomycin HCl 1.5 gm/Sodium Chloride 250 ml @  83.333 mls/ hr Q24H IVPB  Last a


dministered on 2/4/19at 06:18; Admin Dose 83.333 MLS/HR;  Start 2/4/19 at 06:00


Albuterol (Ventolin Hfa) 4 puff Q6H RESP  THERAPY INH  Last administered on 


2/4/19at 07:57; Admin Dose 4 PUFF;  Start 2/3/19 at 14:00


Ipratropium Bromide (Atrovent Hfa) 4 puff Q6H RESP  THERAPY INH  Last 


administered on 2/4/19at 07:57; Admin Dose 4 PUFF;  Start 2/3/19 at 14:00





Assessment/Plan


Hospital Course (Demo Recall)


Assessment





1.  Hypoxic respiratory failure on mechanical ventilation, CPAP trial if 


tolerated


2.  Pneumoperitoneum status post chest tube which patient pulled out


3.  Encephalopathy toxic metabolic resolving


4.  Advanced COPD


5.  Hypernatremia likely free water deficit


6.  Thrombocytopenia 





Plan





1.  Continue mechanical ventilation, CPAP trial


2.  Decrease sedation as tolerated


3.  Continue tube feeding


4.  Continue free water





Discussed CODE STATUS with family.  Overall prognosis guarded.


critical care time 40 minutes











MIMI ZAVALA MD, [facility]      Feb 4, 2019 09:54

## 2019-02-05 VITALS — DIASTOLIC BLOOD PRESSURE: 88 MMHG | SYSTOLIC BLOOD PRESSURE: 134 MMHG | RESPIRATION RATE: 20 BRPM | HEART RATE: 93 BPM

## 2019-02-05 VITALS — RESPIRATION RATE: 21 BRPM | HEART RATE: 96 BPM | SYSTOLIC BLOOD PRESSURE: 147 MMHG | DIASTOLIC BLOOD PRESSURE: 86 MMHG

## 2019-02-05 VITALS — RESPIRATION RATE: 22 BRPM | DIASTOLIC BLOOD PRESSURE: 89 MMHG | HEART RATE: 89 BPM | SYSTOLIC BLOOD PRESSURE: 142 MMHG

## 2019-02-05 VITALS — HEART RATE: 79 BPM | RESPIRATION RATE: 22 BRPM | SYSTOLIC BLOOD PRESSURE: 130 MMHG | DIASTOLIC BLOOD PRESSURE: 76 MMHG

## 2019-02-05 VITALS — RESPIRATION RATE: 22 BRPM | SYSTOLIC BLOOD PRESSURE: 144 MMHG | DIASTOLIC BLOOD PRESSURE: 83 MMHG | HEART RATE: 100 BPM

## 2019-02-05 VITALS — SYSTOLIC BLOOD PRESSURE: 122 MMHG | HEART RATE: 93 BPM | RESPIRATION RATE: 18 BRPM | DIASTOLIC BLOOD PRESSURE: 70 MMHG

## 2019-02-05 VITALS — SYSTOLIC BLOOD PRESSURE: 144 MMHG | DIASTOLIC BLOOD PRESSURE: 88 MMHG | HEART RATE: 90 BPM | RESPIRATION RATE: 20 BRPM

## 2019-02-05 VITALS — RESPIRATION RATE: 22 BRPM | SYSTOLIC BLOOD PRESSURE: 142 MMHG | HEART RATE: 100 BPM | DIASTOLIC BLOOD PRESSURE: 82 MMHG

## 2019-02-05 VITALS — SYSTOLIC BLOOD PRESSURE: 150 MMHG | DIASTOLIC BLOOD PRESSURE: 84 MMHG | RESPIRATION RATE: 21 BRPM | HEART RATE: 91 BPM

## 2019-02-05 VITALS — SYSTOLIC BLOOD PRESSURE: 118 MMHG | RESPIRATION RATE: 23 BRPM | HEART RATE: 94 BPM | DIASTOLIC BLOOD PRESSURE: 79 MMHG

## 2019-02-05 VITALS — SYSTOLIC BLOOD PRESSURE: 122 MMHG | DIASTOLIC BLOOD PRESSURE: 92 MMHG | RESPIRATION RATE: 21 BRPM | HEART RATE: 89 BPM

## 2019-02-05 VITALS — HEART RATE: 78 BPM | RESPIRATION RATE: 22 BRPM | DIASTOLIC BLOOD PRESSURE: 78 MMHG | SYSTOLIC BLOOD PRESSURE: 122 MMHG

## 2019-02-05 VITALS — RESPIRATION RATE: 17 BRPM | SYSTOLIC BLOOD PRESSURE: 136 MMHG | HEART RATE: 85 BPM | DIASTOLIC BLOOD PRESSURE: 81 MMHG

## 2019-02-05 VITALS — SYSTOLIC BLOOD PRESSURE: 145 MMHG | RESPIRATION RATE: 22 BRPM | DIASTOLIC BLOOD PRESSURE: 87 MMHG | HEART RATE: 91 BPM

## 2019-02-05 VITALS — SYSTOLIC BLOOD PRESSURE: 127 MMHG | DIASTOLIC BLOOD PRESSURE: 79 MMHG | RESPIRATION RATE: 14 BRPM | HEART RATE: 80 BPM

## 2019-02-05 VITALS — DIASTOLIC BLOOD PRESSURE: 76 MMHG | HEART RATE: 84 BPM | RESPIRATION RATE: 15 BRPM | SYSTOLIC BLOOD PRESSURE: 124 MMHG

## 2019-02-05 VITALS — RESPIRATION RATE: 20 BRPM | SYSTOLIC BLOOD PRESSURE: 123 MMHG | DIASTOLIC BLOOD PRESSURE: 75 MMHG | HEART RATE: 93 BPM

## 2019-02-05 VITALS — HEART RATE: 92 BPM | SYSTOLIC BLOOD PRESSURE: 121 MMHG | RESPIRATION RATE: 17 BRPM | DIASTOLIC BLOOD PRESSURE: 77 MMHG

## 2019-02-05 VITALS — DIASTOLIC BLOOD PRESSURE: 75 MMHG | RESPIRATION RATE: 22 BRPM | HEART RATE: 84 BPM | SYSTOLIC BLOOD PRESSURE: 122 MMHG

## 2019-02-05 VITALS — HEART RATE: 79 BPM | DIASTOLIC BLOOD PRESSURE: 74 MMHG | SYSTOLIC BLOOD PRESSURE: 117 MMHG | RESPIRATION RATE: 20 BRPM

## 2019-02-05 VITALS — HEART RATE: 85 BPM | SYSTOLIC BLOOD PRESSURE: 138 MMHG | RESPIRATION RATE: 19 BRPM | DIASTOLIC BLOOD PRESSURE: 84 MMHG

## 2019-02-05 VITALS — RESPIRATION RATE: 16 BRPM | DIASTOLIC BLOOD PRESSURE: 88 MMHG | HEART RATE: 100 BPM | SYSTOLIC BLOOD PRESSURE: 131 MMHG

## 2019-02-05 VITALS — DIASTOLIC BLOOD PRESSURE: 74 MMHG | HEART RATE: 91 BPM | RESPIRATION RATE: 15 BRPM | SYSTOLIC BLOOD PRESSURE: 124 MMHG

## 2019-02-05 VITALS — HEART RATE: 92 BPM | RESPIRATION RATE: 18 BRPM | SYSTOLIC BLOOD PRESSURE: 133 MMHG | DIASTOLIC BLOOD PRESSURE: 90 MMHG

## 2019-02-05 LAB
ABNORMAL IP MESSAGE: 1
ADD MAN DIFF?: NO
ALLEN TEST: (no result)
ANION GAP: 1 (ref 5–13)
ARTERIAL BASE EXCESS: 3.3 MMOL/L (ref -3–3)
ARTERIAL BLOOD GAS OXYGEN SAT: 93.2 MMHG (ref 95–98)
ARTERIAL COHB: 1.5 % (ref 0–3)
ARTERIAL FRACTION OF OXYHGB: 91.5 % (ref 93–99)
ARTERIAL HCO3: 27.2 MMOL/L (ref 22–26)
ARTERIAL METHB: 0.3 % (ref 0–1.5)
ARTERIAL PCO2: 38.8 MMHG (ref 35–45)
BASOPHIL #: 0 10^3/UL (ref 0–0.1)
BASOPHILS %: 0 % (ref 0–2)
BLOOD UREA NITROGEN: 42 MG/DL (ref 7–20)
CALCIUM: 9.4 MG/DL (ref 8.4–10.2)
CARBON DIOXIDE: 37 MMOL/L (ref 21–31)
CHLORIDE: 113 MMOL/L (ref 97–110)
CREATININE: 0.77 MG/DL (ref 0.61–1.24)
EOSINOPHILS #: 0 10^3/UL (ref 0–0.5)
EOSINOPHILS %: 0 % (ref 0–7)
FIO2: 30 %
GLUCOSE: 130 MG/DL (ref 70–220)
HEMATOCRIT: 31.6 % (ref 42–52)
HEMOGLOBIN: 9.7 G/DL (ref 14–18)
IMMATURE GRANS #M: 0.05 10^3/UL (ref 0–0.03)
IMMATURE GRANS % (M): 1.3 % (ref 0–0.43)
LYMPHOCYTES #: 0.4 10^3/UL (ref 0.8–2.9)
LYMPHOCYTES %: 10.2 % (ref 15–51)
MAGNESIUM: 2.5 MG/DL (ref 1.7–2.5)
MEAN CORPUSCULAR HEMOGLOBIN: 27.9 PG (ref 29–33)
MEAN CORPUSCULAR HGB CONC: 30.7 G/DL (ref 32–37)
MEAN CORPUSCULAR VOLUME: 90.8 FL (ref 82–101)
MEAN PLATELET VOLUME: 11.2 FL (ref 7.4–10.4)
MODE: (no result)
MONOCYTE #: 0.1 10^3/UL (ref 0.3–0.9)
MONOCYTES %: 3.5 % (ref 0–11)
NEUTROPHIL #: 3.2 10^3/UL (ref 1.6–7.5)
NEUTROPHILS %: 85 % (ref 39–77)
NUCLEATED RED BLOOD CELLS #: 0 10^3/UL (ref 0–0)
NUCLEATED RED BLOOD CELLS%: 0 /100WBC (ref 0–0)
O2 A-A PPRESDIFF RESPIRATORY: 97 MMHG (ref 7–24)
PHOSPHORUS: 2.6 MG/DL (ref 2.5–4.9)
PLATELET COUNT: 78 10^3/UL (ref 140–415)
POSITIVE DIFF: (no result)
POTASSIUM: 3.6 MMOL/L (ref 3.5–5.1)
RED BLOOD COUNT: 3.48 10^6/UL (ref 4.7–6.1)
RED CELL DISTRIBUTION WIDTH: 16.5 % (ref 11.5–14.5)
SODIUM: 151 MMOL/L (ref 135–144)
WHITE BLOOD COUNT: 3.7 10^3/UL (ref 4.8–10.8)

## 2019-02-05 RX ADMIN — FAMOTIDINE 1 MG: 20 TABLET ORAL at 08:58

## 2019-02-05 RX ADMIN — METHYLPREDNISOLONE SODIUM SUCCINATE 1 MG: 40 INJECTION, POWDER, FOR SOLUTION INTRAMUSCULAR; INTRAVENOUS at 11:55

## 2019-02-05 RX ADMIN — MEROPENEM SCH MLS/HR: 1 INJECTION, POWDER, FOR SOLUTION INTRAVENOUS at 08:57

## 2019-02-05 RX ADMIN — IPRATROPIUM BROMIDE AND ALBUTEROL SULFATE 1 ML: .5; 3 SOLUTION RESPIRATORY (INHALATION) at 15:13

## 2019-02-05 RX ADMIN — DIVALPROEX SODIUM 1 MG: 250 TABLET, DELAYED RELEASE ORAL at 17:09

## 2019-02-05 RX ADMIN — PROPOFOL SCH MLS/HR: 10 INJECTION, EMULSION INTRAVENOUS at 20:26

## 2019-02-05 RX ADMIN — SODIUM CHLORIDE SCH MLS/HR: 0.9 INJECTION, SOLUTION INTRAVENOUS at 05:48

## 2019-02-05 RX ADMIN — DIVALPROEX SODIUM 1 MG: 250 TABLET, DELAYED RELEASE ORAL at 08:58

## 2019-02-05 RX ADMIN — MEROPENEM SCH MLS/HR: 1 INJECTION, POWDER, FOR SOLUTION INTRAVENOUS at 20:31

## 2019-02-05 RX ADMIN — AMLODIPINE BESYLATE SCH MG: 5 TABLET ORAL at 08:58

## 2019-02-05 RX ADMIN — RISPERIDONE 1 MG: 1 TABLET ORAL at 20:31

## 2019-02-05 RX ADMIN — AMLODIPINE BESYLATE 1 MG: 5 TABLET ORAL at 08:58

## 2019-02-05 RX ADMIN — METHYLPREDNISOLONE SODIUM SUCCINATE 1 MG: 40 INJECTION, POWDER, FOR SOLUTION INTRAMUSCULAR; INTRAVENOUS at 05:48

## 2019-02-05 RX ADMIN — FAMOTIDINE 1 MG: 20 TABLET ORAL at 20:31

## 2019-02-05 RX ADMIN — DIVALPROEX SODIUM SCH MG: 250 TABLET, DELAYED RELEASE ORAL at 08:58

## 2019-02-05 RX ADMIN — MORPHINE SULFATE 1 MG: 10 SOLUTION ORAL at 02:47

## 2019-02-05 RX ADMIN — METHYLPREDNISOLONE SODIUM SUCCINATE 1 MG: 40 INJECTION, POWDER, FOR SOLUTION INTRAMUSCULAR; INTRAVENOUS at 00:33

## 2019-02-05 RX ADMIN — IPRATROPIUM BROMIDE AND ALBUTEROL SULFATE 1 ML: .5; 3 SOLUTION RESPIRATORY (INHALATION) at 08:32

## 2019-02-05 RX ADMIN — ASPIRIN 81 MG SCH MG: 81 TABLET ORAL at 08:58

## 2019-02-05 RX ADMIN — RISPERIDONE 1 MG: 1 TABLET ORAL at 08:59

## 2019-02-05 RX ADMIN — ZOLPIDEM TARTRATE PRN MG: 5 TABLET, FILM COATED ORAL at 20:31

## 2019-02-05 RX ADMIN — DIVALPROEX SODIUM SCH MG: 250 TABLET, DELAYED RELEASE ORAL at 00:33

## 2019-02-05 RX ADMIN — PROPOFOL 1 MLS/HR: 10 INJECTION, EMULSION INTRAVENOUS at 20:26

## 2019-02-05 RX ADMIN — IPRATROPIUM BROMIDE AND ALBUTEROL SULFATE 1 ML: .5; 3 SOLUTION RESPIRATORY (INHALATION) at 19:12

## 2019-02-05 RX ADMIN — CASTOR OIL AND BALSAM, PERU 1 APPLIC: 788; 87 OINTMENT TOPICAL at 08:59

## 2019-02-05 RX ADMIN — MORPHINE SULFATE PRN MG: 10 SOLUTION ORAL at 02:47

## 2019-02-05 RX ADMIN — IPRATROPIUM BROMIDE AND ALBUTEROL SULFATE SCH ML: .5; 3 SOLUTION RESPIRATORY (INHALATION) at 15:13

## 2019-02-05 RX ADMIN — METHYLPREDNISOLONE SODIUM SUCCINATE 1 MG: 40 INJECTION, POWDER, FOR SOLUTION INTRAMUSCULAR; INTRAVENOUS at 17:13

## 2019-02-05 RX ADMIN — DIVALPROEX SODIUM 1 MG: 250 TABLET, DELAYED RELEASE ORAL at 00:33

## 2019-02-05 RX ADMIN — MEROPENEM 1 MLS/HR: 1 INJECTION, POWDER, FOR SOLUTION INTRAVENOUS at 08:57

## 2019-02-05 RX ADMIN — IPRATROPIUM BROMIDE AND ALBUTEROL SULFATE 1 ML: .5; 3 SOLUTION RESPIRATORY (INHALATION) at 01:13

## 2019-02-05 RX ADMIN — FAMOTIDINE SCH MG: 20 TABLET ORAL at 20:31

## 2019-02-05 RX ADMIN — ZOLPIDEM TARTRATE 1 MG: 5 TABLET, FILM COATED ORAL at 20:31

## 2019-02-05 RX ADMIN — DIVALPROEX SODIUM SCH MG: 250 TABLET, DELAYED RELEASE ORAL at 17:09

## 2019-02-05 RX ADMIN — MEROPENEM 1 MLS/HR: 1 INJECTION, POWDER, FOR SOLUTION INTRAVENOUS at 20:31

## 2019-02-05 RX ADMIN — IPRATROPIUM BROMIDE AND ALBUTEROL SULFATE SCH ML: .5; 3 SOLUTION RESPIRATORY (INHALATION) at 01:13

## 2019-02-05 RX ADMIN — IPRATROPIUM BROMIDE AND ALBUTEROL SULFATE SCH ML: .5; 3 SOLUTION RESPIRATORY (INHALATION) at 08:32

## 2019-02-05 RX ADMIN — IPRATROPIUM BROMIDE AND ALBUTEROL SULFATE SCH ML: .5; 3 SOLUTION RESPIRATORY (INHALATION) at 19:12

## 2019-02-05 RX ADMIN — FAMOTIDINE SCH MG: 20 TABLET ORAL at 08:58

## 2019-02-05 RX ADMIN — VANCOMYCIN HYDROCHLORIDE 1 MLS/HR: 500 INJECTION, POWDER, LYOPHILIZED, FOR SOLUTION INTRAVENOUS at 05:48

## 2019-02-05 RX ADMIN — PROPOFOL SCH MLS/HR: 10 INJECTION, EMULSION INTRAVENOUS at 11:00

## 2019-02-05 RX ADMIN — PROPOFOL 1 MLS/HR: 10 INJECTION, EMULSION INTRAVENOUS at 11:00

## 2019-02-05 RX ADMIN — ASCORBIC ACID, VITAMIN A PALMITATE, CHOLECALCIFEROL, THIAMINE HYDROCHLORIDE, RIBOFLAVIN-5 PHOSPHATE SODIUM, PYRIDOXINE HYDROCHLORIDE, NIACINAMIDE, DEXPANTHENOL, ALPHA-TOCOPHEROL ACETATE, VITAMIN K1, FOLIC ACID, BIOTIN, CYANOCOBALAMIN 1 MLS/HR: 200; 3300; 200; 6; 3.6; 6; 40; 15; 10; 150; 600; 60; 5 INJECTION, SOLUTION INTRAVENOUS at 07:00

## 2019-02-05 RX ADMIN — ASPIRIN 81 MG CHEWABLE TABLET 1 MG: 81 TABLET CHEWABLE at 08:58

## 2019-02-05 NOTE — CONS
Consult Date/Type/Reason


Admit Date/Time


Jan 22, 2019 at 00:03


Initial Consult Date


1/22/19


Requesting Provider:  DONALDO CARRILLO MD


Date/Time of Note


DATE: 2/5/19 


TIME: 08:41





Subjective


No acuteevents - pt extubated - + SOB, but no CP - con't to keep euvolemic. 





ROS: No fever, no chills, no nausea, no vomiting, no diarrhea/constipation


        No recent weight changes


        No chest pain, no PND, no orthopnea + SOB 


        No dizziness, blurred vision


        No thirst, no heat or cold intolerance





Objective


Vitals





Vital Signs


  Date      Temp  Pulse  Resp  B/P (MAP)   Pulse Ox  O2          O2 Flow    FiO2


Time                                                 Delivery    Rate


    2/5/19           99    16                    98  Nasal             2.0


     08:32                                           Cannula


    2/5/19                         138/84


     06:00                          (102)


    2/5/19  97.4


     04:00


    2/4/19                                                                    35


     13:01








Intake and Output





2/4/19 2/4/19 2/5/19





1515:00


23:00


07:00





IntakeIntake Total


452.500 ml


650 ml


250 ml





OutputOutput Total


700 ml


2470 ml


1430 ml





BalanceBalance


-247.500 ml


-1820 ml


-1180 ml











Exam


General: WN/WD/NAD, AOx 2-3 psych 


HEENT: Unicetric/atraumatic/EOMI (follow commands) - FM on 


NECK: JVD elevated, no thyromegaly


Lymph: no lymphadenopathy


HEART: regular with no S3, II/VI systolic murmur at apex


LUNGS: Coarse sounds, wheezing 


ABD: soft, NT, ND, +BS


: Intact


Neuro: non focal


SKIN: chronic changes


EXT: no edema





Results/Medications


Result Diagram:  


2/5/19 0400                                                                     


          2/5/19 0400





Results 24 hrs





Laboratory Tests


Test
                            2/4/19
12:30  2/5/19
04:00         2/5/19
07:00


Blood Gas Specimen        Blood arterial
      
             Blood arterial



Source



Arterial Blood Date       2/4/2019
1:08:19 PM  
             2/5/2019
7:42:36 AM


Drawn



Arterial Blood pH                    7.435  
  
                       7.464  H



(Temp
corrected)


Arterial Blood pCO2                  53.3  H
  
                         38.8  



(Temp
correct)


Arterial Blood pO2                   75.5  L
  
                        71.3  L



(Temp
corrected)


Arterial Blood HCO3                   35.0  H                            27.2  H


Arterial Blood Base                    9.3  H                             3.3  H


Excess


Arterial Blood                       94.7  L
  
                        93.2  L



Oxygen
Saturation


Antoine Test                ACCEPTAB                           ACCEPTAB


Arterial Blood Gas        Right Radial  
      
             Right Radial  



Puncture
Site


Arterial                               0.5  
  
                          1.5  



Blood
Carboxyhemoglobin


Arterial Blood                            0                                0.3


Methemoglobin


Blood Gas A-a O2                     112.1  H                            97.0  H


Differential


Oxyhemoglobin Percent                  94.2                              91.5  L


Blood Gas Temperature                  37.0                               37.0


Blood Gas Modality        VENT - CPAP                        NASAL CANNULA


FiO2                                   35.0                               30.0


Blood Gas Low PEEP                      5.0


Setting


Blood Gas Pressure                       10


Support


Blood Gas Notified Whom   TM                                 TM


Blood Gas Notified Time
  2/4/2019
1:29:03 PM  
             2/5/2019
8:13:17 AM


White Blood Count                                   3.7  #L


Red Blood Count                                     3.48  L


Hemoglobin                                           9.7  L


Hematocrit                                          31.6  L


Mean Corpuscular Volume                              90.8


Mean Corpuscular                                    27.9  L


Hemoglobin


Mean Corpuscular          
                        30.7  L
  



Hemoglobin
Concent


Red Cell Distribution                               16.5  H


Width


Platelet Count                                        78  L


Mean Platelet Volume                                11.2  H


Immature Granulocytes %                            1.300  H


Neutrophils %                                       85.0  H


Lymphocytes %                                       10.2  L


Monocytes %                                           3.5


Eosinophils %                                         0.0


Basophils %                                           0.0


Nucleated Red Blood                                   0.0


Cells %


Immature Granulocytes #                            0.050  H


Neutrophils #                                         3.2


Lymphocytes #                                        0.4  L


Monocytes #                                          0.1  L


Eosinophils #                                         0.0


Basophils #                                           0.0


Nucleated Red Blood                                   0.0


Cells #


Sodium Level                                         151  H


Potassium Level                                       3.6


Chloride Level                                       113  H


Carbon Dioxide Level                                  37  H


Anion Gap                                              1  L


Blood Urea Nitrogen                                   42  H


Creatinine                                           0.77


Est Glomerular Filtrat    
                    > 60  
       



Rate
mL/min


Glucose Level                                         130


Calcium Level                                         9.4


Phosphorus Level                                      2.6


Magnesium Level                                       2.5





Home Meds


Reported Medications


Docusate Sodium* (Colace*) 100 Mg Capsule, 100 MG PO Q24H PRN for CONSTIPATION, 


#30 CAP


   1/21/19


Polyethylene Glycol* (Miralax*) 17 Gm Powd.pack, 17 GM PO DAILY PRN for AS 


NEEDED, #30 PACKET


   1/21/19


Acetaminophen* (Acetaminophen*) 325 Mg Tablet, 650 MG PO Q4H PRN for PAIN 1-


10/10, #30 TAB


   AND FEVER>101F


   1/21/19


Sennosides* (Senna Lax*) 8.6 Mg Tablet, 1 TAB PO AS NEEDED, TAB


   1/21/19


Mv,Minerals/Fa/Lycopene/Ginkgo (ONE DAILY MEN'S 50+ TABLET) 1 Each Tablet, 1 


EACH PO DAILY, TAB


   1/21/19


Divalproex Sodium* (Depakote*) 125 Mg Tablet.dr, 250 MG PO Q8H, #90 TAB


   1/21/19


Quetiapine Fumarate* (Seroquel*) 50 Mg Tablet, 50 MG PO Q8H, TAB


   1/21/19


Ipratropium-Albuterol (Ipratropium-Albuterol) 0.5-3 Mg/3 Ml Ampul.neb, 3 ML 


INHALATION Q4H PRN for WHEEZING AND SOB, #30 VIAL


   1/21/19


Budesonide-Formoterol Fumarate* (Symbicort*) 160-4.5 Hfa.aer.ad, 2 PUFF 


INHALATION BID, #1 EACH


   1/21/19


Medications





Current Medications


Ondansetron HCl (Zofran Inj) 4 mg Q6H  PRN IV NAUSEA AND/OR VOMITING Last 


administered on 2/1/19at 18:46; Admin Dose 4 MG;  Start 1/21/19 at 23:30


Acetaminophen (Tylenol Supp) 650 mg Q4H  PRN [place] PAIN LEVEL 1-3 OR FEVER;  Start 


1/21/19 at 23:30


Aspirin (Aspirin) 81 mg DAILY PO  Last administered on 2/4/19at 08:10; Admin 


Dose 81 MG;  Start 1/24/19 at 09:00


Famotidine (Pepcid) 20 mg Q12 PO  Last administered on 2/4/19at 20:38; Admin 


Dose 20 MG;  Start 1/24/19 at 21:00


Zolpidem Tartrate (Ambien) 5 mg HS  PRN PO INSOMNIA Last administered on 2/4/1


9at 20:38; Admin Dose 5 MG;  Start 1/27/19 at 00:00


Guaifenesin/ Dextromethorphan (Robitussin Dm Liquid Cup) 5 ml Q6H  PRN PO COUGH 


Last administered on 1/31/19at 09:04; Admin Dose 5 ML;  Start 1/27/19 at 20:00


Divalproex Sodium (Depakote) 250 mg Q8H PO  Last administered on 2/5/19at 00:33;


Admin Dose 250 MG;  Start 1/28/19 at 17:00


Morphine Sulfate (morphine) 6 mg Q4H  PRN PO SEVERE PAIN LEVEL 7-10 Last 


administered on 2/5/19at 02:47; Admin Dose 6 MG;  Start 1/30/19 at 21:30


Albuterol/ Ipratropium (Duoneb) 3 ml Q3H RESP THERAPY  PRN HHN SHORTNESS OF 


BREATH;  Start 1/31/19 at 20:00


Risperidone (Risperdal) 1 mg BID PO  Last administered on 2/4/19at 20:38; Admin 


Dose 1 MG;  Start 2/1/19 at 21:00


Vancomycin HCl (Vanco Iv Per Pharmacy) VANCOMYCIN PER PHARMACY PER PROTOCOL XX ;


 Start 2/1/19 at 15:00


Meropenem/Sodium Chloride 50 ml @  100 mls/hr Q12 IVPB  Last administered on 


2/4/19at 20:38; Admin Dose 100 MLS/HR;  Start 2/1/19 at 15:00


Methylprednisolone Sodium Succinate (Solu-Medrol) 40 mg Q6 IV  Last administered


on 2/5/19at 05:48; Admin Dose 40 MG;  Start 2/2/19 at 12:00


Propofol 100 ml @  1.875 mls/ hr Q12H IV  Last administered on 2/4/19at 08:09; 


Admin Dose 15 MLS/HR;  Start 2/2/19 at 11:00


Fentanyl 100 ml @  2.5 mls/hr TITRATE IV  Last administered on 2/4/19at 09:17; 


Admin Dose 5 MLS/HR;  Start 2/2/19 at 11:30


Norepinephrine 250 ml @  1.875 mls/ hr TITRATE IV  Last administered on 2/3/19at


02:08; Admin Dose 11.25 MLS/HR;  Start 2/2/19 at 11:00


Alteplase, Recombinant (Cathflo (Activase)) 2 mg MAY REPEAT X1 PRN CATHETER IF 


CATHETER REMAINS OCCULUDED;  Start 2/2/19 at 11:00


Vancomycin HCl 1.5 gm/Sodium Chloride 250 ml @  83.333 mls/ hr Q24H IVPB  Last 


administered on 2/5/19at 05:48; Admin Dose 83.333 MLS/HR;  Start 2/4/19 at 06:00


Amlodipine Besylate (Norvasc) 5 mg DAILY PO ;  Start 2/5/19 at 09:00


Albuterol/ Ipratropium (Duoneb) 3 ml Q6H RESP  THERAPY HHN  Last administered on


2/5/19at 08:32; Admin Dose 3 ML;  Start 2/4/19 at 20:00


Dextrose 1,000 ml @  50 mls/hr Q20H IV  Last administered on 2/5/19at 07:00; 


Admin Dose 50 MLS/HR;  Start 2/5/19 at 07:00





Assessment/Plan


Hospital Course (Demo Recall)


1.Hypotension-off levo - better now - BP better, will allow in 140s for now, 


2.Resp failure s/p intubation - now self extubated - better overall.  Con't COPD


rx. Con't to monitor. 


3.h/o cardiac arrest - etiology unclear - con't supportive RX now. Stable now - 


of tele. No cardiac intervention currently planned. 


4.Pneumoperitoneum - self d/c chest tube  - stable SOB now. 


5.Positive troponin-now trended neg - will monitor clinically for now. No 


intervention planned. 


6.renal failure - good urine output. 


7.Leukocytosis - on anti-Bx, con't med rx.  On anti-Bx. 


8. anemia - H/H stable - no bleeding noted. 


9, Thrombocytopenia - no active bleeds noted.











HARDY GUZMÁN MD                  Feb 5, 2019 08:44

## 2019-02-05 NOTE — PN
DATE:  02/05/2019

 

 

SUBJECTIVE:  The patient was extubated yesterday.  This morning, the patient is alert, requesting for
 water.  No other events noted.

 

OBJECTIVE:

VITAL SIGNS:  Blood pressure is 138/84, respirations 18, pulse 85, temperature 97.4.

HEENT:  Head is normocephalic.

NECK:  Supple.

HEART:  Regular rate.

LUNGS:  Show diminished breath sounds at the base.

ABDOMEN:  Soft, nontender to palpation without rebound or guarding.

EXTREMITIES:  Negative for clubbing, cyanosis.  Trace edema.

DERMATOLOGIC:  No rashes.

MUSCULOSKELETAL:  No joint effusions.

NEUROLOGIC:  No change in exam.

 

MEDICATIONS:  Reviewed.

 

LABORATORY DATA:  Shows a white count 3.7, hemoglobin 9.7, platelet count 78.  Sodium 151, BUN 42, cr
eatinine 0.77, bicarbonate 37.

 

IMAGING STUDY:  The patient's chest x-ray from 02/04/2019 was reviewed, shows right basilar infiltrat
e.

 

ASSESSMENT AND PLAN:

1.  Nonoliguric acute kidney injury with unknown baseline creatinine.  Etiology is secondary to hemod
ynamics.  Renal function is improved.  Continue current treatment plan, supportive care, renally dose
 all medicines.

2.  Hypernatremia.  The patient has a free water deficit of approximately 3.5 liters.  We will contin
ue to encourage free water intake.  Start D5 water at 50 mL an hour and monitor sodium levels closely
.

3.  Anemia.  Continue to monitor hemoglobin and hematocrit levels.

4.  Mineral bone disorder, monitor calcium and phosphorus levels.

5.  Metabolic alkalosis with respiratory compensation.  Plan is to continue to monitor bicarbonate le
vels.  May consider intermittent Diamox.

6.  Respiratory failure, the patient is status post extubation.  Continue to monitor.  Currently stab
le on nasal cannula.

7.  History of diastolic heart failure.  Monitor I's and O's closely.  May give intermittent diuretic
 therapy with Diamox in the setting of alkalemia.

8.  Status post shock, etiology is felt to be possibly septic, hypovolemic.  The patient is currently
 on pressor support.  Continue to monitor.

9.  Status post cardiac arrest.

10.  Status post pneumothorax.

11.  History of encephalopathy.

 

 

Dictated By: MARYAN RUCKER/PEREZ

DD:    02/05/2019 07:46:22

DT:    02/05/2019 08:09:50

Conf#: [id number]

DID#:  [id number]

CC: KARLO DUDLEY NP; DONALDO CARRILLO MD; MAURI GRIGSBY MD;*EndCC*

## 2019-02-05 NOTE — PN
Date/Time of Note


Date/Time of Note


DATE: 2/5/19 


TIME: 12:47





Assessment/Plan


VTE Prophylaxis


Risk score (from [facility])>0 risk:  12


SCD applied (from [facility]):  Yes


Pharmacological prophylaxis:  NA/contraindicated


Pharm contraindication:  thrombocytopenia





Lines/Catheters


IV Catheter Type (from [facility]):  Central Line


Central line still needed:  Yes


Urinary Cath still in place:  Yes


Reason Cath still needed:  urinary retention





Assessment/Plan


Hospital Course


Patient was extubated yesterday in the afternoon patient is currently on supp


lemental oxygen, awake alert.


Assessment/Plan


-Hypoxic respiratory failure requiring mechanical ventilation, extubated. Dr. English is following in pulmonology consultation.


-Sepsis with shock, resolving.  Dr. Dreyer is following in infection disease 


consultation.


-Status post cardiac arrest.   Dr. Jon is following in cardiology 


consultation.


-Left-sided pneumothorax, status post left side chest tube placement, s/p self 


d/c CT.


-S/p pneumoperitoneum most likely due to cardiac arrest.  Dr. Lai is following


in general surgery consultation. 


-Left axillary and brachial DVT, was on Lovenox which is currently held due to 


thrombocytopenia


-Left lower lobe pneumonia


-Severe emphysema


-NATALIE with possible chronic kidney disease. Dr Hanson is following in nephrology


consultation.


-Schizoaffective disorder depressed type.  Psychiatric consult is appreciated, 


continue Risperdal Depakote





Critical care time spent more than 30 minutes.


Further recommendations based on clinical course.  Plan of care discussed with 


Dr. Miller.


Result Diagram:  


2/5/19 0400                                                                     


          2/5/19 0400





Results 24hrs





Laboratory Tests


     Test
                                2/5/19
04:00         2/5/19
07:00


     White Blood Count                         3.7  #L


     Red Blood Count                           3.48  L


     Hemoglobin                                 9.7  L


     Hematocrit                                31.6  L


     Mean Corpuscular Volume                    90.8


     Mean Corpuscular Hemoglobin               27.9  L


     Mean Corpuscular Hemoglobin
Concent      30.7  L
  



     Red Cell Distribution Width               16.5  H


     Platelet Count                              78  L


     Mean Platelet Volume                      11.2  H


     Immature Granulocytes %                  1.300  H


     Neutrophils %                             85.0  H


     Lymphocytes %                             10.2  L


     Monocytes %                                 3.5


     Eosinophils %                               0.0


     Basophils %                                 0.0


     Nucleated Red Blood Cells %                 0.0


     Immature Granulocytes #                  0.050  H


     Neutrophils #                               3.2


     Lymphocytes #                              0.4  L


     Monocytes #                                0.1  L


     Eosinophils #                               0.0


     Basophils #                                 0.0


     Nucleated Red Blood Cells #                 0.0


     Sodium Level                               151  H


     Potassium Level                             3.6


     Chloride Level                             113  H


     Carbon Dioxide Level                        37  H


     Anion Gap                                    1  L


     Blood Urea Nitrogen                         42  H


     Creatinine                                 0.77


     Est Glomerular Filtrat Rate
mL/min   > 60  
       



     Glucose Level                               130


     Calcium Level                               9.4


     Phosphorus Level                            2.6


     Magnesium Level                             2.5


     Blood Gas Specimen Source
           
             Blood arterial



     Arterial Blood Date Drawn
           
             2/5/2019
7:42:36 AM


     Arterial Blood pH (Temp
corrected)   
                       7.464  H



     Arterial Blood pCO2 (Temp
correct)   
                         38.8  



     Arterial Blood pO2 (Temp
corrected)  
                        71.3  L



     Arterial Blood HCO3                                            27.2  H


     Arterial Blood Base Excess                                      3.3  H


     Arterial Blood Oxygen
Saturation     
                        93.2  L



     Antoine Test                                         ACCEPTAB


     Arterial Blood Gas Puncture
Site     
             Right Radial  



     Arterial Blood
Carboxyhemoglobin     
                          1.5  



     Arterial Blood Methemoglobin                                     0.3


     Blood Gas A-a O2 Differential                                  97.0  H


     Oxyhemoglobin Percent                                          91.5  L


     Blood Gas Temperature                                           37.0


     Blood Gas Modality                                 NASAL CANNULA


     FiO2                                                            30.0


     Blood Gas Notified Whom                            TM


     Blood Gas Notified Time
             
             2/5/2019
8:13:17 AM








Exam/Review of Systems


Exam


Vitals





Vital Signs


  Date      Temp  Pulse  Resp  B/P (MAP)   Pulse Ox  O2          O2 Flow    FiO2


Time                                                 Delivery    Rate


    2/5/19           91


     12:00


    2/5/19                 21      150/84        98  Nasal


     12:00                          (106)            Cannula


    2/5/19                                                             2.0


     08:32


    2/5/19  98.0


     08:00


    2/4/19                                                                    35


     13:01








Intake and Output





2/4/19 2/4/19 2/5/19





1515:00


23:00


07:00





IntakeIntake Total


452.500 ml


650 ml


250 ml





OutputOutput Total


700 ml


2470 ml


1430 ml





BalanceBalance


-247.500 ml


-1820 ml


-1180 ml











Constitutional:  alert, oriented


Neck:  supple


Respiratory:  diminished breath sounds, wheezing


Cardiovascular:  regular rate and rhythm


Gastrointestinal:  soft, non-tender


Musculoskeletal:  nl extremities to inspection


Extremities:  normal pulses


Neurological:  nl mental status





Results


Results 24hrs





Laboratory Tests


     Test
                                2/5/19
04:00         2/5/19
07:00


     White Blood Count                         3.7  #L


     Red Blood Count                           3.48  L


     Hemoglobin                                 9.7  L


     Hematocrit                                31.6  L


     Mean Corpuscular Volume                    90.8


     Mean Corpuscular Hemoglobin               27.9  L


     Mean Corpuscular Hemoglobin
Concent      30.7  L
  



     Red Cell Distribution Width               16.5  H


     Platelet Count                              78  L


     Mean Platelet Volume                      11.2  H


     Immature Granulocytes %                  1.300  H


     Neutrophils %                             85.0  H


     Lymphocytes %                             10.2  L


     Monocytes %                                 3.5


     Eosinophils %                               0.0


     Basophils %                                 0.0


     Nucleated Red Blood Cells %                 0.0


     Immature Granulocytes #                  0.050  H


     Neutrophils #                               3.2


     Lymphocytes #                              0.4  L


     Monocytes #                                0.1  L


     Eosinophils #                               0.0


     Basophils #                                 0.0


     Nucleated Red Blood Cells #                 0.0


     Sodium Level                               151  H


     Potassium Level                             3.6


     Chloride Level                             113  H


     Carbon Dioxide Level                        37  H


     Anion Gap                                    1  L


     Blood Urea Nitrogen                         42  H


     Creatinine                                 0.77


     Est Glomerular Filtrat Rate
mL/min   > 60  
       



     Glucose Level                               130


     Calcium Level                               9.4


     Phosphorus Level                            2.6


     Magnesium Level                             2.5


     Blood Gas Specimen Source
           
             Blood arterial



     Arterial Blood Date Drawn
           
             2/5/2019
7:42:36 AM


     Arterial Blood pH (Temp
corrected)   
                       7.464  H



     Arterial Blood pCO2 (Temp
correct)   
                         38.8  



     Arterial Blood pO2 (Temp
corrected)  
                        71.3  L



     Arterial Blood HCO3                                            27.2  H


     Arterial Blood Base Excess                                      3.3  H


     Arterial Blood Oxygen
Saturation     
                        93.2  L



     Antoine Test                                         ACCEPTAB


     Arterial Blood Gas Puncture
Site     
             Right Radial  



     Arterial Blood
Carboxyhemoglobin     
                          1.5  



     Arterial Blood Methemoglobin                                     0.3


     Blood Gas A-a O2 Differential                                  97.0  H


     Oxyhemoglobin Percent                                          91.5  L


     Blood Gas Temperature                                           37.0


     Blood Gas Modality                                 NASAL CANNULA


     FiO2                                                            30.0


     Blood Gas Notified Whom                            TM


     Blood Gas Notified Time
             
             2/5/2019
8:13:17 AM








Medications


Medication





Current Medications


Ondansetron HCl (Zofran Inj) 4 mg Q6H  PRN IV NAUSEA AND/OR VOMITING Last 


administered on 2/1/19at 18:46; Admin Dose 4 MG;  Start 1/21/19 at 23:30


Acetaminophen (Tylenol Supp) 650 mg Q4H  PRN SD PAIN LEVEL 1-3 OR FEVER;  Start 


1/21/19 at 23:30


Aspirin (Aspirin) 81 mg DAILY PO  Last administered on 2/5/19at 08:58; Admin Dos


e 81 MG;  Start 1/24/19 at 09:00


Famotidine (Pepcid) 20 mg Q12 PO  Last administered on 2/5/19at 08:58; Admin 


Dose 20 MG;  Start 1/24/19 at 21:00


Zolpidem Tartrate (Ambien) 5 mg HS  PRN PO INSOMNIA Last administered on 


2/4/19at 20:38; Admin Dose 5 MG;  Start 1/27/19 at 00:00


Guaifenesin/ Dextromethorphan (Robitussin Dm Liquid Cup) 5 ml Q6H  PRN PO COUGH 


Last administered on 1/31/19at 09:04; Admin Dose 5 ML;  Start 1/27/19 at 20:00


Divalproex Sodium (Depakote) 250 mg Q8H PO  Last administered on 2/5/19at 08:58;


Admin Dose 250 MG;  Start 1/28/19 at 17:00


Morphine Sulfate (morphine) 6 mg Q4H  PRN PO SEVERE PAIN LEVEL 7-10 Last 


administered on 2/5/19at 02:47; Admin Dose 6 MG;  Start 1/30/19 at 21:30


Albuterol/ Ipratropium (Duoneb) 3 ml Q3H RESP THERAPY  PRN HHN SHORTNESS OF 


BREATH;  Start 1/31/19 at 20:00


Risperidone (Risperdal) 1 mg BID PO  Last administered on 2/5/19at 08:59; Admin 


Dose 1 MG;  Start 2/1/19 at 21:00


Vancomycin HCl (Vanco Iv Per Pharmacy) VANCOMYCIN PER PHARMACY PER PROTOCOL XX ;


 Start 2/1/19 at 15:00


Meropenem/Sodium Chloride 50 ml @  100 mls/hr Q12 IVPB  Last administered on 


2/5/19at 08:57; Admin Dose 100 MLS/HR;  Start 2/1/19 at 15:00


Methylprednisolone Sodium Succinate (Solu-Medrol) 40 mg Q6 IV  Last administered


on 2/5/19at 11:55; Admin Dose 40 MG;  Start 2/2/19 at 12:00


Propofol 100 ml @  1.875 mls/ hr Q12H IV  Last administered on 2/4/19at 08:09; 


Admin Dose 15 MLS/HR;  Start 2/2/19 at 11:00


Fentanyl 100 ml @  2.5 mls/hr TITRATE IV  Last administered on 2/4/19at 09:17; 


Admin Dose 5 MLS/HR;  Start 2/2/19 at 11:30


Norepinephrine 250 ml @  1.875 mls/ hr TITRATE IV  Last administered on 2/3/19at


02:08; Admin Dose 11.25 MLS/HR;  Start 2/2/19 at 11:00


Alteplase, Recombinant (Cathflo (Activase)) 2 mg MAY REPEAT X1 PRN CATHETER IF 


CATHETER REMAINS OCCULUDED;  Start 2/2/19 at 11:00


Amlodipine Besylate (Norvasc) 5 mg DAILY PO  Last administered on 2/5/19at 0


8:58; Admin Dose 5 MG;  Start 2/5/19 at 09:00


Albuterol/ Ipratropium (Duoneb) 3 ml Q6H RESP  THERAPY HHN  Last administered on


2/5/19at 08:32; Admin Dose 3 ML;  Start 2/4/19 at 20:00


Dextrose 1,000 ml @  50 mls/hr Q20H IV  Last administered on 2/5/19at 07:00; 


Admin Dose 50 MLS/HR;  Start 2/5/19 at 07:00


Vancomycin HCl 1.5 gm/Dextrose 250 ml @  83.333 mls/ hr Q24H IVPB ;  Start 


2/6/19 at 06:00











EVARISTO BELTRAN              Feb 5, 2019 12:48

## 2019-02-05 NOTE — CONS
Assessment/Plan


Assessment/Plan


Hospital Course (Demo Recall)


S/p extubated, alert, follows commands, looks comfortable, no fevers





Antimicrobials: Vancomycin, meropenem





Indwelling: Right IJ triple-lumen catheter, Blake





Physical examination: Well-developed chronically ill-appearing cachectic elderly


man.  Head atraumatic normocephalic sclera nonicteric, neck is supple chest rise


symmetrical breath sounds diminished bases.  Heart: S1-S2.  Abdomen soft bowel 


sounds present.  Extremities without edema/cyanosis





Assessment:


1.  Acute hypoxemic respiratory failure, s/p extubated 


2.  Pneumonia, possibly aspirated 


4.  Pneumoperitoneum of unclear etiology, no perforation per surgical note, 


status post chest tube


5.  Dementia


5.  Anemia


6.  History of non-ST elevation MI


7.  Status post cardiac arrest


8.  RIJ TLC





Plan:Stable post extubation,  continue antibiotics, aspiration precautions, f/u 


cxr, pulmonary rec-s





Consultation Date/Type/Reason


Admit Date/Time


Jan 22, 2019 at 00:03


Initial Consult Date


1/22/19


Type of Consult


ID


Requesting Provider:  DONALDO CARRILLO MD


Date/Time of Note


DATE: 2/5/19 


TIME: 14:03





Exam/Review of Systems


Exam


Vitals





Vital Signs


  Date      Temp  Pulse  Resp  B/P (MAP)   Pulse Ox  O2          O2 Flow    FiO2


Time                                                 Delivery    Rate


    2/5/19          100    22      142/82        99


     13:00                          (102)


    2/5/19                                           Nasal


     12:00                                           Cannula


    2/5/19                                                             2.0


     08:32


    2/5/19  98.0


     08:00


    2/4/19                                                                    35


     13:01








Intake and Output





2/4/19 2/4/19 2/5/19





1515:00


23:00


07:00





IntakeIntake Total


452.500 ml


650 ml


250 ml





OutputOutput Total


700 ml


2470 ml


1680 ml





BalanceBalance


-247.500 ml


-1820 ml


-1430 ml














Results


Result Diagram:  


2/5/19 0400                                                                     


          2/5/19 0400





Results 24hrs





Laboratory Tests


     Test
                                2/5/19
04:00         2/5/19
07:00


     White Blood Count                         3.7  #L


     Red Blood Count                           3.48  L


     Hemoglobin                                 9.7  L


     Hematocrit                                31.6  L


     Mean Corpuscular Volume                    90.8


     Mean Corpuscular Hemoglobin               27.9  L


     Mean Corpuscular Hemoglobin
Concent      30.7  L
  



     Red Cell Distribution Width               16.5  H


     Platelet Count                              78  L


     Mean Platelet Volume                      11.2  H


     Immature Granulocytes %                  1.300  H


     Neutrophils %                             85.0  H


     Lymphocytes %                             10.2  L


     Monocytes %                                 3.5


     Eosinophils %                               0.0


     Basophils %                                 0.0


     Nucleated Red Blood Cells %                 0.0


     Immature Granulocytes #                  0.050  H


     Neutrophils #                               3.2


     Lymphocytes #                              0.4  L


     Monocytes #                                0.1  L


     Eosinophils #                               0.0


     Basophils #                                 0.0


     Nucleated Red Blood Cells #                 0.0


     Sodium Level                               151  H


     Potassium Level                             3.6


     Chloride Level                             113  H


     Carbon Dioxide Level                        37  H


     Anion Gap                                    1  L


     Blood Urea Nitrogen                         42  H


     Creatinine                                 0.77


     Est Glomerular Filtrat Rate
mL/min   > 60  
       



     Glucose Level                               130


     Calcium Level                               9.4


     Phosphorus Level                            2.6


     Magnesium Level                             2.5


     Blood Gas Specimen Source
           
             Blood arterial



     Arterial Blood Date Drawn
           
             2/5/2019
7:42:36 AM


     Arterial Blood pH (Temp
corrected)   
                       7.464  H



     Arterial Blood pCO2 (Temp
correct)   
                         38.8  



     Arterial Blood pO2 (Temp
corrected)  
                        71.3  L



     Arterial Blood HCO3                                            27.2  H


     Arterial Blood Base Excess                                      3.3  H


     Arterial Blood Oxygen
Saturation     
                        93.2  L



     Antoine Test                                         ACCEPTAB


     Arterial Blood Gas Puncture
Site     
             Right Radial  



     Arterial Blood
Carboxyhemoglobin     
                          1.5  



     Arterial Blood Methemoglobin                                     0.3


     Blood Gas A-a O2 Differential                                  97.0  H


     Oxyhemoglobin Percent                                          91.5  L


     Blood Gas Temperature                                           37.0


     Blood Gas Modality                                 NASAL CANNULA


     FiO2                                                            30.0


     Blood Gas Notified Whom                            TM


     Blood Gas Notified Time
             
             2/5/2019
8:13:17 AM








Medications


Medication





Current Medications


Ondansetron HCl (Zofran Inj) 4 mg Q6H  PRN IV NAUSEA AND/OR VOMITING Last 


administered on 2/1/19at 18:46; Admin Dose 4 MG;  Start 1/21/19 at 23:30


Acetaminophen (Tylenol Supp) 650 mg Q4H  PRN ND PAIN LEVEL 1-3 OR FEVER;  Start 


1/21/19 at 23:30


Aspirin (Aspirin) 81 mg DAILY PO  Last administered on 2/5/19at 08:58; Admin 


Dose 81 MG;  Start 1/24/19 at 09:00


Famotidine (Pepcid) 20 mg Q12 PO  Last administered on 2/5/19at 08:58; Admin 


Dose 20 MG;  Start 1/24/19 at 21:00


Zolpidem Tartrate (Ambien) 5 mg HS  PRN PO INSOMNIA Last administered on 


2/4/19at 20:38; Admin Dose 5 MG;  Start 1/27/19 at 00:00


Guaifenesin/ Dextromethorphan (Robitussin Dm Liquid Cup) 5 ml Q6H  PRN PO COUGH 


Last administered on 1/31/19at 09:04; Admin Dose 5 ML;  Start 1/27/19 at 20:00


Divalproex Sodium (Depakote) 250 mg Q8H PO  Last administered on 2/5/19at 08:58;


Admin Dose 250 MG;  Start 1/28/19 at 17:00


Morphine Sulfate (morphine) 6 mg Q4H  PRN PO SEVERE PAIN LEVEL 7-10 Last 


administered on 2/5/19at 02:47; Admin Dose 6 MG;  Start 1/30/19 at 21:30


Albuterol/ Ipratropium (Duoneb) 3 ml Q3H RESP THERAPY  PRN HHN SHORTNESS OF 


BREATH;  Start 1/31/19 at 20:00


Risperidone (Risperdal) 1 mg BID PO  Last administered on 2/5/19at 08:59; Admin 


Dose 1 MG;  Start 2/1/19 at 21:00


Vancomycin HCl (Vanco Iv Per Pharmacy) VANCOMYCIN PER PHARMACY PER PROTOCOL XX ;


 Start 2/1/19 at 15:00


Meropenem/Sodium Chloride 50 ml @  100 mls/hr Q12 IVPB  Last administered on 


2/5/19at 08:57; Admin Dose 100 MLS/HR;  Start 2/1/19 at 15:00


Methylprednisolone Sodium Succinate (Solu-Medrol) 40 mg Q6 IV  Last administered


on 2/5/19at 11:55; Admin Dose 40 MG;  Start 2/2/19 at 12:00


Propofol 100 ml @  1.875 mls/ hr Q12H IV  Last administered on 2/4/19at 08:09; 


Admin Dose 15 MLS/HR;  Start 2/2/19 at 11:00


Fentanyl 100 ml @  2.5 mls/hr TITRATE IV  Last administered on 2/4/19at 09:17; 


Admin Dose 5 MLS/HR;  Start 2/2/19 at 11:30


Norepinephrine 250 ml @  1.875 mls/ hr TITRATE IV  Last administered on 2/3/19at


02:08; Admin Dose 11.25 MLS/HR;  Start 2/2/19 at 11:00


Alteplase, Recombinant (Cathflo (Activase)) 2 mg MAY REPEAT X1 PRN CATHETER IF 


CATHETER REMAINS OCCULUDED;  Start 2/2/19 at 11:00


Amlodipine Besylate (Norvasc) 5 mg DAILY PO  Last administered on 2/5/19at 


08:58; Admin Dose 5 MG;  Start 2/5/19 at 09:00


Albuterol/ Ipratropium (Duoneb) 3 ml Q6H RESP  THERAPY HHN  Last administered on


2/5/19at 08:32; Admin Dose 3 ML;  Start 2/4/19 at 20:00


Dextrose 1,000 ml @  50 mls/hr Q20H IV  Last administered on 2/5/19at 07:00; 


Admin Dose 50 MLS/HR;  Start 2/5/19 at 07:00


Vancomycin HCl 1.5 gm/Dextrose 250 ml @  83.333 mls/ hr Q24H IVPB ;  Start 


2/6/19 at 06:00











DAVID VÁZQUEZ NP             Feb 5, 2019 14:05

## 2019-02-05 NOTE — CONS
Assessment/Plan


Assessment/Plan


Assessment/Plan (Daily)


Assessment and recommendations;





1.  Patient initially admitted with respiratory failure due to underlying severe


hypercapnia, was extubated was transferred to medical floor then he had to be 


reintubated for recurrent severe hypercapnia.  Status post extubation yesterday.


2.  History of recent pneumoperitoneum of unknown etiology.  Patient did not 


require any intervention.


3.  Status post self removal of left-sided chest tube which was placed in the 


emergency room for possible pneumothorax.  Patient does have a history of severe


bullous emphysema.


4.  Anemia and thrombocytopenia.


5.  Stable seizure disorder.


6.  History of hypertension.


7.  Mild hypernatremia.  Patient currently on appropriate free water 


replacement.


8.  Encephalopathy with waxing and waning mental status.





Continue current supportive care.  Consider stopping antibiotics.  Overall 


prognosis is poor.





Consultation Date/Type/Reason


Admit Date/Time


Jan 22, 2019 at 00:03


Initial Consult Date


1/22/19


Type of Consult


Pulmonary/critical care


Patient is a 68-year-old male who is a nursing home resident was sent over to 


the hospital because of hypothermia and altered mental status.  In the emergency


room, patient developed cardiac arrest requiring CPR and intubation.  In the 


course of workup the patient also required a chest tube placement on the left 


side.  CT imaging of the abdomen showed pneumoperitoneum with possibly 


perforated viscus.  By the time I saw the patient in ICU, patient is orally 


intubated and sedated.  History was obtained from medical records.





Past medical history; essentially unremarkable except for possibly COPD.


Medications; reviewed.  Patient is currently on dopamine at 6 mics per kilogram 


per minute, Levophed 20 mics per minute, propofol 25 mics per kilogram per 


minute.  Other medications were also reviewed.


Allergies; none.


Social history; not available.


Family history and occupational history is also not available.


Review of system; unable to be obtained.


General exam; elderly male, orally intubated, sedated, currently in no distress.


Requesting Provider:  DONALDO CARRILLO MD


Date/Time of Note


DATE: 2/5/19 


TIME: 09:15





24 HR Interval Summary


Free Text/Dictation


Patient's condition is tenuous but stable.  Was extubated yesterday afternoon.  


Patient has remained hemodynamically stable also exhibiting stable pulmonary 


status.


General exam; elderly male, awake, currently no distress.  Appropriately 


responsive.





Exam/Review of Systems


Exam


Vitals





Vital Signs


  Date      Temp  Pulse  Resp  B/P (MAP)   Pulse Ox  O2          O2 Flow    FiO2


Time                                                 Delivery    Rate


    2/5/19           96    21      147/86        99


     09:00                          (106)


    2/5/19                                           Nasal             2.0


     08:32                                           Cannula


    2/5/19  98.0


     08:00


    2/4/19                                                                    35


     13:01








Intake and Output





2/4/19 2/4/19 2/5/19





1515:00


23:00


07:00





IntakeIntake Total


452.500 ml


650 ml


250 ml





OutputOutput Total


700 ml


2470 ml


1430 ml





BalanceBalance


-247.500 ml


-1820 ml


-1180 ml











Exam


HEENT exam; supple neck, no JVD.  No lymphadenopathy.  Midline trachea.  No 


thyromegaly.


Chest exam; diminished breath sounds bilaterally.  No added sounds.  S1-S2 


audible, no murmurs.  Regular rhythm.  Dressing applied to left lateral chest 


wall at prior chest tube site.


Abdomen exam; soft, scaphoid.  Nontender.  No organomegaly.  Bowel sounds 


audible.


Extremity exam; no peripheral edema.


CNS exam; no focal motor deficit.





Results


Result Diagram:  


2/5/19 0400                                                                     


          2/5/19 0400





Results 24hrs





Laboratory Tests


Test
                            2/4/19
12:30  2/5/19
04:00         2/5/19
07:00


Blood Gas Specimen        Blood arterial
      
             Blood arterial



Source



Arterial Blood Date       2/4/2019
1:08:19 PM  
             2/5/2019
7:42:36 AM


Drawn



Arterial Blood pH                    7.435  
  
                       7.464  H



(Temp
corrected)


Arterial Blood pCO2                  53.3  H
  
                         38.8  



(Temp
correct)


Arterial Blood pO2                   75.5  L
  
                        71.3  L



(Temp
corrected)


Arterial Blood HCO3                   35.0  H                            27.2  H


Arterial Blood Base                    9.3  H                             3.3  H


Excess


Arterial Blood                       94.7  L
  
                        93.2  L



Oxygen
Saturation


Antoine Test                ACCEPTAB                           ACCEPTAB


Arterial Blood Gas        Right Radial  
      
             Right Radial  



Puncture
Site


Arterial                               0.5  
  
                          1.5  



Blood
Carboxyhemoglobin


Arterial Blood                            0                                0.3


Methemoglobin


Blood Gas A-a O2                     112.1  H                            97.0  H


Differential


Oxyhemoglobin Percent                  94.2                              91.5  L


Blood Gas Temperature                  37.0                               37.0


Blood Gas Modality        VENT - CPAP                        NASAL CANNULA


FiO2                                   35.0                               30.0


Blood Gas Low PEEP                      5.0


Setting


Blood Gas Pressure                       10


Support


Blood Gas Notified Whom   TM                                 TM


Blood Gas Notified Time
  2/4/2019
1:29:03 PM  
             2/5/2019
8:13:17 AM


White Blood Count                                   3.7  #L


Red Blood Count                                     3.48  L


Hemoglobin                                           9.7  L


Hematocrit                                          31.6  L


Mean Corpuscular Volume                              90.8


Mean Corpuscular                                    27.9  L


Hemoglobin


Mean Corpuscular          
                        30.7  L
  



Hemoglobin
Concent


Red Cell Distribution                               16.5  H


Width


Platelet Count                                        78  L


Mean Platelet Volume                                11.2  H


Immature Granulocytes %                            1.300  H


Neutrophils %                                       85.0  H


Lymphocytes %                                       10.2  L


Monocytes %                                           3.5


Eosinophils %                                         0.0


Basophils %                                           0.0


Nucleated Red Blood                                   0.0


Cells %


Immature Granulocytes #                            0.050  H


Neutrophils #                                         3.2


Lymphocytes #                                        0.4  L


Monocytes #                                          0.1  L


Eosinophils #                                         0.0


Basophils #                                           0.0


Nucleated Red Blood                                   0.0


Cells #


Sodium Level                                         151  H


Potassium Level                                       3.6


Chloride Level                                       113  H


Carbon Dioxide Level                                  37  H


Anion Gap                                              1  L


Blood Urea Nitrogen                                   42  H


Creatinine                                           0.77


Est Glomerular Filtrat    
                    > 60  
       



Rate
mL/min


Glucose Level                                         130


Calcium Level                                         9.4


Phosphorus Level                                      2.6


Magnesium Level                                       2.5








Medications


Medication





Current Medications


Ondansetron HCl (Zofran Inj) 4 mg Q6H  PRN IV NAUSEA AND/OR VOMITING Last 


administered on 2/1/19at 18:46; Admin Dose 4 MG;  Start 1/21/19 at 23:30


Acetaminophen (Tylenol Supp) 650 mg Q4H  PRN [place] PAIN LEVEL 1-3 OR FEVER;  Start 


1/21/19 at 23:30


Aspirin (Aspirin) 81 mg DAILY PO  Last administered on 2/5/19at 08:58; Admin 


Dose 81 MG;  Start 1/24/19 at 09:00


Famotidine (Pepcid) 20 mg Q12 PO  Last administered on 2/5/19at 08:58; Admin 


Dose 20 MG;  Start 1/24/19 at 21:00


Zolpidem Tartrate (Ambien) 5 mg HS  PRN PO INSOMNIA Last administered on 


2/4/19at 20:38; Admin Dose 5 MG;  Start 1/27/19 at 00:00


Guaifenesin/ Dextromethorphan (Robitussin Dm Liquid Cup) 5 ml Q6H  PRN PO COUGH 


Last administered on 1/31/19at 09:04; Admin Dose 5 ML;  Start 1/27/19 at 20:00


Divalproex Sodium (Depakote) 250 mg Q8H PO  Last administered on 2/5/19at 08:58;


Admin Dose 250 MG;  Start 1/28/19 at 17:00


Morphine Sulfate (morphine) 6 mg Q4H  PRN PO SEVERE PAIN LEVEL 7-10 Last 


administered on 2/5/19at 02:47; Admin Dose 6 MG;  Start 1/30/19 at 21:30


Albuterol/ Ipratropium (Duoneb) 3 ml Q3H RESP THERAPY  PRN HHN SHORTNESS OF 


BREATH;  Start 1/31/19 at 20:00


Risperidone (Risperdal) 1 mg BID PO  Last administered on 2/5/19at 08:59; Admin 


Dose 1 MG;  Start 2/1/19 at 21:00


Vancomycin HCl (Vanco Iv Per Pharmacy) VANCOMYCIN PER PHARMACY PER PROTOCOL XX ;


 Start 2/1/19 at 15:00


Meropenem/Sodium Chloride 50 ml @  100 mls/hr Q12 IVPB  Last administered on 


2/5/19at 08:57; Admin Dose 100 MLS/HR;  Start 2/1/19 at 15:00


Methylprednisolone Sodium Succinate (Solu-Medrol) 40 mg Q6 IV  Last administered


on 2/5/19at 05:48; Admin Dose 40 MG;  Start 2/2/19 at 12:00


Propofol 100 ml @  1.875 mls/ hr Q12H IV  Last administered on 2/4/19at 08:09; 


Admin Dose 15 MLS/HR;  Start 2/2/19 at 11:00


Fentanyl 100 ml @  2.5 mls/hr TITRATE IV  Last administered on 2/4/19at 09:17; 


Admin Dose 5 MLS/HR;  Start 2/2/19 at 11:30


Norepinephrine 250 ml @  1.875 mls/ hr TITRATE IV  Last administered on 2/3/19at


02:08; Admin Dose 11.25 MLS/HR;  Start 2/2/19 at 11:00


Alteplase, Recombinant (Cathflo (Activase)) 2 mg MAY REPEAT X1 PRN CATHETER IF 


CATHETER REMAINS OCCULUDED;  Start 2/2/19 at 11:00


Amlodipine Besylate (Norvasc) 5 mg DAILY PO  Last administered on 2/5/19at 


08:58; Admin Dose 5 MG;  Start 2/5/19 at 09:00


Albuterol/ Ipratropium (Duoneb) 3 ml Q6H RESP  THERAPY HHN  Last administered on


2/5/19at 08:32; Admin Dose 3 ML;  Start 2/4/19 at 20:00


Dextrose 1,000 ml @  50 mls/hr Q20H IV  Last administered on 2/5/19at 07:00; 


Admin Dose 50 MLS/HR;  Start 2/5/19 at 07:00


Vancomycin HCl 1.5 gm/Dextrose 250 ml @  83.333 mls/ hr Q24H IVPB ;  Start 


2/6/19 at 06:00











SHAGUFTA HUANG                     Feb 5, 2019 09:18

## 2019-02-06 VITALS — DIASTOLIC BLOOD PRESSURE: 104 MMHG | SYSTOLIC BLOOD PRESSURE: 143 MMHG | RESPIRATION RATE: 22 BRPM | HEART RATE: 108 BPM

## 2019-02-06 VITALS — RESPIRATION RATE: 25 BRPM | SYSTOLIC BLOOD PRESSURE: 123 MMHG | DIASTOLIC BLOOD PRESSURE: 83 MMHG | HEART RATE: 95 BPM

## 2019-02-06 VITALS — DIASTOLIC BLOOD PRESSURE: 87 MMHG | RESPIRATION RATE: 27 BRPM | HEART RATE: 81 BPM | SYSTOLIC BLOOD PRESSURE: 137 MMHG

## 2019-02-06 VITALS — RESPIRATION RATE: 22 BRPM | DIASTOLIC BLOOD PRESSURE: 93 MMHG | HEART RATE: 76 BPM | SYSTOLIC BLOOD PRESSURE: 120 MMHG

## 2019-02-06 VITALS — RESPIRATION RATE: 22 BRPM | SYSTOLIC BLOOD PRESSURE: 127 MMHG | HEART RATE: 84 BPM | DIASTOLIC BLOOD PRESSURE: 79 MMHG

## 2019-02-06 VITALS — SYSTOLIC BLOOD PRESSURE: 133 MMHG | DIASTOLIC BLOOD PRESSURE: 87 MMHG | HEART RATE: 107 BPM

## 2019-02-06 VITALS — HEART RATE: 92 BPM | SYSTOLIC BLOOD PRESSURE: 120 MMHG | DIASTOLIC BLOOD PRESSURE: 76 MMHG | RESPIRATION RATE: 22 BRPM

## 2019-02-06 VITALS — DIASTOLIC BLOOD PRESSURE: 81 MMHG | SYSTOLIC BLOOD PRESSURE: 149 MMHG | RESPIRATION RATE: 22 BRPM | HEART RATE: 82 BPM

## 2019-02-06 VITALS — SYSTOLIC BLOOD PRESSURE: 120 MMHG | DIASTOLIC BLOOD PRESSURE: 85 MMHG | RESPIRATION RATE: 25 BRPM | HEART RATE: 87 BPM

## 2019-02-06 VITALS — SYSTOLIC BLOOD PRESSURE: 141 MMHG | DIASTOLIC BLOOD PRESSURE: 87 MMHG

## 2019-02-06 VITALS — SYSTOLIC BLOOD PRESSURE: 133 MMHG | RESPIRATION RATE: 20 BRPM | DIASTOLIC BLOOD PRESSURE: 99 MMHG | HEART RATE: 99 BPM

## 2019-02-06 VITALS — DIASTOLIC BLOOD PRESSURE: 84 MMHG | HEART RATE: 76 BPM | RESPIRATION RATE: 18 BRPM | SYSTOLIC BLOOD PRESSURE: 133 MMHG

## 2019-02-06 VITALS — HEART RATE: 99 BPM | RESPIRATION RATE: 35 BRPM | DIASTOLIC BLOOD PRESSURE: 90 MMHG | SYSTOLIC BLOOD PRESSURE: 124 MMHG

## 2019-02-06 VITALS — DIASTOLIC BLOOD PRESSURE: 88 MMHG | RESPIRATION RATE: 20 BRPM | HEART RATE: 90 BPM | SYSTOLIC BLOOD PRESSURE: 129 MMHG

## 2019-02-06 VITALS — SYSTOLIC BLOOD PRESSURE: 119 MMHG | RESPIRATION RATE: 28 BRPM | DIASTOLIC BLOOD PRESSURE: 76 MMHG | HEART RATE: 78 BPM

## 2019-02-06 VITALS — RESPIRATION RATE: 22 BRPM | SYSTOLIC BLOOD PRESSURE: 129 MMHG | DIASTOLIC BLOOD PRESSURE: 100 MMHG | HEART RATE: 90 BPM

## 2019-02-06 VITALS — DIASTOLIC BLOOD PRESSURE: 93 MMHG | RESPIRATION RATE: 29 BRPM | HEART RATE: 91 BPM | SYSTOLIC BLOOD PRESSURE: 138 MMHG

## 2019-02-06 VITALS — DIASTOLIC BLOOD PRESSURE: 82 MMHG | HEART RATE: 91 BPM | SYSTOLIC BLOOD PRESSURE: 135 MMHG | RESPIRATION RATE: 28 BRPM

## 2019-02-06 VITALS — DIASTOLIC BLOOD PRESSURE: 83 MMHG | RESPIRATION RATE: 16 BRPM | SYSTOLIC BLOOD PRESSURE: 128 MMHG | HEART RATE: 90 BPM

## 2019-02-06 VITALS — HEART RATE: 106 BPM | RESPIRATION RATE: 21 BRPM | SYSTOLIC BLOOD PRESSURE: 108 MMHG | DIASTOLIC BLOOD PRESSURE: 86 MMHG

## 2019-02-06 VITALS — RESPIRATION RATE: 17 BRPM | DIASTOLIC BLOOD PRESSURE: 89 MMHG | SYSTOLIC BLOOD PRESSURE: 128 MMHG | HEART RATE: 89 BPM

## 2019-02-06 VITALS — SYSTOLIC BLOOD PRESSURE: 119 MMHG | RESPIRATION RATE: 18 BRPM | DIASTOLIC BLOOD PRESSURE: 73 MMHG | HEART RATE: 80 BPM

## 2019-02-06 VITALS — RESPIRATION RATE: 21 BRPM | SYSTOLIC BLOOD PRESSURE: 133 MMHG | DIASTOLIC BLOOD PRESSURE: 84 MMHG | HEART RATE: 84 BPM

## 2019-02-06 VITALS — SYSTOLIC BLOOD PRESSURE: 132 MMHG | HEART RATE: 93 BPM | DIASTOLIC BLOOD PRESSURE: 93 MMHG | RESPIRATION RATE: 18 BRPM

## 2019-02-06 LAB
ABNORMAL IP MESSAGE: 1
ADD MAN DIFF?: NO
ANION GAP: 1 (ref 5–13)
BASOPHIL #: 0 10^3/UL (ref 0–0.1)
BASOPHILS %: 0 % (ref 0–2)
BLOOD UREA NITROGEN: 35 MG/DL (ref 7–20)
CALCIUM: 9.3 MG/DL (ref 8.4–10.2)
CARBON DIOXIDE: 36 MMOL/L (ref 21–31)
CHLORIDE: 108 MMOL/L (ref 97–110)
CREATININE: 0.72 MG/DL (ref 0.61–1.24)
EOSINOPHILS #: 0 10^3/UL (ref 0–0.5)
EOSINOPHILS %: 0 % (ref 0–7)
GLUCOSE: 141 MG/DL (ref 70–220)
HEMATOCRIT: 30.7 % (ref 42–52)
HEMOGLOBIN: 9.6 G/DL (ref 14–18)
IMMATURE GRANS #M: 0.02 10^3/UL (ref 0–0.03)
IMMATURE GRANS % (M): 0.7 % (ref 0–0.43)
LYMPHOCYTES #: 0.4 10^3/UL (ref 0.8–2.9)
LYMPHOCYTES %: 13.7 % (ref 15–51)
MAGNESIUM: 2.4 MG/DL (ref 1.7–2.5)
MEAN CORPUSCULAR HEMOGLOBIN: 27.9 PG (ref 29–33)
MEAN CORPUSCULAR HGB CONC: 31.3 G/DL (ref 32–37)
MEAN CORPUSCULAR VOLUME: 89.2 FL (ref 82–101)
MEAN PLATELET VOLUME: 10.6 FL (ref 7.4–10.4)
MONOCYTE #: 0.1 10^3/UL (ref 0.3–0.9)
MONOCYTES %: 3.4 % (ref 0–11)
NEUTROPHIL #: 2.4 10^3/UL (ref 1.6–7.5)
NEUTROPHILS %: 82.2 % (ref 39–77)
NUCLEATED RED BLOOD CELLS #: 0 10^3/UL (ref 0–0)
NUCLEATED RED BLOOD CELLS%: 0 /100WBC (ref 0–0)
PHOSPHORUS: 2.5 MG/DL (ref 2.5–4.9)
PLATELET COUNT: 69 10^3/UL (ref 140–415)
POSITIVE DIFF: (no result)
POTASSIUM: 4.1 MMOL/L (ref 3.5–5.1)
RED BLOOD COUNT: 3.44 10^6/UL (ref 4.7–6.1)
RED CELL DISTRIBUTION WIDTH: 16 % (ref 11.5–14.5)
SODIUM: 145 MMOL/L (ref 135–144)
WHITE BLOOD COUNT: 2.9 10^3/UL (ref 4.8–10.8)

## 2019-02-06 RX ADMIN — IPRATROPIUM BROMIDE AND ALBUTEROL SULFATE SCH ML: .5; 3 SOLUTION RESPIRATORY (INHALATION) at 13:30

## 2019-02-06 RX ADMIN — AMLODIPINE BESYLATE 1 MG: 5 TABLET ORAL at 09:09

## 2019-02-06 RX ADMIN — DIVALPROEX SODIUM 1 MG: 250 TABLET, DELAYED RELEASE ORAL at 17:55

## 2019-02-06 RX ADMIN — RISPERIDONE 1 MG: 1 TABLET ORAL at 20:51

## 2019-02-06 RX ADMIN — DIVALPROEX SODIUM SCH MG: 250 TABLET, DELAYED RELEASE ORAL at 09:09

## 2019-02-06 RX ADMIN — IPRATROPIUM BROMIDE AND ALBUTEROL SULFATE 1 ML: .5; 3 SOLUTION RESPIRATORY (INHALATION) at 13:30

## 2019-02-06 RX ADMIN — AMLODIPINE BESYLATE SCH MG: 5 TABLET ORAL at 09:09

## 2019-02-06 RX ADMIN — PROPOFOL SCH MLS/HR: 10 INJECTION, EMULSION INTRAVENOUS at 10:21

## 2019-02-06 RX ADMIN — ASPIRIN 81 MG CHEWABLE TABLET 1 MG: 81 TABLET CHEWABLE at 09:09

## 2019-02-06 RX ADMIN — METHYLPREDNISOLONE SODIUM SUCCINATE 1 MG: 40 INJECTION, POWDER, FOR SOLUTION INTRAMUSCULAR; INTRAVENOUS at 17:55

## 2019-02-06 RX ADMIN — DIVALPROEX SODIUM SCH MG: 250 TABLET, DELAYED RELEASE ORAL at 17:55

## 2019-02-06 RX ADMIN — IPRATROPIUM BROMIDE AND ALBUTEROL SULFATE SCH ML: .5; 3 SOLUTION RESPIRATORY (INHALATION) at 19:57

## 2019-02-06 RX ADMIN — PROPOFOL 1 MLS/HR: 10 INJECTION, EMULSION INTRAVENOUS at 10:21

## 2019-02-06 RX ADMIN — FAMOTIDINE SCH MG: 20 TABLET ORAL at 09:09

## 2019-02-06 RX ADMIN — MEROPENEM 1 MLS/HR: 1 INJECTION, POWDER, FOR SOLUTION INTRAVENOUS at 20:51

## 2019-02-06 RX ADMIN — FAMOTIDINE 1 MG: 20 TABLET ORAL at 20:51

## 2019-02-06 RX ADMIN — METHYLPREDNISOLONE SODIUM SUCCINATE 1 MG: 40 INJECTION, POWDER, FOR SOLUTION INTRAMUSCULAR; INTRAVENOUS at 06:22

## 2019-02-06 RX ADMIN — MEROPENEM 1 MLS/HR: 1 INJECTION, POWDER, FOR SOLUTION INTRAVENOUS at 09:09

## 2019-02-06 RX ADMIN — FAMOTIDINE 1 MG: 20 TABLET ORAL at 09:09

## 2019-02-06 RX ADMIN — DIVALPROEX SODIUM 1 MG: 250 TABLET, DELAYED RELEASE ORAL at 00:03

## 2019-02-06 RX ADMIN — ASPIRIN 81 MG SCH MG: 81 TABLET ORAL at 09:09

## 2019-02-06 RX ADMIN — PROPOFOL SCH MLS/HR: 10 INJECTION, EMULSION INTRAVENOUS at 23:00

## 2019-02-06 RX ADMIN — PROPOFOL 1 MLS/HR: 10 INJECTION, EMULSION INTRAVENOUS at 23:00

## 2019-02-06 RX ADMIN — IPRATROPIUM BROMIDE AND ALBUTEROL SULFATE SCH ML: .5; 3 SOLUTION RESPIRATORY (INHALATION) at 02:08

## 2019-02-06 RX ADMIN — IPRATROPIUM BROMIDE AND ALBUTEROL SULFATE SCH ML: .5; 3 SOLUTION RESPIRATORY (INHALATION) at 08:23

## 2019-02-06 RX ADMIN — IPRATROPIUM BROMIDE AND ALBUTEROL SULFATE 1 ML: .5; 3 SOLUTION RESPIRATORY (INHALATION) at 19:57

## 2019-02-06 RX ADMIN — MEROPENEM SCH MLS/HR: 1 INJECTION, POWDER, FOR SOLUTION INTRAVENOUS at 09:09

## 2019-02-06 RX ADMIN — CASTOR OIL AND BALSAM, PERU 1 APPLIC: 788; 87 OINTMENT TOPICAL at 09:10

## 2019-02-06 RX ADMIN — VANCOMYCIN HYDROCHLORIDE 1 MLS/HR: 500 INJECTION, POWDER, LYOPHILIZED, FOR SOLUTION INTRAVENOUS at 06:22

## 2019-02-06 RX ADMIN — METHYLPREDNISOLONE SODIUM SUCCINATE 1 MG: 40 INJECTION, POWDER, FOR SOLUTION INTRAMUSCULAR; INTRAVENOUS at 00:03

## 2019-02-06 RX ADMIN — IPRATROPIUM BROMIDE AND ALBUTEROL SULFATE 1 ML: .5; 3 SOLUTION RESPIRATORY (INHALATION) at 02:08

## 2019-02-06 RX ADMIN — DIVALPROEX SODIUM SCH MG: 250 TABLET, DELAYED RELEASE ORAL at 00:03

## 2019-02-06 RX ADMIN — IPRATROPIUM BROMIDE AND ALBUTEROL SULFATE 1 ML: .5; 3 SOLUTION RESPIRATORY (INHALATION) at 08:23

## 2019-02-06 RX ADMIN — METHYLPREDNISOLONE SODIUM SUCCINATE 1 MG: 40 INJECTION, POWDER, FOR SOLUTION INTRAMUSCULAR; INTRAVENOUS at 23:59

## 2019-02-06 RX ADMIN — METHYLPREDNISOLONE SODIUM SUCCINATE 1 MG: 40 INJECTION, POWDER, FOR SOLUTION INTRAMUSCULAR; INTRAVENOUS at 12:23

## 2019-02-06 RX ADMIN — DIVALPROEX SODIUM 1 MG: 250 TABLET, DELAYED RELEASE ORAL at 09:09

## 2019-02-06 RX ADMIN — MEROPENEM SCH MLS/HR: 1 INJECTION, POWDER, FOR SOLUTION INTRAVENOUS at 20:51

## 2019-02-06 RX ADMIN — RISPERIDONE 1 MG: 1 TABLET ORAL at 09:09

## 2019-02-06 RX ADMIN — FAMOTIDINE SCH MG: 20 TABLET ORAL at 20:51

## 2019-02-06 NOTE — PN
Date/Time of Note


Date/Time of Note


DATE: 2/6/19 


TIME: 13:13





Assessment/Plan


VTE Prophylaxis


Risk [place] (from [facility]0 risk:  13


SCD applied (from [facility]  Yes


Pharmacological prophylaxis:  NA/contraindicated


Pharm contraindication:  thrombocytopenia





Lines/Catheters


IV Catheter Type (from [facility]):  Central Line


Central line still needed:  Yes


Urinary Cath still in place:  Yes


Reason Cath still needed:  urinary retention





Assessment/Plan


Hospital Course


Patient is awake alert, continues on supplemental oxygen, desaturates easily 


when off oxygen. Pt sitting in bed eating lunch, continue to monitor on 


telemetry floor.


Assessment/Plan


-Hypoxic respiratory failure requiring mechanical ventilation, extubated. Dr. English is following in pulmonology consultation.


-Sepsis with shock, resolving.  Dr. Dreyer is following in infection disease 


consultation.


-Status post cardiac arrest.   Dr. Jon is following in cardiology 


consultation.


-Left-sided pneumothorax, status post left side chest tube placement, s/p self 


d/c CT.


-S/p pneumoperitoneum most likely due to cardiac arrest, resolved. Dr. Lai is 


following in general surgery consultation. 


-Left axillary and brachial DVT, LUE swelling subsided, was on Lovenox which is 


currently held due to thrombocytopenia


-Left lower lobe pneumonia


-Severe emphysema


-NATALIE with possible chronic kidney disease. Dr Hanson is following in nephrology


consultation.


-Schizoaffective disorder depressed type.  Psychiatric consult is appreciated, 


continue Risperdal Depakote





Critical care time spent more than 30 minutes.


Further recommendations based on clinical course.  Plan of care discussed with 


Dr. Miller.


Result Diagram:  


2/6/19 0400                                                                     


          2/6/19 0400





Results 24hrs





Laboratory Tests


               Test
                                2/6/19
04:00


               White Blood Count                         2.9  #L


               Red Blood Count                           3.44  L


               Hemoglobin                                 9.6  L


               Hematocrit                                30.7  L


               Mean Corpuscular Volume                    89.2


               Mean Corpuscular Hemoglobin               27.9  L


               Mean Corpuscular Hemoglobin
Concent      31.3  L



               Red Cell Distribution Width               16.0  H


               Platelet Count                              69  L


               Mean Platelet Volume                      10.6  H


               Immature Granulocytes %                  0.700  H


               Neutrophils %                             82.2  H


               Lymphocytes %                             13.7  L


               Monocytes %                                 3.4


               Eosinophils %                               0.0


               Basophils %                                 0.0


               Nucleated Red Blood Cells %                 0.0


               Immature Granulocytes #                   0.020


               Neutrophils #                               2.4


               Lymphocytes #                              0.4  L


               Monocytes #                                0.1  L


               Eosinophils #                               0.0


               Basophils #                                 0.0


               Nucleated Red Blood Cells #                 0.0


               Sodium Level                               145  H


               Potassium Level                             4.1


               Chloride Level                              108


               Carbon Dioxide Level                        36  H


               Anion Gap                                    1  L


               Blood Urea Nitrogen                         35  H


               Creatinine                                 0.72


               Est Glomerular Filtrat Rate
mL/min   > 60  



               Glucose Level                               141


               Calcium Level                               9.3


               Phosphorus Level                            2.5


               Magnesium Level                             2.4








Exam/Review of Systems


Exam


Vitals





Vital Signs


  Date      Temp  Pulse  Resp  B/P (MAP)   Pulse Ox  O2          O2 Flow    FiO2


Time                                                 Delivery    Rate


    2/6/19           90


     12:00


    2/6/19                 20      129/88        93  Nasal             4.0


     11:00                          (102)            Cannula


    2/6/19  97.4


     08:00


    2/4/19                                                                    35


     13:01








Intake and Output





2/5/19 2/5/19 2/6/19





1515:00


23:00


07:00





IntakeIntake Total


2150 ml


220 ml


780 ml





OutputOutput Total


1900 ml


1555 ml


1700 ml





BalanceBalance


250 ml


-1335 ml


-920 ml











Exam


Constitutional:  alert, oriented


Respiratory:  diminished breath sounds, wheezing


Cardiovascular:  regular rate and rhythm


Gastrointestinal:  soft, non-tender


Musculoskeletal:  nl extremities to inspection


Extremities:  normal pulses


Neurological:  nl mental status





Results


Results 24hrs





Laboratory Tests


               Test
                                2/6/19
04:00


               White Blood Count                         2.9  #L


               Red Blood Count                           3.44  L


               Hemoglobin                                 9.6  L


               Hematocrit                                30.7  L


               Mean Corpuscular Volume                    89.2


               Mean Corpuscular Hemoglobin               27.9  L


               Mean Corpuscular Hemoglobin
Concent      31.3  L



               Red Cell Distribution Width               16.0  H


               Platelet Count                              69  L


               Mean Platelet Volume                      10.6  H


               Immature Granulocytes %                  0.700  H


               Neutrophils %                             82.2  H


               Lymphocytes %                             13.7  L


               Monocytes %                                 3.4


               Eosinophils %                               0.0


               Basophils %                                 0.0


               Nucleated Red Blood Cells %                 0.0


               Immature Granulocytes #                   0.020


               Neutrophils #                               2.4


               Lymphocytes #                              0.4  L


               Monocytes #                                0.1  L


               Eosinophils #                               0.0


               Basophils #                                 0.0


               Nucleated Red Blood Cells #                 0.0


               Sodium Level                               145  H


               Potassium Level                             4.1


               Chloride Level                              108


               Carbon Dioxide Level                        36  H


               Anion Gap                                    1  L


               Blood Urea Nitrogen                         35  H


               Creatinine                                 0.72


               Est Glomerular Filtrat Rate
mL/min   > 60  



               Glucose Level                               141


               Calcium Level                               9.3


               Phosphorus Level                            2.5


               Magnesium Level                             2.4








Medications


Medication





Current Medications


Ondansetron HCl (Zofran Inj) 4 mg Q6H  PRN IV NAUSEA AND/OR VOMITING Last 


administered on 2/1/19at 18:46; Admin Dose 4 MG;  Start 1/21/19 at 23:30


Acetaminophen (Tylenol Supp) 650 mg Q4H  PRN [place] PAIN LEVEL 1-3 OR FEVER;  Start 


1/21/19 at 23:30


Aspirin (Aspirin) 81 mg DAILY PO  Last administered on 2/6/19at 09:09; Admin 


Dose 81 MG;  Start 1/24/19 at 09:00


Famotidine (Pepcid) 20 mg Q12 PO  Last administered on 2/6/19at 09:09; Admin 


Dose 20 MG;  Start 1/24/19 at 21:00


Zolpidem Tartrate (Ambien) 5 mg HS  PRN PO INSOMNIA Last administered on 


2/5/19at 20:31; Admin Dose 5 MG;  Start 1/27/19 at 00:00


Guaifenesin/ Dextromethorphan (Robitussin Dm Liquid Cup) 5 ml Q6H  PRN PO COUGH 


Last administered on 1/31/19at 09:04; Admin Dose 5 ML;  Start 1/27/19 at 20:00


Divalproex Sodium (Depakote) 250 mg Q8H PO  Last administered on 2/6/19at 09:09;


Admin Dose 250 MG;  Start 1/28/19 at 17:00


Morphine Sulfate (morphine) 6 mg Q4H  PRN PO SEVERE PAIN LEVEL 7-10 Last 


administered on 2/5/19at 02:47; Admin Dose 6 MG;  Start 1/30/19 at 21:30


Albuterol/ Ipratropium (Duoneb) 3 ml Q3H RESP THERAPY  PRN HHN SHORTNESS OF 


BREATH;  Start 1/31/19 at 20:00


Risperidone (Risperdal) 1 mg BID PO  Last administered on 2/6/19at 09:09; Admin 


Dose 1 MG;  Start 2/1/19 at 21:00


Vancomycin HCl (Vanco Iv Per Pharmacy) VANCOMYCIN PER PHARMACY PER PROTOCOL XX ;


 Start 2/1/19 at 15:00


Meropenem/Sodium Chloride 50 ml @  100 mls/hr Q12 IVPB  Last administered on 


2/6/19at 09:09; Admin Dose 100 MLS/HR;  Start 2/1/19 at 15:00


Methylprednisolone Sodium Succinate (Solu-Medrol) 40 mg Q6 IV  Last administered


on 2/6/19at 12:23; Admin Dose 40 MG;  Start 2/2/19 at 12:00


Propofol 100 ml @  1.875 mls/ hr Q12H IV  Last administered on 2/4/19at 08:09; 


Admin Dose 15 MLS/HR;  Start 2/2/19 at 11:00


Fentanyl 100 ml @  2.5 mls/hr TITRATE IV  Last administered on 2/4/19at 09:17; 


Admin Dose 5 MLS/HR;  Start 2/2/19 at 11:30


Norepinephrine 250 ml @  1.875 mls/ hr TITRATE IV  Last administered on 2/3/19at


02:08; Admin Dose 11.25 MLS/HR;  Start 2/2/19 at 11:00


Alteplase, Recombinant (Cathflo (Activase)) 2 mg MAY REPEAT X1 PRN CATHETER IF 


CATHETER REMAINS OCCULUDED;  Start 2/2/19 at 11:00


Amlodipine Besylate (Norvasc) 5 mg DAILY PO  Last administered on 2/6/19at 


09:09; Admin Dose 5 MG;  Start 2/5/19 at 09:00


Albuterol/ Ipratropium (Duoneb) 3 ml Q6H RESP  THERAPY HHN  Last administered on


2/6/19at 08:23; Admin Dose 3 ML;  Start 2/4/19 at 20:00


Vancomycin HCl 1.5 gm/Dextrose 250 ml @  83.333 mls/ hr Q24H IVPB  Last 


administered on 2/6/19at 06:22; Admin Dose 83.333 MLS/HR;  Start 2/6/19 at 06:00


Miscellaneous Information (*Rx Drug Level Order Reminder*) VANCO TROUGH 2/7  @ 


0,500 ONCE  ONCE XX ;  Start 2/7/19 at 05:00;  Stop 2/7/19 at 05:01











EVARISTO BELTRAN              Feb 6, 2019 13:18

## 2019-02-06 NOTE — PN
DATE:  02/06/2019

 

 

SUBJECTIVE:  The patient is currently stable, no acute events overnight.  No hemoptysis, hematemesis 
or hematochezia.  The patient remains on high flow oxygen.

 

OBJECTIVE:

VITAL SIGNS:  Blood pressure is 127/79, respirations 22, pulse 84, temperature 97.8.

HEENT:  Head is normocephalic.

NECK:  Supple.

HEART:  Regular rate.

LUNGS:  Show diminished breath sounds at the base.

ABDOMEN:  Soft, nontender to palpation.  No rebound or guarding.

EXTREMITIES:  Negative for clubbing, cyanosis.  Trace edema.

DERMATOLOGIC:  No rashes.

MUSCULOSKELETAL:  No joint effusion.

NEUROLOGIC:  No change in exam.

 

MEDICATIONS:  Reviewed.

 

LABORATORY DATA:  Including ABG shows pH 7.46, pCO2 of 38, base excess of 3.3.  Sodium 145, BUN 35, c
hloride 105, bicarbonate 36.  White count 2.9, hemoglobin 9.6, platelet count 69.

 

IMAGING STUDIES:  Reviewed, shows right pleural effusion, right lower infiltrate, no change.

 

ASSESSMENT AND PLAN:

1.  Nonoliguric acute kidney injury with unknown baseline creatinine.  Etiology is secondary to hemod
ynamics.  Renal function is improved.  Continue current treatment plans, supportive care, renally dos
e all medicines.

2.  Hypernatremia, improved.  Continue to encourage free water intake.  The patient is status post D5
 water.

3.  Anemia.  Monitor hemoglobin and hematocrit levels.

4.  Mineral bone disorder, monitor calcium and phosphorus levels.

5.  Mixed acid base disorder.  The patient has a respiratory alkalosis and metabolic alkalosis.  We w
ill continue to monitor.  May consider intermittent Diamox.

6.  History of diastolic heart failure.  Continue to monitor I's and O's closely.  Give intermittent 
diuretics as needed.

7.  Status post shock, likely septic from possible pneumonia.  The patient is status post pressors.  
Continue antibiotic therapy.

8.  Status post cardiac arrest.

9.  Status post pneumothorax.

10.  Acute encephalopathy.

 

 

Dictated By: MARYAN CHAMPAGNE DO

 

NR/NTS

DD:    02/06/2019 07:45:16

DT:    02/06/2019 08:00:38

Conf#: [id number]

DID#:  [id number]

CC: DONALDO CARRILLO MD; KARLO DUDLEY NP; MAURI GRIGSBY MD;*EndCC*

## 2019-02-06 NOTE — CONS
Consult Date/Type/Reason


Admit Date/Time


Jan 22, 2019 at 00:03


Initial Consult Date


1/22/19


Type of Consult


Pulmonary


Requesting Provider:  DONALDO CARRILLO MD


Date/Time of Note


DATE: 2/6/19 


TIME: 09:42





Subjective


Patient awake alert oriented this morning no respiratory distress.  Currently 


hemodynamically stable.





Objective





Vital Signs


  Date      Temp  Pulse  Resp  B/P (MAP)   Pulse Ox  O2          O2 Flow    FiO2


Time                                                 Delivery    Rate


    2/6/19           71    22                   100  Nasal             2.0


     08:23                                           Cannula


    2/6/19                         127/79


     06:00                           (95)


    2/6/19  97.8


     04:00


    2/4/19                                                                    35


     13:01








Intake and Output





2/5/19 2/5/19 2/6/19





1515:00


23:00


07:00





IntakeIntake Total


2150 ml


220 ml


780 ml





OutputOutput Total


1900 ml


1555 ml


1700 ml





BalanceBalance


250 ml


-1335 ml


-920 ml











Exam


GENERAL: Elderly gentleman comfortable at rest no acute distress


VITAL SIGNS:  per chart


NECK:  Supple.  No JVD or lymphadenopathy.


CARDIAC EXAM:  S1, S2. No added sounds or murmurs.


CHEST:  clear bilaterally, No added sounds, rales or wheezes


ABDOMEN:  Soft, nontender.  No guarding or rebound.


EXTREMITIES:  No cyanosis, clubbing or edema.


NEUROLOGIC:  Generalized weakness.  No focal deficits.





Vent Setting


Ventilator Support Mode:  SPONT


Fraction of Inspired Oxygen pe:  35


Positive End Expiratory Pressu:  5.0





Results/Medications


Result Diagram:  


2/6/19 0400                                                                     


          2/6/19 0400





Results 24 hrs





Laboratory Tests


               Test
                                2/6/19
04:00


               White Blood Count                         2.9  #L


               Red Blood Count                           3.44  L


               Hemoglobin                                 9.6  L


               Hematocrit                                30.7  L


               Mean Corpuscular Volume                    89.2


               Mean Corpuscular Hemoglobin               27.9  L


               Mean Corpuscular Hemoglobin
Concent      31.3  L



               Red Cell Distribution Width               16.0  H


               Platelet Count                              69  L


               Mean Platelet Volume                      10.6  H


               Immature Granulocytes %                  0.700  H


               Neutrophils %                             82.2  H


               Lymphocytes %                             13.7  L


               Monocytes %                                 3.4


               Eosinophils %                               0.0


               Basophils %                                 0.0


               Nucleated Red Blood Cells %                 0.0


               Immature Granulocytes #                   0.020


               Neutrophils #                               2.4


               Lymphocytes #                              0.4  L


               Monocytes #                                0.1  L


               Eosinophils #                               0.0


               Basophils #                                 0.0


               Nucleated Red Blood Cells #                 0.0


               Sodium Level                               145  H


               Potassium Level                             4.1


               Chloride Level                              108


               Carbon Dioxide Level                        36  H


               Anion Gap                                    1  L


               Blood Urea Nitrogen                         35  H


               Creatinine                                 0.72


               Est Glomerular Filtrat Rate
mL/min   > 60  



               Glucose Level                               141


               Calcium Level                               9.3


               Phosphorus Level                            2.5


               Magnesium Level                             2.4





Medications





Current Medications


Ondansetron HCl (Zofran Inj) 4 mg Q6H  PRN IV NAUSEA AND/OR VOMITING Last 


administered on 2/1/19at 18:46; Admin Dose 4 MG;  Start 1/21/19 at 23:30


Acetaminophen (Tylenol Supp) 650 mg Q4H  PRN [place] PAIN LEVEL 1-3 OR FEVER;  Start 


1/21/19 at 23:30


Aspirin (Aspirin) 81 mg DAILY PO  Last administered on 2/6/19at 09:09; Admin 


Dose 81 MG;  Start 1/24/19 at 09:00


Famotidine (Pepcid) 20 mg Q12 PO  Last administered on 2/6/19at 09:09; Admin 


Dose 20 MG;  Start 1/24/19 at 21:00


Zolpidem Tartrate (Ambien) 5 mg HS  PRN PO INSOMNIA Last administered on 


2/5/19at 20:31; Admin Dose 5 MG;  Start 1/27/19 at 00:00


Guaifenesin/ Dextromethorphan (Robitussin Dm Liquid Cup) 5 ml Q6H  PRN PO COUGH 


Last administered on 1/31/19at 09:04; Admin Dose 5 ML;  Start 1/27/19 at 20:00


Divalproex Sodium (Depakote) 250 mg Q8H PO  Last administered on 2/6/19at 09:09;


Admin Dose 250 MG;  Start 1/28/19 at 17:00


Morphine Sulfate (morphine) 6 mg Q4H  PRN PO SEVERE PAIN LEVEL 7-10 Last 


administered on 2/5/19at 02:47; Admin Dose 6 MG;  Start 1/30/19 at 21:30


Albuterol/ Ipratropium (Duoneb) 3 ml Q3H RESP THERAPY  PRN HHN SHORTNESS OF 


BREATH;  Start 1/31/19 at 20:00


Risperidone (Risperdal) 1 mg BID PO  Last administered on 2/6/19at 09:09; Admin 


Dose 1 MG;  Start 2/1/19 at 21:00


Vancomycin HCl (Vanco Iv Per Pharmacy) VANCOMYCIN PER PHARMACY PER PROTOCOL XX ;


 Start 2/1/19 at 15:00


Meropenem/Sodium Chloride 50 ml @  100 mls/hr Q12 IVPB  Last administered on 


2/6/19at 09:09; Admin Dose 100 MLS/HR;  Start 2/1/19 at 15:00


Methylprednisolone Sodium Succinate (Solu-Medrol) 40 mg Q6 IV  Last administered


on 2/6/19at 06:22; Admin Dose 40 MG;  Start 2/2/19 at 12:00


Propofol 100 ml @  1.875 mls/ hr Q12H IV  Last administered on 2/4/19at 08:09; 


Admin Dose 15 MLS/HR;  Start 2/2/19 at 11:00


Fentanyl 100 ml @  2.5 mls/hr TITRATE IV  Last administered on 2/4/19at 09:17; 


Admin Dose 5 MLS/HR;  Start 2/2/19 at 11:30


Norepinephrine 250 ml @  1.875 mls/ hr TITRATE IV  Last administered on 2/3/19at


02:08; Admin Dose 11.25 MLS/HR;  Start 2/2/19 at 11:00


Alteplase, Recombinant (Cathflo (Activase)) 2 mg MAY REPEAT X1 PRN CATHETER IF 


CATHETER REMAINS OCCULUDED;  Start 2/2/19 at 11:00


Amlodipine Besylate (Norvasc) 5 mg DAILY PO  Last administered on 2/6/19at 09


:09; Admin Dose 5 MG;  Start 2/5/19 at 09:00


Albuterol/ Ipratropium (Duoneb) 3 ml Q6H RESP  THERAPY HHN  Last administered on


2/6/19at 08:23; Admin Dose 3 ML;  Start 2/4/19 at 20:00


Vancomycin HCl 1.5 gm/Dextrose 250 ml @  83.333 mls/ hr Q24H IVPB  Last 


administered on 2/6/19at 06:22; Admin Dose 83.333 MLS/HR;  Start 2/6/19 at 06:00





Assessment/Plan


Hospital Course (Demo Recall)


Assessment





1.  Hypoxic respiratory failure on mechanical ventilation, safely extubated on 


nasal cannula O2


2.  Pneumoperitoneum status post chest tube which patient pulled out


3.  Encephalopathy toxic metabolic resolving


4.  Advanced COPD


5.  Hypernatremia likely free water deficit


6.  Thrombocytopenia 





Plan





1.  Speech therapy recommendations and aspiration precautions


2.  Physical therapy as tolerated





Critical care time 40 minutes


Stable for transfer to telemetry











MIMI ZAVALA MD, [facility]      Feb 6, 2019 09:44

## 2019-02-06 NOTE — CONS
Assessment/Plan


Assessment/Plan


Hospital Course (Demo Recall)


IMP:


1.Hypotension-borderline norbasc held today. NL EF by echo this admit


2.resp failure s/p extubation


3.cardiac arrest


4.pneumoperitoneum-resolved


5.Positive troponin-now trended neg


6.renal failure-improved


7.Leukocytosis


8. anemia


9, Thrombocytopenia-ongoing some worsening


10.Resp failure-hypercapneic s/p intubation. s/p extubation


11.PNA-by cxr





Recc:


-IN ICU


-Continue norvasc and follow BP clsoely


-Continue asa


-continue steroids/bronchodilators


-Continue abx's and f/u cx data


-Continue risperdal


-follow MS closely


-Follow volume status clsoely





Consultation Date/Type/Reason


Admit Date/Time


Jan 22, 2019 at 00:03


Initial Consult Date


1/22/19


Type of Consult


Cardiology


Reason for Consultation


HTN


Requesting Provider:  DONALDO CARRILLO MD


Date/Time of Note


DATE: 2/6/19 


TIME: 09:57





Exam/Review of Systems


Vital Signs


Vitals





Vital Signs


  Date      Temp  Pulse  Resp  B/P (MAP)   Pulse Ox  O2          O2 Flow    FiO2


Time                                                 Delivery    Rate


    2/6/19           71    22                   100  Nasal             2.0


     08:23                                           Cannula


    2/6/19                         127/79


     06:00                           (95)


    2/6/19  97.8


     04:00


    2/4/19                                                                    35


     13:01








Intake and Output





2/5/19 2/5/19 2/6/19





1515:00


23:00


07:00





IntakeIntake Total


2150 ml


220 ml


780 ml





OutputOutput Total


1900 ml


1555 ml


1700 ml





BalanceBalance


250 ml


-1335 ml


-920 ml














Exam


Exam


Review of Systems:


CONSTITUTIONAL:  No fevers, chills.


PULMONARY:  No sob


CARDIOVASCULAR: No chest pain/palpitations


GASTROINTESTINAL:  No nausea/vomiting.


GENITOURINARY:  No hematuria/dysuria.


MUSCULOSKELETAL:  No myagias/arthalgias.


PSYCHIATRIC:  The patient denies depression.


NEUROLOGIC:   No weakness


Constitutional:  alert


Psych:  no complaints


Head:  normocephalic


ENMT:  mucosa pink and moist


Neck:  supple, jvd (9cm water)


Respiratory:  diminished breath sounds


Cardiovascular:  regular rate and rhythm


Gastrointestinal:  soft, non-tender


Musculoskeletal:  muscle tone (normal)


Extremities:  edema (none)


Neurological:  other (No focal deficits)





Labs


Result Diagram:  


2/6/19 0400                                                                     


          2/6/19 0400





Results 24hrs





Laboratory Tests


               Test
                                2/6/19
04:00


               White Blood Count                         2.9  #L


               Red Blood Count                           3.44  L


               Hemoglobin                                 9.6  L


               Hematocrit                                30.7  L


               Mean Corpuscular Volume                    89.2


               Mean Corpuscular Hemoglobin               27.9  L


               Mean Corpuscular Hemoglobin
Concent      31.3  L



               Red Cell Distribution Width               16.0  H


               Platelet Count                              69  L


               Mean Platelet Volume                      10.6  H


               Immature Granulocytes %                  0.700  H


               Neutrophils %                             82.2  H


               Lymphocytes %                             13.7  L


               Monocytes %                                 3.4


               Eosinophils %                               0.0


               Basophils %                                 0.0


               Nucleated Red Blood Cells %                 0.0


               Immature Granulocytes #                   0.020


               Neutrophils #                               2.4


               Lymphocytes #                              0.4  L


               Monocytes #                                0.1  L


               Eosinophils #                               0.0


               Basophils #                                 0.0


               Nucleated Red Blood Cells #                 0.0


               Sodium Level                               145  H


               Potassium Level                             4.1


               Chloride Level                              108


               Carbon Dioxide Level                        36  H


               Anion Gap                                    1  L


               Blood Urea Nitrogen                         35  H


               Creatinine                                 0.72


               Est Glomerular Filtrat Rate
mL/min   > 60  



               Glucose Level                               141


               Calcium Level                               9.3


               Phosphorus Level                            2.5


               Magnesium Level                             2.4








Medications


Medications





Current Medications


Ondansetron HCl (Zofran Inj) 4 mg Q6H  PRN IV NAUSEA AND/OR VOMITING Last 


administered on 2/1/19at 18:46; Admin Dose 4 MG;  Start 1/21/19 at 23:30


Acetaminophen (Tylenol Supp) 650 mg Q4H  PRN [place] PAIN LEVEL 1-3 OR FEVER;  Start 


1/21/19 at 23:30


Aspirin (Aspirin) 81 mg DAILY PO  Last administered on 2/6/19at 09:09; Admin 


Dose 81 MG;  Start 1/24/19 at 09:00


Famotidine (Pepcid) 20 mg Q12 PO  Last administered on 2/6/19at 09:09; Admin 


Dose 20 MG;  Start 1/24/19 at 21:00


Zolpidem Tartrate (Ambien) 5 mg HS  PRN PO INSOMNIA Last administered on 


2/5/19at 20:31; Admin Dose 5 MG;  Start 1/27/19 at 00:00


Guaifenesin/ Dextromethorphan (Robitussin Dm Liquid Cup) 5 ml Q6H  PRN PO COUGH 


Last administered on 1/31/19at 09:04; Admin Dose 5 ML;  Start 1/27/19 at 20:00


Divalproex Sodium (Depakote) 250 mg Q8H PO  Last administered on 2/6/19at 09:09;


Admin Dose 250 MG;  Start 1/28/19 at 17:00


Morphine Sulfate (morphine) 6 mg Q4H  PRN PO SEVERE PAIN LEVEL 7-10 Last admini


stered on 2/5/19at 02:47; Admin Dose 6 MG;  Start 1/30/19 at 21:30


Albuterol/ Ipratropium (Duoneb) 3 ml Q3H RESP THERAPY  PRN HHN SHORTNESS OF 


BREATH;  Start 1/31/19 at 20:00


Risperidone (Risperdal) 1 mg BID PO  Last administered on 2/6/19at 09:09; Admin 


Dose 1 MG;  Start 2/1/19 at 21:00


Vancomycin HCl (Vanco Iv Per Pharmacy) VANCOMYCIN PER PHARMACY PER PROTOCOL XX ;


 Start 2/1/19 at 15:00


Meropenem/Sodium Chloride 50 ml @  100 mls/hr Q12 IVPB  Last administered on 


2/6/19at 09:09; Admin Dose 100 MLS/HR;  Start 2/1/19 at 15:00


Methylprednisolone Sodium Succinate (Solu-Medrol) 40 mg Q6 IV  Last administered


on 2/6/19at 06:22; Admin Dose 40 MG;  Start 2/2/19 at 12:00


Propofol 100 ml @  1.875 mls/ hr Q12H IV  Last administered on 2/4/19at 08:09; 


Admin Dose 15 MLS/HR;  Start 2/2/19 at 11:00


Fentanyl 100 ml @  2.5 mls/hr TITRATE IV  Last administered on 2/4/19at 09:17; 


Admin Dose 5 MLS/HR;  Start 2/2/19 at 11:30


Norepinephrine 250 ml @  1.875 mls/ hr TITRATE IV  Last administered on 2/3/19at


02:08; Admin Dose 11.25 MLS/HR;  Start 2/2/19 at 11:00


Alteplase, Recombinant (Cathflo (Activase)) 2 mg MAY REPEAT X1 PRN CATHETER IF 


CATHETER REMAINS OCCULUDED;  Start 2/2/19 at 11:00


Amlodipine Besylate (Norvasc) 5 mg DAILY PO  Last administered on 2/6/19at 


09:09; Admin Dose 5 MG;  Start 2/5/19 at 09:00


Albuterol/ Ipratropium (Duoneb) 3 ml Q6H RESP  THERAPY HHN  Last administered on


2/6/19at 08:23; Admin Dose 3 ML;  Start 2/4/19 at 20:00


Vancomycin HCl 1.5 gm/Dextrose 250 ml @  83.333 mls/ hr Q24H IVPB  Last 


administered on 2/6/19at 06:22; Admin Dose 83.333 MLS/HR;  Start 2/6/19 at 06:00











CARISA IZAGUIRRE                Feb 6, 2019 10:02

## 2019-02-06 NOTE — CONS
Assessment/Plan


Assessment/Plan


Hospital Course (Demo Recall)


Patient is alert looks comfortable tolerates pured diet no fevers overnight WBC


2.9 platelets 69 neutrophils 82.2 BUN 35 creatinine 0.72





Antimicrobials: Vancomycin, meropenem





Indwelling: Right IJ triple-lumen catheter, Blake





Physical examination: Well-developed chronically ill-appearing cachectic elderly


man.  Head atraumatic normocephalic sclera nonicteric, neck is supple chest rise


symmetrical breath sounds diminished bases.  Heart: S1-S2.  Abdomen soft bowel 


sounds present.  Extremities without edema/cyanosis





Assessment:


1.  Acute hypoxemic respiratory failure, s/p extubated 


2.  Pneumonia, possibly aspirated 


4.  Pneumoperitoneum of unclear etiology, no perforation per surgical note, 


status post chest tube


5.  Dementia


5.  Anemia with progressive thrombocytopenia


6.  History of non-ST elevation MI


7.  Status post cardiac arrest


8.  RIJ TLC





Plan: Remains stable, DC vancomycin, continue aspiration precautions, repeat 


chest x-ray, pulmonary rec-s





Consultation Date/Type/Reason


Admit Date/Time


Jan 22, 2019 at 00:03


Initial Consult Date


1/22/19


Type of Consult


ID


Requesting Provider:  DONALDO CARRILLO MD


Date/Time of Note


DATE: 2/6/19 


TIME: 14:39





Exam/Review of Systems


Exam


Vitals





Vital Signs


  Date      Temp  Pulse  Resp  B/P (MAP)   Pulse Ox  O2          O2 Flow    FiO2


Time                                                 Delivery    Rate


    2/6/19           90    28                   100  Nasal             2.0


     13:30                                           Cannula


    2/6/19                         129/88


     11:00                          (102)


    2/6/19  97.4


     08:00


    2/4/19                                                                    35


     13:01








Intake and Output





2/5/19 2/5/19 2/6/19





1515:00


23:00


07:00





IntakeIntake Total


2150 ml


220 ml


780 ml





OutputOutput Total


1900 ml


1555 ml


1700 ml





BalanceBalance


250 ml


-1335 ml


-920 ml














Results


Result Diagram:  


2/6/19 0400                                                                     


          2/6/19 0400





Results 24hrs





Laboratory Tests


               Test
                                2/6/19
04:00


               White Blood Count                         2.9  #L


               Red Blood Count                           3.44  L


               Hemoglobin                                 9.6  L


               Hematocrit                                30.7  L


               Mean Corpuscular Volume                    89.2


               Mean Corpuscular Hemoglobin               27.9  L


               Mean Corpuscular Hemoglobin
Concent      31.3  L



               Red Cell Distribution Width               16.0  H


               Platelet Count                              69  L


               Mean Platelet Volume                      10.6  H


               Immature Granulocytes %                  0.700  H


               Neutrophils %                             82.2  H


               Lymphocytes %                             13.7  L


               Monocytes %                                 3.4


               Eosinophils %                               0.0


               Basophils %                                 0.0


               Nucleated Red Blood Cells %                 0.0


               Immature Granulocytes #                   0.020


               Neutrophils #                               2.4


               Lymphocytes #                              0.4  L


               Monocytes #                                0.1  L


               Eosinophils #                               0.0


               Basophils #                                 0.0


               Nucleated Red Blood Cells #                 0.0


               Sodium Level                               145  H


               Potassium Level                             4.1


               Chloride Level                              108


               Carbon Dioxide Level                        36  H


               Anion Gap                                    1  L


               Blood Urea Nitrogen                         35  H


               Creatinine                                 0.72


               Est Glomerular Filtrat Rate
mL/min   > 60  



               Glucose Level                               141


               Calcium Level                               9.3


               Phosphorus Level                            2.5


               Magnesium Level                             2.4








Medications


Medication





Current Medications


Ondansetron HCl (Zofran Inj) 4 mg Q6H  PRN IV NAUSEA AND/OR VOMITING Last 


administered on 2/1/19at 18:46; Admin Dose 4 MG;  Start 1/21/19 at 23:30


Acetaminophen (Tylenol Supp) 650 mg Q4H  PRN [place] PAIN LEVEL 1-3 OR FEVER;  Start 


1/21/19 at 23:30


Aspirin (Aspirin) 81 mg DAILY PO  Last administered on 2/6/19at 09:09; Admin 


Dose 81 MG;  Start 1/24/19 at 09:00


Famotidine (Pepcid) 20 mg Q12 PO  Last administered on 2/6/19at 09:09; Admin 


Dose 20 MG;  Start 1/24/19 at 21:00


Zolpidem Tartrate (Ambien) 5 mg HS  PRN PO INSOMNIA Last administered on 2 /5/19at 20:31; Admin Dose 5 MG;  Start 1/27/19 at 00:00


Guaifenesin/ Dextromethorphan (Robitussin Dm Liquid Cup) 5 ml Q6H  PRN PO COUGH 


Last administered on 1/31/19at 09:04; Admin Dose 5 ML;  Start 1/27/19 at 20:00


Divalproex Sodium (Depakote) 250 mg Q8H PO  Last administered on 2/6/19at 09:09;


Admin Dose 250 MG;  Start 1/28/19 at 17:00


Morphine Sulfate (morphine) 6 mg Q4H  PRN PO SEVERE PAIN LEVEL 7-10 Last 


administered on 2/5/19at 02:47; Admin Dose 6 MG;  Start 1/30/19 at 21:30


Albuterol/ Ipratropium (Duoneb) 3 ml Q3H RESP THERAPY  PRN HHN SHORTNESS OF 


BREATH;  Start 1/31/19 at 20:00


Risperidone (Risperdal) 1 mg BID PO  Last administered on 2/6/19at 09:09; Admin 


Dose 1 MG;  Start 2/1/19 at 21:00


Vancomycin HCl (Vanco Iv Per Pharmacy) VANCOMYCIN PER PHARMACY PER PROTOCOL XX ;


 Start 2/1/19 at 15:00


Meropenem/Sodium Chloride 50 ml @  100 mls/hr Q12 IVPB  Last administered on 


2/6/19at 09:09; Admin Dose 100 MLS/HR;  Start 2/1/19 at 15:00


Methylprednisolone Sodium Succinate (Solu-Medrol) 40 mg Q6 IV  Last administered


on 2/6/19at 12:23; Admin Dose 40 MG;  Start 2/2/19 at 12:00


Propofol 100 ml @  1.875 mls/ hr Q12H IV  Last administered on 2/4/19at 08:09; 


Admin Dose 15 MLS/HR;  Start 2/2/19 at 11:00


Fentanyl 100 ml @  2.5 mls/hr TITRATE IV  Last administered on 2/4/19at 09:17; 


Admin Dose 5 MLS/HR;  Start 2/2/19 at 11:30


Norepinephrine 250 ml @  1.875 mls/ hr TITRATE IV  Last administered on 2/3/19at


02:08; Admin Dose 11.25 MLS/HR;  Start 2/2/19 at 11:00


Alteplase, Recombinant (Cathflo (Activase)) 2 mg MAY REPEAT X1 PRN CATHETER IF 


CATHETER REMAINS OCCULUDED;  Start 2/2/19 at 11:00


Amlodipine Besylate (Norvasc) 5 mg DAILY PO  Last administered on 2/6/19at 


09:09; Admin Dose 5 MG;  Start 2/5/19 at 09:00


Albuterol/ Ipratropium (Duoneb) 3 ml Q6H RESP  THERAPY HHN  Last administered on


2/6/19at 13:30; Admin Dose 3 ML;  Start 2/4/19 at 20:00


Vancomycin HCl 1.5 gm/Dextrose 250 ml @  83.333 mls/ hr Q24H IVPB  Last 


administered on 2/6/19at 06:22; Admin Dose 83.333 MLS/HR;  Start 2/6/19 at 06:00


Miscellaneous Information (*Rx Drug Level Order Reminder*) VANCO TROUGH 2/7  @ 


0,500 ONCE  ONCE XX ;  Start 2/7/19 at 05:00;  Stop 2/7/19 at 05:01











DAVID VÁZQUEZ NP             Feb 6, 2019 14:41

## 2019-02-07 VITALS — DIASTOLIC BLOOD PRESSURE: 78 MMHG | HEART RATE: 79 BPM | SYSTOLIC BLOOD PRESSURE: 132 MMHG | RESPIRATION RATE: 24 BRPM

## 2019-02-07 VITALS — DIASTOLIC BLOOD PRESSURE: 105 MMHG | SYSTOLIC BLOOD PRESSURE: 136 MMHG | RESPIRATION RATE: 20 BRPM | HEART RATE: 120 BPM

## 2019-02-07 VITALS — RESPIRATION RATE: 34 BRPM | SYSTOLIC BLOOD PRESSURE: 110 MMHG | DIASTOLIC BLOOD PRESSURE: 76 MMHG | HEART RATE: 84 BPM

## 2019-02-07 VITALS — RESPIRATION RATE: 26 BRPM | SYSTOLIC BLOOD PRESSURE: 129 MMHG | HEART RATE: 90 BPM | DIASTOLIC BLOOD PRESSURE: 79 MMHG

## 2019-02-07 VITALS — SYSTOLIC BLOOD PRESSURE: 117 MMHG | RESPIRATION RATE: 24 BRPM | DIASTOLIC BLOOD PRESSURE: 81 MMHG | HEART RATE: 93 BPM

## 2019-02-07 VITALS — SYSTOLIC BLOOD PRESSURE: 135 MMHG | RESPIRATION RATE: 27 BRPM | HEART RATE: 99 BPM | DIASTOLIC BLOOD PRESSURE: 80 MMHG

## 2019-02-07 VITALS — DIASTOLIC BLOOD PRESSURE: 98 MMHG | SYSTOLIC BLOOD PRESSURE: 117 MMHG | RESPIRATION RATE: 17 BRPM | HEART RATE: 85 BPM

## 2019-02-07 VITALS — HEART RATE: 90 BPM | RESPIRATION RATE: 25 BRPM | SYSTOLIC BLOOD PRESSURE: 150 MMHG | DIASTOLIC BLOOD PRESSURE: 89 MMHG

## 2019-02-07 VITALS — HEART RATE: 88 BPM | DIASTOLIC BLOOD PRESSURE: 85 MMHG | SYSTOLIC BLOOD PRESSURE: 121 MMHG | RESPIRATION RATE: 32 BRPM

## 2019-02-07 VITALS — HEART RATE: 82 BPM | SYSTOLIC BLOOD PRESSURE: 110 MMHG | RESPIRATION RATE: 30 BRPM | DIASTOLIC BLOOD PRESSURE: 82 MMHG

## 2019-02-07 VITALS — DIASTOLIC BLOOD PRESSURE: 90 MMHG | RESPIRATION RATE: 25 BRPM | SYSTOLIC BLOOD PRESSURE: 123 MMHG | HEART RATE: 87 BPM

## 2019-02-07 VITALS — DIASTOLIC BLOOD PRESSURE: 87 MMHG | HEART RATE: 92 BPM | RESPIRATION RATE: 23 BRPM | SYSTOLIC BLOOD PRESSURE: 149 MMHG

## 2019-02-07 VITALS — DIASTOLIC BLOOD PRESSURE: 81 MMHG | SYSTOLIC BLOOD PRESSURE: 127 MMHG | RESPIRATION RATE: 25 BRPM | HEART RATE: 78 BPM

## 2019-02-07 VITALS — RESPIRATION RATE: 26 BRPM | HEART RATE: 87 BPM | SYSTOLIC BLOOD PRESSURE: 116 MMHG | DIASTOLIC BLOOD PRESSURE: 90 MMHG

## 2019-02-07 VITALS — HEART RATE: 96 BPM | DIASTOLIC BLOOD PRESSURE: 82 MMHG | RESPIRATION RATE: 23 BRPM | SYSTOLIC BLOOD PRESSURE: 118 MMHG

## 2019-02-07 VITALS — SYSTOLIC BLOOD PRESSURE: 110 MMHG | DIASTOLIC BLOOD PRESSURE: 75 MMHG | RESPIRATION RATE: 35 BRPM | HEART RATE: 88 BPM

## 2019-02-07 VITALS — SYSTOLIC BLOOD PRESSURE: 117 MMHG | DIASTOLIC BLOOD PRESSURE: 85 MMHG | HEART RATE: 86 BPM | RESPIRATION RATE: 21 BRPM

## 2019-02-07 VITALS — DIASTOLIC BLOOD PRESSURE: 67 MMHG | HEART RATE: 94 BPM | RESPIRATION RATE: 26 BRPM | SYSTOLIC BLOOD PRESSURE: 98 MMHG

## 2019-02-07 VITALS — DIASTOLIC BLOOD PRESSURE: 84 MMHG | SYSTOLIC BLOOD PRESSURE: 134 MMHG | HEART RATE: 96 BPM | RESPIRATION RATE: 27 BRPM

## 2019-02-07 VITALS — DIASTOLIC BLOOD PRESSURE: 132 MMHG | RESPIRATION RATE: 19 BRPM | SYSTOLIC BLOOD PRESSURE: 170 MMHG | HEART RATE: 105 BPM

## 2019-02-07 VITALS — DIASTOLIC BLOOD PRESSURE: 82 MMHG | SYSTOLIC BLOOD PRESSURE: 125 MMHG | RESPIRATION RATE: 28 BRPM | HEART RATE: 82 BPM

## 2019-02-07 VITALS — SYSTOLIC BLOOD PRESSURE: 135 MMHG | DIASTOLIC BLOOD PRESSURE: 84 MMHG | RESPIRATION RATE: 32 BRPM | HEART RATE: 93 BPM

## 2019-02-07 VITALS — RESPIRATION RATE: 26 BRPM | DIASTOLIC BLOOD PRESSURE: 90 MMHG | HEART RATE: 88 BPM | SYSTOLIC BLOOD PRESSURE: 128 MMHG

## 2019-02-07 VITALS — DIASTOLIC BLOOD PRESSURE: 98 MMHG | RESPIRATION RATE: 18 BRPM | HEART RATE: 77 BPM | SYSTOLIC BLOOD PRESSURE: 116 MMHG

## 2019-02-07 LAB
ABNORMAL IP MESSAGE: 1
ADD MAN DIFF?: NO
ANION GAP: 4 (ref 5–13)
BASOPHIL #: 0 10^3/UL (ref 0–0.1)
BASOPHILS %: 0 % (ref 0–2)
BLOOD UREA NITROGEN: 39 MG/DL (ref 7–20)
CALCIUM: 9.4 MG/DL (ref 8.4–10.2)
CARBON DIOXIDE: 38 MMOL/L (ref 21–31)
CHLORIDE: 105 MMOL/L (ref 97–110)
CREATININE: 0.8 MG/DL (ref 0.61–1.24)
EOSINOPHILS #: 0 10^3/UL (ref 0–0.5)
EOSINOPHILS %: 0 % (ref 0–7)
GLUCOSE: 131 MG/DL (ref 70–220)
HEMATOCRIT: 31 % (ref 42–52)
HEMOGLOBIN: 9.7 G/DL (ref 14–18)
IMMATURE GRANS #M: 0.04 10^3/UL (ref 0–0.03)
IMMATURE GRANS % (M): 1.2 % (ref 0–0.43)
LYMPHOCYTES #: 0.3 10^3/UL (ref 0.8–2.9)
LYMPHOCYTES %: 8.9 % (ref 15–51)
MAGNESIUM: 2.7 MG/DL (ref 1.7–2.5)
MEAN CORPUSCULAR HEMOGLOBIN: 28.3 PG (ref 29–33)
MEAN CORPUSCULAR HGB CONC: 31.3 G/DL (ref 32–37)
MEAN CORPUSCULAR VOLUME: 90.4 FL (ref 82–101)
MEAN PLATELET VOLUME: 10.5 FL (ref 7.4–10.4)
MONOCYTE #: 0.1 10^3/UL (ref 0.3–0.9)
MONOCYTES %: 3.1 % (ref 0–11)
NEUTROPHIL #: 2.8 10^3/UL (ref 1.6–7.5)
NEUTROPHILS %: 86.8 % (ref 39–77)
NUCLEATED RED BLOOD CELLS #: 0 10^3/UL (ref 0–0)
NUCLEATED RED BLOOD CELLS%: 0 /100WBC (ref 0–0)
PHOSPHORUS: 3 MG/DL (ref 2.5–4.9)
PLATELET COUNT: 66 10^3/UL (ref 140–415)
POSITIVE DIFF: (no result)
POTASSIUM: 4.2 MMOL/L (ref 3.5–5.1)
RED BLOOD COUNT: 3.43 10^6/UL (ref 4.7–6.1)
RED CELL DISTRIBUTION WIDTH: 15.9 % (ref 11.5–14.5)
SODIUM: 147 MMOL/L (ref 135–144)
WHITE BLOOD COUNT: 3.3 10^3/UL (ref 4.8–10.8)

## 2019-02-07 RX ADMIN — RISPERIDONE 1 MG: 1 TABLET ORAL at 20:15

## 2019-02-07 RX ADMIN — IPRATROPIUM BROMIDE AND ALBUTEROL SULFATE SCH ML: .5; 3 SOLUTION RESPIRATORY (INHALATION) at 08:19

## 2019-02-07 RX ADMIN — AMLODIPINE BESYLATE SCH MG: 5 TABLET ORAL at 09:17

## 2019-02-07 RX ADMIN — IPRATROPIUM BROMIDE AND ALBUTEROL SULFATE 1 ML: .5; 3 SOLUTION RESPIRATORY (INHALATION) at 20:00

## 2019-02-07 RX ADMIN — IPRATROPIUM BROMIDE AND ALBUTEROL SULFATE 1 ML: .5; 3 SOLUTION RESPIRATORY (INHALATION) at 18:47

## 2019-02-07 RX ADMIN — DIVALPROEX SODIUM 1 MG: 250 TABLET, DELAYED RELEASE ORAL at 17:00

## 2019-02-07 RX ADMIN — PROPOFOL SCH MLS/HR: 10 INJECTION, EMULSION INTRAVENOUS at 22:35

## 2019-02-07 RX ADMIN — FAMOTIDINE 1 MG: 20 TABLET ORAL at 09:17

## 2019-02-07 RX ADMIN — IPRATROPIUM BROMIDE AND ALBUTEROL SULFATE 1 ML: .5; 3 SOLUTION RESPIRATORY (INHALATION) at 14:55

## 2019-02-07 RX ADMIN — IPRATROPIUM BROMIDE AND ALBUTEROL SULFATE SCH ML: .5; 3 SOLUTION RESPIRATORY (INHALATION) at 14:55

## 2019-02-07 RX ADMIN — PROPOFOL 1 MLS/HR: 10 INJECTION, EMULSION INTRAVENOUS at 11:00

## 2019-02-07 RX ADMIN — METHYLPREDNISOLONE SODIUM SUCCINATE 1 MG: 40 INJECTION, POWDER, FOR SOLUTION INTRAMUSCULAR; INTRAVENOUS at 23:48

## 2019-02-07 RX ADMIN — LORAZEPAM 1 MG: 2 INJECTION, SOLUTION INTRAMUSCULAR; INTRAVENOUS at 02:17

## 2019-02-07 RX ADMIN — MEROPENEM 1 MLS/HR: 1 INJECTION, POWDER, FOR SOLUTION INTRAVENOUS at 20:21

## 2019-02-07 RX ADMIN — FAMOTIDINE SCH MG: 20 TABLET ORAL at 09:17

## 2019-02-07 RX ADMIN — IPRATROPIUM BROMIDE AND ALBUTEROL SULFATE 1 ML: .5; 3 SOLUTION RESPIRATORY (INHALATION) at 08:19

## 2019-02-07 RX ADMIN — LORAZEPAM PRN MG: 2 INJECTION, SOLUTION INTRAMUSCULAR; INTRAVENOUS at 02:17

## 2019-02-07 RX ADMIN — DIVALPROEX SODIUM SCH MG: 250 TABLET, DELAYED RELEASE ORAL at 09:17

## 2019-02-07 RX ADMIN — DIVALPROEX SODIUM SCH MG: 250 TABLET, DELAYED RELEASE ORAL at 00:00

## 2019-02-07 RX ADMIN — CASTOR OIL AND BALSAM, PERU 1 APPLIC: 788; 87 OINTMENT TOPICAL at 09:33

## 2019-02-07 RX ADMIN — METHYLPREDNISOLONE SODIUM SUCCINATE 1 MG: 40 INJECTION, POWDER, FOR SOLUTION INTRAMUSCULAR; INTRAVENOUS at 12:22

## 2019-02-07 RX ADMIN — PROPOFOL 1 MLS/HR: 10 INJECTION, EMULSION INTRAVENOUS at 22:35

## 2019-02-07 RX ADMIN — FAMOTIDINE 1 MG: 20 TABLET ORAL at 20:15

## 2019-02-07 RX ADMIN — IPRATROPIUM BROMIDE AND ALBUTEROL SULFATE 1 ML: .5; 3 SOLUTION RESPIRATORY (INHALATION) at 01:12

## 2019-02-07 RX ADMIN — MEROPENEM SCH MLS/HR: 1 INJECTION, POWDER, FOR SOLUTION INTRAVENOUS at 09:17

## 2019-02-07 RX ADMIN — ASPIRIN 81 MG SCH MG: 81 TABLET ORAL at 09:17

## 2019-02-07 RX ADMIN — METHYLPREDNISOLONE SODIUM SUCCINATE 1 MG: 40 INJECTION, POWDER, FOR SOLUTION INTRAMUSCULAR; INTRAVENOUS at 05:00

## 2019-02-07 RX ADMIN — LORAZEPAM PRN MG: 2 INJECTION, SOLUTION INTRAMUSCULAR; INTRAVENOUS at 16:10

## 2019-02-07 RX ADMIN — FAMOTIDINE SCH MG: 20 TABLET ORAL at 20:15

## 2019-02-07 RX ADMIN — RISPERIDONE 1 MG: 1 TABLET ORAL at 09:17

## 2019-02-07 RX ADMIN — METHYLPREDNISOLONE SODIUM SUCCINATE 1 MG: 40 INJECTION, POWDER, FOR SOLUTION INTRAMUSCULAR; INTRAVENOUS at 18:12

## 2019-02-07 RX ADMIN — DIVALPROEX SODIUM 1 MG: 250 TABLET, DELAYED RELEASE ORAL at 09:17

## 2019-02-07 RX ADMIN — MEROPENEM 1 MLS/HR: 1 INJECTION, POWDER, FOR SOLUTION INTRAVENOUS at 09:17

## 2019-02-07 RX ADMIN — DIVALPROEX SODIUM SCH MG: 250 TABLET, DELAYED RELEASE ORAL at 17:00

## 2019-02-07 RX ADMIN — IPRATROPIUM BROMIDE AND ALBUTEROL SULFATE SCH ML: .5; 3 SOLUTION RESPIRATORY (INHALATION) at 20:00

## 2019-02-07 RX ADMIN — LORAZEPAM 1 MG: 2 INJECTION, SOLUTION INTRAMUSCULAR; INTRAVENOUS at 16:10

## 2019-02-07 RX ADMIN — ASPIRIN 81 MG CHEWABLE TABLET 1 MG: 81 TABLET CHEWABLE at 09:17

## 2019-02-07 RX ADMIN — PROPOFOL SCH MLS/HR: 10 INJECTION, EMULSION INTRAVENOUS at 11:00

## 2019-02-07 RX ADMIN — MEROPENEM SCH MLS/HR: 1 INJECTION, POWDER, FOR SOLUTION INTRAVENOUS at 20:21

## 2019-02-07 RX ADMIN — POTASSIUM CHLORIDE SCH MLS/HR: 2 INJECTION, SOLUTION, CONCENTRATE INTRAVENOUS at 08:05

## 2019-02-07 RX ADMIN — AMLODIPINE BESYLATE 1 MG: 5 TABLET ORAL at 09:17

## 2019-02-07 RX ADMIN — ASCORBIC ACID, VITAMIN A PALMITATE, CHOLECALCIFEROL, THIAMINE HYDROCHLORIDE, RIBOFLAVIN-5 PHOSPHATE SODIUM, PYRIDOXINE HYDROCHLORIDE, NIACINAMIDE, DEXPANTHENOL, ALPHA-TOCOPHEROL ACETATE, VITAMIN K1, FOLIC ACID, BIOTIN, CYANOCOBALAMIN 1 MLS/HR: 200; 3300; 200; 6; 3.6; 6; 40; 15; 10; 150; 600; 60; 5 INJECTION, SOLUTION INTRAVENOUS at 08:05

## 2019-02-07 RX ADMIN — DIVALPROEX SODIUM 1 MG: 250 TABLET, DELAYED RELEASE ORAL at 00:00

## 2019-02-07 RX ADMIN — IPRATROPIUM BROMIDE AND ALBUTEROL SULFATE SCH ML: .5; 3 SOLUTION RESPIRATORY (INHALATION) at 01:12

## 2019-02-07 NOTE — CONS
Consult Date/Type/Reason


Admit Date/Time


Jan 22, 2019 at 00:03


Initial Consult Date


1/22/19


Type of Consult


Pulmonary


Requesting Provider:  DONALDO CARRILLO MD


Date/Time of Note


DATE: 2/7/19 


TIME: 11:02





Subjective


Patient agitated overnight but this morning is awake alert comfortable no 


respiratory distress.  No arrhythmias or hemodynamic instability.  Chest x-ray 


shows ongoing right lower lobe effusion versus infiltrate.





Objective





Vital Signs


  Date      Temp  Pulse  Resp  B/P (MAP)   Pulse Ox  O2          O2 Flow    FiO2


Time                                                 Delivery    Rate


    2/7/19           87    25      123/90        90  Nasal             4.0


     09:00                          (101)            Cannula


    2/7/19                                                                    35


     08:21


    2/7/19  97.3


     08:00








Intake and Output





2/6/19 2/6/19 2/7/19





1515:00


23:00


07:00





IntakeIntake Total


950 ml


625 ml


0 ml





OutputOutput Total


1770 ml


1430 ml


675 ml





BalanceBalance


-820 ml


-805 ml


-675 ml











Exam


GENERAL: Elderly gentleman comfortable at rest no acute distress


VITAL SIGNS:  per chart


NECK:  Supple.  No JVD or lymphadenopathy.


CARDIAC EXAM:  S1, S2. No added sounds or murmurs.


CHEST:  clear bilaterally, No added sounds, rales or wheezes


ABDOMEN:  Soft, nontender.  No guarding or rebound.


EXTREMITIES:  No cyanosis, clubbing or edema.


NEUROLOGIC:  Generalized weakness.  No focal deficits.





Vent Setting


Ventilator Support Mode:  SPONT


Fraction of Inspired Oxygen pe:  35


Positive End Expiratory Pressu:  5.0





Results/Medications


Result Diagram:  


2/7/19 0500                                                                     


          2/7/19 0500





Results 24 hrs





Laboratory Tests


               Test
                                2/7/19
05:00


               White Blood Count                          3.3  L


               Red Blood Count                           3.43  L


               Hemoglobin                                 9.7  L


               Hematocrit                                31.0  L


               Mean Corpuscular Volume                    90.4


               Mean Corpuscular Hemoglobin               28.3  L


               Mean Corpuscular Hemoglobin
Concent      31.3  L



               Red Cell Distribution Width               15.9  H


               Platelet Count                              66  L


               Mean Platelet Volume                      10.5  H


               Immature Granulocytes %                  1.200  H


               Neutrophils %                             86.8  H


               Lymphocytes %                              8.9  L


               Monocytes %                                 3.1


               Eosinophils %                               0.0


               Basophils %                                 0.0


               Nucleated Red Blood Cells %                 0.0


               Immature Granulocytes #                  0.040  H


               Neutrophils #                               2.8


               Lymphocytes #                              0.3  L


               Monocytes #                                0.1  L


               Eosinophils #                               0.0


               Basophils #                                 0.0


               Nucleated Red Blood Cells #                 0.0


               Sodium Level                               147  H


               Potassium Level                             4.2


               Chloride Level                              105


               Carbon Dioxide Level                        38  H


               Anion Gap                                    4  L


               Blood Urea Nitrogen                         39  H


               Creatinine                                 0.80


               Est Glomerular Filtrat Rate
mL/min   > 60  



               Glucose Level                               131


               Calcium Level                               9.4


               Phosphorus Level                            3.0


               Magnesium Level                            2.7  H





Medications





Current Medications


Ondansetron HCl (Zofran Inj) 4 mg Q6H  PRN IV NAUSEA AND/OR VOMITING Last 


administered on 2/1/19at 18:46; Admin Dose 4 MG;  Start 1/21/19 at 23:30


Acetaminophen (Tylenol Supp) 650 mg Q4H  PRN ND PAIN LEVEL 1-3 OR FEVER;  Start 


1/21/19 at 23:30


Aspirin (Aspirin) 81 mg DAILY PO  Last administered on 2/7/19at 09:17; Admin 


Dose 81 MG;  Start 1/24/19 at 09:00


Famotidine (Pepcid) 20 mg Q12 PO  Last administered on 2/7/19at 09:17; Admin 


Dose 20 MG;  Start 1/24/19 at 21:00


Zolpidem Tartrate (Ambien) 5 mg HS  PRN PO INSOMNIA Last administered on 


2/5/19at 20:31; Admin Dose 5 MG;  Start 1/27/19 at 00:00


Guaifenesin/ Dextromethorphan (Robitussin Dm Liquid Cup) 5 ml Q6H  PRN PO COUGH 


Last administered on 1/31/19at 09:04; Admin Dose 5 ML;  Start 1/27/19 at 20:00


Divalproex Sodium (Depakote) 250 mg Q8H PO  Last administered on 2/7/19at 09:17;


Admin Dose 250 MG;  Start 1/28/19 at 17:00


Morphine Sulfate (morphine) 6 mg Q4H  PRN PO SEVERE PAIN LEVEL 7-10 Last 


administered on 2/5/19at 02:47; Admin Dose 6 MG;  Start 1/30/19 at 21:30


Albuterol/ Ipratropium (Duoneb) 3 ml Q3H RESP THERAPY  PRN HHN SHORTNESS OF 


BREATH;  Start 1/31/19 at 20:00


Risperidone (Risperdal) 1 mg BID PO  Last administered on 2/7/19at 09:17; Admin 


Dose 1 MG;  Start 2/1/19 at 21:00


Meropenem/Sodium Chloride 50 ml @  100 mls/hr Q12 IVPB  Last administered on 


2/7/19at 09:17; Admin Dose 100 MLS/HR;  Start 2/1/19 at 15:00


Methylprednisolone Sodium Succinate (Solu-Medrol) 40 mg Q6 IV  Last administered


on 2/7/19at 05:00; Admin Dose 40 MG;  Start 2/2/19 at 12:00


Propofol 100 ml @  1.875 mls/ hr Q12H IV  Last administered on 2/4/19at 08:09; 


Admin Dose 15 MLS/HR;  Start 2/2/19 at 11:00


Fentanyl 100 ml @  2.5 mls/hr TITRATE IV  Last administered on 2/4/19at 09:17; 


Admin Dose 5 MLS/HR;  Start 2/2/19 at 11:30


Norepinephrine 250 ml @  1.875 mls/ hr TITRATE IV  Last administered on 2/3/19at


02:08; Admin Dose 11.25 MLS/HR;  Start 2/2/19 at 11:00


Alteplase, Recombinant (Cathflo (Activase)) 2 mg MAY REPEAT X1 PRN CATHETER IF 


CATHETER REMAINS OCCULUDED;  Start 2/2/19 at 11:00


Amlodipine Besylate (Norvasc) 5 mg DAILY PO  Last administered on 2/7/19at 


09:17; Admin Dose 5 MG;  Start 2/5/19 at 09:00


Albuterol/ Ipratropium (Duoneb) 3 ml Q6H RESP  THERAPY HHN  Last administered on


2/7/19at 08:19; Admin Dose 3 ML;  Start 2/4/19 at 20:00


Lorazepam (Ativan) 1 mg Q6H  PRN IV PSYCHOSIS Last administered on 2/7/19at 


02:17; Admin Dose 1 MG;  Start 2/7/19 at 02:00


Dextrose 1,000 ml @  40 mls/hr Q24H IV  Last administered on 2/7/19at 08:05; 


Admin Dose 40 MLS/HR;  Start 2/7/19 at 08:00





Assessment/Plan


Hospital Course (Demo Recall)


Assessment





1.  Hypoxic respiratory failure on mechanical ventilation, safely extubated on 


nasal cannula O2


2.  Pneumoperitoneum status post chest tube which patient pulled out


3.  Encephalopathy toxic metabolic resolving


4.  Advanced COPD


5.  Hypernatremia likely free water deficit


6.  Thrombocytopenia 





Plan





1.  Speech therapy recommendations and aspiration precautions


2.  Physical therapy as tolerated





Critical care time 40 minutes


Stable for transfer to telemetry











MIMI ZAVALA MD, [facility]      Feb 7, 2019 11:03

## 2019-02-07 NOTE — CONS
Assessment/Plan


Assessment/Plan


Hospital Course (Demo Recall)


Patient is awake looks comfortable.  He has lots of secretions and requires 


frequent spitting especially after food intake, still on pured diet.  He is 


afebrile.  WBC 3.3 platelets 66 neutrophils 86.8 BUN 39 creatinine 0.80





Chest x-ray this morning revealed no change





Antimicrobials: Meropenem





Indwelling: Right IJ triple-lumen catheter, Blake





Physical examination: Well-developed chronically ill-appearing cachectic elderly


man.  Head atraumatic normocephalic sclera nonicteric, neck is supple chest rise


symmetrical breath sounds diminished bases.  Heart: S1-S2.  Abdomen soft bowel 


sounds present.  Extremities without edema/cyanosis





Assessment:


1.  Acute hypoxemic respiratory failure, s/p extubated 


2.  Pneumonia, possibly aspirated 


4.  Pneumoperitoneum of unclear etiology, no perforation per surgical note, 


status post chest tube


5.  Dementia


5.  Anemia with progressive thrombocytopenia


6.  History of non-ST elevation MI


7.  Status post cardiac arrest


8.  RIJ TLC





Plan: Remains unchanged, continue aspiration precautions, follow pulmonary rec-s





Consultation Date/Type/Reason


Admit Date/Time


Jan 22, 2019 at 00:03


Initial Consult Date


1/22/19


Type of Consult


ID


Requesting Provider:  DONALDO CARRILLO MD


Date/Time of Note


DATE: 2/7/19 


TIME: 13:20





Exam/Review of Systems


Exam


Vitals





Vital Signs


  Date      Temp  Pulse  Resp  B/P (MAP)   Pulse Ox  O2          O2 Flow    FiO2


Time                                                 Delivery    Rate


    2/7/19           96


     12:00


    2/7/19                 26      129/79        97  Venturi           9.0


     11:00                           (96)            Mask


    2/7/19                                                                    35


     08:21


    2/7/19  97.3


     08:00








Intake and Output





2/6/19 2/6/19 2/7/19





1515:00


23:00


07:00





IntakeIntake Total


950 ml


625 ml


0 ml





OutputOutput Total


1770 ml


1430 ml


675 ml





BalanceBalance


-820 ml


-805 ml


-675 ml














Results


Result Diagram:  


2/7/19 0500                                                                     


          2/7/19 0500





Results 24hrs





Laboratory Tests


               Test
                                2/7/19
05:00


               White Blood Count                          3.3  L


               Red Blood Count                           3.43  L


               Hemoglobin                                 9.7  L


               Hematocrit                                31.0  L


               Mean Corpuscular Volume                    90.4


               Mean Corpuscular Hemoglobin               28.3  L


               Mean Corpuscular Hemoglobin
Concent      31.3  L



               Red Cell Distribution Width               15.9  H


               Platelet Count                              66  L


               Mean Platelet Volume                      10.5  H


               Immature Granulocytes %                  1.200  H


               Neutrophils %                             86.8  H


               Lymphocytes %                              8.9  L


               Monocytes %                                 3.1


               Eosinophils %                               0.0


               Basophils %                                 0.0


               Nucleated Red Blood Cells %                 0.0


               Immature Granulocytes #                  0.040  H


               Neutrophils #                               2.8


               Lymphocytes #                              0.3  L


               Monocytes #                                0.1  L


               Eosinophils #                               0.0


               Basophils #                                 0.0


               Nucleated Red Blood Cells #                 0.0


               Sodium Level                               147  H


               Potassium Level                             4.2


               Chloride Level                              105


               Carbon Dioxide Level                        38  H


               Anion Gap                                    4  L


               Blood Urea Nitrogen                         39  H


               Creatinine                                 0.80


               Est Glomerular Filtrat Rate
mL/min   > 60  



               Glucose Level                               131


               Calcium Level                               9.4


               Phosphorus Level                            3.0


               Magnesium Level                            2.7  H








Medications


Medication





Current Medications


Ondansetron HCl (Zofran Inj) 4 mg Q6H  PRN IV NAUSEA AND/OR VOMITING Last 


administered on 2/1/19at 18:46; Admin Dose 4 MG;  Start 1/21/19 at 23:30


Acetaminophen (Tylenol Supp) 650 mg Q4H  PRN [place] PAIN LEVEL 1-3 OR FEVER;  Start 


1/21/19 at 23:30


Aspirin (Aspirin) 81 mg DAILY PO  Last administered on 2/7/19at 09:17; Admin 


Dose 81 MG;  Start 1/24/19 at 09:00


Famotidine (Pepcid) 20 mg Q12 PO  Last administered on 2/7/19at 09:17; Admin 


Dose 20 MG;  Start 1/24/19 at 21:00


Zolpidem Tartrate (Ambien) 5 mg HS  PRN PO INSOMNIA Last administered on 


2/5/19at 20:31; Admin Dose 5 MG;  Start 1/27/19 at 00:00


Guaifenesin/ Dextromethorphan (Robitussin Dm Liquid Cup) 5 ml Q6H  PRN PO COUGH 


Last administered on 1/31/19at 09:04; Admin Dose 5 ML;  Start 1/27/19 at 20:00


Divalproex Sodium (Depakote) 250 mg Q8H PO  Last administered on 2/7/19at 09:17;


Admin Dose 250 MG;  Start 1/28/19 at 17:00


Morphine Sulfate (morphine) 6 mg Q4H  PRN PO SEVERE PAIN LEVEL 7-10 Last 


administered on 2/5/19at 02:47; Admin Dose 6 MG;  Start 1/30/19 at 21:30


Albuterol/ Ipratropium (Duoneb) 3 ml Q3H RESP THERAPY  PRN HHN SHORTNESS OF 


BREATH;  Start 1/31/19 at 20:00


Risperidone (Risperdal) 1 mg BID PO  Last administered on 2/7/19at 09:17; Admin 


Dose 1 MG;  Start 2/1/19 at 21:00


Meropenem/Sodium Chloride 50 ml @  100 mls/hr Q12 IVPB  Last administered on 


2/7/19at 09:17; Admin Dose 100 MLS/HR;  Start 2/1/19 at 15:00


Methylprednisolone Sodium Succinate (Solu-Medrol) 40 mg Q6 IV  Last administered


on 2/7/19at 12:22; Admin Dose 40 MG;  Start 2/2/19 at 12:00


Propofol 100 ml @  1.875 mls/ hr Q12H IV  Last administered on 2/4/19at 08:09; 


Admin Dose 15 MLS/HR;  Start 2/2/19 at 11:00


Fentanyl 100 ml @  2.5 mls/hr TITRATE IV  Last administered on 2/4/19at 09:17; 


Admin Dose 5 MLS/HR;  Start 2/2/19 at 11:30


Norepinephrine 250 ml @  1.875 mls/ hr TITRATE IV  Last administered on 2/3/19at


02:08; Admin Dose 11.25 MLS/HR;  Start 2/2/19 at 11:00


Alteplase, Recombinant (Cathflo (Activase)) 2 mg MAY REPEAT X1 PRN CATHETER IF 


CATHETER REMAINS OCCULUDED;  Start 2/2/19 at 11:00


Amlodipine Besylate (Norvasc) 5 mg DAILY PO  Last administered on 2/7/19at 


09:17; Admin Dose 5 MG;  Start 2/5/19 at 09:00


Albuterol/ Ipratropium (Duoneb) 3 ml Q6H RESP  THERAPY HHN  Last administered on


2/7/19at 08:19; Admin Dose 3 ML;  Start 2/4/19 at 20:00


Lorazepam (Ativan) 1 mg Q6H  PRN IV PSYCHOSIS Last administered on 2/7/19at 


02:17; Admin Dose 1 MG;  Start 2/7/19 at 02:00


Dextrose 1,000 ml @  40 mls/hr Q24H IV  Last administered on 2/7/19at 08:05; 


Admin Dose 40 MLS/HR;  Start 2/7/19 at 08:00











DAVID VÁZQUEZ NP             Feb 7, 2019 13:21

## 2019-02-07 NOTE — PN
Date/Time of Note


Date/Time of Note


DATE: 2/7/19 


TIME: 16:21





Assessment/Plan


VTE Prophylaxis


Risk score (from [facility] [place])>0 risk:  14


SCD applied (from [facility] [place]):  Yes


Pharmacological prophylaxis:  NA/contraindicated


Pharm contraindication:  other





Lines/Catheters


IV Catheter Type (from [facility]):  Central Line


Central line still needed:  Yes


Urinary Cath still in place:  Yes


Reason Cath still needed:  urinary retention





Assessment/Plan


Hospital Course


Patient is confused, continue on Venturi mask, pending swallow evaluation.


Assessment/Plan


-Hypoxic respiratory failure requiring mechanical ventilation, extubated. Dr. English is following in pulmonology consultation.


-Sepsis with shock, resolving.  Dr. Dreyer is following in infection disease 


consultation.


-Status post cardiac arrest.   Dr. Jon is following in cardiology 


consultation.


-Left-sided pneumothorax, status post left side chest tube placement, s/p self 


d/c CT.


-S/p pneumoperitoneum most likely due to cardiac arrest, resolved. Dr. Lai is 


following in general surgery consultation. 


-Left axillary and brachial DVT, LUE swelling subsided, was on Lovenox which is 


currently held due to thrombocytopenia


-Left lower lobe pneumonia


-Severe emphysema


-NATALIE with possible chronic kidney disease. Dr Hanson is following in nephrology


consultation.


-Schizoaffective disorder depressed type.  Psychiatric consult is appreciated, 


continue Risperdal Depakote





Critical care time spent more than 30 minutes.


Further recommendations based on clinical course.  Plan of care discussed with 


Dr. Miller.


Result Diagram:  


2/7/19 0500                                                                     


          2/7/19 0500





Results 24hrs





Laboratory Tests


               Test
                                2/7/19
05:00


               White Blood Count                          3.3  L


               Red Blood Count                           3.43  L


               Hemoglobin                                 9.7  L


               Hematocrit                                31.0  L


               Mean Corpuscular Volume                    90.4


               Mean Corpuscular Hemoglobin               28.3  L


               Mean Corpuscular Hemoglobin
Concent      31.3  L



               Red Cell Distribution Width               15.9  H


               Platelet Count                              66  L


               Mean Platelet Volume                      10.5  H


               Immature Granulocytes %                  1.200  H


               Neutrophils %                             86.8  H


               Lymphocytes %                              8.9  L


               Monocytes %                                 3.1


               Eosinophils %                               0.0


               Basophils %                                 0.0


               Nucleated Red Blood Cells %                 0.0


               Immature Granulocytes #                  0.040  H


               Neutrophils #                               2.8


               Lymphocytes #                              0.3  L


               Monocytes #                                0.1  L


               Eosinophils #                               0.0


               Basophils #                                 0.0


               Nucleated Red Blood Cells #                 0.0


               Sodium Level                               147  H


               Potassium Level                             4.2


               Chloride Level                              105


               Carbon Dioxide Level                        38  H


               Anion Gap                                    4  L


               Blood Urea Nitrogen                         39  H


               Creatinine                                 0.80


               Est Glomerular Filtrat Rate
mL/min   > 60  



               Glucose Level                               131


               Calcium Level                               9.4


               Phosphorus Level                            3.0


               Magnesium Level                            2.7  H








Exam/Review of Systems


Exam


Vitals





Vital Signs


  Date      Temp  Pulse  Resp  B/P (MAP)   Pulse Ox  O2          O2 Flow    FiO2


Time                                                 Delivery    Rate


    2/7/19           88


     16:00


    2/7/19                 23      149/87       100  Venturi           9.0


     15:00                          (107)            Mask


    2/7/19                                                                    35


     14:59


    2/7/19  97.2


     12:00








Intake and Output





2/6/19 2/6/19 2/7/19





1515:00


23:00


07:00





IntakeIntake Total


950 ml


625 ml


0 ml





OutputOutput Total


1770 ml


1430 ml


675 ml





BalanceBalance


-820 ml


-805 ml


-675 ml











Exam


Constitutional:  alert, oriented


Respiratory:  diminished breath sounds, wheezing


Cardiovascular:  regular rate and rhythm


Gastrointestinal:  soft, non-tender


Musculoskeletal:  nl extremities to inspection


Extremities:  normal pulses


Neurological:  nl mental status





Results


Results 24hrs





Laboratory Tests


               Test
                                2/7/19
05:00


               White Blood Count                          3.3  L


               Red Blood Count                           3.43  L


               Hemoglobin                                 9.7  L


               Hematocrit                                31.0  L


               Mean Corpuscular Volume                    90.4


               Mean Corpuscular Hemoglobin               28.3  L


               Mean Corpuscular Hemoglobin
Concent      31.3  L



               Red Cell Distribution Width               15.9  H


               Platelet Count                              66  L


               Mean Platelet Volume                      10.5  H


               Immature Granulocytes %                  1.200  H


               Neutrophils %                             86.8  H


               Lymphocytes %                              8.9  L


               Monocytes %                                 3.1


               Eosinophils %                               0.0


               Basophils %                                 0.0


               Nucleated Red Blood Cells %                 0.0


               Immature Granulocytes #                  0.040  H


               Neutrophils #                               2.8


               Lymphocytes #                              0.3  L


               Monocytes #                                0.1  L


               Eosinophils #                               0.0


               Basophils #                                 0.0


               Nucleated Red Blood Cells #                 0.0


               Sodium Level                               147  H


               Potassium Level                             4.2


               Chloride Level                              105


               Carbon Dioxide Level                        38  H


               Anion Gap                                    4  L


               Blood Urea Nitrogen                         39  H


               Creatinine                                 0.80


               Est Glomerular Filtrat Rate
mL/min   > 60  



               Glucose Level                               131


               Calcium Level                               9.4


               Phosphorus Level                            3.0


               Magnesium Level                            2.7  H








Medications


Medication





Current Medications


Ondansetron HCl (Zofran Inj) 4 mg Q6H  PRN IV NAUSEA AND/OR VOMITING Last 


administered on 2/1/19at 18:46; Admin Dose 4 MG;  Start 1/21/19 at 23:30


Acetaminophen (Tylenol Supp) 650 mg Q4H  PRN [place] PAIN LEVEL 1-3 OR FEVER;  Start 


1/21/19 at 23:30


Aspirin (Aspirin) 81 mg DAILY PO  Last administered on 2/7/19at 09:17; Admin 


Dose 81 MG;  Start 1/24/19 at 09:00


Famotidine (Pepcid) 20 mg Q12 PO  Last administered on 2/7/19at 09:17; Admin 


Dose 20 MG;  Start 1/24/19 at 21:00


Zolpidem Tartrate (Ambien) 5 mg HS  PRN PO INSOMNIA Last administered on 


2/5/19at 20:31; Admin Dose 5 MG;  Start 1/27/19 at 00:00


Guaifenesin/ Dextromethorphan (Robitussin Dm Liquid Cup) 5 ml Q6H  PRN PO COUGH 


Last administered on 1/31/19at 09:04; Admin Dose 5 ML;  Start 1/27/19 at 20:00


Divalproex Sodium (Depakote) 250 mg Q8H PO  Last administered on 2/7/19at 09:17;


Admin Dose 250 MG;  Start 1/28/19 at 17:00


Morphine Sulfate (morphine) 6 mg Q4H  PRN PO SEVERE PAIN LEVEL 7-10 Last 


administered on 2/5/19at 02:47; Admin Dose 6 MG;  Start 1/30/19 at 21:30


Albuterol/ Ipratropium (Duoneb) 3 ml Q3H RESP THERAPY  PRN HHN SHORTNESS OF 


BREATH;  Start 1/31/19 at 20:00


Risperidone (Risperdal) 1 mg BID PO  Last administered on 2/7/19at 09:17; Admin 


Dose 1 MG;  Start 2/1/19 at 21:00


Meropenem/Sodium Chloride 50 ml @  100 mls/hr Q12 IVPB  Last administered on 


2/7/19at 09:17; Admin Dose 100 MLS/HR;  Start 2/1/19 at 15:00


Methylprednisolone Sodium Succinate (Solu-Medrol) 40 mg Q6 IV  Last administered


on 2/7/19at 12:22; Admin Dose 40 MG;  Start 2/2/19 at 12:00


Propofol 100 ml @  1.875 mls/ hr Q12H IV  Last administered on 2/4/19at 08:09; 


Admin Dose 15 MLS/HR;  Start 2/2/19 at 11:00


Fentanyl 100 ml @  2.5 mls/hr TITRATE IV  Last administered on 2/4/19at 09:17; 


Admin Dose 5 MLS/HR;  Start 2/2/19 at 11:30


Norepinephrine 250 ml @  1.875 mls/ hr TITRATE IV  Last administered on 2/3/19at


02:08; Admin Dose 11.25 MLS/HR;  Start 2/2/19 at 11:00


Alteplase, Recombinant (Cathflo (Activase)) 2 mg MAY REPEAT X1 PRN CATHETER IF 


CATHETER REMAINS OCCULUDED;  Start 2/2/19 at 11:00


Amlodipine Besylate (Norvasc) 5 mg DAILY PO  Last administered on 2/7/19at 0


9:17; Admin Dose 5 MG;  Start 2/5/19 at 09:00


Albuterol/ Ipratropium (Duoneb) 3 ml Q6H RESP  THERAPY HHN  Last administered on


2/7/19at 14:55; Admin Dose 3 ML;  Start 2/4/19 at 20:00


Lorazepam (Ativan) 1 mg Q6H  PRN IV PSYCHOSIS Last administered on 2/7/19at 


16:10; Admin Dose 1 MG;  Start 2/7/19 at 02:00


Dextrose 1,000 ml @  40 mls/hr Q24H IV  Last administered on 2/7/19at 08:05; 


Admin Dose 40 MLS/HR;  Start 2/7/19 at 08:00











EVARISTO BELTRAN              Feb 7, 2019 16:23

## 2019-02-07 NOTE — CONS
Assessment/Plan


Assessment/Plan


Hospital Course (Demo Recall)


IMP:


1.Hypotension-improved today and tolerating norvasc. NL EF by echo this admit


2.resp failure s/p extubation


3.cardiac arrest


4.pneumoperitoneum-resolved


5.Positive troponin-now trended neg


6.renal failure-improved


7.Leukocytosis


8. anemia


9, Thrombocytopenia-ongoing some worsening


10.Resp failure-hypercapneic s/p intubation. s/p extubation


11.PNA-by cxr





Recc:


-IN ICU


-Continue norvasc and follow BP clsoely


-Continue asa


-continue steroids/bronchodilators


-Continue abx's and f/u cx data


-Continue risperdal


-follow MS closely


-Follow volume status clsoely





Consultation Date/Type/Reason


Admit Date/Time


Jan 22, 2019 at 00:03


Initial Consult Date


1/22/19


Type of Consult


Cardiology


Reason for Consultation


positive troponin


Requesting Provider:  DONALDO CARRILLO MD


Date/Time of Note


DATE: 2/7/19 


TIME: 11:07





Exam/Review of Systems


Vital Signs


Vitals





Vital Signs


  Date      Temp  Pulse  Resp  B/P (MAP)   Pulse Ox  O2          O2 Flow    FiO2


Time                                                 Delivery    Rate


    2/7/19           87    25      123/90        90  Nasal             4.0


     09:00                          (101)            Cannula


    2/7/19                                                                    35


     08:21


    2/7/19  97.3


     08:00








Intake and Output





2/6/19 2/6/19 2/7/19





1515:00


23:00


07:00





IntakeIntake Total


950 ml


625 ml


0 ml





OutputOutput Total


1770 ml


1430 ml


675 ml





BalanceBalance


-820 ml


-805 ml


-675 ml














Exam


Exam


Review of Systems:


CONSTITUTIONAL:  No fevers, chills.


PULMONARY:  No sob


CARDIOVASCULAR: No chest pain/palpitations


GASTROINTESTINAL:  No nausea/vomiting.


GENITOURINARY:  No hematuria/dysuria.


MUSCULOSKELETAL:  No myagias/arthalgias.


PSYCHIATRIC:  The patient denies depression.


NEUROLOGIC:   No weakness


Constitutional:  other (awake)


Psych:  no complaints


Head:  normocephalic


ENMT:  mucosa pink and moist


Neck:  supple, jvd (9 cm water)


Respiratory:  diminished breath sounds (at bases/BV)


Cardiovascular:  regular rate and rhythm


Gastrointestinal:  soft, non-tender


Musculoskeletal:  muscle tone (normal)


Extremities:  edema (trace/B)


Neurological:  other (No focal deficits)





Labs


Result Diagram:  


2/7/19 0500                                                                     


          2/7/19 0500





Results 24hrs





Laboratory Tests


               Test
                                2/7/19
05:00


               White Blood Count                          3.3  L


               Red Blood Count                           3.43  L


               Hemoglobin                                 9.7  L


               Hematocrit                                31.0  L


               Mean Corpuscular Volume                    90.4


               Mean Corpuscular Hemoglobin               28.3  L


               Mean Corpuscular Hemoglobin
Concent      31.3  L



               Red Cell Distribution Width               15.9  H


               Platelet Count                              66  L


               Mean Platelet Volume                      10.5  H


               Immature Granulocytes %                  1.200  H


               Neutrophils %                             86.8  H


               Lymphocytes %                              8.9  L


               Monocytes %                                 3.1


               Eosinophils %                               0.0


               Basophils %                                 0.0


               Nucleated Red Blood Cells %                 0.0


               Immature Granulocytes #                  0.040  H


               Neutrophils #                               2.8


               Lymphocytes #                              0.3  L


               Monocytes #                                0.1  L


               Eosinophils #                               0.0


               Basophils #                                 0.0


               Nucleated Red Blood Cells #                 0.0


               Sodium Level                               147  H


               Potassium Level                             4.2


               Chloride Level                              105


               Carbon Dioxide Level                        38  H


               Anion Gap                                    4  L


               Blood Urea Nitrogen                         39  H


               Creatinine                                 0.80


               Est Glomerular Filtrat Rate
mL/min   > 60  



               Glucose Level                               131


               Calcium Level                               9.4


               Phosphorus Level                            3.0


               Magnesium Level                            2.7  H








Medications


Medications





Current Medications


Ondansetron HCl (Zofran Inj) 4 mg Q6H  PRN IV NAUSEA AND/OR VOMITING Last 


administered on 2/1/19at 18:46; Admin Dose 4 MG;  Start 1/21/19 at 23:30


Acetaminophen (Tylenol Supp) 650 mg Q4H  PRN [place] PAIN LEVEL 1-3 OR FEVER;  Start 


1/21/19 at 23:30


Aspirin (Aspirin) 81 mg DAILY PO  Last administered on 2/7/19at 09:17; Admin 


Dose 81 MG;  Start 1/24/19 at 09:00


Famotidine (Pepcid) 20 mg Q12 PO  Last administered on 2/7/19at 09:17; Admin 


Dose 20 MG;  Start 1/24/19 at 21:00


Zolpidem Tartrate (Ambien) 5 mg HS  PRN PO INSOMNIA Last administered on 


2/5/19at 20:31; Admin Dose 5 MG;  Start 1/27/19 at 00:00


Guaifenesin/ Dextromethorphan (Robitussin Dm Liquid Cup) 5 ml Q6H  PRN PO COUGH 


Last administered on 1/31/19at 09:04; Admin Dose 5 ML;  Start 1/27/19 at 20:00


Divalproex Sodium (Depakote) 250 mg Q8H PO  Last administered on 2/7/19at 09:17;


Admin Dose 250 MG;  Start 1/28/19 at 17:00


Morphine Sulfate (morphine) 6 mg Q4H  PRN PO SEVERE PAIN LEVEL 7-10 Last 


administered on 2/5/19at 02:47; Admin Dose 6 MG;  Start 1/30/19 at 21:30


Albuterol/ Ipratropium (Duoneb) 3 ml Q3H RESP THERAPY  PRN HHN SHORTNESS OF 


BREATH;  Start 1/31/19 at 20:00


Risperidone (Risperdal) 1 mg BID PO  Last administered on 2/7/19at 09:17; Admin 


Dose 1 MG;  Start 2/1/19 at 21:00


Meropenem/Sodium Chloride 50 ml @  100 mls/hr Q12 IVPB  Last administered on 


2/7/19at 09:17; Admin Dose 100 MLS/HR;  Start 2/1/19 at 15:00


Methylprednisolone Sodium Succinate (Solu-Medrol) 40 mg Q6 IV  Last administered


on 2/7/19at 05:00; Admin Dose 40 MG;  Start 2/2/19 at 12:00


Propofol 100 ml @  1.875 mls/ hr Q12H IV  Last administered on 2/4/19at 08:09; 


Admin Dose 15 MLS/HR;  Start 2/2/19 at 11:00


Fentanyl 100 ml @  2.5 mls/hr TITRATE IV  Last administered on 2/4/19at 09:17; 


Admin Dose 5 MLS/HR;  Start 2/2/19 at 11:30


Norepinephrine 250 ml @  1.875 mls/ hr TITRATE IV  Last administered on 2/3/19at


02:08; Admin Dose 11.25 MLS/HR;  Start 2/2/19 at 11:00


Alteplase, Recombinant (Cathflo (Activase)) 2 mg MAY REPEAT X1 PRN CATHETER IF 


CATHETER REMAINS OCCULUDED;  Start 2/2/19 at 11:00


Amlodipine Besylate (Norvasc) 5 mg DAILY PO  Last administered on 2/7/19at 


09:17; Admin Dose 5 MG;  Start 2/5/19 at 09:00


Albuterol/ Ipratropium (Duoneb) 3 ml Q6H RESP  THERAPY HHN  Last administered on


2/7/19at 08:19; Admin Dose 3 ML;  Start 2/4/19 at 20:00


Lorazepam (Ativan) 1 mg Q6H  PRN IV PSYCHOSIS Last administered on 2/7/19at 


02:17; Admin Dose 1 MG;  Start 2/7/19 at 02:00


Dextrose 1,000 ml @  40 mls/hr Q24H IV  Last administered on 2/7/19at 08:05; 


Admin Dose 40 MLS/HR;  Start 2/7/19 at 08:00











CARISA IZAGUIRRE                Feb 7, 2019 11:09

## 2019-02-07 NOTE — PN
DATE:  02/07/2019

 

 

SUBJECTIVE:  The patient was agitated overnight, was given Ativan.  The patient has been lethargic.  
No other events noted.  No hemoptysis, hematemesis, or hematochezia.

 

OBJECTIVE:

VITAL SIGNS:  Blood pressure is 132/78, respirations 24, pulse 79, temperature 97.9.

HEENT:  Head is normocephalic.

NECK:  Supple.

HEART:  Regular rate.

LUNGS:  Showed decreased breath sounds at the base.

ABDOMEN:  Soft, nontender to palpation without rebound or guarding.

EXTREMITIES:  Negative for clubbing, cyanosis.  Trace edema.

DERMATOLOGIC:  No rashes.

MUSCULOSKELETAL:  No joint effusion.

NEUROLOGIC:  No change in exam.

 

MEDICATIONS:  Reviewed.

 

LABORATORY DATA:  Shows sodium 147, potassium 4.2, BUN 39, creatinine 0.80.  White count 3.3, hemoglo
bin 9.7, platelet count is 66.

 

IMAGING:  The patient's chest x-ray on 02/05/2019 was reviewed.

 

ASSESSMENT AND PLAN:

1.  Nonoliguric acute kidney injury with unknown baseline creatinine.  Etiology is secondary to hemod
ynamics.  Renal function is improved.  Continue current treatment plans, supportive care, renally dos
e all medicines.

2.  Hypernatremia.  The patient has a free water deficit of approximately 1.5 liters.  We will start 
the patient on D5 water at 50 mL an hour.  Encourage free water intake.

3.  Anemia.  Continue to monitor hemoglobin and hematocrit levels.

4.  Mineral bone disorder, monitor calcium and phosphorus levels.

5.  Diastolic heart failure.  The patient appears near euvolemic, monitor closely.  Give intermittent
 diuretics as needed.

6.  Status post shock secondary to pneumonia.  The patient is completing antibiotic course.

7.  Status post cardiac arrest.

8.  Status post pneumothorax.

9.  Acute encephalopathy, etiology is toxic metabolic.  Continue to monitor.

 

 

Dictated By: MARYAN CHAMPAGNE DO

 

NR/NTS

DD:    02/07/2019 07:34:58

DT:    02/07/2019 07:52:06

Conf#: [id number]

DID#:  [id number]

CC: KARLO DUDLEY NP; DONALDO CARRILLO MD; MAURI GRIGSBY MD;*EndCC*

## 2019-02-08 VITALS — DIASTOLIC BLOOD PRESSURE: 96 MMHG | RESPIRATION RATE: 26 BRPM | SYSTOLIC BLOOD PRESSURE: 132 MMHG | HEART RATE: 89 BPM

## 2019-02-08 VITALS — HEART RATE: 88 BPM | RESPIRATION RATE: 21 BRPM | SYSTOLIC BLOOD PRESSURE: 134 MMHG | DIASTOLIC BLOOD PRESSURE: 84 MMHG

## 2019-02-08 VITALS — RESPIRATION RATE: 23 BRPM | SYSTOLIC BLOOD PRESSURE: 130 MMHG | DIASTOLIC BLOOD PRESSURE: 87 MMHG | HEART RATE: 91 BPM

## 2019-02-08 VITALS — RESPIRATION RATE: 20 BRPM | DIASTOLIC BLOOD PRESSURE: 83 MMHG | SYSTOLIC BLOOD PRESSURE: 140 MMHG | HEART RATE: 92 BPM

## 2019-02-08 VITALS — HEART RATE: 95 BPM | SYSTOLIC BLOOD PRESSURE: 127 MMHG | RESPIRATION RATE: 26 BRPM | DIASTOLIC BLOOD PRESSURE: 79 MMHG

## 2019-02-08 VITALS — HEART RATE: 90 BPM

## 2019-02-08 VITALS — RESPIRATION RATE: 28 BRPM | HEART RATE: 89 BPM | SYSTOLIC BLOOD PRESSURE: 127 MMHG | DIASTOLIC BLOOD PRESSURE: 89 MMHG

## 2019-02-08 VITALS — SYSTOLIC BLOOD PRESSURE: 121 MMHG | HEART RATE: 86 BPM | DIASTOLIC BLOOD PRESSURE: 82 MMHG | RESPIRATION RATE: 28 BRPM

## 2019-02-08 VITALS — DIASTOLIC BLOOD PRESSURE: 104 MMHG | HEART RATE: 102 BPM | RESPIRATION RATE: 23 BRPM | SYSTOLIC BLOOD PRESSURE: 147 MMHG

## 2019-02-08 VITALS — DIASTOLIC BLOOD PRESSURE: 94 MMHG | SYSTOLIC BLOOD PRESSURE: 139 MMHG | RESPIRATION RATE: 24 BRPM | HEART RATE: 100 BPM

## 2019-02-08 VITALS — DIASTOLIC BLOOD PRESSURE: 85 MMHG | SYSTOLIC BLOOD PRESSURE: 123 MMHG | RESPIRATION RATE: 26 BRPM | HEART RATE: 88 BPM

## 2019-02-08 VITALS — DIASTOLIC BLOOD PRESSURE: 89 MMHG | RESPIRATION RATE: 25 BRPM | SYSTOLIC BLOOD PRESSURE: 124 MMHG | HEART RATE: 86 BPM

## 2019-02-08 VITALS — SYSTOLIC BLOOD PRESSURE: 135 MMHG | DIASTOLIC BLOOD PRESSURE: 87 MMHG | RESPIRATION RATE: 25 BRPM | HEART RATE: 95 BPM

## 2019-02-08 VITALS — SYSTOLIC BLOOD PRESSURE: 125 MMHG | RESPIRATION RATE: 24 BRPM | DIASTOLIC BLOOD PRESSURE: 83 MMHG | HEART RATE: 88 BPM

## 2019-02-08 VITALS — HEART RATE: 82 BPM | DIASTOLIC BLOOD PRESSURE: 83 MMHG | RESPIRATION RATE: 23 BRPM | SYSTOLIC BLOOD PRESSURE: 119 MMHG

## 2019-02-08 VITALS — SYSTOLIC BLOOD PRESSURE: 152 MMHG | HEART RATE: 100 BPM | DIASTOLIC BLOOD PRESSURE: 92 MMHG | RESPIRATION RATE: 23 BRPM

## 2019-02-08 VITALS — RESPIRATION RATE: 27 BRPM | SYSTOLIC BLOOD PRESSURE: 135 MMHG | DIASTOLIC BLOOD PRESSURE: 106 MMHG | HEART RATE: 93 BPM

## 2019-02-08 VITALS — SYSTOLIC BLOOD PRESSURE: 115 MMHG | HEART RATE: 81 BPM | RESPIRATION RATE: 25 BRPM | DIASTOLIC BLOOD PRESSURE: 77 MMHG

## 2019-02-08 VITALS — HEART RATE: 95 BPM | DIASTOLIC BLOOD PRESSURE: 77 MMHG | SYSTOLIC BLOOD PRESSURE: 145 MMHG | RESPIRATION RATE: 25 BRPM

## 2019-02-08 VITALS — SYSTOLIC BLOOD PRESSURE: 111 MMHG | RESPIRATION RATE: 26 BRPM | DIASTOLIC BLOOD PRESSURE: 78 MMHG | HEART RATE: 88 BPM

## 2019-02-08 VITALS — HEART RATE: 95 BPM | SYSTOLIC BLOOD PRESSURE: 142 MMHG | DIASTOLIC BLOOD PRESSURE: 77 MMHG | RESPIRATION RATE: 26 BRPM

## 2019-02-08 VITALS — RESPIRATION RATE: 19 BRPM | HEART RATE: 92 BPM | SYSTOLIC BLOOD PRESSURE: 125 MMHG | DIASTOLIC BLOOD PRESSURE: 82 MMHG

## 2019-02-08 VITALS — SYSTOLIC BLOOD PRESSURE: 137 MMHG | HEART RATE: 91 BPM | RESPIRATION RATE: 25 BRPM | DIASTOLIC BLOOD PRESSURE: 87 MMHG

## 2019-02-08 VITALS — RESPIRATION RATE: 25 BRPM | HEART RATE: 94 BPM | SYSTOLIC BLOOD PRESSURE: 121 MMHG | DIASTOLIC BLOOD PRESSURE: 83 MMHG

## 2019-02-08 VITALS — HEART RATE: 93 BPM

## 2019-02-08 LAB
ABNORMAL IP MESSAGE: 1
ADD MAN DIFF?: NO
ANION GAP: -3 (ref 5–13)
BASOPHIL #: 0 10^3/UL (ref 0–0.1)
BASOPHILS %: 0 % (ref 0–2)
BLOOD UREA NITROGEN: 35 MG/DL (ref 7–20)
CALCIUM: 9.5 MG/DL (ref 8.4–10.2)
CARBON DIOXIDE: 40 MMOL/L (ref 21–31)
CHLORIDE: 113 MMOL/L (ref 97–110)
CREATININE: 0.71 MG/DL (ref 0.61–1.24)
EOSINOPHILS #: 0 10^3/UL (ref 0–0.5)
EOSINOPHILS %: 0 % (ref 0–7)
GLUCOSE: 137 MG/DL (ref 70–220)
HEMATOCRIT: 32 % (ref 42–52)
HEMOGLOBIN: 10 G/DL (ref 14–18)
IMMATURE GRANS #M: 0.03 10^3/UL (ref 0–0.03)
IMMATURE GRANS % (M): 0.9 % (ref 0–0.43)
LYMPHOCYTES #: 0.3 10^3/UL (ref 0.8–2.9)
LYMPHOCYTES %: 8.7 % (ref 15–51)
MAGNESIUM: 2.7 MG/DL (ref 1.7–2.5)
MEAN CORPUSCULAR HEMOGLOBIN: 28.2 PG (ref 29–33)
MEAN CORPUSCULAR HGB CONC: 31.3 G/DL (ref 32–37)
MEAN CORPUSCULAR VOLUME: 90.1 FL (ref 82–101)
MEAN PLATELET VOLUME: 10.8 FL (ref 7.4–10.4)
MONOCYTE #: 0.1 10^3/UL (ref 0.3–0.9)
MONOCYTES %: 2.4 % (ref 0–11)
NEUTROPHIL #: 2.9 10^3/UL (ref 1.6–7.5)
NEUTROPHILS %: 88 % (ref 39–77)
NUCLEATED RED BLOOD CELLS #: 0 10^3/UL (ref 0–0)
NUCLEATED RED BLOOD CELLS%: 0 /100WBC (ref 0–0)
PHOSPHORUS: 2.9 MG/DL (ref 2.5–4.9)
PLATELET COUNT: 69 10^3/UL (ref 140–415)
POSITIVE DIFF: (no result)
POTASSIUM: 4 MMOL/L (ref 3.5–5.1)
RED BLOOD COUNT: 3.55 10^6/UL (ref 4.7–6.1)
RED CELL DISTRIBUTION WIDTH: 16.1 % (ref 11.5–14.5)
SODIUM: 150 MMOL/L (ref 135–144)
SODIUM: 150 MMOL/L (ref 135–144)
WHITE BLOOD COUNT: 3.3 10^3/UL (ref 4.8–10.8)

## 2019-02-08 RX ADMIN — MEROPENEM SCH MLS/HR: 1 INJECTION, POWDER, FOR SOLUTION INTRAVENOUS at 20:17

## 2019-02-08 RX ADMIN — DIVALPROEX SODIUM 1 MG: 250 TABLET, DELAYED RELEASE ORAL at 00:15

## 2019-02-08 RX ADMIN — ASCORBIC ACID, VITAMIN A PALMITATE, CHOLECALCIFEROL, THIAMINE HYDROCHLORIDE, RIBOFLAVIN-5 PHOSPHATE SODIUM, PYRIDOXINE HYDROCHLORIDE, NIACINAMIDE, DEXPANTHENOL, ALPHA-TOCOPHEROL ACETATE, VITAMIN K1, FOLIC ACID, BIOTIN, CYANOCOBALAMIN 1 MLS/HR: 200; 3300; 200; 6; 3.6; 6; 40; 15; 10; 150; 600; 60; 5 INJECTION, SOLUTION INTRAVENOUS at 05:05

## 2019-02-08 RX ADMIN — MEROPENEM 1 MLS/HR: 1 INJECTION, POWDER, FOR SOLUTION INTRAVENOUS at 20:17

## 2019-02-08 RX ADMIN — METHYLPREDNISOLONE SODIUM SUCCINATE 1 MG: 40 INJECTION, POWDER, FOR SOLUTION INTRAMUSCULAR; INTRAVENOUS at 11:34

## 2019-02-08 RX ADMIN — CASTOR OIL AND BALSAM, PERU 1 APPLIC: 788; 87 OINTMENT TOPICAL at 10:20

## 2019-02-08 RX ADMIN — METHYLPREDNISOLONE SODIUM SUCCINATE 1 MG: 40 INJECTION, POWDER, FOR SOLUTION INTRAMUSCULAR; INTRAVENOUS at 18:35

## 2019-02-08 RX ADMIN — IPRATROPIUM BROMIDE AND ALBUTEROL SULFATE 1 ML: .5; 3 SOLUTION RESPIRATORY (INHALATION) at 01:37

## 2019-02-08 RX ADMIN — ASPIRIN 81 MG SCH MG: 81 TABLET ORAL at 10:46

## 2019-02-08 RX ADMIN — IPRATROPIUM BROMIDE AND ALBUTEROL SULFATE SCH ML: .5; 3 SOLUTION RESPIRATORY (INHALATION) at 19:39

## 2019-02-08 RX ADMIN — IPRATROPIUM BROMIDE AND ALBUTEROL SULFATE SCH ML: .5; 3 SOLUTION RESPIRATORY (INHALATION) at 01:37

## 2019-02-08 RX ADMIN — AMLODIPINE BESYLATE SCH MG: 5 TABLET ORAL at 09:00

## 2019-02-08 RX ADMIN — PROPOFOL SCH MLS/HR: 10 INJECTION, EMULSION INTRAVENOUS at 10:20

## 2019-02-08 RX ADMIN — LORAZEPAM PRN MG: 2 INJECTION, SOLUTION INTRAMUSCULAR; INTRAVENOUS at 03:40

## 2019-02-08 RX ADMIN — AMLODIPINE BESYLATE 1 MG: 5 TABLET ORAL at 09:00

## 2019-02-08 RX ADMIN — IPRATROPIUM BROMIDE AND ALBUTEROL SULFATE SCH ML: .5; 3 SOLUTION RESPIRATORY (INHALATION) at 14:36

## 2019-02-08 RX ADMIN — ASCORBIC ACID, VITAMIN A PALMITATE, CHOLECALCIFEROL, THIAMINE HYDROCHLORIDE, RIBOFLAVIN-5 PHOSPHATE SODIUM, PYRIDOXINE HYDROCHLORIDE, NIACINAMIDE, DEXPANTHENOL, ALPHA-TOCOPHEROL ACETATE, VITAMIN K1, FOLIC ACID, BIOTIN, CYANOCOBALAMIN 1 MLS/HR: 200; 3300; 200; 6; 3.6; 6; 40; 15; 10; 150; 600; 60; 5 INJECTION, SOLUTION INTRAVENOUS at 20:17

## 2019-02-08 RX ADMIN — DIVALPROEX SODIUM SCH MG: 250 TABLET, DELAYED RELEASE ORAL at 00:15

## 2019-02-08 RX ADMIN — DIVALPROEX SODIUM 1 MG: 250 TABLET, DELAYED RELEASE ORAL at 10:35

## 2019-02-08 RX ADMIN — DIVALPROEX SODIUM SCH MG: 250 TABLET, DELAYED RELEASE ORAL at 10:35

## 2019-02-08 RX ADMIN — MORPHINE SULFATE 1 MG: 10 SOLUTION ORAL at 22:18

## 2019-02-08 RX ADMIN — RISPERIDONE 1 MG: 1 TABLET ORAL at 20:17

## 2019-02-08 RX ADMIN — IPRATROPIUM BROMIDE AND ALBUTEROL SULFATE 1 ML: .5; 3 SOLUTION RESPIRATORY (INHALATION) at 19:39

## 2019-02-08 RX ADMIN — MEROPENEM 1 MLS/HR: 1 INJECTION, POWDER, FOR SOLUTION INTRAVENOUS at 10:20

## 2019-02-08 RX ADMIN — METHYLPREDNISOLONE SODIUM SUCCINATE 1 MG: 40 INJECTION, POWDER, FOR SOLUTION INTRAMUSCULAR; INTRAVENOUS at 05:05

## 2019-02-08 RX ADMIN — PROPOFOL SCH MLS/HR: 10 INJECTION, EMULSION INTRAVENOUS at 23:00

## 2019-02-08 RX ADMIN — MEROPENEM SCH MLS/HR: 1 INJECTION, POWDER, FOR SOLUTION INTRAVENOUS at 10:20

## 2019-02-08 RX ADMIN — FAMOTIDINE SCH MG: 20 TABLET ORAL at 10:49

## 2019-02-08 RX ADMIN — DIVALPROEX SODIUM SCH MG: 250 TABLET, DELAYED RELEASE ORAL at 16:31

## 2019-02-08 RX ADMIN — FAMOTIDINE 1 MG: 20 TABLET ORAL at 20:17

## 2019-02-08 RX ADMIN — IPRATROPIUM BROMIDE AND ALBUTEROL SULFATE 1 ML: .5; 3 SOLUTION RESPIRATORY (INHALATION) at 08:45

## 2019-02-08 RX ADMIN — ASPIRIN 81 MG CHEWABLE TABLET 1 MG: 81 TABLET CHEWABLE at 10:46

## 2019-02-08 RX ADMIN — RISPERIDONE 1 MG: 1 TABLET ORAL at 10:35

## 2019-02-08 RX ADMIN — POTASSIUM CHLORIDE SCH MLS/HR: 2 INJECTION, SOLUTION, CONCENTRATE INTRAVENOUS at 05:05

## 2019-02-08 RX ADMIN — FAMOTIDINE 1 MG: 20 TABLET ORAL at 10:49

## 2019-02-08 RX ADMIN — PROPOFOL 1 MLS/HR: 10 INJECTION, EMULSION INTRAVENOUS at 23:00

## 2019-02-08 RX ADMIN — PROPOFOL 1 MLS/HR: 10 INJECTION, EMULSION INTRAVENOUS at 10:20

## 2019-02-08 RX ADMIN — DIVALPROEX SODIUM 1 MG: 250 TABLET, DELAYED RELEASE ORAL at 16:31

## 2019-02-08 RX ADMIN — POTASSIUM CHLORIDE SCH MLS/HR: 2 INJECTION, SOLUTION, CONCENTRATE INTRAVENOUS at 20:17

## 2019-02-08 RX ADMIN — LORAZEPAM 1 MG: 2 INJECTION, SOLUTION INTRAMUSCULAR; INTRAVENOUS at 03:40

## 2019-02-08 RX ADMIN — FAMOTIDINE SCH MG: 20 TABLET ORAL at 20:17

## 2019-02-08 RX ADMIN — IPRATROPIUM BROMIDE AND ALBUTEROL SULFATE 1 ML: .5; 3 SOLUTION RESPIRATORY (INHALATION) at 14:36

## 2019-02-08 RX ADMIN — MORPHINE SULFATE PRN MG: 10 SOLUTION ORAL at 22:18

## 2019-02-08 RX ADMIN — IPRATROPIUM BROMIDE AND ALBUTEROL SULFATE SCH ML: .5; 3 SOLUTION RESPIRATORY (INHALATION) at 08:45

## 2019-02-08 NOTE — PN
DATE:  02/08/2019

 

 

SUBJECTIVE:  The patient remains lethargic.  The patient is currently n.p.o. as he failed his swallow
 evaluation.  The patient remains on Venturi mask.  No other events noted.

 

OBJECTIVE:

VITAL SIGNS:  Blood pressure is 132/96, respirations 26, pulse 89, temperature 98.1.

HEENT:  Head is normocephalic.

NECK:  Supple.

HEART:  Regular rate.

LUNGS:  Show diminished breath sounds at the base.

ABDOMEN:  Soft, nontender to palpation.  No rebound or guarding.

EXTREMITIES:  Negative for clubbing, cyanosis.  No edema.

DERMATOLOGIC:  No rashes.

MUSCULOSKELETAL:  No joint effusions.

NEUROLOGIC:  No change in exam.

 

MEDICATIONS:  The patient's medications have been reviewed.

 

LABORATORY DATA:  Shows sodium 150, potassium 4.0, bicarbonate 40, BUN 35, creatinine 0.71.  White co
unt 3.3, hemoglobin 10.0, platelet count is 69.

 

ASSESSMENT AND PLAN:

1.  Nonoliguric acute kidney injury with unknown baseline creatinine.  Etiology is secondary to hemod
ynamics.  The patient's renal function has improved.  The patient does have underlying azotemia secon
zunilda to acute kidney injury, hypercatabolic state.  We will continue to monitor closely.  Continue nicholson
pportive care, renally dose all medicines.

2.  Hypernatremia, the patient has a free water deficit of approximately 2.5 liters.  We will increas
e D5 water at 75 mL an hour, monitor serum sodium levels closely.

3.  Anemia.  Continue to monitor hemoglobin and hematocrit levels.

4.  Mineral bone disorder, monitor calcium and phosphorus levels.

5.  Diastolic heart failure.  The patient appears compensated.  We will continue to monitor, give int
ermittent diuretic therapy as needed.

6.  Alkalosis.  Etiology is likely compensatory due to underlying hypercapnia.  We will continue to m
onitor.  Consider repeating ABG.

7.  Acute hypoxemic, hypercapnic respiratory failure.  The patient remains on Venturi facemask.  Cont
inue to monitor.  Follow up with pulmonary.  May require reintubation.

8.  Sepsis, status post shock secondary to pneumonia.  Continue current antibiotic regimen.

9.  Acute encephalopathy, etiology is toxic metabolic.  Continue to monitor.

10.  Status post cardiac arrest.

11.  Status post pneumothorax.

12.  Status post pneumoperitoneum.

13.  Left brachial deep vein thrombosis.  Continue medical management.

 

 

Dictated By: MARYAN RUCKER/PEREZ

DD:    02/08/2019 07:36:13

DT:    02/08/2019 08:09:19

Conf#: [id number]

DID#:  [id number]

CC: KARLO DUDLEY NP; MAURI GRIGSBY MD; DONALDO CARRILLO MD;*EndCC*

## 2019-02-08 NOTE — PN
Date/Time of Note


Date/Time of Note


DATE: 2/8/19 


TIME: 11:48





Assessment/Plan


VTE Prophylaxis


Risk score (from [facility]0 risk:  13


SCD applied (from [facility]  Yes


Pharmacological prophylaxis:  NA/contraindicated


Pharm contraindication:  thrombocytopenia





Lines/Catheters


IV Catheter Type (from [facility]):  Central Line


Central line still needed:  Yes


Urinary Cath still in place:  Yes


Reason Cath still needed:  urinary retention





Assessment/Plan


Hospital Course


Patient continues on Venturi mask, pt passed swallow evaluation, continue strict


aspiration precaution, only when patient is awake and sitting upright, follow-up


speech therapy recommendations.  Patient has congestion, continue breathing 


treatment, IS, suctioning.


Assessment/Plan


-Hypoxic respiratory failure requiring mechanical ventilation, extubated. Dr. English is following in pulmonology consultation.


-Sepsis with shock, resolving.  Dr. Dreyer is following in infection disease 


consultation.


-Status post cardiac arrest.   Dr. Jon is following in cardiology 


consultation.


-Left-sided pneumothorax, status post left side chest tube placement, s/p self 


d/c CT.


-S/p pneumoperitoneum most likely due to cardiac arrest, resolved. Dr. Lai is 


following in general surgery consultation. 


-Left axillary and brachial DVT, LUE swelling subsided, was on Lovenox which is 


currently held due to thrombocytopenia


-Left lower lobe pneumonia


-Severe emphysema


-NATALIE with possible chronic kidney disease. Dr Hanson is following in nephrology


consultation.


-Schizoaffective disorder depressed type.  Psychiatric consult is appreciated, 


continue Risperdal Depakote





Further recommendations based on clinical course.  Plan of care discussed with 


Dr. Miller.


Result Diagram:  


2/8/19 0445                                                                     


          2/8/19 0445





Results 24hrs





Laboratory Tests


               Test
                                2/8/19
04:45


               White Blood Count                          3.3  L


               Red Blood Count                           3.55  L


               Hemoglobin                                10.0  L


               Hematocrit                                32.0  L


               Mean Corpuscular Volume                    90.1


               Mean Corpuscular Hemoglobin               28.2  L


               Mean Corpuscular Hemoglobin
Concent      31.3  L



               Red Cell Distribution Width               16.1  H


               Platelet Count                              69  L


               Mean Platelet Volume                      10.8  H


               Immature Granulocytes %                  0.900  H


               Neutrophils %                             88.0  H


               Lymphocytes %                              8.7  L


               Monocytes %                                 2.4


               Eosinophils %                               0.0


               Basophils %                                 0.0


               Nucleated Red Blood Cells %                 0.0


               Immature Granulocytes #                   0.030


               Neutrophils #                               2.9


               Lymphocytes #                              0.3  L


               Monocytes #                                0.1  L


               Eosinophils #                               0.0


               Basophils #                                 0.0


               Nucleated Red Blood Cells #                 0.0


               Sodium Level                               150  H


               Potassium Level                             4.0


               Chloride Level                             113  H


               Carbon Dioxide Level                        40  H


               Anion Gap                                   -3  L


               Blood Urea Nitrogen                         35  H


               Creatinine                                 0.71


               Est Glomerular Filtrat Rate
mL/min   > 60  



               Glucose Level                               137


               Calcium Level                               9.5


               Phosphorus Level                            2.9


               Magnesium Level                            2.7  H








Exam/Review of Systems


Exam


Vitals





Vital Signs


  Date      Temp  Pulse  Resp  B/P (MAP)   Pulse Ox  O2          O2 Flow    FiO2


Time                                                 Delivery    Rate


    2/8/19           88    24      125/83       100  Venturi


     11:00                           (97)            Mask


    2/8/19  98.4


     08:00


    2/8/19                                                             9.0    35


     01:47








Intake and Output





2/7/19 2/7/19 2/8/19





1515:00


23:00


07:00





IntakeIntake Total


780 ml


370 ml


440 ml





OutputOutput Total


1450 ml


1240 ml


955 ml





BalanceBalance


-670 ml


-870 ml


-515 ml











Exam


Constitutional:  alert, oriented


Respiratory:  diminished breath sounds, wheezing


Cardiovascular:  regular rate and rhythm


Gastrointestinal:  soft, non-tender


Musculoskeletal:  nl extremities to inspection


Extremities:  normal pulses


Neurological:  nl mental status





Results


Results 24hrs





Laboratory Tests


               Test
                                2/8/19
04:45


               White Blood Count                          3.3  L


               Red Blood Count                           3.55  L


               Hemoglobin                                10.0  L


               Hematocrit                                32.0  L


               Mean Corpuscular Volume                    90.1


               Mean Corpuscular Hemoglobin               28.2  L


               Mean Corpuscular Hemoglobin
Concent      31.3  L



               Red Cell Distribution Width               16.1  H


               Platelet Count                              69  L


               Mean Platelet Volume                      10.8  H


               Immature Granulocytes %                  0.900  H


               Neutrophils %                             88.0  H


               Lymphocytes %                              8.7  L


               Monocytes %                                 2.4


               Eosinophils %                               0.0


               Basophils %                                 0.0


               Nucleated Red Blood Cells %                 0.0


               Immature Granulocytes #                   0.030


               Neutrophils #                               2.9


               Lymphocytes #                              0.3  L


               Monocytes #                                0.1  L


               Eosinophils #                               0.0


               Basophils #                                 0.0


               Nucleated Red Blood Cells #                 0.0


               Sodium Level                               150  H


               Potassium Level                             4.0


               Chloride Level                             113  H


               Carbon Dioxide Level                        40  H


               Anion Gap                                   -3  L


               Blood Urea Nitrogen                         35  H


               Creatinine                                 0.71


               Est Glomerular Filtrat Rate
mL/min   > 60  



               Glucose Level                               137


               Calcium Level                               9.5


               Phosphorus Level                            2.9


               Magnesium Level                            2.7  H








Medications


Medication





Current Medications


Ondansetron HCl (Zofran Inj) 4 mg Q6H  PRN IV NAUSEA AND/OR VOMITING Last 


administered on 2/1/19at 18:46; Admin Dose 4 MG;  Start 1/21/19 at 23:30


Acetaminophen (Tylenol Supp) 650 mg Q4H  PRN PA PAIN LEVEL 1-3 OR FEVER;  Start 


1/21/19 at 23:30


Aspirin (Aspirin) 81 mg DAILY PO  Last administered on 2/8/19at 10:46; Admin 


Dose 81 MG;  Start 1/24/19 at 09:00


Famotidine (Pepcid) 20 mg Q12 PO  Last administered on 2/8/19at 10:49; Admin 


Dose 20 MG;  Start 1/24/19 at 21:00


Zolpidem Tartrate (Ambien) 5 mg HS  PRN PO INSOMNIA Last administered on 


2/5/19at 20:31; Admin Dose 5 MG;  Start 1/27/19 at 00:00


Guaifenesin/ Dextromethorphan (Robitussin Dm Liquid Cup) 5 ml Q6H  PRN PO COUGH 


Last administered on 1/31/19at 09:04; Admin Dose 5 ML;  Start 1/27/19 at 20:00


Divalproex Sodium (Depakote) 250 mg Q8H PO  Last administered on 2/8/19at 10:35;


Admin Dose 250 MG;  Start 1/28/19 at 17:00


Morphine Sulfate (morphine) 6 mg Q4H  PRN PO SEVERE PAIN LEVEL 7-10 Last 


administered on 2/5/19at 02:47; Admin Dose 6 MG;  Start 1/30/19 at 21:30


Albuterol/ Ipratropium (Duoneb) 3 ml Q3H RESP THERAPY  PRN HHN SHORTNESS OF 


BREATH Last administered on 2/7/19at 18:47; Admin Dose 3 ML;  Start 1/31/19 at 


20:00


Risperidone (Risperdal) 1 mg BID PO  Last administered on 2/8/19at 10:35; Admin 


Dose 1 MG;  Start 2/1/19 at 21:00


Meropenem/Sodium Chloride 50 ml @  100 mls/hr Q12 IVPB  Last administered on 2/8 /19at 10:20; Admin Dose 100 MLS/HR;  Start 2/1/19 at 15:00


Methylprednisolone Sodium Succinate (Solu-Medrol) 40 mg Q6 IV  Last administered


on 2/8/19at 11:34; Admin Dose 40 MG;  Start 2/2/19 at 12:00


Propofol 100 ml @  1.875 mls/ hr Q12H IV  Last administered on 2/4/19at 08:09; 


Admin Dose 15 MLS/HR;  Start 2/2/19 at 11:00


Fentanyl 100 ml @  2.5 mls/hr TITRATE IV  Last administered on 2/4/19at 09:17; 


Admin Dose 5 MLS/HR;  Start 2/2/19 at 11:30


Norepinephrine 250 ml @  1.875 mls/ hr TITRATE IV  Last administered on 2/3/19at


02:08; Admin Dose 11.25 MLS/HR;  Start 2/2/19 at 11:00


Alteplase, Recombinant (Cathflo (Activase)) 2 mg MAY REPEAT X1 PRN CATHETER IF 


CATHETER REMAINS OCCULUDED;  Start 2/2/19 at 11:00


Amlodipine Besylate (Norvasc) 5 mg DAILY PO  Last administered on 2/8/19at 


09:00; Admin Dose 5 MG;  Start 2/5/19 at 09:00


Albuterol/ Ipratropium (Duoneb) 3 ml Q6H RESP  THERAPY HHN  Last administered on


2/8/19at 08:45; Admin Dose 3 ML;  Start 2/4/19 at 20:00


Lorazepam (Ativan) 1 mg Q6H  PRN IV PSYCHOSIS Last administered on 2/8/19at 


03:40; Admin Dose 1 MG;  Start 2/7/19 at 02:00


Dextrose 1,000 ml @  75 mls/hr [id number] IV  Last administered on 2/8/19at 05:05; 


Admin Dose 40 MLS/HR;  Start 2/7/19 at 08:00


Haloperidol (Haldol) 2 mg PRN  PRN IM AGITATION;  Start 2/8/19 at 06:30











EVARISTO BELTRAN              Feb 8, 2019 11:59

## 2019-02-08 NOTE — CONS
Assessment/Plan


Assessment/Plan


Hospital Course (Demo Recall)


IMP:


1.Hypotension-improved today and tolerating norvasc. NL EF by echo this admit


2.resp failure s/p extubation


3.cardiac arrest


4.pneumoperitoneum-resolved


5.Positive troponin-now trended neg


6.renal failure-improved


7.Leukocytosis


8. anemia


9, Thrombocytopenia-ongoing some worsening


10.Resp failure-hypercapneic s/p intubation. s/p extubation


11.PNA-by cxr





Recc:


-IN ICU


-Continue norvasc and follow BP clsoely


-Continue asa


-continue steroids/bronchodilators


-Continue abx's and f/u cx data


-Continue risperdal


-follow MS closely which is improved today


-Follow volume status clsoely





Consultation Date/Type/Reason


Admit Date/Time


Jan 22, 2019 at 00:03


Initial Consult Date


1/22/19


Type of Consult


Cardiology


Reason for Consultation


HTN


Requesting Provider:  DONALDO CARRILLO MD


Date/Time of Note


DATE: 2/8/19 


TIME: 11:43





Exam/Review of Systems


Vital Signs


Vitals





Vital Signs


  Date      Temp  Pulse  Resp  B/P (MAP)   Pulse Ox  O2          O2 Flow    FiO2


Time                                                 Delivery    Rate


    2/8/19           88    24      125/83       100  Venturi


     11:00                           (97)            Mask


    2/8/19  98.4


     08:00


    2/8/19                                                             9.0    35


     01:47








Intake and Output





2/7/19 2/7/19 2/8/19





1515:00


23:00


07:00





IntakeIntake Total


780 ml


370 ml


440 ml





OutputOutput Total


1450 ml


1240 ml


955 ml





BalanceBalance


-670 ml


-870 ml


-515 ml














Exam


Exam


Review of Systems:


CONSTITUTIONAL:  No fevers, chills.


PULMONARY:  No sob


CARDIOVASCULAR: No chest pain/palpitations


GASTROINTESTINAL:  No nausea/vomiting.


GENITOURINARY:  No hematuria/dysuria.


MUSCULOSKELETAL:  No myagias/arthalgias.


PSYCHIATRIC:  The patient denies depression.


NEUROLOGIC:   No weakness


Constitutional:  alert


Psych:  no complaints


Head:  normocephalic


ENMT:  mucosa pink and moist


Neck:  supple, jvd (9 cm water)


Respiratory:  diminished breath sounds (@ bases/B)


Cardiovascular:  regular rate and rhythm


Gastrointestinal:  soft, non-tender


Musculoskeletal:  muscle tone (normal)


Extremities:  edema (none)


Neurological:  other (more awake/alert)





Labs


Result Diagram:  


2/8/19 0445 2/8/19 0445





Results 24hrs





Laboratory Tests


               Test
                                2/8/19
04:45


               White Blood Count                          3.3  L


               Red Blood Count                           3.55  L


               Hemoglobin                                10.0  L


               Hematocrit                                32.0  L


               Mean Corpuscular Volume                    90.1


               Mean Corpuscular Hemoglobin               28.2  L


               Mean Corpuscular Hemoglobin
Concent      31.3  L



               Red Cell Distribution Width               16.1  H


               Platelet Count                              69  L


               Mean Platelet Volume                      10.8  H


               Immature Granulocytes %                  0.900  H


               Neutrophils %                             88.0  H


               Lymphocytes %                              8.7  L


               Monocytes %                                 2.4


               Eosinophils %                               0.0


               Basophils %                                 0.0


               Nucleated Red Blood Cells %                 0.0


               Immature Granulocytes #                   0.030


               Neutrophils #                               2.9


               Lymphocytes #                              0.3  L


               Monocytes #                                0.1  L


               Eosinophils #                               0.0


               Basophils #                                 0.0


               Nucleated Red Blood Cells #                 0.0


               Sodium Level                               150  H


               Potassium Level                             4.0


               Chloride Level                             113  H


               Carbon Dioxide Level                        40  H


               Anion Gap                                   -3  L


               Blood Urea Nitrogen                         35  H


               Creatinine                                 0.71


               Est Glomerular Filtrat Rate
mL/min   > 60  



               Glucose Level                               137


               Calcium Level                               9.5


               Phosphorus Level                            2.9


               Magnesium Level                            2.7  H








Medications


Medications





Current Medications


Ondansetron HCl (Zofran Inj) 4 mg Q6H  PRN IV NAUSEA AND/OR VOMITING Last 


administered on 2/1/19at 18:46; Admin Dose 4 MG;  Start 1/21/19 at 23:30


Acetaminophen (Tylenol Supp) 650 mg Q4H  PRN [place] PAIN LEVEL 1-3 OR FEVER;  Start 


1/21/19 at 23:30


Aspirin (Aspirin) 81 mg DAILY PO  Last administered on 2/8/19at 10:46; Admin 


Dose 81 MG;  Start 1/24/19 at 09:00


Famotidine (Pepcid) 20 mg Q12 PO  Last administered on 2/8/19at 10:49; Admin 


Dose 20 MG;  Start 1/24/19 at 21:00


Zolpidem Tartrate (Ambien) 5 mg HS  PRN PO INSOMNIA Last administered on 


2/5/19at 20:31; Admin Dose 5 MG;  Start 1/27/19 at 00:00


Guaifenesin/ Dextromethorphan (Robitussin Dm Liquid Cup) 5 ml Q6H  PRN PO COUGH 


Last administered on 1/31/19at 09:04; Admin Dose 5 ML;  Start 1/27/19 at 20:00


Divalproex Sodium (Depakote) 250 mg Q8H PO  Last administered on 2/8/19at 10:35;


Admin Dose 250 MG;  Start 1/28/19 at 17:00


Morphine Sulfate (morphine) 6 mg Q4H  PRN PO SEVERE PAIN LEVEL 7-10 Last 


administered on 2/5/19at 02:47; Admin Dose 6 MG;  Start 1/30/19 at 21:30


Albuterol/ Ipratropium (Duoneb) 3 ml Q3H RESP THERAPY  PRN HHN SHORTNESS OF 


BREATH Last administered on 2/7/19at 18:47; Admin Dose 3 ML;  Start 1/31/19 at 


20:00


Risperidone (Risperdal) 1 mg BID PO  Last administered on 2/8/19at 10:35; Admin 


Dose 1 MG;  Start 2/1/19 at 21:00


Meropenem/Sodium Chloride 50 ml @  100 mls/hr Q12 IVPB  Last administered on 


2/8/19at 10:20; Admin Dose 100 MLS/HR;  Start 2/1/19 at 15:00


Methylprednisolone Sodium Succinate (Solu-Medrol) 40 mg Q6 IV  Last administered


on 2/8/19at 11:34; Admin Dose 40 MG;  Start 2/2/19 at 12:00


Propofol 100 ml @  1.875 mls/ hr Q12H IV  Last administered on 2/4/19at 08:09; 


Admin Dose 15 MLS/HR;  Start 2/2/19 at 11:00


Fentanyl 100 ml @  2.5 mls/hr TITRATE IV  Last administered on 2/4/19at 09:17; 


Admin Dose 5 MLS/HR;  Start 2/2/19 at 11:30


Norepinephrine 250 ml @  1.875 mls/ hr TITRATE IV  Last administered on 2/3/19at


02:08; Admin Dose 11.25 MLS/HR;  Start 2/2/19 at 11:00


Alteplase, Recombinant (Cathflo (Activase)) 2 mg MAY REPEAT X1 PRN CATHETER IF 


CATHETER REMAINS OCCULUDED;  Start 2/2/19 at 11:00


Amlodipine Besylate (Norvasc) 5 mg DAILY PO  Last administered on 2/8/19at 


09:00; Admin Dose 5 MG;  Start 2/5/19 at 09:00


Albuterol/ Ipratropium (Duoneb) 3 ml Q6H RESP  THERAPY HHN  Last administered on


2/8/19at 08:45; Admin Dose 3 ML;  Start 2/4/19 at 20:00


Lorazepam (Ativan) 1 mg Q6H  PRN IV PSYCHOSIS Last administered on 2/8/19at 


03:40; Admin Dose 1 MG;  Start 2/7/19 at 02:00


Dextrose 1,000 ml @  75 mls/hr [id number] IV  Last administered on 2/8/19at 05:05; 


Admin Dose 40 MLS/HR;  Start 2/7/19 at 08:00


Haloperidol (Haldol) 2 mg PRN  PRN IM AGITATION;  Start 2/8/19 at 06:30











CARISA IZAGUIRRE                Feb 8, 2019 11:45

## 2019-02-08 NOTE — CONS
Assessment/Plan


Assessment/Plan


Hospital Course (Demo Recall)


No acute events overnight patient is on Ventimask looks comfortable.  Per report


has significant difficulties with swallowing.  No fevers





WBC 3.3 platelets 69 neutrophils 88 BUN 35 creatinine 0.71





Antimicrobials: Meropenem





Indwelling: Right IJ triple-lumen catheter, Blake





Physical examination: Well-developed chronically ill-appearing cachectic elderly


man.  Head atraumatic normocephalic sclera nonicteric, neck is supple chest rise


symmetrical breath sounds diminished bases.  Heart: S1-S2.  Abdomen soft bowel 


sounds present.  Extremities without edema/cyanosis





Assessment:


1.  Respiratory failure/pneumonia


2.  Dysphagia with ongoing aspiration


4.  Pneumoperitoneum of unclear etiology, no perforation per surgical note, 


status post chest tube


5.  Dementia


5.  Anemia with progressive thrombocytopenia


6.  History of non-ST elevation MI


7.  Status post cardiac arrest


8.  RIJ TLC





Plan: Remains unchanged, continue antibiotics, aspiration precautions, follow 


pulmonary rec-s





Consultation Date/Type/Reason


Admit Date/Time


Jan 22, 2019 at 00:03


Initial Consult Date


1/22/19


Type of Consult


ID


Requesting Provider:  DONALDO CARRILLO MD


Date/Time of Note


DATE: 2/8/19 


TIME: 12:29





Exam/Review of Systems


Exam


Vitals





Vital Signs


  Date      Temp  Pulse  Resp  B/P (MAP)   Pulse Ox  O2          O2 Flow    FiO2


Time                                                 Delivery    Rate


    2/8/19           88    24      125/83       100  Venturi


     11:00                           (97)            Mask


    2/8/19  98.4


     08:00


    2/8/19                                                             9.0    35


     01:47








Intake and Output





2/7/19 2/7/19 2/8/19





1515:00


23:00


07:00





IntakeIntake Total


780 ml


370 ml


440 ml





OutputOutput Total


1450 ml


1240 ml


955 ml





BalanceBalance


-670 ml


-870 ml


-515 ml














Results


Result Diagram:  


2/8/19 0445                                                                     


          2/8/19 0445





Results 24hrs





Laboratory Tests


               Test
                                2/8/19
04:45


               White Blood Count                          3.3  L


               Red Blood Count                           3.55  L


               Hemoglobin                                10.0  L


               Hematocrit                                32.0  L


               Mean Corpuscular Volume                    90.1


               Mean Corpuscular Hemoglobin               28.2  L


               Mean Corpuscular Hemoglobin
Concent      31.3  L



               Red Cell Distribution Width               16.1  H


               Platelet Count                              69  L


               Mean Platelet Volume                      10.8  H


               Immature Granulocytes %                  0.900  H


               Neutrophils %                             88.0  H


               Lymphocytes %                              8.7  L


               Monocytes %                                 2.4


               Eosinophils %                               0.0


               Basophils %                                 0.0


               Nucleated Red Blood Cells %                 0.0


               Immature Granulocytes #                   0.030


               Neutrophils #                               2.9


               Lymphocytes #                              0.3  L


               Monocytes #                                0.1  L


               Eosinophils #                               0.0


               Basophils #                                 0.0


               Nucleated Red Blood Cells #                 0.0


               Sodium Level                               150  H


               Potassium Level                             4.0


               Chloride Level                             113  H


               Carbon Dioxide Level                        40  H


               Anion Gap                                   -3  L


               Blood Urea Nitrogen                         35  H


               Creatinine                                 0.71


               Est Glomerular Filtrat Rate
mL/min   > 60  



               Glucose Level                               137


               Calcium Level                               9.5


               Phosphorus Level                            2.9


               Magnesium Level                            2.7  H








Medications


Medication





Current Medications


Ondansetron HCl (Zofran Inj) 4 mg Q6H  PRN IV NAUSEA AND/OR VOMITING Last 


administered on 2/1/19at 18:46; Admin Dose 4 MG;  Start 1/21/19 at 23:30


Acetaminophen (Tylenol Supp) 650 mg Q4H  PRN [place] PAIN LEVEL 1-3 OR FEVER;  Start 


1/21/19 at 23:30


Aspirin (Aspirin) 81 mg DAILY PO  Last administered on 2/8/19at 10:46; Admin 


Dose 81 MG;  Start 1/24/19 at 09:00


Famotidine (Pepcid) 20 mg Q12 PO  Last administered on 2/8/19at 10:49; Admin 


Dose 20 MG;  Start 1/24/19 at 21:00


Zolpidem Tartrate (Ambien) 5 mg HS  PRN PO INSOMNIA Last administered on 


2/5/19at 20:31; Admin Dose 5 MG;  Start 1/27/19 at 00:00


Guaifenesin/ Dextromethorphan (Robitussin Dm Liquid Cup) 5 ml Q6H  PRN PO COUGH 


Last administered on 1/31/19at 09:04; Admin Dose 5 ML;  Start 1/27/19 at 20:00


Divalproex Sodium (Depakote) 250 mg Q8H PO  Last administered on 2/8/19at 10:35;


Admin Dose 250 MG;  Start 1/28/19 at 17:00


Morphine Sulfate (morphine) 6 mg Q4H  PRN PO SEVERE PAIN LEVEL 7-10 Last 


administered on 2/5/19at 02:47; Admin Dose 6 MG;  Start 1/30/19 at 21:30


Albuterol/ Ipratropium (Duoneb) 3 ml Q3H RESP THERAPY  PRN HHN SHORTNESS OF 


BREATH Last administered on 2/7/19at 18:47; Admin Dose 3 ML;  Start 1/31/19 at 


20:00


Risperidone (Risperdal) 1 mg BID PO  Last administered on 2/8/19at 10:35; Admin 


Dose 1 MG;  Start 2/1/19 at 21:00


Meropenem/Sodium Chloride 50 ml @  100 mls/hr Q12 IVPB  Last administered on 


2/8/19at 10:20; Admin Dose 100 MLS/HR;  Start 2/1/19 at 15:00


Methylprednisolone Sodium Succinate (Solu-Medrol) 40 mg Q6 IV  Last administered


on 2/8/19at 11:34; Admin Dose 40 MG;  Start 2/2/19 at 12:00


Propofol 100 ml @  1.875 mls/ hr Q12H IV  Last administered on 2/4/19at 08:09; 


Admin Dose 15 MLS/HR;  Start 2/2/19 at 11:00


Fentanyl 100 ml @  2.5 mls/hr TITRATE IV  Last administered on 2/4/19at 09:17; 


Admin Dose 5 MLS/HR;  Start 2/2/19 at 11:30


Norepinephrine 250 ml @  1.875 mls/ hr TITRATE IV  Last administered on 2/3/19at


02:08; Admin Dose 11.25 MLS/HR;  Start 2/2/19 at 11:00


Alteplase, Recombinant (Cathflo (Activase)) 2 mg MAY REPEAT X1 PRN CATHETER IF 


CATHETER REMAINS OCCULUDED;  Start 2/2/19 at 11:00


Amlodipine Besylate (Norvasc) 5 mg DAILY PO  Last administered on 2/8/19at 


09:00; Admin Dose 5 MG;  Start 2/5/19 at 09:00


Albuterol/ Ipratropium (Duoneb) 3 ml Q6H RESP  THERAPY HHN  Last administered on


2/8/19at 08:45; Admin Dose 3 ML;  Start 2/4/19 at 20:00


Lorazepam (Ativan) 1 mg Q6H  PRN IV PSYCHOSIS Last administered on 2/8/19at 


03:40; Admin Dose 1 MG;  Start 2/7/19 at 02:00


Dextrose 1,000 ml @  75 mls/hr [id number] IV  Last administered on 2/8/19at 05:05; 


Admin Dose 40 MLS/HR;  Start 2/7/19 at 08:00


Haloperidol (Haldol) 2 mg PRN  PRN IM AGITATION;  Start 2/8/19 at 06:30











DAVID VÁZQUEZ NP             Feb 8, 2019 12:30

## 2019-02-08 NOTE — CONS
Consult Date/Type/Reason


Admit Date/Time


Jan 22, 2019 at 00:03


Initial Consult Date


1/22/19


Type of Consult


Pulmonary


Requesting Provider:  DONALDO CARRILLO MD


Date/Time of Note


DATE: 2/8/19 


TIME: 13:24





Subjective


Intermittent agitation nasal cannula O2.  Currently hemodynamically stable.





Objective





Vital Signs


  Date      Temp  Pulse  Resp  B/P (MAP)   Pulse Ox  O2          O2 Flow    FiO2


Time                                                 Delivery    Rate


    2/8/19           91    25      137/87       100  Venturi


     13:00                          (104)            Mask


    2/8/19  97.7


     12:00


    2/8/19                                                             9.0    35


     01:47








Intake and Output





2/7/19 2/7/19 2/8/19





1515:00


23:00


07:00





IntakeIntake Total


780 ml


370 ml


440 ml





OutputOutput Total


1450 ml


1240 ml


955 ml





BalanceBalance


-670 ml


-870 ml


-515 ml











Exam


GENERAL: Elderly  gentleman well-nourished well-developed


VITAL SIGNS:  per chart


NECK:  Supple.  No JVD or lymphadenopathy.


CARDIAC EXAM:  S1, S2. No added sounds or murmurs.


CHEST: Diminished air entry both bases


ABDOMEN:  Soft, nontender.  No guarding or rebound.


EXTREMITIES:  No cyanosis, clubbing or edema.


NEUROLOGIC:  Generalized weakness.  No focal deficits.





Vent Setting


Ventilator Support Mode:  SPONT


Fraction of Inspired Oxygen pe:  35


Positive End Expiratory Pressu:  5.0





Results/Medications


Result Diagram:  


2/8/19 0445 2/8/19 0445





Results 24 hrs





Laboratory Tests


               Test
                                2/8/19
04:45


               White Blood Count                          3.3  L


               Red Blood Count                           3.55  L


               Hemoglobin                                10.0  L


               Hematocrit                                32.0  L


               Mean Corpuscular Volume                    90.1


               Mean Corpuscular Hemoglobin               28.2  L


               Mean Corpuscular Hemoglobin
Concent      31.3  L



               Red Cell Distribution Width               16.1  H


               Platelet Count                              69  L


               Mean Platelet Volume                      10.8  H


               Immature Granulocytes %                  0.900  H


               Neutrophils %                             88.0  H


               Lymphocytes %                              8.7  L


               Monocytes %                                 2.4


               Eosinophils %                               0.0


               Basophils %                                 0.0


               Nucleated Red Blood Cells %                 0.0


               Immature Granulocytes #                   0.030


               Neutrophils #                               2.9


               Lymphocytes #                              0.3  L


               Monocytes #                                0.1  L


               Eosinophils #                               0.0


               Basophils #                                 0.0


               Nucleated Red Blood Cells #                 0.0


               Sodium Level                               150  H


               Potassium Level                             4.0


               Chloride Level                             113  H


               Carbon Dioxide Level                        40  H


               Anion Gap                                   -3  L


               Blood Urea Nitrogen                         35  H


               Creatinine                                 0.71


               Est Glomerular Filtrat Rate
mL/min   > 60  



               Glucose Level                               137


               Calcium Level                               9.5


               Phosphorus Level                            2.9


               Magnesium Level                            2.7  H





Medications





Current Medications


Ondansetron HCl (Zofran Inj) 4 mg Q6H  PRN IV NAUSEA AND/OR VOMITING Last 


administered on 2/1/19at 18:46; Admin Dose 4 MG;  Start 1/21/19 at 23:30


Acetaminophen (Tylenol Supp) 650 mg Q4H  PRN [place] PAIN LEVEL 1-3 OR FEVER;  Start 


1/21/19 at 23:30


Aspirin (Aspirin) 81 mg DAILY PO  Last administered on 2/8/19at 10:46; Admin 


Dose 81 MG;  Start 1/24/19 at 09:00


Famotidine (Pepcid) 20 mg Q12 PO  Last administered on 2/8/19at 10:49; Admin 


Dose 20 MG;  Start 1/24/19 at 21:00


Zolpidem Tartrate (Ambien) 5 mg HS  PRN PO INSOMNIA Last administered on 


2/5/19at 20:31; Admin Dose 5 MG;  Start 1/27/19 at 00:00


Guaifenesin/ Dextromethorphan (Robitussin Dm Liquid Cup) 5 ml Q6H  PRN PO COUGH 


Last administered on 1/31/19at 09:04; Admin Dose 5 ML;  Start 1/27/19 at 20:00


Divalproex Sodium (Depakote) 250 mg Q8H PO  Last administered on 2/8/19at 10:35;


Admin Dose 250 MG;  Start 1/28/19 at 17:00


Morphine Sulfate (morphine) 6 mg Q4H  PRN PO SEVERE PAIN LEVEL 7-10 Last 


administered on 2/5/19at 02:47; Admin Dose 6 MG;  Start 1/30/19 at 21:30


Albuterol/ Ipratropium (Duoneb) 3 ml Q3H RESP THERAPY  PRN HHN SHORTNESS OF 


BREATH Last administered on 2/7/19at 18:47; Admin Dose 3 ML;  Start 1/31/19 at 


20:00


Risperidone (Risperdal) 1 mg BID PO  Last administered on 2/8/19at 10:35; Admin 


Dose 1 MG;  Start 2/1/19 at 21:00


Meropenem/Sodium Chloride 50 ml @  100 mls/hr Q12 IVPB  Last administered on 


2/8/19at 10:20; Admin Dose 100 MLS/HR;  Start 2/1/19 at 15:00


Methylprednisolone Sodium Succinate (Solu-Medrol) 40 mg Q6 IV  Last administered


on 2/8/19at 11:34; Admin Dose 40 MG;  Start 2/2/19 at 12:00


Propofol 100 ml @  1.875 mls/ hr Q12H IV  Last administered on 2/4/19at 08:09; 


Admin Dose 15 MLS/HR;  Start 2/2/19 at 11:00


Fentanyl 100 ml @  2.5 mls/hr TITRATE IV  Last administered on 2/4/19at 09:17; 


Admin Dose 5 MLS/HR;  Start 2/2/19 at 11:30


Norepinephrine 250 ml @  1.875 mls/ hr TITRATE IV  Last administered on 2/3/19at


02:08; Admin Dose 11.25 MLS/HR;  Start 2/2/19 at 11:00


Alteplase, Recombinant (Cathflo (Activase)) 2 mg MAY REPEAT X1 PRN CATHETER IF 


CATHETER REMAINS OCCULUDED;  Start 2/2/19 at 11:00


Amlodipine Besylate (Norvasc) 5 mg DAILY PO  Last administered on 2/8/19at 


09:00; Admin Dose 5 MG;  Start 2/5/19 at 09:00


Albuterol/ Ipratropium (Duoneb) 3 ml Q6H RESP  THERAPY HHN  Last administered on


2/8/19at 08:45; Admin Dose 3 ML;  Start 2/4/19 at 20:00


Lorazepam (Ativan) 1 mg Q6H  PRN IV PSYCHOSIS Last administered on 2/8/19at 


03:40; Admin Dose 1 MG;  Start 2/7/19 at 02:00


Dextrose 1,000 ml @  75 mls/hr [id number] IV  Last administered on 2/8/19at 05:05; 


Admin Dose 40 MLS/HR;  Start 2/7/19 at 08:00


Haloperidol (Haldol) 2 mg PRN  PRN IM AGITATION;  Start 2/8/19 at 06:30





Assessment/Plan


Hospital Course (Demo Recall)


Assessment





1.  Hypoxic respiratory failure on mechanical ventilation, safely extubated on 


nasal cannula O2


2.  Pneumoperitoneum status post chest tube which patient pulled out


3.  Encephalopathy toxic metabolic resolving


4.  Advanced COPD


5.  Hypernatremia likely free water deficit


6.  Thrombocytopenia 





Plan





1.  Speech therapy recommendations and aspiration precautions


2.  Physical therapy as tolerated


3.  Gentle diuresis


4.  Consider free water per nasogastric tube





Critical care time 40 minutes


Stable for transfer to telemetry











MIMI ZAVALA MD, [facility]      Feb 8, 2019 13:25

## 2019-02-09 VITALS — RESPIRATION RATE: 22 BRPM | HEART RATE: 99 BPM | DIASTOLIC BLOOD PRESSURE: 91 MMHG | SYSTOLIC BLOOD PRESSURE: 134 MMHG

## 2019-02-09 VITALS — HEART RATE: 88 BPM | DIASTOLIC BLOOD PRESSURE: 84 MMHG | RESPIRATION RATE: 18 BRPM | SYSTOLIC BLOOD PRESSURE: 136 MMHG

## 2019-02-09 VITALS — RESPIRATION RATE: 25 BRPM | SYSTOLIC BLOOD PRESSURE: 127 MMHG | HEART RATE: 83 BPM | DIASTOLIC BLOOD PRESSURE: 88 MMHG

## 2019-02-09 VITALS — DIASTOLIC BLOOD PRESSURE: 83 MMHG | HEART RATE: 101 BPM | SYSTOLIC BLOOD PRESSURE: 147 MMHG

## 2019-02-09 VITALS — HEART RATE: 103 BPM | DIASTOLIC BLOOD PRESSURE: 87 MMHG | RESPIRATION RATE: 20 BRPM | SYSTOLIC BLOOD PRESSURE: 134 MMHG

## 2019-02-09 VITALS — RESPIRATION RATE: 22 BRPM | HEART RATE: 103 BPM | SYSTOLIC BLOOD PRESSURE: 124 MMHG | DIASTOLIC BLOOD PRESSURE: 76 MMHG

## 2019-02-09 VITALS — SYSTOLIC BLOOD PRESSURE: 142 MMHG | HEART RATE: 106 BPM | RESPIRATION RATE: 20 BRPM | DIASTOLIC BLOOD PRESSURE: 83 MMHG

## 2019-02-09 VITALS — DIASTOLIC BLOOD PRESSURE: 82 MMHG | RESPIRATION RATE: 22 BRPM | HEART RATE: 86 BPM | SYSTOLIC BLOOD PRESSURE: 133 MMHG

## 2019-02-09 VITALS — DIASTOLIC BLOOD PRESSURE: 86 MMHG | SYSTOLIC BLOOD PRESSURE: 127 MMHG

## 2019-02-09 VITALS — HEART RATE: 100 BPM

## 2019-02-09 VITALS — HEART RATE: 95 BPM

## 2019-02-09 VITALS — HEART RATE: 93 BPM | RESPIRATION RATE: 18 BRPM | SYSTOLIC BLOOD PRESSURE: 135 MMHG | DIASTOLIC BLOOD PRESSURE: 77 MMHG

## 2019-02-09 VITALS — RESPIRATION RATE: 16 BRPM | DIASTOLIC BLOOD PRESSURE: 82 MMHG | SYSTOLIC BLOOD PRESSURE: 125 MMHG | HEART RATE: 96 BPM

## 2019-02-09 VITALS — HEART RATE: 101 BPM

## 2019-02-09 VITALS — HEART RATE: 85 BPM

## 2019-02-09 LAB
ABNORMAL IP MESSAGE: 1
ADD MAN DIFF?: NO
ALLEN TEST: (no result)
ANION GAP: 1 (ref 5–13)
ARTERIAL BASE EXCESS: 7.6 MMOL/L (ref -3–3)
ARTERIAL BLOOD GAS OXYGEN SAT: 94.4 MMHG (ref 95–98)
ARTERIAL COHB: 0.6 % (ref 0–3)
ARTERIAL FRACTION OF OXYHGB: 93.8 % (ref 93–99)
ARTERIAL HCO3: 34.3 MMOL/L (ref 22–26)
ARTERIAL METHB: 0 % (ref 0–1.5)
ARTERIAL PCO2: 58.4 MMHG (ref 35–45)
BASOPHIL #: 0 10^3/UL (ref 0–0.1)
BASOPHILS %: 0 % (ref 0–2)
BLOOD UREA NITROGEN: 32 MG/DL (ref 7–20)
CALCIUM: 9.1 MG/DL (ref 8.4–10.2)
CARBON DIOXIDE: 39 MMOL/L (ref 21–31)
CHLORIDE: 107 MMOL/L (ref 97–110)
CREATININE: 0.6 MG/DL (ref 0.61–1.24)
EOSINOPHILS #: 0 10^3/UL (ref 0–0.5)
EOSINOPHILS %: 0 % (ref 0–7)
FIO2: 36 %
GLUCOSE: 111 MG/DL (ref 70–220)
HEMATOCRIT: 32.1 % (ref 42–52)
HEMOGLOBIN: 9.8 G/DL (ref 14–18)
IMMATURE GRANS #M: 0.02 10^3/UL (ref 0–0.03)
IMMATURE GRANS % (M): 0.5 % (ref 0–0.43)
LYMPHOCYTES #: 0.4 10^3/UL (ref 0.8–2.9)
LYMPHOCYTES %: 8.7 % (ref 15–51)
MAGNESIUM: 2.7 MG/DL (ref 1.7–2.5)
MEAN CORPUSCULAR HEMOGLOBIN: 27.8 PG (ref 29–33)
MEAN CORPUSCULAR HGB CONC: 30.5 G/DL (ref 32–37)
MEAN CORPUSCULAR VOLUME: 91.2 FL (ref 82–101)
MEAN PLATELET VOLUME: 11.1 FL (ref 7.4–10.4)
MODE: (no result)
MONOCYTE #: 0.1 10^3/UL (ref 0.3–0.9)
MONOCYTES %: 2.3 % (ref 0–11)
NEUTROPHIL #: 3.9 10^3/UL (ref 1.6–7.5)
NEUTROPHILS %: 88.5 % (ref 39–77)
NUCLEATED RED BLOOD CELLS #: 0 10^3/UL (ref 0–0)
NUCLEATED RED BLOOD CELLS%: 0 /100WBC (ref 0–0)
O2 A-A PPRESDIFF RESPIRATORY: 102.5 MMHG (ref 7–24)
PHOSPHORUS: 3.2 MG/DL (ref 2.5–4.9)
PLATELET COUNT: 65 10^3/UL (ref 140–415)
POSITIVE DIFF: (no result)
POTASSIUM: 4.1 MMOL/L (ref 3.5–5.1)
RED BLOOD COUNT: 3.52 10^6/UL (ref 4.7–6.1)
RED CELL DISTRIBUTION WIDTH: 16.2 % (ref 11.5–14.5)
SODIUM: 147 MMOL/L (ref 135–144)
WHITE BLOOD COUNT: 4.4 10^3/UL (ref 4.8–10.8)

## 2019-02-09 RX ADMIN — CASTOR OIL AND BALSAM, PERU 1 APPLIC: 788; 87 OINTMENT TOPICAL at 09:00

## 2019-02-09 RX ADMIN — POTASSIUM CHLORIDE SCH MLS/HR: 2 INJECTION, SOLUTION, CONCENTRATE INTRAVENOUS at 13:00

## 2019-02-09 RX ADMIN — MORPHINE SULFATE 1 MG: 10 SOLUTION ORAL at 20:26

## 2019-02-09 RX ADMIN — DIVALPROEX SODIUM 1 MG: 250 TABLET, DELAYED RELEASE ORAL at 11:15

## 2019-02-09 RX ADMIN — PROPOFOL 1 MLS/HR: 10 INJECTION, EMULSION INTRAVENOUS at 10:16

## 2019-02-09 RX ADMIN — FAMOTIDINE 1 MG: 20 TABLET ORAL at 11:15

## 2019-02-09 RX ADMIN — METHYLPREDNISOLONE SODIUM SUCCINATE 1 MG: 40 INJECTION, POWDER, FOR SOLUTION INTRAMUSCULAR; INTRAVENOUS at 18:17

## 2019-02-09 RX ADMIN — AMLODIPINE BESYLATE 1 MG: 5 TABLET ORAL at 11:15

## 2019-02-09 RX ADMIN — IPRATROPIUM BROMIDE AND ALBUTEROL SULFATE SCH ML: .5; 3 SOLUTION RESPIRATORY (INHALATION) at 19:20

## 2019-02-09 RX ADMIN — IPRATROPIUM BROMIDE AND ALBUTEROL SULFATE 1 ML: .5; 3 SOLUTION RESPIRATORY (INHALATION) at 08:51

## 2019-02-09 RX ADMIN — IPRATROPIUM BROMIDE AND ALBUTEROL SULFATE SCH ML: .5; 3 SOLUTION RESPIRATORY (INHALATION) at 01:57

## 2019-02-09 RX ADMIN — IPRATROPIUM BROMIDE AND ALBUTEROL SULFATE SCH ML: .5; 3 SOLUTION RESPIRATORY (INHALATION) at 08:51

## 2019-02-09 RX ADMIN — DIVALPROEX SODIUM SCH MG: 250 TABLET, DELAYED RELEASE ORAL at 01:00

## 2019-02-09 RX ADMIN — METHYLPREDNISOLONE SODIUM SUCCINATE 1 MG: 40 INJECTION, POWDER, FOR SOLUTION INTRAMUSCULAR; INTRAVENOUS at 14:17

## 2019-02-09 RX ADMIN — FAMOTIDINE 1 MG: 20 TABLET ORAL at 20:25

## 2019-02-09 RX ADMIN — LORAZEPAM PRN MG: 2 INJECTION, SOLUTION INTRAMUSCULAR; INTRAVENOUS at 02:07

## 2019-02-09 RX ADMIN — GUAIFENESIN AND DEXTROMETHORPHAN 1 ML: 100; 10 SYRUP ORAL at 20:36

## 2019-02-09 RX ADMIN — IPRATROPIUM BROMIDE AND ALBUTEROL SULFATE 1 ML: .5; 3 SOLUTION RESPIRATORY (INHALATION) at 14:32

## 2019-02-09 RX ADMIN — IPRATROPIUM BROMIDE AND ALBUTEROL SULFATE 1 ML: .5; 3 SOLUTION RESPIRATORY (INHALATION) at 19:20

## 2019-02-09 RX ADMIN — METHYLPREDNISOLONE SODIUM SUCCINATE 1 MG: 40 INJECTION, POWDER, FOR SOLUTION INTRAMUSCULAR; INTRAVENOUS at 05:38

## 2019-02-09 RX ADMIN — PROPOFOL SCH MLS/HR: 10 INJECTION, EMULSION INTRAVENOUS at 10:16

## 2019-02-09 RX ADMIN — ASCORBIC ACID, VITAMIN A PALMITATE, CHOLECALCIFEROL, THIAMINE HYDROCHLORIDE, RIBOFLAVIN-5 PHOSPHATE SODIUM, PYRIDOXINE HYDROCHLORIDE, NIACINAMIDE, DEXPANTHENOL, ALPHA-TOCOPHEROL ACETATE, VITAMIN K1, FOLIC ACID, BIOTIN, CYANOCOBALAMIN 1 MLS/HR: 200; 3300; 200; 6; 3.6; 6; 40; 15; 10; 150; 600; 60; 5 INJECTION, SOLUTION INTRAVENOUS at 13:00

## 2019-02-09 RX ADMIN — METHYLPREDNISOLONE SODIUM SUCCINATE 1 MG: 40 INJECTION, POWDER, FOR SOLUTION INTRAMUSCULAR; INTRAVENOUS at 02:07

## 2019-02-09 RX ADMIN — MEROPENEM SCH MLS/HR: 1 INJECTION, POWDER, FOR SOLUTION INTRAVENOUS at 10:10

## 2019-02-09 RX ADMIN — DIVALPROEX SODIUM SCH MG: 250 TABLET, DELAYED RELEASE ORAL at 11:15

## 2019-02-09 RX ADMIN — DIVALPROEX SODIUM 1 MG: 250 TABLET, DELAYED RELEASE ORAL at 01:00

## 2019-02-09 RX ADMIN — DIVALPROEX SODIUM 1 MG: 250 TABLET, DELAYED RELEASE ORAL at 16:58

## 2019-02-09 RX ADMIN — MORPHINE SULFATE PRN MG: 10 SOLUTION ORAL at 20:26

## 2019-02-09 RX ADMIN — DIVALPROEX SODIUM SCH MG: 250 TABLET, DELAYED RELEASE ORAL at 16:58

## 2019-02-09 RX ADMIN — MEROPENEM 1 MLS/HR: 1 INJECTION, POWDER, FOR SOLUTION INTRAVENOUS at 10:10

## 2019-02-09 RX ADMIN — FAMOTIDINE SCH MG: 20 TABLET ORAL at 11:15

## 2019-02-09 RX ADMIN — FAMOTIDINE SCH MG: 20 TABLET ORAL at 20:25

## 2019-02-09 RX ADMIN — ASPIRIN 81 MG CHEWABLE TABLET 1 MG: 81 TABLET CHEWABLE at 11:15

## 2019-02-09 RX ADMIN — IPRATROPIUM BROMIDE AND ALBUTEROL SULFATE 1 ML: .5; 3 SOLUTION RESPIRATORY (INHALATION) at 01:57

## 2019-02-09 RX ADMIN — ASPIRIN 81 MG SCH MG: 81 TABLET ORAL at 11:15

## 2019-02-09 RX ADMIN — IPRATROPIUM BROMIDE AND ALBUTEROL SULFATE SCH ML: .5; 3 SOLUTION RESPIRATORY (INHALATION) at 14:32

## 2019-02-09 RX ADMIN — RISPERIDONE 1 MG: 1 TABLET ORAL at 20:26

## 2019-02-09 RX ADMIN — AMLODIPINE BESYLATE SCH MG: 5 TABLET ORAL at 11:15

## 2019-02-09 RX ADMIN — LORAZEPAM 1 MG: 2 INJECTION, SOLUTION INTRAMUSCULAR; INTRAVENOUS at 02:07

## 2019-02-09 NOTE — PN
Date/Time of Note


Date/Time of Note


DATE: 2/9/19 


TIME: 07:45





Assessment/Plan


VTE Prophylaxis


Risk score (from Ns)>0 risk:  13


SCD applied (from Ns):  Yes


Pharmacological prophylaxis:  other





Lines/Catheters


IV Catheter Type (from Nrsg):  Central Line


Central line still needed:  Yes


Urinary Cath still in place:  Yes


Reason Cath still needed:  urinary retention





Assessment/Plan


Hospital Course


SUBJECTIVE:  The patient remains lethargic.  The patient is currently on pureed 


diet.  No other events noted.


d/w Dr Hanson 


 


OBJECTIVE:


HEENT:  Head is normocephalic.


NECK:  Supple.


HEART:  Regular rate.


LUNGS:  Show diminished breath sounds at the base.


ABDOMEN:  Soft, nontender to palpation.  No rebound or guarding.


EXTREMITIES:  Negative for clubbing, cyanosis.  No edema.


DERMATOLOGIC:  No rashes.


MUSCULOSKELETAL:  No joint effusions.


NEUROLOGIC:  No change in exam.


 


MEDICATIONS:  The patient's medications have been reviewed.


 


LABORATORY DATA:  Shows sodium 150, potassium 4.0, bicarbonate 40, BUN 35, 


creatinine 0.71.  White count 3.3, hemoglobin 10.0, platelet count is 69.


 


ASSESSMENT AND PLAN:


1.  Nonoliguric acute kidney injury with unknown baseline creatinine.  Etiology 


is secondary to hemodynamics.  The patient's renal function has improved.  The 


patient does have underlying azotemia secondary to acute kidney injury, 


hypercatabolic state.  We will continue to monitor closely.  Continue supportive


care, renally dose all medicines.


2.  Hypernatremia, the patient has a free water deficit of approximately 2 


liters.  We will continue D5W drip 


3.  Anemia.  Continue to monitor hemoglobin and hematocrit levels.


4.  Mineral bone disorder, monitor calcium and phosphorus levels.


5.  Diastolic heart failure.  The patient appears compensated.  We will continue


to monitor, give intermittent diuretic therapy as needed.


6.  Alkalosis.  Etiology is likely compensatory due to underlying hypercapnia.  


We will continue to monitor.  Consider repeating ABG.


7.  Acute hypoxemic, hypercapnic respiratory failure.  The patient remains on 


Venturi facemask.  Continue to monitor.  Follow up with pulmonary.  May require 


reintubation.


8.  Sepsis, status post shock secondary to pneumonia.  Continue current 


antibiotic regimen.


9.  Acute encephalopathy, etiology is toxic metabolic.  Continue to monitor.


10.  Status post cardiac arrest.


11.  Status post pneumothorax.


12.  Status post pneumoperitoneum.


13.  Left brachial deep vein thrombosis.  Continue medical management.


Result Diagram:  


2/9/19 0545                                                                     


          2/9/19 0545





Results 24hrs





Laboratory Tests


        Test
                                2/8/19
13:49  2/9/19
05:45


        Sodium Level                               150  H        147  H


        White Blood Count                                       4.4  #L


        Red Blood Count                                         3.52  L


        Hemoglobin                                               9.8  L


        Hematocrit                                              32.1  L


        Mean Corpuscular Volume                                  91.2


        Mean Corpuscular Hemoglobin                             27.8  L


        Mean Corpuscular Hemoglobin
Concent  
                 30.5  L



        Red Cell Distribution Width                             16.2  H


        Platelet Count                                            65  L


        Mean Platelet Volume                                    11.1  H


        Immature Granulocytes %                                0.500  H


        Neutrophils %                                           88.5  H


        Lymphocytes %                                            8.7  L


        Monocytes %                                               2.3


        Eosinophils %                                             0.0


        Basophils %                                               0.0


        Nucleated Red Blood Cells %                               0.0


        Immature Granulocytes #                                 0.020


        Neutrophils #                                             3.9


        Lymphocytes #                                            0.4  L


        Monocytes #                                              0.1  L


        Eosinophils #                                             0.0


        Basophils #                                               0.0


        Nucleated Red Blood Cells #                               0.0


        Potassium Level                                           4.1


        Chloride Level                                            107


        Carbon Dioxide Level                                      39  H


        Anion Gap                                                  1  L


        Blood Urea Nitrogen                                       32  H


        Creatinine                                              0.60  L


        Est Glomerular Filtrat Rate
mL/min   
             > 60  



        Glucose Level                                             111


        Calcium Level                                             9.1


        Phosphorus Level                                          3.2


        Magnesium Level                                          2.7  H








Exam/Review of Systems


Exam


Vitals





Vital Signs


  Date      Temp  Pulse  Resp  B/P (MAP)   Pulse Ox  O2          O2 Flow    FiO2


Time                                                 Delivery    Rate


    2/9/19  98.7     93    18      135/77        92  Nasal             5.0


     07:08                           (96)            Cannula


    2/9/19                                                                    44


     01:58








Intake and Output





2/8/19 2/8/19 2/9/19





1515:00


23:00


07:00





IntakeIntake Total


600 ml


425 ml


300 ml





OutputOutput Total


890 ml


915 ml


1500 ml





BalanceBalance


-290 ml


-490 ml


-1200 ml














Results


Results 24hrs





Laboratory Tests


        Test
                                2/8/19
13:49  2/9/19
05:45


        Sodium Level                               150  H        147  H


        White Blood Count                                       4.4  #L


        Red Blood Count                                         3.52  L


        Hemoglobin                                               9.8  L


        Hematocrit                                              32.1  L


        Mean Corpuscular Volume                                  91.2


        Mean Corpuscular Hemoglobin                             27.8  L


        Mean Corpuscular Hemoglobin
Concent  
                 30.5  L



        Red Cell Distribution Width                             16.2  H


        Platelet Count                                            65  L


        Mean Platelet Volume                                    11.1  H


        Immature Granulocytes %                                0.500  H


        Neutrophils %                                           88.5  H


        Lymphocytes %                                            8.7  L


        Monocytes %                                               2.3


        Eosinophils %                                             0.0


        Basophils %                                               0.0


        Nucleated Red Blood Cells %                               0.0


        Immature Granulocytes #                                 0.020


        Neutrophils #                                             3.9


        Lymphocytes #                                            0.4  L


        Monocytes #                                              0.1  L


        Eosinophils #                                             0.0


        Basophils #                                               0.0


        Nucleated Red Blood Cells #                               0.0


        Potassium Level                                           4.1


        Chloride Level                                            107


        Carbon Dioxide Level                                      39  H


        Anion Gap                                                  1  L


        Blood Urea Nitrogen                                       32  H


        Creatinine                                              0.60  L


        Est Glomerular Filtrat Rate
mL/min   
             > 60  



        Glucose Level                                             111


        Calcium Level                                             9.1


        Phosphorus Level                                          3.2


        Magnesium Level                                          2.7  H








Medications


Medication





Current Medications


Ondansetron HCl (Zofran Inj) 4 mg Q6H  PRN IV NAUSEA AND/OR VOMITING Last admi


nistered on 2/1/19at 18:46; Admin Dose 4 MG;  Start 1/21/19 at 23:30


Acetaminophen (Tylenol Supp) 650 mg Q4H  PRN [place] PAIN LEVEL 1-3 OR FEVER;  Start 


1/21/19 at 23:30


Aspirin (Aspirin) 81 mg DAILY PO  Last administered on 2/8/19at 10:46; Admin 


Dose 81 MG;  Start 1/24/19 at 09:00


Famotidine (Pepcid) 20 mg Q12 PO  Last administered on 2/8/19at 20:17; Admin 


Dose 20 MG;  Start 1/24/19 at 21:00


Zolpidem Tartrate (Ambien) 5 mg HS  PRN PO INSOMNIA Last administered on 


2/5/19at 20:31; Admin Dose 5 MG;  Start 1/27/19 at 00:00


Guaifenesin/ Dextromethorphan (Robitussin Dm Liquid Cup) 5 ml Q6H  PRN PO COUGH 


Last administered on 1/31/19at 09:04; Admin Dose 5 ML;  Start 1/27/19 at 20:00


Divalproex Sodium (Depakote) 250 mg Q8H PO  Last administered on 2/9/19at 01:00;


Admin Dose 250 MG;  Start 1/28/19 at 17:00


Morphine Sulfate (morphine) 6 mg Q4H  PRN PO SEVERE PAIN LEVEL 7-10 Last 


administered on 2/8/19at 22:18; Admin Dose 6 MG;  Start 1/30/19 at 21:30


Albuterol/ Ipratropium (Duoneb) 3 ml Q3H RESP THERAPY  PRN HHN SHORTNESS OF 


BREATH Last administered on 2/7/19at 18:47; Admin Dose 3 ML;  Start 1/31/19 at 


20:00


Risperidone (Risperdal) 1 mg BID PO  Last administered on 2/8/19at 20:17; Admin 


Dose 1 MG;  Start 2/1/19 at 21:00


Meropenem/Sodium Chloride 50 ml @  100 mls/hr Q12 IVPB  Last administered on 


2/8/19at 20:17; Admin Dose 100 MLS/HR;  Start 2/1/19 at 15:00


Methylprednisolone Sodium Succinate (Solu-Medrol) 40 mg Q6 IV  Last administered


on 2/9/19at 05:38; Admin Dose 40 MG;  Start 2/2/19 at 12:00


Propofol 100 ml @  1.875 mls/ hr Q12H IV  Last administered on 2/4/19at 08:09; 


Admin Dose 15 MLS/HR;  Start 2/2/19 at 11:00


Fentanyl 100 ml @  2.5 mls/hr TITRATE IV  Last administered on 2/4/19at 09:17; 


Admin Dose 5 MLS/HR;  Start 2/2/19 at 11:30


Norepinephrine 250 ml @  1.875 mls/ hr TITRATE IV  Last administered on 2/3/19at


02:08; Admin Dose 11.25 MLS/HR;  Start 2/2/19 at 11:00


Alteplase, Recombinant (Cathflo (Activase)) 2 mg MAY REPEAT X1 PRN CATHETER IF 


CATHETER REMAINS OCCULUDED;  Start 2/2/19 at 11:00


Amlodipine Besylate (Norvasc) 5 mg DAILY PO  Last administered on 2/8/19at 


09:00; Admin Dose 5 MG;  Start 2/5/19 at 09:00


Albuterol/ Ipratropium (Duoneb) 3 ml Q6H RESP  THERAPY HHN  Last administered on


2/9/19at 01:57; Admin Dose 3 ML;  Start 2/4/19 at 20:00


Lorazepam (Ativan) 1 mg Q6H  PRN IV PSYCHOSIS Last administered on 2/9/19at 


02:07; Admin Dose 1 MG;  Start 2/7/19 at 02:00


Dextrose 1,000 ml @  75 mls/hr [id number] IV  Last administered on 2/8/19at 20:17; 


Admin Dose 75 MLS/HR;  Start 2/7/19 at 08:00


Haloperidol (Haldol) 2 mg PRN  PRN IM AGITATION;  Start 2/8/19 at 06:30











JESS VALENZUELA DO               Feb 9, 2019 07:47

## 2019-02-09 NOTE — CONS
Assessment/Plan


Assessment/Plan


Hospital Course (Demo Recall)


Tx to tele, on/off agitated, nad





Antimicrobials: Meropenem





Indwelling: Right IJ triple-lumen catheter, Blake





Physical examination: Well-developed chronically ill-appearing cachectic elderly


man.  Head atraumatic normocephalic sclera nonicteric, neck is supple chest rise


symmetrical breath sounds diminished bases.  Heart: S1-S2.  Abdomen soft bowel 


sounds present.  Extremities without edema/cyanosis





Assessment:


1.  Respiratory failure/pneumonia


2.  Dysphagia with ongoing aspiration


4.  Pneumoperitoneum of unclear etiology, no perforation per surgical note, 


status post chest tube


5.  Dementia


5.  Anemia with progressive thrombocytopenia


6.  History of non-ST elevation MI


7.  Status post cardiac arrest


8.  RIJ TLC





Plan: Remains unchanged, continue present care, aspiration precautions, dc abx 


and observe





Consultation Date/Type/Reason


Admit Date/Time


Jan 22, 2019 at 00:03


Initial Consult Date


1/22/19


Type of Consult


ID


Requesting Provider:  DONALDO CARRILLO MD


Date/Time of Note


DATE: 2/9/19 


TIME: 12:22





Exam/Review of Systems


Exam


Vitals





Vital Signs


  Date      Temp  Pulse  Resp  B/P (MAP)   Pulse Ox  O2          O2 Flow    FiO2


Time                                                 Delivery    Rate


    2/9/19  97.6     86    22      133/82       100  Nasal


     11:29                           (99)            Cannula


    2/9/19                                                             5.0


     11:05


    2/9/19                                                                    36


     08:51








Intake and Output





2/8/19 2/8/19 2/9/19





1414:59


22:59


06:59





IntakeIntake Total


525 ml


500 ml


300 ml





OutputOutput Total


900 ml


955 ml


1500 ml





BalanceBalance


-375 ml


-455 ml


-1200 ml














Results


Result Diagram:  


2/9/19 0545                                                                     


          2/9/19 0545





Results 24hrs





Laboratory Tests


Test
                            2/8/19
13:49  2/9/19
05:45         2/9/19
07:00


Sodium Level                           150  H        147  H


White Blood Count                                   4.4  #L


Red Blood Count                                     3.52  L


Hemoglobin                                           9.8  L


Hematocrit                                          32.1  L


Mean Corpuscular Volume                              91.2


Mean Corpuscular Hemoglobin                         27.8  L


Mean Corpuscular                 
                 30.5  L
  



Hemoglobin
Concent


Red Cell Distribution Width                         16.2  H


Platelet Count                                        65  L


Mean Platelet Volume                                11.1  H


Immature Granulocytes %                            0.500  H


Neutrophils %                                       88.5  H


Lymphocytes %                                        8.7  L


Monocytes %                                           2.3


Eosinophils %                                         0.0


Basophils %                                           0.0


Nucleated Red Blood Cells %                           0.0


Immature Granulocytes #                             0.020


Neutrophils #                                         3.9


Lymphocytes #                                        0.4  L


Monocytes #                                          0.1  L


Eosinophils #                                         0.0


Basophils #                                           0.0


Nucleated Red Blood Cells #                           0.0


Potassium Level                                       4.1


Chloride Level                                        107


Carbon Dioxide Level                                  39  H


Anion Gap                                              1  L


Blood Urea Nitrogen                                   32  H


Creatinine                                          0.60  L


Est Glomerular Filtrat           
             > 60  
       



Rate
mL/min


Glucose Level                                         111


Calcium Level                                         9.1


Phosphorus Level                                      3.2


Magnesium Level                                      2.7  H


Blood Gas Specimen Source
       
             
             Blood arterial



Arterial Blood Date Drawn
       
             
             2/9/2019
8:11:03 AM


Arterial Blood pH                
             
                        7.387  



(Temp
corrected)


Arterial Blood pCO2              
             
                        58.4  H



(Temp
correct)


Arterial Blood pO2               
             
                         86.4  



(Temp
corrected)


Arterial Blood HCO3                                                      34.3  H


Arterial Blood Base Excess                                                7.6  H


Arterial Blood                   
             
                        94.4  L



Oxygen
Saturation


Antoine Test                                                   ACCEPTAB


Arterial Blood Gas               
             
             Right Radial  



Puncture
Site


Arterial                         
             
                          0.6  



Blood
Carboxyhemoglobin


Arterial Blood Methemoglobin                                                 0


Blood Gas A-a O2 Differential                                           102.5  H


Oxyhemoglobin Percent                                                     93.8


Blood Gas Temperature                                                     37.0


Blood Gas Modality                                           NASAL CANNULA


FiO2                                                                      36.0


Blood Gas Notified Whom                                      DT


Blood Gas Notified Time
         
             
             2/9/2019
8:29:05 AM








Medications


Medication





Current Medications


Ondansetron HCl (Zofran Inj) 4 mg Q6H  PRN IV NAUSEA AND/OR VOMITING Last 


administered on 2/1/19at 18:46; Admin Dose 4 MG;  Start 1/21/19 at 23:30


Acetaminophen (Tylenol Supp) 650 mg Q4H  PRN [place] PAIN LEVEL 1-3 OR FEVER;  Start 


1/21/19 at 23:30


Aspirin (Aspirin) 81 mg DAILY PO  Last administered on 2/9/19at 11:15; Admin 


Dose 81 MG;  Start 1/24/19 at 09:00


Famotidine (Pepcid) 20 mg Q12 PO  Last administered on 2/9/19at 11:15; Admin 


Dose 20 MG;  Start 1/24/19 at 21:00


Zolpidem Tartrate (Ambien) 5 mg HS  PRN PO INSOMNIA Last administered on 


2/5/19at 20:31; Admin Dose 5 MG;  Start 1/27/19 at 00:00


Guaifenesin/ Dextromethorphan (Robitussin Dm Liquid Cup) 5 ml Q6H  PRN PO COUGH 


Last administered on 1/31/19at 09:04; Admin Dose 5 ML;  Start 1/27/19 at 20:00


Divalproex Sodium (Depakote) 250 mg Q8H PO  Last administered on 2/9/19at 11:15;


Admin Dose 250 MG;  Start 1/28/19 at 17:00


Morphine Sulfate (morphine) 6 mg Q4H  PRN PO SEVERE PAIN LEVEL 7-10 Last 


administered on 2/8/19at 22:18; Admin Dose 6 MG;  Start 1/30/19 at 21:30


Albuterol/ Ipratropium (Duoneb) 3 ml Q3H RESP THERAPY  PRN HHN SHORTNESS OF 


BREATH Last administered on 2/7/19at 18:47; Admin Dose 3 ML;  Start 1/31/19 at 


20:00


Meropenem/Sodium Chloride 50 ml @  100 mls/hr Q12 IVPB  Last administered on 


2/9/19at 10:10; Admin Dose 100 MLS/HR;  Start 2/1/19 at 15:00


Methylprednisolone Sodium Succinate (Solu-Medrol) 40 mg Q6 IV  Last administered


on 2/9/19at 05:38; Admin Dose 40 MG;  Start 2/2/19 at 12:00


Propofol 100 ml @  1.875 mls/ hr Q12H IV  Last administered on 2/4/19at 08:09; 


Admin Dose 15 MLS/HR;  Start 2/2/19 at 11:00


Fentanyl 100 ml @  2.5 mls/hr TITRATE IV  Last administered on 2/4/19at 09:17; 


Admin Dose 5 MLS/HR;  Start 2/2/19 at 11:30


Norepinephrine 250 ml @  1.875 mls/ hr TITRATE IV  Last administered on 2/3/19at


02:08; Admin Dose 11.25 MLS/HR;  Start 2/2/19 at 11:00


Alteplase, Recombinant (Cathflo (Activase)) 2 mg MAY REPEAT X1 PRN CATHETER IF 


CATHETER REMAINS OCCULUDED;  Start 2/2/19 at 11:00


Amlodipine Besylate (Norvasc) 5 mg DAILY PO  Last administered on 2/9/19at 


11:15; Admin Dose 5 MG;  Start 2/5/19 at 09:00


Albuterol/ Ipratropium (Duoneb) 3 ml Q6H RESP  THERAPY HHN  Last administered on


2/9/19at 08:51; Admin Dose 3 ML;  Start 2/4/19 at 20:00


Lorazepam (Ativan) 1 mg Q6H  PRN IV PSYCHOSIS Last administered on 2/9/19at 


02:07; Admin Dose 1 MG;  Start 2/7/19 at 02:00


Dextrose 1,000 ml @  75 mls/hr [id number] IV  Last administered on 2/8/19at 20:17; 


Admin Dose 75 MLS/HR;  Start 2/7/19 at 08:00


Haloperidol (Haldol) 2 mg PRN  PRN IM AGITATION;  Start 2/8/19 at 06:30


Risperidone (Risperdal) 2 mg BID PO ;  Start 2/9/19 at 21:00











DAVID VÁZQUEZ NP             Feb 9, 2019 12:24

## 2019-02-09 NOTE — PN
Date/Time of Note


Date/Time of Note


DATE: 2/9/19 


TIME: 11:13





Assessment/Plan


VTE Prophylaxis


Risk score (from [facility])>0 risk:  13


SCD applied (from [facility]):  Yes


Pharmacological prophylaxis:  LMWH





Lines/Catheters


IV Catheter Type (from [facility]):  Central Line


Central line still needed:  Yes


Urinary Cath still in place:  Yes


Reason Cath still needed:  skin wounds contaminated by urine





Assessment/Plan


Hospital Course


-Hypoxic respiratory failure requiring mechanical ventilation, extubated. Dr. English is following in pulmonology consultation.


-Sepsis with shock, resolving.  Dr. Dreyer is following in infection disease 


consultation.


-Status post cardiac arrest.   Dr. Jon is following in cardiology 


consultation.


-Left-sided pneumothorax, status post left side chest tube placement, s/p self 


d/c CT.


-S/p pneumoperitoneum most likely due to cardiac arrest, resolved. Dr. Lai is 


following in general surgery consultation. 


-Left axillary and brachial DVT, LUE swelling subsided, was on Lovenox which is 


currently held due to thrombocytopenia


-Left lower lobe pneumonia


-Severe emphysema


-NATALIE with possible chronic kidney disease. Dr Hanson is following in nephrology


consultation.


-Schizoaffective disorder depressed type.  Psychiatric consult is appreciated, 


continue Risperdal Depakote


Result Diagram:  


2/9/19 0545                                                                     


          2/9/19 0545





Results 24hrs





Laboratory Tests


Test
                            2/8/19
13:49  2/9/19
05:45         2/9/19
07:00


Sodium Level                           150  H        147  H


White Blood Count                                   4.4  #L


Red Blood Count                                     3.52  L


Hemoglobin                                           9.8  L


Hematocrit                                          32.1  L


Mean Corpuscular Volume                              91.2


Mean Corpuscular Hemoglobin                         27.8  L


Mean Corpuscular                 
                 30.5  L
  



Hemoglobin
Concent


Red Cell Distribution Width                         16.2  H


Platelet Count                                        65  L


Mean Platelet Volume                                11.1  H


Immature Granulocytes %                            0.500  H


Neutrophils %                                       88.5  H


Lymphocytes %                                        8.7  L


Monocytes %                                           2.3


Eosinophils %                                         0.0


Basophils %                                           0.0


Nucleated Red Blood Cells %                           0.0


Immature Granulocytes #                             0.020


Neutrophils #                                         3.9


Lymphocytes #                                        0.4  L


Monocytes #                                          0.1  L


Eosinophils #                                         0.0


Basophils #                                           0.0


Nucleated Red Blood Cells #                           0.0


Potassium Level                                       4.1


Chloride Level                                        107


Carbon Dioxide Level                                  39  H


Anion Gap                                              1  L


Blood Urea Nitrogen                                   32  H


Creatinine                                          0.60  L


Est Glomerular Filtrat           
             > 60  
       



Rate
mL/min


Glucose Level                                         111


Calcium Level                                         9.1


Phosphorus Level                                      3.2


Magnesium Level                                      2.7  H


Blood Gas Specimen Source
       
             
             Blood arterial



Arterial Blood Date Drawn
       
             
             2/9/2019
8:11:03 AM


Arterial Blood pH                
             
                        7.387  



(Temp
corrected)


Arterial Blood pCO2              
             
                        58.4  H



(Temp
correct)


Arterial Blood pO2               
             
                         86.4  



(Temp
corrected)


Arterial Blood HCO3                                                      34.3  H


Arterial Blood Base Excess                                                7.6  H


Arterial Blood                   
             
                        94.4  L



Oxygen
Saturation


Antoine Test                                                   ACCEPTAB


Arterial Blood Gas               
             
             Right Radial  



Puncture
Site


Arterial                         
             
                          0.6  



Blood
Carboxyhemoglobin


Arterial Blood Methemoglobin                                                 0


Blood Gas A-a O2 Differential                                           102.5  H


Oxyhemoglobin Percent                                                     93.8


Blood Gas Temperature                                                     37.0


Blood Gas Modality                                           NASAL CANNULA


FiO2                                                                      36.0


Blood Gas Notified Whom                                      DT


Blood Gas Notified Time
         
             
             2/9/2019
8:29:05 AM








Subjective


24 Hr Interval Summary


Free Text/Dictation


Patient awake and has no complaints





Exam/Review of Systems


Exam


Vitals





Vital Signs


  Date      Temp  Pulse  Resp  B/P (MAP)   Pulse Ox  O2          O2 Flow    FiO2


Time                                                 Delivery    Rate


    2/9/19  97.0     88    18      136/84        96  Nasal             5.0


     11:05                          (101)            Cannula


    2/9/19                                                                    36


     08:51








Intake and Output





2/8/19 2/8/19 2/9/19





1414:59


22:59


06:59





IntakeIntake Total


525 ml


500 ml


300 ml





OutputOutput Total


900 ml


955 ml


1500 ml





BalanceBalance


-375 ml


-455 ml


-1200 ml











Constitutional:  well developed


Head:  normocephalic, atraumatic


Neck:  supple


Respiratory:  diminished breath sounds


Cardiovascular:  regular rate and rhythm


Gastrointestinal:  soft, non-tender


Extremities:  normal pulses





Results


Results 24hrs





Laboratory Tests


Test
                            2/8/19
13:49  2/9/19
05:45         2/9/19
07:00


Sodium Level                           150  H        147  H


White Blood Count                                   4.4  #L


Red Blood Count                                     3.52  L


Hemoglobin                                           9.8  L


Hematocrit                                          32.1  L


Mean Corpuscular Volume                              91.2


Mean Corpuscular Hemoglobin                         27.8  L


Mean Corpuscular                 
                 30.5  L
  



Hemoglobin
Concent


Red Cell Distribution Width                         16.2  H


Platelet Count                                        65  L


Mean Platelet Volume                                11.1  H


Immature Granulocytes %                            0.500  H


Neutrophils %                                       88.5  H


Lymphocytes %                                        8.7  L


Monocytes %                                           2.3


Eosinophils %                                         0.0


Basophils %                                           0.0


Nucleated Red Blood Cells %                           0.0


Immature Granulocytes #                             0.020


Neutrophils #                                         3.9


Lymphocytes #                                        0.4  L


Monocytes #                                          0.1  L


Eosinophils #                                         0.0


Basophils #                                           0.0


Nucleated Red Blood Cells #                           0.0


Potassium Level                                       4.1


Chloride Level                                        107


Carbon Dioxide Level                                  39  H


Anion Gap                                              1  L


Blood Urea Nitrogen                                   32  H


Creatinine                                          0.60  L


Est Glomerular Filtrat           
             > 60  
       



Rate
mL/min


Glucose Level                                         111


Calcium Level                                         9.1


Phosphorus Level                                      3.2


Magnesium Level                                      2.7  H


Blood Gas Specimen Source
       
             
             Blood arterial



Arterial Blood Date Drawn
       
             
             2/9/2019
8:11:03 AM


Arterial Blood pH                
             
                        7.387  



(Temp
corrected)


Arterial Blood pCO2              
             
                        58.4  H



(Temp
correct)


Arterial Blood pO2               
             
                         86.4  



(Temp
corrected)


Arterial Blood HCO3                                                      34.3  H


Arterial Blood Base Excess                                                7.6  H


Arterial Blood                   
             
                        94.4  L



Oxygen
Saturation


Antoine Test                                                   ACCEPTAB


Arterial Blood Gas               
             
             Right Radial  



Puncture
Site


Arterial                         
             
                          0.6  



Blood
Carboxyhemoglobin


Arterial Blood Methemoglobin                                                 0


Blood Gas A-a O2 Differential                                           102.5  H


Oxyhemoglobin Percent                                                     93.8


Blood Gas Temperature                                                     37.0


Blood Gas Modality                                           NASAL CANNULA


FiO2                                                                      36.0


Blood Gas Notified Whom                                      DT


Blood Gas Notified Time
         
             
             2/9/2019
8:29:05 AM








Medications


Medication





Current Medications


Ondansetron HCl (Zofran Inj) 4 mg Q6H  PRN IV NAUSEA AND/OR VOMITING Last 


administered on 2/1/19at 18:46; Admin Dose 4 MG;  Start 1/21/19 at 23:30


Acetaminophen (Tylenol Supp) 650 mg Q4H  PRN DC PAIN LEVEL 1-3 OR FEVER;  Start 


1/21/19 at 23:30


Aspirin (Aspirin) 81 mg DAILY PO  Last administered on 2/8/19at 10:46; Admin 


Dose 81 MG;  Start 1/24/19 at 09:00


Famotidine (Pepcid) 20 mg Q12 PO  Last administered on 2/8/19at 20:17; Admin 


Dose 20 MG;  Start 1/24/19 at 21:00


Zolpidem Tartrate (Ambien) 5 mg HS  PRN PO INSOMNIA Last administered on 


2/5/19at 20:31; Admin Dose 5 MG;  Start 1/27/19 at 00:00


Guaifenesin/ Dextromethorphan (Robitussin Dm Liquid Cup) 5 ml Q6H  PRN PO COUGH 


Last administered on 1/31/19at 09:04; Admin Dose 5 ML;  Start 1/27/19 at 20:00


Divalproex Sodium (Depakote) 250 mg Q8H PO  Last administered on 2/9/19at 01:00;


Admin Dose 250 MG;  Start 1/28/19 at 17:00


Morphine Sulfate (morphine) 6 mg Q4H  PRN PO SEVERE PAIN LEVEL 7-10 Last 


administered on 2/8/19at 22:18; Admin Dose 6 MG;  Start 1/30/19 at 21:30


Albuterol/ Ipratropium (Duoneb) 3 ml Q3H RESP THERAPY  PRN HHN SHORTNESS OF 


BREATH Last administered on 2/7/19at 18:47; Admin Dose 3 ML;  Start 1/31/19 at 


20:00


Meropenem/Sodium Chloride 50 ml @  100 mls/hr Q12 IVPB  Last administered on 


2/9/19at 10:10; Admin Dose 100 MLS/HR;  Start 2/1/19 at 15:00


Methylprednisolone Sodium Succinate (Solu-Medrol) 40 mg Q6 IV  Last administered


on 2/9/19at 05:38; Admin Dose 40 MG;  Start 2/2/19 at 12:00


Propofol 100 ml @  1.875 mls/ hr Q12H IV  Last administered on 2/4/19at 08:09; 


Admin Dose 15 MLS/HR;  Start 2/2/19 at 11:00


Fentanyl 100 ml @  2.5 mls/hr TITRATE IV  Last administered on 2/4/19at 09:17; 


Admin Dose 5 MLS/HR;  Start 2/2/19 at 11:30


Norepinephrine 250 ml @  1.875 mls/ hr TITRATE IV  Last administered on 2/3/19at


02:08; Admin Dose 11.25 MLS/HR;  Start 2/2/19 at 11:00


Alteplase, Recombinant (Cathflo (Activase)) 2 mg MAY REPEAT X1 PRN CATHETER IF 


CATHETER REMAINS OCCULUDED;  Start 2/2/19 at 11:00


Amlodipine Besylate (Norvasc) 5 mg DAILY PO  Last administered on 2/8/19at 


09:00; Admin Dose 5 MG;  Start 2/5/19 at 09:00


Albuterol/ Ipratropium (Duoneb) 3 ml Q6H RESP  THERAPY HHN  Last administered on


2/9/19at 08:51; Admin Dose 3 ML;  Start 2/4/19 at 20:00


Lorazepam (Ativan) 1 mg Q6H  PRN IV PSYCHOSIS Last administered on 2/9/19at 


02:07; Admin Dose 1 MG;  Start 2/7/19 at 02:00


Dextrose 1,000 ml @  75 mls/hr [id number] IV  Last administered on 2/8/19at 20:17; 


Admin Dose 75 MLS/HR;  Start 2/7/19 at 08:00


Haloperidol (Haldol) 2 mg PRN  PRN IM AGITATION;  Start 2/8/19 at 06:30


Risperidone (Risperdal) 2 mg BID PO ;  Start 2/9/19 at 21:00











MAURI GRIGSBY                    Feb 9, 2019 11:14

## 2019-02-09 NOTE — CONS
Date/Time of Note


Date/Time of Note


DATE: 2/9/19 


TIME: 09:39





Consult Date/Type/Reason


Admit Date


Jan 22, 2019 at 00:03


Type of Consult


Psych


Ordering Provider:  DONALDO CARRILLO MD





Subjective


Patient is a 68-year-old  male, seen as a follow-up for increased 


agitation and difficulty with redirection.  On a face-to-face evaluation, 


patient is currently sedated, staff report he has been increasingly agitated 


difficult to redirect.





Objective


Patient Appearance:  Poor Hygiene


Voice Loudness:  Mildly Soft/Quiet


Mood and Affect Description:  Anxious (Staff report he is very anxious and 


agitated), Calm, Withdrawn


Mood or Affect:  Anxious


Delusion Description:  Not Present











LUIS ENRIQUE DIETRICH NP           Feb 9, 2019 09:42

## 2019-02-09 NOTE — CONS
Consult Date/Type/Reason


Admit Date/Time


Jan 22, 2019 at 00:03


Initial Consult Date


1/22/19


Type of Consultation:  Pulm


Requesting Provider:  DONALDO CARRILLO MD


Date/Time of Note


DATE: 2/9/19 


TIME: 12:18





Subjective


No overnight events. Appears agitated.





Objective


Vitals





Vital Signs


  Date      Temp  Pulse  Resp  B/P (MAP)   Pulse Ox  O2          O2 Flow    FiO2


Time                                                 Delivery    Rate


    2/9/19  97.6     86    22      133/82       100  Nasal


     11:29                           (99)            Cannula


    2/9/19                                                             5.0


     11:05


    2/9/19                                                                    36


     08:51








Intake and Output





2/8/19 2/8/19 2/9/19





1515:00


23:00


07:00





IntakeIntake Total


600 ml


425 ml


300 ml





OutputOutput Total


890 ml


915 ml


1500 ml





BalanceBalance


-290 ml


-490 ml


-1200 ml











Exam


GENERAL: Elderly  gentleman well-nourished well-developed


VITAL SIGNS:  per chart


NECK:  Supple.  No JVD or lymphadenopathy.


CARDIAC EXAM:  S1, S2. No added sounds or murmurs.


CHEST: Diminished air entry both bases


ABDOMEN:  Soft, nontender.  No guarding or rebound.


EXTREMITIES:  No cyanosis, clubbing or edema.


NEUROLOGIC:  Generalized weakness.  No focal deficits.





Results/Medications


Result Diagram:  


2/9/19 0545                                                                     


          2/9/19 0545





Results 24 hrs





Laboratory Tests


Test
                            2/8/19
13:49  2/9/19
05:45         2/9/19
07:00


Sodium Level                           150  H        147  H


White Blood Count                                   4.4  #L


Red Blood Count                                     3.52  L


Hemoglobin                                           9.8  L


Hematocrit                                          32.1  L


Mean Corpuscular Volume                              91.2


Mean Corpuscular Hemoglobin                         27.8  L


Mean Corpuscular                 
                 30.5  L
  



Hemoglobin
Concent


Red Cell Distribution Width                         16.2  H


Platelet Count                                        65  L


Mean Platelet Volume                                11.1  H


Immature Granulocytes %                            0.500  H


Neutrophils %                                       88.5  H


Lymphocytes %                                        8.7  L


Monocytes %                                           2.3


Eosinophils %                                         0.0


Basophils %                                           0.0


Nucleated Red Blood Cells %                           0.0


Immature Granulocytes #                             0.020


Neutrophils #                                         3.9


Lymphocytes #                                        0.4  L


Monocytes #                                          0.1  L


Eosinophils #                                         0.0


Basophils #                                           0.0


Nucleated Red Blood Cells #                           0.0


Potassium Level                                       4.1


Chloride Level                                        107


Carbon Dioxide Level                                  39  H


Anion Gap                                              1  L


Blood Urea Nitrogen                                   32  H


Creatinine                                          0.60  L


Est Glomerular Filtrat           
             > 60  
       



Rate
mL/min


Glucose Level                                         111


Calcium Level                                         9.1


Phosphorus Level                                      3.2


Magnesium Level                                      2.7  H


Blood Gas Specimen Source
       
             
             Blood arterial



Arterial Blood Date Drawn
       
             
             2/9/2019
8:11:03 AM


Arterial Blood pH                
             
                        7.387  



(Temp
corrected)


Arterial Blood pCO2              
             
                        58.4  H



(Temp
correct)


Arterial Blood pO2               
             
                         86.4  



(Temp
corrected)


Arterial Blood HCO3                                                      34.3  H


Arterial Blood Base Excess                                                7.6  H


Arterial Blood                   
             
                        94.4  L



Oxygen
Saturation


Antoine Test                                                   ACCEPTAB


Arterial Blood Gas               
             
             Right Radial  



Puncture
Site


Arterial                         
             
                          0.6  



Blood
Carboxyhemoglobin


Arterial Blood Methemoglobin                                                 0


Blood Gas A-a O2 Differential                                           102.5  H


Oxyhemoglobin Percent                                                     93.8


Blood Gas Temperature                                                     37.0


Blood Gas Modality                                           NASAL CANNULA


FiO2                                                                      36.0


Blood Gas Notified Whom                                      DT


Blood Gas Notified Time
         
             
             2/9/2019
8:29:05 AM





Home Meds


Reported Medications


Docusate Sodium* (Colace*) 100 Mg Capsule, 100 MG PO Q24H PRN for CONSTIPATION, 


#30 CAP


   1/21/19


Polyethylene Glycol* (Miralax*) 17 Gm Powd.pack, 17 GM PO DAILY PRN for AS 


NEEDED, #30 PACKET


   1/21/19


Acetaminophen* (Acetaminophen*) 325 Mg Tablet, 650 MG PO Q4H PRN for PAIN 1-


10/10, #30 TAB


   AND FEVER>101F


   1/21/19


Sennosides* (Senna Lax*) 8.6 Mg Tablet, 1 TAB PO AS NEEDED, TAB


   1/21/19


Mv,Minerals/Fa/Lycopene/Ginkgo (ONE DAILY MEN'S 50+ TABLET) 1 Each Tablet, 1 


EACH PO DAILY, TAB


   1/21/19


Divalproex Sodium* (Depakote*) 125 Mg Tablet.dr, 250 MG PO Q8H, #90 TAB


   1/21/19


Quetiapine Fumarate* (Seroquel*) 50 Mg Tablet, 50 MG PO Q8H, TAB


   1/21/19


Ipratropium-Albuterol (Ipratropium-Albuterol) 0.5-3 Mg/3 Ml Ampul.neb, 3 ML 


INHALATION Q4H PRN for WHEEZING AND SOB, #30 VIAL


   1/21/19


Budesonide-Formoterol Fumarate* (Symbicort*) 160-4.5 Hfa.aer.ad, 2 PUFF 


INHALATION BID, #1 EACH


   1/21/19


Medications





Current Medications


Ondansetron HCl (Zofran Inj) 4 mg Q6H  PRN IV NAUSEA AND/OR VOMITING Last 


administered on 2/1/19at 18:46; Admin Dose 4 MG;  Start 1/21/19 at 23:30


Acetaminophen (Tylenol Supp) 650 mg Q4H  PRN NV PAIN LEVEL 1-3 OR FEVER;  Start 


1/21/19 at 23:30


Aspirin (Aspirin) 81 mg DAILY PO  Last administered on 2/9/19at 11:15; Admin 


Dose 81 MG;  Start 1/24/19 at 09:00


Famotidine (Pepcid) 20 mg Q12 PO  Last administered on 2/9/19at 11:15; Admin 


Dose 20 MG;  Start 1/24/19 at 21:00


Zolpidem Tartrate (Ambien) 5 mg HS  PRN PO INSOMNIA Last administered on 


2/5/19at 20:31; Admin Dose 5 MG;  Start 1/27/19 at 00:00


Guaifenesin/ Dextromethorphan (Robitussin Dm Liquid Cup) 5 ml Q6H  PRN PO COUGH 


Last administered on 1/31/19at 09:04; Admin Dose 5 ML;  Start 1/27/19 at 20:00


Divalproex Sodium (Depakote) 250 mg Q8H PO  Last administered on 2/9/19at 11:15;


Admin Dose 250 MG;  Start 1/28/19 at 17:00


Morphine Sulfate (morphine) 6 mg Q4H  PRN PO SEVERE PAIN LEVEL 7-10 Last 


administered on 2/8/19at 22:18; Admin Dose 6 MG;  Start 1/30/19 at 21:30


Albuterol/ Ipratropium (Duoneb) 3 ml Q3H RESP THERAPY  PRN HHN SHORTNESS OF B


REATH Last administered on 2/7/19at 18:47; Admin Dose 3 ML;  Start 1/31/19 at 


20:00


Meropenem/Sodium Chloride 50 ml @  100 mls/hr Q12 IVPB  Last administered on 


2/9/19at 10:10; Admin Dose 100 MLS/HR;  Start 2/1/19 at 15:00


Methylprednisolone Sodium Succinate (Solu-Medrol) 40 mg Q6 IV  Last administered


on 2/9/19at 05:38; Admin Dose 40 MG;  Start 2/2/19 at 12:00


Propofol 100 ml @  1.875 mls/ hr Q12H IV  Last administered on 2/4/19at 08:09; 


Admin Dose 15 MLS/HR;  Start 2/2/19 at 11:00


Fentanyl 100 ml @  2.5 mls/hr TITRATE IV  Last administered on 2/4/19at 09:17; 


Admin Dose 5 MLS/HR;  Start 2/2/19 at 11:30


Norepinephrine 250 ml @  1.875 mls/ hr TITRATE IV  Last administered on 2/3/19at


02:08; Admin Dose 11.25 MLS/HR;  Start 2/2/19 at 11:00


Alteplase, Recombinant (Cathflo (Activase)) 2 mg MAY REPEAT X1 PRN CATHETER IF 


CATHETER REMAINS OCCULUDED;  Start 2/2/19 at 11:00


Amlodipine Besylate (Norvasc) 5 mg DAILY PO  Last administered on 2/9/19at 


11:15; Admin Dose 5 MG;  Start 2/5/19 at 09:00


Albuterol/ Ipratropium (Duoneb) 3 ml Q6H RESP  THERAPY HHN  Last administered on


2/9/19at 08:51; Admin Dose 3 ML;  Start 2/4/19 at 20:00


Lorazepam (Ativan) 1 mg Q6H  PRN IV PSYCHOSIS Last administered on 2/9/19at 


02:07; Admin Dose 1 MG;  Start 2/7/19 at 02:00


Dextrose 1,000 ml @  75 mls/hr [id number] IV  Last administered on 2/8/19at 20:17; 


Admin Dose 75 MLS/HR;  Start 2/7/19 at 08:00


Haloperidol (Haldol) 2 mg PRN  PRN IM AGITATION;  Start 2/8/19 at 06:30


Risperidone (Risperdal) 2 mg BID PO ;  Start 2/9/19 at 21:00





Assessment/Plan


Assessment/Plan (Daily)


IMP: 


1.  Hypoxic respiratory failure --safely extubated on nasal cannula O2


2.  Pneumoperitoneum status post chest tube which patient pulled out


3.  Encephalopathy toxic metabolic resolving


4.  Advanced COPD


5.  Hypernatremia likely free water deficit


6.  Thrombocytopenia 





RECS:


1. Aspiration precautions


2. Minimize O2 to keep Sp02 88-92%


3. BD's/CPT prn











JACOB HAN MD               Feb 9, 2019 12:20

## 2019-02-10 VITALS — HEART RATE: 66 BPM

## 2019-02-10 VITALS — SYSTOLIC BLOOD PRESSURE: 147 MMHG | RESPIRATION RATE: 21 BRPM | DIASTOLIC BLOOD PRESSURE: 96 MMHG | HEART RATE: 102 BPM

## 2019-02-10 VITALS — HEART RATE: 87 BPM | SYSTOLIC BLOOD PRESSURE: 123 MMHG | DIASTOLIC BLOOD PRESSURE: 77 MMHG | RESPIRATION RATE: 22 BRPM

## 2019-02-10 VITALS — HEART RATE: 84 BPM | RESPIRATION RATE: 20 BRPM | DIASTOLIC BLOOD PRESSURE: 85 MMHG | SYSTOLIC BLOOD PRESSURE: 121 MMHG

## 2019-02-10 VITALS — SYSTOLIC BLOOD PRESSURE: 118 MMHG | HEART RATE: 92 BPM | DIASTOLIC BLOOD PRESSURE: 78 MMHG | RESPIRATION RATE: 18 BRPM

## 2019-02-10 VITALS — DIASTOLIC BLOOD PRESSURE: 106 MMHG | RESPIRATION RATE: 22 BRPM | HEART RATE: 92 BPM | SYSTOLIC BLOOD PRESSURE: 137 MMHG

## 2019-02-10 VITALS — SYSTOLIC BLOOD PRESSURE: 122 MMHG | HEART RATE: 85 BPM | DIASTOLIC BLOOD PRESSURE: 83 MMHG | RESPIRATION RATE: 17 BRPM

## 2019-02-10 VITALS — SYSTOLIC BLOOD PRESSURE: 120 MMHG | DIASTOLIC BLOOD PRESSURE: 81 MMHG | RESPIRATION RATE: 18 BRPM | HEART RATE: 82 BPM

## 2019-02-10 VITALS — DIASTOLIC BLOOD PRESSURE: 91 MMHG | RESPIRATION RATE: 18 BRPM | SYSTOLIC BLOOD PRESSURE: 129 MMHG

## 2019-02-10 VITALS — HEART RATE: 101 BPM | SYSTOLIC BLOOD PRESSURE: 145 MMHG | DIASTOLIC BLOOD PRESSURE: 123 MMHG | RESPIRATION RATE: 16 BRPM

## 2019-02-10 VITALS — DIASTOLIC BLOOD PRESSURE: 78 MMHG | HEART RATE: 92 BPM | RESPIRATION RATE: 20 BRPM | SYSTOLIC BLOOD PRESSURE: 120 MMHG

## 2019-02-10 VITALS — SYSTOLIC BLOOD PRESSURE: 133 MMHG | RESPIRATION RATE: 16 BRPM | HEART RATE: 87 BPM | DIASTOLIC BLOOD PRESSURE: 84 MMHG

## 2019-02-10 VITALS — SYSTOLIC BLOOD PRESSURE: 118 MMHG | HEART RATE: 97 BPM | RESPIRATION RATE: 16 BRPM | DIASTOLIC BLOOD PRESSURE: 71 MMHG

## 2019-02-10 VITALS — DIASTOLIC BLOOD PRESSURE: 99 MMHG | SYSTOLIC BLOOD PRESSURE: 123 MMHG | HEART RATE: 94 BPM | RESPIRATION RATE: 18 BRPM

## 2019-02-10 VITALS — SYSTOLIC BLOOD PRESSURE: 120 MMHG | HEART RATE: 92 BPM | DIASTOLIC BLOOD PRESSURE: 79 MMHG | RESPIRATION RATE: 20 BRPM

## 2019-02-10 VITALS — DIASTOLIC BLOOD PRESSURE: 94 MMHG | HEART RATE: 93 BPM | SYSTOLIC BLOOD PRESSURE: 134 MMHG | RESPIRATION RATE: 27 BRPM

## 2019-02-10 VITALS — HEART RATE: 82 BPM

## 2019-02-10 VITALS — HEART RATE: 92 BPM | RESPIRATION RATE: 18 BRPM | DIASTOLIC BLOOD PRESSURE: 98 MMHG | SYSTOLIC BLOOD PRESSURE: 129 MMHG

## 2019-02-10 VITALS — HEART RATE: 96 BPM

## 2019-02-10 LAB
ARTERIAL BASE EXCESS: 6.7 MMOL/L (ref -3–3)
ARTERIAL BASE EXCESS: 7.9 MMOL/L (ref -3–3)
ARTERIAL BLOOD GAS OXYGEN SAT: 71.3 MMHG (ref 95–98)
ARTERIAL BLOOD GAS OXYGEN SAT: 89.6 MMHG (ref 95–98)
ARTERIAL COHB: 0.2 % (ref 0–3)
ARTERIAL COHB: 0.6 % (ref 0–3)
ARTERIAL FRACTION OF OXYHGB: 70.7 % (ref 93–99)
ARTERIAL FRACTION OF OXYHGB: 89.3 % (ref 93–99)
ARTERIAL HCO3: 34.4 MMOL/L (ref 22–26)
ARTERIAL HCO3: 37.5 MMOL/L (ref 22–26)
ARTERIAL METHB: 0.1 % (ref 0–1.5)
ARTERIAL METHB: 0.2 % (ref 0–1.5)
ARTERIAL PCO2: 65.9 MMHG (ref 35–45)
ARTERIAL PCO2: 83.5 MMHG (ref 35–45)
FIO2: 100 %
FIO2: 61 %
MODE: (no result)
MODE: (no result)
O2 A-A PPRESDIFF RESPIRATORY: 298.3 MMHG (ref 7–24)
O2 A-A PPRESDIFF RESPIRATORY: 586.6 MMHG (ref 7–24)

## 2019-02-10 RX ADMIN — METHYLPREDNISOLONE SODIUM SUCCINATE 1 MG: 40 INJECTION, POWDER, FOR SOLUTION INTRAMUSCULAR; INTRAVENOUS at 00:23

## 2019-02-10 RX ADMIN — METHYLPREDNISOLONE SODIUM SUCCINATE 1 MG: 40 INJECTION, POWDER, FOR SOLUTION INTRAMUSCULAR; INTRAVENOUS at 23:26

## 2019-02-10 RX ADMIN — ASCORBIC ACID, VITAMIN A PALMITATE, CHOLECALCIFEROL, THIAMINE HYDROCHLORIDE, RIBOFLAVIN-5 PHOSPHATE SODIUM, PYRIDOXINE HYDROCHLORIDE, NIACINAMIDE, DEXPANTHENOL, ALPHA-TOCOPHEROL ACETATE, VITAMIN K1, FOLIC ACID, BIOTIN, CYANOCOBALAMIN 1 MLS/HR: 200; 3300; 200; 6; 3.6; 6; 40; 15; 10; 150; 600; 60; 5 INJECTION, SOLUTION INTRAVENOUS at 13:20

## 2019-02-10 RX ADMIN — RISPERIDONE 1 MG: 1 TABLET ORAL at 21:20

## 2019-02-10 RX ADMIN — METHYLPREDNISOLONE SODIUM SUCCINATE 1 MG: 40 INJECTION, POWDER, FOR SOLUTION INTRAMUSCULAR; INTRAVENOUS at 05:16

## 2019-02-10 RX ADMIN — ASCORBIC ACID, VITAMIN A PALMITATE, CHOLECALCIFEROL, THIAMINE HYDROCHLORIDE, RIBOFLAVIN-5 PHOSPHATE SODIUM, PYRIDOXINE HYDROCHLORIDE, NIACINAMIDE, DEXPANTHENOL, ALPHA-TOCOPHEROL ACETATE, VITAMIN K1, FOLIC ACID, BIOTIN, CYANOCOBALAMIN 1 MLS/HR: 200; 3300; 200; 6; 3.6; 6; 40; 15; 10; 150; 600; 60; 5 INJECTION, SOLUTION INTRAVENOUS at 00:28

## 2019-02-10 RX ADMIN — ASPIRIN 81 MG CHEWABLE TABLET 1 MG: 81 TABLET CHEWABLE at 09:47

## 2019-02-10 RX ADMIN — AMLODIPINE BESYLATE SCH MG: 5 TABLET ORAL at 09:48

## 2019-02-10 RX ADMIN — POTASSIUM CHLORIDE SCH MLS/HR: 2 INJECTION, SOLUTION, CONCENTRATE INTRAVENOUS at 00:28

## 2019-02-10 RX ADMIN — LORAZEPAM PRN MG: 2 INJECTION, SOLUTION INTRAMUSCULAR; INTRAVENOUS at 23:26

## 2019-02-10 RX ADMIN — IPRATROPIUM BROMIDE AND ALBUTEROL SULFATE 1 ML: .5; 3 SOLUTION RESPIRATORY (INHALATION) at 01:20

## 2019-02-10 RX ADMIN — RISPERIDONE 1 MG: 1 TABLET ORAL at 09:48

## 2019-02-10 RX ADMIN — GUAIFENESIN AND DEXTROMETHORPHAN 1 ML: 100; 10 SYRUP ORAL at 05:16

## 2019-02-10 RX ADMIN — METHYLPREDNISOLONE SODIUM SUCCINATE 1 MG: 40 INJECTION, POWDER, FOR SOLUTION INTRAMUSCULAR; INTRAVENOUS at 13:39

## 2019-02-10 RX ADMIN — AMLODIPINE BESYLATE 1 MG: 5 TABLET ORAL at 09:48

## 2019-02-10 RX ADMIN — MORPHINE SULFATE PRN MG: 10 SOLUTION ORAL at 00:23

## 2019-02-10 RX ADMIN — IPRATROPIUM BROMIDE AND ALBUTEROL SULFATE 1 ML: .5; 3 SOLUTION RESPIRATORY (INHALATION) at 20:57

## 2019-02-10 RX ADMIN — FAMOTIDINE 1 MG: 20 TABLET ORAL at 21:20

## 2019-02-10 RX ADMIN — POTASSIUM CHLORIDE SCH MLS/HR: 2 INJECTION, SOLUTION, CONCENTRATE INTRAVENOUS at 13:20

## 2019-02-10 RX ADMIN — IPRATROPIUM BROMIDE AND ALBUTEROL SULFATE SCH ML: .5; 3 SOLUTION RESPIRATORY (INHALATION) at 08:02

## 2019-02-10 RX ADMIN — DIVALPROEX SODIUM SCH MG: 125 CAPSULE, COATED PELLETS ORAL at 21:00

## 2019-02-10 RX ADMIN — METHYLPREDNISOLONE SODIUM SUCCINATE 1 MG: 40 INJECTION, POWDER, FOR SOLUTION INTRAMUSCULAR; INTRAVENOUS at 18:08

## 2019-02-10 RX ADMIN — IPRATROPIUM BROMIDE AND ALBUTEROL SULFATE SCH ML: .5; 3 SOLUTION RESPIRATORY (INHALATION) at 14:56

## 2019-02-10 RX ADMIN — DIVALPROEX SODIUM 1 MG: 125 CAPSULE, COATED PELLETS ORAL at 21:00

## 2019-02-10 RX ADMIN — CASTOR OIL AND BALSAM, PERU 1 APPLIC: 788; 87 OINTMENT TOPICAL at 09:00

## 2019-02-10 RX ADMIN — DIVALPROEX SODIUM 1 MG: 250 TABLET, DELAYED RELEASE ORAL at 00:23

## 2019-02-10 RX ADMIN — IPRATROPIUM BROMIDE AND ALBUTEROL SULFATE SCH ML: .5; 3 SOLUTION RESPIRATORY (INHALATION) at 20:57

## 2019-02-10 RX ADMIN — FAMOTIDINE 1 MG: 20 TABLET ORAL at 09:47

## 2019-02-10 RX ADMIN — DIVALPROEX SODIUM 1 MG: 250 TABLET, DELAYED RELEASE ORAL at 09:47

## 2019-02-10 RX ADMIN — IPRATROPIUM BROMIDE AND ALBUTEROL SULFATE 1 ML: .5; 3 SOLUTION RESPIRATORY (INHALATION) at 14:56

## 2019-02-10 RX ADMIN — MORPHINE SULFATE 1 MG: 10 SOLUTION ORAL at 00:23

## 2019-02-10 RX ADMIN — ASPIRIN 81 MG SCH MG: 81 TABLET ORAL at 09:47

## 2019-02-10 RX ADMIN — FAMOTIDINE SCH MG: 20 TABLET ORAL at 09:47

## 2019-02-10 RX ADMIN — IPRATROPIUM BROMIDE AND ALBUTEROL SULFATE 1 ML: .5; 3 SOLUTION RESPIRATORY (INHALATION) at 08:02

## 2019-02-10 RX ADMIN — LORAZEPAM 1 MG: 2 INJECTION, SOLUTION INTRAMUSCULAR; INTRAVENOUS at 23:26

## 2019-02-10 RX ADMIN — DIVALPROEX SODIUM SCH MG: 250 TABLET, DELAYED RELEASE ORAL at 00:23

## 2019-02-10 RX ADMIN — DIVALPROEX SODIUM SCH MG: 250 TABLET, DELAYED RELEASE ORAL at 09:47

## 2019-02-10 RX ADMIN — IPRATROPIUM BROMIDE AND ALBUTEROL SULFATE SCH ML: .5; 3 SOLUTION RESPIRATORY (INHALATION) at 01:20

## 2019-02-10 RX ADMIN — FAMOTIDINE SCH MG: 20 TABLET ORAL at 21:20

## 2019-02-10 NOTE — PN
Date/Time of Note


Date/Time of Note


DATE: 2/10/19 


TIME: 07:39





Assessment/Plan


VTE Prophylaxis


Risk score (from Nsg)>0 risk:  13


SCD applied (from Nsg):  Yes


Pharmacological prophylaxis:  other





Lines/Catheters


IV Catheter Type (from Nrsg):  Peripheral IV


Urinary Cath still in place:  Yes


Reason Cath still needed:  urinary retention





Assessment/Plan


Hospital Course


SUBJECTIVE:  The patient remains lethargic.  The patient is currently on pureed 


diet.  No other events noted.


d/w Dr Hanson 


 


OBJECTIVE:


HEENT:  Head is normocephalic.


NECK:  Supple.


HEART:  Regular rate.


LUNGS:  Show diminished breath sounds at the base.


ABDOMEN:  Soft, nontender to palpation.  No rebound or guarding.


EXTREMITIES:  Negative for clubbing, cyanosis.  No edema.


DERMATOLOGIC:  No rashes.


MUSCULOSKELETAL:  No joint effusions.


NEUROLOGIC:  No change in exam.


 


MEDICATIONS:  The patient's medications have been reviewed.


 


LABORATORY DATA:  Shows sodium 150, potassium 4.0, bicarbonate 40, BUN 35, 


creatinine 0.71.  White count 3.3, hemoglobin 10.0, platelet count is 69.


 


ASSESSMENT AND PLAN:


1.  Nonoliguric acute kidney injury with unknown baseline creatinine.  Etiology 


is secondary to hemodynamics.  The patient's renal function has improved.  The 


patient does have underlying azotemia secondary to acute kidney injury, 


hypercatabolic state.  We will continue to monitor closely.  Continue supportive


care, renally dose all medicines.


2.  Hypernatremia, the patient has a free water deficit of approximately 2 


liters.  We will continue D5W drip 


3.  Anemia.  Continue to monitor hemoglobin and hematocrit levels.


4.  Mineral bone disorder, monitor calcium and phosphorus levels.


5.  Diastolic heart failure.  The patient appears compensated.  We will continue


to monitor, give intermittent diuretic therapy as needed.


6.  Alkalosis.  Etiology is likely compensatory due to underlying hypercapnia.  


We will continue to monitor.  Consider repeating ABG.


7.  Acute hypoxemic, hypercapnic respiratory failure.  The patient remains on 


Venturi facemask.  Continue to monitor.  Follow up with pulmonary.  May require 


reintubation.


8.  Sepsis, status post shock secondary to pneumonia.  Continue current 


antibiotic regimen.


9.  Acute encephalopathy, etiology is toxic metabolic.  Continue to monitor.


10.  Status post cardiac arrest.


11.  Status post pneumothorax.


12.  Status post pneumoperitoneum.


13.  Left brachial deep vein thrombosis.  Continue medical management.


Result Diagram:  


2/9/19 0545                                                                     


          2/9/19 0545





Results 24hrs





Laboratory Tests


           Test
                                       2/10/19
02:40


           Blood Gas Specimen Source
           Blood arterial



           Arterial Blood Date Drawn
           2/10/2019
2:50:04 AM


           Arterial Blood pH (Temp
corrected)             7.270  *L



           Arterial Blood pCO2 (Temp
correct)              83.5  *H



           Arterial Blood pO2 (Temp
corrected)             42.9  *L



           Arterial Blood HCO3                               37.5  H


           Arterial Blood Base Excess                         7.9  H


           Arterial Blood Oxygen
Saturation                 71.3  L



           Antoine Test                           N/A


           Arterial Blood Gas Puncture
Site     Right Radial  



           Arterial Blood
Carboxyhemoglobin                   0.6  



           Arterial Blood Methemoglobin                        0.2


           Blood Gas A-a O2 Differential                    586.6  H


           Oxyhemoglobin Percent                             70.7  L


           Blood Gas Temperature                              37.0


           Blood Gas Actual Respiration
Rate                   24  



           Blood Gas Modality                   MASK - NRB


           FiO2                                              100.0


           Blood Gas Critical Value Read
Back   Evelio GUY  



           Blood Gas Notified Whom              [facility]


           Blood Gas Notified Time
             2/10/2019
3:00:11 AM








Exam/Review of Systems


Exam


Vitals





Vital Signs


  Date      Temp  Pulse  Resp  B/P (MAP)   Pulse Ox  O2          O2 Flow    FiO2


Time                                                 Delivery    Rate


   2/10/19  98.0     87    16      133/84        98


     07:20                          (100)


   2/10/19                                           Simple            6.0


     01:20                                           Mask


    2/9/19                                                                    36


     08:51








Intake and Output





2/9/19


2/9/19


2/10/19





1414:59


22:59


06:59





IntakeIntake Total


50 ml


460 ml


885 ml





OutputOutput Total


1000 ml


950 ml





BalanceBalance


50 ml


-540 ml


-65 ml














Results


Results 24hrs





Laboratory Tests


           Test
                                       2/10/19
02:40


           Blood Gas Specimen Source
           Blood arterial



           Arterial Blood Date Drawn
           2/10/2019
2:50:04 AM


           Arterial Blood pH (Temp
corrected)             7.270  *L



           Arterial Blood pCO2 (Temp
correct)              83.5  *H



           Arterial Blood pO2 (Temp
corrected)             42.9  *L



           Arterial Blood HCO3                               37.5  H


           Arterial Blood Base Excess                         7.9  H


           Arterial Blood Oxygen
Saturation                 71.3  L



           Antoine Test                           N/A


           Arterial Blood Gas Puncture
Site     Right Radial  



           Arterial Blood
Carboxyhemoglobin                   0.6  



           Arterial Blood Methemoglobin                        0.2


           Blood Gas A-a O2 Differential                    586.6  H


           Oxyhemoglobin Percent                             70.7  L


           Blood Gas Temperature                              37.0


           Blood Gas Actual Respiration
Rate                   24  



           Blood Gas Modality                   MASK - NRB


           FiO2                                              100.0


           Blood Gas Critical Value Read
Back   Evelio GUY  



           Blood Gas Notified Whom              [facility]


           Blood Gas Notified Time
             2/10/2019
3:00:11 AM








Medications


Medication





Current Medications


Ondansetron HCl (Zofran Inj) 4 mg Q6H  PRN IV NAUSEA AND/OR VOMITING Last 


administered on 2/1/19at 18:46; Admin Dose 4 MG;  Start 1/21/19 at 23:30


Acetaminophen (Tylenol Supp) 650 mg Q4H  PRN [place] PAIN LEVEL 1-3 OR FEVER;  Start 


1/21/19 at 23:30


Aspirin (Aspirin) 81 mg DAILY PO  Last administered on 2/9/19at 11:15; Admin 


Dose 81 MG;  Start 1/24/19 at 09:00


Famotidine (Pepcid) 20 mg Q12 PO  Last administered on 2/9/19at 20:25; Admin 


Dose 20 MG;  Start 1/24/19 at 21:00


Zolpidem Tartrate (Ambien) 5 mg HS  PRN PO INSOMNIA Last administered on 


2/5/19at 20:31; Admin Dose 5 MG;  Start 1/27/19 at 00:00


Guaifenesin/ Dextromethorphan (Robitussin Dm Liquid Cup) 5 ml Q6H  PRN PO COUGH 


Last administered on 2/10/19at 05:16; Admin Dose 5 ML;  Start 1/27/19 at 20:00


Divalproex Sodium (Depakote) 250 mg Q8H PO  Last administered on 2/10/19at 


00:23; Admin Dose 250 MG;  Start 1/28/19 at 17:00


Morphine Sulfate (morphine) 6 mg Q4H  PRN PO SEVERE PAIN LEVEL 7-10 Last 


administered on 2/10/19at 00:23; Admin Dose 6 MG;  Start 1/30/19 at 21:30


Albuterol/ Ipratropium (Duoneb) 3 ml Q3H RESP THERAPY  PRN HHN SHORTNESS OF 


BREATH Last administered on 2/7/19at 18:47; Admin Dose 3 ML;  Start 1/31/19 at 


20:00


Methylprednisolone Sodium Succinate (Solu-Medrol) 40 mg Q6 IV  Last administered


on 2/10/19at 05:16; Admin Dose 40 MG;  Start 2/2/19 at 12:00


Alteplase, Recombinant (Cathflo (Activase)) 2 mg MAY REPEAT X1 PRN CATHETER IF 


CATHETER REMAINS OCCULUDED;  Start 2/2/19 at 11:00


Amlodipine Besylate (Norvasc) 5 mg DAILY PO  Last administered on 2/9/19at 


11:15; Admin Dose 5 MG;  Start 2/5/19 at 09:00


Albuterol/ Ipratropium (Duoneb) 3 ml Q6H RESP  THERAPY HHN  Last administered on


2/10/19at 01:20; Admin Dose 3 ML;  Start 2/4/19 at 20:00


Lorazepam (Ativan) 1 mg Q6H  PRN IV PSYCHOSIS Last administered on 2/9/19at 


02:07; Admin Dose 1 MG;  Start 2/7/19 at 02:00


Dextrose 1,000 ml @  75 mls/hr [id number] IV  Last administered on 2/10/19at 00:28;


Admin Dose 75 MLS/HR;  Start 2/7/19 at 08:00


Haloperidol (Haldol) 2 mg PRN  PRN IM AGITATION;  Start 2/8/19 at 06:30


Risperidone (Risperdal) 2 mg BID PO  Last administered on 2/9/19at 20:26; Admin 


Dose 2 MG;  Start 2/9/19 at 21:00











JESS VALENZUELA DO              Feb 10, 2019 07:40

## 2019-02-10 NOTE — CONS
Consult Date/Type/Reason


Admit Date/Time


Jan 22, 2019 at 00:03


Initial Consult Date


1/22/19


Type of Consultation:  Pulm/CCM


Requesting Provider:  DONALDO CARRILLO MD


Date/Time of Note


DATE: 2/10/19 


TIME: 11:30





Subjective


Events noted. Increased oxygen requirements and hypercapnia noted. Required 


aggressive suctioning.





Objective


Vitals





Vital Signs


  Date      Temp  Pulse  Resp  B/P (MAP)   Pulse Ox  O2          O2 Flow    FiO2


Time                                                 Delivery    Rate


   2/10/19  97.8     97    16      118/71        90  Mask


     11:07                           (87)


   2/10/19                                                             8.0


     08:12


    2/9/19                                                                    36


     08:51








Intake and Output





2/9/19


2/9/19


2/10/19





1515:00


23:00


07:00





IntakeIntake Total


50 ml


460 ml


885 ml





OutputOutput Total


1000 ml


950 ml





BalanceBalance


50 ml


-540 ml


-65 ml











Exam


HEENT: Neck supple; no JVD; no LAD


CVS: RRR, S1 and S2


CHEST: Coarse rhonchi b/l 


ABD: Soft, NT, + BS


EXT: No c/c/e





Results/Medications


Result Diagram:  


2/9/19 0545                                                                     


          2/9/19 0545





Results 24 hrs





Laboratory Tests


Test
                                       2/10/19
02:40         2/10/19
06:30


Blood Gas Specimen Source
           Blood arterial
       Blood arterial



Arterial Blood Date Drawn
           2/10/2019
2:50:04 AM  2/10/2019
6:30:00 AM


Arterial Blood pH (Temp
corrected)             7.270  *L
             7.335  L



Arterial Blood pCO2 (Temp
correct)              83.5  *H
              65.9  H



Arterial Blood pO2 (Temp
corrected)             42.9  *L
              64.3  L



Arterial Blood HCO3                               37.5  H               34.4  H


Arterial Blood Base Excess                         7.9  H                6.7  H


Arterial Blood Oxygen
Saturation                 71.3  L
              89.6  L



Antoine Test                           N/A                   N/A


Arterial Blood Gas Puncture
Site     Right Radial  
       Right Radial  



Arterial Blood
Carboxyhemoglobin                   0.6  
                0.2  



Arterial Blood Methemoglobin                        0.2                   0.1


Blood Gas A-a O2 Differential                    586.6  H              298.3  H


Oxyhemoglobin Percent                             70.7  L               89.3  L


Blood Gas Temperature                              37.0                  37.0


Blood Gas Actual Respiration
Rate                   24  
                 24  



Blood Gas Modality                   MASK - NRB            MASK - SIMPLE


FiO2                                              100.0                  61.0


Blood Gas Critical Value Read
Back   Evelio GUY  
      



Blood Gas Notified Whom              [facility]                  [facility]


Blood Gas Notified Time
             2/10/2019
3:00:11 AM  2/10/2019
6:35:00 AM





Home Meds


Reported Medications


Docusate Sodium* (Colace*) 100 Mg Capsule, 100 MG PO Q24H PRN for CONSTIPATION, 


#30 CAP


   1/21/19


Polyethylene Glycol* (Miralax*) 17 Gm Powd.pack, 17 GM PO DAILY PRN for AS 


NEEDED, #30 PACKET


   1/21/19


Acetaminophen* (Acetaminophen*) 325 Mg Tablet, 650 MG PO Q4H PRN for PAIN 1-


10/10, #30 TAB


   AND FEVER>101F


   1/21/19


Sennosides* (Senna Lax*) 8.6 Mg Tablet, 1 TAB PO AS NEEDED, TAB


   1/21/19


Mv,Minerals/Fa/Lycopene/Ginkgo (ONE DAILY MEN'S 50+ TABLET) 1 Each Tablet, 1 


EACH PO DAILY, TAB


   1/21/19


Divalproex Sodium* (Depakote*) 125 Mg Tablet.dr, 250 MG PO Q8H, #90 TAB


   1/21/19


Quetiapine Fumarate* (Seroquel*) 50 Mg Tablet, 50 MG PO Q8H, TAB


   1/21/19


Ipratropium-Albuterol (Ipratropium-Albuterol) 0.5-3 Mg/3 Ml Ampul.neb, 3 ML 


INHALATION Q4H PRN for WHEEZING AND SOB, #30 VIAL


   1/21/19


Budesonide-Formoterol Fumarate* (Symbicort*) 160-4.5 Hfa.aer.ad, 2 PUFF 


INHALATION BID, #1 EACH


   1/21/19


Medications





Current Medications


Ondansetron HCl (Zofran Inj) 4 mg Q6H  PRN IV NAUSEA AND/OR VOMITING Last 


administered on 2/1/19at 18:46; Admin Dose 4 MG;  Start 1/21/19 at 23:30


Acetaminophen (Tylenol Supp) 650 mg Q4H  PRN [place] PAIN LEVEL 1-3 OR FEVER;  Start 


1/21/19 at 23:30


Aspirin (Aspirin) 81 mg DAILY PO  Last administered on 2/10/19at 09:47; Admin 


Dose 81 MG;  Start 1/24/19 at 09:00


Famotidine (Pepcid) 20 mg Q12 PO  Last administered on 2/10/19at 09:47; Admin 


Dose 20 MG;  Start 1/24/19 at 21:00


Zolpidem Tartrate (Ambien) 5 mg HS  PRN PO INSOMNIA Last administered on 


2/5/19at 20:31; Admin Dose 5 MG;  Start 1/27/19 at 00:00


Guaifenesin/ Dextromethorphan (Robitussin Dm Liquid Cup) 5 ml Q6H  PRN PO COUGH 


Last administered on 2/10/19at 05:16; Admin Dose 5 ML;  Start 1/27/19 at 20:00


Divalproex Sodium (Depakote) 250 mg Q8H PO  Last administered on 2/10/19at 


09:47; Admin Dose 250 MG;  Start 1/28/19 at 17:00


Morphine Sulfate (morphine) 6 mg Q4H  PRN PO SEVERE PAIN LEVEL 7-10 Last 


administered on 2/10/19at 00:23; Admin Dose 6 MG;  Start 1/30/19 at 21:30


Albuterol/ Ipratropium (Duoneb) 3 ml Q3H RESP THERAPY  PRN HHN SHORTNESS OF 


BREATH Last administered on 2/7/19at 18:47; Admin Dose 3 ML;  Start 1/31/19 at 


20:00


Methylprednisolone Sodium Succinate (Solu-Medrol) 40 mg Q6 IV  Last administered


on 2/10/19at 05:16; Admin Dose 40 MG;  Start 2/2/19 at 12:00


Alteplase, Recombinant (Cathflo (Activase)) 2 mg MAY REPEAT X1 PRN CATHETER IF 


CATHETER REMAINS OCCULUDED;  Start 2/2/19 at 11:00


Amlodipine Besylate (Norvasc) 5 mg DAILY PO  Last administered on 2/10/19at 


09:48; Admin Dose 5 MG;  Start 2/5/19 at 09:00


Albuterol/ Ipratropium (Duoneb) 3 ml Q6H RESP  THERAPY HHN  Last administered on


2/10/19at 08:02; Admin Dose 3 ML;  Start 2/4/19 at 20:00


Lorazepam (Ativan) 1 mg Q6H  PRN IV PSYCHOSIS Last administered on 2/9/19at 


02:07; Admin Dose 1 MG;  Start 2/7/19 at 02:00


Dextrose 1,000 ml @  75 mls/hr [id number] IV  Last administered on 2/10/19at 00:28;


Admin Dose 75 MLS/HR;  Start 2/7/19 at 08:00


Haloperidol (Haldol) 2 mg PRN  PRN IM AGITATION;  Start 2/8/19 at 06:30


Risperidone (Risperdal) 2 mg BID PO  Last administered on 2/10/19at 09:48; Admin


Dose 2 MG;  Start 2/9/19 at 21:00





Assessment/Plan


Assessment/Plan (Daily)


IMP: 


1.  Acute on chronic hypoxic and hypercapnic respiratory failure--worse 


overnight with increased mucus plugging. 


2.  Pneumoperitoneum status post chest tube which patient pulled out


3.  Encephalopathy toxic metabolic resolving


4.  Advanced COPD


5.  Hypernatremia likely free water deficit


6.  Thrombocytopenia 


7.  Anemia 





RECS:


1. Transfer to ICU


2. Maintain oxygen to keep Sp02 88-92%


3. Aggressive suctioning/CPT


4. NPO


5. Follow ABG/CXR


6. Strict aspiration precautions





Case d/w 6W and ICU RN staff 


40 min cc time











JACOB HAN MD              Feb 10, 2019 11:36

## 2019-02-10 NOTE — CONS
Assessment/Plan


Assessment/Plan


Hospital Course (Demo Recall)


Patient was transferred to ICU secondary to respiratory distress.  Currently 


alert awake and comfortable on 5 L nasal cannula, no fevers overnight.


He is off antibiotics since yesterday.





Indwelling: Right IJ triple-lumen catheter, Blake





Physical examination: Well-developed chronically ill-appearing cachectic elderly


man.  Head atraumatic normocephalic sclera nonicteric, neck is supple chest rise


symmetrical breath sounds diminished bases.  Heart: S1-S2.  Abdomen soft bowel 


sounds present.  Extremities without edema/cyanosis





Assessment:


1.  Respiratory failure, s/p pneumonia


2.  Dysphagia with ongoing aspiration


4.  Pneumoperitoneum of unclear etiology, no perforation per surgical note, 


status post chest tube


5.  Dementia


5.  Anemia with progressive thrombocytopenia


6.  History of non-ST elevation MI


7.  Status post cardiac arrest


8.  RIJ TLC





Plan: Stable, a continue present care, aspiration precautions and aggressive 


pulmonary toilet





Consultation Date/Type/Reason


Admit Date/Time


Jan 22, 2019 at 00:03


Initial Consult Date


1/22/19


Type of Consult


ID


Requesting Provider:  DONALDO CARRILLO MD


Date/Time of Note


DATE: 2/10/19 


TIME: 19:11





Exam/Review of Systems


Exam


Vitals





Vital Signs


  Date      Temp  Pulse  Resp  B/P (MAP)   Pulse Ox  O2          O2 Flow    FiO2


Time                                                 Delivery    Rate


   2/10/19           92    22     137/106        99  Nasal             5.0


     18:00                          (116)            Cannula


   2/10/19  98.2


     14:00


    2/9/19                                                                    36


     08:51








Intake and Output





2/9/19


2/9/19


2/10/19





1515:00


23:00


07:00





IntakeIntake Total


50 ml


460 ml


885 ml





OutputOutput Total


1000 ml


950 ml





BalanceBalance


50 ml


-540 ml


-65 ml














Results


Result Diagram:  


2/9/19 0545                                                                     


          2/9/19 0545





Results 24hrs





Laboratory Tests


Test
                                       2/10/19
02:40         2/10/19
06:30


Blood Gas Specimen Source
           Blood arterial
       Blood arterial



Arterial Blood Date Drawn
           2/10/2019
2:50:04 AM  2/10/2019
6:30:00 AM


Arterial Blood pH (Temp
corrected)             7.270  *L
             7.335  L



Arterial Blood pCO2 (Temp
correct)              83.5  *H
              65.9  H



Arterial Blood pO2 (Temp
corrected)             42.9  *L
              64.3  L



Arterial Blood HCO3                               37.5  H               34.4  H


Arterial Blood Base Excess                         7.9  H                6.7  H


Arterial Blood Oxygen
Saturation                 71.3  L
              89.6  L



Antoine Test                           N/A                   N/A


Arterial Blood Gas Puncture
Site     Right Radial  
       Right Radial  



Arterial Blood
Carboxyhemoglobin                   0.6  
                0.2  



Arterial Blood Methemoglobin                        0.2                   0.1


Blood Gas A-a O2 Differential                    586.6  H              298.3  H


Oxyhemoglobin Percent                             70.7  L               89.3  L


Blood Gas Temperature                              37.0                  37.0


Blood Gas Actual Respiration
Rate                   24  
                 24  



Blood Gas Modality                   MASK - NRB            MASK - SIMPLE


FiO2                                              100.0                  61.0


Blood Gas Critical Value Read
Back   Evelio GUY  
      



Blood Gas Notified Whom              [facility]                  [facility]


Blood Gas Notified Time
             2/10/2019
3:00:11 AM  2/10/2019
6:35:00 AM








Medications


Medication





Current Medications


Ondansetron HCl (Zofran Inj) 4 mg Q6H  PRN IV NAUSEA AND/OR VOMITING Last 


administered on 2/1/19at 18:46; Admin Dose 4 MG;  Start 1/21/19 at 23:30


Acetaminophen (Tylenol Supp) 650 mg Q4H  PRN [place] PAIN LEVEL 1-3 OR FEVER;  Start 


1/21/19 at 23:30


Aspirin (Aspirin) 81 mg DAILY PO  Last administered on 2/10/19at 09:47; Admin 


Dose 81 MG;  Start 1/24/19 at 09:00


Famotidine (Pepcid) 20 mg Q12 PO  Last administered on 2/10/19at 09:47; Admin 


Dose 20 MG;  Start 1/24/19 at 21:00


Zolpidem Tartrate (Ambien) 5 mg HS  PRN PO INSOMNIA Last administered on 


2/5/19at 20:31; Admin Dose 5 MG;  Start 1/27/19 at 00:00


Guaifenesin/ Dextromethorphan (Robitussin Dm Liquid Cup) 5 ml Q6H  PRN PO COUGH 


Last administered on 2/10/19at 05:16; Admin Dose 5 ML;  Start 1/27/19 at 20:00


Divalproex Sodium (Depakote) 250 mg Q8H PO  Last administered on 2/10/19at 


09:47; Admin Dose 250 MG;  Start 1/28/19 at 17:00


Morphine Sulfate (morphine) 6 mg Q4H  PRN PO SEVERE PAIN LEVEL 7-10 Last 


administered on 2/10/19at 00:23; Admin Dose 6 MG;  Start 1/30/19 at 21:30


Albuterol/ Ipratropium (Duoneb) 3 ml Q3H RESP THERAPY  PRN HHN SHORTNESS OF 


BREATH Last administered on 2/7/19at 18:47; Admin Dose 3 ML;  Start 1/31/19 at 


20:00


Methylprednisolone Sodium Succinate (Solu-Medrol) 40 mg Q6 IV  Last administered


on 2/10/19at 18:08; Admin Dose 40 MG;  Start 2/2/19 at 12:00


Alteplase, Recombinant (Cathflo (Activase)) 2 mg MAY REPEAT X1 PRN CATHETER IF 


CATHETER REMAINS OCCULUDED;  Start 2/2/19 at 11:00


Amlodipine Besylate (Norvasc) 5 mg DAILY PO  Last administered on 2/10/19at 


09:48; Admin Dose 5 MG;  Start 2/5/19 at 09:00


Albuterol/ Ipratropium (Duoneb) 3 ml Q6H RESP  THERAPY HHN  Last administered on


2/10/19at 14:56; Admin Dose 3 ML;  Start 2/4/19 at 20:00


Lorazepam (Ativan) 1 mg Q6H  PRN IV PSYCHOSIS Last administered on 2/9/19at 


02:07; Admin Dose 1 MG;  Start 2/7/19 at 02:00


Dextrose 1,000 ml @  75 mls/hr [id number] IV  Last administered on 2/10/19at 00:28;


Admin Dose 75 MLS/HR;  Start 2/7/19 at 08:00;  Status Hold


Haloperidol (Haldol) 2 mg PRN  PRN IM AGITATION;  Start 2/8/19 at 06:30


Risperidone (Risperdal) 2 mg BID PO  Last administered on 2/10/19at 09:48; Admin


Dose 2 MG;  Start 2/9/19 at 21:00











DAVID VÁZQUEZ NP            Feb 10, 2019 19:12

## 2019-02-10 NOTE — PN
Date/Time of Note


Date/Time of Note


DATE: 2/10/19 


TIME: 12:09





Assessment/Plan


VTE Prophylaxis


Risk score (from [facility])>0 risk:  8


SCD applied (from [facility]):  Yes


Pharmacological prophylaxis:  LMWH





Lines/Catheters


IV Catheter Type (from [facility]):  Peripheral IV


Urinary Cath still in place:  Yes


Reason Cath still needed:  skin wounds contaminated by urine





Assessment/Plan


Hospital Course


-Hypoxic respiratory failure requiring mechanical ventilation, extubated. Dr. English is following in pulmonology consultation.


-Sepsis with shock, resolving.  Dr. Dreyer is following in infection disease 


consultation.


-Status post cardiac arrest.   Dr. Jon is following in cardiology c


onsultation.


-Left-sided pneumothorax, status post left side chest tube placement, s/p self 


d/c CT.


-S/p pneumoperitoneum most likely due to cardiac arrest, resolved. Dr. Lai is 


following in general surgery consultation. 


-Left axillary and brachial DVT, LUE swelling subsided, was on Lovenox which is 


currently held due to thrombocytopenia


-Left lower lobe pneumonia


-Severe emphysema


-NATALIE with possible chronic kidney disease. Dr Hanson is following in nephrology


consultation.


-Schizoaffective disorder depressed type.  Psychiatric consult is appreciated, 


continue Risperdal Depakote


Result Diagram:  


2/9/19 0545                                                                     


          2/9/19 0545





Results 24hrs





Laboratory Tests


Test
                                       2/10/19
02:40         2/10/19
06:30


Blood Gas Specimen Source
           Blood arterial
       Blood arterial



Arterial Blood Date Drawn
           2/10/2019
2:50:04 AM  2/10/2019
6:30:00 AM


Arterial Blood pH (Temp
corrected)             7.270  *L
             7.335  L



Arterial Blood pCO2 (Temp
correct)              83.5  *H
              65.9  H



Arterial Blood pO2 (Temp
corrected)             42.9  *L
              64.3  L



Arterial Blood HCO3                               37.5  H               34.4  H


Arterial Blood Base Excess                         7.9  H                6.7  H


Arterial Blood Oxygen
Saturation                 71.3  L
              89.6  L



Antoine Test                           N/A                   N/A


Arterial Blood Gas Puncture
Site     Right Radial  
       Right Radial  



Arterial Blood
Carboxyhemoglobin                   0.6  
                0.2  



Arterial Blood Methemoglobin                        0.2                   0.1


Blood Gas A-a O2 Differential                    586.6  H              298.3  H


Oxyhemoglobin Percent                             70.7  L               89.3  L


Blood Gas Temperature                              37.0                  37.0


Blood Gas Actual Respiration
Rate                   24  
                 24  



Blood Gas Modality                   MASK - NRB            MASK - SIMPLE


FiO2                                              100.0                  61.0


Blood Gas Critical Value Read
Back   Evelio GUY  
      



Blood Gas Notified Whom              [facility]                  [facility]


Blood Gas Notified Time
             2/10/2019
3:00:11 AM  2/10/2019
6:35:00 AM








Subjective


24 Hr Interval Summary


Free Text/Dictation


Patient appear tachypneic, on face mask currently





Exam/Review of Systems


Exam


Vitals





Vital Signs


  Date      Temp  Pulse  Resp  B/P (MAP)   Pulse Ox  O2          O2 Flow    FiO2


Time                                                 Delivery    Rate


   2/10/19  97.8     97    16      118/71        90  Mask


     11:07                           (87)


   2/10/19                                                             8.0


     08:12


    2/9/19                                                                    36


     08:51








Intake and Output





2/9/19


2/9/19


2/10/19





1515:00


23:00


07:00





IntakeIntake Total


50 ml


460 ml


885 ml





OutputOutput Total


1000 ml


950 ml





BalanceBalance


50 ml


-540 ml


-65 ml











Constitutional:  well developed


Head:  normocephalic, atraumatic


Neck:  supple


Respiratory:  diminished breath sounds


Cardiovascular:  regular rate and rhythm


Gastrointestinal:  soft, non-tender


Extremities:  normal pulses





Results


Results 24hrs





Laboratory Tests


Test
                                       2/10/19
02:40         2/10/19
06:30


Blood Gas Specimen Source
           Blood arterial
       Blood arterial



Arterial Blood Date Drawn
           2/10/2019
2:50:04 AM  2/10/2019
6:30:00 AM


Arterial Blood pH (Temp
corrected)             7.270  *L
             7.335  L



Arterial Blood pCO2 (Temp
correct)              83.5  *H
              65.9  H



Arterial Blood pO2 (Temp
corrected)             42.9  *L
              64.3  L



Arterial Blood HCO3                               37.5  H               34.4  H


Arterial Blood Base Excess                         7.9  H                6.7  H


Arterial Blood Oxygen
Saturation                 71.3  L
              89.6  L



Antoine Test                           N/A                   N/A


Arterial Blood Gas Puncture
Site     Right Radial  
       Right Radial  



Arterial Blood
Carboxyhemoglobin                   0.6  
                0.2  



Arterial Blood Methemoglobin                        0.2                   0.1


Blood Gas A-a O2 Differential                    586.6  H              298.3  H


Oxyhemoglobin Percent                             70.7  L               89.3  L


Blood Gas Temperature                              37.0                  37.0


Blood Gas Actual Respiration
Rate                   24  
                 24  



Blood Gas Modality                   MASK - NRB            MASK - SIMPLE


FiO2                                              100.0                  61.0


Blood Gas Critical Value Read
Back   Evelio GUY  
      



Blood Gas Notified Whom              [facility]                  [facility]


Blood Gas Notified Time
             2/10/2019
3:00:11 AM  2/10/2019
6:35:00 AM








Medications


Medication





Current Medications


Ondansetron HCl (Zofran Inj) 4 mg Q6H  PRN IV NAUSEA AND/OR VOMITING Last 


administered on 2/1/19at 18:46; Admin Dose 4 MG;  Start 1/21/19 at 23:30


Acetaminophen (Tylenol Supp) 650 mg Q4H  PRN [place] PAIN LEVEL 1-3 OR FEVER;  Start 


1/21/19 at 23:30


Aspirin (Aspirin) 81 mg DAILY PO  Last administered on 2/10/19at 09:47; Admin 


Dose 81 MG;  Start 1/24/19 at 09:00


Famotidine (Pepcid) 20 mg Q12 PO  Last administered on 2/10/19at 09:47; Admin 


Dose 20 MG;  Start 1/24/19 at 21:00


Zolpidem Tartrate (Ambien) 5 mg HS  PRN PO INSOMNIA Last administered on 


2/5/19at 20:31; Admin Dose 5 MG;  Start 1/27/19 at 00:00


Guaifenesin/ Dextromethorphan (Robitussin Dm Liquid Cup) 5 ml Q6H  PRN PO COUGH 


Last administered on 2/10/19at 05:16; Admin Dose 5 ML;  Start 1/27/19 at 20:00


Divalproex Sodium (Depakote) 250 mg Q8H PO  Last administered on 2/10/19at 


09:47; Admin Dose 250 MG;  Start 1/28/19 at 17:00


Morphine Sulfate (morphine) 6 mg Q4H  PRN PO SEVERE PAIN LEVEL 7-10 Last 


administered on 2/10/19at 00:23; Admin Dose 6 MG;  Start 1/30/19 at 21:30


Albuterol/ Ipratropium (Duoneb) 3 ml Q3H RESP THERAPY  PRN HHN SHORTNESS OF 


BREATH Last administered on 2/7/19at 18:47; Admin Dose 3 ML;  Start 1/31/19 at 


20:00


Methylprednisolone Sodium Succinate (Solu-Medrol) 40 mg Q6 IV  Last administered


on 2/10/19at 05:16; Admin Dose 40 MG;  Start 2/2/19 at 12:00


Alteplase, Recombinant (Cathflo (Activase)) 2 mg MAY REPEAT X1 PRN CATHETER IF 


CATHETER REMAINS OCCULUDED;  Start 2/2/19 at 11:00


Amlodipine Besylate (Norvasc) 5 mg DAILY PO  Last administered on 2/10/19at 0


9:48; Admin Dose 5 MG;  Start 2/5/19 at 09:00


Albuterol/ Ipratropium (Duoneb) 3 ml Q6H RESP  THERAPY HHN  Last administered on


2/10/19at 08:02; Admin Dose 3 ML;  Start 2/4/19 at 20:00


Lorazepam (Ativan) 1 mg Q6H  PRN IV PSYCHOSIS Last administered on 2/9/19at 


02:07; Admin Dose 1 MG;  Start 2/7/19 at 02:00


Dextrose 1,000 ml @  75 mls/hr [id number] IV  Last administered on 2/10/19at 00:28;


Admin Dose 75 MLS/HR;  Start 2/7/19 at 08:00


Haloperidol (Haldol) 2 mg PRN  PRN IM AGITATION;  Start 2/8/19 at 06:30


Risperidone (Risperdal) 2 mg BID PO  Last administered on 2/10/19at 09:48; Admin


Dose 2 MG;  Start 2/9/19 at 21:00











MAURI GRIGSBY                   Feb 10, 2019 12:10

## 2019-02-11 VITALS — RESPIRATION RATE: 18 BRPM | DIASTOLIC BLOOD PRESSURE: 98 MMHG | HEART RATE: 112 BPM | SYSTOLIC BLOOD PRESSURE: 152 MMHG

## 2019-02-11 VITALS — DIASTOLIC BLOOD PRESSURE: 85 MMHG | HEART RATE: 78 BPM | SYSTOLIC BLOOD PRESSURE: 119 MMHG | RESPIRATION RATE: 20 BRPM

## 2019-02-11 VITALS — SYSTOLIC BLOOD PRESSURE: 145 MMHG | RESPIRATION RATE: 25 BRPM | HEART RATE: 107 BPM | DIASTOLIC BLOOD PRESSURE: 97 MMHG

## 2019-02-11 VITALS — DIASTOLIC BLOOD PRESSURE: 136 MMHG | SYSTOLIC BLOOD PRESSURE: 162 MMHG | RESPIRATION RATE: 26 BRPM | HEART RATE: 112 BPM

## 2019-02-11 VITALS — RESPIRATION RATE: 23 BRPM | SYSTOLIC BLOOD PRESSURE: 120 MMHG | HEART RATE: 98 BPM | DIASTOLIC BLOOD PRESSURE: 88 MMHG

## 2019-02-11 VITALS — DIASTOLIC BLOOD PRESSURE: 119 MMHG | RESPIRATION RATE: 22 BRPM | SYSTOLIC BLOOD PRESSURE: 142 MMHG | HEART RATE: 103 BPM

## 2019-02-11 VITALS — RESPIRATION RATE: 25 BRPM | DIASTOLIC BLOOD PRESSURE: 77 MMHG | SYSTOLIC BLOOD PRESSURE: 147 MMHG | HEART RATE: 112 BPM

## 2019-02-11 VITALS — HEART RATE: 96 BPM | RESPIRATION RATE: 29 BRPM | DIASTOLIC BLOOD PRESSURE: 92 MMHG | SYSTOLIC BLOOD PRESSURE: 136 MMHG

## 2019-02-11 VITALS — RESPIRATION RATE: 22 BRPM | HEART RATE: 113 BPM | DIASTOLIC BLOOD PRESSURE: 108 MMHG | SYSTOLIC BLOOD PRESSURE: 128 MMHG

## 2019-02-11 VITALS — HEART RATE: 103 BPM | SYSTOLIC BLOOD PRESSURE: 136 MMHG | RESPIRATION RATE: 21 BRPM | DIASTOLIC BLOOD PRESSURE: 110 MMHG

## 2019-02-11 VITALS — RESPIRATION RATE: 28 BRPM | SYSTOLIC BLOOD PRESSURE: 154 MMHG | DIASTOLIC BLOOD PRESSURE: 101 MMHG | HEART RATE: 110 BPM

## 2019-02-11 VITALS — SYSTOLIC BLOOD PRESSURE: 150 MMHG | DIASTOLIC BLOOD PRESSURE: 101 MMHG | RESPIRATION RATE: 25 BRPM | HEART RATE: 111 BPM

## 2019-02-11 VITALS — RESPIRATION RATE: 23 BRPM | DIASTOLIC BLOOD PRESSURE: 84 MMHG | SYSTOLIC BLOOD PRESSURE: 127 MMHG | HEART RATE: 90 BPM

## 2019-02-11 VITALS — RESPIRATION RATE: 25 BRPM | SYSTOLIC BLOOD PRESSURE: 138 MMHG | DIASTOLIC BLOOD PRESSURE: 122 MMHG | HEART RATE: 121 BPM

## 2019-02-11 VITALS — SYSTOLIC BLOOD PRESSURE: 139 MMHG | HEART RATE: 109 BPM | DIASTOLIC BLOOD PRESSURE: 95 MMHG | RESPIRATION RATE: 33 BRPM

## 2019-02-11 VITALS — HEART RATE: 84 BPM | SYSTOLIC BLOOD PRESSURE: 117 MMHG | DIASTOLIC BLOOD PRESSURE: 81 MMHG | RESPIRATION RATE: 23 BRPM

## 2019-02-11 VITALS — RESPIRATION RATE: 23 BRPM | HEART RATE: 106 BPM

## 2019-02-11 VITALS — RESPIRATION RATE: 21 BRPM | SYSTOLIC BLOOD PRESSURE: 151 MMHG | HEART RATE: 106 BPM | DIASTOLIC BLOOD PRESSURE: 139 MMHG

## 2019-02-11 VITALS — RESPIRATION RATE: 19 BRPM | HEART RATE: 84 BPM

## 2019-02-11 VITALS — SYSTOLIC BLOOD PRESSURE: 140 MMHG | HEART RATE: 108 BPM | RESPIRATION RATE: 24 BRPM | DIASTOLIC BLOOD PRESSURE: 99 MMHG

## 2019-02-11 VITALS — HEART RATE: 112 BPM | DIASTOLIC BLOOD PRESSURE: 102 MMHG | SYSTOLIC BLOOD PRESSURE: 146 MMHG | RESPIRATION RATE: 28 BRPM

## 2019-02-11 VITALS — HEART RATE: 111 BPM

## 2019-02-11 VITALS — HEART RATE: 94 BPM | RESPIRATION RATE: 22 BRPM | DIASTOLIC BLOOD PRESSURE: 87 MMHG | SYSTOLIC BLOOD PRESSURE: 119 MMHG

## 2019-02-11 VITALS — SYSTOLIC BLOOD PRESSURE: 132 MMHG | DIASTOLIC BLOOD PRESSURE: 91 MMHG

## 2019-02-11 VITALS — SYSTOLIC BLOOD PRESSURE: 135 MMHG | HEART RATE: 107 BPM | DIASTOLIC BLOOD PRESSURE: 75 MMHG

## 2019-02-11 LAB
ABNORMAL IP MESSAGE: 1
ADD MAN DIFF?: NO
ALLEN TEST: (no result)
ALLEN TEST: (no result)
ANION GAP: -2 (ref 5–13)
ARTERIAL BASE EXCESS: 10.6 MMOL/L (ref -3–3)
ARTERIAL BASE EXCESS: 11.8 MMOL/L (ref -3–3)
ARTERIAL BLOOD GAS OXYGEN SAT: 96 MMHG (ref 95–98)
ARTERIAL BLOOD GAS OXYGEN SAT: 96.6 MMHG (ref 95–98)
ARTERIAL COHB: 0.2 % (ref 0–3)
ARTERIAL COHB: 0.4 % (ref 0–3)
ARTERIAL FRACTION OF OXYHGB: 95.4 % (ref 93–99)
ARTERIAL FRACTION OF OXYHGB: 96.1 % (ref 93–99)
ARTERIAL HCO3: 35.7 MMOL/L (ref 22–26)
ARTERIAL HCO3: 36.5 MMOL/L (ref 22–26)
ARTERIAL METHB: 0.2 % (ref 0–1.5)
ARTERIAL METHB: 0.3 % (ref 0–1.5)
ARTERIAL PCO2: 48.5 MMHG (ref 35–45)
ARTERIAL PCO2: 50 MMHG (ref 35–45)
BASOPHIL #: 0 10^3/UL (ref 0–0.1)
BASOPHILS %: 0.2 % (ref 0–2)
BLOOD UREA NITROGEN: 38 MG/DL (ref 7–20)
CALCIUM: 9.3 MG/DL (ref 8.4–10.2)
CARBON DIOXIDE: 40 MMOL/L (ref 21–31)
CHLORIDE: 108 MMOL/L (ref 97–110)
CREATININE: 0.77 MG/DL (ref 0.61–1.24)
EOSINOPHILS #: 0 10^3/UL (ref 0–0.5)
EOSINOPHILS %: 0.2 % (ref 0–7)
FIO2: 33 %
FIO2: 39 %
GLUCOSE: 206 MG/DL (ref 70–220)
HEMATOCRIT: 33.8 % (ref 42–52)
HEMOGLOBIN: 10.3 G/DL (ref 14–18)
IMMATURE GRANS #M: 0.08 10^3/UL (ref 0–0.03)
IMMATURE GRANS % (M): 1.2 % (ref 0–0.43)
LACTIC ACID: 2.8 MMOL/L (ref 0.5–2)
LYMPHOCYTES #: 0.4 10^3/UL (ref 0.8–2.9)
LYMPHOCYTES %: 5.9 % (ref 15–51)
MEAN CORPUSCULAR HEMOGLOBIN: 27.9 PG (ref 29–33)
MEAN CORPUSCULAR HGB CONC: 30.5 G/DL (ref 32–37)
MEAN CORPUSCULAR VOLUME: 91.6 FL (ref 82–101)
MEAN PLATELET VOLUME: 12.2 FL (ref 7.4–10.4)
MODE: (no result)
MODE: (no result)
MONOCYTE #: 0.1 10^3/UL (ref 0.3–0.9)
MONOCYTES %: 1.2 % (ref 0–11)
NEUTROPHIL #: 6 10^3/UL (ref 1.6–7.5)
NEUTROPHILS %: 91.3 % (ref 39–77)
NUCLEATED RED BLOOD CELLS #: 0 10^3/UL (ref 0–0)
NUCLEATED RED BLOOD CELLS%: 0 /100WBC (ref 0–0)
O2 A-A PPRESDIFF RESPIRATORY: 141.1 MMHG (ref 7–24)
O2 A-A PPRESDIFF RESPIRATORY: 84.3 MMHG (ref 7–24)
PLATELET COUNT: 53 10^3/UL (ref 140–415)
POSITIVE DIFF: (no result)
POTASSIUM: 4.6 MMOL/L (ref 3.5–5.1)
RED BLOOD COUNT: 3.69 10^6/UL (ref 4.7–6.1)
RED CELL DISTRIBUTION WIDTH: 15.9 % (ref 11.5–14.5)
SODIUM: 146 MMOL/L (ref 135–144)
WHITE BLOOD COUNT: 6.6 10^3/UL (ref 4.8–10.8)

## 2019-02-11 RX ADMIN — METHYLPREDNISOLONE SODIUM SUCCINATE 1 MG: 40 INJECTION, POWDER, FOR SOLUTION INTRAMUSCULAR; INTRAVENOUS at 18:23

## 2019-02-11 RX ADMIN — HALOPERIDOL LACTATE 1 MG: 5 INJECTION, SOLUTION INTRAMUSCULAR at 04:41

## 2019-02-11 RX ADMIN — DIVALPROEX SODIUM SCH MG: 125 CAPSULE, COATED PELLETS ORAL at 20:48

## 2019-02-11 RX ADMIN — FAMOTIDINE SCH MG: 20 TABLET ORAL at 20:49

## 2019-02-11 RX ADMIN — METHYLPREDNISOLONE SODIUM SUCCINATE 1 MG: 40 INJECTION, POWDER, FOR SOLUTION INTRAMUSCULAR; INTRAVENOUS at 05:53

## 2019-02-11 RX ADMIN — AMLODIPINE BESYLATE 1 MG: 5 TABLET ORAL at 10:24

## 2019-02-11 RX ADMIN — IPRATROPIUM BROMIDE AND ALBUTEROL SULFATE 1 ML: .5; 3 SOLUTION RESPIRATORY (INHALATION) at 01:38

## 2019-02-11 RX ADMIN — IPRATROPIUM BROMIDE AND ALBUTEROL SULFATE SCH ML: .5; 3 SOLUTION RESPIRATORY (INHALATION) at 20:03

## 2019-02-11 RX ADMIN — RISPERIDONE 1 MG: 1 TABLET ORAL at 20:49

## 2019-02-11 RX ADMIN — AMLODIPINE BESYLATE SCH MG: 5 TABLET ORAL at 10:24

## 2019-02-11 RX ADMIN — IPRATROPIUM BROMIDE AND ALBUTEROL SULFATE 1 ML: .5; 3 SOLUTION RESPIRATORY (INHALATION) at 08:46

## 2019-02-11 RX ADMIN — FAMOTIDINE SCH MG: 20 TABLET ORAL at 10:24

## 2019-02-11 RX ADMIN — DIVALPROEX SODIUM 1 MG: 125 CAPSULE, COATED PELLETS ORAL at 12:21

## 2019-02-11 RX ADMIN — IPRATROPIUM BROMIDE AND ALBUTEROL SULFATE SCH ML: .5; 3 SOLUTION RESPIRATORY (INHALATION) at 14:12

## 2019-02-11 RX ADMIN — FAMOTIDINE 1 MG: 20 TABLET ORAL at 10:24

## 2019-02-11 RX ADMIN — METHYLPREDNISOLONE SODIUM SUCCINATE 1 MG: 40 INJECTION, POWDER, FOR SOLUTION INTRAMUSCULAR; INTRAVENOUS at 12:20

## 2019-02-11 RX ADMIN — IPRATROPIUM BROMIDE AND ALBUTEROL SULFATE 1 ML: .5; 3 SOLUTION RESPIRATORY (INHALATION) at 14:12

## 2019-02-11 RX ADMIN — DIVALPROEX SODIUM 1 MG: 125 CAPSULE, COATED PELLETS ORAL at 20:48

## 2019-02-11 RX ADMIN — IPRATROPIUM BROMIDE AND ALBUTEROL SULFATE SCH ML: .5; 3 SOLUTION RESPIRATORY (INHALATION) at 01:38

## 2019-02-11 RX ADMIN — ASPIRIN 81 MG CHEWABLE TABLET 1 MG: 81 TABLET CHEWABLE at 10:23

## 2019-02-11 RX ADMIN — CASTOR OIL AND BALSAM, PERU 1 APPLIC: 788; 87 OINTMENT TOPICAL at 11:24

## 2019-02-11 RX ADMIN — FAMOTIDINE 1 MG: 20 TABLET ORAL at 20:49

## 2019-02-11 RX ADMIN — LORAZEPAM PRN MG: 2 INJECTION, SOLUTION INTRAMUSCULAR; INTRAVENOUS at 13:05

## 2019-02-11 RX ADMIN — DIVALPROEX SODIUM 1 MG: 125 CAPSULE, COATED PELLETS ORAL at 04:40

## 2019-02-11 RX ADMIN — LORAZEPAM 1 MG: 2 INJECTION, SOLUTION INTRAMUSCULAR; INTRAVENOUS at 13:05

## 2019-02-11 RX ADMIN — DIVALPROEX SODIUM SCH MG: 125 CAPSULE, COATED PELLETS ORAL at 12:21

## 2019-02-11 RX ADMIN — HALOPERIDOL LACTATE PRN MG: 5 INJECTION, SOLUTION INTRAMUSCULAR at 04:41

## 2019-02-11 RX ADMIN — METHYLPREDNISOLONE SODIUM SUCCINATE 1 MG: 40 INJECTION, POWDER, FOR SOLUTION INTRAMUSCULAR; INTRAVENOUS at 23:46

## 2019-02-11 RX ADMIN — IPRATROPIUM BROMIDE AND ALBUTEROL SULFATE SCH ML: .5; 3 SOLUTION RESPIRATORY (INHALATION) at 08:46

## 2019-02-11 RX ADMIN — ASPIRIN 81 MG SCH MG: 81 TABLET ORAL at 10:23

## 2019-02-11 RX ADMIN — RISPERIDONE 1 MG: 1 TABLET ORAL at 10:25

## 2019-02-11 RX ADMIN — IPRATROPIUM BROMIDE AND ALBUTEROL SULFATE 1 ML: .5; 3 SOLUTION RESPIRATORY (INHALATION) at 20:03

## 2019-02-11 RX ADMIN — DIVALPROEX SODIUM SCH MG: 125 CAPSULE, COATED PELLETS ORAL at 04:40

## 2019-02-11 NOTE — CONS
Assessment/Plan


Assessment/Plan


Hospital Course (Demo Recall)


Alert, comfortable on 4L nc, no fevers, no SOB





Indwelling: Right IJ triple-lumen catheter, Blake





Physical examination: Well-developed chronically ill-appearing cachectic elderly


man.  Head atraumatic normocephalic sclera nonicteric, neck is supple chest rise


symmetrical breath sounds diminished bases.  Heart: S1-S2.  Abdomen soft bowel 


sounds present.  Extremities without edema/cyanosis





Assessment:


1.  Respiratory failure, s/p pneumonia


2.  Dysphagia with ongoing aspiration


4.  Pneumoperitoneum of unclear etiology, no perforation per surgical note, 


status post chest tube


5.  Dementia


5.  Anemia with progressive thrombocytopenia


6.  History of non-ST elevation MI


7.  Status post cardiac arrest


8.  RIJ TLC





Plan: Remains stable, continue present care, aspiration precautions, observe off


abx





Consultation Date/Type/Reason


Admit Date/Time


Jan 22, 2019 at 00:03


Initial Consult Date


1/22/19


Type of Consult


ID


Requesting Provider:  DONALDO CARRILLO MD


Date/Time of Note


DATE: 2/11/19 


TIME: 11:14





Exam/Review of Systems


Exam


Vitals





Vital Signs


  Date      Temp  Pulse  Resp  B/P (MAP)   Pulse Ox  O2          O2 Flow    FiO2


Time                                                 Delivery    Rate


   2/11/19                                       97                    3.0


     11:12


   2/11/19           98    23      120/88            Nasal


     10:00                           (99)            Cannula


   2/11/19                                                                    36


     09:45


   2/11/19  97.6


     08:00








Intake and Output





2/10/19


2/10/19


2/11/19





1515:00


23:00


07:00





IntakeIntake Total


820 ml


0 ml





OutputOutput Total


400 ml


820 ml


775 ml





BalanceBalance


-400 ml


0 ml


-775 ml














Results


Result Diagram:  


2/11/19 0415                                                                    


           2/11/19 0415





Results 24hrs





Laboratory Tests


    Test
                                2/11/19
04:15         2/11/19
05:00


    White Blood Count                           6.6  #


    Red Blood Count                            3.69  L


    Hemoglobin                                 10.3  L


    Hematocrit                                 33.8  L


    Mean Corpuscular Volume                     91.6


    Mean Corpuscular Hemoglobin                27.9  L


    Mean Corpuscular Hemoglobin
Concent       30.5  L
  



    Red Cell Distribution Width                15.9  H


    Platelet Count                               53  L


    Mean Platelet Volume                       12.2  H


    Immature Granulocytes %                   1.200  H


    Neutrophils %                              91.3  H


    Lymphocytes %                               5.9  L


    Monocytes %                                  1.2


    Eosinophils %                                0.2


    Basophils %                                  0.2


    Nucleated Red Blood Cells %                  0.0


    Immature Granulocytes #                   0.080  H


    Neutrophils #                                6.0


    Lymphocytes #                               0.4  L


    Monocytes #                                 0.1  L


    Eosinophils #                                0.0


    Basophils #                                  0.0


    Nucleated Red Blood Cells #                  0.0


    Sodium Level                                146  H


    Potassium Level                              4.6


    Chloride Level                               108


    Carbon Dioxide Level                         40  H


    Anion Gap                                    -2  L


    Blood Urea Nitrogen                          38  H


    Creatinine                                  0.77


    Est Glomerular Filtrat Rate
mL/min   > 60  
        



    Glucose Level                                206


    Lactic Acid Level                          2.8  *H


    Calcium Level                                9.3


    Blood Gas Specimen Source
           
              Blood arterial



    Arterial Blood Date Drawn
           
              2/11/2019
5:00:10 AM


    Arterial Blood pH (Temp
corrected)   
                         7.495  H



    Arterial Blood pCO2 (Temp
correct)   
                          48.5  H



    Arterial Blood pO2 (Temp
corrected)  
                           81.1  



    Arterial Blood HCO3                                              36.5  H


    Arterial Blood Base Excess                                       11.8  H


    Arterial Blood Oxygen
Saturation     
                           96.0  



    Antoine Test                                          ACCEPTAB


    Arterial Blood Gas Puncture
Site     
              Left Radial  



    Arterial Blood
Carboxyhemoglobin     
                            0.4  



    Arterial Blood Methemoglobin                                       0.2


    Blood Gas A-a O2 Differential                                   141.1  H


    Oxyhemoglobin Percent                                             95.4


    Blood Gas Temperature                                             37.0


    Blood Gas Modality                                  NASAL CANNULA


    FiO2                                                              39.0


    Blood Gas Notified Whom                             MA


    Blood Gas Notified Time
             
              2/11/2019
5:07:46 AM








Medications


Medication





Current Medications


Ondansetron HCl (Zofran Inj) 4 mg Q6H  PRN IV NAUSEA AND/OR VOMITING Last 


administered on 2/1/19at 18:46; Admin Dose 4 MG;  Start 1/21/19 at 23:30


Acetaminophen (Tylenol Supp) 650 mg Q4H  PRN [place] PAIN LEVEL 1-3 OR FEVER;  Start 


1/21/19 at 23:30


Aspirin (Aspirin) 81 mg DAILY PO  Last administered on 2/11/19at 10:23; Admin 


Dose 81 MG;  Start 1/24/19 at 09:00


Famotidine (Pepcid) 20 mg Q12 PO  Last administered on 2/11/19at 10:24; Admin 


Dose 20 MG;  Start 1/24/19 at 21:00


Zolpidem Tartrate (Ambien) 5 mg HS  PRN PO INSOMNIA Last administered on 


2/5/19at 20:31; Admin Dose 5 MG;  Start 1/27/19 at 00:00


Guaifenesin/ Dextromethorphan (Robitussin Dm Liquid Cup) 5 ml Q6H  PRN PO COUGH 


Last administered on 2/10/19at 05:16; Admin Dose 5 ML;  Start 1/27/19 at 20:00


Morphine Sulfate (morphine) 6 mg Q4H  PRN PO SEVERE PAIN LEVEL 7-10 Last 


administered on 2/10/19at 00:23; Admin Dose 6 MG;  Start 1/30/19 at 21:30


Albuterol/ Ipratropium (Duoneb) 3 ml Q3H RESP THERAPY  PRN HHN SHORTNESS OF 


BREATH Last administered on 2/7/19at 18:47; Admin Dose 3 ML;  Start 1/31/19 at 


20:00


Methylprednisolone Sodium Succinate (Solu-Medrol) 40 mg Q6 IV  Last administered


on 2/11/19at 05:53; Admin Dose 40 MG;  Start 2/2/19 at 12:00


Alteplase, Recombinant (Cathflo (Activase)) 2 mg MAY REPEAT X1 PRN CATHETER IF 


CATHETER REMAINS OCCULUDED;  Start 2/2/19 at 11:00


Amlodipine Besylate (Norvasc) 5 mg DAILY PO  Last administered on 2/11/19at 


10:24; Admin Dose 5 MG;  Start 2/5/19 at 09:00


Albuterol/ Ipratropium (Duoneb) 3 ml Q6H RESP  THERAPY HHN  Last administered on


2/11/19at 08:46; Admin Dose 3 ML;  Start 2/4/19 at 20:00


Lorazepam (Ativan) 1 mg Q6H  PRN IV PSYCHOSIS Last administered on 2/10/19at 


23:26; Admin Dose 1 MG;  Start 2/7/19 at 02:00


Dextrose 1,000 ml @  50 mls/hr Q20H IV  Last administered on 2/10/19at 00:28; 


Admin Dose 75 MLS/HR;  Start 2/7/19 at 08:00;  Status Hold


Haloperidol (Haldol) 2 mg PRN  PRN IM AGITATION Last administered on 2/11/19at 


04:41; Admin Dose 2 MG;  Start 2/8/19 at 06:30


Risperidone (Risperdal) 2 mg BID PO  Last administered on 2/11/19at 10:25; Admin


Dose 2 MG;  Start 2/9/19 at 21:00


Divalproex Sodium (Depakote Sprinkle) 250 mg Q8H PO  Last administered on 


2/11/19at 04:40; Admin Dose 250 MG;  Start 2/10/19 at 21:00











DAVID VÁZQUEZ NP            Feb 11, 2019 11:15

## 2019-02-11 NOTE — CONS
Consult Date/Type/Reason


Admit Date/Time


Jan 22, 2019 at 00:03


Initial Consult Date


1/22/19


Type of Consult


Pulmonary


Requesting Provider:  DONALDO CARRILLO MD


Date/Time of Note


DATE: 2/11/19 


TIME: 09:26





Subjective


Patient better this morning.  Awake alert comfortable no respiratory distress on


nasal cannula O2.





Objective





Vital Signs


  Date      Temp  Pulse  Resp  B/P (MAP)   Pulse Ox  O2          O2 Flow    FiO2


Time                                                 Delivery    Rate


   2/11/19                                                             6.0


     08:48


   2/11/19          100    22                   100  Nasal


     08:48                                           Cannula


   2/11/19                        142/119


     06:00                          (127)


   2/11/19  98.6


     04:00


    2/9/19                                                                    36


     08:51








Intake and Output





2/10/19


2/10/19


2/11/19





1515:00


23:00


07:00





IntakeIntake Total


820 ml


0 ml





OutputOutput Total


400 ml


820 ml


675 ml





BalanceBalance


-400 ml


0 ml


-675 ml











Exam


GENERAL:  


VITAL SIGNS:  per chart


NECK:  Supple.  No JVD or lymphadenopathy.


CARDIAC EXAM:  S1, S2. No added sounds or murmurs.


CHEST:  clear bilaterally, No added sounds, rales or wheezes


ABDOMEN:  Soft, nontender.  No guarding or rebound.


EXTREMITIES:  No cyanosis, clubbing or edema.


NEUROLOGIC:  Generalized weakness.  No focal deficits.





Vent Setting


Ventilator Support Mode:  SPONT


Fraction of Inspired Oxygen pe:  36


Positive End Expiratory Pressu:  5.0





Results/Medications


Result Diagram:  


2/11/19 0415                                                                    


           2/11/19 0415





Results 24 hrs





Laboratory Tests


    Test
                                2/11/19
04:15         2/11/19
05:00


    White Blood Count                           6.6  #


    Red Blood Count                            3.69  L


    Hemoglobin                                 10.3  L


    Hematocrit                                 33.8  L


    Mean Corpuscular Volume                     91.6


    Mean Corpuscular Hemoglobin                27.9  L


    Mean Corpuscular Hemoglobin
Concent       30.5  L
  



    Red Cell Distribution Width                15.9  H


    Platelet Count                               53  L


    Mean Platelet Volume                       12.2  H


    Immature Granulocytes %                   1.200  H


    Neutrophils %                              91.3  H


    Lymphocytes %                               5.9  L


    Monocytes %                                  1.2


    Eosinophils %                                0.2


    Basophils %                                  0.2


    Nucleated Red Blood Cells %                  0.0


    Immature Granulocytes #                   0.080  H


    Neutrophils #                                6.0


    Lymphocytes #                               0.4  L


    Monocytes #                                 0.1  L


    Eosinophils #                                0.0


    Basophils #                                  0.0


    Nucleated Red Blood Cells #                  0.0


    Sodium Level                                146  H


    Potassium Level                              4.6


    Chloride Level                               108


    Carbon Dioxide Level                         40  H


    Anion Gap                                    -2  L


    Blood Urea Nitrogen                          38  H


    Creatinine                                  0.77


    Est Glomerular Filtrat Rate
mL/min   > 60  
        



    Glucose Level                                206


    Lactic Acid Level                          2.8  *H


    Calcium Level                                9.3


    Blood Gas Specimen Source
           
              Blood arterial



    Arterial Blood Date Drawn
           
              2/11/2019
5:00:10 AM


    Arterial Blood pH (Temp
corrected)   
                         7.495  H



    Arterial Blood pCO2 (Temp
correct)   
                          48.5  H



    Arterial Blood pO2 (Temp
corrected)  
                           81.1  



    Arterial Blood HCO3                                              36.5  H


    Arterial Blood Base Excess                                       11.8  H


    Arterial Blood Oxygen
Saturation     
                           96.0  



    Antoine Test                                          ACCEPTAB


    Arterial Blood Gas Puncture
Site     
              Left Radial  



    Arterial Blood
Carboxyhemoglobin     
                            0.4  



    Arterial Blood Methemoglobin                                       0.2


    Blood Gas A-a O2 Differential                                   141.1  H


    Oxyhemoglobin Percent                                             95.4


    Blood Gas Temperature                                             37.0


    Blood Gas Modality                                  NASAL CANNULA


    FiO2                                                              39.0


    Blood Gas Notified Whom                             MA


    Blood Gas Notified Time
             
              2/11/2019
5:07:46 AM





Medications





Current Medications


Ondansetron HCl (Zofran Inj) 4 mg Q6H  PRN IV NAUSEA AND/OR VOMITING Last 


administered on 2/1/19at 18:46; Admin Dose 4 MG;  Start 1/21/19 at 23:30


Acetaminophen (Tylenol Supp) 650 mg Q4H  PRN [place] PAIN LEVEL 1-3 OR FEVER;  Start 


1/21/19 at 23:30


Aspirin (Aspirin) 81 mg DAILY PO  Last administered on 2/10/19at 09:47; Admin 


Dose 81 MG;  Start 1/24/19 at 09:00


Famotidine (Pepcid) 20 mg Q12 PO  Last administered on 2/10/19at 21:20; Admin 


Dose 20 MG;  Start 1/24/19 at 21:00


Zolpidem Tartrate (Ambien) 5 mg HS  PRN PO INSOMNIA Last administered on 


2/5/19at 20:31; Admin Dose 5 MG;  Start 1/27/19 at 00:00


Guaifenesin/ Dextromethorphan (Robitussin Dm Liquid Cup) 5 ml Q6H  PRN PO COUGH 


Last administered on 2/10/19at 05:16; Admin Dose 5 ML;  Start 1/27/19 at 20:00


Morphine Sulfate (morphine) 6 mg Q4H  PRN PO SEVERE PAIN LEVEL 7-10 Last 


administered on 2/10/19at 00:23; Admin Dose 6 MG;  Start 1/30/19 at 21:30


Albuterol/ Ipratropium (Duoneb) 3 ml Q3H RESP THERAPY  PRN HHN SHORTNESS OF 


BREATH Last administered on 2/7/19at 18:47; Admin Dose 3 ML;  Start 1/31/19 at 


20:00


Methylprednisolone Sodium Succinate (Solu-Medrol) 40 mg Q6 IV  Last administered


on 2/11/19at 05:53; Admin Dose 40 MG;  Start 2/2/19 at 12:00


Alteplase, Recombinant (Cathflo (Activase)) 2 mg MAY REPEAT X1 PRN CATHETER IF 


CATHETER REMAINS OCCULUDED;  Start 2/2/19 at 11:00


Amlodipine Besylate (Norvasc) 5 mg DAILY PO  Last administered on 2/10/19at 


09:48; Admin Dose 5 MG;  Start 2/5/19 at 09:00


Albuterol/ Ipratropium (Duoneb) 3 ml Q6H RESP  THERAPY HHN  Last administered on


2/11/19at 08:46; Admin Dose 3 ML;  Start 2/4/19 at 20:00


Lorazepam (Ativan) 1 mg Q6H  PRN IV PSYCHOSIS Last administered on 2/10/19at 


23:26; Admin Dose 1 MG;  Start 2/7/19 at 02:00


Dextrose 1,000 ml @  50 mls/hr Q20H IV  Last administered on 2/10/19at 00:28; 


Admin Dose 75 MLS/HR;  Start 2/7/19 at 08:00;  Status Hold


Haloperidol (Haldol) 2 mg PRN  PRN IM AGITATION Last administered on 2/11/19at 


04:41; Admin Dose 2 MG;  Start 2/8/19 at 06:30


Risperidone (Risperdal) 2 mg BID PO  Last administered on 2/10/19at 21:20; Admin


Dose 2 MG;  Start 2/9/19 at 21:00


Divalproex Sodium (Depakote Sprinkle) 250 mg Q8H PO  Last administered on 


2/11/19at 04:40; Admin Dose 250 MG;  Start 2/10/19 at 21:00





Assessment/Plan


Hospital Course (Demo Recall)


IMP: 


1.  Acute on chronic hypoxic and hypercapnic respiratory failure--worse 


overnight with increased mucus plugging. 


2.  Pneumoperitoneum status post chest tube which patient pulled out


3.  Encephalopathy toxic metabolic resolving


4.  Advanced COPD


5.  Hypernatremia likely free water deficit


6.  Thrombocytopenia 


7.  Anemia 





RECS:


1.  Continue ICU care


2. Maintain oxygen to keep Sp02 88-92%


3. Aggressive suctioning/CPT


4. NPO


5. Follow ABG/CXR


6. Strict aspiration precautions











MIMI ZAVALA MD, [facility]     Feb 11, 2019 09:44

## 2019-02-11 NOTE — CONS
Assessment/Plan


Assessment/Plan


Hospital Course (Demo Recall)


IMP:


1.Hypotension-Now improved and tolerating norvasc. NL EF by echo this admit


2.resp failure s/p extubation


3.cardiac arrest


4.pneumoperitoneum-resolved


5.Positive troponin-now trended neg


6.renal failure-improved


7.Leukocytosis


8. anemia


9, Thrombocytopenia-ongoing some worsening


10.Resp failure-hypercapnic s/p intubation. s/p extubation but now transferred 


back to ICU for increasing resp distress


11.PNA-by cxr


12.PLeual effusion





Recc:


-IN ICU


-Continue norvasc and follow BP clsoely


-Continue asa


-continue steroids/bronchodilators


-Continue risperdal


-follow MS closely 


-Follow volume status clsoely


-consider thoracentesis


-asp precautions





Consultation Date/Type/Reason


Admit Date/Time


Jan 22, 2019 at 00:03


Initial Consult Date


1/22/19


Type of Consult


Cardiology


Reason for Consultation


cad


Requesting Provider:  DONALDO CARRILLO MD


Date/Time of Note


DATE: 2/11/19 


TIME: 13:23





Exam/Review of Systems


Vital Signs


Vitals





Vital Signs


  Date      Temp  Pulse  Resp  B/P (MAP)   Pulse Ox  O2          O2 Flow    FiO2


Time                                                 Delivery    Rate


   2/11/19  97.7     93    22      119/87        99  Nasal             5.0


     12:00                           (98)            Cannula


   2/11/19                                                                    36


     09:45








Intake and Output





2/10/19


2/10/19


2/11/19





1414:59


22:59


06:59





IntakeIntake Total


620 ml


200 ml





OutputOutput Total


400 ml


680 ml


815 ml





BalanceBalance


-400 ml


-60 ml


-615 ml














Exam


Exam


Review of Systems:


CONSTITUTIONAL:  No fevers, chills.


PULMONARY:  No sob


CARDIOVASCULAR: No chest pain/palpitations


GASTROINTESTINAL:  No nausea/vomiting.


GENITOURINARY:  No hematuria/dysuria.


MUSCULOSKELETAL:  No myagias/arthalgias.


PSYCHIATRIC:  The patient denies depression.


NEUROLOGIC:   No weakness


Constitutional:  alert, oriented


Psych:  no complaints


Head:  normocephalic


ENMT:  mucosa pink and moist


Neck:  supple, jvd (9 cm water)


Respiratory:  diminished breath sounds (at bases/B)


Cardiovascular:  other (tachycardic, regular rhythm)


Musculoskeletal:  muscle weakness (mild generalized)


Extremities:  edema (none)





Labs


Result Diagram:  


2/11/19 0415                                                                    


           2/11/19 0415





Results 24hrs





Laboratory Tests


    Test
                                2/11/19
04:15         2/11/19
05:00


    White Blood Count                           6.6  #


    Red Blood Count                            3.69  L


    Hemoglobin                                 10.3  L


    Hematocrit                                 33.8  L


    Mean Corpuscular Volume                     91.6


    Mean Corpuscular Hemoglobin                27.9  L


    Mean Corpuscular Hemoglobin
Concent       30.5  L
  



    Red Cell Distribution Width                15.9  H


    Platelet Count                               53  L


    Mean Platelet Volume                       12.2  H


    Immature Granulocytes %                   1.200  H


    Neutrophils %                              91.3  H


    Lymphocytes %                               5.9  L


    Monocytes %                                  1.2


    Eosinophils %                                0.2


    Basophils %                                  0.2


    Nucleated Red Blood Cells %                  0.0


    Immature Granulocytes #                   0.080  H


    Neutrophils #                                6.0


    Lymphocytes #                               0.4  L


    Monocytes #                                 0.1  L


    Eosinophils #                                0.0


    Basophils #                                  0.0


    Nucleated Red Blood Cells #                  0.0


    Sodium Level                                146  H


    Potassium Level                              4.6


    Chloride Level                               108


    Carbon Dioxide Level                         40  H


    Anion Gap                                    -2  L


    Blood Urea Nitrogen                          38  H


    Creatinine                                  0.77


    Est Glomerular Filtrat Rate
mL/min   > 60  
        



    Glucose Level                                206


    Lactic Acid Level                          2.8  *H


    Calcium Level                                9.3


    Blood Gas Specimen Source
           
              Blood arterial



    Arterial Blood Date Drawn
           
              2/11/2019
5:00:10 AM


    Arterial Blood pH (Temp
corrected)   
                         7.495  H



    Arterial Blood pCO2 (Temp
correct)   
                          48.5  H



    Arterial Blood pO2 (Temp
corrected)  
                           81.1  



    Arterial Blood HCO3                                              36.5  H


    Arterial Blood Base Excess                                       11.8  H


    Arterial Blood Oxygen
Saturation     
                           96.0  



    Antoine Test                                          ACCEPTAB


    Arterial Blood Gas Puncture
Site     
              Left Radial  



    Arterial Blood
Carboxyhemoglobin     
                            0.4  



    Arterial Blood Methemoglobin                                       0.2


    Blood Gas A-a O2 Differential                                   141.1  H


    Oxyhemoglobin Percent                                             95.4


    Blood Gas Temperature                                             37.0


    Blood Gas Modality                                  NASAL CANNULA


    FiO2                                                              39.0


    Blood Gas Notified Whom                             MA


    Blood Gas Notified Time
             
              2/11/2019
5:07:46 AM








Medications


Medications





Current Medications


Ondansetron HCl (Zofran Inj) 4 mg Q6H  PRN IV NAUSEA AND/OR VOMITING Last 


administered on 2/1/19at 18:46; Admin Dose 4 MG;  Start 1/21/19 at 23:30


Acetaminophen (Tylenol Supp) 650 mg Q4H  PRN [place] PAIN LEVEL 1-3 OR FEVER;  Start 


1/21/19 at 23:30


Aspirin (Aspirin) 81 mg DAILY PO  Last administered on 2/11/19at 10:23; Admin 


Dose 81 MG;  Start 1/24/19 at 09:00


Famotidine (Pepcid) 20 mg Q12 PO  Last administered on 2/11/19at 10:24; Admin 


Dose 20 MG;  Start 1/24/19 at 21:00


Zolpidem Tartrate (Ambien) 5 mg HS  PRN PO INSOMNIA Last administered on 


2/5/19at 20:31; Admin Dose 5 MG;  Start 1/27/19 at 00:00


Guaifenesin/ Dextromethorphan (Robitussin Dm Liquid Cup) 5 ml Q6H  PRN PO COUGH 


Last administered on 2/10/19at 05:16; Admin Dose 5 ML;  Start 1/27/19 at 20:00


Morphine Sulfate (morphine) 6 mg Q4H  PRN PO SEVERE PAIN LEVEL 7-10 Last 


administered on 2/10/19at 00:23; Admin Dose 6 MG;  Start 1/30/19 at 21:30


Albuterol/ Ipratropium (Duoneb) 3 ml Q3H RESP THERAPY  PRN HHN SHORTNESS OF 


BREATH Last administered on 2/7/19at 18:47; Admin Dose 3 ML;  Start 1/31/19 at 


20:00


Methylprednisolone Sodium Succinate (Solu-Medrol) 40 mg Q6 IV  Last administered


on 2/11/19at 12:20; Admin Dose 40 MG;  Start 2/2/19 at 12:00


Alteplase, Recombinant (Cathflo (Activase)) 2 mg MAY REPEAT X1 PRN CATHETER IF 


CATHETER REMAINS OCCULUDED;  Start 2/2/19 at 11:00


Amlodipine Besylate (Norvasc) 5 mg DAILY PO  Last administered on 2/11/19at 


10:24; Admin Dose 5 MG;  Start 2/5/19 at 09:00


Albuterol/ Ipratropium (Duoneb) 3 ml Q6H RESP  THERAPY HHN  Last administered on


2/11/19at 08:46; Admin Dose 3 ML;  Start 2/4/19 at 20:00


Lorazepam (Ativan) 1 mg Q6H  PRN IV PSYCHOSIS Last administered on 2/11/19at 


13:05; Admin Dose 1 MG;  Start 2/7/19 at 02:00


Dextrose 1,000 ml @  50 mls/hr Q20H IV  Last administered on 2/10/19at 00:28; 


Admin Dose 75 MLS/HR;  Start 2/7/19 at 08:00;  Status Hold


Haloperidol (Haldol) 2 mg PRN  PRN IM AGITATION Last administered on 2/11/19at 


04:41; Admin Dose 2 MG;  Start 2/8/19 at 06:30


Risperidone (Risperdal) 2 mg BID PO  Last administered on 2/11/19at 10:25; Admin


Dose 2 MG;  Start 2/9/19 at 21:00


Divalproex Sodium (Depakote Sprinkle) 250 mg Q8H PO  Last administered on 


2/11/19at 12:21; Admin Dose 250 MG;  Start 2/10/19 at 21:00











CARISA IZAGUIRRE               Feb 11, 2019 13:26

## 2019-02-11 NOTE — PN
Date/Time of Note


Date/Time of Note


DATE: 2/11/19 


TIME: 15:24





Assessment/Plan


VTE Prophylaxis


Risk score (from Ns)>0 risk:  11


SCD applied (from Ns):  Yes





Lines/Catheters


IV Catheter Type (from [facility]):  Saline Lock


Urinary Cath still in place:  Yes





Assessment/Plan


Hospital Course





Assessment/Plan


-Hypoxic respiratory failure requiring mechanical ventilation, extubated. Dr. English is following in pulmonology consultation.


-Sepsis with shock, resolving.  Dr. Dreyer is following in infection disease 


consultation.


-Status post cardiac arrest.   Dr. Jon is following in cardiology co


nsultation.


-Left-sided pneumothorax, status post left side chest tube placement, s/p self 


d/c CT.


-S/p pneumoperitoneum most likely due to cardiac arrest, resolved. Dr. Lai is 


following in general surgery consultation. 


-Left axillary and brachial DVT, LUE swelling subsided, was on Lovenox which is 


currently held due to thrombocytopenia


-Left lower lobe pneumonia


-Severe emphysema


-NATALIE with possible chronic kidney disease. Dr Hanson is following in nephrology


consultation.


-Schizoaffective disorder depressed type.  Psychiatric consult is appreciated, 


continue Risperdal Depakote





Further recommendations based on clinical course.  Plan of care discussed with 


Dr. Miller.


Result Diagram:  


2/11/19 0415                                                                    


           2/11/19 0415





Results 24hrs





Laboratory Tests


    Test
                                2/11/19
04:15         2/11/19
05:00


    White Blood Count                           6.6  #


    Red Blood Count                            3.69  L


    Hemoglobin                                 10.3  L


    Hematocrit                                 33.8  L


    Mean Corpuscular Volume                     91.6


    Mean Corpuscular Hemoglobin                27.9  L


    Mean Corpuscular Hemoglobin
Concent       30.5  L
  



    Red Cell Distribution Width                15.9  H


    Platelet Count                               53  L


    Mean Platelet Volume                       12.2  H


    Immature Granulocytes %                   1.200  H


    Neutrophils %                              91.3  H


    Lymphocytes %                               5.9  L


    Monocytes %                                  1.2


    Eosinophils %                                0.2


    Basophils %                                  0.2


    Nucleated Red Blood Cells %                  0.0


    Immature Granulocytes #                   0.080  H


    Neutrophils #                                6.0


    Lymphocytes #                               0.4  L


    Monocytes #                                 0.1  L


    Eosinophils #                                0.0


    Basophils #                                  0.0


    Nucleated Red Blood Cells #                  0.0


    Sodium Level                                146  H


    Potassium Level                              4.6


    Chloride Level                               108


    Carbon Dioxide Level                         40  H


    Anion Gap                                    -2  L


    Blood Urea Nitrogen                          38  H


    Creatinine                                  0.77


    Est Glomerular Filtrat Rate
mL/min   > 60  
        



    Glucose Level                                206


    Lactic Acid Level                          2.8  *H


    Calcium Level                                9.3


    Blood Gas Specimen Source
           
              Blood arterial



    Arterial Blood Date Drawn
           
              2/11/2019
5:00:10 AM


    Arterial Blood pH (Temp
corrected)   
                         7.495  H



    Arterial Blood pCO2 (Temp
correct)   
                          48.5  H



    Arterial Blood pO2 (Temp
corrected)  
                           81.1  



    Arterial Blood HCO3                                              36.5  H


    Arterial Blood Base Excess                                       11.8  H


    Arterial Blood Oxygen
Saturation     
                           96.0  



    Antoine Test                                          ACCEPTAB


    Arterial Blood Gas Puncture
Site     
              Left Radial  



    Arterial Blood
Carboxyhemoglobin     
                            0.4  



    Arterial Blood Methemoglobin                                       0.2


    Blood Gas A-a O2 Differential                                   141.1  H


    Oxyhemoglobin Percent                                             95.4


    Blood Gas Temperature                                             37.0


    Blood Gas Modality                                  NASAL CANNULA


    FiO2                                                              39.0


    Blood Gas Notified Whom                             MA


    Blood Gas Notified Time
             
              2/11/2019
5:07:46 AM








Exam/Review of Systems


Exam


Vitals





Vital Signs


  Date      Temp  Pulse  Resp  B/P (MAP)   Pulse Ox  O2          O2 Flow    FiO2


Time                                                 Delivery    Rate


   2/11/19                                       91                    3.0


     14:12


   2/11/19          101    24                        Nasal


     14:12                                           Cannula


   2/11/19  97.7                   119/87


     12:00                           (98)


   2/11/19                                                                    36


     09:45








Intake and Output





2/10/19


2/10/19


2/11/19





1515:00


23:00


07:00





IntakeIntake Total


820 ml


0 ml





OutputOutput Total


400 ml


820 ml


775 ml





BalanceBalance


-400 ml


0 ml


-775 ml











Exam


Constitutional:  alert, oriented


Respiratory:  diminished breath sounds


Cardiovascular:  regular rate and rhythm


Gastrointestinal:  soft, non-tender


Musculoskeletal:  nl extremities to inspection


Extremities:  normal pulses


Neurological:  nl mental status





Results


Results 24hrs





Laboratory Tests


    Test
                                2/11/19
04:15         2/11/19
05:00


    White Blood Count                           6.6  #


    Red Blood Count                            3.69  L


    Hemoglobin                                 10.3  L


    Hematocrit                                 33.8  L


    Mean Corpuscular Volume                     91.6


    Mean Corpuscular Hemoglobin                27.9  L


    Mean Corpuscular Hemoglobin
Concent       30.5  L
  



    Red Cell Distribution Width                15.9  H


    Platelet Count                               53  L


    Mean Platelet Volume                       12.2  H


    Immature Granulocytes %                   1.200  H


    Neutrophils %                              91.3  H


    Lymphocytes %                               5.9  L


    Monocytes %                                  1.2


    Eosinophils %                                0.2


    Basophils %                                  0.2


    Nucleated Red Blood Cells %                  0.0


    Immature Granulocytes #                   0.080  H


    Neutrophils #                                6.0


    Lymphocytes #                               0.4  L


    Monocytes #                                 0.1  L


    Eosinophils #                                0.0


    Basophils #                                  0.0


    Nucleated Red Blood Cells #                  0.0


    Sodium Level                                146  H


    Potassium Level                              4.6


    Chloride Level                               108


    Carbon Dioxide Level                         40  H


    Anion Gap                                    -2  L


    Blood Urea Nitrogen                          38  H


    Creatinine                                  0.77


    Est Glomerular Filtrat Rate
mL/min   > 60  
        



    Glucose Level                                206


    Lactic Acid Level                          2.8  *H


    Calcium Level                                9.3


    Blood Gas Specimen Source
           
              Blood arterial



    Arterial Blood Date Drawn
           
              2/11/2019
5:00:10 AM


    Arterial Blood pH (Temp
corrected)   
                         7.495  H



    Arterial Blood pCO2 (Temp
correct)   
                          48.5  H



    Arterial Blood pO2 (Temp
corrected)  
                           81.1  



    Arterial Blood HCO3                                              36.5  H


    Arterial Blood Base Excess                                       11.8  H


    Arterial Blood Oxygen
Saturation     
                           96.0  



    Antoine Test                                          ACCEPTAB


    Arterial Blood Gas Puncture
Site     
              Left Radial  



    Arterial Blood
Carboxyhemoglobin     
                            0.4  



    Arterial Blood Methemoglobin                                       0.2


    Blood Gas A-a O2 Differential                                   141.1  H


    Oxyhemoglobin Percent                                             95.4


    Blood Gas Temperature                                             37.0


    Blood Gas Modality                                  NASAL CANNULA


    FiO2                                                              39.0


    Blood Gas Notified Whom                             MA


    Blood Gas Notified Time
             
              2/11/2019
5:07:46 AM








Medications


Medication





Current Medications


Ondansetron HCl (Zofran Inj) 4 mg Q6H  PRN IV NAUSEA AND/OR VOMITING Last 


administered on 2/1/19at 18:46; Admin Dose 4 MG;  Start 1/21/19 at 23:30


Acetaminophen (Tylenol Supp) 650 mg Q4H  PRN [place] PAIN LEVEL 1-3 OR FEVER;  Start 


1/21/19 at 23:30


Aspirin (Aspirin) 81 mg DAILY PO  Last administered on 2/11/19at 10:23; Admin 


Dose 81 MG;  Start 1/24/19 at 09:00


Famotidine (Pepcid) 20 mg Q12 PO  Last administered on 2/11/19at 10:24; Admin 


Dose 20 MG;  Start 1/24/19 at 21:00


Zolpidem Tartrate (Ambien) 5 mg HS  PRN PO INSOMNIA Last administered on 


2/5/19at 20:31; Admin Dose 5 MG;  Start 1/27/19 at 00:00


Guaifenesin/ Dextromethorphan (Robitussin Dm Liquid Cup) 5 ml Q6H  PRN PO COUGH 


Last administered on 2/10/19at 05:16; Admin Dose 5 ML;  Start 1/27/19 at 20:00


Morphine Sulfate (morphine) 6 mg Q4H  PRN PO SEVERE PAIN LEVEL 7-10 Last 


administered on 2/10/19at 00:23; Admin Dose 6 MG;  Start 1/30/19 at 21:30


Albuterol/ Ipratropium (Duoneb) 3 ml Q3H RESP THERAPY  PRN HHN SHORTNESS OF 


BREATH Last administered on 2/7/19at 18:47; Admin Dose 3 ML;  Start 1/31/19 at 


20:00


Methylprednisolone Sodium Succinate (Solu-Medrol) 40 mg Q6 IV  Last administered


on 2/11/19at 12:20; Admin Dose 40 MG;  Start 2/2/19 at 12:00


Alteplase, Recombinant (Cathflo (Activase)) 2 mg MAY REPEAT X1 PRN CATHETER IF 


CATHETER REMAINS OCCULUDED;  Start 2/2/19 at 11:00


Amlodipine Besylate (Norvasc) 5 mg DAILY PO  Last administered on 2/11/19at 


10:24; Admin Dose 5 MG;  Start 2/5/19 at 09:00


Albuterol/ Ipratropium (Duoneb) 3 ml Q6H RESP  THERAPY HHN  Last administered on


2/11/19at 14:12; Admin Dose 3 ML;  Start 2/4/19 at 20:00


Lorazepam (Ativan) 1 mg Q6H  PRN IV PSYCHOSIS Last administered on 2/11/19at 


13:05; Admin Dose 1 MG;  Start 2/7/19 at 02:00


Dextrose 1,000 ml @  50 mls/hr Q20H IV  Last administered on 2/10/19at 00:28; 


Admin Dose 75 MLS/HR;  Start 2/7/19 at 08:00;  Status Hold


Haloperidol (Haldol) 2 mg PRN  PRN IM AGITATION Last administered on 2/11/19at 


04:41; Admin Dose 2 MG;  Start 2/8/19 at 06:30


Risperidone (Risperdal) 2 mg BID PO  Last administered on 2/11/19at 10:25; Admin


Dose 2 MG;  Start 2/9/19 at 21:00


Divalproex Sodium (Depakote Sprinkle) 250 mg Q8H PO  Last administered on 


2/11/19at 12:21; Admin Dose 250 MG;  Start 2/10/19 at 21:00











EVARISTO BELTRAN             Feb 11, 2019 15:25

## 2019-02-11 NOTE — PN
DATE:  02/11/2019

 

 

SUBJECTIVE:  The patient remains confused.  No other acute events noted overnight.  No hemoptysis, he
matemesis, hematochezia.

 

OBJECTIVE:

VITAL SIGNS:  Blood pressure is 142/119, respirations 22, pulse 103, temperature 98.6.

HEENT:  Head is normocephalic.

NECK:  Supple.

HEART:  Regular rate.

LUNGS:  Show diminished breath sounds at the base.

ABDOMEN:  Soft, nontender to palpation without rebound or guarding.

EXTREMITIES:  Negative for clubbing, cyanosis, no edema.

DERMATOLOGIC:  No rashes.

MUSCULOSKELETAL:  No joint effusion.

NEUROLOGIC:  No change in exam.

 

MEDICATIONS:  Reviewed.

 

LABORATORY DATA:  Shows sodium 146, BUN 38, creatinine 0.77, hemoglobin A1c 2.8.  White count 6.6, he
moglobin 10.3, platelet count is 53.

 

IMAGING:  Chest x-ray was reviewed.  No significant change.

 

ASSESSMENT AND PLAN:

1.  Nonoliguric acute kidney injury with unknown baseline creatinine.  Etiology of acute kidney injur
y is secondary to hemodynamics.  Renal function has improved.  The patient does have an underlying az
otemia secondary to acute kidney injury, hypercatabolic state.  Will continue to monitor, continue nicholson
pportive care, renally dose all meds, avoid nephrotoxins.

2.  Hypernatremia.  The patient has a free water deficit approximately 1.5 liters.  Will continue to 
encourage free water intake.  Will give the patient 1 liter of D5 water.

3.  Anemia.  Monitor hemoglobin and hematocrit.

4.  Mineral bone disorder of calcium and phosphorus levels.

5.  Diastolic heart failure.  The patient appears compensated.  Continue to monitor, give intermitten
t diuretic therapy as needed.

6.  Alkalosis, likely compensatory due to underlying hypercapnia.  Continue to monitor.

7.  Acute hypoxemic hypercapnic respiratory failure.  The patient is currently on nasal cannula and s
table.  Continue to monitor.  Follow up with pulmonary.

8.  Sepsis, status post shock secondary to pneumonia.  Continue current antibiotic regimen.

9.  Acute encephalopathy, etiology toxic metabolic.  Continue to monitor.

10.  Left brachial deep vein thrombosis.  Continue medical management.

11.  Status post cardiac arrest.

12.  Status post pneumothorax.

13.  Status post pneumoperitoneum.

 

 

Dictated By: MARYAN RUCKER/NTS

DD:    02/11/2019 07:57:21

DT:    02/11/2019 08:51:55

Conf#: [id number]

DID#:  [id number]

CC: MAURI GRIGSBY MD;*EndCC*

## 2019-02-12 VITALS — SYSTOLIC BLOOD PRESSURE: 147 MMHG | RESPIRATION RATE: 27 BRPM | HEART RATE: 109 BPM | DIASTOLIC BLOOD PRESSURE: 108 MMHG

## 2019-02-12 VITALS — RESPIRATION RATE: 19 BRPM | SYSTOLIC BLOOD PRESSURE: 128 MMHG | DIASTOLIC BLOOD PRESSURE: 98 MMHG | HEART RATE: 98 BPM

## 2019-02-12 VITALS — DIASTOLIC BLOOD PRESSURE: 98 MMHG | RESPIRATION RATE: 26 BRPM | SYSTOLIC BLOOD PRESSURE: 148 MMHG | HEART RATE: 110 BPM

## 2019-02-12 VITALS — DIASTOLIC BLOOD PRESSURE: 90 MMHG | SYSTOLIC BLOOD PRESSURE: 145 MMHG | RESPIRATION RATE: 25 BRPM | HEART RATE: 99 BPM

## 2019-02-12 VITALS — SYSTOLIC BLOOD PRESSURE: 133 MMHG | HEART RATE: 105 BPM | RESPIRATION RATE: 30 BRPM | DIASTOLIC BLOOD PRESSURE: 81 MMHG

## 2019-02-12 VITALS — RESPIRATION RATE: 20 BRPM | DIASTOLIC BLOOD PRESSURE: 100 MMHG | HEART RATE: 103 BPM | SYSTOLIC BLOOD PRESSURE: 139 MMHG

## 2019-02-12 VITALS — HEART RATE: 100 BPM | SYSTOLIC BLOOD PRESSURE: 135 MMHG | DIASTOLIC BLOOD PRESSURE: 95 MMHG | RESPIRATION RATE: 23 BRPM

## 2019-02-12 VITALS — SYSTOLIC BLOOD PRESSURE: 142 MMHG | DIASTOLIC BLOOD PRESSURE: 97 MMHG | RESPIRATION RATE: 25 BRPM | HEART RATE: 110 BPM

## 2019-02-12 VITALS — RESPIRATION RATE: 25 BRPM | DIASTOLIC BLOOD PRESSURE: 104 MMHG | HEART RATE: 107 BPM | SYSTOLIC BLOOD PRESSURE: 147 MMHG

## 2019-02-12 VITALS — RESPIRATION RATE: 29 BRPM | HEART RATE: 106 BPM | SYSTOLIC BLOOD PRESSURE: 129 MMHG | DIASTOLIC BLOOD PRESSURE: 90 MMHG

## 2019-02-12 VITALS — HEART RATE: 104 BPM | RESPIRATION RATE: 25 BRPM | DIASTOLIC BLOOD PRESSURE: 95 MMHG | SYSTOLIC BLOOD PRESSURE: 137 MMHG

## 2019-02-12 VITALS — DIASTOLIC BLOOD PRESSURE: 108 MMHG | RESPIRATION RATE: 23 BRPM | SYSTOLIC BLOOD PRESSURE: 146 MMHG | HEART RATE: 115 BPM

## 2019-02-12 VITALS — HEART RATE: 107 BPM | RESPIRATION RATE: 17 BRPM | DIASTOLIC BLOOD PRESSURE: 107 MMHG | SYSTOLIC BLOOD PRESSURE: 150 MMHG

## 2019-02-12 VITALS — RESPIRATION RATE: 25 BRPM | SYSTOLIC BLOOD PRESSURE: 128 MMHG | HEART RATE: 105 BPM | DIASTOLIC BLOOD PRESSURE: 89 MMHG

## 2019-02-12 VITALS — RESPIRATION RATE: 25 BRPM | DIASTOLIC BLOOD PRESSURE: 98 MMHG | SYSTOLIC BLOOD PRESSURE: 121 MMHG | HEART RATE: 105 BPM

## 2019-02-12 VITALS — SYSTOLIC BLOOD PRESSURE: 130 MMHG | HEART RATE: 96 BPM | DIASTOLIC BLOOD PRESSURE: 93 MMHG | RESPIRATION RATE: 24 BRPM

## 2019-02-12 VITALS — RESPIRATION RATE: 26 BRPM | HEART RATE: 113 BPM | DIASTOLIC BLOOD PRESSURE: 98 MMHG | SYSTOLIC BLOOD PRESSURE: 137 MMHG

## 2019-02-12 VITALS — RESPIRATION RATE: 35 BRPM | HEART RATE: 105 BPM | DIASTOLIC BLOOD PRESSURE: 97 MMHG | SYSTOLIC BLOOD PRESSURE: 136 MMHG

## 2019-02-12 VITALS — HEART RATE: 114 BPM | DIASTOLIC BLOOD PRESSURE: 116 MMHG | RESPIRATION RATE: 20 BRPM | SYSTOLIC BLOOD PRESSURE: 148 MMHG

## 2019-02-12 VITALS — HEART RATE: 107 BPM | DIASTOLIC BLOOD PRESSURE: 96 MMHG | SYSTOLIC BLOOD PRESSURE: 141 MMHG | RESPIRATION RATE: 25 BRPM

## 2019-02-12 VITALS — RESPIRATION RATE: 28 BRPM | DIASTOLIC BLOOD PRESSURE: 79 MMHG | SYSTOLIC BLOOD PRESSURE: 139 MMHG | HEART RATE: 107 BPM

## 2019-02-12 VITALS — SYSTOLIC BLOOD PRESSURE: 131 MMHG | HEART RATE: 100 BPM | DIASTOLIC BLOOD PRESSURE: 86 MMHG | RESPIRATION RATE: 16 BRPM

## 2019-02-12 VITALS — RESPIRATION RATE: 27 BRPM | SYSTOLIC BLOOD PRESSURE: 144 MMHG | HEART RATE: 109 BPM | DIASTOLIC BLOOD PRESSURE: 100 MMHG

## 2019-02-12 VITALS — DIASTOLIC BLOOD PRESSURE: 96 MMHG | RESPIRATION RATE: 37 BRPM | SYSTOLIC BLOOD PRESSURE: 141 MMHG | HEART RATE: 106 BPM

## 2019-02-12 LAB
ANION GAP: 4 (ref 5–13)
BLOOD UREA NITROGEN: 46 MG/DL (ref 7–20)
CALCIUM: 9.6 MG/DL (ref 8.4–10.2)
CARBON DIOXIDE: 38 MMOL/L (ref 21–31)
CHLORIDE: 112 MMOL/L (ref 97–110)
CREATININE: 0.8 MG/DL (ref 0.61–1.24)
GLUCOSE: 165 MG/DL (ref 70–220)
MAGNESIUM: 2.9 MG/DL (ref 1.7–2.5)
PHOSPHORUS: 2.8 MG/DL (ref 2.5–4.9)
POTASSIUM: 3.6 MMOL/L (ref 3.5–5.1)
SODIUM: 154 MMOL/L (ref 135–144)

## 2019-02-12 RX ADMIN — METHYLPREDNISOLONE SODIUM SUCCINATE 1 MG: 40 INJECTION, POWDER, FOR SOLUTION INTRAMUSCULAR; INTRAVENOUS at 18:25

## 2019-02-12 RX ADMIN — IPRATROPIUM BROMIDE AND ALBUTEROL SULFATE SCH ML: .5; 3 SOLUTION RESPIRATORY (INHALATION) at 08:40

## 2019-02-12 RX ADMIN — CASTOR OIL AND BALSAM, PERU 1 APPLIC: 788; 87 OINTMENT TOPICAL at 10:00

## 2019-02-12 RX ADMIN — MORPHINE SULFATE PRN MG: 10 SOLUTION ORAL at 08:04

## 2019-02-12 RX ADMIN — AMLODIPINE BESYLATE 1 MG: 5 TABLET ORAL at 09:00

## 2019-02-12 RX ADMIN — RISPERIDONE 1 MG: 1 TABLET ORAL at 09:00

## 2019-02-12 RX ADMIN — DIVALPROEX SODIUM 1 MG: 125 CAPSULE, COATED PELLETS ORAL at 05:20

## 2019-02-12 RX ADMIN — MORPHINE SULFATE 1 MG: 10 SOLUTION ORAL at 08:04

## 2019-02-12 RX ADMIN — DIVALPROEX SODIUM 1 MG: 125 CAPSULE, COATED PELLETS ORAL at 13:09

## 2019-02-12 RX ADMIN — FAMOTIDINE SCH MG: 20 TABLET ORAL at 09:00

## 2019-02-12 RX ADMIN — MORPHINE SULFATE 1 MG: 10 SOLUTION ORAL at 12:44

## 2019-02-12 RX ADMIN — IPRATROPIUM BROMIDE AND ALBUTEROL SULFATE SCH ML: .5; 3 SOLUTION RESPIRATORY (INHALATION) at 13:15

## 2019-02-12 RX ADMIN — IPRATROPIUM BROMIDE AND ALBUTEROL SULFATE 1 ML: .5; 3 SOLUTION RESPIRATORY (INHALATION) at 08:40

## 2019-02-12 RX ADMIN — DIVALPROEX SODIUM SCH MG: 125 CAPSULE, COATED PELLETS ORAL at 13:09

## 2019-02-12 RX ADMIN — MORPHINE SULFATE PRN MG: 10 SOLUTION ORAL at 12:44

## 2019-02-12 RX ADMIN — METHYLPREDNISOLONE SODIUM SUCCINATE 1 MG: 40 INJECTION, POWDER, FOR SOLUTION INTRAMUSCULAR; INTRAVENOUS at 05:20

## 2019-02-12 RX ADMIN — FAMOTIDINE SCH MG: 20 TABLET ORAL at 20:58

## 2019-02-12 RX ADMIN — ASPIRIN 81 MG CHEWABLE TABLET 1 MG: 81 TABLET CHEWABLE at 09:00

## 2019-02-12 RX ADMIN — POTASSIUM CHLORIDE SCH MLS/HR: 2 INJECTION, SOLUTION, CONCENTRATE INTRAVENOUS at 06:50

## 2019-02-12 RX ADMIN — ASCORBIC ACID, VITAMIN A PALMITATE, CHOLECALCIFEROL, THIAMINE HYDROCHLORIDE, RIBOFLAVIN-5 PHOSPHATE SODIUM, PYRIDOXINE HYDROCHLORIDE, NIACINAMIDE, DEXPANTHENOL, ALPHA-TOCOPHEROL ACETATE, VITAMIN K1, FOLIC ACID, BIOTIN, CYANOCOBALAMIN 1 MLS/HR: 200; 3300; 200; 6; 3.6; 6; 40; 15; 10; 150; 600; 60; 5 INJECTION, SOLUTION INTRAVENOUS at 20:52

## 2019-02-12 RX ADMIN — IPRATROPIUM BROMIDE AND ALBUTEROL SULFATE SCH ML: .5; 3 SOLUTION RESPIRATORY (INHALATION) at 20:01

## 2019-02-12 RX ADMIN — METHYLPREDNISOLONE SODIUM SUCCINATE 1 MG: 40 INJECTION, POWDER, FOR SOLUTION INTRAMUSCULAR; INTRAVENOUS at 12:44

## 2019-02-12 RX ADMIN — HALOPERIDOL LACTATE PRN MG: 5 INJECTION, SOLUTION INTRAMUSCULAR at 00:44

## 2019-02-12 RX ADMIN — IPRATROPIUM BROMIDE AND ALBUTEROL SULFATE SCH ML: .5; 3 SOLUTION RESPIRATORY (INHALATION) at 01:37

## 2019-02-12 RX ADMIN — HALOPERIDOL LACTATE 1 MG: 5 INJECTION, SOLUTION INTRAMUSCULAR at 00:44

## 2019-02-12 RX ADMIN — IPRATROPIUM BROMIDE AND ALBUTEROL SULFATE 1 ML: .5; 3 SOLUTION RESPIRATORY (INHALATION) at 20:01

## 2019-02-12 RX ADMIN — ASCORBIC ACID, VITAMIN A PALMITATE, CHOLECALCIFEROL, THIAMINE HYDROCHLORIDE, RIBOFLAVIN-5 PHOSPHATE SODIUM, PYRIDOXINE HYDROCHLORIDE, NIACINAMIDE, DEXPANTHENOL, ALPHA-TOCOPHEROL ACETATE, VITAMIN K1, FOLIC ACID, BIOTIN, CYANOCOBALAMIN 1 MLS/HR: 200; 3300; 200; 6; 3.6; 6; 40; 15; 10; 150; 600; 60; 5 INJECTION, SOLUTION INTRAVENOUS at 06:50

## 2019-02-12 RX ADMIN — DIVALPROEX SODIUM SCH MG: 125 CAPSULE, COATED PELLETS ORAL at 05:20

## 2019-02-12 RX ADMIN — ASPIRIN 81 MG SCH MG: 81 TABLET ORAL at 09:00

## 2019-02-12 RX ADMIN — AMLODIPINE BESYLATE SCH MG: 5 TABLET ORAL at 09:00

## 2019-02-12 RX ADMIN — DIVALPROEX SODIUM 1 MG: 125 CAPSULE, COATED PELLETS ORAL at 20:58

## 2019-02-12 RX ADMIN — IPRATROPIUM BROMIDE AND ALBUTEROL SULFATE 1 ML: .5; 3 SOLUTION RESPIRATORY (INHALATION) at 13:15

## 2019-02-12 RX ADMIN — FAMOTIDINE 1 MG: 20 TABLET ORAL at 20:58

## 2019-02-12 RX ADMIN — POTASSIUM CHLORIDE SCH MLS/HR: 2 INJECTION, SOLUTION, CONCENTRATE INTRAVENOUS at 20:52

## 2019-02-12 RX ADMIN — DIVALPROEX SODIUM SCH MG: 125 CAPSULE, COATED PELLETS ORAL at 20:58

## 2019-02-12 RX ADMIN — FAMOTIDINE 1 MG: 20 TABLET ORAL at 09:00

## 2019-02-12 RX ADMIN — RISPERIDONE 1 MG: 1 TABLET ORAL at 20:58

## 2019-02-12 RX ADMIN — IPRATROPIUM BROMIDE AND ALBUTEROL SULFATE 1 ML: .5; 3 SOLUTION RESPIRATORY (INHALATION) at 01:37

## 2019-02-12 NOTE — CONS
Consult Date/Type/Reason


Admit Date/Time


Jan 22, 2019 at 00:03


Initial Consult Date


1/22/19


Type of Consultation:  Pulm/CCM


Requesting Provider:  DONALDO CARRILLO MD


Date/Time of Note


DATE: 2/12/19 


TIME: 11:52





Subjective


No acute events +  DVT, but low paltelets - defer to hematology if anti-


coagulation is warranted. NO CP now. 





ROS: No fever, no chills, no nausea, no vomiting, no diarrhea/constipation


        No recent weight changes


        No chest pain, no PND, no orthopnea + reports abd pain (chronic)


        No dizziness, blurred vision


        No thirst, no heat or cold intolerance





Objective


Vitals





Vital Signs


  Date      Temp  Pulse  Resp  B/P (MAP)   Pulse Ox  O2          O2 Flow    FiO2


Time                                                 Delivery    Rate


   2/12/19          104    25      137/95            Nasal             3.0


     11:00                          (109)            Cannula


   2/12/19                                       83


     10:00


   2/12/19  97.3


     08:00


   2/11/19                                                                    36


     09:45








Intake and Output





2/11/19 2/11/19 2/12/19





1515:00


23:00


07:00





IntakeIntake Total


150 ml


0 ml


100 ml





OutputOutput Total


500 ml


930 ml


640 ml





BalanceBalance


-350 ml


-930 ml


-540 ml











Exam


General: WN/WD/NAD, AOx 2-3 psych 


HEENT: Unicetric/atraumatic/EOMI ( follow commands)


NECK: JVD elevated, no thyromegaly


Lymph: no lymphadenopathy


HEART: regular with no S3, II/VI systolic murmur at apex


LUNGS: Coarse sounds


ABD: soft, NT, ND, +BS


: Intact


Neuro: non focal


SKIN: chronic changes


EXT: trace edema





Results/Medications


Result Diagram:  


2/11/19 0415                                                                    


           2/12/19 0340





Results 24 hrs





Laboratory Tests


    Test
                                       2/11/19
15:20  2/12/19
03:40


    Blood Gas Specimen Source
           Blood arterial
       



    Arterial Blood Date Drawn
           2/11/2019
3:20:06 PM  



    Arterial Blood pH (Temp
corrected)              7.472  H
  



    Arterial Blood pCO2 (Temp
correct)               50.0  H
  



    Arterial Blood pO2 (Temp
corrected)               92.6  
  



    Arterial Blood HCO3                               35.7  H


    Arterial Blood Base Excess                        10.6  H


    Arterial Blood Oxygen
Saturation                  96.6  
  



    Antoine Test                           ACCEPTAB


    Arterial Blood Gas Puncture
Site     Right Radial  
       



    Arterial Blood
Carboxyhemoglobin                   0.2  
  



    Arterial Blood Methemoglobin                        0.3


    Blood Gas A-a O2 Differential                     84.3  H


    Oxyhemoglobin Percent                              96.1


    Blood Gas Temperature                              37.0


    Blood Gas Modality                   NASAL CANNULA


    FiO2                                               33.0


    Blood Gas Notified Whom              


    Blood Gas Notified Time
             2/11/2019
3:32:09 PM  



    Sodium Level                                                      154  H


    Potassium Level                                                    3.6


    Chloride Level                                                    112  H


    Carbon Dioxide Level                                               38  H


    Anion Gap                                                           4  L


    Blood Urea Nitrogen                                                46  H


    Creatinine                                                        0.80


    Est Glomerular Filtrat Rate
mL/min   
                     > 60  



    Glucose Level                                                      165


    Calcium Level                                                      9.6


    Phosphorus Level                                                   2.8


    Magnesium Level                                                   2.9  H





Home Meds


Reported Medications


Docusate Sodium* (Colace*) 100 Mg Capsule, 100 MG PO Q24H PRN for CONSTIPATION, 


#30 CAP


   1/21/19


Polyethylene Glycol* (Miralax*) 17 Gm Powd.pack, 17 GM PO DAILY PRN for AS NE


EDED, #30 PACKET


   1/21/19


Acetaminophen* (Acetaminophen*) 325 Mg Tablet, 650 MG PO Q4H PRN for PAIN 1-


10/10, #30 TAB


   AND FEVER>101F


   1/21/19


Sennosides* (Senna Lax*) 8.6 Mg Tablet, 1 TAB PO AS NEEDED, TAB


   1/21/19


Mv,Minerals/Fa/Lycopene/Ginkgo (ONE DAILY MEN'S 50+ TABLET) 1 Each Tablet, 1 


EACH PO DAILY, TAB


   1/21/19


Divalproex Sodium* (Depakote*) 125 Mg Tablet.dr, 250 MG PO Q8H, #90 TAB


   1/21/19


Quetiapine Fumarate* (Seroquel*) 50 Mg Tablet, 50 MG PO Q8H, TAB


   1/21/19


Ipratropium-Albuterol (Ipratropium-Albuterol) 0.5-3 Mg/3 Ml Ampul.neb, 3 ML 


INHALATION Q4H PRN for WHEEZING AND SOB, #30 VIAL


   1/21/19


Budesonide-Formoterol Fumarate* (Symbicort*) 160-4.5 Hfa.aer.ad, 2 PUFF 


INHALATION BID, #1 EACH


   1/21/19


Medications





Current Medications


Ondansetron HCl (Zofran Inj) 4 mg Q6H  PRN IV NAUSEA AND/OR VOMITING Last 


administered on 2/1/19at 18:46; Admin Dose 4 MG;  Start 1/21/19 at 23:30


Acetaminophen (Tylenol Supp) 650 mg Q4H  PRN [place] PAIN LEVEL 1-3 OR FEVER;  Start 


1/21/19 at 23:30


Aspirin (Aspirin) 81 mg DAILY PO  Last administered on 2/11/19at 10:23; Admin 


Dose 81 MG;  Start 1/24/19 at 09:00


Famotidine (Pepcid) 20 mg Q12 PO  Last administered on 2/11/19at 20:49; Admin 


Dose 20 MG;  Start 1/24/19 at 21:00


Zolpidem Tartrate (Ambien) 5 mg HS  PRN PO INSOMNIA Last administered on 


2/5/19at 20:31; Admin Dose 5 MG;  Start 1/27/19 at 00:00


Guaifenesin/ Dextromethorphan (Robitussin Dm Liquid Cup) 5 ml Q6H  PRN PO COUGH 


Last administered on 2/10/19at 05:16; Admin Dose 5 ML;  Start 1/27/19 at 20:00


Morphine Sulfate (morphine) 6 mg Q4H  PRN PO SEVERE PAIN LEVEL 7-10 Last 


administered on 2/12/19at 08:04; Admin Dose 6 MG;  Start 1/30/19 at 21:30


Albuterol/ Ipratropium (Duoneb) 3 ml Q3H RESP THERAPY  PRN HHN SHORTNESS OF 


BREATH Last administered on 2/7/19at 18:47; Admin Dose 3 ML;  Start 1/31/19 at 


20:00


Methylprednisolone Sodium Succinate (Solu-Medrol) 40 mg Q6 IV  Last administered


on 2/12/19at 05:20; Admin Dose 40 MG;  Start 2/2/19 at 12:00


Alteplase, Recombinant (Cathflo (Activase)) 2 mg MAY REPEAT X1 PRN CATHETER IF 


CATHETER REMAINS OCCULUDED;  Start 2/2/19 at 11:00


Amlodipine Besylate (Norvasc) 5 mg DAILY PO  Last administered on 2/11/19at 


10:24; Admin Dose 5 MG;  Start 2/5/19 at 09:00


Albuterol/ Ipratropium (Duoneb) 3 ml Q6H RESP  THERAPY HHN  Last administered on


2/12/19at 08:40; Admin Dose 3 ML;  Start 2/4/19 at 20:00


Lorazepam (Ativan) 1 mg Q6H  PRN IV PSYCHOSIS Last administered on 2/11/19at 


13:05; Admin Dose 1 MG;  Start 2/7/19 at 02:00


Haloperidol (Haldol) 2 mg PRN  PRN IM AGITATION Last administered on 2/12/19at 


00:44; Admin Dose 2 MG;  Start 2/8/19 at 06:30


Risperidone (Risperdal) 2 mg BID PO  Last administered on 2/11/19at 20:49; Admin


Dose 2 MG;  Start 2/9/19 at 21:00


Divalproex Sodium (Depakote Sprinkle) 250 mg Q8H PO  Last administered on 


2/12/19at 05:20; Admin Dose 250 MG;  Start 2/10/19 at 21:00


Dextrose 1,000 ml @  75 mls/hr [id number] IV  Last administered on 2/12/19at 06:50;


Admin Dose 75 MLS/HR;  Start 2/12/19 at 06:30





Assessment/Plan


Hospital Course (Demo Recall)


1.Hypotension-off levo - better now - BP better, will allow in 140s for now, 


BETTER NOW. 


2.Resp failure s/p intubation - now self extubated - better overall.  Con't COPD


rx. Con't to monitor. Improved breathing. 


3.h/o cardiac arrest - etiology unclear - con't supportive RX now. Stable now - 


of tele. No cardiac intervention currently planned. 


4.Pneumoperitoneum - self d/c chest tube  - stable SOB now. 


5.Positive troponin-now trended neg - will monitor clinically for now. No 


intervention planned. 


6.renal failure - good urine output. 


7.Leukocytosis - on anti-Bx, con't med rx.  On anti-Bx. 


8. anemia - H/H stable - no bleeding noted. 


9, Thrombocytopenia - no active bleeds noted. 


10 DVT - low plts - defer to hematology for anti-coagulation.











HARDY GUZMÁN MD                 Feb 12, 2019 11:54

## 2019-02-12 NOTE — CONS
Assessment/Plan


Assessment/Plan


Hospital Course (Demo Recall)


Patient remains unchanged downgraded to 3 L nasal cannula he is awake and looks 


comfortable, afebrile, off antibiotics





Indwelling: Right IJ triple-lumen catheter, Blake





Physical examination: Well-developed chronically ill-appearing cachectic elderly


man.  Head atraumatic normocephalic sclera nonicteric, neck is supple chest rise


symmetrical breath sounds diminished bases.  Heart: S1-S2.  Abdomen soft bowel 


sounds present.  Extremities without edema/cyanosis





Assessment:


1.  Respiratory failure, s/p pneumonia


2.  Dysphagia with ongoing aspiration


4.  Pneumoperitoneum of unclear etiology, no perforation per surgical note, 


status post chest tube


5.  Dementia


5.  Anemia with progressive thrombocytopenia


6.  History of non-ST elevation MI


7.  Status post cardiac arrest


8.  RIJ TLC





Plan: Remains stable, continue present care, aspiration precautions, observe off


abx





Consultation Date/Type/Reason


Admit Date/Time


Jan 22, 2019 at 00:03


Initial Consult Date


1/22/19


Type of Consult


ID


Requesting Provider:  DONALDO CARRILLO MD


Date/Time of Note


DATE: 2/12/19 


TIME: 12:43





Exam/Review of Systems


Exam


Vitals





Vital Signs


  Date      Temp  Pulse  Resp  B/P (MAP)   Pulse Ox  O2          O2 Flow    FiO2


Time                                                 Delivery    Rate


   2/12/19          107


     12:00


   2/12/19                 25      137/95            Nasal             3.0


     11:00                          (109)            Cannula


   2/12/19                                       83


     10:00


   2/12/19  97.3


     08:00


   2/11/19                                                                    36


     09:45








Intake and Output





2/11/19 2/11/19 2/12/19





1515:00


23:00


07:00





IntakeIntake Total


150 ml


0 ml


100 ml





OutputOutput Total


500 ml


930 ml


640 ml





BalanceBalance


-350 ml


-930 ml


-540 ml














Results


Result Diagram:  


2/11/19 0415                                                                    


           2/12/19 0340





Results 24hrs





Laboratory Tests


    Test
                                       2/11/19
15:20  2/12/19
03:40


    Blood Gas Specimen Source
           Blood arterial
       



    Arterial Blood Date Drawn
           2/11/2019
3:20:06 PM  



    Arterial Blood pH (Temp
corrected)              7.472  H
  



    Arterial Blood pCO2 (Temp
correct)               50.0  H
  



    Arterial Blood pO2 (Temp
corrected)               92.6  
  



    Arterial Blood HCO3                               35.7  H


    Arterial Blood Base Excess                        10.6  H


    Arterial Blood Oxygen
Saturation                  96.6  
  



    Antoine Test                           ACCEPTAB


    Arterial Blood Gas Puncture
Site     Right Radial  
       



    Arterial Blood
Carboxyhemoglobin                   0.2  
  



    Arterial Blood Methemoglobin                        0.3


    Blood Gas A-a O2 Differential                     84.3  H


    Oxyhemoglobin Percent                              96.1


    Blood Gas Temperature                              37.0


    Blood Gas Modality                   NASAL CANNULA


    FiO2                                               33.0


    Blood Gas Notified Whom              


    Blood Gas Notified Time
             2/11/2019
3:32:09 PM  



    Sodium Level                                                      154  H


    Potassium Level                                                    3.6


    Chloride Level                                                    112  H


    Carbon Dioxide Level                                               38  H


    Anion Gap                                                           4  L


    Blood Urea Nitrogen                                                46  H


    Creatinine                                                        0.80


    Est Glomerular Filtrat Rate
mL/min   
                     > 60  



    Glucose Level                                                      165


    Calcium Level                                                      9.6


    Phosphorus Level                                                   2.8


    Magnesium Level                                                   2.9  H








Medications


Medication





Current Medications


Ondansetron HCl (Zofran Inj) 4 mg Q6H  PRN IV NAUSEA AND/OR VOMITING Last 


administered on 2/1/19at 18:46; Admin Dose 4 MG;  Start 1/21/19 at 23:30


Acetaminophen (Tylenol Supp) 650 mg Q4H  PRN [place] PAIN LEVEL 1-3 OR FEVER;  Start 


1/21/19 at 23:30


Aspirin (Aspirin) 81 mg DAILY PO  Last administered on 2/11/19at 10:23; Admin 


Dose 81 MG;  Start 1/24/19 at 09:00


Famotidine (Pepcid) 20 mg Q12 PO  Last administered on 2/11/19at 20:49; Admin 


Dose 20 MG;  Start 1/24/19 at 21:00


Zolpidem Tartrate (Ambien) 5 mg HS  PRN PO INSOMNIA Last administered on 


2/5/19at 20:31; Admin Dose 5 MG;  Start 1/27/19 at 00:00


Guaifenesin/ Dextromethorphan (Robitussin Dm Liquid Cup) 5 ml Q6H  PRN PO COUGH 


Last administered on 2/10/19at 05:16; Admin Dose 5 ML;  Start 1/27/19 at 20:00


Morphine Sulfate (morphine) 6 mg Q4H  PRN PO SEVERE PAIN LEVEL 7-10 Last 


administered on 2/12/19at 08:04; Admin Dose 6 MG;  Start 1/30/19 at 21:30


Albuterol/ Ipratropium (Duoneb) 3 ml Q3H RESP THERAPY  PRN HHN SHORTNESS OF B


REATH Last administered on 2/7/19at 18:47; Admin Dose 3 ML;  Start 1/31/19 at 


20:00


Methylprednisolone Sodium Succinate (Solu-Medrol) 40 mg Q6 IV  Last administered


on 2/12/19at 05:20; Admin Dose 40 MG;  Start 2/2/19 at 12:00


Alteplase, Recombinant (Cathflo (Activase)) 2 mg MAY REPEAT X1 PRN CATHETER IF 


CATHETER REMAINS OCCULUDED;  Start 2/2/19 at 11:00


Amlodipine Besylate (Norvasc) 5 mg DAILY PO  Last administered on 2/11/19at 


10:24; Admin Dose 5 MG;  Start 2/5/19 at 09:00


Albuterol/ Ipratropium (Duoneb) 3 ml Q6H RESP  THERAPY HHN  Last administered on


2/12/19at 08:40; Admin Dose 3 ML;  Start 2/4/19 at 20:00


Lorazepam (Ativan) 1 mg Q6H  PRN IV PSYCHOSIS Last administered on 2/11/19at 


13:05; Admin Dose 1 MG;  Start 2/7/19 at 02:00


Haloperidol (Haldol) 2 mg PRN  PRN IM AGITATION Last administered on 2/12/19at 


00:44; Admin Dose 2 MG;  Start 2/8/19 at 06:30


Risperidone (Risperdal) 2 mg BID PO  Last administered on 2/11/19at 20:49; Admin


Dose 2 MG;  Start 2/9/19 at 21:00


Divalproex Sodium (Depakote Sprinkle) 250 mg Q8H PO  Last administered on 


2/12/19at 05:20; Admin Dose 250 MG;  Start 2/10/19 at 21:00


Dextrose 1,000 ml @  75 mls/hr [id number] IV  Last administered on 2/12/19at 06:50;


Admin Dose 75 MLS/HR;  Start 2/12/19 at 06:30











DAVID VÁZQUEZ NP            Feb 12, 2019 12:44

## 2019-02-12 NOTE — PN
Date/Time of Note


Date/Time of Note


DATE: 2/12/19 


TIME: 16:40





Assessment/Plan


VTE Prophylaxis


[place] [facility] risk:  7


SCD applied [place] [facility]  Yes


Pharmacological prophylaxis:  NA/contraindicated


Pharm contraindication:  thrombocytopenia





Lines/Catheters


IV Catheter Type (from [facility]):  Saline Lock


Urinary Cath still in place:  Yes


Reason Cath still needed:  urinary retention





Assessment/Plan


Hospital Course


Patient continues on supplemental oxygen, desaturates easily.  Will obtain GI 


consult for possible G-tube placement due to dysphagia and severe protein 


calorie malnutrition.


Assessment/Plan


-Dysphagia.  Dr. Muniz is asked to see patient in gastroenterology consultation


for possible G-tube placement.


-Hypoxic respiratory failure requiring mechanical ventilation, extubated. Dr. English is following in pulmonology consultation.


-Sepsis with shock, resolving.  Dr. Dreyer is following in infection disease con


sultation.


-Status post cardiac arrest.   Dr. Jon is following in cardiology 


consultation.


-Left-sided pneumothorax, status post left side chest tube placement, s/p self 


d/c CT.


-S/p pneumoperitoneum most likely due to cardiac arrest, resolved. Dr. Lai is 


following in general surgery consultation. 


-Left axillary and brachial DVT, LUE swelling subsided, was on Lovenox which is 


currently held due to thrombocytopenia


-Left lower lobe pneumonia


-Severe emphysema


-NATALIE with possible chronic kidney disease. Dr Hanson is following in nephrology


consultation.


-Schizoaffective disorder depressed type.  Psychiatric consult is appreciated, 


continue Risperdal Depakote





Further recommendations based on clinical course.  Plan of care discussed with 


Dr. Miller.


Result Diagram:  


2/11/19 0415                                                                    


           2/12/19 0340





Results 24hrs





Laboratory Tests


               Test
                               2/12/19
03:40


               Sodium Level                               154  H


               Potassium Level                             3.6


               Chloride Level                             112  H


               Carbon Dioxide Level                        38  H


               Anion Gap                                    4  L


               Blood Urea Nitrogen                         46  H


               Creatinine                                 0.80


               Est Glomerular Filtrat Rate
mL/min  > 60  



               Glucose Level                               165


               Calcium Level                               9.6


               Phosphorus Level                            2.8


               Magnesium Level                            2.9  H








Exam/Review of Systems


Exam


Vitals





Vital Signs


  Date      Temp  Pulse  Resp  B/P (MAP)   Pulse Ox  O2          O2 Flow    FiO2


Time                                                 Delivery    Rate


   2/12/19          107    25      141/96            Nasal             3.0


     14:00                          (111)            Cannula


   2/12/19                                       95


     13:15


   2/12/19  96.3


     12:00


   2/11/19                                                                    36


     09:45








Intake and Output





2/11/19 2/11/19 2/12/19





1515:00


23:00


07:00





IntakeIntake Total


150 ml


0 ml


120 ml





OutputOutput Total


500 ml


930 ml


640 ml





BalanceBalance


-350 ml


-930 ml


-520 ml











Exam


Constitutional:  alert, oriented


Respiratory:  diminished breath sounds


Cardiovascular:  regular rate and rhythm


Gastrointestinal:  soft, non-tender


Musculoskeletal:  nl extremities to inspection


Extremities:  normal pulses


Neurological:  nl mental status





Results


Results 24hrs





Laboratory Tests


               Test
                               2/12/19
03:40


               Sodium Level                               154  H


               Potassium Level                             3.6


               Chloride Level                             112  H


               Carbon Dioxide Level                        38  H


               Anion Gap                                    4  L


               Blood Urea Nitrogen                         46  H


               Creatinine                                 0.80


               Est Glomerular Filtrat Rate
mL/min  > 60  



               Glucose Level                               165


               Calcium Level                               9.6


               Phosphorus Level                            2.8


               Magnesium Level                            2.9  H








Medications


Medication





Current Medications


Ondansetron HCl (Zofran Inj) 4 mg Q6H  PRN IV NAUSEA AND/OR VOMITING Last 


administered on 2/1/19at 18:46; Admin Dose 4 MG;  Start 1/21/19 at 23:30


Acetaminophen (Tylenol Supp) 650 mg Q4H  PRN CT PAIN LEVEL 1-3 OR FEVER;  Start 


1/21/19 at 23:30


Aspirin (Aspirin) 81 mg DAILY PO  Last administered on 2/11/19at 10:23; Admin D


ose 81 MG;  Start 1/24/19 at 09:00


Famotidine (Pepcid) 20 mg Q12 PO  Last administered on 2/11/19at 20:49; Admin 


Dose 20 MG;  Start 1/24/19 at 21:00


Zolpidem Tartrate (Ambien) 5 mg HS  PRN PO INSOMNIA Last administered on 


2/5/19at 20:31; Admin Dose 5 MG;  Start 1/27/19 at 00:00


Guaifenesin/ Dextromethorphan (Robitussin Dm Liquid Cup) 5 ml Q6H  PRN PO COUGH 


Last administered on 2/10/19at 05:16; Admin Dose 5 ML;  Start 1/27/19 at 20:00


Morphine Sulfate (morphine) 6 mg Q4H  PRN PO SEVERE PAIN LEVEL 7-10 Last 


administered on 2/12/19at 12:44; Admin Dose 6 MG;  Start 1/30/19 at 21:30


Albuterol/ Ipratropium (Duoneb) 3 ml Q3H RESP THERAPY  PRN HHN SHORTNESS OF 


BREATH Last administered on 2/7/19at 18:47; Admin Dose 3 ML;  Start 1/31/19 at 


20:00


Methylprednisolone Sodium Succinate (Solu-Medrol) 40 mg Q6 IV  Last administered


on 2/12/19at 12:44; Admin Dose 40 MG;  Start 2/2/19 at 12:00


Alteplase, Recombinant (Cathflo (Activase)) 2 mg MAY REPEAT X1 PRN CATHETER IF 


CATHETER REMAINS OCCULUDED;  Start 2/2/19 at 11:00


Amlodipine Besylate (Norvasc) 5 mg DAILY PO  Last administered on 2/11/19at 


10:24; Admin Dose 5 MG;  Start 2/5/19 at 09:00


Albuterol/ Ipratropium (Duoneb) 3 ml Q6H RESP  THERAPY HHN  Last administered on


2/12/19at 13:15; Admin Dose 3 ML;  Start 2/4/19 at 20:00


Lorazepam (Ativan) 1 mg Q6H  PRN IV PSYCHOSIS Last administered on 2/11/19at 


13:05; Admin Dose 1 MG;  Start 2/7/19 at 02:00


Haloperidol (Haldol) 2 mg PRN  PRN IM AGITATION Last administered on 2/12/19at 


00:44; Admin Dose 2 MG;  Start 2/8/19 at 06:30


Risperidone (Risperdal) 2 mg BID PO  Last administered on 2/11/19at 20:49; Admin


Dose 2 MG;  Start 2/9/19 at 21:00


Divalproex Sodium (Depakote Sprinkle) 250 mg Q8H PO  Last administered on 


2/12/19at 13:09; Admin Dose 250 MG;  Start 2/10/19 at 21:00


Dextrose 1,000 ml @  75 mls/hr [id number] IV  Last administered on 2/12/19at 06:50;


Admin Dose 75 MLS/HR;  Start 2/12/19 at 06:30











EVARISTO BELTRAN             Feb 12, 2019 16:45

## 2019-02-12 NOTE — PN
DATE:  02/12/2019

 

 

SUBJECTIVE:  The patient was noted to be lethargic and tachypneic overnight.  The patient is on aspir
ation precautions.  Urinary output has been adequate, approximately 50 mL an hour.  No other acute ev
ents noted.  No hemoptysis, hematemesis, hematochezia.

 

OBJECTIVE:

VITAL SIGNS:  Blood pressure is 147/104, respirations 25, pulse 105, temperature 97.6.

I's AND O'S:  The patient had 250 mL in with 2 liters out.

HEENT:  Head is normocephalic.

NECK:  Supple.

HEART:  Regular rate.

LUNGS:  Show diminished breath sounds at the base.

ABDOMEN:  Soft, nontender to palpation.  No rebound or guarding.

EXTREMITIES:  Negative for clubbing, cyanosis, no edema.

DERMATOLOGIC:  No rashes.

MUSCULOSKELETAL:  No joint effusions.

NEUROLOGIC:  No focal deficits.

 

MEDICATIONS:  Reviewed.

 

LABORATORY DATA:  Shows sodium of 154, potassium 3.6, chloride 112, bicarbonate 38, BUN 46, creatinin
e 0.80, magnesium 2.9.  The patient has ABG shows a pH of 7.47, pCO2 of 50, base excess of 10.  White
 count 6.6, hemoglobin 10.3, platelet count is 53.

 

IMAGING:  Chest x-ray was reviewed, shows a moderate right-sided pleural effusion and lower lobe atel
ectasis.

 

ASSESSMENT AND PLAN:

1.  Nonoliguric acute kidney injury, unknown previous baseline creatinine.  Etiology of acute kidney 
injury is secondary to hemodynamics.  Renal function has improved.  The patient does have underlying 
azotemia secondary to acute kidney injury, hypercatabolic state.  Will continue to monitor, continue 
supportive care, renally dose all meds.

2.  Hypernatremia.  The patient has a free water deficit of approximately 2.5 to 3 liters.  We will s
tart the patient on D5 water at 75 mL an hour and monitor.

3.  Metabolic alkalosis with respiratory compensation.  At this point, would continue current treatme
nt plan.  Continue IV hydration.  May consider intermittent Diamox.

4.  Anemia.  Continue to monitor hemoglobin and hematocrit levels.

5.  Mineral bone disorder, monitor calcium and phosphorus levels.

6.  Hypomagnesemia.  Continue to monitor.

7.  History of diastolic heart failure.  The patient appears compensated.  Continue to monitor and gi
ve intermittent Diamox as needed.

8.  Pleural effusion, possibly parapneumonic continue to monitor.  Consider thoracentesis.

9.  Acute hypoxemic, hypercapnic respiratory failure.  The patient currently is on nasal cannula.  Co
ntinue to monitor.  Follow up with pulmonary.

10.  Sepsis, status post shock of pneumonia.  Patient is completing antibiotic course.

11.  Acute encephalopathy, etiology is toxic metabolic.

12.  Status post brachial vein deep vein thrombosis.  Continue medical management.

13.  Status post cardiac arrest.

14.  Status post pneumothorax.  

15.  Status post pneumoperitoneum.

 

 

Dictated By: MARYAN CHAMPAGNE DO

 

NR/NTS

DD:    02/12/2019 07:31:30

DT:    02/12/2019 07:46:53

Conf#: [id number]

DID#:  [id number]

CC: MAURI GRIGSBY MD;*EndCC*

## 2019-02-12 NOTE — CONS
Consult Date/Type/Reason


Admit Date/Time


Jan 22, 2019 at 00:03


Initial Consult Date


1/22/19


Type of Consult


Pulmonary


Requesting Provider:  DONALDO CARRILLO MD


Date/Time of Note


DATE: 2/12/19 


TIME: 10:14





Subjective


Patient's condition remains guarded.


Currently having moderate secretions which she is having difficulty tolerating 


with fluctuating oxygen saturations.  Remains awake and alert however.





Objective





Vital Signs


  Date      Temp  Pulse  Resp  B/P (MAP)   Pulse Ox  O2          O2 Flow    FiO2


Time                                                 Delivery    Rate


   2/12/19          106    28                    92  Nasal             2.0


     08:40                                           Cannula


   2/12/19                        147/104


     06:00                          (118)


   2/12/19  97.6


     04:00


   2/11/19                                                                    36


     09:45








Intake and Output





2/11/19 2/11/19 2/12/19





1515:00


23:00


07:00





IntakeIntake Total


150 ml


0 ml


100 ml





OutputOutput Total


500 ml


930 ml


640 ml





BalanceBalance


-350 ml


-930 ml


-540 ml











Exam


GENERAL: Frail elderly gentleman


VITAL SIGNS:  per chart


NECK:  Supple.  No JVD or lymphadenopathy.


CARDIAC EXAM:  S1, S2. No added sounds or murmurs.


CHEST: Diminished air entry bilaterally


ABDOMEN:  Soft, nontender.  No guarding or rebound.


EXTREMITIES:  No cyanosis, clubbing or edema.


NEUROLOGIC:  Generalized weakness.  No focal deficits.





Vent Setting


Ventilator Support Mode:  SPONT


Fraction of Inspired Oxygen pe:  36


Positive End Expiratory Pressu:  5.0





Results/Medications


Result Diagram:  


2/11/19 0415                                                                    


           2/12/19 0340





Results 24 hrs





Laboratory Tests


    Test
                                       2/11/19
15:20  2/12/19
03:40


    Blood Gas Specimen Source
           Blood arterial
       



    Arterial Blood Date Drawn
           2/11/2019
3:20:06 PM  



    Arterial Blood pH (Temp
corrected)              7.472  H
  



    Arterial Blood pCO2 (Temp
correct)               50.0  H
  



    Arterial Blood pO2 (Temp
corrected)               92.6  
  



    Arterial Blood HCO3                               35.7  H


    Arterial Blood Base Excess                        10.6  H


    Arterial Blood Oxygen
Saturation                  96.6  
  



    Natoine Test                           ACCEPTAB


    Arterial Blood Gas Puncture
Site     Right Radial  
       



    Arterial Blood
Carboxyhemoglobin                   0.2  
  



    Arterial Blood Methemoglobin                        0.3


    Blood Gas A-a O2 Differential                     84.3  H


    Oxyhemoglobin Percent                              96.1


    Blood Gas Temperature                              37.0


    Blood Gas Modality                   NASAL CANNULA


    FiO2                                               33.0


    Blood Gas Notified Whom              


    Blood Gas Notified Time
             2/11/2019
3:32:09 PM  



    Sodium Level                                                      154  H


    Potassium Level                                                    3.6


    Chloride Level                                                    112  H


    Carbon Dioxide Level                                               38  H


    Anion Gap                                                           4  L


    Blood Urea Nitrogen                                                46  H


    Creatinine                                                        0.80


    Est Glomerular Filtrat Rate
mL/min   
                     > 60  



    Glucose Level                                                      165


    Calcium Level                                                      9.6


    Phosphorus Level                                                   2.8


    Magnesium Level                                                   2.9  H





Medications





Current Medications


Ondansetron HCl (Zofran Inj) 4 mg Q6H  PRN IV NAUSEA AND/OR VOMITING Last 


administered on 2/1/19at 18:46; Admin Dose 4 MG;  Start 1/21/19 at 23:30


Acetaminophen (Tylenol Supp) 650 mg Q4H  PRN [place] PAIN LEVEL 1-3 OR FEVER;  Start 


1/21/19 at 23:30


Aspirin (Aspirin) 81 mg DAILY PO  Last administered on 2/11/19at 10:23; Admin 


Dose 81 MG;  Start 1/24/19 at 09:00


Famotidine (Pepcid) 20 mg Q12 PO  Last administered on 2/11/19at 20:49; Admin 


Dose 20 MG;  Start 1/24/19 at 21:00


Zolpidem Tartrate (Ambien) 5 mg HS  PRN PO INSOMNIA Last administered on 


2/5/19at 20:31; Admin Dose 5 MG;  Start 1/27/19 at 00:00


Guaifenesin/ Dextromethorphan (Robitussin Dm Liquid Cup) 5 ml Q6H  PRN PO COUGH 


Last administered on 2/10/19at 05:16; Admin Dose 5 ML;  Start 1/27/19 at 20:00


Morphine Sulfate (morphine) 6 mg Q4H  PRN PO SEVERE PAIN LEVEL 7-10 Last 


administered on 2/12/19at 08:04; Admin Dose 6 MG;  Start 1/30/19 at 21:30


Albuterol/ Ipratropium (Duoneb) 3 ml Q3H RESP THERAPY  PRN HHN SHORTNESS OF 


BREATH Last administered on 2/7/19at 18:47; Admin Dose 3 ML;  Start 1/31/19 at 


20:00


Methylprednisolone Sodium Succinate (Solu-Medrol) 40 mg Q6 IV  Last administered


on 2/12/19at 05:20; Admin Dose 40 MG;  Start 2/2/19 at 12:00


Alteplase, Recombinant (Cathflo (Activase)) 2 mg MAY REPEAT X1 PRN CATHETER IF 


CATHETER REMAINS OCCULUDED;  Start 2/2/19 at 11:00


Amlodipine Besylate (Norvasc) 5 mg DAILY PO  Last administered on 2/11/19at 


10:24; Admin Dose 5 MG;  Start 2/5/19 at 09:00


Albuterol/ Ipratropium (Duoneb) 3 ml Q6H RESP  THERAPY HHN  Last administered on


2/12/19at 08:40; Admin Dose 3 ML;  Start 2/4/19 at 20:00


Lorazepam (Ativan) 1 mg Q6H  PRN IV PSYCHOSIS Last administered on 2/11/19at 


13:05; Admin Dose 1 MG;  Start 2/7/19 at 02:00


Haloperidol (Haldol) 2 mg PRN  PRN IM AGITATION Last administered on 2/12/19at 


00:44; Admin Dose 2 MG;  Start 2/8/19 at 06:30


Risperidone (Risperdal) 2 mg BID PO  Last administered on 2/11/19at 20:49; Admin


Dose 2 MG;  Start 2/9/19 at 21:00


Divalproex Sodium (Depakote Sprinkle) 250 mg Q8H PO  Last administered on 


2/12/19at 05:20; Admin Dose 250 MG;  Start 2/10/19 at 21:00


Dextrose 1,000 ml @  75 mls/hr [id number] IV  Last administered on 2/12/19at 06:50;


Admin Dose 75 MLS/HR;  Start 2/12/19 at 06:30





Assessment/Plan


Hospital Course (Demo Recall)


IMP: 


1.  Acute on chronic hypoxic and hypercapnic respiratory failure--worse 


overnight with increased mucus plugging. 


2.  Pneumoperitoneum status post chest tube which patient pulled out


3.  Encephalopathy toxic metabolic resolving


4.  Advanced COPD


5.  Hypernatremia likely free water deficit


6.  Thrombocytopenia 


7.  Anemia 





RECS:


1.  Continue ICU care


2. Maintain oxygen to keep Sp02 88-92%


3. Aggressive suctioning/CPT


4. NPO


5. Follow ABG/CXR


6. Strict aspiration precautions





Consider palliative care evaluation.  Patient remains aspiration risk and would 


require PEG tube.  He remains at risk for reintubation and goals of care should 


be addressed with either his D POA or the patient himself.  Consider palliative 


care consult.











MIMI ZAVALA MD, [facility]     Feb 12, 2019 10:16

## 2019-02-13 VITALS — RESPIRATION RATE: 16 BRPM

## 2019-02-13 VITALS — HEART RATE: 103 BPM | DIASTOLIC BLOOD PRESSURE: 107 MMHG | SYSTOLIC BLOOD PRESSURE: 156 MMHG | RESPIRATION RATE: 22 BRPM

## 2019-02-13 VITALS — RESPIRATION RATE: 20 BRPM | SYSTOLIC BLOOD PRESSURE: 88 MMHG | DIASTOLIC BLOOD PRESSURE: 72 MMHG | HEART RATE: 94 BPM

## 2019-02-13 VITALS — SYSTOLIC BLOOD PRESSURE: 119 MMHG | DIASTOLIC BLOOD PRESSURE: 108 MMHG | HEART RATE: 99 BPM | RESPIRATION RATE: 26 BRPM

## 2019-02-13 VITALS — HEART RATE: 94 BPM | DIASTOLIC BLOOD PRESSURE: 87 MMHG | SYSTOLIC BLOOD PRESSURE: 123 MMHG | RESPIRATION RATE: 20 BRPM

## 2019-02-13 VITALS — DIASTOLIC BLOOD PRESSURE: 90 MMHG | HEART RATE: 94 BPM | SYSTOLIC BLOOD PRESSURE: 121 MMHG | RESPIRATION RATE: 14 BRPM

## 2019-02-13 VITALS — SYSTOLIC BLOOD PRESSURE: 101 MMHG | RESPIRATION RATE: 23 BRPM | HEART RATE: 99 BPM | DIASTOLIC BLOOD PRESSURE: 59 MMHG

## 2019-02-13 VITALS — DIASTOLIC BLOOD PRESSURE: 89 MMHG | RESPIRATION RATE: 26 BRPM | HEART RATE: 98 BPM | SYSTOLIC BLOOD PRESSURE: 122 MMHG

## 2019-02-13 VITALS — HEART RATE: 99 BPM | RESPIRATION RATE: 22 BRPM | DIASTOLIC BLOOD PRESSURE: 104 MMHG | SYSTOLIC BLOOD PRESSURE: 145 MMHG

## 2019-02-13 VITALS — SYSTOLIC BLOOD PRESSURE: 138 MMHG | DIASTOLIC BLOOD PRESSURE: 100 MMHG | HEART RATE: 100 BPM | RESPIRATION RATE: 24 BRPM

## 2019-02-13 VITALS — HEART RATE: 90 BPM | SYSTOLIC BLOOD PRESSURE: 116 MMHG | RESPIRATION RATE: 16 BRPM | DIASTOLIC BLOOD PRESSURE: 103 MMHG

## 2019-02-13 VITALS — DIASTOLIC BLOOD PRESSURE: 72 MMHG | RESPIRATION RATE: 20 BRPM | SYSTOLIC BLOOD PRESSURE: 84 MMHG | HEART RATE: 93 BPM

## 2019-02-13 VITALS — RESPIRATION RATE: 21 BRPM | SYSTOLIC BLOOD PRESSURE: 152 MMHG | HEART RATE: 103 BPM | DIASTOLIC BLOOD PRESSURE: 89 MMHG

## 2019-02-13 VITALS — DIASTOLIC BLOOD PRESSURE: 95 MMHG | SYSTOLIC BLOOD PRESSURE: 128 MMHG | RESPIRATION RATE: 18 BRPM | HEART RATE: 101 BPM

## 2019-02-13 VITALS — RESPIRATION RATE: 20 BRPM | HEART RATE: 101 BPM | SYSTOLIC BLOOD PRESSURE: 106 MMHG | DIASTOLIC BLOOD PRESSURE: 86 MMHG

## 2019-02-13 VITALS — RESPIRATION RATE: 20 BRPM

## 2019-02-13 VITALS — SYSTOLIC BLOOD PRESSURE: 91 MMHG | HEART RATE: 96 BPM | DIASTOLIC BLOOD PRESSURE: 77 MMHG | RESPIRATION RATE: 20 BRPM

## 2019-02-13 VITALS — RESPIRATION RATE: 23 BRPM | SYSTOLIC BLOOD PRESSURE: 117 MMHG | DIASTOLIC BLOOD PRESSURE: 94 MMHG | HEART RATE: 96 BPM

## 2019-02-13 VITALS — HEART RATE: 99 BPM | DIASTOLIC BLOOD PRESSURE: 98 MMHG | RESPIRATION RATE: 26 BRPM | SYSTOLIC BLOOD PRESSURE: 132 MMHG

## 2019-02-13 VITALS — DIASTOLIC BLOOD PRESSURE: 85 MMHG | SYSTOLIC BLOOD PRESSURE: 159 MMHG | HEART RATE: 105 BPM | RESPIRATION RATE: 23 BRPM

## 2019-02-13 VITALS — RESPIRATION RATE: 24 BRPM | HEART RATE: 98 BPM | SYSTOLIC BLOOD PRESSURE: 146 MMHG | DIASTOLIC BLOOD PRESSURE: 97 MMHG

## 2019-02-13 VITALS — HEART RATE: 98 BPM | SYSTOLIC BLOOD PRESSURE: 133 MMHG | DIASTOLIC BLOOD PRESSURE: 91 MMHG | RESPIRATION RATE: 27 BRPM

## 2019-02-13 VITALS — SYSTOLIC BLOOD PRESSURE: 134 MMHG | DIASTOLIC BLOOD PRESSURE: 92 MMHG | HEART RATE: 101 BPM | RESPIRATION RATE: 20 BRPM

## 2019-02-13 VITALS — HEART RATE: 95 BPM | SYSTOLIC BLOOD PRESSURE: 120 MMHG | DIASTOLIC BLOOD PRESSURE: 90 MMHG | RESPIRATION RATE: 21 BRPM

## 2019-02-13 VITALS — SYSTOLIC BLOOD PRESSURE: 109 MMHG | RESPIRATION RATE: 16 BRPM | DIASTOLIC BLOOD PRESSURE: 89 MMHG | HEART RATE: 93 BPM

## 2019-02-13 VITALS — HEART RATE: 99 BPM | RESPIRATION RATE: 20 BRPM | DIASTOLIC BLOOD PRESSURE: 95 MMHG | SYSTOLIC BLOOD PRESSURE: 156 MMHG

## 2019-02-13 VITALS — DIASTOLIC BLOOD PRESSURE: 81 MMHG | SYSTOLIC BLOOD PRESSURE: 98 MMHG

## 2019-02-13 VITALS — HEART RATE: 100 BPM | DIASTOLIC BLOOD PRESSURE: 96 MMHG | RESPIRATION RATE: 22 BRPM | SYSTOLIC BLOOD PRESSURE: 135 MMHG

## 2019-02-13 VITALS — HEART RATE: 91 BPM | SYSTOLIC BLOOD PRESSURE: 122 MMHG | DIASTOLIC BLOOD PRESSURE: 90 MMHG | RESPIRATION RATE: 20 BRPM

## 2019-02-13 VITALS — SYSTOLIC BLOOD PRESSURE: 146 MMHG | RESPIRATION RATE: 27 BRPM | DIASTOLIC BLOOD PRESSURE: 110 MMHG | HEART RATE: 94 BPM

## 2019-02-13 VITALS — RESPIRATION RATE: 17 BRPM

## 2019-02-13 LAB
ABNORMAL IP MESSAGE: 1
ADD MAN DIFF?: NO
ADD UMIC: YES
ALLEN TEST: (no result)
ANION GAP: 2 (ref 5–13)
ANION GAP: 3 (ref 5–13)
ARTERIAL BASE EXCESS: 11.2 MMOL/L (ref -3–3)
ARTERIAL BLOOD GAS OXYGEN SAT: 99 MMHG (ref 95–98)
ARTERIAL COHB: 0.3 % (ref 0–3)
ARTERIAL FRACTION OF OXYHGB: 98.4 % (ref 93–99)
ARTERIAL HCO3: 35.5 MMOL/L (ref 22–26)
ARTERIAL METHB: 0.3 % (ref 0–1.5)
ARTERIAL PCO2: 45.7 MMHG (ref 35–45)
BASOPHIL #: 0 10^3/UL (ref 0–0.1)
BASOPHILS %: 0 % (ref 0–2)
BLOOD GAS LOW PEEP SETTING: 5 CMH2O
BLOOD GAS TIDAL VOLUME: 500 ML
BLOOD UREA NITROGEN: 40 MG/DL (ref 7–20)
BLOOD UREA NITROGEN: 42 MG/DL (ref 7–20)
CALCIUM: 8.9 MG/DL (ref 8.4–10.2)
CALCIUM: 9.3 MG/DL (ref 8.4–10.2)
CARBON DIOXIDE: 38 MMOL/L (ref 21–31)
CARBON DIOXIDE: 40 MMOL/L (ref 21–31)
CHLORIDE: 110 MMOL/L (ref 97–110)
CHLORIDE: 112 MMOL/L (ref 97–110)
CREATININE,URINE RANDOM: 24.08 MG/DL (ref 20–370)
CREATININE: 0.74 MG/DL (ref 0.61–1.24)
CREATININE: 0.87 MG/DL (ref 0.61–1.24)
EOSINOPHILS #: 0 10^3/UL (ref 0–0.5)
EOSINOPHILS %: 0 % (ref 0–7)
FIO2: 100 %
GLUCOSE: 149 MG/DL (ref 70–220)
GLUCOSE: 167 MG/DL (ref 70–220)
HEMATOCRIT: 34.8 % (ref 42–52)
HEMOGLOBIN: 10.6 G/DL (ref 14–18)
IMMATURE GRANS #M: 0.03 10^3/UL (ref 0–0.03)
IMMATURE GRANS % (M): 0.5 % (ref 0–0.43)
INR: 1.01
LYMPHOCYTES #: 0.2 10^3/UL (ref 0.8–2.9)
LYMPHOCYTES %: 4.3 % (ref 15–51)
MAGNESIUM: 2.8 MG/DL (ref 1.7–2.5)
MEAN CORPUSCULAR HEMOGLOBIN: 28 PG (ref 29–33)
MEAN CORPUSCULAR HGB CONC: 30.5 G/DL (ref 32–37)
MEAN CORPUSCULAR VOLUME: 92.1 FL (ref 82–101)
MEAN PLATELET VOLUME: 10.6 FL (ref 7.4–10.4)
MODE: (no result)
MONOCYTE #: 0.1 10^3/UL (ref 0.3–0.9)
MONOCYTES %: 2.3 % (ref 0–11)
NEUTROPHIL #: 5.2 10^3/UL (ref 1.6–7.5)
NEUTROPHILS %: 92.9 % (ref 39–77)
NUCLEATED RED BLOOD CELLS #: 0 10^3/UL (ref 0–0)
NUCLEATED RED BLOOD CELLS%: 0 /100WBC (ref 0–0)
O2 A-A PPRESDIFF RESPIRATORY: 266.5 MMHG (ref 7–24)
OSMOLALITY,URINE: 294 MOSM/KG (ref 250–1200)
PARTIAL THROMBOPLASTIN TIME: 28.5 SEC (ref 23–35)
PHOSPHORUS: 3.5 MG/DL (ref 2.5–4.9)
PLATELET COUNT: 47 10^3/UL (ref 140–415)
POSITIVE DIFF: (no result)
POTASSIUM: 3.6 MMOL/L (ref 3.5–5.1)
POTASSIUM: 3.9 MMOL/L (ref 3.5–5.1)
PROTEIN URINE: 34 MG/DL (ref 0–11.9)
PROTIME: 13.4 SEC (ref 11.9–14.9)
PT RATIO: 1
RED BLOOD COUNT: 3.78 10^6/UL (ref 4.7–6.1)
RED CELL DISTRIBUTION WIDTH: 16.9 % (ref 11.5–14.5)
SODIUM,URINE RANDOM: 35 MMOL/L (ref 30–90)
SODIUM: 151 MMOL/L (ref 135–144)
SODIUM: 154 MMOL/L (ref 135–144)
TYPE AND SCREEN: 1 1
UR ASCORBIC ACID: NEGATIVE MG/DL
UR BILIRUBIN (DIP): NEGATIVE MG/DL
UR BLOOD (DIP): (no result) MG/DL
UR CLARITY: CLEAR
UR COLOR: (no result)
UR GLUCOSE (DIP): (no result) MG/DL
UR KETONES (DIP): NEGATIVE MG/DL
UR LEUKOCYTE ESTERASE (DIP): NEGATIVE LEU/UL
UR NITRITE (DIP): NEGATIVE MG/DL
UR PH (DIP): 6 (ref 5–9)
UR RBC: 4 /HPF (ref 0–5)
UR SPECIFIC GRAVITY (DIP): 1.01 (ref 1–1.03)
UR TOTAL PROTEIN (DIP): NEGATIVE MG/DL
UR UROBILINOGEN (DIP): NEGATIVE MG/DL
UR WBC: 1 /HPF (ref 0–5)
WHITE BLOOD COUNT: 5.6 10^3/UL (ref 4.8–10.8)

## 2019-02-13 PROCEDURE — 5A1955Z RESPIRATORY VENTILATION, GREATER THAN 96 CONSECUTIVE HOURS: ICD-10-PCS

## 2019-02-13 PROCEDURE — 0BH18EZ INSERTION OF ENDOTRACHEAL AIRWAY INTO TRACHEA, VIA NATURAL OR ARTIFICIAL OPENING ENDOSCOPIC: ICD-10-PCS

## 2019-02-13 PROCEDURE — 0W9B30Z DRAINAGE OF LEFT PLEURAL CAVITY WITH DRAINAGE DEVICE, PERCUTANEOUS APPROACH: ICD-10-PCS

## 2019-02-13 PROCEDURE — 0BH17EZ INSERTION OF ENDOTRACHEAL AIRWAY INTO TRACHEA, VIA NATURAL OR ARTIFICIAL OPENING: ICD-10-PCS

## 2019-02-13 PROCEDURE — 0DH63UZ INSERTION OF FEEDING DEVICE INTO STOMACH, PERCUTANEOUS APPROACH: ICD-10-PCS

## 2019-02-13 PROCEDURE — 0DH63UZ INSERTION OF FEEDING DEVICE INTO STOMACH, PERCUTANEOUS APPROACH: ICD-10-PCS | Performed by: INTERNAL MEDICINE

## 2019-02-13 PROCEDURE — 02HV33Z INSERTION OF INFUSION DEVICE INTO SUPERIOR VENA CAVA, PERCUTANEOUS APPROACH: ICD-10-PCS

## 2019-02-13 PROCEDURE — 5A12012 PERFORMANCE OF CARDIAC OUTPUT, SINGLE, MANUAL: ICD-10-PCS

## 2019-02-13 RX ADMIN — FAMOTIDINE SCH MG: 20 TABLET ORAL at 20:41

## 2019-02-13 RX ADMIN — ASPIRIN 81 MG SCH MG: 81 TABLET ORAL at 09:00

## 2019-02-13 RX ADMIN — Medication 1 MCG: at 18:16

## 2019-02-13 RX ADMIN — CEFAZOLIN 1: 1 INJECTION, POWDER, FOR SOLUTION INTRAMUSCULAR; INTRAVENOUS at 18:11

## 2019-02-13 RX ADMIN — AMLODIPINE BESYLATE SCH MG: 5 TABLET ORAL at 09:00

## 2019-02-13 RX ADMIN — IPRATROPIUM BROMIDE AND ALBUTEROL SULFATE 1 ML: .5; 3 SOLUTION RESPIRATORY (INHALATION) at 09:44

## 2019-02-13 RX ADMIN — POTASSIUM CHLORIDE SCH MLS/HR: 2 INJECTION, SOLUTION, CONCENTRATE INTRAVENOUS at 06:44

## 2019-02-13 RX ADMIN — FAMOTIDINE 1 MG: 20 TABLET ORAL at 09:00

## 2019-02-13 RX ADMIN — POTASSIUM CHLORIDE SCH MLS/HR: 2 INJECTION, SOLUTION, CONCENTRATE INTRAVENOUS at 23:19

## 2019-02-13 RX ADMIN — IPRATROPIUM BROMIDE AND ALBUTEROL SULFATE SCH ML: .5; 3 SOLUTION RESPIRATORY (INHALATION) at 20:18

## 2019-02-13 RX ADMIN — DIVALPROEX SODIUM SCH MG: 125 CAPSULE, COATED PELLETS ORAL at 04:14

## 2019-02-13 RX ADMIN — ASCORBIC ACID, VITAMIN A PALMITATE, CHOLECALCIFEROL, THIAMINE HYDROCHLORIDE, RIBOFLAVIN-5 PHOSPHATE SODIUM, PYRIDOXINE HYDROCHLORIDE, NIACINAMIDE, DEXPANTHENOL, ALPHA-TOCOPHEROL ACETATE, VITAMIN K1, FOLIC ACID, BIOTIN, CYANOCOBALAMIN 1 MLS/HR: 200; 3300; 200; 6; 3.6; 6; 40; 15; 10; 150; 600; 60; 5 INJECTION, SOLUTION INTRAVENOUS at 09:57

## 2019-02-13 RX ADMIN — IPRATROPIUM BROMIDE AND ALBUTEROL SULFATE 1 ML: .5; 3 SOLUTION RESPIRATORY (INHALATION) at 20:18

## 2019-02-13 RX ADMIN — PROPOFOL 1: 10 INJECTION, EMULSION INTRAVENOUS at 18:15

## 2019-02-13 RX ADMIN — DIVALPROEX SODIUM SCH MG: 125 CAPSULE, COATED PELLETS ORAL at 13:00

## 2019-02-13 RX ADMIN — CASTOR OIL AND BALSAM, PERU 1 APPLIC: 788; 87 OINTMENT TOPICAL at 09:54

## 2019-02-13 RX ADMIN — IPRATROPIUM BROMIDE AND ALBUTEROL SULFATE SCH ML: .5; 3 SOLUTION RESPIRATORY (INHALATION) at 14:29

## 2019-02-13 RX ADMIN — POTASSIUM CHLORIDE SCH MLS/HR: 2 INJECTION, SOLUTION, CONCENTRATE INTRAVENOUS at 09:57

## 2019-02-13 RX ADMIN — DIVALPROEX SODIUM 1 MG: 125 CAPSULE, COATED PELLETS ORAL at 13:00

## 2019-02-13 RX ADMIN — FAMOTIDINE 1 MG: 20 TABLET ORAL at 20:41

## 2019-02-13 RX ADMIN — METHYLPREDNISOLONE SODIUM SUCCINATE 1 MG: 40 INJECTION, POWDER, FOR SOLUTION INTRAMUSCULAR; INTRAVENOUS at 00:52

## 2019-02-13 RX ADMIN — METHYLPREDNISOLONE SODIUM SUCCINATE 1 MG: 40 INJECTION, POWDER, FOR SOLUTION INTRAMUSCULAR; INTRAVENOUS at 12:57

## 2019-02-13 RX ADMIN — IPRATROPIUM BROMIDE AND ALBUTEROL SULFATE SCH ML: .5; 3 SOLUTION RESPIRATORY (INHALATION) at 02:11

## 2019-02-13 RX ADMIN — DIVALPROEX SODIUM 1 MG: 125 CAPSULE, COATED PELLETS ORAL at 04:14

## 2019-02-13 RX ADMIN — IPRATROPIUM BROMIDE AND ALBUTEROL SULFATE 1 ML: .5; 3 SOLUTION RESPIRATORY (INHALATION) at 02:11

## 2019-02-13 RX ADMIN — IPRATROPIUM BROMIDE AND ALBUTEROL SULFATE SCH ML: .5; 3 SOLUTION RESPIRATORY (INHALATION) at 09:44

## 2019-02-13 RX ADMIN — ASCORBIC ACID, VITAMIN A PALMITATE, CHOLECALCIFEROL, THIAMINE HYDROCHLORIDE, RIBOFLAVIN-5 PHOSPHATE SODIUM, PYRIDOXINE HYDROCHLORIDE, NIACINAMIDE, DEXPANTHENOL, ALPHA-TOCOPHEROL ACETATE, VITAMIN K1, FOLIC ACID, BIOTIN, CYANOCOBALAMIN 1 MLS/HR: 200; 3300; 200; 6; 3.6; 6; 40; 15; 10; 150; 600; 60; 5 INJECTION, SOLUTION INTRAVENOUS at 06:44

## 2019-02-13 RX ADMIN — IPRATROPIUM BROMIDE AND ALBUTEROL SULFATE 1 ML: .5; 3 SOLUTION RESPIRATORY (INHALATION) at 14:29

## 2019-02-13 RX ADMIN — AMLODIPINE BESYLATE 1 MG: 5 TABLET ORAL at 09:00

## 2019-02-13 RX ADMIN — PROPOFOL 1 MLS/HR: 10 INJECTION, EMULSION INTRAVENOUS at 15:08

## 2019-02-13 RX ADMIN — RISPERIDONE 1 MG: 1 TABLET ORAL at 09:00

## 2019-02-13 RX ADMIN — DIVALPROEX SODIUM 1 MG: 125 CAPSULE, COATED PELLETS ORAL at 20:41

## 2019-02-13 RX ADMIN — RISPERIDONE 1 MG: 1 TABLET ORAL at 20:41

## 2019-02-13 RX ADMIN — METHYLPREDNISOLONE SODIUM SUCCINATE 1 MG: 40 INJECTION, POWDER, FOR SOLUTION INTRAMUSCULAR; INTRAVENOUS at 05:45

## 2019-02-13 RX ADMIN — ASCORBIC ACID, VITAMIN A PALMITATE, CHOLECALCIFEROL, THIAMINE HYDROCHLORIDE, RIBOFLAVIN-5 PHOSPHATE SODIUM, PYRIDOXINE HYDROCHLORIDE, NIACINAMIDE, DEXPANTHENOL, ALPHA-TOCOPHEROL ACETATE, VITAMIN K1, FOLIC ACID, BIOTIN, CYANOCOBALAMIN 1 MLS/HR: 200; 3300; 200; 6; 3.6; 6; 40; 15; 10; 150; 600; 60; 5 INJECTION, SOLUTION INTRAVENOUS at 23:19

## 2019-02-13 RX ADMIN — FAMOTIDINE SCH MG: 20 TABLET ORAL at 09:00

## 2019-02-13 RX ADMIN — PROPOFOL SCH MLS/HR: 10 INJECTION, EMULSION INTRAVENOUS at 15:08

## 2019-02-13 RX ADMIN — ASPIRIN 81 MG CHEWABLE TABLET 1 MG: 81 TABLET CHEWABLE at 09:00

## 2019-02-13 RX ADMIN — METHYLPREDNISOLONE SODIUM SUCCINATE 1 MG: 40 INJECTION, POWDER, FOR SOLUTION INTRAMUSCULAR; INTRAVENOUS at 21:05

## 2019-02-13 RX ADMIN — DIVALPROEX SODIUM SCH MG: 125 CAPSULE, COATED PELLETS ORAL at 20:41

## 2019-02-13 RX ADMIN — METHYLPREDNISOLONE SODIUM SUCCINATE 1 MG: 40 INJECTION, POWDER, FOR SOLUTION INTRAMUSCULAR; INTRAVENOUS at 23:24

## 2019-02-13 NOTE — PN
DATE:  02/13/2019

 

 

SUBJECTIVE:  The patient remains tachypneic, currently on a Venturi mask.  The patient is pending pos
sible intubation.  The patient had excellent urinary output over 2 liters.  No other acute events not
ed.  No hemoptysis, hematemesis or hematochezia.

 

OBJECTIVE:

VITAL SIGNS:  Blood pressure is 120/90, respirations 21, pulse 91, temperature 98.4.

HEENT:  Head is normocephalic.

NECK:  Supple.

HEART:  Regular rate.

LUNGS:  Show diminished breath sounds at the base.

ABDOMEN:  Soft, nontender to palpation without rebound or guarding.

EXTREMITIES:  Negative for clubbing, cyanosis, no edema.

DERMATOLOGIC:  No rashes.

MUSCULOSKELETAL:  No joint effusion.

NEUROLOGIC:  No change in exam.

 

MEDICATIONS:  Reviewed.

 

LABORATORY DATA:  Shows sodium of 154, potassium 3.9, chloride 112, BUN 42, creatinine 0.87, magnesiu
m 2.8.  CBC was reviewed.

 

IMAGING STUDY:  Chest x-ray was reviewed.

 

ASSESSMENT AND PLAN:

1.  Nonoliguric acute kidney injury with unknown baseline creatinine.  Etiology of acute kidney injur
y is secondary to hemodynamics.  The patient's creatinine has improved.  The patient does have underl
kera azotemia secondary to acute kidney injury, hypercatabolic state.  We will continue to monitor, c
ontinue supportive care, renally dose all medicines and avoid nephrotoxins.

2.  Hypernatremia.  Etiology was felt to be secondary to insensible losses.  However, given the patie
nt's increased urinary output, we will need to rule out diabetes insipidus.  Plan at this point is to
 intensify the rate of D5 water 200 mL per hour.  We will monitor serial sodium.  We will check urine
 sodium, urine osmolarity.

3.  Metabolic alkalosis with respiratory compensation.  At this point, continue current treatment amadeo
n.  Continue IV hydration.  Continue to monitor.

4.  Anemia.  Continue to monitor hemoglobin and hematocrit levels.

5.  Mineral bone disorder.  Monitor calcium and phosphorus levels.

6.  Hypomagnesemia.  Continue to monitor and replete as needed.

7.  Diastolic heart failure.  The patient is compensated.  Continue to monitor, give intermittent diu
retic therapy as needed.

8.  Pleural effusion, possible parapneumonic.  Continue to monitor.  Consider thoracentesis.

9.  Acute hypoxemic hypercapnic respiratory failure.  The patient is on Venturi mask, decompensated. 
 Pending possible intubation.

10.  Acute encephalopathy, etiology is toxic metabolic.

11.  Sepsis secondary to pneumonia.  Continue current antibiotic regimen.

12.  Deep vein thrombosis.  Continue medical management.

13.  Status post cardiac arrest.

14.  Status post pneumothorax.

15.  Status post pneumoperitoneum.

 

 

Dictated By: MARYAN RUCKER/NTS

DD:    02/13/2019 07:28:16

DT:    02/13/2019 08:11:30

Conf#: [id number]

DID#:  [id number]

CC: KARLO DUDLEY NP; DONALDO CARRILLO MD;*EndCC*

## 2019-02-13 NOTE — PAC
Date/Time of Note


Date/Time of Note


DATE: 2/13/19 


TIME: 20:10





Post-Anesthesia Notes


Post-Anesthesia Note


Last documented vital signs





Vital Signs


  Date      Temp  Pulse  Resp  B/P (MAP)   Pulse Ox  O2          O2 Flow    FiO2


Time                                                 Delivery    Rate


   2/13/19           94


     20:00


   2/13/19                 18      128/95        98


     18:30                          (106)


   2/13/19                                           Mechanical


     18:00                                           Ventilator


   2/13/19                                                                   100


     17:30


   2/13/19  98.0


     16:00


   2/13/19                                                            12.0


     14:27





Activity:  WNL


Respiratory function:  WNL


Cardiovascular function:  WNL


Mental status:  Baseline


Pain reasonably controlled:  Yes


Hydration appropriate:  Yes


Nausea/Vomiting absent:  Yes











GURU TURCIOS               Feb 13, 2019 20:11

## 2019-02-13 NOTE — PREAC
Date/Time of Note


Date/Time of Note


DATE: 2/13/19 


TIME: 18:12





Anesthesia Eval and Record


Evaluation


Time Pre-Procedure Interview


DATE: 2/13/19 


TIME: 18:12


Age


68


Sex


male


NPO:  8 hrs


Preoperative diagnosis


dysphagia


Planned procedure


peg





Past Medical History


Past Medical History:  Includes


Cardio:  HTN, Dyslipidemia, CAD


Endo:  Diabetes


Pulm:  COPD, Other (resp failure)


Musculoskeletal:  Osteoarthritis


GI:  GERD


Heme:  Anemia





Surgery & Anesthesia Issues


No known issue





Meds


Anticoagulation:  No


Beta Blocker within 24 hr:  No


Reason Beta Blocker not given:  Pt. not on B-Blocker


Reported Medications


Docusate Sodium* (Colace*) 100 Mg Capsule, 100 MG PO Q24H PRN for CONSTIPATION, 


#30 CAP


   1/21/19


Polyethylene Glycol* (Miralax*) 17 Gm Powd.pack, 17 GM PO DAILY PRN for AS 


NEEDED, #30 PACKET


   1/21/19


Acetaminophen* (Acetaminophen*) 325 Mg Tablet, 650 MG PO Q4H PRN for PAIN 1-


10/10, #30 TAB


   AND FEVER>101F


   1/21/19


Sennosides* (Senna Lax*) 8.6 Mg Tablet, 1 TAB PO AS NEEDED, TAB


   1/21/19


Mv,Minerals/Fa/Lycopene/Ginkgo (ONE DAILY MEN'S 50+ TABLET) 1 Each Tablet, 1 


EACH PO DAILY, TAB


   1/21/19


Divalproex Sodium* (Depakote*) 125 Mg Tablet.dr, 250 MG PO Q8H, #90 TAB


   1/21/19


Quetiapine Fumarate* (Seroquel*) 50 Mg Tablet, 50 MG PO Q8H, TAB


   1/21/19


Ipratropium-Albuterol (Ipratropium-Albuterol) 0.5-3 Mg/3 Ml Ampul.neb, 3 ML 


INHALATION Q4H PRN for WHEEZING AND SOB, #30 VIAL


   1/21/19


Budesonide-Formoterol Fumarate* (Symbicort*) 160-4.5 Hfa.aer.ad, 2 PUFF IN


HALATION BID, #1 EACH


   1/21/19





Current Medications


Ondansetron HCl (Zofran Inj) 4 mg Q6H  PRN IV NAUSEA AND/OR VOMITING Last 


administered on 2/1/19at 18:46; Admin Dose 4 MG;  Start 1/21/19 at 23:30


Acetaminophen (Tylenol Supp) 650 mg Q4H  PRN TN PAIN LEVEL 1-3 OR FEVER;  Start 


1/21/19 at 23:30


Aspirin (Aspirin) 81 mg DAILY PO  Last administered on 2/11/19at 10:23; Admin 


Dose 81 MG;  Start 1/24/19 at 09:00


Famotidine (Pepcid) 20 mg Q12 PO  Last administered on 2/11/19at 20:49; Admin 


Dose 20 MG;  Start 1/24/19 at 21:00


Zolpidem Tartrate (Ambien) 5 mg HS  PRN PO INSOMNIA Last administered on 


2/5/19at 20:31; Admin Dose 5 MG;  Start 1/27/19 at 00:00


Guaifenesin/ Dextromethorphan (Robitussin Dm Liquid Cup) 5 ml Q6H  PRN PO COUGH 


Last administered on 2/10/19at 05:16; Admin Dose 5 ML;  Start 1/27/19 at 20:00


Morphine Sulfate (morphine) 6 mg Q4H  PRN PO SEVERE PAIN LEVEL 7-10 Last 


administered on 2/12/19at 12:44; Admin Dose 6 MG;  Start 1/30/19 at 21:30


Albuterol/ Ipratropium (Duoneb) 3 ml Q3H RESP THERAPY  PRN HHN SHORTNESS OF 


BREATH Last administered on 2/7/19at 18:47; Admin Dose 3 ML;  Start 1/31/19 at 


20:00


Methylprednisolone Sodium Succinate (Solu-Medrol) 40 mg Q6 IV  Last administered


on 2/13/19at 12:57; Admin Dose 40 MG;  Start 2/2/19 at 12:00


Alteplase, Recombinant (Cathflo (Activase)) 2 mg MAY REPEAT X1 PRN CATHETER IF 


CATHETER REMAINS OCCULUDED;  Start 2/2/19 at 11:00


Amlodipine Besylate (Norvasc) 5 mg DAILY PO  Last administered on 2/11/19at 


10:24; Admin Dose 5 MG;  Start 2/5/19 at 09:00


Albuterol/ Ipratropium (Duoneb) 3 ml Q6H RESP  THERAPY HHN  Last administered on


2/13/19at 14:29; Admin Dose 3 ML;  Start 2/4/19 at 20:00


Lorazepam (Ativan) 1 mg Q6H  PRN IV PSYCHOSIS Last administered on 2/11/19at 


13:05; Admin Dose 1 MG;  Start 2/7/19 at 02:00


Haloperidol (Haldol) 2 mg PRN  PRN IM AGITATION Last administered on 2/12/19at 


00:44; Admin Dose 2 MG;  Start 2/8/19 at 06:30


Risperidone (Risperdal) 2 mg BID PO  Last administered on 2/11/19at 20:49; Admin


Dose 2 MG;  Start 2/9/19 at 21:00


Divalproex Sodium (Depakote Sprinkle) 250 mg Q8H PO  Last administered on 


2/12/19at 13:09; Admin Dose 250 MG;  Start 2/10/19 at 21:00


Dextrose 1,000 ml @  100 mls/hr Q10H IV  Last administered on 2/13/19at 09:57; 


Admin Dose 100 MLS/HR;  Start 2/12/19 at 06:30


Propofol 100 ml @  1.875 mls/ hr Q12H IV  Last administered on 2/13/19at 15:08; 


Admin Dose 1.875 MLS/HR;  Start 2/13/19 at 15:30


Meds reviewed:  Yes





Allergies


Coded Allergies:  


     No Known Allergy (Unverified , 1/21/19)


Allergies Reviewed:  Yes





Labs/Studies


Labs Reviewed:  Reviewed by anesthesiologist


Result Diagram:  


2/13/19 0501                                                                    


           2/13/19 1353





Laboratory Tests


2/13/19 05:01








2/13/19 13:53











Blood Bank


                      Test
                  2/13/19
05:01


                      Antibody Screen        NEGATIVE


                      Blood Product Summary  Counts


                      Blood Type             O POSITIVE





Pregnancy test:  Negative


Studies:  ECG





Pre-procedure Exam


Last vitals





Vital Signs


  Date      Temp  Pulse  Resp  B/P (MAP)   Pulse Ox  O2          O2 Flow    FiO2


Time                                                 Delivery    Rate


   2/13/19           90    16     116/103       100


     17:30                          (107)


   2/13/19                                           Mechanical


     17:00                                           Ventilator


   2/13/19  98.0


     16:00


   2/13/19                                                                   100


     14:55


   2/13/19                                                            12.0


     09:59





Airway:  Adequate mouth opening, Adequate thyromental dist


Mallampati:  Mallampati II


Teeth:  Normal


Lung:  Normal


Heart:  Normal





ASA Physical Status


ASA physical status:  3


Emergency:  None





Planned Anesthetic


General/MAC:  ETT





Pre-operative Attestations


Prior to commencing anesthesia and surgery, the patient was re-evaluated, there 


was verification of:


*The patient's identity


*The results of appropriate recent lab work and preoperative vital signs


*The above evaluation not changing prior to induction


*Anesthetic plan, risk benefits, alternative and complications discussed with 


patient/family; questions answered; patient/family understands, accepts and 


wishes to proceed.











GURU TURCIOS               Feb 13, 2019 18:13

## 2019-02-13 NOTE — CONS
Consult Date/Type/Reason


Admit Date/Time


Jan 22, 2019 at 00:03


Initial Consult Date


1/22/19


Type of Consult


Pulmonary


Requesting Provider:  DONALDO CARRILLO MD


Date/Time of Note


DATE: 2/13/19 


TIME: 10:45





Subjective


Patient continues Ventimask oxygen.  Intermittent agitation this morning however


he is agreeable to intubation prior to PEG tube placement.





Objective





Vital Signs


  Date      Temp  Pulse  Resp  B/P (MAP)   Pulse Ox  O2          O2 Flow    FiO2


Time                                                 Delivery    Rate


   2/13/19                                      100                   12.0    40


     09:59


   2/13/19                                           Venti Mask


     09:59


   2/13/19           99


     08:00


   2/13/19                 21      120/90


     06:00                          (100)


   2/13/19  98.4


     04:00








Intake and Output





2/12/19 2/12/19 2/13/19





1515:00


23:00


07:00





IntakeIntake Total


555 ml


630 ml


450 ml





OutputOutput Total


750 ml


650 ml


600 ml





BalanceBalance


-195 ml


-20 ml


-150 ml











Exam


GENERAL: Frail elderly gentleman


VITAL SIGNS:  per chart


NECK:  Supple.  No JVD or lymphadenopathy.


CARDIAC EXAM:  S1, S2. No added sounds or murmurs.


CHEST: Diminished air entry bilaterally


ABDOMEN:  Soft, nontender.  No guarding or rebound.


EXTREMITIES:  No cyanosis, clubbing or edema.


NEUROLOGIC:  Generalized weakness.  No focal deficits.





Vent Setting


Ventilator Support Mode:  SPONT


Fraction of Inspired Oxygen pe:  40


Positive End Expiratory Pressu:  5.0





Results/Medications


Result Diagram:  


2/13/19 0501                                                                    


           2/13/19 0501





Results 24 hrs





Laboratory Tests


               Test
                                2/13/19
05:01


               White Blood Count                            5.6


               Red Blood Count                            3.78  L


               Hemoglobin                                 10.6  L


               Hematocrit                                 34.8  L


               Mean Corpuscular Volume                     92.1


               Mean Corpuscular Hemoglobin                28.0  L


               Mean Corpuscular Hemoglobin
Concent       30.5  L



               Red Cell Distribution Width                16.9  H


               Platelet Count                               47  L


               Mean Platelet Volume                       10.6  H


               Immature Granulocytes %                   0.500  H


               Neutrophils %                              92.9  H


               Lymphocytes %                               4.3  L


               Monocytes %                                  2.3


               Eosinophils %                                0.0


               Basophils %                                  0.0


               Nucleated Red Blood Cells %                  0.0


               Immature Granulocytes #                    0.030


               Neutrophils #                                5.2


               Lymphocytes #                               0.2  L


               Monocytes #                                 0.1  L


               Eosinophils #                                0.0


               Basophils #                                  0.0


               Nucleated Red Blood Cells #                  0.0


               Sodium Level                                154  H


               Potassium Level                              3.9


               Chloride Level                              112  H


               Carbon Dioxide Level                         40  H


               Anion Gap                                     2  L


               Blood Urea Nitrogen                          42  H


               Creatinine                                  0.87


               Est Glomerular Filtrat Rate
mL/min   > 60  



               Glucose Level                                167


               Calcium Level                                9.3


               Phosphorus Level                             3.5


               Magnesium Level                             2.8  H





Medications





Current Medications


Ondansetron HCl (Zofran Inj) 4 mg Q6H  PRN IV NAUSEA AND/OR VOMITING Last 


administered on 2/1/19at 18:46; Admin Dose 4 MG;  Start 1/21/19 at 23:30


Acetaminophen (Tylenol Supp) 650 mg Q4H  PRN RI PAIN LEVEL 1-3 OR FEVER;  Start 


1/21/19 at 23:30


Aspirin (Aspirin) 81 mg DAILY PO  Last administered on 2/11/19at 10:23; Admin 


Dose 81 MG;  Start 1/24/19 at 09:00


Famotidine (Pepcid) 20 mg Q12 PO  Last administered on 2/11/19at 20:49; Admin 


Dose 20 MG;  Start 1/24/19 at 21:00


Zolpidem Tartrate (Ambien) 5 mg HS  PRN PO INSOMNIA Last administered on 


2/5/19at 20:31; Admin Dose 5 MG;  Start 1/27/19 at 00:00


Guaifenesin/ Dextromethorphan (Robitussin Dm Liquid Cup) 5 ml Q6H  PRN PO COUGH 


Last administered on 2/10/19at 05:16; Admin Dose 5 ML;  Start 1/27/19 at 20:00


Morphine Sulfate (morphine) 6 mg Q4H  PRN PO SEVERE PAIN LEVEL 7-10 Last 


administered on 2/12/19at 12:44; Admin Dose 6 MG;  Start 1/30/19 at 21:30


Albuterol/ Ipratropium (Duoneb) 3 ml Q3H RESP THERAPY  PRN HHN SHORTNESS OF 


BREATH Last administered on 2/7/19at 18:47; Admin Dose 3 ML;  Start 1/31/19 at 


20:00


Methylprednisolone Sodium Succinate (Solu-Medrol) 40 mg Q6 IV  Last administered


on 2/13/19at 05:45; Admin Dose 40 MG;  Start 2/2/19 at 12:00


Alteplase, Recombinant (Cathflo (Activase)) 2 mg MAY REPEAT X1 PRN CATHETER IF 


CATHETER REMAINS OCCULUDED;  Start 2/2/19 at 11:00


Amlodipine Besylate (Norvasc) 5 mg DAILY PO  Last administered on 2/11/19at 


10:24; Admin Dose 5 MG;  Start 2/5/19 at 09:00


Albuterol/ Ipratropium (Duoneb) 3 ml Q6H RESP  THERAPY HHN  Last administered on


2/13/19at 09:44; Admin Dose 3 ML;  Start 2/4/19 at 20:00


Lorazepam (Ativan) 1 mg Q6H  PRN IV PSYCHOSIS Last administered on 2/11/19at 


13:05; Admin Dose 1 MG;  Start 2/7/19 at 02:00


Haloperidol (Haldol) 2 mg PRN  PRN IM AGITATION Last administered on 2/12/19at 


00:44; Admin Dose 2 MG;  Start 2/8/19 at 06:30


Risperidone (Risperdal) 2 mg BID PO  Last administered on 2/11/19at 20:49; Admin


Dose 2 MG;  Start 2/9/19 at 21:00


Divalproex Sodium (Depakote Sprinkle) 250 mg Q8H PO  Last administered on 


2/12/19at 13:09; Admin Dose 250 MG;  Start 2/10/19 at 21:00


Dextrose 1,000 ml @  100 mls/hr Q10H IV  Last administered on 2/13/19at 09:57; 


Admin Dose 100 MLS/HR;  Start 2/12/19 at 06:30





Assessment/Plan


Hospital Course (Demo Recall)


iMP: 


1.  Acute on chronic hypoxic and hypercapnic respiratory failure--worse 


overnight with increased mucus plugging. 


2.  Pneumoperitoneum status post chest tube which patient pulled out


3.  Encephalopathy toxic metabolic resolving


4.  Advanced COPD


5.  Hypernatremia likely free water deficit


6.  Thrombocytopenia 


7.  Anemia 





RECS:


1.  Continue ICU care


2. Maintain oxygen to keep Sp02 88-92%, Elective intubation prior to PEG tube 


placement 


3. Aggressive suctioning/CPT


4. NPO PEG tube placement scheduled for this afternoon


5. Follow ABG/CXR


6. Strict aspiration precautions





Critical care time 40 minutes.











MIMI ZAVALA MD, [facility]     Feb 13, 2019 10:46

## 2019-02-13 NOTE — GILP
DATE OF PROCEDURE:  

 

 

NAME OF PROCEDURE:  Esophagogastroduodenoscopy, percutaneous endoscopic gastrostomy tube placement.

 

PREOPERATIVE DIAGNOSIS:  The patient is presenting with a history of difficulty in swallowing and he 
has aspiration pneumonia.  Because of this, percutaneous endoscopic gastrostomy tube placement has be
en requested and that needs to be performed now.

 

POSTOPERATIVE DIAGNOSIS:  The patient is presenting with a history of difficulty in swallowing and he
 has aspiration pneumonia.  Because of this, percutaneous endoscopic gastrostomy tube placement has b
een requested and that needs to be performed now.

 

DESCRIPTION OF PROCEDURE:  After the informed written consent was obtained, the patient was asked to 
lay in the supine position.  Intravenous anesthesia was given by anesthesiologist, Dr. Franks.  Wh
en the patient became somnolent, the Olympus video upper endoscope was inserted into the oropharynx, 
then into the esophagus.  Esophagus appeared normal.  Stomach appeared normal.  Duodenum appeared nor
mal.  Scope at this time was withdrawn to the level of the gastric cavity.  Anterior abdominal wall w
as prepared with Betadine and alcohol.  A 2 mL of 2% Xylocaine was infiltrated at the endoscopic illu
minating site.  A 5 mm incision was made by using the scalpel.  Through this incision, trocar was ins
erted into the stomach, and after removing the stylet, the guidewire was inserted into the stomach an
d the guidewire was grabbed with a polypectomy snare and then it was brought out through the mouth al
slim with the endoscope.  To this end of the guidewire, a #20 Microvasive G-tube was tied in a loop fa
shion and then it was brought out through the abdominal wall incision.  Retention bumper was placed o
macey the G-tube close to the skin.  Tapered end of the gastrostomy tube was cut.  The adapter was plac
ed at the tip of the tube.  The procedure was terminated.

 

PLAN:  Recommend starting G-tube feeding in the a.m.

 

 

Dictated By: ANGÉLICA DANEIL/NTS

DD:    02/13/2019 19:07:52

DT:    02/13/2019 21:28:45

Conf#: [id number]

DID#:  [id number]

CC: DONALDO CARRILLO MD; HAILE SOTOMAYOR MD; MAURI GRIGSBY MD;*EndCC*

## 2019-02-13 NOTE — SP
DATE OF PROCEDURE:  

 

 

PROCEDURE:  Endotracheal intubation.

 

INDICATION:  Respiratory distress, pending G-tube placement.

 

DESCRIPTION OF PROCEDURE:  The patient was placed in supine position, neck extended.  Blood pressure,
 EKG and pulse oximetry were continuously monitored.  He was given 10 mg of etomidate and 100 mg of s
uccinylcholine.  Vocal cords were visualized and a size 7.5 endotracheal tube was passed through the 
vocal cords and secured at 23 cm to the lip.  The patient had equal breath sounds bilaterally with po
sitive CO2 capnometer.  He tolerated the procedure well without complication.  Post-intubation, chest
 x-ray and ABG are pending.

 

 

Dictated By: MIMI ZAVALA MD

 

SV/NTS

DD:    02/13/2019 15:32:01

DT:    02/13/2019 16:51:16

Conf#: [id number]

DID#:  [id number]

CC: DONALDO CARRILLO MD; MAURI GRIGSBY MD; KARLO DUDLEY NP;*EndCC*

## 2019-02-13 NOTE — CONS
Assessment/Plan


Assessment/Plan


Hospital Course (Demo Recall)


Patient is on Venturi mask more lethargic today in no distress afebrile WBC 5.6 


neutrophils 92.9 BUN 42 creatinine 0.87





Indwelling: Right IJ triple-lumen catheter, Blake





Physical examination: Well-developed chronically ill-appearing cachectic elderly


man.  Head atraumatic normocephalic sclera nonicteric, neck is supple chest rise


symmetrical breath sounds diminished bases.  Heart: S1-S2.  Abdomen soft bowel 


sounds present.  Extremities without edema/cyanosis





Assessment:


1.  Respiratory failure, s/p pneumonia


2.  Dysphagia with ongoing aspiration


4.  Pneumoperitoneum of unclear etiology, no perforation per surgical note, 


status post chest tube


5.  Dementia


5.  Anemia with progressive thrombocytopenia


6.  History of non-ST elevation MI


7.  Status post cardiac arrest


8.  RIJ TLC





Plan: Remains unchanged, continue present care, aspiration precautions, plan for


PEG





Consultation Date/Type/Reason


Admit Date/Time


Jan 22, 2019 at 00:03


Initial Consult Date


1/22/19


Type of Consult


ID


Requesting Provider:  DONALDO CARRILLO MD


Date/Time of Note


DATE: 2/13/19 


TIME: 11:38





Exam/Review of Systems


Exam


Vitals





Vital Signs


  Date      Temp  Pulse  Resp  B/P (MAP)   Pulse Ox  O2          O2 Flow    FiO2


Time                                                 Delivery    Rate


   2/13/19                                      100                   12.0    40


     09:59


   2/13/19                                           Venti Mask


     09:59


   2/13/19           99


     08:00


   2/13/19                 21      120/90


     06:00                          (100)


   2/13/19  98.4


     04:00








Intake and Output





2/12/19 2/12/19 2/13/19





1515:00


23:00


07:00





IntakeIntake Total


555 ml


630 ml


450 ml





OutputOutput Total


750 ml


650 ml


600 ml





BalanceBalance


-195 ml


-20 ml


-150 ml














Results


Result Diagram:  


2/13/19 0501                                                                    


           2/13/19 0501





Results 24hrs





Laboratory Tests


               Test
                                2/13/19
05:01


               White Blood Count                            5.6


               Red Blood Count                            3.78  L


               Hemoglobin                                 10.6  L


               Hematocrit                                 34.8  L


               Mean Corpuscular Volume                     92.1


               Mean Corpuscular Hemoglobin                28.0  L


               Mean Corpuscular Hemoglobin
Concent       30.5  L



               Red Cell Distribution Width                16.9  H


               Platelet Count                               47  L


               Mean Platelet Volume                       10.6  H


               Immature Granulocytes %                   0.500  H


               Neutrophils %                              92.9  H


               Lymphocytes %                               4.3  L


               Monocytes %                                  2.3


               Eosinophils %                                0.0


               Basophils %                                  0.0


               Nucleated Red Blood Cells %                  0.0


               Immature Granulocytes #                    0.030


               Neutrophils #                                5.2


               Lymphocytes #                               0.2  L


               Monocytes #                                 0.1  L


               Eosinophils #                                0.0


               Basophils #                                  0.0


               Nucleated Red Blood Cells #                  0.0


               Sodium Level                                154  H


               Potassium Level                              3.9


               Chloride Level                              112  H


               Carbon Dioxide Level                         40  H


               Anion Gap                                     2  L


               Blood Urea Nitrogen                          42  H


               Creatinine                                  0.87


               Est Glomerular Filtrat Rate
mL/min   > 60  



               Glucose Level                                167


               Calcium Level                                9.3


               Phosphorus Level                             3.5


               Magnesium Level                             2.8  H








Medications


Medication





Current Medications


Ondansetron HCl (Zofran Inj) 4 mg Q6H  PRN IV NAUSEA AND/OR VOMITING Last 


administered on 2/1/19at 18:46; Admin Dose 4 MG;  Start 1/21/19 at 23:30


Acetaminophen (Tylenol Supp) 650 mg Q4H  PRN [place] PAIN LEVEL 1-3 OR FEVER;  Start 


1/21/19 at 23:30


Aspirin (Aspirin) 81 mg DAILY PO  Last administered on 2/11/19at 10:23; Admin 


Dose 81 MG;  Start 1/24/19 at 09:00


Famotidine (Pepcid) 20 mg Q12 PO  Last administered on 2/11/19at 20:49; Admin 


Dose 20 MG;  Start 1/24/19 at 21:00


Zolpidem Tartrate (Ambien) 5 mg HS  PRN PO INSOMNIA Last administered on 


2/5/19at 20:31; Admin Dose 5 MG;  Start 1/27/19 at 00:00


Guaifenesin/ Dextromethorphan (Robitussin Dm Liquid Cup) 5 ml Q6H  PRN PO COUGH 


Last administered on 2/10/19at 05:16; Admin Dose 5 ML;  Start 1/27/19 at 20:00


Morphine Sulfate (morphine) 6 mg Q4H  PRN PO SEVERE PAIN LEVEL 7-10 Last 


administered on 2/12/19at 12:44; Admin Dose 6 MG;  Start 1/30/19 at 21:30


Albuterol/ Ipratropium (Duoneb) 3 ml Q3H RESP THERAPY  PRN HHN SHORTNESS OF 


BREATH Last administered on 2/7/19at 18:47; Admin Dose 3 ML;  Start 1/31/19 at 


20:00


Methylprednisolone Sodium Succinate (Solu-Medrol) 40 mg Q6 IV  Last administered


on 2/13/19at 05:45; Admin Dose 40 MG;  Start 2/2/19 at 12:00


Alteplase, Recombinant (Cathflo (Activase)) 2 mg MAY REPEAT X1 PRN CATHETER IF 


CATHETER REMAINS OCCULUDED;  Start 2/2/19 at 11:00


Amlodipine Besylate (Norvasc) 5 mg DAILY PO  Last administered on 2/11/19at 


10:24; Admin Dose 5 MG;  Start 2/5/19 at 09:00


Albuterol/ Ipratropium (Duoneb) 3 ml Q6H RESP  THERAPY HHN  Last administered on


2/13/19at 09:44; Admin Dose 3 ML;  Start 2/4/19 at 20:00


Lorazepam (Ativan) 1 mg Q6H  PRN IV PSYCHOSIS Last administered on 2/11/19at 


13:05; Admin Dose 1 MG;  Start 2/7/19 at 02:00


Haloperidol (Haldol) 2 mg PRN  PRN IM AGITATION Last administered on 2/12/19at 


00:44; Admin Dose 2 MG;  Start 2/8/19 at 06:30


Risperidone (Risperdal) 2 mg BID PO  Last administered on 2/11/19at 20:49; Admin


Dose 2 MG;  Start 2/9/19 at 21:00


Divalproex Sodium (Depakote Sprinkle) 250 mg Q8H PO  Last administered on 


2/12/19at 13:09; Admin Dose 250 MG;  Start 2/10/19 at 21:00


Dextrose 1,000 ml @  100 mls/hr Q10H IV  Last administered on 2/13/19at 09:57; 


Admin Dose 100 MLS/HR;  Start 2/12/19 at 06:30











DAVID VÁZQUEZ NP            Feb 13, 2019 11:39

## 2019-02-13 NOTE — PN
Date/Time of Note


Date/Time of Note


DATE: 2/13/19 


TIME: 13:59





Assessment/Plan


VTE Prophylaxis


Risk score (from [facility])>0 risk:  8


SCD applied (from [facility]):  Yes


Pharmacological prophylaxis:  NA/contraindicated


Pharm contraindication:  thrombocytopenia, surgical contra





Lines/Catheters


IV Catheter Type (from [facility]):  Peripheral IV


Urinary Cath still in place:  Yes


Reason Cath still needed:  urinary retention





Assessment/Plan


Hospital Course


Patient is awake alert continues on Ventimask.  Patient is undergoing platelets 


transfusion for plan for G-tube placement today patient will get intubated prior


to procedure.


Assessment/Plan


-Dysphagia.  Dr. Sanchez is following in gastroenterology consultation for 


possible G-tube placement.


-Hypoxic respiratory failure requiring mechanical ventilation, extubated. Dr. English is following in pulmonology consultation.


-Sepsis with shock, resolving.  Dr. Dreyer is following in infection disease 


consultation.


-Status post cardiac arrest.   Dr. Jon is following in cardiology consultati


on.


-Left-sided pneumothorax, status post left side chest tube placement, s/p self 


d/c CT.


-S/p pneumoperitoneum most likely due to cardiac arrest, resolved. Dr. Lai is 


following in general surgery consultation. 


-Left axillary and brachial DVT, LUE swelling subsided, was on Lovenox which is 


currently held due to thrombocytopenia


-Left lower lobe pneumonia


-Severe emphysema


-NATALIE with possible chronic kidney disease. Dr Hanson is following in nephrology


consultation.


-Schizoaffective disorder depressed type.  Psychiatric consult is appreciated, 


continue Risperdal Depakote





Further recommendations based on clinical course.  Plan of care discussed with 


Dr. Miller.


Result Diagram:  


2/13/19 0501                                                                    


           2/13/19 0501





Results 24hrs





Laboratory Tests


       Test
                                2/13/19
05:01  2/13/19
10:00


       White Blood Count                            5.6


       Red Blood Count                            3.78  L


       Hemoglobin                                 10.6  L


       Hematocrit                                 34.8  L


       Mean Corpuscular Volume                     92.1


       Mean Corpuscular Hemoglobin                28.0  L


       Mean Corpuscular Hemoglobin
Concent       30.5  L
  



       Red Cell Distribution Width                16.9  H


       Platelet Count                               47  L


       Mean Platelet Volume                       10.6  H


       Immature Granulocytes %                   0.500  H


       Neutrophils %                              92.9  H


       Lymphocytes %                               4.3  L


       Monocytes %                                  2.3


       Eosinophils %                                0.0


       Basophils %                                  0.0


       Nucleated Red Blood Cells %                  0.0


       Immature Granulocytes #                    0.030


       Neutrophils #                                5.2


       Lymphocytes #                               0.2  L


       Monocytes #                                 0.1  L


       Eosinophils #                                0.0


       Basophils #                                  0.0


       Nucleated Red Blood Cells #                  0.0


       Sodium Level                                154  H


       Potassium Level                              3.9


       Chloride Level                              112  H


       Carbon Dioxide Level                         40  H


       Anion Gap                                     2  L


       Blood Urea Nitrogen                          42  H


       Creatinine                                  0.87


       Est Glomerular Filtrat Rate
mL/min   > 60  
        



       Glucose Level                                167


       Calcium Level                                9.3


       Phosphorus Level                             3.5


       Magnesium Level                             2.8  H


       Urine Color                                         STRAW


       Urine Clarity                                       CLEAR


       Urine pH                                                    6.0


       Urine Specific Gravity                                    1.009


       Urine Ketones                                       NEGATIVE


       Urine Nitrite                                       NEGATIVE


       Urine Bilirubin                                     NEGATIVE


       Urine Urobilinogen                                  NEGATIVE


       Urine Leukocyte Esterase                            NEGATIVE


       Urine Microscopic RBC                                         4


       Urine Microscopic WBC                                         1


       Urine Hemoglobin                                            2+  H


       Urine Osmolality                                            294


       Urine Random Creatinine                                   24.08


       Urine Random Sodium                                          35


       Urine Glucose                                               1+  H


       Urine Total Protein                                       34.0  H








Exam/Review of Systems


Exam


Vitals





Vital Signs


  Date      Temp  Pulse  Resp  B/P (MAP)   Pulse Ox  O2          O2 Flow    FiO2


Time                                                 Delivery    Rate


   2/13/19           98    27      133/91        98  Venturi


     13:00                          (105)            Mask


   2/13/19  97.6


     12:00


   2/13/19                                                            12.0    40


     09:59








Intake and Output





2/12/19 2/12/19 2/13/19





1515:00


23:00


07:00





IntakeIntake Total


555 ml


630 ml


476.7 ml





OutputOutput Total


750 ml


650 ml


680 ml





BalanceBalance


-195 ml


-20 ml


-203.3 ml











Exam


Constitutional:  alert, oriented


Respiratory:  diminished breath sounds


Cardiovascular:  regular rate and rhythm


Gastrointestinal:  soft, non-tender


Musculoskeletal:  nl extremities to inspection


Extremities:  normal pulses


Neurological:  nl mental status





Results


Results 24hrs





Laboratory Tests


       Test
                                2/13/19
05:01  2/13/19
10:00


       White Blood Count                            5.6


       Red Blood Count                            3.78  L


       Hemoglobin                                 10.6  L


       Hematocrit                                 34.8  L


       Mean Corpuscular Volume                     92.1


       Mean Corpuscular Hemoglobin                28.0  L


       Mean Corpuscular Hemoglobin
Concent       30.5  L
  



       Red Cell Distribution Width                16.9  H


       Platelet Count                               47  L


       Mean Platelet Volume                       10.6  H


       Immature Granulocytes %                   0.500  H


       Neutrophils %                              92.9  H


       Lymphocytes %                               4.3  L


       Monocytes %                                  2.3


       Eosinophils %                                0.0


       Basophils %                                  0.0


       Nucleated Red Blood Cells %                  0.0


       Immature Granulocytes #                    0.030


       Neutrophils #                                5.2


       Lymphocytes #                               0.2  L


       Monocytes #                                 0.1  L


       Eosinophils #                                0.0


       Basophils #                                  0.0


       Nucleated Red Blood Cells #                  0.0


       Sodium Level                                154  H


       Potassium Level                              3.9


       Chloride Level                              112  H


       Carbon Dioxide Level                         40  H


       Anion Gap                                     2  L


       Blood Urea Nitrogen                          42  H


       Creatinine                                  0.87


       Est Glomerular Filtrat Rate
mL/min   > 60  
        



       Glucose Level                                167


       Calcium Level                                9.3


       Phosphorus Level                             3.5


       Magnesium Level                             2.8  H


       Urine Color                                         STRAW


       Urine Clarity                                       CLEAR


       Urine pH                                                    6.0


       Urine Specific Gravity                                    1.009


       Urine Ketones                                       NEGATIVE


       Urine Nitrite                                       NEGATIVE


       Urine Bilirubin                                     NEGATIVE


       Urine Urobilinogen                                  NEGATIVE


       Urine Leukocyte Esterase                            NEGATIVE


       Urine Microscopic RBC                                         4


       Urine Microscopic WBC                                         1


       Urine Hemoglobin                                            2+  H


       Urine Osmolality                                            294


       Urine Random Creatinine                                   24.08


       Urine Random Sodium                                          35


       Urine Glucose                                               1+  H


       Urine Total Protein                                       34.0  H








Medications


Medication





Current Medications


Ondansetron HCl (Zofran Inj) 4 mg Q6H  PRN IV NAUSEA AND/OR VOMITING Last 


administered on 2/1/19at 18:46; Admin Dose 4 MG;  Start 1/21/19 at 23:30


Acetaminophen (Tylenol Supp) 650 mg Q4H  PRN [place] PAIN LEVEL 1-3 OR FEVER;  Start 


1/21/19 at 23:30


Aspirin (Aspirin) 81 mg DAILY PO  Last administered on 2/11/19at 10:23; Admin 


Dose 81 MG;  Start 1/24/19 at 09:00


Famotidine (Pepcid) 20 mg Q12 PO  Last administered on 2/11/19at 20:49; Admin 


Dose 20 MG;  Start 1/24/19 at 21:00


Zolpidem Tartrate (Ambien) 5 mg HS  PRN PO INSOMNIA Last administered on 


2/5/19at 20:31; Admin Dose 5 MG;  Start 1/27/19 at 00:00


Guaifenesin/ Dextromethorphan (Robitussin Dm Liquid Cup) 5 ml Q6H  PRN PO COUGH 


Last administered on 2/10/19at 05:16; Admin Dose 5 ML;  Start 1/27/19 at 20:00


Morphine Sulfate (morphine) 6 mg Q4H  PRN PO SEVERE PAIN LEVEL 7-10 Last ad


ministered on 2/12/19at 12:44; Admin Dose 6 MG;  Start 1/30/19 at 21:30


Albuterol/ Ipratropium (Duoneb) 3 ml Q3H RESP THERAPY  PRN HHN SHORTNESS OF 


BREATH Last administered on 2/7/19at 18:47; Admin Dose 3 ML;  Start 1/31/19 at 


20:00


Methylprednisolone Sodium Succinate (Solu-Medrol) 40 mg Q6 IV  Last administered


on 2/13/19at 12:57; Admin Dose 40 MG;  Start 2/2/19 at 12:00


Alteplase, Recombinant (Cathflo (Activase)) 2 mg MAY REPEAT X1 PRN CATHETER IF 


CATHETER REMAINS OCCULUDED;  Start 2/2/19 at 11:00


Amlodipine Besylate (Norvasc) 5 mg DAILY PO  Last administered on 2/11/19at 


10:24; Admin Dose 5 MG;  Start 2/5/19 at 09:00


Albuterol/ Ipratropium (Duoneb) 3 ml Q6H RESP  THERAPY HHN  Last administered on


2/13/19at 09:44; Admin Dose 3 ML;  Start 2/4/19 at 20:00


Lorazepam (Ativan) 1 mg Q6H  PRN IV PSYCHOSIS Last administered on 2/11/19at 


13:05; Admin Dose 1 MG;  Start 2/7/19 at 02:00


Haloperidol (Haldol) 2 mg PRN  PRN IM AGITATION Last administered on 2/12/19at 


00:44; Admin Dose 2 MG;  Start 2/8/19 at 06:30


Risperidone (Risperdal) 2 mg BID PO  Last administered on 2/11/19at 20:49; Admin


Dose 2 MG;  Start 2/9/19 at 21:00


Divalproex Sodium (Depakote Sprinkle) 250 mg Q8H PO  Last administered on 


2/12/19at 13:09; Admin Dose 250 MG;  Start 2/10/19 at 21:00


Dextrose 1,000 ml @  100 mls/hr Q10H IV  Last administered on 2/13/19at 09:57; 


Admin Dose 100 MLS/HR;  Start 2/12/19 at 06:30











EVARISTO BELTRAN             Feb 13, 2019 14:02

## 2019-02-13 NOTE — CONS
Assessment/Plan


Assessment/Plan


Hospital Course (Demo Recall)


IMP:


1.Hypotension-Now improved and tolerating norvasc. NL EF by echo this admit


2.resp failure s/p extubation


3.cardiac arrest


4.pneumoperitoneum-resolved


5.Positive troponin-now trended neg


6.renal failure-improved


7.Leukocytosis


8. anemia


9, Thrombocytopenia-ongoing some worsening


10.Resp failure-hypercapnic s/p intubation. s/p extubation but now transferred 


back to ICU for increasing resp distress


11.PNA-by cxr


12.PLeual effusion





Recc:


-IN ICU


-Continue norvasc and follow BP clsoely


-Continue asa


-continue steroids/bronchodilators


-Continue risperdal


-follow MS closely 


-Follow volume status clsoely


-consider thoracentesis


-asp precautions


-pnding G tube placement





Consultation Date/Type/Reason


Admit Date/Time


Jan 22, 2019 at 00:03


Initial Consult Date


1/22/19


Type of Consult


Cardiology


Requesting Provider:  DONALDO CARRILLO MD


Date/Time of Note


DATE: 2/13/19 


TIME: 10:08





Exam/Review of Systems


Vital Signs


Vitals





Vital Signs


  Date      Temp  Pulse  Resp  B/P (MAP)   Pulse Ox  O2          O2 Flow    FiO2


Time                                                 Delivery    Rate


   2/13/19                                      100                   12.0    40


     09:59


   2/13/19                                           Venti Mask


     09:59


   2/13/19           99


     08:00


   2/13/19                 21      120/90


     06:00                          (100)


   2/13/19  98.4


     04:00








Intake and Output





2/12/19 2/12/19 2/13/19





1515:00


23:00


07:00





IntakeIntake Total


555 ml


630 ml


450 ml





OutputOutput Total


750 ml


650 ml


600 ml





BalanceBalance


-195 ml


-20 ml


-150 ml














Exam


Exam


Review of Systems:


CONSTITUTIONAL:  No fevers, chills.


PULMONARY:  No sob


CARDIOVASCULAR: No chest pain/palpitations


GASTROINTESTINAL:  No nausea/vomiting.


GENITOURINARY:  No hematuria/dysuria.


MUSCULOSKELETAL:  No myagias/arthalgias.


PSYCHIATRIC:  The patient denies depression.


NEUROLOGIC:  somewhat lethargic


Constitutional:  alert


Psych:  no complaints


ENMT:  mucosa pink and moist


Neck:  supple, jvd (9 cm water)


Respiratory:  other (mild exp wheezing)


Cardiovascular:  regular rate and rhythm


Gastrointestinal:  soft, non-tender


Musculoskeletal:  muscle weakness (generalized)


Extremities:  edema (none)


Neurological:  lethargic (somewhat), other (No focal deficits)





Labs


Result Diagram:  


2/13/19 0501                                                                    


           2/13/19 0501





Results 24hrs





Laboratory Tests


               Test
                                2/13/19
05:01


               White Blood Count                            5.6


               Red Blood Count                            3.78  L


               Hemoglobin                                 10.6  L


               Hematocrit                                 34.8  L


               Mean Corpuscular Volume                     92.1


               Mean Corpuscular Hemoglobin                28.0  L


               Mean Corpuscular Hemoglobin
Concent       30.5  L



               Red Cell Distribution Width                16.9  H


               Platelet Count                               47  L


               Mean Platelet Volume                       10.6  H


               Immature Granulocytes %                   0.500  H


               Neutrophils %                              92.9  H


               Lymphocytes %                               4.3  L


               Monocytes %                                  2.3


               Eosinophils %                                0.0


               Basophils %                                  0.0


               Nucleated Red Blood Cells %                  0.0


               Immature Granulocytes #                    0.030


               Neutrophils #                                5.2


               Lymphocytes #                               0.2  L


               Monocytes #                                 0.1  L


               Eosinophils #                                0.0


               Basophils #                                  0.0


               Nucleated Red Blood Cells #                  0.0


               Sodium Level                                154  H


               Potassium Level                              3.9


               Chloride Level                              112  H


               Carbon Dioxide Level                         40  H


               Anion Gap                                     2  L


               Blood Urea Nitrogen                          42  H


               Creatinine                                  0.87


               Est Glomerular Filtrat Rate
mL/min   > 60  



               Glucose Level                                167


               Calcium Level                                9.3


               Phosphorus Level                             3.5


               Magnesium Level                             2.8  H








Medications


Medications





Current Medications


Ondansetron HCl (Zofran Inj) 4 mg Q6H  PRN IV NAUSEA AND/OR VOMITING Last 


administered on 2/1/19at 18:46; Admin Dose 4 MG;  Start 1/21/19 at 23:30


Acetaminophen (Tylenol Supp) 650 mg Q4H  PRN OR PAIN LEVEL 1-3 OR FEVER;  Start 


1/21/19 at 23:30


Aspirin (Aspirin) 81 mg DAILY PO  Last administered on 2/11/19at 10:23; Admin 


Dose 81 MG;  Start 1/24/19 at 09:00


Famotidine (Pepcid) 20 mg Q12 PO  Last administered on 2/11/19at 20:49; Admin 


Dose 20 MG;  Start 1/24/19 at 21:00


Zolpidem Tartrate (Ambien) 5 mg HS  PRN PO INSOMNIA Last administered on 


2/5/19at 20:31; Admin Dose 5 MG;  Start 1/27/19 at 00:00


Guaifenesin/ Dextromethorphan (Robitussin Dm Liquid Cup) 5 ml Q6H  PRN PO COUGH 


Last administered on 2/10/19at 05:16; Admin Dose 5 ML;  Start 1/27/19 at 20:00


Morphine Sulfate (morphine) 6 mg Q4H  PRN PO SEVERE PAIN LEVEL 7-10 Last 


administered on 2/12/19at 12:44; Admin Dose 6 MG;  Start 1/30/19 at 21:30


Albuterol/ Ipratropium (Duoneb) 3 ml Q3H RESP THERAPY  PRN HHN SHORTNESS OF 


BREATH Last administered on 2/7/19at 18:47; Admin Dose 3 ML;  Start 1/31/19 at 


20:00


Methylprednisolone Sodium Succinate (Solu-Medrol) 40 mg Q6 IV  Last administered


on 2/13/19at 05:45; Admin Dose 40 MG;  Start 2/2/19 at 12:00


Alteplase, Recombinant (Cathflo (Activase)) 2 mg MAY REPEAT X1 PRN CATHETER IF 


CATHETER REMAINS OCCULUDED;  Start 2/2/19 at 11:00


Amlodipine Besylate (Norvasc) 5 mg DAILY PO  Last administered on 2/11/19at 


10:24; Admin Dose 5 MG;  Start 2/5/19 at 09:00


Albuterol/ Ipratropium (Duoneb) 3 ml Q6H RESP  THERAPY HHN  Last administered on


2/13/19at 09:44; Admin Dose 3 ML;  Start 2/4/19 at 20:00


Lorazepam (Ativan) 1 mg Q6H  PRN IV PSYCHOSIS Last administered on 2/11/19at 


13:05; Admin Dose 1 MG;  Start 2/7/19 at 02:00


Haloperidol (Haldol) 2 mg PRN  PRN IM AGITATION Last administered on 2/12/19at 


00:44; Admin Dose 2 MG;  Start 2/8/19 at 06:30


Risperidone (Risperdal) 2 mg BID PO  Last administered on 2/11/19at 20:49; Admin


Dose 2 MG;  Start 2/9/19 at 21:00


Divalproex Sodium (Depakote Sprinkle) 250 mg Q8H PO  Last administered on 


2/12/19at 13:09; Admin Dose 250 MG;  Start 2/10/19 at 21:00


Dextrose 1,000 ml @  100 mls/hr Q10H IV  Last administered on 2/13/19at 09:57; 


Admin Dose 100 MLS/HR;  Start 2/12/19 at 06:30











CARISA IZAGUIRRE               Feb 13, 2019 10:10

## 2019-02-13 NOTE — OPR
Date/Time of Note


Date/Time of Note


DATE: 2/13/19 


TIME: 18:58





Operative Report


Procedure Date:  Feb 13, 2019


Preoperative Diagnosis


dysphagia


Postoperative Diagnosis


same


Operation/Procedure Performed


egd and peg


Surgeon


see signature line


Assistant


none


Anesthesia Type:  MAC


Anesthesiologist:  GURU TURCIOS


Estimated Blood Loss:  none


Transfusion


   none


Specimen


none


Grafts/Implants


none


Complications


none


Pt Condition Post Procedure:  stable


Disposition:  other


Indications


dysphagia


Procedure Description


egd and  percutaneous endoscopic gastrostomy done











ANGÉLICA JOHANSEN MD            Feb 13, 2019 19:03

## 2019-02-14 VITALS — DIASTOLIC BLOOD PRESSURE: 79 MMHG | HEART RATE: 94 BPM | RESPIRATION RATE: 28 BRPM | SYSTOLIC BLOOD PRESSURE: 99 MMHG

## 2019-02-14 VITALS — DIASTOLIC BLOOD PRESSURE: 77 MMHG | SYSTOLIC BLOOD PRESSURE: 100 MMHG | HEART RATE: 94 BPM | RESPIRATION RATE: 36 BRPM

## 2019-02-14 VITALS — DIASTOLIC BLOOD PRESSURE: 77 MMHG | RESPIRATION RATE: 24 BRPM | HEART RATE: 89 BPM | SYSTOLIC BLOOD PRESSURE: 104 MMHG

## 2019-02-14 VITALS — SYSTOLIC BLOOD PRESSURE: 95 MMHG | DIASTOLIC BLOOD PRESSURE: 70 MMHG | HEART RATE: 86 BPM | RESPIRATION RATE: 18 BRPM

## 2019-02-14 VITALS — SYSTOLIC BLOOD PRESSURE: 87 MMHG | DIASTOLIC BLOOD PRESSURE: 60 MMHG | RESPIRATION RATE: 21 BRPM | HEART RATE: 90 BPM

## 2019-02-14 VITALS — RESPIRATION RATE: 24 BRPM

## 2019-02-14 VITALS — SYSTOLIC BLOOD PRESSURE: 130 MMHG | RESPIRATION RATE: 22 BRPM | HEART RATE: 97 BPM | DIASTOLIC BLOOD PRESSURE: 94 MMHG

## 2019-02-14 VITALS — RESPIRATION RATE: 22 BRPM | SYSTOLIC BLOOD PRESSURE: 81 MMHG | HEART RATE: 92 BPM | DIASTOLIC BLOOD PRESSURE: 66 MMHG

## 2019-02-14 VITALS — SYSTOLIC BLOOD PRESSURE: 111 MMHG | HEART RATE: 92 BPM | DIASTOLIC BLOOD PRESSURE: 76 MMHG | RESPIRATION RATE: 23 BRPM

## 2019-02-14 VITALS — RESPIRATION RATE: 22 BRPM

## 2019-02-14 VITALS — RESPIRATION RATE: 16 BRPM | HEART RATE: 85 BPM | SYSTOLIC BLOOD PRESSURE: 90 MMHG | DIASTOLIC BLOOD PRESSURE: 71 MMHG

## 2019-02-14 VITALS — SYSTOLIC BLOOD PRESSURE: 102 MMHG | HEART RATE: 93 BPM | RESPIRATION RATE: 28 BRPM | DIASTOLIC BLOOD PRESSURE: 81 MMHG

## 2019-02-14 VITALS — HEART RATE: 91 BPM | DIASTOLIC BLOOD PRESSURE: 80 MMHG | RESPIRATION RATE: 30 BRPM | SYSTOLIC BLOOD PRESSURE: 101 MMHG

## 2019-02-14 VITALS — HEART RATE: 89 BPM | SYSTOLIC BLOOD PRESSURE: 106 MMHG | RESPIRATION RATE: 20 BRPM | DIASTOLIC BLOOD PRESSURE: 85 MMHG

## 2019-02-14 VITALS — HEART RATE: 95 BPM | DIASTOLIC BLOOD PRESSURE: 87 MMHG | RESPIRATION RATE: 34 BRPM | SYSTOLIC BLOOD PRESSURE: 128 MMHG

## 2019-02-14 VITALS — RESPIRATION RATE: 30 BRPM | SYSTOLIC BLOOD PRESSURE: 99 MMHG | HEART RATE: 96 BPM | DIASTOLIC BLOOD PRESSURE: 77 MMHG

## 2019-02-14 VITALS — RESPIRATION RATE: 23 BRPM

## 2019-02-14 VITALS — HEART RATE: 92 BPM | RESPIRATION RATE: 37 BRPM | DIASTOLIC BLOOD PRESSURE: 71 MMHG | SYSTOLIC BLOOD PRESSURE: 108 MMHG

## 2019-02-14 VITALS — HEART RATE: 94 BPM | DIASTOLIC BLOOD PRESSURE: 86 MMHG | RESPIRATION RATE: 43 BRPM | SYSTOLIC BLOOD PRESSURE: 119 MMHG

## 2019-02-14 VITALS — DIASTOLIC BLOOD PRESSURE: 79 MMHG | RESPIRATION RATE: 31 BRPM | SYSTOLIC BLOOD PRESSURE: 109 MMHG | HEART RATE: 90 BPM

## 2019-02-14 VITALS — DIASTOLIC BLOOD PRESSURE: 78 MMHG | HEART RATE: 93 BPM | SYSTOLIC BLOOD PRESSURE: 91 MMHG | RESPIRATION RATE: 22 BRPM

## 2019-02-14 VITALS — SYSTOLIC BLOOD PRESSURE: 98 MMHG | HEART RATE: 90 BPM | DIASTOLIC BLOOD PRESSURE: 74 MMHG | RESPIRATION RATE: 25 BRPM

## 2019-02-14 VITALS — HEART RATE: 93 BPM | RESPIRATION RATE: 31 BRPM | SYSTOLIC BLOOD PRESSURE: 102 MMHG | DIASTOLIC BLOOD PRESSURE: 73 MMHG

## 2019-02-14 VITALS — DIASTOLIC BLOOD PRESSURE: 82 MMHG | SYSTOLIC BLOOD PRESSURE: 112 MMHG | RESPIRATION RATE: 29 BRPM | HEART RATE: 92 BPM

## 2019-02-14 VITALS — DIASTOLIC BLOOD PRESSURE: 80 MMHG | SYSTOLIC BLOOD PRESSURE: 114 MMHG | HEART RATE: 94 BPM | RESPIRATION RATE: 21 BRPM

## 2019-02-14 VITALS — SYSTOLIC BLOOD PRESSURE: 120 MMHG | HEART RATE: 96 BPM | DIASTOLIC BLOOD PRESSURE: 90 MMHG | RESPIRATION RATE: 28 BRPM

## 2019-02-14 VITALS — RESPIRATION RATE: 20 BRPM

## 2019-02-14 VITALS — RESPIRATION RATE: 25 BRPM

## 2019-02-14 VITALS — HEART RATE: 92 BPM | DIASTOLIC BLOOD PRESSURE: 70 MMHG | RESPIRATION RATE: 30 BRPM | SYSTOLIC BLOOD PRESSURE: 93 MMHG

## 2019-02-14 VITALS — RESPIRATION RATE: 21 BRPM

## 2019-02-14 LAB
ABNORMAL IP MESSAGE: 1
ADD MAN DIFF?: NO
ALLEN TEST: (no result)
ANION GAP: 1 (ref 5–13)
ANION GAP: 2 (ref 5–13)
ARTERIAL BASE EXCESS: 11.8 MMOL/L (ref -3–3)
ARTERIAL BLOOD GAS OXYGEN SAT: 89.4 MMHG (ref 95–98)
ARTERIAL COHB: 0.3 % (ref 0–3)
ARTERIAL FRACTION OF OXYHGB: 88.9 % (ref 93–99)
ARTERIAL HCO3: 36.6 MMOL/L (ref 22–26)
ARTERIAL METHB: 0.3 % (ref 0–1.5)
ARTERIAL PCO2: 49.1 MMHG (ref 35–45)
BASOPHIL #: 0 10^3/UL (ref 0–0.1)
BASOPHILS %: 0 % (ref 0–2)
BLOOD GAS LOW PEEP SETTING: 5 CMH2O
BLOOD GAS TIDAL VOLUME: 450 ML
BLOOD UREA NITROGEN: 39 MG/DL (ref 7–20)
BLOOD UREA NITROGEN: 40 MG/DL (ref 7–20)
CALCIUM: 8.5 MG/DL (ref 8.4–10.2)
CALCIUM: 8.5 MG/DL (ref 8.4–10.2)
CARBON DIOXIDE: 38 MMOL/L (ref 21–31)
CARBON DIOXIDE: 40 MMOL/L (ref 21–31)
CHLORIDE: 109 MMOL/L (ref 97–110)
CHLORIDE: 110 MMOL/L (ref 97–110)
CREATININE: 0.79 MG/DL (ref 0.61–1.24)
CREATININE: 0.96 MG/DL (ref 0.61–1.24)
EOSINOPHILS #: 0 10^3/UL (ref 0–0.5)
EOSINOPHILS %: 0 % (ref 0–7)
FIO2: 50 %
GLUCOSE: 138 MG/DL (ref 70–220)
GLUCOSE: 179 MG/DL (ref 70–220)
HEMATOCRIT: 29.1 % (ref 42–52)
HEMOGLOBIN: 8.7 G/DL (ref 14–18)
IMMATURE GRANS #M: 0.02 10^3/UL (ref 0–0.03)
IMMATURE GRANS % (M): 0.5 % (ref 0–0.43)
LYMPHOCYTES #: 0.3 10^3/UL (ref 0.8–2.9)
LYMPHOCYTES %: 6.1 % (ref 15–51)
MAGNESIUM: 2.6 MG/DL (ref 1.7–2.5)
MEAN CORPUSCULAR HEMOGLOBIN: 27.8 PG (ref 29–33)
MEAN CORPUSCULAR HGB CONC: 29.9 G/DL (ref 32–37)
MEAN CORPUSCULAR VOLUME: 93 FL (ref 82–101)
MEAN PLATELET VOLUME: 11.2 FL (ref 7.4–10.4)
MICROALBUMIN/CREATININE RATIO: 186 (ref ?–30)
MICROALBUMIN: 5.2 MG/DL
MODE: (no result)
MONOCYTE #: 0.2 10^3/UL (ref 0.3–0.9)
MONOCYTES %: 3.6 % (ref 0–11)
NEUTROPHIL #: 3.7 10^3/UL (ref 1.6–7.5)
NEUTROPHILS %: 89.8 % (ref 39–77)
NUCLEATED RED BLOOD CELLS #: 0 10^3/UL (ref 0–0)
NUCLEATED RED BLOOD CELLS%: 0 /100WBC (ref 0–0)
O2 A-A PPRESDIFF RESPIRATORY: 241.4 MMHG (ref 7–24)
PHOSPHORUS: 3.1 MG/DL (ref 2.5–4.9)
PLATELET COUNT: 85 10^3/UL (ref 140–415)
POSITIVE DIFF: (no result)
POTASSIUM: 3.4 MMOL/L (ref 3.5–5.1)
POTASSIUM: 4.3 MMOL/L (ref 3.5–5.1)
RED BLOOD COUNT: 3.13 10^6/UL (ref 4.7–6.1)
RED CELL DISTRIBUTION WIDTH: 17 % (ref 11.5–14.5)
SODIUM: 149 MMOL/L (ref 135–144)
SODIUM: 151 MMOL/L (ref 135–144)
WHITE BLOOD COUNT: 4.1 10^3/UL (ref 4.8–10.8)

## 2019-02-14 RX ADMIN — RISPERIDONE 1 MG: 1 TABLET ORAL at 08:15

## 2019-02-14 RX ADMIN — METHYLPREDNISOLONE SODIUM SUCCINATE 1 MG: 40 INJECTION, POWDER, FOR SOLUTION INTRAMUSCULAR; INTRAVENOUS at 17:57

## 2019-02-14 RX ADMIN — PROPOFOL 1 MLS/HR: 10 INJECTION, EMULSION INTRAVENOUS at 04:14

## 2019-02-14 RX ADMIN — DESMOPRESSIN ACETATE SCH MCG: 4 SOLUTION INTRAVENOUS at 10:48

## 2019-02-14 RX ADMIN — IPRATROPIUM BROMIDE AND ALBUTEROL SULFATE SCH ML: .5; 3 SOLUTION RESPIRATORY (INHALATION) at 19:16

## 2019-02-14 RX ADMIN — METHYLPREDNISOLONE SODIUM SUCCINATE 1 MG: 40 INJECTION, POWDER, FOR SOLUTION INTRAMUSCULAR; INTRAVENOUS at 05:50

## 2019-02-14 RX ADMIN — POTASSIUM CHLORIDE 1 MLS/HR: 200 INJECTION, SOLUTION INTRAVENOUS at 08:18

## 2019-02-14 RX ADMIN — IPRATROPIUM BROMIDE AND ALBUTEROL SULFATE SCH ML: .5; 3 SOLUTION RESPIRATORY (INHALATION) at 14:45

## 2019-02-14 RX ADMIN — DESMOPRESSIN ACETATE SCH MCG: 4 SOLUTION INTRAVENOUS at 21:22

## 2019-02-14 RX ADMIN — DIVALPROEX SODIUM 1 MG: 125 CAPSULE, COATED PELLETS ORAL at 04:15

## 2019-02-14 RX ADMIN — METHYLPREDNISOLONE SODIUM SUCCINATE 1 MG: 40 INJECTION, POWDER, FOR SOLUTION INTRAMUSCULAR; INTRAVENOUS at 23:53

## 2019-02-14 RX ADMIN — IPRATROPIUM BROMIDE AND ALBUTEROL SULFATE 1 ML: .5; 3 SOLUTION RESPIRATORY (INHALATION) at 19:16

## 2019-02-14 RX ADMIN — CASTOR OIL AND BALSAM, PERU 1 APPLIC: 788; 87 OINTMENT TOPICAL at 08:16

## 2019-02-14 RX ADMIN — DESMOPRESSIN ACETATE 1 MCG: 4 SOLUTION INTRAVENOUS at 21:22

## 2019-02-14 RX ADMIN — DIVALPROEX SODIUM SCH MG: 125 CAPSULE, COATED PELLETS ORAL at 04:15

## 2019-02-14 RX ADMIN — ASPIRIN 81 MG CHEWABLE TABLET 1 MG: 81 TABLET CHEWABLE at 08:15

## 2019-02-14 RX ADMIN — ASPIRIN 81 MG SCH MG: 81 TABLET ORAL at 08:15

## 2019-02-14 RX ADMIN — PROPOFOL SCH MLS/HR: 10 INJECTION, EMULSION INTRAVENOUS at 04:14

## 2019-02-14 RX ADMIN — AMLODIPINE BESYLATE 1 MG: 5 TABLET ORAL at 08:16

## 2019-02-14 RX ADMIN — DESMOPRESSIN ACETATE 1 MCG: 4 SOLUTION INTRAVENOUS at 10:48

## 2019-02-14 RX ADMIN — METHYLPREDNISOLONE SODIUM SUCCINATE 1 MG: 40 INJECTION, POWDER, FOR SOLUTION INTRAMUSCULAR; INTRAVENOUS at 12:06

## 2019-02-14 RX ADMIN — RISPERIDONE 1 MG: 1 TABLET ORAL at 21:22

## 2019-02-14 RX ADMIN — POTASSIUM CHLORIDE 1 MLS/HR: 200 INJECTION, SOLUTION INTRAVENOUS at 10:48

## 2019-02-14 RX ADMIN — POTASSIUM CHLORIDE SCH MLS/HR: 200 INJECTION, SOLUTION INTRAVENOUS at 10:48

## 2019-02-14 RX ADMIN — DIVALPROEX SODIUM 1 MG: 125 CAPSULE, COATED PELLETS ORAL at 12:06

## 2019-02-14 RX ADMIN — PROPOFOL 1 MLS/HR: 10 INJECTION, EMULSION INTRAVENOUS at 12:10

## 2019-02-14 RX ADMIN — IPRATROPIUM BROMIDE AND ALBUTEROL SULFATE 1 ML: .5; 3 SOLUTION RESPIRATORY (INHALATION) at 14:45

## 2019-02-14 RX ADMIN — ASCORBIC ACID, VITAMIN A PALMITATE, CHOLECALCIFEROL, THIAMINE HYDROCHLORIDE, RIBOFLAVIN-5 PHOSPHATE SODIUM, PYRIDOXINE HYDROCHLORIDE, NIACINAMIDE, DEXPANTHENOL, ALPHA-TOCOPHEROL ACETATE, VITAMIN K1, FOLIC ACID, BIOTIN, CYANOCOBALAMIN 1 MLS/HR: 200; 3300; 200; 6; 3.6; 6; 40; 15; 10; 150; 600; 60; 5 INJECTION, SOLUTION INTRAVENOUS at 08:23

## 2019-02-14 RX ADMIN — FAMOTIDINE SCH MG: 20 TABLET ORAL at 08:15

## 2019-02-14 RX ADMIN — FAMOTIDINE 1 MG: 20 TABLET ORAL at 08:15

## 2019-02-14 RX ADMIN — FAMOTIDINE SCH MG: 20 TABLET ORAL at 21:22

## 2019-02-14 RX ADMIN — DIVALPROEX SODIUM SCH MG: 125 CAPSULE, COATED PELLETS ORAL at 21:22

## 2019-02-14 RX ADMIN — AMLODIPINE BESYLATE SCH MG: 5 TABLET ORAL at 08:16

## 2019-02-14 RX ADMIN — PROPOFOL SCH MLS/HR: 10 INJECTION, EMULSION INTRAVENOUS at 12:10

## 2019-02-14 RX ADMIN — DIVALPROEX SODIUM 1 MG: 125 CAPSULE, COATED PELLETS ORAL at 21:22

## 2019-02-14 RX ADMIN — POTASSIUM CHLORIDE SCH MLS/HR: 2 INJECTION, SOLUTION, CONCENTRATE INTRAVENOUS at 08:23

## 2019-02-14 RX ADMIN — IPRATROPIUM BROMIDE AND ALBUTEROL SULFATE 1 ML: .5; 3 SOLUTION RESPIRATORY (INHALATION) at 02:15

## 2019-02-14 RX ADMIN — POTASSIUM CHLORIDE SCH MLS/HR: 200 INJECTION, SOLUTION INTRAVENOUS at 08:18

## 2019-02-14 RX ADMIN — DIVALPROEX SODIUM SCH MG: 125 CAPSULE, COATED PELLETS ORAL at 12:06

## 2019-02-14 RX ADMIN — FAMOTIDINE 1 MG: 20 TABLET ORAL at 21:22

## 2019-02-14 RX ADMIN — IPRATROPIUM BROMIDE AND ALBUTEROL SULFATE SCH ML: .5; 3 SOLUTION RESPIRATORY (INHALATION) at 02:15

## 2019-02-14 NOTE — CONS
Assessment/Plan


Assessment/Plan


Hospital Course (Demo Recall)


IMP:


1.Hypotension-Now improved and tolerating norvasc. NL EF by echo this admit


2.resp failure s/p intubation


3.cardiac arrest


4.pneumoperitoneum-resolved


5.Positive troponin-now trended neg


6.renal failure-improved


7.Leukocytosis


8. anemia


9, Thrombocytopenia-ongoing some worsening


10.Resp failure-hypercapnic s/p intubation. s/p extubation but now transferred 


back to ICU for increasing resp distress


11.PNA-by cxr


12.PLeual effusion





Recc:


-IN ICU


-Continue norvasc and follow BP clsoely


-Continue asa


-continue steroids/bronchodilators


-Continue risperdal


-follow MS closely 


-Follow volume status clsoely


-consider thoracentesis


-Follow resp status closely


-pnding G tube placement





Consultation Date/Type/Reason


Admit Date/Time


Jan 22, 2019 at 00:03


Initial Consult Date


1/22/19


Type of Consult


Cardiology


Reason for Consultation


HTN


Requesting Provider:  DONALDO CARRILLO MD


Date/Time of Note


DATE: 2/14/19 


TIME: 10:06





Exam/Review of Systems


Vital Signs


Vitals





Vital Signs


  Date      Temp  Pulse  Resp  B/P (MAP)   Pulse Ox  O2          O2 Flow    FiO2


Time                                                 Delivery    Rate


   2/14/19           92    37      108/71            Mechanical


     09:00                           (83)            Ventilator


   2/14/19  98.8                                 95


     08:00


   2/14/19                                                                    50


     08:00


   2/13/19                                                            12.0


     14:27








Intake and Output





2/13/19 2/13/19 2/14/19





1515:00


23:00


07:00





IntakeIntake Total


800.0 ml


819.8900 ml


741.250 ml





OutputOutput Total


500 ml


790 ml


600 ml





BalanceBalance


300.0 ml


29.8900 ml


141.250 ml














Exam


Exam


Review of Systems:


CONSTITUTIONAL:  No fevers, chills.


PULMONARY:  No sob


CARDIOVASCULAR: No chest pain/palpitations


GASTROINTESTINAL:  No nausea/vomiting.


GENITOURINARY:  No hematuria/dysuria.


MUSCULOSKELETAL:  No myagias/arthalgias.


PSYCHIATRIC:  The patient denies depression.


NEUROLOGIC:   No weakness


Constitutional:  alert


Psych:  no complaints


Head:  normocephalic


ENMT:  mucosa pink and moist


Neck:  supple, jvd (9 cm water)


Respiratory:  diminished breath sounds


Cardiovascular:  regular rate and rhythm


Gastrointestinal:  soft, non-tender


Musculoskeletal:  muscle tone (normal)


Extremities:  edema (none)


Neurological:  other (No focal deficits)





Labs


Result Diagram:  


2/14/19 0450                                                                    


           2/14/19 0439





Results 24hrs





Laboratory Tests


Test
               2/13/19
13:53    2/13/19
16:00  2/13/19
18:02  2/14/19
04:39


Sodium Level               151  H                                         151  H


Potassium Level             3.6                                           3.4  L


Chloride Level              110                                            109


Carbon Dioxide              38  H                                          40  H


Level


Anion Gap                    3  L                                           2  L


Blood Urea                  40  H                                          39  H


Nitrogen


Creatinine                 0.74                                           0.79


Est Glomerular    > 60  
          
                
              > 60  



Filtrat


Rate
mL/min


Glucose Level               149                                            179


Calcium Level               8.9                                            8.5


Blood Gas         
                Blood arterial
  
              



Specimen Source



Arterial Blood    
                2/6/2019
4:01:0  
              



Date Drawn
                                   3 PM


Arterial Blood    
                      7.508  H
  
              



pH


(Temp
corrected)


Arterial Blood    
                       45.7  H
  
              



pCO2


(Temp
correct)


Arterial Blood    
                      400.8  H
  
              



pO2


(Temp
corrected)


Arterial Blood                             35.5  H


HCO3


Arterial Blood                             11.2  H


Base Excess


Arterial Blood    
                       99.0  H
  
              



Oxygen
Saturatio


n


Antoine Test                         ACCEPTAB


Arterial Blood    
                Right Radial  
  
              



Gas


Puncture
Site


Arterial          
                         0.3  
  
              



Blood
Carboxyhem


oglobin


Arterial Blood                               0.3


Methemoglobin


Blood Gas A-a O2                          266.5  H


Differential


Oxyhemoglobin                               98.4


Percent


Blood Gas                                   37.0


Temperature


Blood Gas                                   20.0


Respiration Rate


Blood Gas Actual  
                          20  
  
              



Respiration
Rate


Blood Gas                          VENT - AC


Modality


FiO2                                       100.0


Blood Gas Tidal                            500.0


Volume


Blood Gas Low                                5.0


PEEP Setting


Blood Gas                          ANASTASIA CHIRINOS


Notified Whom


Blood Gas         
                2/13/2019
4:19:  
              



Notified Time
                               28 PM


Prothrombin Time                                           13.4


Prothrombin Time                                            1.0


Ratio


INR               
                
                      1.01  
  



International


Normalized
Ratio


Activated         
                
                      28.5  
  



Partial
Thrombop


last Time


Phosphorus Level                                                           3.1


Magnesium Level                                                           2.6  H


Test
               2/14/19
04:50    2/14/19
07:00  
              



White Blood               4.1  #L


Count


Red Blood Count           3.13  L


Hemoglobin                 8.7  L


Hematocrit                29.1  L


Mean Corpuscular           93.0


Volume


Mean Corpuscular          27.8  L


Hemoglobin


Mean Corpuscular         29.9  L
  
                
              



Hemoglobin
Marisel


nt


Red Cell                  17.0  H


Distribution


Width


Platelet Count             85  #L


Mean Platelet             11.2  H


Volume


Immature                 0.500  H


Granulocytes %


Neutrophils %             89.8  H


Lymphocytes %              6.1  L


Monocytes %                 3.6


Eosinophils %               0.0


Basophils %                 0.0


Nucleated Red               0.0


Blood Cells %


Immature                  0.020


Granulocytes #


Neutrophils #               3.7


Lymphocytes #              0.3  L


Monocytes #                0.2  L


Eosinophils #               0.0


Basophils #                 0.0


Nucleated Red               0.0


Blood Cells #


Lab Scanned       BLOOD
TRANSFUSI  
                
              



Report
           ON


Blood Gas         
                Blood arterial
  
              



Specimen Source



Arterial Blood    
                2/14/2019
7:45:  
              



Date Drawn
                                  22 AM


Arterial Blood    
                      7.490  H
  
              



pH


(Temp
corrected)


Arterial Blood    
                       49.1  H
  
              



pCO2


(Temp
correct)


Arterial Blood    
                       59.9  L
  
              



pO2


(Temp
corrected)


Arterial Blood                             36.6  H


HCO3


Arterial Blood                             11.8  H


Base Excess


Arterial Blood    
                       89.4  L
  
              



Oxygen
Saturatio


n


Antoine Test                         ACCEPTAB


Arterial Blood    
                Right Radial  
  
              



Gas


Puncture
Site


Arterial          
                         0.3  
  
              



Blood
Carboxyhem


oglobin


Arterial Blood                               0.3


Methemoglobin


Blood Gas A-a O2                          241.4  H


Differential


Oxyhemoglobin                              88.9  L


Percent


Blood Gas                                   37.0


Temperature


Blood Gas                                   16.0


Respiration Rate


Blood Gas Actual  
                          22  
  
              



Respiration
Rate


Blood Gas                          VENT - AC


Modality


FiO2                                        50.0


Blood Gas Tidal                            450.0


Volume


Blood Gas Low                                5.0


PEEP Setting


Blood Gas                          DT


Notified Whom


Blood Gas         
                2/14/2019
8:13:  
              



Notified Time
                               23 AM








Medications


Medications





Current Medications


Ondansetron HCl (Zofran Inj) 4 mg Q6H  PRN IV NAUSEA AND/OR VOMITING Last adm


inistered on 2/1/19at 18:46; Admin Dose 4 MG;  Start 1/21/19 at 23:30


Acetaminophen (Tylenol Supp) 650 mg Q4H  PRN ID PAIN LEVEL 1-3 OR FEVER;  Start 


1/21/19 at 23:30


Aspirin (Aspirin) 81 mg DAILY PO  Last administered on 2/14/19at 08:15; Admin 


Dose 81 MG;  Start 1/24/19 at 09:00


Famotidine (Pepcid) 20 mg Q12 PO  Last administered on 2/14/19at 08:15; Admin 


Dose 20 MG;  Start 1/24/19 at 21:00


Zolpidem Tartrate (Ambien) 5 mg HS  PRN PO INSOMNIA Last administered on 


2/5/19at 20:31; Admin Dose 5 MG;  Start 1/27/19 at 00:00


Guaifenesin/ Dextromethorphan (Robitussin Dm Liquid Cup) 5 ml Q6H  PRN PO COUGH 


Last administered on 2/10/19at 05:16; Admin Dose 5 ML;  Start 1/27/19 at 20:00


Morphine Sulfate (morphine) 6 mg Q4H  PRN PO SEVERE PAIN LEVEL 7-10 Last 


administered on 2/12/19at 12:44; Admin Dose 6 MG;  Start 1/30/19 at 21:30


Albuterol/ Ipratropium (Duoneb) 3 ml Q3H RESP THERAPY  PRN HHN SHORTNESS OF 


BREATH Last administered on 2/7/19at 18:47; Admin Dose 3 ML;  Start 1/31/19 at 


20:00


Methylprednisolone Sodium Succinate (Solu-Medrol) 40 mg Q6 IV  Last administered


on 2/14/19at 05:50; Admin Dose 40 MG;  Start 2/2/19 at 12:00


Alteplase, Recombinant (Cathflo (Activase)) 2 mg MAY REPEAT X1 PRN CATHETER IF 


CATHETER REMAINS OCCULUDED;  Start 2/2/19 at 11:00


Amlodipine Besylate (Norvasc) 5 mg DAILY PO  Last administered on 2/14/19at 


08:16; Admin Dose 5 MG;  Start 2/5/19 at 09:00


Albuterol/ Ipratropium (Duoneb) 3 ml Q6H RESP  THERAPY HHN  Last administered on


2/14/19at 02:15; Admin Dose 3 ML;  Start 2/4/19 at 20:00


Lorazepam (Ativan) 1 mg Q6H  PRN IV PSYCHOSIS Last administered on 2/11/19at 


13:05; Admin Dose 1 MG;  Start 2/7/19 at 02:00


Haloperidol (Haldol) 2 mg PRN  PRN IM AGITATION Last administered on 2/12/19at 


00:44; Admin Dose 2 MG;  Start 2/8/19 at 06:30


Risperidone (Risperdal) 2 mg BID PO  Last administered on 2/14/19at 08:15; Admin


Dose 2 MG;  Start 2/9/19 at 21:00


Divalproex Sodium (Depakote Sprinkle) 250 mg Q8H PO  Last administered on 


2/12/19at 13:09; Admin Dose 250 MG;  Start 2/10/19 at 21:00


Dextrose 1,000 ml @  100 mls/hr Q10H IV  Last administered on 2/14/19at 08:23; 


Admin Dose 100 MLS/HR;  Start 2/12/19 at 06:30


Propofol 100 ml @  1.875 mls/ hr Q12H IV  Last administered on 2/14/19at 04:14; 


Admin Dose 7.5 MLS/HR;  Start 2/13/19 at 15:30


Potassium Chloride 100 ml @  50 mls/hr Q2H IVPB  Last administered on 2/14/19at 


08:18; Admin Dose 50 MLS/HR;  Start 2/14/19 at 08:00;  Stop 2/14/19 at 11:59


Desmopressin Acetate (Ddavp) 2 mcg BID IV ;  Start 2/14/19 at 09:00











CARISA IZAGUIRRE               Feb 14, 2019 10:10

## 2019-02-14 NOTE — PN
DATE:  02/14/2019

 

 

SUBJECTIVE:  The patient remains intubated.  The patient noted to have excellent urinary output.  The
 patient had percutaneous endoscopic gastrostomy tube placed yesterday.  No other acute events noted.


 

OBJECTIVE:

VITAL SIGNS:  Blood pressure is 128/34, respirations 24, pulse 95, temperature 98.0.

HEENT:  Head is normocephalic.

NECK:  Supple.

HEART:  Regular rate.

LUNGS:  Show diminished breath sounds at the base.

ABDOMEN:  Soft, nontender to palpation without rebound or guarding.

EXTREMITIES:  Negative for clubbing, cyanosis, no edema.

DERMATOLOGIC:  No rashes.

MUSCULOSKELETAL:  No joint effusion.

NEUROLOGIC:  No change in exam.

 

MEDICATIONS:  Reviewed.

 

LABORATORY DATA:  Shows urine osmolarity of 294.  Urine sodium of 35.  Urine creatinine of 24.  The p
atient has a serum sodium level of 151, potassium 2.4, BUN 39, creatinine 0.79, magnesium 2.6.  White
 count 4.1, hemoglobin 8.7, and platelet count is 85.

 

ASSESSMENT AND PLAN:

1.  Hypernatremia.  Etiology is secondary to partial diabetes insipidus, possible nephrogenic versus 
central in etiology.  The patient's urine osmolarity is inappropriately low for the patient's level o
f hypernatremia.  Plan at this point is to give a trial of desmopressin to see if the patient has cli
nical response.  We will continue to monitor serum sodium levels.  Continue free water flushes, jodee
nue D5 water.  If the patient should fail to response to desmopressin, the patient likely then has a 
nephrogenic diabetes insipidus and possible recent acute kidney injury.  We then consider placing the
 patient on hydrochlorothiazide and/or amiloride.  We will monitor closely.

2.  Nonoliguric acute kidney injury with unknown baseline creatinine.  Etiology is secondary to hemod
ynamics, questionable tubular injury.  The patient's renal function has improved.  Although he contin
ues to have underlying azotemia likely due to acute kidney injury, hypercatabolic state.  We will con
tinue to monitor, continue supportive care, renally dose all medicines and avoid nephrotoxins.

3.  Metabolic acidosis with respiratory compensation.  Continue to monitor.

4.  Anemia.  Monitor hemoglobin and hematocrit levels.

5.  Mineral bone disorder, monitor calcium and phosphorus levels.

6.  Hypomagnesemia.  Continue to monitor and replete as needed.

7.  History of diastolic heart failure, currently compensated.  Continue to monitor.

8.  Pleural effusion.  Continue to observe.  Consider thoracentesis.

9.  Ventilator-dependent respiratory failure.  Vent settings and ABG was reviewed.  Continue to monit
or.  Follow up with pulmonary.

10.  Acute encephalopathy, etiology is toxic metabolic.

11.  Dysphagia status post percutaneous endoscopic gastrostomy.  The patient will be starting tube fe
eding.

11.  Sepsis secondary to pneumonia.  Continue antibiotic regimen.

12.  Deep vein thrombosis.  Continue medical management.

13.  Status post cardiac arrest.

14.  Status post pneumothorax.

15.  Status post pneumoperitoneum.

 

 

Dictated By: MARYAN CHAMPAGNE DO

 

NR/NTS

DD:    02/14/2019 07:34:44

DT:    02/14/2019 07:56:14

Conf#: [id number]

DID#:  [id number]

CC: DONALDO CARRILLO MD; MAURI GRIGSBY MD; KARLO DUDLEY NP;*EndCC*

## 2019-02-14 NOTE — PN
Date/Time of Note


Date/Time of Note


DATE: 2/14/19 


TIME: 16:14





Assessment/Plan


VTE Prophylaxis


[place] ([place] [facility] [place] risk:  13


SCD applied (from [facility] [place]  Yes


Pharmacological prophylaxis:  NA/contraindicated


Pharm contraindication:  thrombocytopenia





Lines/Catheters


IV Catheter Type (from [facility] [[email or web address]  Peripheral IV


Central line still needed:  Yes


Urinary Cath still in place:  Yes


Reason Cath still needed:  urinary retention





Assessment/Plan


Hospital Course


Patient is orally intubated, on ventilatory support, status post G-tube amadeo


cement


Assessment/Plan


-Dysphagia.  That is post G-tube placement by Dr. Sanchez.


-Hypoxic respiratory failure requiring mechanical ventilation, extubated and now


intubated for elective PEG placement.  Dr. English is following in pulmonology 


consultation.


-Sepsis with shock, resolving.  Dr. Dreyer is following in infection disease 


consultation.


-Status post cardiac arrest.   Dr. Jon is following in cardiology 


consultation.


-Left-sided pneumothorax, status post left side chest tube placement, s/p self 


d/c CT.


-S/p pneumoperitoneum most likely due to cardiac arrest, resolved. Dr. Lai is 


following in general surgery consultation. 


-Left axillary and brachial DVT, LUE swelling subsided, was on Lovenox which is 


currently held due to thrombocytopenia


-Left lower lobe pneumonia


-Severe emphysema


-NATALIE with possible chronic kidney disease. Dr Hanson is following in nephrology


consultation.


-Hyper natremia, continue free water via G-tube, IV fluids with dextrose.


-Schizoaffective disorder depressed type.  Psychiatric consult is appreciated, 


continue Risperdal Depakote





Further recommendations based on clinical course.  Plan of care discussed with 


Dr. Miller.


Result Diagram:  


2/14/19 0450                                                                    


           2/14/19 1356





Results 24hrs





Laboratory Tests


Test
             2/13/19
18:02  2/14/19
04:39    2/14/19
04:50    2/14/19
07:00


Prothrombin Time         13.4


Prothrombin Time          1.0


Ratio


INR                     1.01  
  
              
                



International


Normalized
Ratio


Activated               28.5  
  
              
                



Partial
Thrombop


last Time


Sodium Level                            151  H


Potassium Level                         3.4  L


Chloride Level                           109


Carbon Dioxide                           40  H


Level


Anion Gap                                 2  L


Blood Urea                               39  H


Nitrogen


Creatinine                              0.79


Est Glomerular    
              > 60  
        
                



Filtrat


Rate
mL/min


Glucose Level                            179


Calcium Level                            8.5


Phosphorus Level                         3.1


Magnesium Level                         2.6  H


White Blood                                             4.1  #L


Count


Red Blood Count                                         3.13  L


Hemoglobin                                               8.7  L


Hematocrit                                              29.1  L


Mean Corpuscular                                         93.0


Volume


Mean Corpuscular                                        27.8  L


Hemoglobin


Mean Corpuscular  
              
                     29.9  L
  



Hemoglobin
Marisel


nt


Red Cell                                                17.0  H


Distribution


Width


Platelet Count                                           85  #L


Mean Platelet                                           11.2  H


Volume


Immature                                               0.500  H


Granulocytes %


Neutrophils %                                           89.8  H


Lymphocytes %                                            6.1  L


Monocytes %                                               3.6


Eosinophils %                                             0.0


Basophils %                                               0.0


Nucleated Red                                             0.0


Blood Cells %


Immature                                                0.020


Granulocytes #


Neutrophils #                                             3.7


Lymphocytes #                                            0.3  L


Monocytes #                                              0.2  L


Eosinophils #                                             0.0


Basophils #                                               0.0


Nucleated Red                                             0.0


Blood Cells #


Lab Scanned       
              
              BLOOD
TRANSFUSI  



Report
                                         ON


Blood Gas         
              
              
                Blood arterial



Specimen Source



Arterial Blood    
              
              
                2/14/2019
7:45:


Date Drawn
                                                                22 AM


Arterial Blood    
              
              
                      7.490  H



pH


(Temp
corrected)


Arterial Blood    
              
              
                       49.1  H



pCO2


(Temp
correct)


Arterial Blood    
              
              
                       59.9  L



pO2


(Temp
corrected)


Arterial Blood                                                           36.6  H


HCO3


Arterial Blood                                                           11.8  H


Base Excess


Arterial Blood    
              
              
                       89.4  L



Oxygen
Saturatio


n


Antoine Test                                                       ACCEPTAB


Arterial Blood    
              
              
                Right Radial  



Gas


Puncture
Site


Arterial          
              
              
                         0.3  



Blood
Carboxyhem


oglobin


Arterial Blood                                                             0.3


Methemoglobin


Blood Gas A-a O2                                                        241.4  H


Differential


Oxyhemoglobin                                                            88.9  L


Percent


Blood Gas                                                                 37.0


Temperature


Blood Gas                                                                 16.0


Respiration Rate


Blood Gas Actual  
              
              
                          22  



Respiration
Rate


Blood Gas                                                        VENT - AC


Modality


FiO2                                                                      50.0


Blood Gas Tidal                                                          450.0


Volume


Blood Gas Low                                                              5.0


PEEP Setting


Blood Gas                                                        DT


Notified Whom


Blood Gas         
              
              
                2/14/2019
8:13:


Notified Time
                                                             23 AM


Test
             2/14/19
13:56  
              
                



Sodium Level             149  H


Potassium Level           4.3


Chloride Level            110


Carbon Dioxide            38  H


Level


Anion Gap                  1  L


Blood Urea                40  H


Nitrogen


Creatinine               0.96


Est Glomerular    > 60  
        
              
                



Filtrat


Rate
mL/min


Glucose Level            138  #


Calcium Level             8.5








Exam/Review of Systems


Exam


Vitals





Vital Signs


  Date      Temp  Pulse  Resp  B/P (MAP)   Pulse Ox  O2          O2 Flow    FiO2


Time                                                 Delivery    Rate


   2/14/19           89    24      104/77        94  Mechanical


     14:00                           (86)            Ventilator


   2/14/19                                                                    50


     13:10


   2/14/19  98.0


     12:00


   2/13/19                                                            12.0


     14:27








Intake and Output





2/13/19 2/13/19 2/14/19





1414:59


22:59


06:59





IntakeIntake Total


726.7 ml


814.2650 ml


846.875 ml





OutputOutput Total


500 ml


770 ml


700 ml





BalanceBalance


226.7 ml


44.2650 ml


146.875 ml











Exam


Constitutional:  alert, oriented


Respiratory:  diminished breath sounds


Cardiovascular:  regular rate and rhythm


Gastrointestinal:  soft, non-tender


Musculoskeletal:  nl extremities to inspection


Extremities:  normal pulses


Neurological:  nl mental status





Results


Results 24hrs





Laboratory Tests


Test
             2/13/19
18:02  2/14/19
04:39    2/14/19
04:50    2/14/19
07:00


Prothrombin Time         13.4


Prothrombin Time          1.0


Ratio


INR                     1.01  
  
              
                



International


Normalized
Ratio


Activated               28.5  
  
              
                



Partial
Thrombop


last Time


Sodium Level                            151  H


Potassium Level                         3.4  L


Chloride Level                           109


Carbon Dioxide                           40  H


Level


Anion Gap                                 2  L


Blood Urea                               39  H


Nitrogen


Creatinine                              0.79


Est Glomerular    
              > 60  
        
                



Filtrat


Rate
mL/min


Glucose Level                            179


Calcium Level                            8.5


Phosphorus Level                         3.1


Magnesium Level                         2.6  H


White Blood                                             4.1  #L


Count


Red Blood Count                                         3.13  L


Hemoglobin                                               8.7  L


Hematocrit                                              29.1  L


Mean Corpuscular                                         93.0


Volume


Mean Corpuscular                                        27.8  L


Hemoglobin


Mean Corpuscular  
              
                     29.9  L
  



Hemoglobin
Marisel


nt


Red Cell                                                17.0  H


Distribution


Width


Platelet Count                                           85  #L


Mean Platelet                                           11.2  H


Volume


Immature                                               0.500  H


Granulocytes %


Neutrophils %                                           89.8  H


Lymphocytes %                                            6.1  L


Monocytes %                                               3.6


Eosinophils %                                             0.0


Basophils %                                               0.0


Nucleated Red                                             0.0


Blood Cells %


Immature                                                0.020


Granulocytes #


Neutrophils #                                             3.7


Lymphocytes #                                            0.3  L


Monocytes #                                              0.2  L


Eosinophils #                                             0.0


Basophils #                                               0.0


Nucleated Red                                             0.0


Blood Cells #


Lab Scanned       
              
              BLOOD
TRANSFUSI  



Report
                                         ON


Blood Gas         
              
              
                Blood arterial



Specimen Source



Arterial Blood    
              
              
                2/14/2019
7:45:


Date Drawn
                                                                22 AM


Arterial Blood    
              
              
                      7.490  H



pH


(Temp
corrected)


Arterial Blood    
              
              
                       49.1  H



pCO2


(Temp
correct)


Arterial Blood    
              
              
                       59.9  L



pO2


(Temp
corrected)


Arterial Blood                                                           36.6  H


HCO3


Arterial Blood                                                           11.8  H


Base Excess


Arterial Blood    
              
              
                       89.4  L



Oxygen
Saturatio


n


Antoine Test                                                       ACCEPTAB


Arterial Blood    
              
              
                Right Radial  



Gas


Puncture
Site


Arterial          
              
              
                         0.3  



Blood
Carboxyhem


oglobin


Arterial Blood                                                             0.3


Methemoglobin


Blood Gas A-a O2                                                        241.4  H


Differential


Oxyhemoglobin                                                            88.9  L


Percent


Blood Gas                                                                 37.0


Temperature


Blood Gas                                                                 16.0


Respiration Rate


Blood Gas Actual  
              
              
                          22  



Respiration
Rate


Blood Gas                                                        VENT - AC


Modality


FiO2                                                                      50.0


Blood Gas Tidal                                                          450.0


Volume


Blood Gas Low                                                              5.0


PEEP Setting


Blood Gas                                                        DT


Notified Whom


Blood Gas         
              
              
                2/14/2019
8:13:


Notified Time
                                                             23 AM


Test
             2/14/19
13:56  
              
                



Sodium Level             149  H


Potassium Level           4.3


Chloride Level            110


Carbon Dioxide            38  H


Level


Anion Gap                  1  L


Blood Urea                40  H


Nitrogen


Creatinine               0.96


Est Glomerular    > 60  
        
              
                



Filtrat


Rate
mL/min


Glucose Level            138  #


Calcium Level             8.5








Medications


Medication





Current Medications


Ondansetron HCl (Zofran Inj) 4 mg Q6H  PRN IV NAUSEA AND/OR VOMITING Last 


administered on 2/1/19at 18:46; Admin Dose 4 MG;  Start 1/21/19 at 23:30


Acetaminophen (Tylenol Supp) 650 mg Q4H  PRN SD PAIN LEVEL 1-3 OR FEVER;  Start 


1/21/19 at 23:30


Aspirin (Aspirin) 81 mg DAILY PO  Last administered on 2/14/19at 08:15; Admin Do


se 81 MG;  Start 1/24/19 at 09:00


Famotidine (Pepcid) 20 mg Q12 PO  Last administered on 2/14/19at 08:15; Admin 


Dose 20 MG;  Start 1/24/19 at 21:00


Zolpidem Tartrate (Ambien) 5 mg HS  PRN PO INSOMNIA Last administered on 


2/5/19at 20:31; Admin Dose 5 MG;  Start 1/27/19 at 00:00


Guaifenesin/ Dextromethorphan (Robitussin Dm Liquid Cup) 5 ml Q6H  PRN PO COUGH 


Last administered on 2/10/19at 05:16; Admin Dose 5 ML;  Start 1/27/19 at 20:00


Morphine Sulfate (morphine) 6 mg Q4H  PRN PO SEVERE PAIN LEVEL 7-10 Last 


administered on 2/12/19at 12:44; Admin Dose 6 MG;  Start 1/30/19 at 21:30


Albuterol/ Ipratropium (Duoneb) 3 ml Q3H RESP THERAPY  PRN HHN SHORTNESS OF 


BREATH Last administered on 2/7/19at 18:47; Admin Dose 3 ML;  Start 1/31/19 at 


20:00


Methylprednisolone Sodium Succinate (Solu-Medrol) 40 mg Q6 IV  Last administered


on 2/14/19at 12:06; Admin Dose 40 MG;  Start 2/2/19 at 12:00


Alteplase, Recombinant (Cathflo (Activase)) 2 mg MAY REPEAT X1 PRN CATHETER IF 


CATHETER REMAINS OCCULUDED;  Start 2/2/19 at 11:00


Amlodipine Besylate (Norvasc) 5 mg DAILY PO  Last administered on 2/14/19at 


08:16; Admin Dose 5 MG;  Start 2/5/19 at 09:00


Albuterol/ Ipratropium (Duoneb) 3 ml Q6H RESP  THERAPY HHN  Last administered on


2/14/19at 14:45; Admin Dose 3 ML;  Start 2/4/19 at 20:00


Lorazepam (Ativan) 1 mg Q6H  PRN IV PSYCHOSIS Last administered on 2/11/19at 


13:05; Admin Dose 1 MG;  Start 2/7/19 at 02:00


Haloperidol (Haldol) 2 mg PRN  PRN IM AGITATION Last administered on 2/12/19at 


00:44; Admin Dose 2 MG;  Start 2/8/19 at 06:30


Risperidone (Risperdal) 2 mg BID PO  Last administered on 2/14/19at 08:15; Admin


Dose 2 MG;  Start 2/9/19 at 21:00


Divalproex Sodium (Depakote Sprinkle) 250 mg Q8H PO  Last administered on 


2/14/19at 12:06; Admin Dose 250 MG;  Start 2/10/19 at 21:00


Dextrose 1,000 ml @  100 mls/hr Q10H IV  Last administered on 2/14/19at 08:23; 


Admin Dose 100 MLS/HR;  Start 2/12/19 at 06:30


Propofol 100 ml @  1.875 mls/ hr Q12H IV  Last administered on 2/14/19at 12:10; 


Admin Dose 7.5 MLS/HR;  Start 2/13/19 at 15:30


Desmopressin Acetate (Ddavp) 2 mcg BID IV  Last administered on 2/14/19at 10:48;


Admin Dose 2 MCG;  Start 2/14/19 at 09:00











EVARISTO BELTRAN             Feb 14, 2019 16:17

## 2019-02-14 NOTE — CONS
Consult Date/Type/Reason


Admit Date/Time


Jan 22, 2019 at 00:03


Initial Consult Date


1/22/19


Type of Consult


Pulmonary


Requesting Provider:  DONALDO CARRILLO MD


Date/Time of Note


DATE: 2/14/19 


TIME: 09:45





Subjective


Intubated yesterday for elective PEG tube placement.  Comfortable this morning 


opens eyes on decrease sedation remains hemodynamically stable.  PEG tube was 


placed without complication.





Objective





Vital Signs


  Date      Temp  Pulse  Resp  B/P (MAP)   Pulse Ox  O2          O2 Flow    FiO2


Time                                                 Delivery    Rate


   2/14/19           92    37      108/71            Mechanical


     09:00                           (83)            Ventilator


   2/14/19  98.8                                 95


     08:00


   2/14/19                                                                    50


     08:00


   2/13/19                                                            12.0


     14:27








Intake and Output





2/13/19 2/13/19 2/14/19





1515:00


23:00


07:00





IntakeIntake Total


800.0 ml


819.8900 ml


741.250 ml





OutputOutput Total


500 ml


790 ml


600 ml





BalanceBalance


300.0 ml


29.8900 ml


141.250 ml











Exam


GENERAL: Frail elderly gentleman, on mechanical ventilation orally intubated


VITAL SIGNS:  per chart


NECK:  Supple.  No JVD or lymphadenopathy.


CARDIAC EXAM:  S1, S2. No added sounds or murmurs.


CHEST: Diminished air entry bilaterally


ABDOMEN:  Soft, nontender.  No guarding or rebound.


EXTREMITIES:  No cyanosis, clubbing or edema.


NEUROLOGIC:  Generalized weakness.  No focal deficits.





Vent Setting


Ventilator Support Mode:  AC


Fraction of Inspired Oxygen pe:  50


Positive End Expiratory Pressu:  5.0





Results/Medications


Result Diagram:  


2/14/19 0450                                                                    


           2/14/19 0439





Results 24 hrs





Laboratory Tests


Test
             2/13/19
10:00    2/13/19
13:53    2/13/19
16:00  2/13/19
18:02


Urine Color       STRAW


Urine Clarity     CLEAR


Urine pH                  6.0


Urine Specific          1.009


Gravity


Urine Ketones     NEGATIVE


Urine Nitrite     NEGATIVE


Urine Bilirubin   NEGATIVE


Urine             NEGATIVE


Urobilinogen


Urine Leukocyte   NEGATIVE


Esterase


Urine                       4


Microscopic RBC


Urine                       1


Microscopic WBC


Urine Hemoglobin          2+  H


Urine Osmolality          294


Urine Random            24.08


Creatinine


Urine Random               35


Sodium


Urine Glucose             1+  H


Urine Total             34.0  H


Protein


Sodium Level                              151  H


Potassium Level                            3.6


Chloride Level                             110


Carbon Dioxide                             38  H


Level


Anion Gap                                   3  L


Blood Urea                                 40  H


Nitrogen


Creatinine                                0.74


Est Glomerular    
              > 60  
          
                



Filtrat


Rate
mL/min


Glucose Level                              149


Calcium Level                              8.9


Blood Gas         
              
                Blood arterial
  



Specimen Source



Arterial Blood    
              
                2/6/2019
4:01:0  



Date Drawn
                                                  3 PM


Arterial Blood    
              
                      7.508  H
  



pH


(Temp
corrected)


Arterial Blood    
              
                       45.7  H
  



pCO2


(Temp
correct)


Arterial Blood    
              
                      400.8  H
  



pO2


(Temp
corrected)


Arterial Blood                                            35.5  H


HCO3


Arterial Blood                                            11.2  H


Base Excess


Arterial Blood    
              
                       99.0  H
  



Oxygen
Saturatio


n


Antoine Test                                        ACCEPTAB


Arterial Blood    
              
                Right Radial  
  



Gas


Puncture
Site


Arterial          
              
                         0.3  
  



Blood
Carboxyhem


oglobin


Arterial Blood                                              0.3


Methemoglobin


Blood Gas A-a O2                                         266.5  H


Differential


Oxyhemoglobin                                              98.4


Percent


Blood Gas                                                  37.0


Temperature


Blood Gas                                                  20.0


Respiration Rate


Blood Gas Actual  
              
                          20  
  



Respiration
Rate


Blood Gas                                         VENT - AC


Modality


FiO2                                                      100.0


Blood Gas Tidal                                           500.0


Volume


Blood Gas Low                                               5.0


PEEP Setting


Blood Gas                                         ANASTASIA RT


Notified Whom


Blood Gas         
              
                2/13/2019
4:19:  



Notified Time
                                              28 PM


Prothrombin Time                                                          13.4


Prothrombin Time                                                           1.0


Ratio


INR               
              
                
                      1.01  



International


Normalized
Ratio


Activated         
              
                
                      28.5  



Partial
Thrombop


last Time


Test
             2/14/19
04:39    2/14/19
04:50    2/14/19
07:00  



Sodium Level             151  H


Potassium Level          3.4  L


Chloride Level            109


Carbon Dioxide            40  H


Level


Anion Gap                  2  L


Blood Urea                39  H


Nitrogen


Creatinine               0.79


Est Glomerular    > 60  
        
                
                



Filtrat


Rate
mL/min


Glucose Level             179


Calcium Level             8.5


Phosphorus Level          3.1


Magnesium Level          2.6  H


White Blood                              4.1  #L


Count


Red Blood Count                          3.13  L


Hemoglobin                                8.7  L


Hematocrit                               29.1  L


Mean Corpuscular                          93.0


Volume


Mean Corpuscular                         27.8  L


Hemoglobin


Mean Corpuscular  
                     29.9  L
  
                



Hemoglobin
Marisel


nt


Red Cell                                 17.0  H


Distribution


Width


Platelet Count                            85  #L


Mean Platelet                            11.2  H


Volume


Immature                                0.500  H


Granulocytes %


Neutrophils %                            89.8  H


Lymphocytes %                             6.1  L


Monocytes %                                3.6


Eosinophils %                              0.0


Basophils %                                0.0


Nucleated Red                              0.0


Blood Cells %


Immature                                 0.020


Granulocytes #


Neutrophils #                              3.7


Lymphocytes #                             0.3  L


Monocytes #                               0.2  L


Eosinophils #                              0.0


Basophils #                                0.0


Nucleated Red                              0.0


Blood Cells #


Lab Scanned       
              BLOOD
TRANSFUSI  
                



Report
                          ON


Blood Gas         
              
                Blood arterial
  



Specimen Source



Arterial Blood    
              
                2/14/2019
7:45:  



Date Drawn
                                                 22 AM


Arterial Blood    
              
                      7.490  H
  



pH


(Temp
corrected)


Arterial Blood    
              
                       49.1  H
  



pCO2


(Temp
correct)


Arterial Blood    
              
                       59.9  L
  



pO2


(Temp
corrected)


Arterial Blood                                            36.6  H


HCO3


Arterial Blood                                            11.8  H


Base Excess


Arterial Blood    
              
                       89.4  L
  



Oxygen
Saturatio


n


Antoine Test                                        ACCEPTAB


Arterial Blood    
              
                Right Radial  
  



Gas


Puncture
Site


Arterial          
              
                         0.3  
  



Blood
Carboxyhem


oglobin


Arterial Blood                                              0.3


Methemoglobin


Blood Gas A-a O2                                         241.4  H


Differential


Oxyhemoglobin                                             88.9  L


Percent


Blood Gas                                                  37.0


Temperature


Blood Gas                                                  16.0


Respiration Rate


Blood Gas Actual  
              
                          22  
  



Respiration
Rate


Blood Gas                                         VENT - AC


Modality


FiO2                                                       50.0


Blood Gas Tidal                                           450.0


Volume


Blood Gas Low                                               5.0


PEEP Setting


Blood Gas                                         DT


Notified Whom


Blood Gas         
              
                2/14/2019
8:13:  



Notified Time
                                              23 AM





Medications





Current Medications


Ondansetron HCl (Zofran Inj) 4 mg Q6H  PRN IV NAUSEA AND/OR VOMITING Last 


administered on 2/1/19at 18:46; Admin Dose 4 MG;  Start 1/21/19 at 23:30


Acetaminophen (Tylenol Supp) 650 mg Q4H  PRN MD PAIN LEVEL 1-3 OR FEVER;  Start 


1/21/19 at 23:30


Aspirin (Aspirin) 81 mg DAILY PO  Last administered on 2/14/19at 08:15; Admin 


Dose 81 MG;  Start 1/24/19 at 09:00


Famotidine (Pepcid) 20 mg Q12 PO  Last administered on 2/14/19at 08:15; Admin 


Dose 20 MG;  Start 1/24/19 at 21:00


Zolpidem Tartrate (Ambien) 5 mg HS  PRN PO INSOMNIA Last administered on 


2/5/19at 20:31; Admin Dose 5 MG;  Start 1/27/19 at 00:00


Guaifenesin/ Dextromethorphan (Robitussin Dm Liquid Cup) 5 ml Q6H  PRN PO COUGH 


Last administered on 2/10/19at 05:16; Admin Dose 5 ML;  Start 1/27/19 at 20:00


Morphine Sulfate (morphine) 6 mg Q4H  PRN PO SEVERE PAIN LEVEL 7-10 Last ad


ministered on 2/12/19at 12:44; Admin Dose 6 MG;  Start 1/30/19 at 21:30


Albuterol/ Ipratropium (Duoneb) 3 ml Q3H RESP THERAPY  PRN HHN SHORTNESS OF 


BREATH Last administered on 2/7/19at 18:47; Admin Dose 3 ML;  Start 1/31/19 at 


20:00


Methylprednisolone Sodium Succinate (Solu-Medrol) 40 mg Q6 IV  Last administered


on 2/14/19at 05:50; Admin Dose 40 MG;  Start 2/2/19 at 12:00


Alteplase, Recombinant (Cathflo (Activase)) 2 mg MAY REPEAT X1 PRN CATHETER IF 


CATHETER REMAINS OCCULUDED;  Start 2/2/19 at 11:00


Amlodipine Besylate (Norvasc) 5 mg DAILY PO  Last administered on 2/14/19at 


08:16; Admin Dose 5 MG;  Start 2/5/19 at 09:00


Albuterol/ Ipratropium (Duoneb) 3 ml Q6H RESP  THERAPY HHN  Last administered on


2/14/19at 02:15; Admin Dose 3 ML;  Start 2/4/19 at 20:00


Lorazepam (Ativan) 1 mg Q6H  PRN IV PSYCHOSIS Last administered on 2/11/19at 


13:05; Admin Dose 1 MG;  Start 2/7/19 at 02:00


Haloperidol (Haldol) 2 mg PRN  PRN IM AGITATION Last administered on 2/12/19at 


00:44; Admin Dose 2 MG;  Start 2/8/19 at 06:30


Risperidone (Risperdal) 2 mg BID PO  Last administered on 2/14/19at 08:15; Admin


Dose 2 MG;  Start 2/9/19 at 21:00


Divalproex Sodium (Depakote Sprinkle) 250 mg Q8H PO  Last administered on 


2/12/19at 13:09; Admin Dose 250 MG;  Start 2/10/19 at 21:00


Dextrose 1,000 ml @  100 mls/hr Q10H IV  Last administered on 2/14/19at 08:23; 


Admin Dose 100 MLS/HR;  Start 2/12/19 at 06:30


Propofol 100 ml @  1.875 mls/ hr Q12H IV  Last administered on 2/14/19at 04:14; 


Admin Dose 7.5 MLS/HR;  Start 2/13/19 at 15:30


Potassium Chloride 100 ml @  50 mls/hr Q2H IVPB  Last administered on 2/14/19at 


08:18; Admin Dose 50 MLS/HR;  Start 2/14/19 at 08:00;  Stop 2/14/19 at 11:59


Desmopressin Acetate (Ddavp) 2 mcg BID IV ;  Start 2/14/19 at 09:00





Assessment/Plan


Hospital Course (Demo Recall)


iMP: 


1.  Acute on chronic hypoxic and hypercapnic respiratory failure--worse 


overnight with increased mucus plugging.  Orally intubated from PEG tube 


placement


2.  Pneumoperitoneum status post chest tube which patient pulled out


3.  Encephalopathy toxic metabolic resolving


4.  Advanced COPD


5.  Hypernatremia likely free water deficit


6.  Thrombocytopenia 


7.  Anemia 





RECS:


1.  CPAP weaning trial.


2.  Post extubation incentive spirometry and bronchodilators


3. Aggressive suctioning/CPT


4. Start tube feeding once extubated


5. Follow ABG/CXR


6. Strict aspiration precautions





Critical care time 40 minutes.











MIMI ZAVALA MD, [facility]     Feb 14, 2019 09:46

## 2019-02-14 NOTE — CONS
Assessment/Plan


Assessment/Plan


Hospital Course (Demo Recall)


Patient is status post PEG yesterday.  He is intubated and in no distress, 


failed weaning trial this morning.  No fevers overnight WBC 4.1 H&H 8.7 and 29.1


platelets 85 BUN 39 creatinine 0.79





Chest x-ray this morning revealed no changes





Indwelling: Endotracheal tube PEG Blake





Antimicrobials: Patient is off antibiotics status post Ancef yesterday








Physical examination: Well-developed chronically ill-appearing cachectic elderly


man.  Head atraumatic normocephalic sclera nonicteric, neck is supple chest rise


symmetrical breath sounds diminished bases.  Heart: S1-S2.  Abdomen soft bowel 


sounds present.  Extremities without edema/cyanosis





Assessment:


1.  Respiratory failure, s/p pneumonia


2.  Dysphagia with ongoing aspiration


4.  Pneumoperitoneum of unclear etiology, no perforation per surgical note, 


status post chest tube


5.  Dementia


5.  Anemia with progressive thrombocytopenia


6.  History of non-ST elevation MI


7.  Status post cardiac arrest


8.  RIJ TLC





Plan: Remains stable, continue present care, weaning trials per pulmonary





Consultation Date/Type/Reason


Admit Date/Time


Jan 22, 2019 at 00:03


Initial Consult Date


1/22/19


Type of Consult


ID


Requesting Provider:  DONALDO CARRILLO MD


Date/Time of Note


DATE: 2/14/19 


TIME: 13:36





Exam/Review of Systems


Exam


Vitals





Vital Signs


  Date      Temp  Pulse  Resp  B/P (MAP)   Pulse Ox  O2          O2 Flow    FiO2


Time                                                 Delivery    Rate


   2/14/19  98.0     91    30      101/80        94  Mechanical


     12:00                           (87)            Ventilator


   2/14/19                                                                    50


     11:10


   2/13/19                                                            12.0


     14:27








Intake and Output





2/13/19 2/13/19 2/14/19





1515:00


23:00


07:00





IntakeIntake Total


800.0 ml


819.8900 ml


848.750 ml





OutputOutput Total


500 ml


790 ml


600 ml





BalanceBalance


300.0 ml


29.8900 ml


248.750 ml














Results


Result Diagram:  


2/14/19 0450                                                                    


           2/14/19 0439





Results 24hrs





Laboratory Tests


Test
               2/13/19
13:53    2/13/19
16:00  2/13/19
18:02  2/14/19
04:39


Sodium Level               151  H                                         151  H


Potassium Level             3.6                                           3.4  L


Chloride Level              110                                            109


Carbon Dioxide              38  H                                          40  H


Level


Anion Gap                    3  L                                           2  L


Blood Urea                  40  H                                          39  H


Nitrogen


Creatinine                 0.74                                           0.79


Est Glomerular    > 60  
          
                
              > 60  



Filtrat


Rate
mL/min


Glucose Level               149                                            179


Calcium Level               8.9                                            8.5


Blood Gas         
                Blood arterial
  
              



Specimen Source



Arterial Blood    
                2/6/2019
4:01:0  
              



Date Drawn
                                   3 PM


Arterial Blood    
                      7.508  H
  
              



pH


(Temp
corrected)


Arterial Blood    
                       45.7  H
  
              



pCO2


(Temp
correct)


Arterial Blood    
                      400.8  H
  
              



pO2


(Temp
corrected)


Arterial Blood                             35.5  H


HCO3


Arterial Blood                             11.2  H


Base Excess


Arterial Blood    
                       99.0  H
  
              



Oxygen
Saturatio


n


Antoine Test                         ACCEPTAB


Arterial Blood    
                Right Radial  
  
              



Gas


Puncture
Site


Arterial          
                         0.3  
  
              



Blood
Carboxyhem


oglobin


Arterial Blood                               0.3


Methemoglobin


Blood Gas A-a O2                          266.5  H


Differential


Oxyhemoglobin                               98.4


Percent


Blood Gas                                   37.0


Temperature


Blood Gas                                   20.0


Respiration Rate


Blood Gas Actual  
                          20  
  
              



Respiration
Rate


Blood Gas                          VENT - AC


Modality


FiO2                                       100.0


Blood Gas Tidal                            500.0


Volume


Blood Gas Low                                5.0


PEEP Setting


Blood Gas                          ANASTASIA RT


Notified Whom


Blood Gas         
                2/13/2019
4:19:  
              



Notified Time
                               28 PM


Prothrombin Time                                           13.4


Prothrombin Time                                            1.0


Ratio


INR               
                
                      1.01  
  



International


Normalized
Ratio


Activated         
                
                      28.5  
  



Partial
Thrombop


last Time


Phosphorus Level                                                           3.1


Magnesium Level                                                           2.6  H


Test
               2/14/19
04:50    2/14/19
07:00  
              



White Blood               4.1  #L


Count


Red Blood Count           3.13  L


Hemoglobin                 8.7  L


Hematocrit                29.1  L


Mean Corpuscular           93.0


Volume


Mean Corpuscular          27.8  L


Hemoglobin


Mean Corpuscular         29.9  L
  
                
              



Hemoglobin
Marisel


nt


Red Cell                  17.0  H


Distribution


Width


Platelet Count             85  #L


Mean Platelet             11.2  H


Volume


Immature                 0.500  H


Granulocytes %


Neutrophils %             89.8  H


Lymphocytes %              6.1  L


Monocytes %                 3.6


Eosinophils %               0.0


Basophils %                 0.0


Nucleated Red               0.0


Blood Cells %


Immature                  0.020


Granulocytes #


Neutrophils #               3.7


Lymphocytes #              0.3  L


Monocytes #                0.2  L


Eosinophils #               0.0


Basophils #                 0.0


Nucleated Red               0.0


Blood Cells #


Lab Scanned       BLOOD
TRANSFUSI  
                
              



Report
           ON


Blood Gas         
                Blood arterial
  
              



Specimen Source



Arterial Blood    
                2/14/2019
7:45:  
              



Date Drawn
                                  22 AM


Arterial Blood    
                      7.490  H
  
              



pH


(Temp
corrected)


Arterial Blood    
                       49.1  H
  
              



pCO2


(Temp
correct)


Arterial Blood    
                       59.9  L
  
              



pO2


(Temp
corrected)


Arterial Blood                             36.6  H


HCO3


Arterial Blood                             11.8  H


Base Excess


Arterial Blood    
                       89.4  L
  
              



Oxygen
Saturatio


n


Antoine Test                         ACCEPTAB


Arterial Blood    
                Right Radial  
  
              



Gas


Puncture
Site


Arterial          
                         0.3  
  
              



Blood
Carboxyhem


oglobin


Arterial Blood                               0.3


Methemoglobin


Blood Gas A-a O2                          241.4  H


Differential


Oxyhemoglobin                              88.9  L


Percent


Blood Gas                                   37.0


Temperature


Blood Gas                                   16.0


Respiration Rate


Blood Gas Actual  
                          22  
  
              



Respiration
Rate


Blood Gas                          VENT - AC


Modality


FiO2                                        50.0


Blood Gas Tidal                            450.0


Volume


Blood Gas Low                                5.0


PEEP Setting


Blood Gas                          DT


Notified Whom


Blood Gas         
                2/14/2019
8:13:  
              



Notified Time
                               23 AM








Medications


Medication





Current Medications


Ondansetron HCl (Zofran Inj) 4 mg Q6H  PRN IV NAUSEA AND/OR VOMITING Last 


administered on 2/1/19at 18:46; Admin Dose 4 MG;  Start 1/21/19 at 23:30


Acetaminophen (Tylenol Supp) 650 mg Q4H  PRN [place] PAIN LEVEL 1-3 OR FEVER;  Start 


1/21/19 at 23:30


Aspirin (Aspirin) 81 mg DAILY PO  Last administered on 2/14/19at 08:15; Admin 


Dose 81 MG;  Start 1/24/19 at 09:00


Famotidine (Pepcid) 20 mg Q12 PO  Last administered on 2/14/19at 08:15; Admin Do


se 20 MG;  Start 1/24/19 at 21:00


Zolpidem Tartrate (Ambien) 5 mg HS  PRN PO INSOMNIA Last administered on 


2/5/19at 20:31; Admin Dose 5 MG;  Start 1/27/19 at 00:00


Guaifenesin/ Dextromethorphan (Robitussin Dm Liquid Cup) 5 ml Q6H  PRN PO COUGH 


Last administered on 2/10/19at 05:16; Admin Dose 5 ML;  Start 1/27/19 at 20:00


Morphine Sulfate (morphine) 6 mg Q4H  PRN PO SEVERE PAIN LEVEL 7-10 Last admi


nistered on 2/12/19at 12:44; Admin Dose 6 MG;  Start 1/30/19 at 21:30


Albuterol/ Ipratropium (Duoneb) 3 ml Q3H RESP THERAPY  PRN HHN SHORTNESS OF 


BREATH Last administered on 2/7/19at 18:47; Admin Dose 3 ML;  Start 1/31/19 at 


20:00


Methylprednisolone Sodium Succinate (Solu-Medrol) 40 mg Q6 IV  Last administered


on 2/14/19at 12:06; Admin Dose 40 MG;  Start 2/2/19 at 12:00


Alteplase, Recombinant (Cathflo (Activase)) 2 mg MAY REPEAT X1 PRN CATHETER IF 


CATHETER REMAINS OCCULUDED;  Start 2/2/19 at 11:00


Amlodipine Besylate (Norvasc) 5 mg DAILY PO  Last administered on 2/14/19at 


08:16; Admin Dose 5 MG;  Start 2/5/19 at 09:00


Albuterol/ Ipratropium (Duoneb) 3 ml Q6H RESP  THERAPY HHN  Last administered on


2/14/19at 02:15; Admin Dose 3 ML;  Start 2/4/19 at 20:00


Lorazepam (Ativan) 1 mg Q6H  PRN IV PSYCHOSIS Last administered on 2/11/19at 


13:05; Admin Dose 1 MG;  Start 2/7/19 at 02:00


Haloperidol (Haldol) 2 mg PRN  PRN IM AGITATION Last administered on 2/12/19at 


00:44; Admin Dose 2 MG;  Start 2/8/19 at 06:30


Risperidone (Risperdal) 2 mg BID PO  Last administered on 2/14/19at 08:15; Admin


Dose 2 MG;  Start 2/9/19 at 21:00


Divalproex Sodium (Depakote Sprinkle) 250 mg Q8H PO  Last administered on 


2/14/19at 12:06; Admin Dose 250 MG;  Start 2/10/19 at 21:00


Dextrose 1,000 ml @  100 mls/hr Q10H IV  Last administered on 2/14/19at 08:23; 


Admin Dose 100 MLS/HR;  Start 2/12/19 at 06:30


Propofol 100 ml @  1.875 mls/ hr Q12H IV  Last administered on 2/14/19at 12:10; 


Admin Dose 7.5 MLS/HR;  Start 2/13/19 at 15:30


Desmopressin Acetate (Ddavp) 2 mcg BID IV  Last administered on 2/14/19at 10:48;


Admin Dose 2 MCG;  Start 2/14/19 at 09:00











DAVID VÁZQUEZ NP            Feb 14, 2019 13:37

## 2019-02-15 VITALS — HEART RATE: 86 BPM | DIASTOLIC BLOOD PRESSURE: 60 MMHG | SYSTOLIC BLOOD PRESSURE: 81 MMHG | RESPIRATION RATE: 30 BRPM

## 2019-02-15 VITALS — RESPIRATION RATE: 20 BRPM | HEART RATE: 96 BPM | DIASTOLIC BLOOD PRESSURE: 76 MMHG | SYSTOLIC BLOOD PRESSURE: 102 MMHG

## 2019-02-15 VITALS — DIASTOLIC BLOOD PRESSURE: 66 MMHG | RESPIRATION RATE: 28 BRPM | HEART RATE: 81 BPM | SYSTOLIC BLOOD PRESSURE: 88 MMHG

## 2019-02-15 VITALS — HEART RATE: 91 BPM | DIASTOLIC BLOOD PRESSURE: 63 MMHG | SYSTOLIC BLOOD PRESSURE: 97 MMHG | RESPIRATION RATE: 23 BRPM

## 2019-02-15 VITALS — RESPIRATION RATE: 25 BRPM

## 2019-02-15 VITALS — DIASTOLIC BLOOD PRESSURE: 51 MMHG | SYSTOLIC BLOOD PRESSURE: 72 MMHG | RESPIRATION RATE: 17 BRPM | HEART RATE: 73 BPM

## 2019-02-15 VITALS — SYSTOLIC BLOOD PRESSURE: 95 MMHG | HEART RATE: 88 BPM | DIASTOLIC BLOOD PRESSURE: 66 MMHG | RESPIRATION RATE: 36 BRPM

## 2019-02-15 VITALS — RESPIRATION RATE: 23 BRPM

## 2019-02-15 VITALS — DIASTOLIC BLOOD PRESSURE: 53 MMHG | HEART RATE: 74 BPM | RESPIRATION RATE: 19 BRPM | SYSTOLIC BLOOD PRESSURE: 72 MMHG

## 2019-02-15 VITALS — HEART RATE: 83 BPM

## 2019-02-15 VITALS — SYSTOLIC BLOOD PRESSURE: 89 MMHG | RESPIRATION RATE: 21 BRPM | HEART RATE: 78 BPM | DIASTOLIC BLOOD PRESSURE: 59 MMHG

## 2019-02-15 VITALS — DIASTOLIC BLOOD PRESSURE: 63 MMHG | RESPIRATION RATE: 22 BRPM | SYSTOLIC BLOOD PRESSURE: 91 MMHG | HEART RATE: 88 BPM

## 2019-02-15 VITALS — HEART RATE: 75 BPM | DIASTOLIC BLOOD PRESSURE: 60 MMHG | RESPIRATION RATE: 24 BRPM | SYSTOLIC BLOOD PRESSURE: 91 MMHG

## 2019-02-15 VITALS — SYSTOLIC BLOOD PRESSURE: 83 MMHG | DIASTOLIC BLOOD PRESSURE: 58 MMHG | HEART RATE: 68 BPM | RESPIRATION RATE: 16 BRPM

## 2019-02-15 VITALS — DIASTOLIC BLOOD PRESSURE: 56 MMHG | SYSTOLIC BLOOD PRESSURE: 79 MMHG | HEART RATE: 66 BPM | RESPIRATION RATE: 17 BRPM

## 2019-02-15 VITALS — DIASTOLIC BLOOD PRESSURE: 64 MMHG | RESPIRATION RATE: 20 BRPM | HEART RATE: 88 BPM | SYSTOLIC BLOOD PRESSURE: 91 MMHG

## 2019-02-15 VITALS — SYSTOLIC BLOOD PRESSURE: 115 MMHG | RESPIRATION RATE: 24 BRPM | DIASTOLIC BLOOD PRESSURE: 75 MMHG | HEART RATE: 85 BPM

## 2019-02-15 VITALS — HEART RATE: 88 BPM | SYSTOLIC BLOOD PRESSURE: 93 MMHG | RESPIRATION RATE: 31 BRPM | DIASTOLIC BLOOD PRESSURE: 67 MMHG

## 2019-02-15 VITALS — RESPIRATION RATE: 20 BRPM

## 2019-02-15 VITALS — HEART RATE: 77 BPM | RESPIRATION RATE: 18 BRPM | SYSTOLIC BLOOD PRESSURE: 90 MMHG | DIASTOLIC BLOOD PRESSURE: 62 MMHG

## 2019-02-15 VITALS — HEART RATE: 88 BPM | RESPIRATION RATE: 28 BRPM | SYSTOLIC BLOOD PRESSURE: 93 MMHG | DIASTOLIC BLOOD PRESSURE: 69 MMHG

## 2019-02-15 VITALS — SYSTOLIC BLOOD PRESSURE: 87 MMHG | DIASTOLIC BLOOD PRESSURE: 68 MMHG | RESPIRATION RATE: 16 BRPM | HEART RATE: 67 BPM

## 2019-02-15 VITALS — SYSTOLIC BLOOD PRESSURE: 81 MMHG | RESPIRATION RATE: 18 BRPM | DIASTOLIC BLOOD PRESSURE: 60 MMHG | HEART RATE: 76 BPM

## 2019-02-15 VITALS — RESPIRATION RATE: 17 BRPM | HEART RATE: 71 BPM | DIASTOLIC BLOOD PRESSURE: 57 MMHG | SYSTOLIC BLOOD PRESSURE: 85 MMHG

## 2019-02-15 VITALS — SYSTOLIC BLOOD PRESSURE: 134 MMHG | HEART RATE: 93 BPM | RESPIRATION RATE: 21 BRPM | DIASTOLIC BLOOD PRESSURE: 74 MMHG

## 2019-02-15 VITALS — DIASTOLIC BLOOD PRESSURE: 55 MMHG | HEART RATE: 69 BPM | SYSTOLIC BLOOD PRESSURE: 76 MMHG | RESPIRATION RATE: 19 BRPM

## 2019-02-15 VITALS — RESPIRATION RATE: 19 BRPM | HEART RATE: 88 BPM | DIASTOLIC BLOOD PRESSURE: 75 MMHG | SYSTOLIC BLOOD PRESSURE: 100 MMHG

## 2019-02-15 VITALS — RESPIRATION RATE: 25 BRPM | SYSTOLIC BLOOD PRESSURE: 86 MMHG | DIASTOLIC BLOOD PRESSURE: 65 MMHG | HEART RATE: 84 BPM

## 2019-02-15 VITALS — HEART RATE: 93 BPM | DIASTOLIC BLOOD PRESSURE: 58 MMHG | RESPIRATION RATE: 30 BRPM | SYSTOLIC BLOOD PRESSURE: 89 MMHG

## 2019-02-15 VITALS — HEART RATE: 91 BPM | RESPIRATION RATE: 48 BRPM | DIASTOLIC BLOOD PRESSURE: 66 MMHG | SYSTOLIC BLOOD PRESSURE: 83 MMHG

## 2019-02-15 VITALS — RESPIRATION RATE: 23 BRPM | DIASTOLIC BLOOD PRESSURE: 64 MMHG | HEART RATE: 81 BPM | SYSTOLIC BLOOD PRESSURE: 88 MMHG

## 2019-02-15 VITALS — RESPIRATION RATE: 28 BRPM | HEART RATE: 89 BPM | DIASTOLIC BLOOD PRESSURE: 80 MMHG | SYSTOLIC BLOOD PRESSURE: 102 MMHG

## 2019-02-15 VITALS — HEART RATE: 75 BPM | RESPIRATION RATE: 19 BRPM | DIASTOLIC BLOOD PRESSURE: 59 MMHG | SYSTOLIC BLOOD PRESSURE: 82 MMHG

## 2019-02-15 VITALS — DIASTOLIC BLOOD PRESSURE: 65 MMHG | SYSTOLIC BLOOD PRESSURE: 90 MMHG | HEART RATE: 78 BPM | RESPIRATION RATE: 19 BRPM

## 2019-02-15 VITALS — DIASTOLIC BLOOD PRESSURE: 71 MMHG | HEART RATE: 79 BPM | RESPIRATION RATE: 23 BRPM | SYSTOLIC BLOOD PRESSURE: 100 MMHG

## 2019-02-15 VITALS — RESPIRATION RATE: 16 BRPM

## 2019-02-15 VITALS — SYSTOLIC BLOOD PRESSURE: 74 MMHG | RESPIRATION RATE: 18 BRPM | DIASTOLIC BLOOD PRESSURE: 56 MMHG | HEART RATE: 71 BPM

## 2019-02-15 VITALS — SYSTOLIC BLOOD PRESSURE: 81 MMHG | DIASTOLIC BLOOD PRESSURE: 60 MMHG | HEART RATE: 69 BPM | RESPIRATION RATE: 20 BRPM

## 2019-02-15 VITALS — DIASTOLIC BLOOD PRESSURE: 61 MMHG | RESPIRATION RATE: 21 BRPM | HEART RATE: 72 BPM | SYSTOLIC BLOOD PRESSURE: 83 MMHG

## 2019-02-15 VITALS — RESPIRATION RATE: 17 BRPM

## 2019-02-15 VITALS — RESPIRATION RATE: 26 BRPM

## 2019-02-15 VITALS — RESPIRATION RATE: 22 BRPM

## 2019-02-15 LAB
ABNORMAL IP MESSAGE: 1
ADD MAN DIFF?: NO
ALLEN TEST: (no result)
ANION GAP: 2 (ref 5–13)
ANION GAP: 4 (ref 5–13)
ARTERIAL BASE EXCESS: 8.3 MMOL/L (ref -3–3)
ARTERIAL BLOOD GAS OXYGEN SAT: 96.8 MMHG (ref 95–98)
ARTERIAL COHB: 0.3 % (ref 0–3)
ARTERIAL FRACTION OF OXYHGB: 96.3 % (ref 93–99)
ARTERIAL HCO3: 33.2 MMOL/L (ref 22–26)
ARTERIAL METHB: 0.2 % (ref 0–1.5)
ARTERIAL PCO2: 48.3 MMHG (ref 35–45)
BASOPHIL #: 0 10^3/UL (ref 0–0.1)
BASOPHILS %: 0 % (ref 0–2)
BLOOD GAS LOW PEEP SETTING: 5 CMH2O
BLOOD GAS TIDAL VOLUME: 450 ML
BLOOD UREA NITROGEN: 45 MG/DL (ref 7–20)
BLOOD UREA NITROGEN: 49 MG/DL (ref 7–20)
CALCIUM: 8.4 MG/DL (ref 8.4–10.2)
CALCIUM: 8.5 MG/DL (ref 8.4–10.2)
CARBON DIOXIDE: 34 MMOL/L (ref 21–31)
CARBON DIOXIDE: 37 MMOL/L (ref 21–31)
CHLORIDE: 106 MMOL/L (ref 97–110)
CHLORIDE: 106 MMOL/L (ref 97–110)
CREATININE: 0.88 MG/DL (ref 0.61–1.24)
CREATININE: 1 MG/DL (ref 0.61–1.24)
EOSINOPHILS #: 0 10^3/UL (ref 0–0.5)
EOSINOPHILS %: 0 % (ref 0–7)
FIO2: 50 %
GLUCOSE: 140 MG/DL (ref 70–220)
GLUCOSE: 171 MG/DL (ref 70–220)
HEMATOCRIT: 28.3 % (ref 42–52)
HEMOGLOBIN: 8.6 G/DL (ref 14–18)
IMMATURE GRANS #M: 0.01 10^3/UL (ref 0–0.03)
IMMATURE GRANS % (M): 0.3 % (ref 0–0.43)
LYMPHOCYTES #: 0.3 10^3/UL (ref 0.8–2.9)
LYMPHOCYTES %: 8.1 % (ref 15–51)
MAGNESIUM: 2.7 MG/DL (ref 1.7–2.5)
MEAN CORPUSCULAR HEMOGLOBIN: 28 PG (ref 29–33)
MEAN CORPUSCULAR HGB CONC: 30.4 G/DL (ref 32–37)
MEAN CORPUSCULAR VOLUME: 92.2 FL (ref 82–101)
MEAN PLATELET VOLUME: 11.1 FL (ref 7.4–10.4)
MODE: (no result)
MONOCYTE #: 0.1 10^3/UL (ref 0.3–0.9)
MONOCYTES %: 2.9 % (ref 0–11)
NEUTROPHIL #: 3.1 10^3/UL (ref 1.6–7.5)
NEUTROPHILS %: 88.7 % (ref 39–77)
NUCLEATED RED BLOOD CELLS #: 0 10^3/UL (ref 0–0)
NUCLEATED RED BLOOD CELLS%: 0 /100WBC (ref 0–0)
O2 A-A PPRESDIFF RESPIRATORY: 201 MMHG (ref 7–24)
OSMOLALITY,URINE: 400 MOSM/KG (ref 250–1200)
PHOSPHORUS: 3.5 MG/DL (ref 2.5–4.9)
PLATELET COUNT: 61 10^3/UL (ref 140–415)
POSITIVE DIFF: (no result)
POTASSIUM: 4 MMOL/L (ref 3.5–5.1)
POTASSIUM: 4 MMOL/L (ref 3.5–5.1)
RED BLOOD COUNT: 3.07 10^6/UL (ref 4.7–6.1)
RED CELL DISTRIBUTION WIDTH: 16.8 % (ref 11.5–14.5)
SODIUM: 142 MMOL/L (ref 135–144)
SODIUM: 147 MMOL/L (ref 135–144)
WHITE BLOOD COUNT: 3.5 10^3/UL (ref 4.8–10.8)

## 2019-02-15 RX ADMIN — AMLODIPINE BESYLATE 1 MG: 5 TABLET ORAL at 08:24

## 2019-02-15 RX ADMIN — VASOPRESSIN SCH MLS/HR: 20 INJECTION, SOLUTION INTRAMUSCULAR; SUBCUTANEOUS at 10:24

## 2019-02-15 RX ADMIN — DESMOPRESSIN ACETATE SCH MCG: 4 SOLUTION INTRAVENOUS at 08:25

## 2019-02-15 RX ADMIN — CEFEPIME SCH MLS/HR: 1 INJECTION, POWDER, FOR SOLUTION INTRAMUSCULAR; INTRAVENOUS at 14:02

## 2019-02-15 RX ADMIN — IPRATROPIUM BROMIDE AND ALBUTEROL SULFATE SCH ML: .5; 3 SOLUTION RESPIRATORY (INHALATION) at 01:26

## 2019-02-15 RX ADMIN — IPRATROPIUM BROMIDE AND ALBUTEROL SULFATE SCH ML: .5; 3 SOLUTION RESPIRATORY (INHALATION) at 10:54

## 2019-02-15 RX ADMIN — DIVALPROEX SODIUM SCH MG: 125 CAPSULE, COATED PELLETS ORAL at 04:19

## 2019-02-15 RX ADMIN — METHYLPREDNISOLONE SODIUM SUCCINATE 1 MG: 40 INJECTION, POWDER, FOR SOLUTION INTRAMUSCULAR; INTRAVENOUS at 05:46

## 2019-02-15 RX ADMIN — LIDOCAINE HYDROCHLORIDE 1 MLS/HR: 10 INJECTION, SOLUTION EPIDURAL; INFILTRATION; INTRACAUDAL; PERINEURAL at 14:03

## 2019-02-15 RX ADMIN — VASOPRESSIN 1 MLS/HR: 20 INJECTION, SOLUTION INTRAMUSCULAR; SUBCUTANEOUS at 10:24

## 2019-02-15 RX ADMIN — AMLODIPINE BESYLATE SCH MG: 5 TABLET ORAL at 08:24

## 2019-02-15 RX ADMIN — FAMOTIDINE SCH MG: 20 TABLET ORAL at 08:24

## 2019-02-15 RX ADMIN — PROPOFOL 1 MLS/HR: 10 INJECTION, EMULSION INTRAVENOUS at 15:30

## 2019-02-15 RX ADMIN — IPRATROPIUM BROMIDE AND ALBUTEROL SULFATE 1 ML: .5; 3 SOLUTION RESPIRATORY (INHALATION) at 15:08

## 2019-02-15 RX ADMIN — ASPIRIN 81 MG SCH MG: 81 TABLET ORAL at 08:24

## 2019-02-15 RX ADMIN — FOLIC ACID SCH MLS/HR: 5 INJECTION, SOLUTION INTRAMUSCULAR; INTRAVENOUS; SUBCUTANEOUS at 15:06

## 2019-02-15 RX ADMIN — DIVALPROEX SODIUM 1 MG: 125 CAPSULE, COATED PELLETS ORAL at 04:19

## 2019-02-15 RX ADMIN — OXYCODONE HYDROCHLORIDE AND ACETAMINOPHEN 1 MG: 500 TABLET ORAL at 14:02

## 2019-02-15 RX ADMIN — IPRATROPIUM BROMIDE AND ALBUTEROL SULFATE 1 ML: .5; 3 SOLUTION RESPIRATORY (INHALATION) at 19:17

## 2019-02-15 RX ADMIN — IPRATROPIUM BROMIDE AND ALBUTEROL SULFATE SCH ML: .5; 3 SOLUTION RESPIRATORY (INHALATION) at 19:17

## 2019-02-15 RX ADMIN — PROPOFOL SCH MLS/HR: 10 INJECTION, EMULSION INTRAVENOUS at 01:14

## 2019-02-15 RX ADMIN — FAMOTIDINE 1 MG: 20 TABLET ORAL at 20:35

## 2019-02-15 RX ADMIN — CASTOR OIL AND BALSAM, PERU 1 APPLIC: 788; 87 OINTMENT TOPICAL at 08:25

## 2019-02-15 RX ADMIN — PROPOFOL SCH MLS/HR: 10 INJECTION, EMULSION INTRAVENOUS at 15:30

## 2019-02-15 RX ADMIN — RISPERIDONE 1 MG: 1 TABLET ORAL at 08:24

## 2019-02-15 RX ADMIN — DIVALPROEX SODIUM 1 MG: 125 CAPSULE, COATED PELLETS ORAL at 20:35

## 2019-02-15 RX ADMIN — PROPOFOL 1 MLS/HR: 10 INJECTION, EMULSION INTRAVENOUS at 01:14

## 2019-02-15 RX ADMIN — IPRATROPIUM BROMIDE AND ALBUTEROL SULFATE 1 ML: .5; 3 SOLUTION RESPIRATORY (INHALATION) at 10:54

## 2019-02-15 RX ADMIN — Medication 1 MLS/HR: at 22:04

## 2019-02-15 RX ADMIN — POTASSIUM CHLORIDE SCH MLS/HR: 2 INJECTION, SOLUTION, CONCENTRATE INTRAVENOUS at 04:19

## 2019-02-15 RX ADMIN — LORAZEPAM PRN MG: 2 INJECTION, SOLUTION INTRAMUSCULAR; INTRAVENOUS at 02:58

## 2019-02-15 RX ADMIN — METHYLPREDNISOLONE SODIUM SUCCINATE 1 MG: 40 INJECTION, POWDER, FOR SOLUTION INTRAMUSCULAR; INTRAVENOUS at 17:23

## 2019-02-15 RX ADMIN — THIAMINE HYDROCHLORIDE 1 MLS/HR: 100 INJECTION, SOLUTION INTRAMUSCULAR; INTRAVENOUS at 15:06

## 2019-02-15 RX ADMIN — LORAZEPAM 1 MG: 2 INJECTION, SOLUTION INTRAMUSCULAR; INTRAVENOUS at 02:58

## 2019-02-15 RX ADMIN — METHYLPREDNISOLONE SODIUM SUCCINATE 1 MG: 40 INJECTION, POWDER, FOR SOLUTION INTRAMUSCULAR; INTRAVENOUS at 23:23

## 2019-02-15 RX ADMIN — FAMOTIDINE SCH MG: 20 TABLET ORAL at 20:35

## 2019-02-15 RX ADMIN — DESMOPRESSIN ACETATE 1 MCG: 4 SOLUTION INTRAVENOUS at 08:25

## 2019-02-15 RX ADMIN — DESMOPRESSIN ACETATE SCH MCG: 4 SOLUTION INTRAVENOUS at 20:36

## 2019-02-15 RX ADMIN — RISPERIDONE 1 MG: 1 TABLET ORAL at 20:36

## 2019-02-15 RX ADMIN — VASOPRESSIN SCH MLS/HR: 20 INJECTION, SOLUTION INTRAMUSCULAR; SUBCUTANEOUS at 17:26

## 2019-02-15 RX ADMIN — FAMOTIDINE 1 MG: 20 TABLET ORAL at 08:24

## 2019-02-15 RX ADMIN — CEFEPIME 1 MLS/HR: 1 INJECTION, POWDER, FOR SOLUTION INTRAMUSCULAR; INTRAVENOUS at 14:02

## 2019-02-15 RX ADMIN — DIVALPROEX SODIUM SCH MG: 125 CAPSULE, COATED PELLETS ORAL at 20:35

## 2019-02-15 RX ADMIN — IPRATROPIUM BROMIDE AND ALBUTEROL SULFATE 1 ML: .5; 3 SOLUTION RESPIRATORY (INHALATION) at 01:26

## 2019-02-15 RX ADMIN — OXYCODONE HYDROCHLORIDE AND ACETAMINOPHEN SCH MG: 500 TABLET ORAL at 14:02

## 2019-02-15 RX ADMIN — ASCORBIC ACID, VITAMIN A PALMITATE, CHOLECALCIFEROL, THIAMINE HYDROCHLORIDE, RIBOFLAVIN-5 PHOSPHATE SODIUM, PYRIDOXINE HYDROCHLORIDE, NIACINAMIDE, DEXPANTHENOL, ALPHA-TOCOPHEROL ACETATE, VITAMIN K1, FOLIC ACID, BIOTIN, CYANOCOBALAMIN 1 MLS/HR: 200; 3300; 200; 6; 3.6; 6; 40; 15; 10; 150; 600; 60; 5 INJECTION, SOLUTION INTRAVENOUS at 04:19

## 2019-02-15 RX ADMIN — DIVALPROEX SODIUM SCH MG: 125 CAPSULE, COATED PELLETS ORAL at 14:03

## 2019-02-15 RX ADMIN — ASPIRIN 81 MG CHEWABLE TABLET 1 MG: 81 TABLET CHEWABLE at 08:24

## 2019-02-15 RX ADMIN — METHYLPREDNISOLONE SODIUM SUCCINATE 1 MG: 40 INJECTION, POWDER, FOR SOLUTION INTRAMUSCULAR; INTRAVENOUS at 14:02

## 2019-02-15 RX ADMIN — DESMOPRESSIN ACETATE 1 MCG: 4 SOLUTION INTRAVENOUS at 20:36

## 2019-02-15 RX ADMIN — CEFEPIME SCH MLS/HR: 1 INJECTION, POWDER, FOR SOLUTION INTRAMUSCULAR; INTRAVENOUS at 20:34

## 2019-02-15 RX ADMIN — CEFEPIME 1 MLS/HR: 1 INJECTION, POWDER, FOR SOLUTION INTRAMUSCULAR; INTRAVENOUS at 20:34

## 2019-02-15 RX ADMIN — DIVALPROEX SODIUM 1 MG: 125 CAPSULE, COATED PELLETS ORAL at 14:03

## 2019-02-15 RX ADMIN — VASOPRESSIN 1 MLS/HR: 20 INJECTION, SOLUTION INTRAMUSCULAR; SUBCUTANEOUS at 17:26

## 2019-02-15 RX ADMIN — IPRATROPIUM BROMIDE AND ALBUTEROL SULFATE SCH ML: .5; 3 SOLUTION RESPIRATORY (INHALATION) at 15:08

## 2019-02-15 NOTE — CONS
Assessment/Plan


Assessment/Plan


Assessment/Plan (Daily)


-Dysphagia.  That is post G-tube placement by Dr. Sanchez.


-Hypoxic respiratory failure requiring mechanical ventilation, extubated and now


intubated for elective PEG placement.  Dr. English is following in pulmonology 


consultation.


-Sepsis with shock, resolving.  Dr. Dreyer is following in infection disease 


consultation.


-Status post cardiac arrest.   Dr. Jon is following in cardiology consult


ation.


-Left-sided pneumothorax, status post left side chest tube placement, s/p self 


d/c CT.


-S/p pneumoperitoneum most likely due to cardiac arrest, resolved. Dr. Lai is 


following in general surgery consultation. 


-Left axillary and brachial DVT, LUE swelling subsided, was on Lovenox which is 


currently held due to thrombocytopenia


-Left lower lobe pneumonia


-Severe emphysema


-NATALIE with possible chronic kidney disease. Dr Hanson is following in nephrology


consultation.


-Hyper natremia, continue free water via G-tube, IV fluids with dextrose.


-Schizoaffective disorder depressed type.  Psychiatric consult is appreciated, 


continue Risperdal Depakote





Total critical care time spent 35 mins. Further recommendations based on 


clinical course.  Plan of care discussed with Dr. Carrillo.





Consultation Date/Type/Reason


Admit Date/Time


Jan 22, 2019 at 00:03


Initial Consult Date


1/22/19


Requesting Provider:  DONALDO CARRILLO MD


Date/Time of Note


DATE: 2/15/19 


TIME: 09:55





24 HR Interval Summary


Free Text/Dictation


on Propofol


Hypotensive


Subjective hx not possible:  pt non-verbal


Constitutional:  requiring O2





Exam/Review of Systems


Exam


Vitals





Vital Signs


  Date      Temp  Pulse  Resp  B/P (MAP)   Pulse Ox  O2          O2 Flow    FiO2


Time                                                 Delivery    Rate


   2/15/19           91    48  83/66 (72)        97  Mechanical


     09:00                                           Ventilator


   2/15/19  98.0


     08:00


   2/15/19                                                                    50


     08:00


   2/13/19                                                            12.0


     14:27








Intake and Output





2/14/19


2/14/19


2/15/19





1515:00


23:00


07:00





IntakeIntake Total


1003.8 ml


1049.125 ml


1596.250 ml





OutputOutput Total


175 ml


400 ml


330 ml





BalanceBalance


828.8 ml


649.125 ml


1266.250 ml











Constitutional:  frail


Eyes:  nl lids, nl sclera


ENMT:  nl external ears & nose


Neck:  non-tender


Respiratory:  diminished breath sounds (at bases bilaterally)


Cardiovascular:  nl pulses, other (s1s2)


Gastrointestinal:  soft


Musculoskeletal:  muscle weakness


Extremities:  normal pulses


Neurological:  unresponsive





Results


Result Diagram:  


2/15/19 0400                                                                    


           2/15/19 0400





Results 24hrs





Laboratory Tests


Test
                         2/14/19
13:56  2/15/19
04:00         2/15/19
07:00


Sodium Level                         149  H         147  H


Potassium Level                       4.3            4.0


Chloride Level                        110            106


Carbon Dioxide Level                  38  H          37  H


Anion Gap                              1  L           4  L


Blood Urea Nitrogen                   40  H          45  H


Creatinine                           0.96           1.00


Est Glomerular Filtrat        > 60  
        > 60  
        



Rate
mL/min


Glucose Level                        138  #          171


Calcium Level                         8.5            8.5


White Blood Count                                   3.5  L


Red Blood Count                                    3.07  L


Hemoglobin                                          8.6  L


Hematocrit                                         28.3  L


Mean Corpuscular Volume                             92.2


Mean Corpuscular Hemoglobin                        28.0  L


Mean Corpuscular              
                   30.4  L
  



Hemoglobin
Concent


Red Cell Distribution Width                        16.8  H


Platelet Count                                      61  #L


Mean Platelet Volume                               11.1  H


Immature Granulocytes %                            0.300


Neutrophils %                                      88.7  H


Lymphocytes %                                       8.1  L


Monocytes %                                          2.9


Eosinophils %                                        0.0


Basophils %                                          0.0


Nucleated Red Blood Cells %                          0.0


Immature Granulocytes #                            0.010


Neutrophils #                                        3.1


Lymphocytes #                                       0.3  L


Monocytes #                                         0.1  L


Eosinophils #                                        0.0


Basophils #                                          0.0


Nucleated Red Blood Cells #                          0.0


Phosphorus Level                                     3.5


Magnesium Level                                     2.7  H


Blood Gas Specimen Source
    
              
              Blood arterial



Arterial Blood Date Drawn
    
              
              2/15/2019
7:28:34 AM


Arterial Blood pH             
              
                         7.455  H



(Temp
corrected)


Arterial Blood pCO2           
              
                          48.3  H



(Temp
correct)


Arterial Blood pO2            
              
                         101.2  H



(Temp
corrected)


Arterial Blood HCO3                                                      33.2  H


Arterial Blood Base Excess                                                8.3  H


Arterial Blood                
              
                           96.8  



Oxygen
Saturation


Antoine Test                                                  ACCEPTAB


Arterial Blood Gas            
              
              Right Radial  



Puncture
Site


Arterial                      
              
                            0.3  



Blood
Carboxyhemoglobin


Arterial Blood Methemoglobin                                               0.2


Blood Gas A-a O2                                                        201.0  H


Differential


Oxyhemoglobin Percent                                                     96.3


Blood Gas Temperature                                                     37.0


Blood Gas Respiration Rate                                                16.0


Blood Gas Actual              
              
                             23  



Respiration
Rate


Blood Gas Modality                                          VENT - AC


FiO2                                                                      50.0


Blood Gas Tidal Volume                                                   450.0


Blood Gas Low PEEP Setting                                                 5.0


Blood Gas Notified Whom                                     TM


Blood Gas Notified Time
      
              
              2/15/2019
7:57:08 AM








Medications


Medication





Current Medications


Ondansetron HCl (Zofran Inj) 4 mg Q6H  PRN IV NAUSEA AND/OR VOMITING Last 


administered on 2/1/19at 18:46; Admin Dose 4 MG;  Start 1/21/19 at 23:30


Acetaminophen (Tylenol Supp) 650 mg Q4H  PRN [place] PAIN LEVEL 1-3 OR FEVER;  Start 


1/21/19 at 23:30


Aspirin (Aspirin) 81 mg DAILY PO  Last administered on 2/15/19at 08:24; Admin 


Dose 81 MG;  Start 1/24/19 at 09:00


Famotidine (Pepcid) 20 mg Q12 PO  Last administered on 2/15/19at 08:24; Admin 


Dose 20 MG;  Start 1/24/19 at 21:00


Zolpidem Tartrate (Ambien) 5 mg HS  PRN PO INSOMNIA Last administered on 


2/5/19at 20:31; Admin Dose 5 MG;  Start 1/27/19 at 00:00


Guaifenesin/ Dextromethorphan (Robitussin Dm Liquid Cup) 5 ml Q6H  PRN PO COUGH 


Last administered on 2/10/19at 05:16; Admin Dose 5 ML;  Start 1/27/19 at 20:00


Morphine Sulfate (morphine) 6 mg Q4H  PRN PO SEVERE PAIN LEVEL 7-10 Last 


administered on 2/12/19at 12:44; Admin Dose 6 MG;  Start 1/30/19 at 21:30


Albuterol/ Ipratropium (Duoneb) 3 ml Q3H RESP THERAPY  PRN HHN SHORTNESS OF 


BREATH Last administered on 2/7/19at 18:47; Admin Dose 3 ML;  Start 1/31/19 at 


20:00


Methylprednisolone Sodium Succinate (Solu-Medrol) 40 mg Q6 IV  Last administered


on 2/15/19at 05:46; Admin Dose 40 MG;  Start 2/2/19 at 12:00


Alteplase, Recombinant (Cathflo (Activase)) 2 mg MAY REPEAT X1 PRN CATHETER IF 


CATHETER REMAINS OCCULUDED;  Start 2/2/19 at 11:00


Amlodipine Besylate (Norvasc) 5 mg DAILY PO  Last administered on 2/15/19at 


08:24; Admin Dose 5 MG;  Start 2/5/19 at 09:00


Albuterol/ Ipratropium (Duoneb) 3 ml Q6H RESP  THERAPY HHN  Last administered on


2/15/19at 01:26; Admin Dose 3 ML;  Start 2/4/19 at 20:00


Lorazepam (Ativan) 1 mg Q6H  PRN IV PSYCHOSIS Last administered on 2/15/19at 


02:58; Admin Dose 1 MG;  Start 2/7/19 at 02:00


Haloperidol (Haldol) 2 mg PRN  PRN IM AGITATION Last administered on 2/12/19at 


00:44; Admin Dose 2 MG;  Start 2/8/19 at 06:30


Risperidone (Risperdal) 2 mg BID PO  Last administered on 2/15/19at 08:24; Admin


Dose 2 MG;  Start 2/9/19 at 21:00


Divalproex Sodium (Depakote Sprinkle) 250 mg Q8H PO  Last administered on 


2/15/19at 04:19; Admin Dose 250 MG;  Start 2/10/19 at 21:00


Dextrose 1,000 ml @  50 mls/hr Q20H IV  Last administered on 2/15/19at 04:19; 


Admin Dose 50 MLS/HR;  Start 2/12/19 at 06:30


Propofol 100 ml @  1.875 mls/ hr Q12H IV  Last administered on 2/15/19at 01:14; 


Admin Dose 7.5 MLS/HR;  Start 2/13/19 at 15:30


Desmopressin Acetate (Ddavp) 2 mcg BID IV  Last administered on 2/15/19at 08:25;


Admin Dose 2 MCG;  Start 2/14/19 at 09:00


Dexmedetomidine HCl 200 mcg/ Sodium Chloride 50 ml @ 0 mls/hr TITRATE IV ;  


Start 2/15/19 at 09:30











LETY MONSON                Feb 15, 2019 9:57 am

## 2019-02-15 NOTE — CONS
Assessment/Plan


Assessment/Plan


Hospital Course (Demo Recall)


IMP:


1.Hypotension-Now improved and tolerating norvasc. NL EF by echo this admit


2.resp failure s/p intubation


3.cardiac arrest


4.pneumoperitoneum-resolved


5.Positive troponin-now trended neg


6.renal failure-improved


7.Leukocytosis


8. anemia


9, Thrombocytopenia-ongoing some worsening


10.Resp failure-hypercapnic s/p intubation. s/p extubation but now transferred 


back to ICU for increasing resp distress


11.PNA-by cxr


12.PLeual effusion


14. dysphagia s/p G tube





Recc:


-IN ICU


-Continue norvasc as tolerated only and follow BP clsoely and tus will reduce 


dose now


-Continue asa


-continue steroids/bronchodilators


-Continue risperdal


-follow MS closely 


-Follow volume status clsoely


-consider thoracentesis


-Follow resp status closely





Consultation Date/Type/Reason


Admit Date/Time


Jan 22, 2019 at 00:03


Initial Consult Date


1/22/19


Type of Consult


Cardiology


Reason for Consultation


abnl ecg


Requesting Provider:  DONALDO CARRILLO MD


Date/Time of Note


DATE: 2/15/19 


TIME: 11:38





Exam/Review of Systems


Vital Signs


Vitals





Vital Signs


  Date      Temp  Pulse  Resp  B/P (MAP)   Pulse Ox  O2          O2 Flow    FiO2


Time                                                 Delivery    Rate


   2/15/19           88    28  93/69 (77)        93  Mechanical


     11:00                                           Ventilator


   2/15/19  98.0


     08:00


   2/15/19                                                                    50


     08:00


   2/13/19                                                            12.0


     14:27








Intake and Output





2/14/19


2/14/19


2/15/19





1515:00


23:00


07:00





IntakeIntake Total


1003.8 ml


1049.125 ml


1596.250 ml





OutputOutput Total


175 ml


400 ml


330 ml





BalanceBalance


828.8 ml


649.125 ml


1266.250 ml














Exam


Exam


Review of Systems:


CONSTITUTIONAL:  No fevers, chills.


PULMONARY: intubated


CARDIOVASCULAR: No chest pain/palpitations


GASTROINTESTINAL:  No nausea/vomiting.


GENITOURINARY:  No hematuria/dysuria.


MUSCULOSKELETAL:  No myagias/arthalgias.


PSYCHIATRIC:  The patient denies depression.


NEUROLOGIC:   No weakness


Constitutional:  alert


Psych:  no complaints


Head:  normocephalic


Neck:  supple, jvd (9 cm wateer)


Respiratory:  diminished breath sounds


Cardiovascular:  regular rate and rhythm


Gastrointestinal:  soft, non-tender


Musculoskeletal:  muscle tone (normal)


Extremities:  edema (none)


Neurological:  other (sedated)





Labs


Result Diagram:  


2/15/19 0400                                                                    


           2/15/19 0400





Results 24hrs





Laboratory Tests


Test
              2/14/19
13:56  2/15/19
04:00     2/15/19
07:00  2/15/19
07:45


Sodium Level              149  H         147  H


Potassium Level            4.3            4.0


Chloride Level             110            106


Carbon Dioxide             38  H          37  H


Level


Anion Gap                   1  L           4  L


Blood Urea                 40  H          45  H


Nitrogen


Creatinine                0.96           1.00


Est Glomerular     > 60  
        > 60  
        
                 



Filtrat


Rate
mL/min


Glucose Level             138  #          171


Calcium Level              8.5            8.5


White Blood Count                        3.5  L


Red Blood Count                         3.07  L


Hemoglobin                               8.6  L


Hematocrit                              28.3  L


Mean Corpuscular                         92.2


Volume


Mean Corpuscular                        28.0  L


Hemoglobin


Mean Corpuscular   
                   30.4  L
  
                 



Hemoglobin
Concen


t


Red Cell                                16.8  H


Distribution


Width


Platelet Count                           61  #L


Mean Platelet                           11.1  H


Volume


Immature                                0.300


Granulocytes %


Neutrophils %                           88.7  H


Lymphocytes %                            8.1  L


Monocytes %                               2.9


Eosinophils %                             0.0


Basophils %                               0.0


Nucleated Red                             0.0


Blood Cells %


Immature                                0.010


Granulocytes #


Neutrophils #                             3.1


Lymphocytes #                            0.3  L


Monocytes #                              0.1  L


Eosinophils #                             0.0


Basophils #                               0.0


Nucleated Red                             0.0


Blood Cells #


Phosphorus Level                          3.5


Magnesium Level                          2.7  H


Blood Gas          
              
              Blood arterial
   



Specimen Source



Arterial Blood     
              
              2/15/2019
7:28:3  



Date Drawn
                                                  4 AM


Arterial Blood pH  
              
                     7.455  H
  



(Temp
corrected)


Arterial Blood     
              
                      48.3  H
  



pCO2


(Temp
correct)


Arterial Blood     
              
                     101.2  H
  



pO2


(Temp
corrected)


Arterial Blood                                            33.2  H


HCO3


Arterial Blood                                             8.3  H


Base Excess


Arterial Blood     
              
                       96.8  
  



Oxygen
Saturation


Antoine Test                                       ACCEPTAB


Arterial Blood     
              
              Right Radial  
   



Gas Puncture
Site


Arterial           
              
                        0.3  
  



Blood
Carboxyhemo


globin


Arterial Blood                                              0.2


Methemoglobin


Blood Gas A-a O2                                         201.0  H


Differential


Oxyhemoglobin                                              96.3


Percent


Blood Gas                                                  37.0


Temperature


Blood Gas                                                  16.0


Respiration Rate


Blood Gas Actual   
              
                         23  
  



Respiration
Rate


Blood Gas                                        VENT - AC


Modality


FiO2                                                       50.0


Blood Gas Tidal                                           450.0


Volume


Blood Gas Low                                               5.0


PEEP Setting


Blood Gas                                        TM


Notified Whom


Blood Gas          
              
              2/15/2019
7:57:0  



Notified Time
                                               8 AM


Urine Osmolality                                                           400








Medications


Medications





Current Medications


Ondansetron HCl (Zofran Inj) 4 mg Q6H  PRN IV NAUSEA AND/OR VOMITING Last 


administered on 2/1/19at 18:46; Admin Dose 4 MG;  Start 1/21/19 at 23:30


Acetaminophen (Tylenol Supp) 650 mg Q4H  PRN [place] PAIN LEVEL 1-3 OR FEVER;  Start 


1/21/19 at 23:30


Aspirin (Aspirin) 81 mg DAILY PO  Last administered on 2/15/19at 08:24; Admin 


Dose 81 MG;  Start 1/24/19 at 09:00


Famotidine (Pepcid) 20 mg Q12 PO  Last administered on 2/15/19at 08:24; Admin 


Dose 20 MG;  Start 1/24/19 at 21:00


Zolpidem Tartrate (Ambien) 5 mg HS  PRN PO INSOMNIA Last administered on 


2/5/19at 20:31; Admin Dose 5 MG;  Start 1/27/19 at 00:00


Guaifenesin/ Dextromethorphan (Robitussin Dm Liquid Cup) 5 ml Q6H  PRN PO COUGH 


Last administered on 2/10/19at 05:16; Admin Dose 5 ML;  Start 1/27/19 at 20:00


Morphine Sulfate (morphine) 6 mg Q4H  PRN PO SEVERE PAIN LEVEL 7-10 Last 


administered on 2/12/19at 12:44; Admin Dose 6 MG;  Start 1/30/19 at 21:30


Albuterol/ Ipratropium (Duoneb) 3 ml Q3H RESP THERAPY  PRN HHN SHORTNESS OF 


BREATH Last administered on 2/7/19at 18:47; Admin Dose 3 ML;  Start 1/31/19 at 


20:00


Methylprednisolone Sodium Succinate (Solu-Medrol) 40 mg Q6 IV  Last administered


on 2/15/19at 05:46; Admin Dose 40 MG;  Start 2/2/19 at 12:00


Alteplase, Recombinant (Cathflo (Activase)) 2 mg MAY REPEAT X1 PRN CATHETER IF C


ATHETER REMAINS OCCULUDED;  Start 2/2/19 at 11:00


Amlodipine Besylate (Norvasc) 5 mg DAILY PO  Last administered on 2/15/19at 


08:24; Admin Dose 5 MG;  Start 2/5/19 at 09:00


Albuterol/ Ipratropium (Duoneb) 3 ml Q6H RESP  THERAPY HHN  Last administered on


2/15/19at 10:54; Admin Dose 3 ML;  Start 2/4/19 at 20:00


Lorazepam (Ativan) 1 mg Q6H  PRN IV PSYCHOSIS Last administered on 2/15/19at 


02:58; Admin Dose 1 MG;  Start 2/7/19 at 02:00


Haloperidol (Haldol) 2 mg PRN  PRN IM AGITATION Last administered on 2/12/19at 


00:44; Admin Dose 2 MG;  Start 2/8/19 at 06:30


Risperidone (Risperdal) 2 mg BID PO  Last administered on 2/15/19at 08:24; Admin


Dose 2 MG;  Start 2/9/19 at 21:00


Divalproex Sodium (Depakote Sprinkle) 250 mg Q8H PO  Last administered on 


2/15/19at 04:19; Admin Dose 250 MG;  Start 2/10/19 at 21:00


Dextrose 1,000 ml @  50 mls/hr Q20H IV  Last administered on 2/15/19at 04:19; 


Admin Dose 50 MLS/HR;  Start 2/12/19 at 06:30


Propofol 100 ml @  1.875 mls/ hr Q12H IV  Last administered on 2/15/19at 01:14; 


Admin Dose 7.5 MLS/HR;  Start 2/13/19 at 15:30


Desmopressin Acetate (Ddavp) 2 mcg BID IV  Last administered on 2/15/19at 08:25;


Admin Dose 2 MCG;  Start 2/14/19 at 09:00


Dexmedetomidine HCl 200 mcg/ Sodium Chloride 50 ml @ 0 mls/hr TITRATE IV  Last 


administered on 2/15/19at 10:24; Admin Dose 3.13 MLS/HR;  Start 2/15/19 at 09:30


Ascorbic Acid (Vitamin C) 500 mg DAILY GTB ;  Start 2/15/19 at 10:30











CARISA IZAGUIRRE               Feb 15, 2019 11:41

## 2019-02-15 NOTE — PN
DATE:  02/15/2019

 

 

SUBJECTIVE:  The patient remains intubated on full ventilatory support.  No other acute events noted.
  The patient's urinary output has decreased in the last 24 hours.  No hemoptysis, hematemesis, hemat
ochezia.

 

OBJECTIVE:

VITAL SIGNS:  Blood pressure is 134/74, respiration 21, pulse 93, temperature 97.9.

HEENT:  Head is normocephalic.

NECK:  Supple.

HEART:  Regular rate.

LUNGS:  Show diminished breath sounds at the base.

ABDOMEN:  Soft, nontender to palpation without rebound or guarding.

EXTREMITIES:  Negative for clubbing, cyanosis, no edema.

DERMATOLOGIC:  No rashes.

MUSCULOSKELETAL:  No joint effusion.

NEUROLOGIC:  No change in exam.

 

MEDICATIONS:  Reviewed.

 

LABORATORY DATA:  Shows sodium of 147, potassium 4.0, BUN 45, creatinine 1.0.  Urinalysis was reviewe
d.  ABG was reviewed.

 

IMAGING STUDIES:  Reviewed.

 

ASSESSMENT AND PLAN:

1.  Hypernatremia, etiology is secondary to partial diabetes insipidus, possible nephrogenic versus c
entral in etiology.  The patient's urine osmolarity is inappropriately low for patient's level of hyp
ernatremia.  The patient has been placed on a trial of desmopressin with a mild decrease in urinary o
utput.  Sodium levels have slowly been improving.  Plan is to repeat urine osmolarity to see if there
 is any increase in concentration while on desmopressin.  Otherwise, continue free water flushes, con
tinue D5 water.  Continue to monitor serial sodium levels closely.

2.  Nonoliguric acute kidney injury with unknown baseline creatinine.  Etiology secondary to hemodyna
mics, possible tubular injury.  The patient's renal function is slowly declining.  At this point, con
tinue current treatment plan.  Continue supportive care, renally dose all meds, avoid nephrotoxins.

3.  Metabolic alkalosis with respiratory compensation.  Continue to monitor.

4.  Anemia, monitor hemoglobin and hematocrit levels.  

5.  Mineral bone disorder, monitor calcium and phosphorus levels.

6.  Hypomagnesemia.  Continue to monitor and replete.

7.  History of diastolic heart failure.  The patient appears compensated.  Continue to monitor.

8.  Pleural effusion, status post thoracentesis.

9.  Ventilator dependent respiratory failure.  Vent settings and ABG was reviewed.  Continue to monit
or. Followup with pulmonary..

10.  Acute encephalopathy, etiology toxic metabolic.

11.  Dysphagia, status post percutaneous endoscopic gastrostomy, continue tube feeding.

12.  Sepsis secondary to pneumonia.  Patient completed antibiotic course.

13.  Deep venous thrombosis.  Continue medical management.

14.  Status post cardiac arrest.

15.  Status post pneumothorax.

16.  Status post pneumoperitoneum.

 

 

Dictated By: MARYAN CHAMPAGNE DO

 

NR/NTS

DD:    02/15/2019 07:37:53

DT:    02/15/2019 10:35:55

Conf#: [id number]

DID#:  [id number]

CC: MAURI GRIGSBY MD;*EndCC*

## 2019-02-15 NOTE — CONS
Assessment/Plan


Assessment/Plan


Hospital Course (Demo Recall)


No acute events patient is intubated sedated in no distress he is afebrile, off 


antibiotics.  WBC 3.5 neutrophils 88.7 BUN 45 creatinine 1





Microbiology: Sputum culture growing gram-negative rods





Chest x-ray this morning revealed similar appearance of right greater than left 


basilar infiltrates





Indwelling: Endotracheal tube PEG Blake





Physical examination: Well-developed chronically ill-appearing cachectic elderly


man.  Head atraumatic normocephalic sclera nonicteric, neck is supple chest rise


symmetrical breath sounds diminished bases.  Heart: S1-S2.  Abdomen soft bowel 


sounds present.  Extremities without edema/cyanosis





Assessment:


1.  Respiratory failure, possible recurrent pneumonia


2.  Dysphagia with ongoing aspiration


4.  Pneumoperitoneum of unclear etiology, no perforation per surgical note, 


status post chest tube


5.  Dementia


5.  Anemia with progressive thrombocytopenia


6.  History of non-ST elevation MI


7.  Status post cardiac arrest


8.  RIJ TLC





Plan: Remains stable, we are going to start cefepime, await for final sputum 


cultures, continue vent management per pulmonary





Consultation Date/Type/Reason


Admit Date/Time


Jan 22, 2019 at 00:03


Initial Consult Date


1/22/19


Type of Consult


ID


Requesting Provider:  DONALDO CARRILLO MD


Date/Time of Note


DATE: 2/15/19 


TIME: 12:55





Exam/Review of Systems


Exam


Vitals





Vital Signs


  Date      Temp  Pulse  Resp  B/P (MAP)   Pulse Ox  O2          O2 Flow    FiO2


Time                                                 Delivery    Rate


   2/15/19  98.4     85    24      115/75        96  Mechanical


     12:00                           (88)            Ventilator


   2/15/19                                                                    50


     08:00


   2/13/19                                                            12.0


     14:27








Intake and Output





2/14/19


2/14/19


2/15/19





1515:00


23:00


07:00





IntakeIntake Total


1003.8 ml


1049.125 ml


1596.250 ml





OutputOutput Total


175 ml


400 ml


330 ml





BalanceBalance


828.8 ml


649.125 ml


1266.250 ml














Results


Result Diagram:  


2/15/19 0400                                                                    


           2/15/19 0400





Results 24hrs





Laboratory Tests


Test
              2/14/19
13:56  2/15/19
04:00     2/15/19
07:00  2/15/19
07:45


Sodium Level              149  H         147  H


Potassium Level            4.3            4.0


Chloride Level             110            106


Carbon Dioxide             38  H          37  H


Level


Anion Gap                   1  L           4  L


Blood Urea                 40  H          45  H


Nitrogen


Creatinine                0.96           1.00


Est Glomerular     > 60  
        > 60  
        
                 



Filtrat


Rate
mL/min


Glucose Level             138  #          171


Calcium Level              8.5            8.5


White Blood Count                        3.5  L


Red Blood Count                         3.07  L


Hemoglobin                               8.6  L


Hematocrit                              28.3  L


Mean Corpuscular                         92.2


Volume


Mean Corpuscular                        28.0  L


Hemoglobin


Mean Corpuscular   
                   30.4  L
  
                 



Hemoglobin
Concen


t


Red Cell                                16.8  H


Distribution


Width


Platelet Count                           61  #L


Mean Platelet                           11.1  H


Volume


Immature                                0.300


Granulocytes %


Neutrophils %                           88.7  H


Lymphocytes %                            8.1  L


Monocytes %                               2.9


Eosinophils %                             0.0


Basophils %                               0.0


Nucleated Red                             0.0


Blood Cells %


Immature                                0.010


Granulocytes #


Neutrophils #                             3.1


Lymphocytes #                            0.3  L


Monocytes #                              0.1  L


Eosinophils #                             0.0


Basophils #                               0.0


Nucleated Red                             0.0


Blood Cells #


Phosphorus Level                          3.5


Magnesium Level                          2.7  H


Blood Gas          
              
              Blood arterial
   



Specimen Source



Arterial Blood     
              
              2/15/2019
7:28:3  



Date Drawn
                                                  4 AM


Arterial Blood pH  
              
                     7.455  H
  



(Temp
corrected)


Arterial Blood     
              
                      48.3  H
  



pCO2


(Temp
correct)


Arterial Blood     
              
                     101.2  H
  



pO2


(Temp
corrected)


Arterial Blood                                            33.2  H


HCO3


Arterial Blood                                             8.3  H


Base Excess


Arterial Blood     
              
                       96.8  
  



Oxygen
Saturation


Antoine Test                                       ACCEPTAB


Arterial Blood     
              
              Right Radial  
   



Gas Puncture
Site


Arterial           
              
                        0.3  
  



Blood
Carboxyhemo


globin


Arterial Blood                                              0.2


Methemoglobin


Blood Gas A-a O2                                         201.0  H


Differential


Oxyhemoglobin                                              96.3


Percent


Blood Gas                                                  37.0


Temperature


Blood Gas                                                  16.0


Respiration Rate


Blood Gas Actual   
              
                         23  
  



Respiration
Rate


Blood Gas                                        VENT - AC


Modality


FiO2                                                       50.0


Blood Gas Tidal                                           450.0


Volume


Blood Gas Low                                               5.0


PEEP Setting


Blood Gas                                        TM


Notified Whom


Blood Gas          
              
              2/15/2019
7:57:0  



Notified Time
                                               8 AM


Urine Osmolality                                                           400








Medications


Medication





Current Medications


Ondansetron HCl (Zofran Inj) 4 mg Q6H  PRN IV NAUSEA AND/OR VOMITING Last 


administered on 2/1/19at 18:46; Admin Dose 4 MG;  Start 1/21/19 at 23:30


Acetaminophen (Tylenol Supp) 650 mg Q4H  PRN [place] PAIN LEVEL 1-3 OR FEVER;  Start 


1/21/19 at 23:30


Aspirin (Aspirin) 81 mg DAILY PO  Last administered on 2/15/19at 08:24; Admin 


Dose 81 MG;  Start 1/24/19 at 09:00


Famotidine (Pepcid) 20 mg Q12 PO  Last administered on 2/15/19at 08:24; Admin 


Dose 20 MG;  Start 1/24/19 at 21:00


Zolpidem Tartrate (Ambien) 5 mg HS  PRN PO INSOMNIA Last administered on 


2/5/19at 20:31; Admin Dose 5 MG;  Start 1/27/19 at 00:00


Guaifenesin/ Dextromethorphan (Robitussin Dm Liquid Cup) 5 ml Q6H  PRN PO COUGH 


Last administered on 2/10/19at 05:16; Admin Dose 5 ML;  Start 1/27/19 at 20:00


Morphine Sulfate (morphine) 6 mg Q4H  PRN PO SEVERE PAIN LEVEL 7-10 Last 


administered on 2/12/19at 12:44; Admin Dose 6 MG;  Start 1/30/19 at 21:30


Albuterol/ Ipratropium (Duoneb) 3 ml Q3H RESP THERAPY  PRN HHN SHORTNESS OF 


BREATH Last administered on 2/7/19at 18:47; Admin Dose 3 ML;  Start 1/31/19 at 


20:00


Methylprednisolone Sodium Succinate (Solu-Medrol) 40 mg Q6 IV  Last administered


on 2/15/19at 05:46; Admin Dose 40 MG;  Start 2/2/19 at 12:00


Alteplase, Recombinant (Cathflo (Activase)) 2 mg MAY REPEAT X1 PRN CATHETER IF 


CATHETER REMAINS OCCULUDED;  Start 2/2/19 at 11:00


Albuterol/ Ipratropium (Duoneb) 3 ml Q6H RESP  THERAPY HHN  Last administered on


2/15/19at 10:54; Admin Dose 3 ML;  Start 2/4/19 at 20:00


Lorazepam (Ativan) 1 mg Q6H  PRN IV PSYCHOSIS Last administered on 2/15/19at 


02:58; Admin Dose 1 MG;  Start 2/7/19 at 02:00


Haloperidol (Haldol) 2 mg PRN  PRN IM AGITATION Last administered on 2/12/19at 


00:44; Admin Dose 2 MG;  Start 2/8/19 at 06:30


Risperidone (Risperdal) 2 mg BID PO  Last administered on 2/15/19at 08:24; Admin


Dose 2 MG;  Start 2/9/19 at 21:00


Divalproex Sodium (Depakote Sprinkle) 250 mg Q8H PO  Last administered on 


2/15/19at 04:19; Admin Dose 250 MG;  Start 2/10/19 at 21:00


Dextrose 1,000 ml @  50 mls/hr Q20H IV  Last administered on 2/15/19at 04:19; 


Admin Dose 50 MLS/HR;  Start 2/12/19 at 06:30


Propofol 100 ml @  1.875 mls/ hr Q12H IV  Last administered on 2/15/19at 01:14; 


Admin Dose 7.5 MLS/HR;  Start 2/13/19 at 15:30


Desmopressin Acetate (Ddavp) 2 mcg BID IV  Last administered on 2/15/19at 08:25;


Admin Dose 2 MCG;  Start 2/14/19 at 09:00


Dexmedetomidine HCl 200 mcg/ Sodium Chloride 50 ml @ 0 mls/hr TITRATE IV  Last 


administered on 2/15/19at 10:24; Admin Dose 3.13 MLS/HR;  Start 2/15/19 at 09:30


Ascorbic Acid (Vitamin C) 500 mg DAILY GTB ;  Start 2/15/19 at 10:30


Amlodipine Besylate (Norvasc) 2.5 mg DAILY PO ;  Start 2/16/19 at 09:00











DAVID VÁZQUEZ NP            Feb 15, 2019 12:56

## 2019-02-15 NOTE — CONS
Consult Date/Type/Reason


Admit Date/Time


Jan 22, 2019 at 00:03


Initial Consult Date


1/22/19


Type of Consult


Pulmonary


Requesting Provider:  DONALDO CARRILLO MD


Date/Time of Note


DATE: 2/15/19 


TIME: 10:54





Subjective


Patient remains on mechanical ventilation agonal respiration failed multiple 


weaning trials.  Continues tube feeding via recently placed PEG tube.





Objective





Vital Signs


  Date      Temp  Pulse  Resp  B/P (MAP)   Pulse Ox  O2          O2 Flow    FiO2


Time                                                 Delivery    Rate


   2/15/19           91    48  83/66 (72)        97  Mechanical


     09:00                                           Ventilator


   2/15/19  98.0


     08:00


   2/15/19                                                                    50


     08:00


   2/13/19                                                            12.0


     14:27








Intake and Output





2/14/19


2/14/19


2/15/19





1515:00


23:00


07:00





IntakeIntake Total


1003.8 ml


1049.125 ml


1596.250 ml





OutputOutput Total


175 ml


400 ml


330 ml





BalanceBalance


828.8 ml


649.125 ml


1266.250 ml











Exam


GENERAL: Frail elderly gentleman, on mechanical ventilation orally intubated


VITAL SIGNS:  per chart


NECK:  Supple.  No JVD or lymphadenopathy.


CARDIAC EXAM:  S1, S2. No added sounds or murmurs.


CHEST: Diminished air entry bilaterally


ABDOMEN:  Soft, nontender.  No guarding or rebound.


EXTREMITIES:  No cyanosis, clubbing or edema.


NEUROLOGIC:  Generalized weakness.





Vent Setting


Ventilator Support Mode:  AC


Fraction of Inspired Oxygen pe:  50


Positive End Expiratory Pressu:  5.0





Results/Medications


Result Diagram:  


2/15/19 0400                                                                    


           2/15/19 0400





Results 24 hrs





Laboratory Tests


Test
              2/14/19
13:56  2/15/19
04:00     2/15/19
07:00  2/15/19
07:45


Sodium Level              149  H         147  H


Potassium Level            4.3            4.0


Chloride Level             110            106


Carbon Dioxide             38  H          37  H


Level


Anion Gap                   1  L           4  L


Blood Urea                 40  H          45  H


Nitrogen


Creatinine                0.96           1.00


Est Glomerular     > 60  
        > 60  
        
                 



Filtrat


Rate
mL/min


Glucose Level             138  #          171


Calcium Level              8.5            8.5


White Blood Count                        3.5  L


Red Blood Count                         3.07  L


Hemoglobin                               8.6  L


Hematocrit                              28.3  L


Mean Corpuscular                         92.2


Volume


Mean Corpuscular                        28.0  L


Hemoglobin


Mean Corpuscular   
                   30.4  L
  
                 



Hemoglobin
Concen


t


Red Cell                                16.8  H


Distribution


Width


Platelet Count                           61  #L


Mean Platelet                           11.1  H


Volume


Immature                                0.300


Granulocytes %


Neutrophils %                           88.7  H


Lymphocytes %                            8.1  L


Monocytes %                               2.9


Eosinophils %                             0.0


Basophils %                               0.0


Nucleated Red                             0.0


Blood Cells %


Immature                                0.010


Granulocytes #


Neutrophils #                             3.1


Lymphocytes #                            0.3  L


Monocytes #                              0.1  L


Eosinophils #                             0.0


Basophils #                               0.0


Nucleated Red                             0.0


Blood Cells #


Phosphorus Level                          3.5


Magnesium Level                          2.7  H


Blood Gas          
              
              Blood arterial
   



Specimen Source



Arterial Blood     
              
              2/15/2019
7:28:3  



Date Drawn
                                                  4 AM


Arterial Blood pH  
              
                     7.455  H
  



(Temp
corrected)


Arterial Blood     
              
                      48.3  H
  



pCO2


(Temp
correct)


Arterial Blood     
              
                     101.2  H
  



pO2


(Temp
corrected)


Arterial Blood                                            33.2  H


HCO3


Arterial Blood                                             8.3  H


Base Excess


Arterial Blood     
              
                       96.8  
  



Oxygen
Saturation


Antoine Test                                       ACCEPTAB


Arterial Blood     
              
              Right Radial  
   



Gas Puncture
Site


Arterial           
              
                        0.3  
  



Blood
Carboxyhemo


globin


Arterial Blood                                              0.2


Methemoglobin


Blood Gas A-a O2                                         201.0  H


Differential


Oxyhemoglobin                                              96.3


Percent


Blood Gas                                                  37.0


Temperature


Blood Gas                                                  16.0


Respiration Rate


Blood Gas Actual   
              
                         23  
  



Respiration
Rate


Blood Gas                                        VENT - AC


Modality


FiO2                                                       50.0


Blood Gas Tidal                                           450.0


Volume


Blood Gas Low                                               5.0


PEEP Setting


Blood Gas                                        TM


Notified Whom


Blood Gas          
              
              2/15/2019
7:57:0  



Notified Time
                                               8 AM


Urine Osmolality                                                           400





Medications





Current Medications


Ondansetron HCl (Zofran Inj) 4 mg Q6H  PRN IV NAUSEA AND/OR VOMITING Last 


administered on 2/1/19at 18:46; Admin Dose 4 MG;  Start 1/21/19 at 23:30


Acetaminophen (Tylenol Supp) 650 mg Q4H  PRN [place] PAIN LEVEL 1-3 OR FEVER;  Start 


1/21/19 at 23:30


Aspirin (Aspirin) 81 mg DAILY PO  Last administered on 2/15/19at 08:24; Admin 


Dose 81 MG;  Start 1/24/19 at 09:00


Famotidine (Pepcid) 20 mg Q12 PO  Last administered on 2/15/19at 08:24; Admin 


Dose 20 MG;  Start 1/24/19 at 21:00


Zolpidem Tartrate (Ambien) 5 mg HS  PRN PO INSOMNIA Last administered on 


2/5/19at 20:31; Admin Dose 5 MG;  Start 1/27/19 at 00:00


Guaifenesin/ Dextromethorphan (Robitussin Dm Liquid Cup) 5 ml Q6H  PRN PO COUGH 


Last administered on 2/10/19at 05:16; Admin Dose 5 ML;  Start 1/27/19 at 20:00


Morphine Sulfate (morphine) 6 mg Q4H  PRN PO SEVERE PAIN LEVEL 7-10 Last 


administered on 2/12/19at 12:44; Admin Dose 6 MG;  Start 1/30/19 at 21:30


Albuterol/ Ipratropium (Duoneb) 3 ml Q3H RESP THERAPY  PRN HHN SHORTNESS OF 


BREATH Last administered on 2/7/19at 18:47; Admin Dose 3 ML;  Start 1/31/19 at 


20:00


Methylprednisolone Sodium Succinate (Solu-Medrol) 40 mg Q6 IV  Last administered


on 2/15/19at 05:46; Admin Dose 40 MG;  Start 2/2/19 at 12:00


Alteplase, Recombinant (Cathflo (Activase)) 2 mg MAY REPEAT X1 PRN CATHETER IF 


CATHETER REMAINS OCCULUDED;  Start 2/2/19 at 11:00


Amlodipine Besylate (Norvasc) 5 mg DAILY PO  Last administered on 2/15/19at 


08:24; Admin Dose 5 MG;  Start 2/5/19 at 09:00


Albuterol/ Ipratropium (Duoneb) 3 ml Q6H RESP  THERAPY HHN  Last administered on


2/15/19at 01:26; Admin Dose 3 ML;  Start 2/4/19 at 20:00


Lorazepam (Ativan) 1 mg Q6H  PRN IV PSYCHOSIS Last administered on 2/15/19at 


02:58; Admin Dose 1 MG;  Start 2/7/19 at 02:00


Haloperidol (Haldol) 2 mg PRN  PRN IM AGITATION Last administered on 2/12/19at 


00:44; Admin Dose 2 MG;  Start 2/8/19 at 06:30


Risperidone (Risperdal) 2 mg BID PO  Last administered on 2/15/19at 08:24; Admin


Dose 2 MG;  Start 2/9/19 at 21:00


Divalproex Sodium (Depakote Sprinkle) 250 mg Q8H PO  Last administered on 


2/15/19at 04:19; Admin Dose 250 MG;  Start 2/10/19 at 21:00


Dextrose 1,000 ml @  50 mls/hr Q20H IV  Last administered on 2/15/19at 04:19; 


Admin Dose 50 MLS/HR;  Start 2/12/19 at 06:30


Propofol 100 ml @  1.875 mls/ hr Q12H IV  Last administered on 2/15/19at 01:14; 


Admin Dose 7.5 MLS/HR;  Start 2/13/19 at 15:30


Desmopressin Acetate (Ddavp) 2 mcg BID IV  Last administered on 2/15/19at 08:25;


Admin Dose 2 MCG;  Start 2/14/19 at 09:00


Dexmedetomidine HCl 200 mcg/ Sodium Chloride 50 ml @ 0 mls/hr TITRATE IV  Last 


administered on 2/15/19at 10:24; Admin Dose 3.13 MLS/HR;  Start 2/15/19 at 09:30


Ascorbic Acid (Vitamin C) 500 mg DAILY GTB ;  Start 2/15/19 at 10:30





Assessment/Plan


Hospital Course (Demo Recall)


iMP: 


1.  Acute on chronic hypoxic and hypercapnic respiratory failure--worse overnig


ht with increased mucus plugging.  Currently not able to liberate from 


mechanical ventilation


3.  Encephalopathy toxic metabolic resolving


4.  Advanced COPD


5.  Hypernatremia likely free water deficit


6.  Thrombocytopenia 


7.  Anemia 





RECS:


1.  Failed multiple weaning trials.  Continue mechanical ventilation.  Patient 


may require tracheostomy.


2.  Continue tube feeding as tolerated


3.  Pulmonary toilet


4.  Consider increasing free water





Critical care time 40 minutes.











MIMI ZAVALA MD, [facility]     Feb 15, 2019 10:56

## 2019-02-16 VITALS — SYSTOLIC BLOOD PRESSURE: 103 MMHG | RESPIRATION RATE: 15 BRPM | DIASTOLIC BLOOD PRESSURE: 72 MMHG | HEART RATE: 81 BPM

## 2019-02-16 VITALS — RESPIRATION RATE: 22 BRPM | SYSTOLIC BLOOD PRESSURE: 107 MMHG | DIASTOLIC BLOOD PRESSURE: 67 MMHG | HEART RATE: 75 BPM

## 2019-02-16 VITALS — DIASTOLIC BLOOD PRESSURE: 66 MMHG | HEART RATE: 52 BPM | RESPIRATION RATE: 20 BRPM | SYSTOLIC BLOOD PRESSURE: 109 MMHG

## 2019-02-16 VITALS — RESPIRATION RATE: 27 BRPM | SYSTOLIC BLOOD PRESSURE: 105 MMHG | HEART RATE: 73 BPM | DIASTOLIC BLOOD PRESSURE: 64 MMHG

## 2019-02-16 VITALS — DIASTOLIC BLOOD PRESSURE: 75 MMHG | HEART RATE: 62 BPM | RESPIRATION RATE: 18 BRPM | SYSTOLIC BLOOD PRESSURE: 109 MMHG

## 2019-02-16 VITALS — DIASTOLIC BLOOD PRESSURE: 58 MMHG | HEART RATE: 67 BPM | RESPIRATION RATE: 20 BRPM | SYSTOLIC BLOOD PRESSURE: 92 MMHG

## 2019-02-16 VITALS — RESPIRATION RATE: 16 BRPM | HEART RATE: 75 BPM | SYSTOLIC BLOOD PRESSURE: 100 MMHG | DIASTOLIC BLOOD PRESSURE: 67 MMHG

## 2019-02-16 VITALS — HEART RATE: 89 BPM | DIASTOLIC BLOOD PRESSURE: 69 MMHG | SYSTOLIC BLOOD PRESSURE: 98 MMHG | RESPIRATION RATE: 25 BRPM

## 2019-02-16 VITALS — HEART RATE: 77 BPM | RESPIRATION RATE: 26 BRPM | SYSTOLIC BLOOD PRESSURE: 111 MMHG | DIASTOLIC BLOOD PRESSURE: 71 MMHG

## 2019-02-16 VITALS — RESPIRATION RATE: 18 BRPM | DIASTOLIC BLOOD PRESSURE: 74 MMHG | SYSTOLIC BLOOD PRESSURE: 108 MMHG | HEART RATE: 64 BPM

## 2019-02-16 VITALS — SYSTOLIC BLOOD PRESSURE: 93 MMHG | DIASTOLIC BLOOD PRESSURE: 65 MMHG | RESPIRATION RATE: 21 BRPM | HEART RATE: 88 BPM

## 2019-02-16 VITALS — SYSTOLIC BLOOD PRESSURE: 103 MMHG | HEART RATE: 82 BPM | RESPIRATION RATE: 25 BRPM | DIASTOLIC BLOOD PRESSURE: 78 MMHG

## 2019-02-16 VITALS — DIASTOLIC BLOOD PRESSURE: 72 MMHG | HEART RATE: 97 BPM | SYSTOLIC BLOOD PRESSURE: 115 MMHG | RESPIRATION RATE: 15 BRPM

## 2019-02-16 VITALS — HEART RATE: 73 BPM | SYSTOLIC BLOOD PRESSURE: 87 MMHG | RESPIRATION RATE: 24 BRPM | DIASTOLIC BLOOD PRESSURE: 61 MMHG

## 2019-02-16 VITALS — SYSTOLIC BLOOD PRESSURE: 106 MMHG | RESPIRATION RATE: 25 BRPM | HEART RATE: 79 BPM | DIASTOLIC BLOOD PRESSURE: 67 MMHG

## 2019-02-16 VITALS — DIASTOLIC BLOOD PRESSURE: 78 MMHG | HEART RATE: 64 BPM | RESPIRATION RATE: 19 BRPM | SYSTOLIC BLOOD PRESSURE: 111 MMHG

## 2019-02-16 VITALS — HEART RATE: 62 BPM | DIASTOLIC BLOOD PRESSURE: 78 MMHG | RESPIRATION RATE: 20 BRPM | SYSTOLIC BLOOD PRESSURE: 117 MMHG

## 2019-02-16 VITALS — RESPIRATION RATE: 16 BRPM | SYSTOLIC BLOOD PRESSURE: 83 MMHG | DIASTOLIC BLOOD PRESSURE: 57 MMHG | HEART RATE: 67 BPM

## 2019-02-16 VITALS — RESPIRATION RATE: 15 BRPM | HEART RATE: 82 BPM | SYSTOLIC BLOOD PRESSURE: 103 MMHG | DIASTOLIC BLOOD PRESSURE: 73 MMHG

## 2019-02-16 VITALS — DIASTOLIC BLOOD PRESSURE: 81 MMHG | RESPIRATION RATE: 25 BRPM | SYSTOLIC BLOOD PRESSURE: 120 MMHG | HEART RATE: 86 BPM

## 2019-02-16 VITALS — SYSTOLIC BLOOD PRESSURE: 107 MMHG | DIASTOLIC BLOOD PRESSURE: 78 MMHG | RESPIRATION RATE: 24 BRPM | HEART RATE: 81 BPM

## 2019-02-16 VITALS — SYSTOLIC BLOOD PRESSURE: 106 MMHG | HEART RATE: 61 BPM | DIASTOLIC BLOOD PRESSURE: 73 MMHG | RESPIRATION RATE: 16 BRPM

## 2019-02-16 VITALS — HEART RATE: 91 BPM | DIASTOLIC BLOOD PRESSURE: 72 MMHG | RESPIRATION RATE: 22 BRPM | SYSTOLIC BLOOD PRESSURE: 104 MMHG

## 2019-02-16 VITALS — DIASTOLIC BLOOD PRESSURE: 74 MMHG | HEART RATE: 81 BPM | SYSTOLIC BLOOD PRESSURE: 110 MMHG | RESPIRATION RATE: 25 BRPM

## 2019-02-16 VITALS — HEART RATE: 100 BPM | DIASTOLIC BLOOD PRESSURE: 74 MMHG | SYSTOLIC BLOOD PRESSURE: 114 MMHG | RESPIRATION RATE: 23 BRPM

## 2019-02-16 VITALS — DIASTOLIC BLOOD PRESSURE: 54 MMHG | RESPIRATION RATE: 24 BRPM | HEART RATE: 67 BPM | SYSTOLIC BLOOD PRESSURE: 82 MMHG

## 2019-02-16 VITALS — RESPIRATION RATE: 18 BRPM | DIASTOLIC BLOOD PRESSURE: 76 MMHG | HEART RATE: 61 BPM | SYSTOLIC BLOOD PRESSURE: 116 MMHG

## 2019-02-16 VITALS — RESPIRATION RATE: 23 BRPM | SYSTOLIC BLOOD PRESSURE: 99 MMHG | HEART RATE: 79 BPM | DIASTOLIC BLOOD PRESSURE: 66 MMHG

## 2019-02-16 VITALS — DIASTOLIC BLOOD PRESSURE: 46 MMHG | SYSTOLIC BLOOD PRESSURE: 75 MMHG | HEART RATE: 61 BPM | RESPIRATION RATE: 19 BRPM

## 2019-02-16 VITALS — SYSTOLIC BLOOD PRESSURE: 109 MMHG | DIASTOLIC BLOOD PRESSURE: 74 MMHG | RESPIRATION RATE: 17 BRPM | HEART RATE: 82 BPM

## 2019-02-16 VITALS — RESPIRATION RATE: 27 BRPM | SYSTOLIC BLOOD PRESSURE: 98 MMHG | DIASTOLIC BLOOD PRESSURE: 71 MMHG | HEART RATE: 90 BPM

## 2019-02-16 VITALS — SYSTOLIC BLOOD PRESSURE: 106 MMHG | DIASTOLIC BLOOD PRESSURE: 68 MMHG | RESPIRATION RATE: 25 BRPM | HEART RATE: 77 BPM

## 2019-02-16 VITALS — DIASTOLIC BLOOD PRESSURE: 71 MMHG | RESPIRATION RATE: 27 BRPM | SYSTOLIC BLOOD PRESSURE: 105 MMHG | HEART RATE: 91 BPM

## 2019-02-16 VITALS — DIASTOLIC BLOOD PRESSURE: 68 MMHG | HEART RATE: 63 BPM | SYSTOLIC BLOOD PRESSURE: 103 MMHG | RESPIRATION RATE: 20 BRPM

## 2019-02-16 VITALS — DIASTOLIC BLOOD PRESSURE: 40 MMHG | SYSTOLIC BLOOD PRESSURE: 66 MMHG | RESPIRATION RATE: 19 BRPM | HEART RATE: 70 BPM

## 2019-02-16 VITALS — SYSTOLIC BLOOD PRESSURE: 105 MMHG | RESPIRATION RATE: 23 BRPM | HEART RATE: 62 BPM | DIASTOLIC BLOOD PRESSURE: 70 MMHG

## 2019-02-16 VITALS — DIASTOLIC BLOOD PRESSURE: 92 MMHG | HEART RATE: 82 BPM | SYSTOLIC BLOOD PRESSURE: 118 MMHG | RESPIRATION RATE: 22 BRPM

## 2019-02-16 VITALS — DIASTOLIC BLOOD PRESSURE: 65 MMHG | HEART RATE: 67 BPM | RESPIRATION RATE: 23 BRPM | SYSTOLIC BLOOD PRESSURE: 89 MMHG

## 2019-02-16 VITALS — HEART RATE: 61 BPM | RESPIRATION RATE: 20 BRPM | SYSTOLIC BLOOD PRESSURE: 110 MMHG | DIASTOLIC BLOOD PRESSURE: 76 MMHG

## 2019-02-16 VITALS — RESPIRATION RATE: 25 BRPM | SYSTOLIC BLOOD PRESSURE: 102 MMHG | DIASTOLIC BLOOD PRESSURE: 75 MMHG | HEART RATE: 84 BPM

## 2019-02-16 VITALS — HEART RATE: 63 BPM | SYSTOLIC BLOOD PRESSURE: 109 MMHG | DIASTOLIC BLOOD PRESSURE: 75 MMHG | RESPIRATION RATE: 15 BRPM

## 2019-02-16 VITALS — SYSTOLIC BLOOD PRESSURE: 118 MMHG | HEART RATE: 62 BPM | DIASTOLIC BLOOD PRESSURE: 83 MMHG | RESPIRATION RATE: 18 BRPM

## 2019-02-16 VITALS — SYSTOLIC BLOOD PRESSURE: 107 MMHG | RESPIRATION RATE: 17 BRPM | HEART RATE: 89 BPM | DIASTOLIC BLOOD PRESSURE: 79 MMHG

## 2019-02-16 VITALS — RESPIRATION RATE: 18 BRPM | DIASTOLIC BLOOD PRESSURE: 65 MMHG | SYSTOLIC BLOOD PRESSURE: 104 MMHG | HEART RATE: 69 BPM

## 2019-02-16 VITALS — SYSTOLIC BLOOD PRESSURE: 109 MMHG | RESPIRATION RATE: 27 BRPM | HEART RATE: 81 BPM | DIASTOLIC BLOOD PRESSURE: 76 MMHG

## 2019-02-16 VITALS — DIASTOLIC BLOOD PRESSURE: 67 MMHG | SYSTOLIC BLOOD PRESSURE: 99 MMHG | RESPIRATION RATE: 22 BRPM | HEART RATE: 67 BPM

## 2019-02-16 VITALS — SYSTOLIC BLOOD PRESSURE: 93 MMHG | HEART RATE: 68 BPM | DIASTOLIC BLOOD PRESSURE: 62 MMHG | RESPIRATION RATE: 19 BRPM

## 2019-02-16 VITALS — SYSTOLIC BLOOD PRESSURE: 115 MMHG | HEART RATE: 64 BPM | RESPIRATION RATE: 19 BRPM | DIASTOLIC BLOOD PRESSURE: 80 MMHG

## 2019-02-16 VITALS — SYSTOLIC BLOOD PRESSURE: 99 MMHG | DIASTOLIC BLOOD PRESSURE: 71 MMHG | RESPIRATION RATE: 12 BRPM | HEART RATE: 74 BPM

## 2019-02-16 VITALS — RESPIRATION RATE: 19 BRPM | SYSTOLIC BLOOD PRESSURE: 109 MMHG | DIASTOLIC BLOOD PRESSURE: 75 MMHG | HEART RATE: 73 BPM

## 2019-02-16 VITALS — DIASTOLIC BLOOD PRESSURE: 73 MMHG | HEART RATE: 74 BPM | SYSTOLIC BLOOD PRESSURE: 105 MMHG | RESPIRATION RATE: 20 BRPM

## 2019-02-16 VITALS — SYSTOLIC BLOOD PRESSURE: 111 MMHG | HEART RATE: 62 BPM | DIASTOLIC BLOOD PRESSURE: 74 MMHG | RESPIRATION RATE: 18 BRPM

## 2019-02-16 VITALS — DIASTOLIC BLOOD PRESSURE: 67 MMHG | RESPIRATION RATE: 20 BRPM | HEART RATE: 63 BPM | SYSTOLIC BLOOD PRESSURE: 96 MMHG

## 2019-02-16 VITALS — DIASTOLIC BLOOD PRESSURE: 58 MMHG | SYSTOLIC BLOOD PRESSURE: 92 MMHG | HEART RATE: 60 BPM | RESPIRATION RATE: 18 BRPM

## 2019-02-16 VITALS — DIASTOLIC BLOOD PRESSURE: 74 MMHG | HEART RATE: 71 BPM | SYSTOLIC BLOOD PRESSURE: 102 MMHG | RESPIRATION RATE: 23 BRPM

## 2019-02-16 VITALS — HEART RATE: 64 BPM | SYSTOLIC BLOOD PRESSURE: 99 MMHG | DIASTOLIC BLOOD PRESSURE: 74 MMHG | RESPIRATION RATE: 20 BRPM

## 2019-02-16 VITALS — RESPIRATION RATE: 18 BRPM | DIASTOLIC BLOOD PRESSURE: 48 MMHG | SYSTOLIC BLOOD PRESSURE: 72 MMHG | HEART RATE: 65 BPM

## 2019-02-16 VITALS — DIASTOLIC BLOOD PRESSURE: 65 MMHG | RESPIRATION RATE: 20 BRPM | SYSTOLIC BLOOD PRESSURE: 98 MMHG | HEART RATE: 63 BPM

## 2019-02-16 VITALS — RESPIRATION RATE: 20 BRPM

## 2019-02-16 VITALS — DIASTOLIC BLOOD PRESSURE: 60 MMHG | RESPIRATION RATE: 19 BRPM | HEART RATE: 69 BPM | SYSTOLIC BLOOD PRESSURE: 82 MMHG

## 2019-02-16 VITALS — HEART RATE: 62 BPM | SYSTOLIC BLOOD PRESSURE: 106 MMHG | DIASTOLIC BLOOD PRESSURE: 78 MMHG | RESPIRATION RATE: 20 BRPM

## 2019-02-16 VITALS — DIASTOLIC BLOOD PRESSURE: 73 MMHG | RESPIRATION RATE: 24 BRPM | HEART RATE: 79 BPM | SYSTOLIC BLOOD PRESSURE: 108 MMHG

## 2019-02-16 VITALS — SYSTOLIC BLOOD PRESSURE: 94 MMHG | DIASTOLIC BLOOD PRESSURE: 71 MMHG | RESPIRATION RATE: 21 BRPM | HEART RATE: 66 BPM

## 2019-02-16 VITALS — RESPIRATION RATE: 19 BRPM | SYSTOLIC BLOOD PRESSURE: 96 MMHG | DIASTOLIC BLOOD PRESSURE: 64 MMHG | HEART RATE: 66 BPM

## 2019-02-16 VITALS — RESPIRATION RATE: 20 BRPM | DIASTOLIC BLOOD PRESSURE: 65 MMHG | SYSTOLIC BLOOD PRESSURE: 100 MMHG | HEART RATE: 64 BPM

## 2019-02-16 VITALS — DIASTOLIC BLOOD PRESSURE: 64 MMHG | HEART RATE: 68 BPM | SYSTOLIC BLOOD PRESSURE: 91 MMHG | RESPIRATION RATE: 22 BRPM

## 2019-02-16 VITALS — RESPIRATION RATE: 17 BRPM | DIASTOLIC BLOOD PRESSURE: 76 MMHG | HEART RATE: 62 BPM | SYSTOLIC BLOOD PRESSURE: 106 MMHG

## 2019-02-16 VITALS — HEART RATE: 69 BPM | DIASTOLIC BLOOD PRESSURE: 80 MMHG | RESPIRATION RATE: 19 BRPM | SYSTOLIC BLOOD PRESSURE: 111 MMHG

## 2019-02-16 VITALS — HEART RATE: 66 BPM | DIASTOLIC BLOOD PRESSURE: 74 MMHG | RESPIRATION RATE: 20 BRPM | SYSTOLIC BLOOD PRESSURE: 112 MMHG

## 2019-02-16 VITALS — DIASTOLIC BLOOD PRESSURE: 77 MMHG | RESPIRATION RATE: 18 BRPM | HEART RATE: 62 BPM | SYSTOLIC BLOOD PRESSURE: 118 MMHG

## 2019-02-16 VITALS — HEART RATE: 99 BPM | DIASTOLIC BLOOD PRESSURE: 85 MMHG | RESPIRATION RATE: 22 BRPM | SYSTOLIC BLOOD PRESSURE: 123 MMHG

## 2019-02-16 VITALS — DIASTOLIC BLOOD PRESSURE: 70 MMHG | HEART RATE: 64 BPM | SYSTOLIC BLOOD PRESSURE: 102 MMHG | RESPIRATION RATE: 21 BRPM

## 2019-02-16 VITALS — HEART RATE: 75 BPM | RESPIRATION RATE: 23 BRPM | SYSTOLIC BLOOD PRESSURE: 104 MMHG | DIASTOLIC BLOOD PRESSURE: 69 MMHG

## 2019-02-16 VITALS — RESPIRATION RATE: 20 BRPM | HEART RATE: 63 BPM | SYSTOLIC BLOOD PRESSURE: 104 MMHG | DIASTOLIC BLOOD PRESSURE: 72 MMHG

## 2019-02-16 VITALS — HEART RATE: 63 BPM | SYSTOLIC BLOOD PRESSURE: 106 MMHG | DIASTOLIC BLOOD PRESSURE: 71 MMHG | RESPIRATION RATE: 17 BRPM

## 2019-02-16 VITALS — SYSTOLIC BLOOD PRESSURE: 107 MMHG | RESPIRATION RATE: 21 BRPM | DIASTOLIC BLOOD PRESSURE: 75 MMHG | HEART RATE: 64 BPM

## 2019-02-16 VITALS — DIASTOLIC BLOOD PRESSURE: 54 MMHG | RESPIRATION RATE: 20 BRPM | HEART RATE: 62 BPM | SYSTOLIC BLOOD PRESSURE: 84 MMHG

## 2019-02-16 VITALS — SYSTOLIC BLOOD PRESSURE: 119 MMHG | DIASTOLIC BLOOD PRESSURE: 77 MMHG | RESPIRATION RATE: 24 BRPM | HEART RATE: 86 BPM

## 2019-02-16 VITALS — SYSTOLIC BLOOD PRESSURE: 109 MMHG | DIASTOLIC BLOOD PRESSURE: 75 MMHG | HEART RATE: 65 BPM | RESPIRATION RATE: 20 BRPM

## 2019-02-16 VITALS — SYSTOLIC BLOOD PRESSURE: 108 MMHG | HEART RATE: 61 BPM | RESPIRATION RATE: 20 BRPM | DIASTOLIC BLOOD PRESSURE: 77 MMHG

## 2019-02-16 VITALS — RESPIRATION RATE: 14 BRPM | DIASTOLIC BLOOD PRESSURE: 74 MMHG | SYSTOLIC BLOOD PRESSURE: 104 MMHG | HEART RATE: 77 BPM

## 2019-02-16 VITALS — RESPIRATION RATE: 20 BRPM | SYSTOLIC BLOOD PRESSURE: 99 MMHG | HEART RATE: 77 BPM | DIASTOLIC BLOOD PRESSURE: 67 MMHG

## 2019-02-16 VITALS — RESPIRATION RATE: 16 BRPM | DIASTOLIC BLOOD PRESSURE: 61 MMHG | HEART RATE: 56 BPM | SYSTOLIC BLOOD PRESSURE: 100 MMHG

## 2019-02-16 VITALS — HEART RATE: 77 BPM | SYSTOLIC BLOOD PRESSURE: 109 MMHG | RESPIRATION RATE: 16 BRPM | DIASTOLIC BLOOD PRESSURE: 77 MMHG

## 2019-02-16 VITALS — HEART RATE: 68 BPM | SYSTOLIC BLOOD PRESSURE: 109 MMHG | DIASTOLIC BLOOD PRESSURE: 81 MMHG | RESPIRATION RATE: 21 BRPM

## 2019-02-16 VITALS — DIASTOLIC BLOOD PRESSURE: 80 MMHG | HEART RATE: 62 BPM | SYSTOLIC BLOOD PRESSURE: 106 MMHG | RESPIRATION RATE: 16 BRPM

## 2019-02-16 VITALS — RESPIRATION RATE: 23 BRPM | SYSTOLIC BLOOD PRESSURE: 91 MMHG | DIASTOLIC BLOOD PRESSURE: 59 MMHG | HEART RATE: 70 BPM

## 2019-02-16 VITALS — HEART RATE: 60 BPM | DIASTOLIC BLOOD PRESSURE: 64 MMHG | RESPIRATION RATE: 17 BRPM | SYSTOLIC BLOOD PRESSURE: 102 MMHG

## 2019-02-16 VITALS — RESPIRATION RATE: 25 BRPM | DIASTOLIC BLOOD PRESSURE: 69 MMHG | SYSTOLIC BLOOD PRESSURE: 102 MMHG | HEART RATE: 76 BPM

## 2019-02-16 VITALS — SYSTOLIC BLOOD PRESSURE: 97 MMHG | HEART RATE: 65 BPM | RESPIRATION RATE: 21 BRPM | DIASTOLIC BLOOD PRESSURE: 64 MMHG

## 2019-02-16 VITALS — DIASTOLIC BLOOD PRESSURE: 72 MMHG | SYSTOLIC BLOOD PRESSURE: 104 MMHG | RESPIRATION RATE: 11 BRPM | HEART RATE: 78 BPM

## 2019-02-16 VITALS — DIASTOLIC BLOOD PRESSURE: 80 MMHG | RESPIRATION RATE: 19 BRPM | HEART RATE: 66 BPM | SYSTOLIC BLOOD PRESSURE: 109 MMHG

## 2019-02-16 VITALS — DIASTOLIC BLOOD PRESSURE: 69 MMHG | SYSTOLIC BLOOD PRESSURE: 104 MMHG | HEART RATE: 62 BPM | RESPIRATION RATE: 19 BRPM

## 2019-02-16 VITALS — DIASTOLIC BLOOD PRESSURE: 75 MMHG | RESPIRATION RATE: 17 BRPM | SYSTOLIC BLOOD PRESSURE: 106 MMHG | HEART RATE: 62 BPM

## 2019-02-16 VITALS — RESPIRATION RATE: 28 BRPM

## 2019-02-16 VITALS — RESPIRATION RATE: 25 BRPM

## 2019-02-16 VITALS — RESPIRATION RATE: 26 BRPM | SYSTOLIC BLOOD PRESSURE: 108 MMHG | HEART RATE: 83 BPM | DIASTOLIC BLOOD PRESSURE: 73 MMHG

## 2019-02-16 VITALS — SYSTOLIC BLOOD PRESSURE: 106 MMHG | RESPIRATION RATE: 21 BRPM | HEART RATE: 65 BPM | DIASTOLIC BLOOD PRESSURE: 74 MMHG

## 2019-02-16 VITALS — HEART RATE: 75 BPM

## 2019-02-16 VITALS — RESPIRATION RATE: 17 BRPM | DIASTOLIC BLOOD PRESSURE: 66 MMHG | HEART RATE: 63 BPM | SYSTOLIC BLOOD PRESSURE: 106 MMHG

## 2019-02-16 VITALS — RESPIRATION RATE: 21 BRPM

## 2019-02-16 VITALS — RESPIRATION RATE: 22 BRPM

## 2019-02-16 LAB
ABNORMAL IP MESSAGE: 1
ADD MAN DIFF?: YES
ANION GAP: 1 (ref 5–13)
ANION GAP: 4 (ref 5–13)
ANISOCYTOSIS: (no result) (ref 0–0)
BAND NEUTROPHILS #M: 1 10^3/UL (ref 0–0.6)
BAND NEUTROPHILS % (M): 15 % (ref 0–4)
BLOOD UREA NITROGEN: 40 MG/DL (ref 7–20)
BLOOD UREA NITROGEN: 44 MG/DL (ref 7–20)
CALCIUM: 8.4 MG/DL (ref 8.4–10.2)
CALCIUM: 8.4 MG/DL (ref 8.4–10.2)
CARBON DIOXIDE: 32 MMOL/L (ref 21–31)
CARBON DIOXIDE: 33 MMOL/L (ref 21–31)
CHLORIDE: 111 MMOL/L (ref 97–110)
CHLORIDE: 112 MMOL/L (ref 97–110)
CREATININE: 0.8 MG/DL (ref 0.61–1.24)
CREATININE: 0.8 MG/DL (ref 0.61–1.24)
GLUCOSE: 156 MG/DL (ref 70–220)
GLUCOSE: 197 MG/DL (ref 70–220)
HEMATOCRIT: 29.7 % (ref 42–52)
HEMOGLOBIN: 8.9 G/DL (ref 14–18)
IMMATURE GRANS #M: 0.04 10^3/UL (ref 0–0.03)
IMMATURE GRANS % (M): 0.5 % (ref 0–0.43)
LACTIC ACID: 1.7 MMOL/L (ref 0.5–2)
LYMPHOCYTES #M: 0.1 10^3/UL (ref 0.8–2.9)
LYMPHOCYTES % (M): 2 % (ref 15–51)
MAGNESIUM: 2.6 MG/DL (ref 1.7–2.5)
MEAN CORPUSCULAR HEMOGLOBIN: 27.9 PG (ref 29–33)
MEAN CORPUSCULAR HGB CONC: 30 G/DL (ref 32–37)
MEAN CORPUSCULAR VOLUME: 93.1 FL (ref 82–101)
MEAN PLATELET VOLUME: 11.6 FL (ref 7.4–10.4)
MONOCYTE #M: 0.1 10^3/UL (ref 0.3–0.9)
MONOCYTES % (M): 2 % (ref 0–11)
NUCLEATED RED BLOOD CELLS%: 0 /100WBC (ref 0–0)
PHOSPHORUS: 3.7 MG/DL (ref 2.5–4.9)
PLATELET COUNT: 82 10^3/UL (ref 140–415)
PLATELET ESTIMATE: (no result)
POIKILOCYTOSIS: (no result) (ref 0–0)
POSITIVE DIFF: (no result)
POTASSIUM: 4.1 MMOL/L (ref 3.5–5.1)
POTASSIUM: 4.4 MMOL/L (ref 3.5–5.1)
RED BLOOD COUNT: 3.19 10^6/UL (ref 4.7–6.1)
RED CELL DISTRIBUTION WIDTH: 16.9 % (ref 11.5–14.5)
SEG NEUT #M: 6 10^3/UL (ref 1.6–7.5)
SEGMENTED NEUTROPHILS (M) %: 81 % (ref 39–77)
SMUDGE%M: 16 % (ref 0–0)
SODIUM: 146 MMOL/L (ref 135–144)
SODIUM: 147 MMOL/L (ref 135–144)
WHITE BLOOD COUNT: 7.3 10^3/UL (ref 4.8–10.8)

## 2019-02-16 RX ADMIN — VASOPRESSIN 1 MLS/HR: 20 INJECTION, SOLUTION INTRAMUSCULAR; SUBCUTANEOUS at 15:32

## 2019-02-16 RX ADMIN — IPRATROPIUM BROMIDE 1 PUFF: 17 AEROSOL, METERED RESPIRATORY (INHALATION) at 13:02

## 2019-02-16 RX ADMIN — DIVALPROEX SODIUM 1 MG: 125 CAPSULE, COATED PELLETS ORAL at 21:24

## 2019-02-16 RX ADMIN — PROPOFOL SCH MLS/HR: 10 INJECTION, EMULSION INTRAVENOUS at 15:30

## 2019-02-16 RX ADMIN — DIVALPROEX SODIUM 1 MG: 125 CAPSULE, COATED PELLETS ORAL at 12:52

## 2019-02-16 RX ADMIN — VASOPRESSIN SCH MLS/HR: 20 INJECTION, SOLUTION INTRAMUSCULAR; SUBCUTANEOUS at 12:09

## 2019-02-16 RX ADMIN — VASOPRESSIN 1 MLS/HR: 20 INJECTION, SOLUTION INTRAMUSCULAR; SUBCUTANEOUS at 21:37

## 2019-02-16 RX ADMIN — MORPHINE SULFATE PRN MG: 10 SOLUTION ORAL at 12:53

## 2019-02-16 RX ADMIN — DIVALPROEX SODIUM SCH MG: 125 CAPSULE, COATED PELLETS ORAL at 05:01

## 2019-02-16 RX ADMIN — VASOPRESSIN 1 MLS/HR: 20 INJECTION, SOLUTION INTRAMUSCULAR; SUBCUTANEOUS at 18:34

## 2019-02-16 RX ADMIN — ALBUTEROL SULFATE 1 PUFF: 90 AEROSOL, METERED RESPIRATORY (INHALATION) at 13:03

## 2019-02-16 RX ADMIN — FAMOTIDINE SCH MG: 20 TABLET ORAL at 21:24

## 2019-02-16 RX ADMIN — HEPARIN 1 MLS/HR: 100 SYRINGE at 01:51

## 2019-02-16 RX ADMIN — THIAMINE HYDROCHLORIDE 1 MLS/HR: 100 INJECTION, SOLUTION INTRAMUSCULAR; INTRAVENOUS at 10:30

## 2019-02-16 RX ADMIN — METHYLPREDNISOLONE SODIUM SUCCINATE 1 MG: 40 INJECTION, POWDER, FOR SOLUTION INTRAMUSCULAR; INTRAVENOUS at 05:04

## 2019-02-16 RX ADMIN — FAMOTIDINE 1 MG: 20 TABLET ORAL at 21:24

## 2019-02-16 RX ADMIN — LEVOFLOXACIN SCH MLS/HR: 500 INJECTION, SOLUTION INTRAVENOUS at 12:52

## 2019-02-16 RX ADMIN — VASOPRESSIN SCH MLS/HR: 20 INJECTION, SOLUTION INTRAMUSCULAR; SUBCUTANEOUS at 03:21

## 2019-02-16 RX ADMIN — ALBUTEROL SULFATE SCH PUFF: 90 AEROSOL, METERED RESPIRATORY (INHALATION) at 13:03

## 2019-02-16 RX ADMIN — OXYCODONE HYDROCHLORIDE AND ACETAMINOPHEN SCH MG: 500 TABLET ORAL at 08:42

## 2019-02-16 RX ADMIN — ASPIRIN 81 MG CHEWABLE TABLET 1 MG: 81 TABLET CHEWABLE at 08:42

## 2019-02-16 RX ADMIN — IPRATROPIUM BROMIDE AND ALBUTEROL SULFATE SCH ML: .5; 3 SOLUTION RESPIRATORY (INHALATION) at 01:30

## 2019-02-16 RX ADMIN — VASOPRESSIN SCH MLS/HR: 20 INJECTION, SOLUTION INTRAMUSCULAR; SUBCUTANEOUS at 15:32

## 2019-02-16 RX ADMIN — DIVALPROEX SODIUM SCH MG: 125 CAPSULE, COATED PELLETS ORAL at 12:52

## 2019-02-16 RX ADMIN — DIVALPROEX SODIUM SCH MG: 125 CAPSULE, COATED PELLETS ORAL at 21:24

## 2019-02-16 RX ADMIN — ALBUTEROL SULFATE 1 PUFF: 90 AEROSOL, METERED RESPIRATORY (INHALATION) at 21:15

## 2019-02-16 RX ADMIN — RISPERIDONE 1 MG: 1 TABLET ORAL at 08:42

## 2019-02-16 RX ADMIN — HEPARIN 1 MLS/HR: 100 SYRINGE at 08:28

## 2019-02-16 RX ADMIN — MORPHINE SULFATE 1 MG: 10 SOLUTION ORAL at 08:35

## 2019-02-16 RX ADMIN — VASOPRESSIN SCH MLS/HR: 20 INJECTION, SOLUTION INTRAMUSCULAR; SUBCUTANEOUS at 21:37

## 2019-02-16 RX ADMIN — IPRATROPIUM BROMIDE AND ALBUTEROL SULFATE SCH ML: .5; 3 SOLUTION RESPIRATORY (INHALATION) at 08:10

## 2019-02-16 RX ADMIN — VASOPRESSIN SCH MLS/HR: 20 INJECTION, SOLUTION INTRAMUSCULAR; SUBCUTANEOUS at 08:28

## 2019-02-16 RX ADMIN — MORPHINE SULFATE PRN MG: 10 SOLUTION ORAL at 08:35

## 2019-02-16 RX ADMIN — PROPOFOL SCH MLS/HR: 10 INJECTION, EMULSION INTRAVENOUS at 02:58

## 2019-02-16 RX ADMIN — RISPERIDONE 1 MG: 1 TABLET ORAL at 21:24

## 2019-02-16 RX ADMIN — DIVALPROEX SODIUM 1 MG: 125 CAPSULE, COATED PELLETS ORAL at 05:01

## 2019-02-16 RX ADMIN — IPRATROPIUM BROMIDE AND ALBUTEROL SULFATE 1 ML: .5; 3 SOLUTION RESPIRATORY (INHALATION) at 08:10

## 2019-02-16 RX ADMIN — METHYLPREDNISOLONE SODIUM SUCCINATE 1 MG: 40 INJECTION, POWDER, FOR SOLUTION INTRAMUSCULAR; INTRAVENOUS at 17:11

## 2019-02-16 RX ADMIN — ALBUTEROL SULFATE SCH PUFF: 90 AEROSOL, METERED RESPIRATORY (INHALATION) at 21:15

## 2019-02-16 RX ADMIN — FAMOTIDINE SCH MG: 20 TABLET ORAL at 08:42

## 2019-02-16 RX ADMIN — VASOPRESSIN SCH MLS/HR: 20 INJECTION, SOLUTION INTRAMUSCULAR; SUBCUTANEOUS at 18:34

## 2019-02-16 RX ADMIN — FOLIC ACID SCH MLS/HR: 5 INJECTION, SOLUTION INTRAMUSCULAR; INTRAVENOUS; SUBCUTANEOUS at 10:30

## 2019-02-16 RX ADMIN — CALCIUM GLUCONATE SCH MLS/HR: 94 INJECTION, SOLUTION INTRAVENOUS at 08:28

## 2019-02-16 RX ADMIN — LEVOFLOXACIN 1 MLS/HR: 500 INJECTION, SOLUTION INTRAVENOUS at 12:52

## 2019-02-16 RX ADMIN — IPRATROPIUM BROMIDE 1 PUFF: 17 AEROSOL, METERED RESPIRATORY (INHALATION) at 21:15

## 2019-02-16 RX ADMIN — FAMOTIDINE 1 MG: 20 TABLET ORAL at 08:42

## 2019-02-16 RX ADMIN — HEPARIN 1 MLS/HR: 100 SYRINGE at 21:39

## 2019-02-16 RX ADMIN — DESMOPRESSIN ACETATE 1 MCG: 4 SOLUTION INTRAVENOUS at 08:43

## 2019-02-16 RX ADMIN — VASOPRESSIN 1 MLS/HR: 20 INJECTION, SOLUTION INTRAMUSCULAR; SUBCUTANEOUS at 12:09

## 2019-02-16 RX ADMIN — PROPOFOL 1 MLS/HR: 10 INJECTION, EMULSION INTRAVENOUS at 02:58

## 2019-02-16 RX ADMIN — CALCIUM GLUCONATE SCH MLS/HR: 94 INJECTION, SOLUTION INTRAVENOUS at 21:39

## 2019-02-16 RX ADMIN — VASOPRESSIN 1 MLS/HR: 20 INJECTION, SOLUTION INTRAMUSCULAR; SUBCUTANEOUS at 03:21

## 2019-02-16 RX ADMIN — IPRATROPIUM BROMIDE AND ALBUTEROL SULFATE 1 ML: .5; 3 SOLUTION RESPIRATORY (INHALATION) at 01:30

## 2019-02-16 RX ADMIN — ASPIRIN 81 MG SCH MG: 81 TABLET ORAL at 08:42

## 2019-02-16 RX ADMIN — CEFEPIME 1 MLS/HR: 1 INJECTION, POWDER, FOR SOLUTION INTRAMUSCULAR; INTRAVENOUS at 08:43

## 2019-02-16 RX ADMIN — METHYLPREDNISOLONE SODIUM SUCCINATE 1 MG: 40 INJECTION, POWDER, FOR SOLUTION INTRAMUSCULAR; INTRAVENOUS at 12:52

## 2019-02-16 RX ADMIN — CALCIUM GLUCONATE SCH MLS/HR: 94 INJECTION, SOLUTION INTRAVENOUS at 01:51

## 2019-02-16 RX ADMIN — MORPHINE SULFATE 1 MG: 10 SOLUTION ORAL at 12:53

## 2019-02-16 RX ADMIN — VASOPRESSIN 1 MLS/HR: 20 INJECTION, SOLUTION INTRAMUSCULAR; SUBCUTANEOUS at 08:28

## 2019-02-16 RX ADMIN — CASTOR OIL AND BALSAM, PERU 1 APPLIC: 788; 87 OINTMENT TOPICAL at 08:43

## 2019-02-16 RX ADMIN — PROPOFOL 1 MLS/HR: 10 INJECTION, EMULSION INTRAVENOUS at 15:30

## 2019-02-16 RX ADMIN — OXYCODONE HYDROCHLORIDE AND ACETAMINOPHEN 1 MG: 500 TABLET ORAL at 08:42

## 2019-02-16 NOTE — CONS
Consult Date/Type/Reason


Admit Date/Time


Jan 22, 2019 at 00:03


Initial Consult Date


1/22/19


Type of Consultation:  Pulm/CCM


Reason for Consultation


Patient continues mechanical ventilation.  Still had agonal respiration off 


sedation.  More alert this morning somewhat agitated.  Continues tube feeding as


tolerated.  Remains hemodynamically stable.


Requesting Provider:  DONALDO CARRILLO MD


Date/Time of Note


DATE: 2/16/19 


TIME: 09:50





Objective


Vitals





Vital Signs


  Date      Temp  Pulse  Resp  B/P (MAP)   Pulse Ox  O2          O2 Flow    FiO2


Time                                                 Delivery    Rate


   2/16/19           97    15      115/72        89  Mechanical


     09:00                           (86)            Ventilator


   2/16/19  99.5


     08:02


   2/16/19                                                                    60


     05:00


   2/13/19                                                            12.0


     14:27








Intake and Output





2/15/19


2/15/19


2/16/19





1515:00


23:00


07:00





IntakeIntake Total


1563.5 ml


2109.16 ml


1566.70 ml





OutputOutput Total


500 ml


630 ml


600 ml





BalanceBalance


1063.5 ml


1479.16 ml


966.70 ml











Exam


GENERAL: Frail elderly gentleman, on mechanical ventilation orally intubated


VITAL SIGNS:  per chart


NECK:  Supple.  No JVD or lymphadenopathy.


CARDIAC EXAM:  S1, S2. No added sounds or murmurs.


CHEST: Diminished air entry bilaterally


ABDOMEN:  Soft, nontender.  No guarding or rebound.


EXTREMITIES:  No cyanosis, clubbing or edema.


NEUROLOGIC:  Generalized weakness.





Results/Medications


Result Diagram:  


2/16/19 0450                                                                    


           2/16/19 0450





Results 24 hrs





Laboratory Tests


       Test
                                2/15/19
13:57  2/16/19
04:50


       Sodium Level                                 142           147  H


       Potassium Level                              4.0            4.1


       Chloride Level                               106           111  H


       Carbon Dioxide Level                         34  H          32  H


       Anion Gap                                     2  L           4  L


       Blood Urea Nitrogen                          49  H          44  H


       Creatinine                                  0.88           0.80


       Est Glomerular Filtrat Rate
mL/min   > 60  
        > 60  



       Glucose Level                                140            197


       Calcium Level                                8.4            8.4


       White Blood Count                                          7.3  #


       Red Blood Count                                           3.19  L


       Hemoglobin                                                 8.9  L


       Hematocrit                                                29.7  L


       Mean Corpuscular Volume                                    93.1


       Mean Corpuscular Hemoglobin                               27.9  L


       Mean Corpuscular Hemoglobin
Concent  
                   30.0  L



       Red Cell Distribution Width                               16.9  H


       Platelet Count                                             82  #L


       Mean Platelet Volume                                      11.6  H


       Immature Granulocytes %                                  0.500  H


       Neutrophils %


       Segmented Neutrophils %
(Manual)     
                     81  H



       Band Neutrophils % (Manual)                                 15  H


       Lymphocytes %


       Lymphocytes % (Manual)                                       2  L


       Monocytes %


       Monocytes % (Manual)                                          2


       Eosinophils %


       Basophils %


       Nucleated Red Blood Cells %                                 0.0


       Immature Granulocytes #                                  0.040  H


       Neutrophils #


       Neutrophils # (Manual)                                      6.0


       Band Neutrophils #                                         1.0  H


       Lymphocytes (Manual)                                       0.1  L


       Lymphocytes #


       Monocytes #


       Monocytes # (Manual)                                       0.1  L


       Eosinophils #


       Basophils #


       Nucleated Red Blood Cells #


       Platelet Estimate                                   DECREASED


       Poikilocytosis                                               1+


       Anisocytosis                                                 1+


       Lactic Acid Level                                           1.7


       Phosphorus Level                                            3.7


       Magnesium Level                                            2.6  H





Home Meds


Reported Medications


Docusate Sodium* (Colace*) 100 Mg Capsule, 100 MG PO Q24H PRN for CONSTIPATION, 


#30 CAP


   1/21/19


Polyethylene Glycol* (Miralax*) 17 Gm Powd.pack, 17 GM PO DAILY PRN for AS 


NEEDED, #30 PACKET


   1/21/19


Acetaminophen* (Acetaminophen*) 325 Mg Tablet, 650 MG PO Q4H PRN for PAIN 1-


10/10, #30 TAB


   AND FEVER>101F


   1/21/19


Sennosides* (Senna Lax*) 8.6 Mg Tablet, 1 TAB PO AS NEEDED, TAB


   1/21/19


Mv,Minerals/Fa/Lycopene/Ginkgo (ONE DAILY MEN'S 50+ TABLET) 1 Each Tablet, 1 


EACH PO DAILY, TAB


   1/21/19


Divalproex Sodium* (Depakote*) 125 Mg Tablet.dr, 250 MG PO Q8H, #90 TAB


   1/21/19


Quetiapine Fumarate* (Seroquel*) 50 Mg Tablet, 50 MG PO Q8H, TAB


   1/21/19


Ipratropium-Albuterol (Ipratropium-Albuterol) 0.5-3 Mg/3 Ml Ampul.neb, 3 ML 


INHALATION Q4H PRN for WHEEZING AND SOB, #30 VIAL


   1/21/19


Budesonide-Formoterol Fumarate* (Symbicort*) 160-4.5 Hfa.aer.ad, 2 PUFF 


INHALATION BID, #1 EACH


   1/21/19


Medications





Current Medications


Ondansetron HCl (Zofran Inj) 4 mg Q6H  PRN IV NAUSEA AND/OR VOMITING Last 


administered on 2/1/19at 18:46; Admin Dose 4 MG;  Start 1/21/19 at 23:30


Acetaminophen (Tylenol Supp) 650 mg Q4H  PRN [place] PAIN LEVEL 1-3 OR FEVER;  Start 


1/21/19 at 23:30


Aspirin (Aspirin) 81 mg DAILY PO  Last administered on 2/16/19at 08:42; Admin 


Dose 81 MG;  Start 1/24/19 at 09:00


Famotidine (Pepcid) 20 mg Q12 PO  Last administered on 2/16/19at 08:42; Admin 


Dose 20 MG;  Start 1/24/19 at 21:00


Zolpidem Tartrate (Ambien) 5 mg HS  PRN PO INSOMNIA Last administered on 


2/5/19at 20:31; Admin Dose 5 MG;  Start 1/27/19 at 00:00


Guaifenesin/ Dextromethorphan (Robitussin Dm Liquid Cup) 5 ml Q6H  PRN PO COUGH 


Last administered on 2/10/19at 05:16; Admin Dose 5 ML;  Start 1/27/19 at 20:00


Morphine Sulfate (morphine) 6 mg Q4H  PRN PO SEVERE PAIN LEVEL 7-10 Last ad


ministered on 2/12/19at 12:44; Admin Dose 6 MG;  Start 1/30/19 at 21:30


Albuterol/ Ipratropium (Duoneb) 3 ml Q3H RESP THERAPY  PRN HHN SHORTNESS OF 


BREATH Last administered on 2/7/19at 18:47; Admin Dose 3 ML;  Start 1/31/19 at 


20:00


Methylprednisolone Sodium Succinate (Solu-Medrol) 40 mg Q6 IV  Last administered


on 2/16/19at 05:04; Admin Dose 40 MG;  Start 2/2/19 at 12:00


Alteplase, Recombinant (Cathflo (Activase)) 2 mg MAY REPEAT X1 PRN CATHETER IF 


CATHETER REMAINS OCCULUDED;  Start 2/2/19 at 11:00


Albuterol/ Ipratropium (Duoneb) 3 ml Q6H RESP  THERAPY HHN  Last administered on


2/16/19at 01:30; Admin Dose 3 ML;  Start 2/4/19 at 20:00


Lorazepam (Ativan) 1 mg Q6H  PRN IV PSYCHOSIS Last administered on 2/15/19at 


02:58; Admin Dose 1 MG;  Start 2/7/19 at 02:00


Haloperidol (Haldol) 2 mg PRN  PRN IM AGITATION Last administered on 2/12/19at 


00:44; Admin Dose 2 MG;  Start 2/8/19 at 06:30


Risperidone (Risperdal) 2 mg BID PO  Last administered on 2/16/19at 08:42; Admin


Dose 2 MG;  Start 2/9/19 at 21:00


Divalproex Sodium (Depakote Sprinkle) 250 mg Q8H PO  Last administered on 


2/16/19at 05:01; Admin Dose 250 MG;  Start 2/10/19 at 21:00


Propofol 100 ml @  1.875 mls/ hr Q12H IV  Last administered on 2/15/19at 01:14; 


Admin Dose 7.5 MLS/HR;  Start 2/13/19 at 15:30


Dexmedetomidine HCl 200 mcg/ Sodium Chloride 50 ml @ 0 mls/hr TITRATE IV  Last 


administered on 2/16/19at 08:28; Admin Dose 0.35 MLS/HR;  Start 2/15/19 at 09:30


Ascorbic Acid (Vitamin C) 500 mg DAILY GTB  Last administered on 2/16/19at 


08:42; Admin Dose 500 MG;  Start 2/15/19 at 10:30


Sodium Chloride 1,000 ml @  50 mls/hr Q20H IV  Last administered on 2/15/19at 


15:06; Admin Dose 50 MLS/HR;  Start 2/15/19 at 16:00


Phenylephrine HCl 80 mg/Dextrose 250 ml @  18.75 mls/ hr TITRATE IV  Last 


administered on 2/16/19at 08:28; Admin Dose 37.5 MLS/HR;  Start 2/16/19 at 01:00


Cefepime HCl 1 gm/ Dextrose 50 ml @  100 mls/hr Q12 IVPB  Last administered on 


2/16/19at 08:43; Admin Dose 100 MLS/HR;  Start 2/16/19 at 09:00


Desmopressin Acetate (Ddavp) 2 mcg DAILY IV  Last administered on 2/16/19at 


08:43; Admin Dose 2 MCG;  Start 2/16/19 at 09:00





Assessment/Plan


Hospital Course (Demo Recall)


iMP: 


1.  Acute on chronic hypoxic and hypercapnic respiratory failure--worse 


overnight with increased mucus plugging.  Currently not able to liberate from 


mechanical ventilation


3.  Encephalopathy toxic metabolic resolving


4.  Advanced COPD


5.  Hypernatremia likely free water deficit


6.  Thrombocytopenia 


7.  Anemia 





RECS:


1.  Failed multiple weaning trials.  Continue mechanical ventilation.  Patient 


may require tracheostomy.  We will repeat CPAP trial this morning.


2.  Continue tube feeding as tolerated


3.  Pulmonary toilet


4.  Consider increasing free water





Critical care time 40 minutes.











MIMI ZAVALA MD, [facility]     Feb 16, 2019 09:51

## 2019-02-16 NOTE — PN
DATE:  02/16/2019

 

 

SUBJECTIVE:  The patient is critically ill, on pressor support, on full ventilatory support.  No othe
r acute events noted.  No hemoptysis, hematemesis, hematochezia.

 

OBJECTIVE:

VITAL SIGNS:  Blood pressure is 99/71, respirations 12, pulse 74, temperature 98.6.

HEENT:  Head is normocephalic.

NECK:  Supple.

HEART:  Regular rate.

LUNGS:  Show diminished breath sounds at the base.

ABDOMEN:  Soft, nontender to palpation.  No rebound or guarding.

EXTREMITIES:  Negative for clubbing, cyanosis.  Trace edema.

DERMATOLOGIC:  No rashes.

MUSCULOSKELETAL:  No joint effusions.

NEUROLOGIC:  No change in exam.

 

MEDICATIONS:  The patient's medications have been reviewed.

 

LABORATORY DATA:  Shows a sodium of 147, potassium 4.1, chloride 106, bicarb 34.  White count 7.3, he
moglobin 8.9, platelet count is 82.

 

ASSESSMENT AND PLAN:

1.  Hyponatremia, etiology secondary to partial diabetes insipidus, likely nephrogenic in etiology.  
The patient was placed on a trial of desmopressin without any significant increase in urine osmolarit
y, suggesting a nephrogenic source.  Likely underlying cause is acute kidney injury.  Recommendation 
at this point is to continue aggressive free water flushes.  Will deescalate desmopressin.  Will plac
e all piggybacks in D5 water.  Continue to monitor serial sodium levels closely.

2.  Nonoliguric acute kidney injury with unknown baseline creatinine.  Etiology secondary to hemodyna
mics, acute tubular necrosis.  The patient's renal function is stabilizing.  Continue current treatme
nt plan, supportive care, and renally dose all medication.

3.  Metabolic alkalosis with respiratory compensation.  Continue to monitor.  Defer any diuretics at 
this time.

4.  Anemia.  Monitor hemoglobin and hematocrit levels.

5.  Mineral bone disorder.  Monitor calcium and phosphorus levels.

6.  Diastolic heart failure.  The patient is currently compensated, in shock.  Holding any diuretics.


7.  Ventilatory dependent respiratory failure.  Ventilator settings and arterial blood gas were revie
wed.  Continue to monitor.  Follow up with pulmonary.

8.  Dysphagia, status post percutaneous endoscopic gastrostomy, tube feeding.

9.  Septic shock secondary to pneumonia.  The patient is currently on pressor support, IV fluids, ant
ibiotic therapy.  Will continue.  Continue current treatment plan.  Follow up cultures.

10.  Acute encephalopathy.  Etiology is toxic-metabolic.

11.  Pleural effusion, status post thoracentesis.

12.  Deep venous thrombosis.  Continue medical management.

13.  Status post cardiac arrest.

14.  Status post pneumothorax.

15.  Status post pneumoperitoneum.

 

Please note, I spent over 30 minutes of critical care time with this patient.

 

 

Dictated By: MARYAN CHAMPAGNE DO

 

NR/NTS

DD:    02/16/2019 07:25:16

DT:    02/16/2019 14:44:01

Conf#: [id number]

DID#:  [id number]

CC: MAURI GRIGSBY MD;*EndCC*

## 2019-02-16 NOTE — CONS
Assessment/Plan


Assessment/Plan


Hospital Course (Demo Recall)


ID PROGRESS NOTE


CURRENT ABX: DAY #  => OFF ABX 


2/16/19 0450                                                                    


           2/16/19 0450





24H INTERVAL SUMMARY


* Orally intubated, non-communicative, TMax 99.5 today w/rise in WBC's while OFF


  ABX 


* 02/15/19 CXR: Similar appearance of right greater than left basilar 


  infiltrates. Small bilateral pleural effusions unchanged.


* Indwelling: Endotracheal tube PEG Blake


MICRO/OTHER


* BCx (-) 


* 02/14/19 RESP CX:  RESPIRATORY CULTURE  Final  


     Organism 1                     ENTEROBACTER CLOACAE


        QUANTITY                    3+





                                    E CLOACAE        


                                    M.I.C.    RX     


                                    --------- ---    


     CEFAZOLIN                                 R     


     CEFOTAXIME                                S     


     CIPROFLOXACIN                  <=0.25     S     


     GENTAMICIN                     <=1        S     


     LEVOFLOXACIN                   <=0.12     S     


     TOBRAMYCIN                     <=1        S     


     TRIMETHOPRIM/SULFAMETHOXAZOLE  <=20       S     





---------------------------------------------------





PHYSICAL EXAMINATION:


GENERAL: Afebrile, VSS


HEENT: AT, NC, anicteric, orally intubated 


NECK:  Supple, trach midline 


CHEST: Equal chest rise bilaterally, without dyspnea on observation 


HEART:  Pulse RRR


ABDOMEN:  Soft / NT 


EXTREMITIES:  Warm, dry


SKIN: No rash, no diaphoresis 





ID ASSESSMENT


69 yo M admit with: 


1.  SIRS w/low grade temps, rising WBC 


2. Respiratory failure due to recurrent Aspiration pneumonia


* Dysphagia/peg with ongoing aspiration


* 02/15/19 CXR: Similar appearance of right greater than left basilar 


  infiltrates. Small bilateral pleural effusions unchanged.


4.  Pneumoperitoneum of unclear etiology, no perforation per surgical note, 


status post chest tube


5.  Dementia


5.  Anemia with progressive thrombocytopenia


6.  History of non-ST elevation MI


7.  Status post cardiac arrest


8.  RIJ TLC


(-)MRSA Nares 


ABX ALLERGIES: KNDA 


INVASIVES: R-IJ/TLC, ETT, PEG, FC 


CURRENT ABX: DAY #  => START Levaquin 500mg IV daily 





ID RECOMMENDATIONS/PLAN:  


1. START Levaquin 500mg IV daily for HCAP - ASP PNA Enterobacter 


.





Consultation Date/Type/Reason


Admit Date/Time


Jan 22, 2019 at 00:03


Initial Consult Date


1/22/19


Requesting Provider:  DONALDO CARRILLO MD


Date/Time of Note


DATE: 2/16/19 


TIME: 10:11





Exam/Review of Systems


Exam


Vitals





Vital Signs


  Date      Temp  Pulse  Resp  B/P (MAP)   Pulse Ox  O2          O2 Flow    FiO2


Time                                                 Delivery    Rate


   2/16/19           97    15      115/72        89  Mechanical


     09:00                           (86)            Ventilator


   2/16/19  99.5


     08:02


   2/16/19                                                                    60


     05:00


   2/13/19                                                            12.0


     14:27








Intake and Output





2/15/19


2/15/19


2/16/19





1515:00


23:00


07:00





IntakeIntake Total


1563.5 ml


2109.16 ml


1566.70 ml





OutputOutput Total


500 ml


630 ml


600 ml





BalanceBalance


1063.5 ml


1479.16 ml


966.70 ml














Results


Result Diagram:  


2/16/19 0450                                                                    


           2/16/19 0450





Results 24hrs





Laboratory Tests


       Test
                                2/15/19
13:57  2/16/19
04:50


       Sodium Level                                 142           147  H


       Potassium Level                              4.0            4.1


       Chloride Level                               106           111  H


       Carbon Dioxide Level                         34  H          32  H


       Anion Gap                                     2  L           4  L


       Blood Urea Nitrogen                          49  H          44  H


       Creatinine                                  0.88           0.80


       Est Glomerular Filtrat Rate
mL/min   > 60  
        > 60  



       Glucose Level                                140            197


       Calcium Level                                8.4            8.4


       White Blood Count                                          7.3  #


       Red Blood Count                                           3.19  L


       Hemoglobin                                                 8.9  L


       Hematocrit                                                29.7  L


       Mean Corpuscular Volume                                    93.1


       Mean Corpuscular Hemoglobin                               27.9  L


       Mean Corpuscular Hemoglobin
Concent  
                   30.0  L



       Red Cell Distribution Width                               16.9  H


       Platelet Count                                             82  #L


       Mean Platelet Volume                                      11.6  H


       Immature Granulocytes %                                  0.500  H


       Neutrophils %


       Segmented Neutrophils %
(Manual)     
                     81  H



       Band Neutrophils % (Manual)                                 15  H


       Lymphocytes %


       Lymphocytes % (Manual)                                       2  L


       Monocytes %


       Monocytes % (Manual)                                          2


       Eosinophils %


       Basophils %


       Nucleated Red Blood Cells %                                 0.0


       Immature Granulocytes #                                  0.040  H


       Neutrophils #


       Neutrophils # (Manual)                                      6.0


       Band Neutrophils #                                         1.0  H


       Lymphocytes (Manual)                                       0.1  L


       Lymphocytes #


       Monocytes #


       Monocytes # (Manual)                                       0.1  L


       Eosinophils #


       Basophils #


       Nucleated Red Blood Cells #


       Platelet Estimate                                   DECREASED


       Poikilocytosis                                               1+


       Anisocytosis                                                 1+


       Lactic Acid Level                                           1.7


       Phosphorus Level                                            3.7


       Magnesium Level                                            2.6  H








Medications


Medication





Current Medications


Ondansetron HCl (Zofran Inj) 4 mg Q6H  PRN IV NAUSEA AND/OR VOMITING Last 


administered on 2/1/19at 18:46; Admin Dose 4 MG;  Start 1/21/19 at 23:30


Acetaminophen (Tylenol Supp) 650 mg Q4H  PRN [place] PAIN LEVEL 1-3 OR FEVER;  Start 


1/21/19 at 23:30


Aspirin (Aspirin) 81 mg DAILY PO  Last administered on 2/16/19at 08:42; Admin 


Dose 81 MG;  Start 1/24/19 at 09:00


Famotidine (Pepcid) 20 mg Q12 PO  Last administered on 2/16/19at 08:42; Admin 


Dose 20 MG;  Start 1/24/19 at 21:00


Zolpidem Tartrate (Ambien) 5 mg HS  PRN PO INSOMNIA Last administered on 


2/5/19at 20:31; Admin Dose 5 MG;  Start 1/27/19 at 00:00


Guaifenesin/ Dextromethorphan (Robitussin Dm Liquid Cup) 5 ml Q6H  PRN PO COUGH 


Last administered on 2/10/19at 05:16; Admin Dose 5 ML;  Start 1/27/19 at 20:00


Morphine Sulfate (morphine) 6 mg Q4H  PRN PO SEVERE PAIN LEVEL 7-10 Last 


administered on 2/12/19at 12:44; Admin Dose 6 MG;  Start 1/30/19 at 21:30


Albuterol/ Ipratropium (Duoneb) 3 ml Q3H RESP THERAPY  PRN HHN SHORTNESS OF 


BREATH Last administered on 2/7/19at 18:47; Admin Dose 3 ML;  Start 1/31/19 at 


20:00


Methylprednisolone Sodium Succinate (Solu-Medrol) 40 mg Q6 IV  Last administered


on 2/16/19at 05:04; Admin Dose 40 MG;  Start 2/2/19 at 12:00


Alteplase, Recombinant (Cathflo (Activase)) 2 mg MAY REPEAT X1 PRN CATHETER IF 


CATHETER REMAINS OCCULUDED;  Start 2/2/19 at 11:00


Albuterol/ Ipratropium (Duoneb) 3 ml Q6H RESP  THERAPY HHN  Last administered on


2/16/19at 01:30; Admin Dose 3 ML;  Start 2/4/19 at 20:00


Lorazepam (Ativan) 1 mg Q6H  PRN IV PSYCHOSIS Last administered on 2/15/19at 


02:58; Admin Dose 1 MG;  Start 2/7/19 at 02:00


Haloperidol (Haldol) 2 mg PRN  PRN IM AGITATION Last administered on 2/12/19at 


00:44; Admin Dose 2 MG;  Start 2/8/19 at 06:30


Risperidone (Risperdal) 2 mg BID PO  Last administered on 2/16/19at 08:42; Admin


Dose 2 MG;  Start 2/9/19 at 21:00


Divalproex Sodium (Depakote Sprinkle) 250 mg Q8H PO  Last administered on 


2/16/19at 05:01; Admin Dose 250 MG;  Start 2/10/19 at 21:00


Propofol 100 ml @  1.875 mls/ hr Q12H IV  Last administered on 2/15/19at 01:14; 


Admin Dose 7.5 MLS/HR;  Start 2/13/19 at 15:30


Dexmedetomidine HCl 200 mcg/ Sodium Chloride 50 ml @ 0 mls/hr TITRATE IV  Last 


administered on 2/16/19at 08:28; Admin Dose 0.35 MLS/HR;  Start 2/15/19 at 09:30


Ascorbic Acid (Vitamin C) 500 mg DAILY GTB  Last administered on 2/16/19at 


08:42; Admin Dose 500 MG;  Start 2/15/19 at 10:30


Sodium Chloride 1,000 ml @  50 mls/hr Q20H IV  Last administered on 2/15/19at 


15:06; Admin Dose 50 MLS/HR;  Start 2/15/19 at 16:00


Phenylephrine HCl 80 mg/Dextrose 250 ml @  18.75 mls/ hr TITRATE IV  Last 


administered on 2/16/19at 08:28; Admin Dose 37.5 MLS/HR;  Start 2/16/19 at 01:00


Cefepime HCl 1 gm/ Dextrose 50 ml @  100 mls/hr Q12 IVPB  Last administered on 


2/16/19at 08:43; Admin Dose 100 MLS/HR;  Start 2/16/19 at 09:00


Desmopressin Acetate (Ddavp) 2 mcg DAILY IV  Last administered on 2/16/19at 


08:43; Admin Dose 2 MCG;  Start 2/16/19 at 09:00











DELL GALLO NP              Feb 16, 2019 10:11

## 2019-02-16 NOTE — CONS
Consult Date/Type/Reason


Admit Date/Time


Jan 22, 2019 at 00:03


Initial Consult Date


1/22/19


Type of Consultation:  Pulm/CCM


Requesting Provider:  DONALDO CARRILLO MD


Date/Time of Note


DATE: 2/16/19 


TIME: 13:36





Subjective


NO acute events - pt stable - intubated - BP on low side, but stable overall. 


Will monitor now. 





ROS: No fever, no chills, no nausea, no vomiting, no diarrhea/constipation - per


nurse


        No recent weight changes


        No chest pain, no PND, no orthopnea


        No dizziness, blurred vision


        No thirst, no heat or cold intolerance





Objective


Vitals





Vital Signs


  Date      Temp  Pulse  Resp  B/P (MAP)   Pulse Ox  O2          O2 Flow    FiO2


Time                                                 Delivery    Rate


   2/16/19           97    15      115/72        89  Mechanical


     09:00                           (86)            Ventilator


   2/16/19  99.5


     08:02


   2/16/19                                                                    60


     05:00


   2/13/19                                                            12.0


     14:27








Intake and Output





2/15/19


2/15/19


2/16/19





1414:59


22:59


06:59





IntakeIntake Total


1009.6 ml


2620.06 ml


1562.20 ml





OutputOutput Total


440 ml


620 ml


475 ml





BalanceBalance


569.6 ml


2000.06 ml


1087.20 ml











Exam


General: WN/WD/NAD, AOx 0 


HEENT: Unicetric/atraumatic/EOMI (does not follow commands)


NECK: JVD elevated, no thyromegaly - intubated 


Lymph: no lymphadenopathy


HEART: regular with no S3, II/VI systolic murmur at apex


LUNGS: Coarse sounds


ABD: soft, NT, ND, +BS


: Intact


Neuro: non focal


SKIN: chronic changes


EXT: no edema





Results/Medications


Result Diagram:  


2/16/19 0450 2/16/19 0450





Results 24 hrs





Laboratory Tests


       Test
                                2/15/19
13:57  2/16/19
04:50


       Sodium Level                                 142           147  H


       Potassium Level                              4.0            4.1


       Chloride Level                               106           111  H


       Carbon Dioxide Level                         34  H          32  H


       Anion Gap                                     2  L           4  L


       Blood Urea Nitrogen                          49  H          44  H


       Creatinine                                  0.88           0.80


       Est Glomerular Filtrat Rate
mL/min   > 60  
        > 60  



       Glucose Level                                140            197


       Calcium Level                                8.4            8.4


       White Blood Count                                          7.3  #


       Red Blood Count                                           3.19  L


       Hemoglobin                                                 8.9  L


       Hematocrit                                                29.7  L


       Mean Corpuscular Volume                                    93.1


       Mean Corpuscular Hemoglobin                               27.9  L


       Mean Corpuscular Hemoglobin
Concent  
                   30.0  L



       Red Cell Distribution Width                               16.9  H


       Platelet Count                                             82  #L


       Mean Platelet Volume                                      11.6  H


       Immature Granulocytes %                                  0.500  H


       Neutrophils %


       Segmented Neutrophils %
(Manual)     
                     81  H



       Band Neutrophils % (Manual)                                 15  H


       Lymphocytes %


       Lymphocytes % (Manual)                                       2  L


       Monocytes %


       Monocytes % (Manual)                                          2


       Eosinophils %


       Basophils %


       Nucleated Red Blood Cells %                                 0.0


       Immature Granulocytes #                                  0.040  H


       Neutrophils #


       Neutrophils # (Manual)                                      6.0


       Band Neutrophils #                                         1.0  H


       Lymphocytes (Manual)                                       0.1  L


       Lymphocytes #


       Monocytes #


       Monocytes # (Manual)                                       0.1  L


       Eosinophils #


       Basophils #


       Nucleated Red Blood Cells #


       Platelet Estimate                                   DECREASED


       Poikilocytosis                                               1+


       Anisocytosis                                                 1+


       Lactic Acid Level                                           1.7


       Phosphorus Level                                            3.7


       Magnesium Level                                            2.6  H





Home Meds


Reported Medications


Docusate Sodium* (Colace*) 100 Mg Capsule, 100 MG PO Q24H PRN for CONSTIPATION, 


#30 CAP


   1/21/19


Polyethylene Glycol* (Miralax*) 17 Gm Powd.pack, 17 GM PO DAILY PRN for AS 


NEEDED, #30 PACKET


   1/21/19


Acetaminophen* (Acetaminophen*) 325 Mg Tablet, 650 MG PO Q4H PRN for PAIN 1-


10/10, #30 TAB


   AND FEVER>101F


   1/21/19


Sennosides* (Senna Lax*) 8.6 Mg Tablet, 1 TAB PO AS NEEDED, TAB


   1/21/19


Mv,Minerals/Fa/Lycopene/Ginkgo (ONE DAILY MEN'S 50+ TABLET) 1 Each Tablet, 1 


EACH PO DAILY, TAB


   1/21/19


Divalproex Sodium* (Depakote*) 125 Mg Tablet.dr, 250 MG PO Q8H, #90 TAB


   1/21/19


Quetiapine Fumarate* (Seroquel*) 50 Mg Tablet, 50 MG PO Q8H, TAB


   1/21/19


Ipratropium-Albuterol (Ipratropium-Albuterol) 0.5-3 Mg/3 Ml Ampul.neb, 3 ML 


INHALATION Q4H PRN for WHEEZING AND SOB, #30 VIAL


   1/21/19


Budesonide-Formoterol Fumarate* (Symbicort*) 160-4.5 Hfa.aer.ad, 2 PUFF 


INHALATION BID, #1 EACH


   1/21/19


Medications





Current Medications


Ondansetron HCl (Zofran Inj) 4 mg Q6H  PRN IV NAUSEA AND/OR VOMITING Last 


administered on 2/1/19at 18:46; Admin Dose 4 MG;  Start 1/21/19 at 23:30


Acetaminophen (Tylenol Supp) 650 mg Q4H  PRN [place] PAIN LEVEL 1-3 OR FEVER;  Start 


1/21/19 at 23:30


Aspirin (Aspirin) 81 mg DAILY PO  Last administered on 2/16/19at 08:42; Admin 


Dose 81 MG;  Start 1/24/19 at 09:00


Famotidine (Pepcid) 20 mg Q12 PO  Last administered on 2/16/19at 08:42; Admin 


Dose 20 MG;  Start 1/24/19 at 21:00


Zolpidem Tartrate (Ambien) 5 mg HS  PRN PO INSOMNIA Last administered on 


2/5/19at 20:31; Admin Dose 5 MG;  Start 1/27/19 at 00:00


Guaifenesin/ Dextromethorphan (Robitussin Dm Liquid Cup) 5 ml Q6H  PRN PO COUGH 


Last administered on 2/10/19at 05:16; Admin Dose 5 ML;  Start 1/27/19 at 20:00


Morphine Sulfate (morphine) 6 mg Q4H  PRN PO SEVERE PAIN LEVEL 7-10 Last 


administered on 2/16/19at 08:35; Admin Dose 6 MG;  Start 1/30/19 at 21:30


Albuterol/ Ipratropium (Duoneb) 3 ml Q3H RESP THERAPY  PRN HHN SHORTNESS OF 


BREATH Last administered on 2/7/19at 18:47; Admin Dose 3 ML;  Start 1/31/19 at 


20:00


Methylprednisolone Sodium Succinate (Solu-Medrol) 40 mg Q6 IV  Last administered


on 2/16/19at 12:52; Admin Dose 40 MG;  Start 2/2/19 at 12:00


Alteplase, Recombinant (Cathflo (Activase)) 2 mg MAY REPEAT X1 PRN CATHETER IF 


CATHETER REMAINS OCCULUDED;  Start 2/2/19 at 11:00


Lorazepam (Ativan) 1 mg Q6H  PRN IV PSYCHOSIS Last administered on 2/15/19at 


02:58; Admin Dose 1 MG;  Start 2/7/19 at 02:00


Haloperidol (Haldol) 2 mg PRN  PRN IM AGITATION Last administered on 2/12/19at 0


0:44; Admin Dose 2 MG;  Start 2/8/19 at 06:30


Risperidone (Risperdal) 2 mg BID PO  Last administered on 2/16/19at 08:42; Admin


Dose 2 MG;  Start 2/9/19 at 21:00


Divalproex Sodium (Depakote Sprinkle) 250 mg Q8H PO  Last administered on 


2/16/19at 12:52; Admin Dose 250 MG;  Start 2/10/19 at 21:00


Propofol 100 ml @  1.875 mls/ hr Q12H IV  Last administered on 2/15/19at 01:14; 


Admin Dose 7.5 MLS/HR;  Start 2/13/19 at 15:30


Dexmedetomidine HCl 200 mcg/ Sodium Chloride 50 ml @  3.13 mls/hr TITRATE IV  


Last administered on 2/16/19at 12:09; Admin Dose 20.31 MLS/HR;  Start 2/15/19 at


09:30


Ascorbic Acid (Vitamin C) 500 mg DAILY GTB  Last administered on 2/16/19at 


08:42; Admin Dose 500 MG;  Start 2/15/19 at 10:30


Sodium Chloride 1,000 ml @  50 mls/hr Q20H IV  Last administered on 2/16/19at 


10:30; Admin Dose 50 MLS/HR;  Start 2/15/19 at 16:00


Phenylephrine HCl 80 mg/Dextrose 250 ml @  18.75 mls/ hr TITRATE IV  Last 


administered on 2/16/19at 08:28; Admin Dose 37.5 MLS/HR;  Start 2/16/19 at 01:00


Desmopressin Acetate (Ddavp) 2 mcg DAILY IV  Last administered on 2/16/19at 


08:43; Admin Dose 2 MCG;  Start 2/16/19 at 09:00


Albuterol (Ventolin Hfa) 4 puff Q6H RESP  THERAPY INH  Last administered on 2 /16/19at 13:03; Admin Dose 4 PUFF;  Start 2/16/19 at 14:00


Ipratropium Bromide (Atrovent Hfa) 4 puff Q6H RESP  THERAPY INH  Last 


administered on 2/16/19at 13:02; Admin Dose 4 PUFF;  Start 2/16/19 at 14:00


Levofloxacin/ Dextrose 100 ml @  100 mls/hr Q24H IVPB  Last administered on 


2/16/19at 12:52; Admin Dose 100 MLS/HR;  Start 2/16/19 at 12:00





Assessment/Plan


Hospital Course (Demo Recall)


1.Hypotension-off levo - better now - BP better, will allow in 140s for now, 


BETTER NOW. Con't t monitor. 


2.Resp failure s/p re-intubation - pulm team follows. 


3.h/o cardiac arrest - etiology unclear - con't supportive RX now. Stable now - 


of tele. No cardiac intervention currently planned. 


4.Pneumoperitoneum - self d/c chest tube  - stable SOB now.  Con't Rx. 


5.Positive troponin-now trended neg - will monitor clinically for now. No 


intervention planned.  NO CP noted. 


6.renal failure - good urine output. 


7.Leukocytosis - on anti-Bx, con't med rx.  On anti-Bx. 


8. anemia - H/H stable - no bleeding noted. 


9, Thrombocytopenia - no active bleeds noted. 


10 DVT - low plts - defer to hematology for anti-coagulation.











HARDY GUZMÁN MD                 Feb 16, 2019 13:38

## 2019-02-16 NOTE — PN
Date/Time of Note


Date/Time of Note


DATE: 2/16/19 


TIME: 16:02





Assessment/Plan


VTE Prophylaxis


Risk score (from [facility])>0 risk:  12


SCD applied (from [facility]):  Yes


SCD contraindicated:  other


Pharmacological prophylaxis:  other


Pharm contraindication:  other





Lines/Catheters


IV Catheter Type (from [facility]):  Central Line


Urinary Cath still in place:  Yes


Reason Cath still needed:  urinary retention





Assessment/Plan


Assessment/Plan


-Dysphagia.  That is post G-tube placement by Dr. Sanchez.


-Hypoxic respiratory failure requiring mechanical ventilation, extubated and now


intubated for elective PEG placement.  Dr. English is following in pulmonology 


consultation.


-Sepsis with shock, resolving.  Dr. Dreyer is following in infection disease 


consultation.


-Status post cardiac arrest.   Dr. Jon is following in cardiology 


consultation.


-Left-sided pneumothorax, status post left side chest tube placement, s/p self 


d/c CT.


-S/p pneumoperitoneum most likely due to cardiac arrest, resolved. Dr. Lai is 


following in general surgery consultation. 


-Left axillary and brachial DVT, LUE swelling subsided, was on Lovenox which is 


currently held due to thrombocytopenia


-Left lower lobe pneumonia


-Severe emphysema


-NATALIE with possible chronic kidney disease. Dr Hanson is following in nephrology


consultation.


-Hyper natremia, continue free water via G-tube, IV fluids with dextrose.


-Schizoaffective disorder depressed type.  Psychiatric consult is appreciated, 


continue Risperdal Depakote





Total critical care time spent 35 mins. Further recommendations based on 


clinical course.  Plan of care discussed with Dr. Miller.


Result Diagram:  


2/16/19 0450                                                                    


           2/16/19 1427





Results 24hrs





Laboratory Tests


       Test
                                2/16/19
04:50  2/16/19
14:27


       White Blood Count                           7.3  #


       Red Blood Count                            3.19  L


       Hemoglobin                                  8.9  L


       Hematocrit                                 29.7  L


       Mean Corpuscular Volume                     93.1


       Mean Corpuscular Hemoglobin                27.9  L


       Mean Corpuscular Hemoglobin
Concent       30.0  L
  



       Red Cell Distribution Width                16.9  H


       Platelet Count                              82  #L


       Mean Platelet Volume                       11.6  H


       Immature Granulocytes %                   0.500  H


       Neutrophils %


       Segmented Neutrophils %
(Manual)            81  H
  



       Band Neutrophils % (Manual)                  15  H


       Lymphocytes %


       Lymphocytes % (Manual)                        2  L


       Monocytes %


       Monocytes % (Manual)                           2


       Eosinophils %


       Basophils %


       Nucleated Red Blood Cells %                  0.0


       Immature Granulocytes #                   0.040  H


       Neutrophils #


       Neutrophils # (Manual)                       6.0


       Band Neutrophils #                          1.0  H


       Lymphocytes (Manual)                        0.1  L


       Lymphocytes #


       Monocytes #


       Monocytes # (Manual)                        0.1  L


       Eosinophils #


       Basophils #


       Nucleated Red Blood Cells #


       Platelet Estimate                    DECREASED


       Poikilocytosis                                1+


       Anisocytosis                                  1+


       Sodium Level                                147  H         146  H


       Potassium Level                              4.1            4.4


       Chloride Level                              111  H         112  H


       Carbon Dioxide Level                         32  H          33  H


       Anion Gap                                     4  L           1  L


       Blood Urea Nitrogen                          44  H          40  H


       Creatinine                                  0.80           0.80


       Est Glomerular Filtrat Rate
mL/min   > 60  
        > 60  



       Glucose Level                                197            156


       Lactic Acid Level                            1.7


       Calcium Level                                8.4            8.4


       Phosphorus Level                             3.7


       Magnesium Level                             2.6  H








Subjective


24 Hr Interval Summary


Free Text/Dictation


Increased UO; nephro follows


Subjective hx not possible:  pt non-verbal


Constitutional:  requiring IVF, requiring O2





Exam/Review of Systems


Exam


Vitals





Vital Signs


  Date      Temp  Pulse  Resp  B/P (MAP)   Pulse Ox  O2          O2 Flow    FiO2


Time                                                 Delivery    Rate


   2/16/19           61    20      110/76        92


     15:45                           (87)


   2/16/19                                           Mechanical


     15:00                                           Ventilator


   2/16/19                                                                    35


     13:48


   2/16/19  99.8


     12:00


   2/13/19                                                            12.0


     14:27








Intake and Output





2/15/19


2/15/19


2/16/19





1515:00


23:00


07:00





IntakeIntake Total


1563.5 ml


2109.16 ml


1566.70 ml





OutputOutput Total


500 ml


630 ml


600 ml





BalanceBalance


1063.5 ml


1479.16 ml


966.70 ml











Constitutional:  alert, non-verbal, frail





Results


Results 24hrs





Laboratory Tests


       Test
                                2/16/19
04:50  2/16/19
14:27


       White Blood Count                           7.3  #


       Red Blood Count                            3.19  L


       Hemoglobin                                  8.9  L


       Hematocrit                                 29.7  L


       Mean Corpuscular Volume                     93.1


       Mean Corpuscular Hemoglobin                27.9  L


       Mean Corpuscular Hemoglobin
Concent       30.0  L
  



       Red Cell Distribution Width                16.9  H


       Platelet Count                              82  #L


       Mean Platelet Volume                       11.6  H


       Immature Granulocytes %                   0.500  H


       Neutrophils %


       Segmented Neutrophils %
(Manual)            81  H
  



       Band Neutrophils % (Manual)                  15  H


       Lymphocytes %


       Lymphocytes % (Manual)                        2  L


       Monocytes %


       Monocytes % (Manual)                           2


       Eosinophils %


       Basophils %


       Nucleated Red Blood Cells %                  0.0


       Immature Granulocytes #                   0.040  H


       Neutrophils #


       Neutrophils # (Manual)                       6.0


       Band Neutrophils #                          1.0  H


       Lymphocytes (Manual)                        0.1  L


       Lymphocytes #


       Monocytes #


       Monocytes # (Manual)                        0.1  L


       Eosinophils #


       Basophils #


       Nucleated Red Blood Cells #


       Platelet Estimate                    DECREASED


       Poikilocytosis                                1+


       Anisocytosis                                  1+


       Sodium Level                                147  H         146  H


       Potassium Level                              4.1            4.4


       Chloride Level                              111  H         112  H


       Carbon Dioxide Level                         32  H          33  H


       Anion Gap                                     4  L           1  L


       Blood Urea Nitrogen                          44  H          40  H


       Creatinine                                  0.80           0.80


       Est Glomerular Filtrat Rate
mL/min   > 60  
        > 60  



       Glucose Level                                197            156


       Lactic Acid Level                            1.7


       Calcium Level                                8.4            8.4


       Phosphorus Level                             3.7


       Magnesium Level                             2.6  H








Medications


Medication





Current Medications


Ondansetron HCl (Zofran Inj) 4 mg Q6H  PRN IV NAUSEA AND/OR VOMITING Last 


administered on 2/1/19at 18:46; Admin Dose 4 MG;  Start 1/21/19 at 23:30


Acetaminophen (Tylenol Supp) 650 mg Q4H  PRN [place] PAIN LEVEL 1-3 OR FEVER;  Start 


1/21/19 at 23:30


Aspirin (Aspirin) 81 mg DAILY PO  Last administered on 2/16/19at 08:42; Admin 


Dose 81 MG;  Start 1/24/19 at 09:00


Famotidine (Pepcid) 20 mg Q12 PO  Last administered on 2/16/19at 08:42; Admin 


Dose 20 MG;  Start 1/24/19 at 21:00


Zolpidem Tartrate (Ambien) 5 mg HS  PRN PO INSOMNIA Last administered on 


2/5/19at 20:31; Admin Dose 5 MG;  Start 1/27/19 at 00:00


Guaifenesin/ Dextromethorphan (Robitussin Dm Liquid Cup) 5 ml Q6H  PRN PO COUGH 


Last administered on 2/10/19at 05:16; Admin Dose 5 ML;  Start 1/27/19 at 20:00


Morphine Sulfate (morphine) 6 mg Q4H  PRN PO SEVERE PAIN LEVEL 7-10 Last adminis


tered on 2/16/19at 08:35; Admin Dose 6 MG;  Start 1/30/19 at 21:30


Albuterol/ Ipratropium (Duoneb) 3 ml Q3H RESP THERAPY  PRN HHN SHORTNESS OF 


BREATH Last administered on 2/7/19at 18:47; Admin Dose 3 ML;  Start 1/31/19 at 


20:00


Methylprednisolone Sodium Succinate (Solu-Medrol) 40 mg Q6 IV  Last administered


on 2/16/19at 12:52; Admin Dose 40 MG;  Start 2/2/19 at 12:00


Alteplase, Recombinant (Cathflo (Activase)) 2 mg MAY REPEAT X1 PRN CATHETER IF 


CATHETER REMAINS OCCULUDED;  Start 2/2/19 at 11:00


Lorazepam (Ativan) 1 mg Q6H  PRN IV PSYCHOSIS Last administered on 2/15/19at 


02:58; Admin Dose 1 MG;  Start 2/7/19 at 02:00


Haloperidol (Haldol) 2 mg PRN  PRN IM AGITATION Last administered on 2/12/19at 


00:44; Admin Dose 2 MG;  Start 2/8/19 at 06:30


Risperidone (Risperdal) 2 mg BID PO  Last administered on 2/16/19at 08:42; Admin


Dose 2 MG;  Start 2/9/19 at 21:00


Divalproex Sodium (Depakote Sprinkle) 250 mg Q8H PO  Last administered on 


2/16/19at 12:52; Admin Dose 250 MG;  Start 2/10/19 at 21:00


Propofol 100 ml @  1.875 mls/ hr Q12H IV  Last administered on 2/15/19at 01:14; 


Admin Dose 7.5 MLS/HR;  Start 2/13/19 at 15:30


Dexmedetomidine HCl 200 mcg/ Sodium Chloride 50 ml @  3.13 mls/hr TITRATE IV  


Last administered on 2/16/19at 15:32; Admin Dose 21.86 MLS/HR;  Start 2/15/19 at


09:30


Ascorbic Acid (Vitamin C) 500 mg DAILY GTB  Last administered on 2/16/19at 


08:42; Admin Dose 500 MG;  Start 2/15/19 at 10:30


Sodium Chloride 1,000 ml @  50 mls/hr Q20H IV  Last administered on 2/16/19at 


10:30; Admin Dose 50 MLS/HR;  Start 2/15/19 at 16:00


Phenylephrine HCl 80 mg/Dextrose 250 ml @  18.75 mls/ hr TITRATE IV  Last 


administered on 2/16/19at 08:28; Admin Dose 37.5 MLS/HR;  Start 2/16/19 at 01:00


Desmopressin Acetate (Ddavp) 2 mcg DAILY IV  Last administered on 2/16/19at 


08:43; Admin Dose 2 MCG;  Start 2/16/19 at 09:00


Albuterol (Ventolin Hfa) 4 puff Q6H RESP  THERAPY INH  Last administered on 


2/16/19at 13:03; Admin Dose 4 PUFF;  Start 2/16/19 at 14:00


Ipratropium Bromide (Atrovent Hfa) 4 puff Q6H RESP  THERAPY INH  Last 


administered on 2/16/19at 13:02; Admin Dose 4 PUFF;  Start 2/16/19 at 14:00


Levofloxacin/ Dextrose 100 ml @  100 mls/hr Q24H IVPB  Last administered on 


2/16/19at 12:52; Admin Dose 100 MLS/HR;  Start 2/16/19 at 12:00











LETY MONSON                 Feb 16, 2019 16:04

## 2019-02-17 VITALS — HEART RATE: 49 BPM | RESPIRATION RATE: 16 BRPM | DIASTOLIC BLOOD PRESSURE: 73 MMHG | SYSTOLIC BLOOD PRESSURE: 101 MMHG

## 2019-02-17 VITALS — DIASTOLIC BLOOD PRESSURE: 70 MMHG | RESPIRATION RATE: 16 BRPM | SYSTOLIC BLOOD PRESSURE: 94 MMHG | HEART RATE: 59 BPM

## 2019-02-17 VITALS — SYSTOLIC BLOOD PRESSURE: 114 MMHG | DIASTOLIC BLOOD PRESSURE: 78 MMHG | RESPIRATION RATE: 17 BRPM | HEART RATE: 58 BPM

## 2019-02-17 VITALS — HEART RATE: 56 BPM | RESPIRATION RATE: 15 BRPM | SYSTOLIC BLOOD PRESSURE: 117 MMHG | DIASTOLIC BLOOD PRESSURE: 79 MMHG

## 2019-02-17 VITALS — RESPIRATION RATE: 20 BRPM | DIASTOLIC BLOOD PRESSURE: 85 MMHG | SYSTOLIC BLOOD PRESSURE: 110 MMHG | HEART RATE: 64 BPM

## 2019-02-17 VITALS — SYSTOLIC BLOOD PRESSURE: 89 MMHG | DIASTOLIC BLOOD PRESSURE: 60 MMHG | RESPIRATION RATE: 16 BRPM | HEART RATE: 105 BPM

## 2019-02-17 VITALS — DIASTOLIC BLOOD PRESSURE: 79 MMHG | RESPIRATION RATE: 18 BRPM | HEART RATE: 61 BPM | SYSTOLIC BLOOD PRESSURE: 113 MMHG

## 2019-02-17 VITALS — HEART RATE: 58 BPM | RESPIRATION RATE: 19 BRPM | DIASTOLIC BLOOD PRESSURE: 75 MMHG | SYSTOLIC BLOOD PRESSURE: 111 MMHG

## 2019-02-17 VITALS — RESPIRATION RATE: 18 BRPM

## 2019-02-17 VITALS — HEART RATE: 101 BPM | RESPIRATION RATE: 17 BRPM | SYSTOLIC BLOOD PRESSURE: 95 MMHG | DIASTOLIC BLOOD PRESSURE: 63 MMHG

## 2019-02-17 VITALS — SYSTOLIC BLOOD PRESSURE: 80 MMHG | HEART RATE: 104 BPM | DIASTOLIC BLOOD PRESSURE: 41 MMHG | RESPIRATION RATE: 16 BRPM

## 2019-02-17 VITALS — SYSTOLIC BLOOD PRESSURE: 121 MMHG | HEART RATE: 58 BPM | RESPIRATION RATE: 20 BRPM | DIASTOLIC BLOOD PRESSURE: 79 MMHG

## 2019-02-17 VITALS — HEART RATE: 59 BPM | DIASTOLIC BLOOD PRESSURE: 77 MMHG | RESPIRATION RATE: 17 BRPM | SYSTOLIC BLOOD PRESSURE: 122 MMHG

## 2019-02-17 VITALS — HEART RATE: 104 BPM | RESPIRATION RATE: 15 BRPM | SYSTOLIC BLOOD PRESSURE: 87 MMHG | DIASTOLIC BLOOD PRESSURE: 53 MMHG

## 2019-02-17 VITALS — HEART RATE: 58 BPM | SYSTOLIC BLOOD PRESSURE: 111 MMHG | DIASTOLIC BLOOD PRESSURE: 79 MMHG | RESPIRATION RATE: 19 BRPM

## 2019-02-17 VITALS — DIASTOLIC BLOOD PRESSURE: 75 MMHG | SYSTOLIC BLOOD PRESSURE: 104 MMHG | RESPIRATION RATE: 21 BRPM | HEART RATE: 63 BPM

## 2019-02-17 VITALS — HEART RATE: 100 BPM | RESPIRATION RATE: 17 BRPM | DIASTOLIC BLOOD PRESSURE: 63 MMHG | SYSTOLIC BLOOD PRESSURE: 92 MMHG

## 2019-02-17 VITALS — DIASTOLIC BLOOD PRESSURE: 64 MMHG | SYSTOLIC BLOOD PRESSURE: 95 MMHG | RESPIRATION RATE: 16 BRPM | HEART RATE: 106 BPM

## 2019-02-17 VITALS — RESPIRATION RATE: 18 BRPM | HEART RATE: 101 BPM

## 2019-02-17 VITALS — SYSTOLIC BLOOD PRESSURE: 119 MMHG | RESPIRATION RATE: 14 BRPM | DIASTOLIC BLOOD PRESSURE: 78 MMHG | HEART RATE: 59 BPM

## 2019-02-17 VITALS — DIASTOLIC BLOOD PRESSURE: 68 MMHG | HEART RATE: 63 BPM | RESPIRATION RATE: 20 BRPM | SYSTOLIC BLOOD PRESSURE: 102 MMHG

## 2019-02-17 VITALS — RESPIRATION RATE: 21 BRPM | DIASTOLIC BLOOD PRESSURE: 84 MMHG | SYSTOLIC BLOOD PRESSURE: 118 MMHG | HEART RATE: 63 BPM

## 2019-02-17 VITALS — HEART RATE: 110 BPM | SYSTOLIC BLOOD PRESSURE: 106 MMHG | DIASTOLIC BLOOD PRESSURE: 69 MMHG | RESPIRATION RATE: 17 BRPM

## 2019-02-17 VITALS — SYSTOLIC BLOOD PRESSURE: 114 MMHG | DIASTOLIC BLOOD PRESSURE: 81 MMHG | HEART RATE: 56 BPM | RESPIRATION RATE: 17 BRPM

## 2019-02-17 VITALS — DIASTOLIC BLOOD PRESSURE: 75 MMHG | SYSTOLIC BLOOD PRESSURE: 108 MMHG | RESPIRATION RATE: 16 BRPM | HEART RATE: 49 BPM

## 2019-02-17 VITALS — HEART RATE: 104 BPM | DIASTOLIC BLOOD PRESSURE: 49 MMHG | RESPIRATION RATE: 18 BRPM | SYSTOLIC BLOOD PRESSURE: 87 MMHG

## 2019-02-17 VITALS — HEART RATE: 61 BPM | RESPIRATION RATE: 16 BRPM | SYSTOLIC BLOOD PRESSURE: 98 MMHG | DIASTOLIC BLOOD PRESSURE: 69 MMHG

## 2019-02-17 VITALS — RESPIRATION RATE: 16 BRPM | DIASTOLIC BLOOD PRESSURE: 64 MMHG | HEART RATE: 104 BPM | SYSTOLIC BLOOD PRESSURE: 92 MMHG

## 2019-02-17 VITALS — RESPIRATION RATE: 17 BRPM | HEART RATE: 58 BPM | SYSTOLIC BLOOD PRESSURE: 121 MMHG | DIASTOLIC BLOOD PRESSURE: 80 MMHG

## 2019-02-17 VITALS — HEART RATE: 47 BPM | RESPIRATION RATE: 16 BRPM | SYSTOLIC BLOOD PRESSURE: 107 MMHG | DIASTOLIC BLOOD PRESSURE: 72 MMHG

## 2019-02-17 VITALS — SYSTOLIC BLOOD PRESSURE: 91 MMHG | HEART RATE: 102 BPM | RESPIRATION RATE: 17 BRPM | DIASTOLIC BLOOD PRESSURE: 53 MMHG

## 2019-02-17 VITALS — SYSTOLIC BLOOD PRESSURE: 111 MMHG | DIASTOLIC BLOOD PRESSURE: 77 MMHG | HEART RATE: 61 BPM | RESPIRATION RATE: 19 BRPM

## 2019-02-17 VITALS — HEART RATE: 65 BPM | RESPIRATION RATE: 15 BRPM | SYSTOLIC BLOOD PRESSURE: 128 MMHG | DIASTOLIC BLOOD PRESSURE: 87 MMHG

## 2019-02-17 VITALS — DIASTOLIC BLOOD PRESSURE: 73 MMHG | SYSTOLIC BLOOD PRESSURE: 106 MMHG | RESPIRATION RATE: 19 BRPM | HEART RATE: 63 BPM

## 2019-02-17 VITALS — DIASTOLIC BLOOD PRESSURE: 64 MMHG | RESPIRATION RATE: 17 BRPM | HEART RATE: 100 BPM | SYSTOLIC BLOOD PRESSURE: 88 MMHG

## 2019-02-17 VITALS — RESPIRATION RATE: 20 BRPM | DIASTOLIC BLOOD PRESSURE: 80 MMHG | SYSTOLIC BLOOD PRESSURE: 124 MMHG | HEART RATE: 55 BPM

## 2019-02-17 VITALS — HEART RATE: 50 BPM | DIASTOLIC BLOOD PRESSURE: 70 MMHG | SYSTOLIC BLOOD PRESSURE: 105 MMHG | RESPIRATION RATE: 16 BRPM

## 2019-02-17 VITALS — SYSTOLIC BLOOD PRESSURE: 106 MMHG | HEART RATE: 48 BPM | RESPIRATION RATE: 16 BRPM | DIASTOLIC BLOOD PRESSURE: 76 MMHG

## 2019-02-17 VITALS — HEART RATE: 50 BPM | SYSTOLIC BLOOD PRESSURE: 104 MMHG | RESPIRATION RATE: 16 BRPM | DIASTOLIC BLOOD PRESSURE: 77 MMHG

## 2019-02-17 VITALS — SYSTOLIC BLOOD PRESSURE: 95 MMHG | DIASTOLIC BLOOD PRESSURE: 59 MMHG | HEART RATE: 105 BPM | RESPIRATION RATE: 17 BRPM

## 2019-02-17 VITALS — SYSTOLIC BLOOD PRESSURE: 120 MMHG | HEART RATE: 51 BPM | DIASTOLIC BLOOD PRESSURE: 74 MMHG | RESPIRATION RATE: 16 BRPM

## 2019-02-17 VITALS — SYSTOLIC BLOOD PRESSURE: 122 MMHG | RESPIRATION RATE: 20 BRPM | HEART RATE: 78 BPM | DIASTOLIC BLOOD PRESSURE: 74 MMHG

## 2019-02-17 VITALS — DIASTOLIC BLOOD PRESSURE: 72 MMHG | SYSTOLIC BLOOD PRESSURE: 114 MMHG | HEART RATE: 102 BPM | RESPIRATION RATE: 17 BRPM

## 2019-02-17 VITALS — DIASTOLIC BLOOD PRESSURE: 64 MMHG | HEART RATE: 103 BPM | SYSTOLIC BLOOD PRESSURE: 96 MMHG | RESPIRATION RATE: 18 BRPM

## 2019-02-17 VITALS — DIASTOLIC BLOOD PRESSURE: 76 MMHG | RESPIRATION RATE: 17 BRPM | HEART RATE: 56 BPM | SYSTOLIC BLOOD PRESSURE: 107 MMHG

## 2019-02-17 VITALS — RESPIRATION RATE: 20 BRPM | SYSTOLIC BLOOD PRESSURE: 126 MMHG | DIASTOLIC BLOOD PRESSURE: 65 MMHG | HEART RATE: 108 BPM

## 2019-02-17 VITALS — HEART RATE: 52 BPM | DIASTOLIC BLOOD PRESSURE: 73 MMHG | RESPIRATION RATE: 16 BRPM | SYSTOLIC BLOOD PRESSURE: 100 MMHG

## 2019-02-17 VITALS — RESPIRATION RATE: 16 BRPM

## 2019-02-17 VITALS — DIASTOLIC BLOOD PRESSURE: 73 MMHG | RESPIRATION RATE: 16 BRPM | SYSTOLIC BLOOD PRESSURE: 105 MMHG | HEART RATE: 50 BPM

## 2019-02-17 VITALS — DIASTOLIC BLOOD PRESSURE: 86 MMHG | RESPIRATION RATE: 17 BRPM | HEART RATE: 68 BPM | SYSTOLIC BLOOD PRESSURE: 99 MMHG

## 2019-02-17 VITALS — SYSTOLIC BLOOD PRESSURE: 93 MMHG | HEART RATE: 104 BPM | RESPIRATION RATE: 16 BRPM | DIASTOLIC BLOOD PRESSURE: 51 MMHG

## 2019-02-17 VITALS — SYSTOLIC BLOOD PRESSURE: 104 MMHG | DIASTOLIC BLOOD PRESSURE: 70 MMHG | HEART RATE: 108 BPM | RESPIRATION RATE: 18 BRPM

## 2019-02-17 VITALS — HEART RATE: 100 BPM | SYSTOLIC BLOOD PRESSURE: 96 MMHG | DIASTOLIC BLOOD PRESSURE: 67 MMHG | RESPIRATION RATE: 16 BRPM

## 2019-02-17 VITALS — SYSTOLIC BLOOD PRESSURE: 110 MMHG | DIASTOLIC BLOOD PRESSURE: 77 MMHG | RESPIRATION RATE: 16 BRPM | HEART RATE: 61 BPM

## 2019-02-17 VITALS — RESPIRATION RATE: 17 BRPM | SYSTOLIC BLOOD PRESSURE: 91 MMHG | HEART RATE: 105 BPM | DIASTOLIC BLOOD PRESSURE: 68 MMHG

## 2019-02-17 VITALS — HEART RATE: 111 BPM | SYSTOLIC BLOOD PRESSURE: 113 MMHG | DIASTOLIC BLOOD PRESSURE: 79 MMHG | RESPIRATION RATE: 17 BRPM

## 2019-02-17 VITALS — RESPIRATION RATE: 23 BRPM

## 2019-02-17 VITALS — RESPIRATION RATE: 23 BRPM | DIASTOLIC BLOOD PRESSURE: 82 MMHG | SYSTOLIC BLOOD PRESSURE: 124 MMHG | HEART RATE: 59 BPM

## 2019-02-17 VITALS — SYSTOLIC BLOOD PRESSURE: 91 MMHG | RESPIRATION RATE: 17 BRPM | DIASTOLIC BLOOD PRESSURE: 64 MMHG | HEART RATE: 100 BPM

## 2019-02-17 VITALS — SYSTOLIC BLOOD PRESSURE: 87 MMHG | HEART RATE: 103 BPM | DIASTOLIC BLOOD PRESSURE: 51 MMHG | RESPIRATION RATE: 16 BRPM

## 2019-02-17 VITALS — HEART RATE: 104 BPM | SYSTOLIC BLOOD PRESSURE: 90 MMHG | RESPIRATION RATE: 17 BRPM | DIASTOLIC BLOOD PRESSURE: 53 MMHG

## 2019-02-17 VITALS — RESPIRATION RATE: 16 BRPM | HEART RATE: 105 BPM | SYSTOLIC BLOOD PRESSURE: 88 MMHG | DIASTOLIC BLOOD PRESSURE: 38 MMHG

## 2019-02-17 VITALS — SYSTOLIC BLOOD PRESSURE: 86 MMHG | RESPIRATION RATE: 18 BRPM | DIASTOLIC BLOOD PRESSURE: 56 MMHG | HEART RATE: 100 BPM

## 2019-02-17 VITALS — SYSTOLIC BLOOD PRESSURE: 94 MMHG | DIASTOLIC BLOOD PRESSURE: 49 MMHG | HEART RATE: 107 BPM | RESPIRATION RATE: 16 BRPM

## 2019-02-17 VITALS — DIASTOLIC BLOOD PRESSURE: 75 MMHG | SYSTOLIC BLOOD PRESSURE: 105 MMHG | HEART RATE: 60 BPM | RESPIRATION RATE: 19 BRPM

## 2019-02-17 VITALS — DIASTOLIC BLOOD PRESSURE: 66 MMHG | RESPIRATION RATE: 16 BRPM | HEART RATE: 56 BPM | SYSTOLIC BLOOD PRESSURE: 103 MMHG

## 2019-02-17 VITALS — SYSTOLIC BLOOD PRESSURE: 107 MMHG | HEART RATE: 58 BPM | DIASTOLIC BLOOD PRESSURE: 84 MMHG | RESPIRATION RATE: 23 BRPM

## 2019-02-17 VITALS — RESPIRATION RATE: 19 BRPM | DIASTOLIC BLOOD PRESSURE: 78 MMHG | HEART RATE: 58 BPM | SYSTOLIC BLOOD PRESSURE: 117 MMHG

## 2019-02-17 VITALS — HEART RATE: 58 BPM | SYSTOLIC BLOOD PRESSURE: 108 MMHG | DIASTOLIC BLOOD PRESSURE: 70 MMHG | RESPIRATION RATE: 16 BRPM

## 2019-02-17 VITALS — HEART RATE: 58 BPM | SYSTOLIC BLOOD PRESSURE: 96 MMHG | RESPIRATION RATE: 16 BRPM | DIASTOLIC BLOOD PRESSURE: 67 MMHG

## 2019-02-17 VITALS — DIASTOLIC BLOOD PRESSURE: 81 MMHG | RESPIRATION RATE: 17 BRPM | HEART RATE: 57 BPM | SYSTOLIC BLOOD PRESSURE: 114 MMHG

## 2019-02-17 VITALS — RESPIRATION RATE: 15 BRPM | HEART RATE: 60 BPM | DIASTOLIC BLOOD PRESSURE: 85 MMHG | SYSTOLIC BLOOD PRESSURE: 115 MMHG

## 2019-02-17 VITALS — SYSTOLIC BLOOD PRESSURE: 132 MMHG | HEART RATE: 84 BPM | DIASTOLIC BLOOD PRESSURE: 100 MMHG | RESPIRATION RATE: 18 BRPM

## 2019-02-17 VITALS — SYSTOLIC BLOOD PRESSURE: 115 MMHG | DIASTOLIC BLOOD PRESSURE: 78 MMHG | HEART RATE: 58 BPM | RESPIRATION RATE: 19 BRPM

## 2019-02-17 VITALS — HEART RATE: 66 BPM

## 2019-02-17 VITALS — RESPIRATION RATE: 21 BRPM | HEART RATE: 64 BPM | DIASTOLIC BLOOD PRESSURE: 56 MMHG | SYSTOLIC BLOOD PRESSURE: 100 MMHG

## 2019-02-17 VITALS — RESPIRATION RATE: 16 BRPM | DIASTOLIC BLOOD PRESSURE: 62 MMHG | SYSTOLIC BLOOD PRESSURE: 93 MMHG | HEART RATE: 102 BPM

## 2019-02-17 VITALS — RESPIRATION RATE: 19 BRPM

## 2019-02-17 VITALS — SYSTOLIC BLOOD PRESSURE: 92 MMHG | RESPIRATION RATE: 16 BRPM | HEART RATE: 54 BPM | DIASTOLIC BLOOD PRESSURE: 67 MMHG

## 2019-02-17 VITALS — HEART RATE: 54 BPM | RESPIRATION RATE: 16 BRPM | SYSTOLIC BLOOD PRESSURE: 95 MMHG | DIASTOLIC BLOOD PRESSURE: 71 MMHG

## 2019-02-17 VITALS — RESPIRATION RATE: 16 BRPM | HEART RATE: 105 BPM | DIASTOLIC BLOOD PRESSURE: 63 MMHG | SYSTOLIC BLOOD PRESSURE: 86 MMHG

## 2019-02-17 VITALS — SYSTOLIC BLOOD PRESSURE: 101 MMHG | RESPIRATION RATE: 19 BRPM | HEART RATE: 59 BPM | DIASTOLIC BLOOD PRESSURE: 71 MMHG

## 2019-02-17 VITALS — DIASTOLIC BLOOD PRESSURE: 69 MMHG | RESPIRATION RATE: 16 BRPM | SYSTOLIC BLOOD PRESSURE: 97 MMHG

## 2019-02-17 VITALS — SYSTOLIC BLOOD PRESSURE: 108 MMHG | HEART RATE: 92 BPM | DIASTOLIC BLOOD PRESSURE: 79 MMHG | RESPIRATION RATE: 19 BRPM

## 2019-02-17 VITALS — HEART RATE: 107 BPM | RESPIRATION RATE: 17 BRPM | SYSTOLIC BLOOD PRESSURE: 87 MMHG | DIASTOLIC BLOOD PRESSURE: 55 MMHG

## 2019-02-17 VITALS — RESPIRATION RATE: 21 BRPM

## 2019-02-17 LAB
ABNORMAL IP MESSAGE: 1
ADD MAN DIFF?: NO
ADD UMIC: NO
ANION GAP: 1 (ref 5–13)
ANION GAP: 7 (ref 5–13)
ANISOCYTOSIS: (no result) (ref 0–0)
BAND NEUTROPHILS #M: 0.2 10^3/UL (ref 0–0.6)
BAND NEUTROPHILS % (M): 11 % (ref 0–4)
BASOPHIL #: 0 10^3/UL (ref 0–0.1)
BASOPHILS %: 0 % (ref 0–2)
BLOOD UREA NITROGEN: 44 MG/DL (ref 7–20)
BLOOD UREA NITROGEN: 44 MG/DL (ref 7–20)
CALCIUM: 8.6 MG/DL (ref 8.4–10.2)
CALCIUM: 8.6 MG/DL (ref 8.4–10.2)
CARBON DIOXIDE: 32 MMOL/L (ref 21–31)
CARBON DIOXIDE: 34 MMOL/L (ref 21–31)
CHLORIDE: 112 MMOL/L (ref 97–110)
CHLORIDE: 116 MMOL/L (ref 97–110)
CREATININE,URINE RANDOM: < 12.4 MG/DL (ref 20–370)
CREATININE: 0.75 MG/DL (ref 0.61–1.24)
CREATININE: 0.77 MG/DL (ref 0.61–1.24)
EOSINOPHILS #: 0 10^3/UL (ref 0–0.5)
EOSINOPHILS %: 0 % (ref 0–7)
ERYTHROBLAST% (NRBC) (M): 1 % (ref 0–0)
GIANT THROMBO% (M): 1 % (ref 0–0)
GLUCOSE: 187 MG/DL (ref 70–220)
GLUCOSE: 214 MG/DL (ref 70–220)
HEMATOCRIT: 27.6 % (ref 42–52)
HEMOGLOBIN: 8.3 G/DL (ref 14–18)
IMMATURE GRANS #M: 0.01 10^3/UL (ref 0–0.03)
IMMATURE GRANS % (M): 0.5 % (ref 0–0.43)
LYMPHOCYTES #: 0.2 10^3/UL (ref 0.8–2.9)
LYMPHOCYTES #M: 0.2 10^3/UL (ref 0.8–2.9)
LYMPHOCYTES % (M): 10 % (ref 15–51)
LYMPHOCYTES %: 7 % (ref 15–51)
MAGNESIUM: 2.7 MG/DL (ref 1.7–2.5)
MEAN CORPUSCULAR HEMOGLOBIN: 28.3 PG (ref 29–33)
MEAN CORPUSCULAR HGB CONC: 30.1 G/DL (ref 32–37)
MEAN CORPUSCULAR VOLUME: 94.2 FL (ref 82–101)
MEAN PLATELET VOLUME: 12.3 FL (ref 7.4–10.4)
MICROCYTOSIS: (no result) (ref 0–0)
MONOCYTE #: 0.1 10^3/UL (ref 0.3–0.9)
MONOCYTES %: 2.8 % (ref 0–11)
NEUTROPHIL #: 1.9 10^3/UL (ref 1.6–7.5)
NEUTROPHILS %: 89.7 % (ref 39–77)
NUCLEATED RED BLOOD CELLS #: 0 10^3/UL (ref 0–0)
NUCLEATED RED BLOOD CELLS%: 0 /100WBC (ref 0–0)
OSMOLALITY,URINE: 284 MOSM/KG (ref 250–1200)
OVALOCYTES: (no result) (ref 0–0)
PHOSPHORUS: 2.6 MG/DL (ref 2.5–4.9)
PLATELET COUNT: 43 10^3/UL (ref 140–415)
PLATELET ESTIMATE: (no result)
POIKILOCYTOSIS: (no result) (ref 0–0)
POLYCHROMASIA: (no result) (ref 0–0)
POSITIVE DIFF: (no result)
POTASSIUM: 4.4 MMOL/L (ref 3.5–5.1)
POTASSIUM: 4.7 MMOL/L (ref 3.5–5.1)
PROTEIN URINE: 34 MG/DL (ref 0–11.9)
RED BLOOD COUNT: 2.93 10^6/UL (ref 4.7–6.1)
RED CELL DISTRIBUTION WIDTH: 17 % (ref 11.5–14.5)
SEG NEUT #M: 1.7 10^3/UL (ref 1.6–7.5)
SEGMENTED NEUTROPHILS (M) %: 79 % (ref 39–77)
SMUDGE%M: 6 % (ref 0–0)
SODIUM,URINE RANDOM: 27 MMOL/L (ref 30–90)
SODIUM: 151 MMOL/L (ref 135–144)
SODIUM: 151 MMOL/L (ref 135–144)
UR ASCORBIC ACID: NEGATIVE MG/DL
UR BILIRUBIN (DIP): NEGATIVE MG/DL
UR BLOOD (DIP): NEGATIVE MG/DL
UR CLARITY: CLEAR
UR COLOR: YELLOW
UR GLUCOSE (DIP): (no result) MG/DL
UR KETONES (DIP): NEGATIVE MG/DL
UR LEUKOCYTE ESTERASE (DIP): NEGATIVE LEU/UL
UR NITRITE (DIP): NEGATIVE MG/DL
UR PH (DIP): 5 (ref 5–9)
UR SPECIFIC GRAVITY (DIP): 1.01 (ref 1–1.03)
UR TOTAL PROTEIN (DIP): NEGATIVE MG/DL
UR UROBILINOGEN (DIP): NEGATIVE MG/DL
WHITE BLOOD COUNT: 2.1 10^3/UL (ref 4.8–10.8)

## 2019-02-17 RX ADMIN — DIVALPROEX SODIUM SCH MG: 125 CAPSULE, COATED PELLETS ORAL at 05:28

## 2019-02-17 RX ADMIN — PROPOFOL SCH MLS/HR: 10 INJECTION, EMULSION INTRAVENOUS at 15:30

## 2019-02-17 RX ADMIN — ASPIRIN 81 MG SCH MG: 81 TABLET ORAL at 08:29

## 2019-02-17 RX ADMIN — MIDAZOLAM HYDROCHLORIDE 1 MLS/HR: 5 INJECTION, SOLUTION INTRAMUSCULAR; INTRAVENOUS at 23:49

## 2019-02-17 RX ADMIN — ALBUTEROL SULFATE SCH PUFF: 90 AEROSOL, METERED RESPIRATORY (INHALATION) at 13:25

## 2019-02-17 RX ADMIN — OXYCODONE HYDROCHLORIDE AND ACETAMINOPHEN SCH MG: 500 TABLET ORAL at 08:28

## 2019-02-17 RX ADMIN — DESMOPRESSIN ACETATE 1 MCG: 4 SOLUTION INTRAVENOUS at 08:29

## 2019-02-17 RX ADMIN — METHYLPREDNISOLONE SODIUM SUCCINATE 1 MG: 40 INJECTION, POWDER, FOR SOLUTION INTRAMUSCULAR; INTRAVENOUS at 13:40

## 2019-02-17 RX ADMIN — POTASSIUM ACETATE 1 MLS/HR: 3.93 INJECTION, SOLUTION, CONCENTRATE INTRAVENOUS at 21:15

## 2019-02-17 RX ADMIN — VASOPRESSIN SCH MLS/HR: 20 INJECTION, SOLUTION INTRAMUSCULAR; SUBCUTANEOUS at 06:40

## 2019-02-17 RX ADMIN — VASOPRESSIN SCH MLS/HR: 20 INJECTION, SOLUTION INTRAMUSCULAR; SUBCUTANEOUS at 00:41

## 2019-02-17 RX ADMIN — DIVALPROEX SODIUM 1 MG: 125 CAPSULE, COATED PELLETS ORAL at 21:13

## 2019-02-17 RX ADMIN — PROPOFOL 1 MLS/HR: 10 INJECTION, EMULSION INTRAVENOUS at 15:30

## 2019-02-17 RX ADMIN — ALBUTEROL SULFATE 1 PUFF: 90 AEROSOL, METERED RESPIRATORY (INHALATION) at 01:33

## 2019-02-17 RX ADMIN — RISPERIDONE 1 MG: 1 TABLET ORAL at 21:14

## 2019-02-17 RX ADMIN — PROPOFOL SCH MLS/HR: 10 INJECTION, EMULSION INTRAVENOUS at 03:30

## 2019-02-17 RX ADMIN — DIVALPROEX SODIUM 1 MG: 125 CAPSULE, COATED PELLETS ORAL at 05:28

## 2019-02-17 RX ADMIN — POTASSIUM ACETATE 1 MLS/HR: 3.93 INJECTION, SOLUTION, CONCENTRATE INTRAVENOUS at 08:28

## 2019-02-17 RX ADMIN — CASTOR OIL AND BALSAM, PERU 1 APPLIC: 788; 87 OINTMENT TOPICAL at 08:30

## 2019-02-17 RX ADMIN — VASOPRESSIN SCH MLS/HR: 20 INJECTION, SOLUTION INTRAMUSCULAR; SUBCUTANEOUS at 03:48

## 2019-02-17 RX ADMIN — DIVALPROEX SODIUM 1 MG: 125 CAPSULE, COATED PELLETS ORAL at 13:40

## 2019-02-17 RX ADMIN — FAMOTIDINE SCH MG: 20 TABLET ORAL at 08:29

## 2019-02-17 RX ADMIN — IPRATROPIUM BROMIDE 1 PUFF: 17 AEROSOL, METERED RESPIRATORY (INHALATION) at 01:32

## 2019-02-17 RX ADMIN — MIDAZOLAM HYDROCHLORIDE SCH MLS/HR: 5 INJECTION, SOLUTION INTRAMUSCULAR; INTRAVENOUS at 10:49

## 2019-02-17 RX ADMIN — DIVALPROEX SODIUM SCH MG: 125 CAPSULE, COATED PELLETS ORAL at 13:40

## 2019-02-17 RX ADMIN — METHYLPREDNISOLONE SODIUM SUCCINATE 1 MG: 40 INJECTION, POWDER, FOR SOLUTION INTRAMUSCULAR; INTRAVENOUS at 18:25

## 2019-02-17 RX ADMIN — FAMOTIDINE SCH MG: 20 TABLET ORAL at 21:13

## 2019-02-17 RX ADMIN — DESMOPRESSIN ACETATE 1 MCG: 4 SOLUTION INTRAVENOUS at 21:13

## 2019-02-17 RX ADMIN — ALBUTEROL SULFATE 1 PUFF: 90 AEROSOL, METERED RESPIRATORY (INHALATION) at 13:25

## 2019-02-17 RX ADMIN — OXYCODONE HYDROCHLORIDE AND ACETAMINOPHEN 1 MG: 500 TABLET ORAL at 08:28

## 2019-02-17 RX ADMIN — LEVOFLOXACIN 1 MLS/HR: 500 INJECTION, SOLUTION INTRAVENOUS at 13:40

## 2019-02-17 RX ADMIN — ALBUTEROL SULFATE SCH PUFF: 90 AEROSOL, METERED RESPIRATORY (INHALATION) at 19:36

## 2019-02-17 RX ADMIN — MIDAZOLAM HYDROCHLORIDE 1 MLS/HR: 5 INJECTION, SOLUTION INTRAMUSCULAR; INTRAVENOUS at 10:49

## 2019-02-17 RX ADMIN — POTASSIUM ACETATE 1 MLS/HR: 3.93 INJECTION, SOLUTION, CONCENTRATE INTRAVENOUS at 17:30

## 2019-02-17 RX ADMIN — MIDAZOLAM HYDROCHLORIDE SCH MLS/HR: 5 INJECTION, SOLUTION INTRAMUSCULAR; INTRAVENOUS at 23:49

## 2019-02-17 RX ADMIN — VASOPRESSIN 1 MLS/HR: 20 INJECTION, SOLUTION INTRAMUSCULAR; SUBCUTANEOUS at 06:40

## 2019-02-17 RX ADMIN — IPRATROPIUM BROMIDE 1 PUFF: 17 AEROSOL, METERED RESPIRATORY (INHALATION) at 13:25

## 2019-02-17 RX ADMIN — VASOPRESSIN 1 MLS/HR: 20 INJECTION, SOLUTION INTRAMUSCULAR; SUBCUTANEOUS at 00:41

## 2019-02-17 RX ADMIN — THIAMINE HYDROCHLORIDE 1 MLS/HR: 100 INJECTION, SOLUTION INTRAMUSCULAR; INTRAVENOUS at 06:41

## 2019-02-17 RX ADMIN — FOLIC ACID SCH MLS/HR: 5 INJECTION, SOLUTION INTRAMUSCULAR; INTRAVENOUS; SUBCUTANEOUS at 06:41

## 2019-02-17 RX ADMIN — ALBUTEROL SULFATE 1 PUFF: 90 AEROSOL, METERED RESPIRATORY (INHALATION) at 19:36

## 2019-02-17 RX ADMIN — METHYLPREDNISOLONE SODIUM SUCCINATE 1 MG: 40 INJECTION, POWDER, FOR SOLUTION INTRAMUSCULAR; INTRAVENOUS at 00:23

## 2019-02-17 RX ADMIN — DESMOPRESSIN ACETATE SCH MCG: 4 SOLUTION INTRAVENOUS at 08:29

## 2019-02-17 RX ADMIN — ALBUTEROL SULFATE SCH PUFF: 90 AEROSOL, METERED RESPIRATORY (INHALATION) at 01:33

## 2019-02-17 RX ADMIN — ALBUTEROL SULFATE SCH PUFF: 90 AEROSOL, METERED RESPIRATORY (INHALATION) at 08:10

## 2019-02-17 RX ADMIN — ASPIRIN 81 MG CHEWABLE TABLET 1 MG: 81 TABLET CHEWABLE at 08:29

## 2019-02-17 RX ADMIN — PROPOFOL 1 MLS/HR: 10 INJECTION, EMULSION INTRAVENOUS at 03:30

## 2019-02-17 RX ADMIN — LEVOFLOXACIN SCH MLS/HR: 500 INJECTION, SOLUTION INTRAVENOUS at 13:40

## 2019-02-17 RX ADMIN — FAMOTIDINE 1 MG: 20 TABLET ORAL at 08:29

## 2019-02-17 RX ADMIN — FAMOTIDINE 1 MG: 20 TABLET ORAL at 21:13

## 2019-02-17 RX ADMIN — DIVALPROEX SODIUM SCH MG: 125 CAPSULE, COATED PELLETS ORAL at 21:13

## 2019-02-17 RX ADMIN — DESMOPRESSIN ACETATE SCH MCG: 4 SOLUTION INTRAVENOUS at 21:13

## 2019-02-17 RX ADMIN — CALCIUM GLUCONATE SCH MLS/HR: 94 INJECTION, SOLUTION INTRAVENOUS at 08:28

## 2019-02-17 RX ADMIN — IPRATROPIUM BROMIDE 1 PUFF: 17 AEROSOL, METERED RESPIRATORY (INHALATION) at 08:10

## 2019-02-17 RX ADMIN — CALCIUM GLUCONATE SCH MLS/HR: 94 INJECTION, SOLUTION INTRAVENOUS at 17:30

## 2019-02-17 RX ADMIN — IPRATROPIUM BROMIDE 1 PUFF: 17 AEROSOL, METERED RESPIRATORY (INHALATION) at 19:36

## 2019-02-17 RX ADMIN — ALBUTEROL SULFATE 1 PUFF: 90 AEROSOL, METERED RESPIRATORY (INHALATION) at 08:10

## 2019-02-17 RX ADMIN — METHYLPREDNISOLONE SODIUM SUCCINATE 1 MG: 40 INJECTION, POWDER, FOR SOLUTION INTRAMUSCULAR; INTRAVENOUS at 05:29

## 2019-02-17 RX ADMIN — RISPERIDONE 1 MG: 1 TABLET ORAL at 08:30

## 2019-02-17 RX ADMIN — CALCIUM GLUCONATE SCH MLS/HR: 94 INJECTION, SOLUTION INTRAVENOUS at 21:15

## 2019-02-17 RX ADMIN — VASOPRESSIN 1 MLS/HR: 20 INJECTION, SOLUTION INTRAMUSCULAR; SUBCUTANEOUS at 03:48

## 2019-02-17 NOTE — PN
Date/Time of Note


Date/Time of Note


DATE: 2/17/19 


TIME: 11:11





Assessment/Plan


VTE Prophylaxis


Risk score (from Nsg)>0 risk:  12


SCD applied (from Nsg):  Yes





Lines/Catheters


IV Catheter Type (from Nrsg):  Central Line


Urinary Cath still in place:  Yes


Reason Cath still needed:  urinary retention





Assessment/Plan


Assessment/Plan


-Dysphagia.  That is post G-tube placement by Dr. Sanchez.


-Hypoxic respiratory failure requiring mechanical ventilation, extubated and now


intubated for elective PEG placement.  Dr. English is following in pulmonology 


consultation.


-Sepsis with shock, resolving.  Dr. Dreyer is following in infection disease 


consultation.


-Status post cardiac arrest.   Dr. Jon is following in cardiology 


consultation.


-Left-sided pneumothorax, status post left side chest tube placement, s/p self 


d/c CT.


-S/p pneumoperitoneum most likely due to cardiac arrest, resolved. Dr. Lai is 


following in general surgery consultation. 


-Left axillary and brachial DVT, LUE swelling subsided, was on Lovenox which is 


currently held due to thrombocytopenia


-Left lower lobe pneumonia


-Severe emphysema


-NATALIE with possible chronic kidney disease. Dr Hanson is following in nephrology


consultation.


-Hyper natremia, continue free water via G-tube, IV fluids with dextrose.


-Schizoaffective disorder depressed type.  Psychiatric consult is appreciated, 


continue Risperdal Depakote





Total critical care time spent 35 mins. Further recommendations based on 


clinical course.  Plan of care discussed with Dr. Miller.


Result Diagram:  


2/17/19 0415                                                                    


           2/17/19 0415





Results 24hrs





Laboratory Tests


      Test
                                2/16/19
14:27    2/17/19
04:15


      Sodium Level                                146  H           151  H


      Potassium Level                              4.4              4.7


      Chloride Level                              112  H           116  H


      Carbon Dioxide Level                         33  H            34  H


      Anion Gap                                     1  L             1  L


      Blood Urea Nitrogen                          40  H            44  H


      Creatinine                                  0.80             0.75


      Est Glomerular Filtrat Rate
mL/min   > 60  
        > 60  



      Glucose Level                                156              214


      Calcium Level                                8.4              8.6


      White Blood Count                                           2.1  #L


      Red Blood Count                                             2.93  L


      Hemoglobin                                                   8.3  L


      Hematocrit                                                  27.6  L


      Mean Corpuscular Volume                                      94.2


      Mean Corpuscular Hemoglobin                                 28.3  L


      Mean Corpuscular Hemoglobin
Concent  
                     30.1  L



      Red Cell Distribution Width                                 17.0  H


      Platelet Count                                               43  #L


      Mean Platelet Volume                                        12.3  H


      Immature Granulocytes %                                    0.500  H


      Neutrophils %                                               89.7  H


      Segmented Neutrophils %
(Manual)     
                       79  H



      Band Neutrophils % (Manual)                                   11  H


      Lymphocytes %                                                7.0  L


      Lymphocytes % (Manual)                                        10  L


      Monocytes %                                                   2.8


      Eosinophils %                                                 0.0


      Basophils %                                                   0.0


      Nucleated Red Blood Cells %                                    1  H


      Immature Granulocytes #                                     0.010


      Neutrophils #                                                 1.9


      Neutrophils # (Manual)                                        1.7


      Band Neutrophils #                                            0.2


      Lymphocytes (Manual)                                         0.2  L


      Lymphocytes #                                                0.2  L


      Monocytes #                                                  0.1  L


      Eosinophils #                                                 0.0


      Basophils #                                                   0.0


      Nucleated Red Blood Cells #                                   0.0


      Platelet Estimate                                   SIG DECREASED


      Giant Platelets                                                1  H


      Polychromasia                                                  1+


      Poikilocytosis                                                 1+


      Anisocytosis                                                   1+


      Microcytosis                                                   1+


      Ovalocytes                                                     1+


      Phosphorus Level                                              2.6


      Magnesium Level                                              2.7  H








Exam/Review of Systems


Exam


Vitals





Vital Signs


  Date      Temp  Pulse  Resp  B/P (MAP)   Pulse Ox  O2          O2 Flow    FiO2


Time                                                 Delivery    Rate


   2/17/19                                                                    35


     08:00


   2/17/19           57


     08:00


   2/17/19                 21                    93


     05:00


   2/17/19                         118/84


     03:15                           (95)


   2/17/19                                           Mechanical


     03:00                                           Ventilator


   2/17/19  98.6


     00:00


   2/13/19                                                            12.0


     14:27








Intake and Output





2/16/19 2/16/19 2/17/19





1414:59


22:59


06:59





IntakeIntake Total


1379.63 ml


1436.83 ml


1249.69 ml





OutputOutput Total


1200 ml


1070 ml


970 ml





BalanceBalance


179.63 ml


366.83 ml


279.69 ml














Results


Results 24hrs





Laboratory Tests


      Test
                                2/16/19
14:27    2/17/19
04:15


      Sodium Level                                146  H           151  H


      Potassium Level                              4.4              4.7


      Chloride Level                              112  H           116  H


      Carbon Dioxide Level                         33  H            34  H


      Anion Gap                                     1  L             1  L


      Blood Urea Nitrogen                          40  H            44  H


      Creatinine                                  0.80             0.75


      Est Glomerular Filtrat Rate
mL/min   > 60  
        > 60  



      Glucose Level                                156              214


      Calcium Level                                8.4              8.6


      White Blood Count                                           2.1  #L


      Red Blood Count                                             2.93  L


      Hemoglobin                                                   8.3  L


      Hematocrit                                                  27.6  L


      Mean Corpuscular Volume                                      94.2


      Mean Corpuscular Hemoglobin                                 28.3  L


      Mean Corpuscular Hemoglobin
Concent  
                     30.1  L



      Red Cell Distribution Width                                 17.0  H


      Platelet Count                                               43  #L


      Mean Platelet Volume                                        12.3  H


      Immature Granulocytes %                                    0.500  H


      Neutrophils %                                               89.7  H


      Segmented Neutrophils %
(Manual)     
                       79  H



      Band Neutrophils % (Manual)                                   11  H


      Lymphocytes %                                                7.0  L


      Lymphocytes % (Manual)                                        10  L


      Monocytes %                                                   2.8


      Eosinophils %                                                 0.0


      Basophils %                                                   0.0


      Nucleated Red Blood Cells %                                    1  H


      Immature Granulocytes #                                     0.010


      Neutrophils #                                                 1.9


      Neutrophils # (Manual)                                        1.7


      Band Neutrophils #                                            0.2


      Lymphocytes (Manual)                                         0.2  L


      Lymphocytes #                                                0.2  L


      Monocytes #                                                  0.1  L


      Eosinophils #                                                 0.0


      Basophils #                                                   0.0


      Nucleated Red Blood Cells #                                   0.0


      Platelet Estimate                                   SIG DECREASED


      Giant Platelets                                                1  H


      Polychromasia                                                  1+


      Poikilocytosis                                                 1+


      Anisocytosis                                                   1+


      Microcytosis                                                   1+


      Ovalocytes                                                     1+


      Phosphorus Level                                              2.6


      Magnesium Level                                              2.7  H








Medications


Medication





Current Medications


Ondansetron HCl (Zofran Inj) 4 mg Q6H  PRN IV NAUSEA AND/OR VOMITING Last 


administered on 2/1/19at 18:46; Admin Dose 4 MG;  Start 1/21/19 at 23:30


Acetaminophen (Tylenol Supp) 650 mg Q4H  PRN [place] PAIN LEVEL 1-3 OR FEVER;  Start 


1/21/19 at 23:30


Aspirin (Aspirin) 81 mg DAILY PO  Last administered on 2/17/19at 08:29; Admin 


Dose 81 MG;  Start 1/24/19 at 09:00


Famotidine (Pepcid) 20 mg Q12 PO  Last administered on 2/17/19at 08:29; Admin 


Dose 20 MG;  Start 1/24/19 at 21:00


Zolpidem Tartrate (Ambien) 5 mg HS  PRN PO INSOMNIA Last administered on 


2/5/19at 20:31; Admin Dose 5 MG;  Start 1/27/19 at 00:00


Guaifenesin/ Dextromethorphan (Robitussin Dm Liquid Cup) 5 ml Q6H  PRN PO COUGH 


Last administered on 2/10/19at 05:16; Admin Dose 5 ML;  Start 1/27/19 at 20:00


Morphine Sulfate (morphine) 6 mg Q4H  PRN PO SEVERE PAIN LEVEL 7-10 Last 


administered on 2/16/19at 08:35; Admin Dose 6 MG;  Start 1/30/19 at 21:30


Albuterol/ Ipratropium (Duoneb) 3 ml Q3H RESP THERAPY  PRN HHN SHORTNESS OF DOM


TH Last administered on 2/7/19at 18:47; Admin Dose 3 ML;  Start 1/31/19 at 20:00


Methylprednisolone Sodium Succinate (Solu-Medrol) 40 mg Q6 IV  Last administered


on 2/17/19at 05:29; Admin Dose 40 MG;  Start 2/2/19 at 12:00


Alteplase, Recombinant (Cathflo (Activase)) 2 mg MAY REPEAT X1 PRN CATHETER IF 


CATHETER REMAINS OCCULUDED;  Start 2/2/19 at 11:00


Lorazepam (Ativan) 1 mg Q6H  PRN IV PSYCHOSIS Last administered on 2/15/19at 


02:58; Admin Dose 1 MG;  Start 2/7/19 at 02:00


Haloperidol (Haldol) 2 mg PRN  PRN IM AGITATION Last administered on 2/12/19at 


00:44; Admin Dose 2 MG;  Start 2/8/19 at 06:30


Risperidone (Risperdal) 2 mg BID PO  Last administered on 2/17/19at 08:30; Admin


Dose 2 MG;  Start 2/9/19 at 21:00


Divalproex Sodium (Depakote Sprinkle) 250 mg Q8H PO  Last administered on 


2/17/19at 05:28; Admin Dose 250 MG;  Start 2/10/19 at 21:00


Propofol 100 ml @  1.875 mls/ hr Q12H IV  Last administered on 2/15/19at 01:14; 


Admin Dose 7.5 MLS/HR;  Start 2/13/19 at 15:30


Ascorbic Acid (Vitamin C) 500 mg DAILY GTB  Last administered on 2/17/19at 


08:28; Admin Dose 500 MG;  Start 2/15/19 at 10:30


Phenylephrine HCl 80 mg/Dextrose 250 ml @  18.75 mls/ hr TITRATE IV  Last 


administered on 2/16/19at 21:39; Admin Dose 7.5 MLS/HR;  Start 2/16/19 at 01:00


Albuterol (Ventolin Hfa) 4 puff Q6H RESP  THERAPY INH  Last administered on 


2/17/19at 01:33; Admin Dose 4 PUFF;  Start 2/16/19 at 14:00


Ipratropium Bromide (Atrovent Hfa) 4 puff Q6H RESP  THERAPY INH  Last 


administered on 2/17/19at 01:32; Admin Dose 4 PUFF;  Start 2/16/19 at 14:00


Levofloxacin/ Dextrose 100 ml @  100 mls/hr Q24H IVPB  Last administered on 


2/16/19at 12:52; Admin Dose 100 MLS/HR;  Start 2/16/19 at 12:00


Desmopressin Acetate (Ddavp) 2 mcg BID IV  Last administered on 2/17/19at 08:29;


Admin Dose 2 MCG;  Start 2/17/19 at 09:00


Sodium Chloride 1,000 ml @  100 mls/hr Q10H IV  Last administered on 2/17/19at 


08:28; Admin Dose 100 MLS/HR;  Start 2/17/19 at 07:30


Midazolam HCl 50 ml @ 1 mls/hr TITRATE IV  Last administered on 2/17/19at 10:49;


Admin Dose 4 MLS/HR;  Start 2/17/19 at 10:00











LETY MONSON                 Feb 17, 2019 11:11

## 2019-02-17 NOTE — CONS
Assessment/Plan


Assessment/Plan


Hospital Course (Demo Recall)


ID PROGRESS NOTE


CURRENT ABX: DAY #  => Levaquin #2 


2/17/19 0415                                                                    


           2/17/19 0415


24H INTERVAL SUMMARY


* Cachectic 69 yo M  -- failed CPAP trial yesterday 


* Orally intubated, non-communicative, TMax 99.8 yesterday - STARTED ON LEVAQUIN


  YESTERDAY FOR VAP 


* 02/15/19 CXR: Similar appearance of right greater than left basilar 


  infiltrates. Small bilateral pleural effusions unchanged.


* Indwelling: Endotracheal tube PEG Blake


MICRO/OTHER


* BCx (-) 


* 02/14/19 RESP CX:  RESPIRATORY CULTURE  Final  


     Organism 1                     ENTEROBACTER CLOACAE


        QUANTITY                    3+





                                    E CLOACAE        


                                    M.I.C.    RX     


                                    --------- ---    


     CEFAZOLIN                                 R     


     CEFOTAXIME                                S     


     CIPROFLOXACIN                  <=0.25     S     


     GENTAMICIN                     <=1        S     


     LEVOFLOXACIN                   <=0.12     S     


     TOBRAMYCIN                     <=1        S     


     TRIMETHOPRIM/SULFAMETHOXAZOLE  <=20       S     





---------------------------------------------------





PHYSICAL EXAMINATION:


GENERAL: Afebrile, VSS,cachexia, non-verbal 


HEENT: AT, NC, anicteric, edentulous, orally intubated 


NECK:  Supple, trach midline 


CHEST: Equal chest rise bilaterally== Vented 


HEART:  Pulse RRR


ABDOMEN:  Soft / NT 


EXTREMITIES:  Warm, dry, muscle wasting 


SKIN: No rash, no diaphoresis 





ID ASSESSMENT


69 yo M admit with: 


1. SIRS w/low grade temps, rising WBC 


2. Respiratory failure due to recurrent Aspiration pneumonia


* Dysphagia/peg with ongoing aspiration


* 02/15/19 CXR: Similar appearance of right greater than left basilar 


  infiltrates. Small bilateral pleural effusions unchanged.


4.  Pneumoperitoneum of unclear etiology, no perforation per surgical note, 


status post chest tube


5.  Dementia


5.  Anemia with progressive thrombocytopenia


6.  History of non-ST elevation MI


7.  Status post cardiac arrest


8.  RIJ TLC


(-)MRSA Nares 


ABX ALLERGIES: KNDA 


INVASIVES: R-IJ/TLC, ETT, PEG, FC 


CURRENT ABX: DAY #  =>  Levaquin #2 





ID RECOMMENDATIONS/PLAN:  


1. STARTED on  Levaquin 500mg IV daily for HCAP -> ASP PNA Enterobacter = VAP 





.





Consultation Date/Type/Reason


Admit Date/Time


Jan 22, 2019 at 00:03


Initial Consult Date


1/22/19


Requesting Provider:  DONALDO CARRILLO MD


Date/Time of Note


DATE: 2/17/19 


TIME: 10:23





Exam/Review of Systems


Exam


Vitals





Vital Signs


  Date      Temp  Pulse  Resp  B/P (MAP)   Pulse Ox  O2          O2 Flow    FiO2


Time                                                 Delivery    Rate


   2/17/19           57


     08:00


   2/17/19                 21                    93                           35


     05:00


   2/17/19                         118/84


     03:15                           (95)


   2/17/19                                           Mechanical


     03:00                                           Ventilator


   2/17/19  98.6


     00:00


   2/13/19                                                            12.0


     14:27








Intake and Output





2/16/19 2/16/19 2/17/19





1515:00


23:00


07:00





IntakeIntake Total


1392.74 ml


1425.44 ml


1120.47 ml





OutputOutput Total


1225 ml


940 ml


850 ml





BalanceBalance


167.74 ml


485.44 ml


270.47 ml














Results


Result Diagram:  


2/17/19 0415                                                                    


           2/17/19 0415





Results 24hrs





Laboratory Tests


      Test
                                2/16/19
14:27    2/17/19
04:15


      Sodium Level                                146  H           151  H


      Potassium Level                              4.4              4.7


      Chloride Level                              112  H           116  H


      Carbon Dioxide Level                         33  H            34  H


      Anion Gap                                     1  L             1  L


      Blood Urea Nitrogen                          40  H            44  H


      Creatinine                                  0.80             0.75


      Est Glomerular Filtrat Rate
mL/min   > 60  
        > 60  



      Glucose Level                                156              214


      Calcium Level                                8.4              8.6


      White Blood Count                                           2.1  #L


      Red Blood Count                                             2.93  L


      Hemoglobin                                                   8.3  L


      Hematocrit                                                  27.6  L


      Mean Corpuscular Volume                                      94.2


      Mean Corpuscular Hemoglobin                                 28.3  L


      Mean Corpuscular Hemoglobin
Concent  
                     30.1  L



      Red Cell Distribution Width                                 17.0  H


      Platelet Count                                               43  #L


      Mean Platelet Volume                                        12.3  H


      Immature Granulocytes %                                    0.500  H


      Neutrophils %                                               89.7  H


      Segmented Neutrophils %
(Manual)     
                       79  H



      Band Neutrophils % (Manual)                                   11  H


      Lymphocytes %                                                7.0  L


      Lymphocytes % (Manual)                                        10  L


      Monocytes %                                                   2.8


      Eosinophils %                                                 0.0


      Basophils %                                                   0.0


      Nucleated Red Blood Cells %                                    1  H


      Immature Granulocytes #                                     0.010


      Neutrophils #                                                 1.9


      Neutrophils # (Manual)                                        1.7


      Band Neutrophils #                                            0.2


      Lymphocytes (Manual)                                         0.2  L


      Lymphocytes #                                                0.2  L


      Monocytes #                                                  0.1  L


      Eosinophils #                                                 0.0


      Basophils #                                                   0.0


      Nucleated Red Blood Cells #                                   0.0


      Platelet Estimate                                   SIG DECREASED


      Giant Platelets                                                1  H


      Polychromasia                                                  1+


      Poikilocytosis                                                 1+


      Anisocytosis                                                   1+


      Microcytosis                                                   1+


      Ovalocytes                                                     1+


      Phosphorus Level                                              2.6


      Magnesium Level                                              2.7  H








Medications


Medication





Current Medications


Ondansetron HCl (Zofran Inj) 4 mg Q6H  PRN IV NAUSEA AND/OR VOMITING Last 


administered on 2/1/19at 18:46; Admin Dose 4 MG;  Start 1/21/19 at 23:30


Acetaminophen (Tylenol Supp) 650 mg Q4H  PRN [place] PAIN LEVEL 1-3 OR FEVER;  Start 


1/21/19 at 23:30


Aspirin (Aspirin) 81 mg DAILY PO  Last administered on 2/17/19at 08:29; Admin 


Dose 81 MG;  Start 1/24/19 at 09:00


Famotidine (Pepcid) 20 mg Q12 PO  Last administered on 2/17/19at 08:29; Admin 


Dose 20 MG;  Start 1/24/19 at 21:00


Zolpidem Tartrate (Ambien) 5 mg HS  PRN PO INSOMNIA Last administered on 


2/5/19at 20:31; Admin Dose 5 MG;  Start 1/27/19 at 00:00


Guaifenesin/ Dextromethorphan (Robitussin Dm Liquid Cup) 5 ml Q6H  PRN PO COUGH 


Last administered on 2/10/19at 05:16; Admin Dose 5 ML;  Start 1/27/19 at 20:00


Morphine Sulfate (morphine) 6 mg Q4H  PRN PO SEVERE PAIN LEVEL 7-10 Last 


administered on 2/16/19at 08:35; Admin Dose 6 MG;  Start 1/30/19 at 21:30


Albuterol/ Ipratropium (Duoneb) 3 ml Q3H RESP THERAPY  PRN HHN SHORTNESS OF 


BREATH Last administered on 2/7/19at 18:47; Admin Dose 3 ML;  Start 1/31/19 at 


20:00


Methylprednisolone Sodium Succinate (Solu-Medrol) 40 mg Q6 IV  Last administered


on 2/17/19at 05:29; Admin Dose 40 MG;  Start 2/2/19 at 12:00


Alteplase, Recombinant (Cathflo (Activase)) 2 mg MAY REPEAT X1 PRN CATHETER IF 


CATHETER REMAINS OCCULUDED;  Start 2/2/19 at 11:00


Lorazepam (Ativan) 1 mg Q6H  PRN IV PSYCHOSIS Last administered on 2/15/19at 


02:58; Admin Dose 1 MG;  Start 2/7/19 at 02:00


Haloperidol (Haldol) 2 mg PRN  PRN IM AGITATION Last administered on 2/12/19at 


00:44; Admin Dose 2 MG;  Start 2/8/19 at 06:30


Risperidone (Risperdal) 2 mg BID PO  Last administered on 2/17/19at 08:30; Admin


Dose 2 MG;  Start 2/9/19 at 21:00


Divalproex Sodium (Depakote Sprinkle) 250 mg Q8H PO  Last administered on 


2/17/19at 05:28; Admin Dose 250 MG;  Start 2/10/19 at 21:00


Propofol 100 ml @  1.875 mls/ hr Q12H IV  Last administered on 2/15/19at 01:14; 


Admin Dose 7.5 MLS/HR;  Start 2/13/19 at 15:30


Ascorbic Acid (Vitamin C) 500 mg DAILY GTB  Last administered on 2/17/19at 


08:28; Admin Dose 500 MG;  Start 2/15/19 at 10:30


Phenylephrine HCl 80 mg/Dextrose 250 ml @  18.75 mls/ hr TITRATE IV  Last 


administered on 2/16/19at 21:39; Admin Dose 7.5 MLS/HR;  Start 2/16/19 at 01:00


Albuterol (Ventolin Hfa) 4 puff Q6H RESP  THERAPY INH  Last administered on 


2/17/19at 01:33; Admin Dose 4 PUFF;  Start 2/16/19 at 14:00


Ipratropium Bromide (Atrovent Hfa) 4 puff Q6H RESP  THERAPY INH  Last 


administered on 2/17/19at 01:32; Admin Dose 4 PUFF;  Start 2/16/19 at 14:00


Levofloxacin/ Dextrose 100 ml @  100 mls/hr Q24H IVPB  Last administered on 


2/16/19at 12:52; Admin Dose 100 MLS/HR;  Start 2/16/19 at 12:00


Desmopressin Acetate (Ddavp) 2 mcg BID IV  Last administered on 2/17/19at 08:29;


Admin Dose 2 MCG;  Start 2/17/19 at 09:00


Sodium Chloride 1,000 ml @  100 mls/hr Q10H IV  Last administered on 2/17/19at 


08:28; Admin Dose 100 MLS/HR;  Start 2/17/19 at 07:30


Midazolam HCl 50 ml @ 1 mls/hr TITRATE IV ;  Start 2/17/19 at 10:00











DELL GALLO NP              Feb 17, 2019 10:27

## 2019-02-17 NOTE — PN
DATE:  02/17/2019

 

 

SUBJECTIVE:  The patient remains critically ill on IV pressors.  The patient continues to have copiou
s amount of urinary output.  After deescalating desmopressin.  No other acute events noted.

 

OBJECTIVE:

VITAL SIGNS:  Blood pressure is 118/84, respiration 21, pulse 63, temperature 98.6.

HEENT:  Head is normocephalic.

NECK:  Supple.

HEART:  Regular rate.

LUNGS:  Show diminished breath sounds at the base.

ABDOMEN:  Soft, nontender to palpation without rebound or guarding.

EXTREMITIES:  Negative for clubbing, cyanosis, no edema.

DERMATOLOGIC:  No rashes.

MUSCULOSKELETAL:  No joint effusion.

NEUROLOGIC:  No change in exam.

 

MEDICATIONS:  Reviewed.

 

LABORATORY DATA:  Shows sodium 151, potassium 4.7, chloride 116, bicarb 34, BUN 44, creatinine 0.75. 
 White count 2.1, hemoglobin 8.3, platelet count is 43.  The patient's cultures were reviewed.

 

IMAGING:  Chest x-ray from 02/15/2019 was reviewed.

 

ASSESSMENT AND PLAN:

1.  Hypernatremia.  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
y.  The patient was placed on a trial of desmopressin without any significant change in urinary osmol
arity suggesting a nephrogenic source.  However, upon deescalation of desmopressin, the patient's uri
nary output markedly increased.  Plan is to resume desmopressin at 2 mcg b.i.d.  Will repeat urine os
molarity.  We will also increase free water flushes to 300 mL q.4 hours.  Will change IV fluids to ha
lf NS at 100 mL an hour.  Will continue to monitor serial sodium levels closely.  If the patient is t
o become hemodynamically more stable, will consider starting hydrochlorothiazide and/or amiloride to 
treat a nephrogenic DI.

2.  Nonoliguric acute kidney injury with unknown baseline creatinine.  Etiology is secondary to hemod
ynamics, acute tubular necrosis.  The patient's renal function is stabilizing.  Continue current meme
tment plan, supportive care, renally dose all meds.

3.  Metabolic alkalosis with respiratory compensation.  Continue to monitor.

4.  Anemia.  Monitor hemoglobin and hematocrit levels.

5.  Mineral bone disorder.  Monitor calcium and phosphorus levels.

6.  Diastolic heart failure.  The patient appears compensated currently in shock.  Continue to monito
r.

7.  Ventilator-dependent respiratory failure.  Vent settings and ABG was reviewed.  Continue to monit
or.  Follow up with pulmonary.

8.  Dysphagia, status post PEG.  Continue tube feeding.

9.  Septic shock secondary to pneumonia.  Continue current medical management.  Continue pressor supp
ort, IV fluids, antibiotic therapy.

10.  Acute encephalopathy.  Etiology is toxic metabolic.

11.  Pleural effusion.  The patient is status post thoracentesis.

12.  Deep venous thrombosis.  Continue medical management.

13.  Status post cardiac arrest.

14.  Status post pneumothorax.

15.  Status post pneumoperitoneum.

 

Please note I spent over 30 minutes of critical care time with this patient.

 

 

Dictated By: MARYAN CHAMPAGNE DO

 

NR/NTS

DD:    02/17/2019 07:28:49

DT:    02/17/2019 11:03:50

Conf#: [id number]

DID#:  [id number]

CC: MAURI GRIGSBY MD;*EndCC*

## 2019-02-17 NOTE — CONS
Consult Date/Type/Reason


Admit Date/Time


Jan 22, 2019 at 00:03


Initial Consult Date


1/22/19


Type of Consult


Pulmonary


Requesting Provider:  DONALDO CARRILLO MD


Date/Time of Note


DATE: 2/17/19 


TIME: 09:59





Subjective


 frail elderly gentleman failed multiple weaning trials.  Failed CPAP trial 


yesterday after 10 minutes.





Objective





Vital Signs


  Date      Temp  Pulse  Resp  B/P (MAP)   Pulse Ox  O2          O2 Flow    FiO2


Time                                                 Delivery    Rate


   2/17/19           57


     08:00


   2/17/19                 21                    93                           35


     05:00


   2/17/19                         118/84


     03:15                           (95)


   2/17/19                                           Mechanical


     03:00                                           Ventilator


   2/17/19  98.6


     00:00


   2/13/19                                                            12.0


     14:27








Intake and Output





2/16/19 2/16/19 2/17/19





1515:00


23:00


07:00





IntakeIntake Total


1392.74 ml


1425.44 ml


1120.47 ml





OutputOutput Total


1225 ml


940 ml


850 ml





BalanceBalance


167.74 ml


485.44 ml


270.47 ml











Exam


GENERAL: Frail elderly gentleman, on mechanical ventilation orally intubated


VITAL SIGNS:  per chart


NECK:  Supple.  No JVD or lymphadenopathy.


CARDIAC EXAM:  S1, S2. No added sounds or murmurs.


CHEST: Diminished air entry bilaterally


ABDOMEN:  Soft, nontender.  No guarding or rebound.


EXTREMITIES:  No cyanosis, clubbing or edema.


NEUROLOGIC:  Generalized weakness.





Vent Setting


Ventilator Support Mode:  AC, VC plus


Fraction of Inspired Oxygen pe:  35


Positive End Expiratory Pressu:  5.0





Results/Medications


Result Diagram:  


2/17/19 0415                                                                    


           2/17/19 0415





Results 24 hrs





Laboratory Tests


      Test
                                2/16/19
14:27    2/17/19
04:15


      Sodium Level                                146  H           151  H


      Potassium Level                              4.4              4.7


      Chloride Level                              112  H           116  H


      Carbon Dioxide Level                         33  H            34  H


      Anion Gap                                     1  L             1  L


      Blood Urea Nitrogen                          40  H            44  H


      Creatinine                                  0.80             0.75


      Est Glomerular Filtrat Rate
mL/min   > 60  
        > 60  



      Glucose Level                                156              214


      Calcium Level                                8.4              8.6


      White Blood Count                                           2.1  #L


      Red Blood Count                                             2.93  L


      Hemoglobin                                                   8.3  L


      Hematocrit                                                  27.6  L


      Mean Corpuscular Volume                                      94.2


      Mean Corpuscular Hemoglobin                                 28.3  L


      Mean Corpuscular Hemoglobin
Concent  
                     30.1  L



      Red Cell Distribution Width                                 17.0  H


      Platelet Count                                               43  #L


      Mean Platelet Volume                                        12.3  H


      Immature Granulocytes %                                    0.500  H


      Neutrophils %                                               89.7  H


      Segmented Neutrophils %
(Manual)     
                       79  H



      Band Neutrophils % (Manual)                                   11  H


      Lymphocytes %                                                7.0  L


      Lymphocytes % (Manual)                                        10  L


      Monocytes %                                                   2.8


      Eosinophils %                                                 0.0


      Basophils %                                                   0.0


      Nucleated Red Blood Cells %                                    1  H


      Immature Granulocytes #                                     0.010


      Neutrophils #                                                 1.9


      Neutrophils # (Manual)                                        1.7


      Band Neutrophils #                                            0.2


      Lymphocytes (Manual)                                         0.2  L


      Lymphocytes #                                                0.2  L


      Monocytes #                                                  0.1  L


      Eosinophils #                                                 0.0


      Basophils #                                                   0.0


      Nucleated Red Blood Cells #                                   0.0


      Platelet Estimate                                   SIG DECREASED


      Giant Platelets                                                1  H


      Polychromasia                                                  1+


      Poikilocytosis                                                 1+


      Anisocytosis                                                   1+


      Microcytosis                                                   1+


      Ovalocytes                                                     1+


      Phosphorus Level                                              2.6


      Magnesium Level                                              2.7  H





Medications





Current Medications


Ondansetron HCl (Zofran Inj) 4 mg Q6H  PRN IV NAUSEA AND/OR VOMITING Last 


administered on 2/1/19at 18:46; Admin Dose 4 MG;  Start 1/21/19 at 23:30


Acetaminophen (Tylenol Supp) 650 mg Q4H  PRN [place] PAIN LEVEL 1-3 OR FEVER;  Start 


1/21/19 at 23:30


Aspirin (Aspirin) 81 mg DAILY PO  Last administered on 2/17/19at 08:29; Admin 


Dose 81 MG;  Start 1/24/19 at 09:00


Famotidine (Pepcid) 20 mg Q12 PO  Last administered on 2/17/19at 08:29; Admin 


Dose 20 MG;  Start 1/24/19 at 21:00


Zolpidem Tartrate (Ambien) 5 mg HS  PRN PO INSOMNIA Last administered on 


2/5/19at 20:31; Admin Dose 5 MG;  Start 1/27/19 at 00:00


Guaifenesin/ Dextromethorphan (Robitussin Dm Liquid Cup) 5 ml Q6H  PRN PO COUGH 


Last administered on 2/10/19at 05:16; Admin Dose 5 ML;  Start 1/27/19 at 20:00


Morphine Sulfate (morphine) 6 mg Q4H  PRN PO SEVERE PAIN LEVEL 7-10 Last 


administered on 2/16/19at 08:35; Admin Dose 6 MG;  Start 1/30/19 at 21:30


Albuterol/ Ipratropium (Duoneb) 3 ml Q3H RESP THERAPY  PRN HHN SHORTNESS OF ARACELY


ATH Last administered on 2/7/19at 18:47; Admin Dose 3 ML;  Start 1/31/19 at 


20:00


Methylprednisolone Sodium Succinate (Solu-Medrol) 40 mg Q6 IV  Last administered


on 2/17/19at 05:29; Admin Dose 40 MG;  Start 2/2/19 at 12:00


Alteplase, Recombinant (Cathflo (Activase)) 2 mg MAY REPEAT X1 PRN CATHETER IF 


CATHETER REMAINS OCCULUDED;  Start 2/2/19 at 11:00


Lorazepam (Ativan) 1 mg Q6H  PRN IV PSYCHOSIS Last administered on 2/15/19at 


02:58; Admin Dose 1 MG;  Start 2/7/19 at 02:00


Haloperidol (Haldol) 2 mg PRN  PRN IM AGITATION Last administered on 2/12/19at 


00:44; Admin Dose 2 MG;  Start 2/8/19 at 06:30


Risperidone (Risperdal) 2 mg BID PO  Last administered on 2/17/19at 08:30; Admin


Dose 2 MG;  Start 2/9/19 at 21:00


Divalproex Sodium (Depakote Sprinkle) 250 mg Q8H PO  Last administered on 


2/17/19at 05:28; Admin Dose 250 MG;  Start 2/10/19 at 21:00


Propofol 100 ml @  1.875 mls/ hr Q12H IV  Last administered on 2/15/19at 01:14; 


Admin Dose 7.5 MLS/HR;  Start 2/13/19 at 15:30


Ascorbic Acid (Vitamin C) 500 mg DAILY GTB  Last administered on 2/17/19at 


08:28; Admin Dose 500 MG;  Start 2/15/19 at 10:30


Phenylephrine HCl 80 mg/Dextrose 250 ml @  18.75 mls/ hr TITRATE IV  Last 


administered on 2/16/19at 21:39; Admin Dose 7.5 MLS/HR;  Start 2/16/19 at 01:00


Albuterol (Ventolin Hfa) 4 puff Q6H RESP  THERAPY INH  Last administered on 


2/17/19at 01:33; Admin Dose 4 PUFF;  Start 2/16/19 at 14:00


Ipratropium Bromide (Atrovent Hfa) 4 puff Q6H RESP  THERAPY INH  Last 


administered on 2/17/19at 01:32; Admin Dose 4 PUFF;  Start 2/16/19 at 14:00


Levofloxacin/ Dextrose 100 ml @  100 mls/hr Q24H IVPB  Last administered on 


2/16/19at 12:52; Admin Dose 100 MLS/HR;  Start 2/16/19 at 12:00


Desmopressin Acetate (Ddavp) 2 mcg BID IV  Last administered on 2/17/19at 08:29;


Admin Dose 2 MCG;  Start 2/17/19 at 09:00


Sodium Chloride 1,000 ml @  100 mls/hr Q10H IV  Last administered on 2/17/19at 


08:28; Admin Dose 100 MLS/HR;  Start 2/17/19 at 07:30


Midazolam HCl 50 ml @ 1 mls/hr TITRATE IV ;  Start 2/17/19 at 10:00





Assessment/Plan


Hospital Course (Demo Recall)


iMP: 


1.  Acute on chronic hypoxic and hypercapnic respiratory failure--worse 


overnight with increased mucus plugging.  Currently not able to liberate from 


mechanical ventilation


3.  Encephalopathy toxic metabolic resolving


4.  Advanced COPD


5.  Hypernatremia likely free water deficit


6.  Thrombocytopenia 


7.  Anemia 





RECS:


1.  Failed multiple weaning trials.  Continue mechanical ventilation.  Patient 


may require tracheostomy.  We will discuss goals of care with primary team.  


Patient is unlikely to come off mechanical ventilation at this point.


2.  Continue tube feeding as tolerated


3.  Pulmonary toilet


4.  Consider increasing free water nephrology recommendations regarding 


hypernatremia





Critical care time 40 minutes.











MIMI ZAVALA MD, [facility]     Feb 17, 2019 10:00

## 2019-02-17 NOTE — CONS
Consult Date/Type/Reason


Admit Date/Time


Jan 22, 2019 at 00:03


Initial Consult Date


1/22/19


Type of Consultation:  Pulm/CCM


Requesting Provider:  DONALDO CARRILLO MD


Date/Time of Note


DATE: 2/17/19 


TIME: 13:25





Subjective


NO acute e vents - pt intubated - HR in mid 40s - stable BP - no indication for 


pacing now. 





NO F/C/N/V/D





Objective


Vitals





Vital Signs


  Date      Temp  Pulse  Resp  B/P (MAP)   Pulse Ox  O2          O2 Flow    FiO2


Time                                                 Delivery    Rate


   2/17/19           49


     12:00


   2/17/19                 16  95/71 (79)        98


     11:15


   2/17/19                                           Mechanical


     11:00                                           Ventilator


   2/17/19  97.8


     08:00


   2/17/19                                                                    35


     08:00


   2/13/19                                                            12.0


     14:27








Intake and Output





2/16/19 2/16/19 2/17/19





1515:00


23:00


07:00





IntakeIntake Total


1392.74 ml


1425.44 ml


1196.97 ml





OutputOutput Total


1225 ml


940 ml


850 ml





BalanceBalance


167.74 ml


485.44 ml


346.97 ml











Exam


General: WN/WD/NAD, AOx comfortable 


HEENT: Unicetric/atraumatic/EOMI ( follow commands)


NECK: JVD elevated, no thyromegaly, intubated 


Lymph: no lymphadenopathy


HEART: regular with no S3, II/VI systolic murmur at apex


LUNGS: Coarse sounds


ABD: soft, NT, ND, +BS


: Intact


Neuro: non focal


SKIN: chronic changes


EXT: trace edema





Results/Medications


Result Diagram:  


2/17/19 0415                                                                    


           2/17/19 0415





Results 24 hrs





Laboratory Tests


      Test
                                2/16/19
14:27    2/17/19
04:15


      Sodium Level                                146  H           151  H


      Potassium Level                              4.4              4.7


      Chloride Level                              112  H           116  H


      Carbon Dioxide Level                         33  H            34  H


      Anion Gap                                     1  L             1  L


      Blood Urea Nitrogen                          40  H            44  H


      Creatinine                                  0.80             0.75


      Est Glomerular Filtrat Rate
mL/min   > 60  
        > 60  



      Glucose Level                                156              214


      Calcium Level                                8.4              8.6


      White Blood Count                                           2.1  #L


      Red Blood Count                                             2.93  L


      Hemoglobin                                                   8.3  L


      Hematocrit                                                  27.6  L


      Mean Corpuscular Volume                                      94.2


      Mean Corpuscular Hemoglobin                                 28.3  L


      Mean Corpuscular Hemoglobin
Concent  
                     30.1  L



      Red Cell Distribution Width                                 17.0  H


      Platelet Count                                               43  #L


      Mean Platelet Volume                                        12.3  H


      Immature Granulocytes %                                    0.500  H


      Neutrophils %                                               89.7  H


      Segmented Neutrophils %
(Manual)     
                       79  H



      Band Neutrophils % (Manual)                                   11  H


      Lymphocytes %                                                7.0  L


      Lymphocytes % (Manual)                                        10  L


      Monocytes %                                                   2.8


      Eosinophils %                                                 0.0


      Basophils %                                                   0.0


      Nucleated Red Blood Cells %                                    1  H


      Immature Granulocytes #                                     0.010


      Neutrophils #                                                 1.9


      Neutrophils # (Manual)                                        1.7


      Band Neutrophils #                                            0.2


      Lymphocytes (Manual)                                         0.2  L


      Lymphocytes #                                                0.2  L


      Monocytes #                                                  0.1  L


      Eosinophils #                                                 0.0


      Basophils #                                                   0.0


      Nucleated Red Blood Cells #                                   0.0


      Platelet Estimate                                   SIG DECREASED


      Giant Platelets                                                1  H


      Polychromasia                                                  1+


      Poikilocytosis                                                 1+


      Anisocytosis                                                   1+


      Microcytosis                                                   1+


      Ovalocytes                                                     1+


      Phosphorus Level                                              2.6


      Magnesium Level                                              2.7  H





Home Meds


Reported Medications


Docusate Sodium* (Colace*) 100 Mg Capsule, 100 MG PO Q24H PRN for CONSTIPATION, 


#30 CAP


   1/21/19


Polyethylene Glycol* (Miralax*) 17 Gm Powd.pack, 17 GM PO DAILY PRN for AS 


NEEDED, #30 PACKET


   1/21/19


Acetaminophen* (Acetaminophen*) 325 Mg Tablet, 650 MG PO Q4H PRN for PAIN 1-10/


10, #30 TAB


   AND FEVER>101F


   1/21/19


Sennosides* (Senna Lax*) 8.6 Mg Tablet, 1 TAB PO AS NEEDED, TAB


   1/21/19


Mv,Minerals/Fa/Lycopene/Ginkgo (ONE DAILY MEN'S 50+ TABLET) 1 Each Tablet, 1 


EACH PO DAILY, TAB


   1/21/19


Divalproex Sodium* (Depakote*) 125 Mg Tablet.dr, 250 MG PO Q8H, #90 TAB


   1/21/19


Quetiapine Fumarate* (Seroquel*) 50 Mg Tablet, 50 MG PO Q8H, TAB


   1/21/19


Ipratropium-Albuterol (Ipratropium-Albuterol) 0.5-3 Mg/3 Ml Ampul.neb, 3 ML 


INHALATION Q4H PRN for WHEEZING AND SOB, #30 VIAL


   1/21/19


Budesonide-Formoterol Fumarate* (Symbicort*) 160-4.5 Hfa.aer.ad, 2 PUFF 


INHALATION BID, #1 EACH


   1/21/19


Medications





Current Medications


Ondansetron HCl (Zofran Inj) 4 mg Q6H  PRN IV NAUSEA AND/OR VOMITING Last 


administered on 2/1/19at 18:46; Admin Dose 4 MG;  Start 1/21/19 at 23:30


Acetaminophen (Tylenol Supp) 650 mg Q4H  PRN [place] PAIN LEVEL 1-3 OR FEVER;  Start 


1/21/19 at 23:30


Aspirin (Aspirin) 81 mg DAILY PO  Last administered on 2/17/19at 08:29; Admin 


Dose 81 MG;  Start 1/24/19 at 09:00


Famotidine (Pepcid) 20 mg Q12 PO  Last administered on 2/17/19at 08:29; Admin 


Dose 20 MG;  Start 1/24/19 at 21:00


Zolpidem Tartrate (Ambien) 5 mg HS  PRN PO INSOMNIA Last administered on 


2/5/19at 20:31; Admin Dose 5 MG;  Start 1/27/19 at 00:00


Guaifenesin/ Dextromethorphan (Robitussin Dm Liquid Cup) 5 ml Q6H  PRN PO COUGH 


Last administered on 2/10/19at 05:16; Admin Dose 5 ML;  Start 1/27/19 at 20:00


Morphine Sulfate (morphine) 6 mg Q4H  PRN PO SEVERE PAIN LEVEL 7-10 Last 


administered on 2/16/19at 08:35; Admin Dose 6 MG;  Start 1/30/19 at 21:30


Albuterol/ Ipratropium (Duoneb) 3 ml Q3H RESP THERAPY  PRN HHN SHORTNESS OF 


BREATH Last administered on 2/7/19at 18:47; Admin Dose 3 ML;  Start 1/31/19 at 


20:00


Methylprednisolone Sodium Succinate (Solu-Medrol) 40 mg Q6 IV  Last administered


on 2/17/19at 05:29; Admin Dose 40 MG;  Start 2/2/19 at 12:00


Alteplase, Recombinant (Cathflo (Activase)) 2 mg MAY REPEAT X1 PRN CATHETER IF 


CATHETER REMAINS OCCULUDED;  Start 2/2/19 at 11:00


Lorazepam (Ativan) 1 mg Q6H  PRN IV PSYCHOSIS Last administered on 2/15/19at 


02:58; Admin Dose 1 MG;  Start 2/7/19 at 02:00


Haloperidol (Haldol) 2 mg PRN  PRN IM AGITATION Last administered on 2/12/19at 


00:44; Admin Dose 2 MG;  Start 2/8/19 at 06:30


Risperidone (Risperdal) 2 mg BID PO  Last administered on 2/17/19at 08:30; Admin


Dose 2 MG;  Start 2/9/19 at 21:00


Divalproex Sodium (Depakote Sprinkle) 250 mg Q8H PO  Last administered on 


2/17/19at 05:28; Admin Dose 250 MG;  Start 2/10/19 at 21:00


Propofol 100 ml @  1.875 mls/ hr Q12H IV  Last administered on 2/15/19at 01:14; 


Admin Dose 7.5 MLS/HR;  Start 2/13/19 at 15:30


Ascorbic Acid (Vitamin C) 500 mg DAILY GTB  Last administered on 2/17/19at 


08:28; Admin Dose 500 MG;  Start 2/15/19 at 10:30


Phenylephrine HCl 80 mg/Dextrose 250 ml @  18.75 mls/ hr TITRATE IV  Last 


administered on 2/16/19at 21:39; Admin Dose 7.5 MLS/HR;  Start 2/16/19 at 01:00


Albuterol (Ventolin Hfa) 4 puff Q6H RESP  THERAPY INH  Last administered on 


2/17/19at 01:33; Admin Dose 4 PUFF;  Start 2/16/19 at 14:00


Ipratropium Bromide (Atrovent Hfa) 4 puff Q6H RESP  THERAPY INH  Last 


administered on 2/17/19at 01:32; Admin Dose 4 PUFF;  Start 2/16/19 at 14:00


Levofloxacin/ Dextrose 100 ml @  100 mls/hr Q24H IVPB  Last administered on 


2/16/19at 12:52; Admin Dose 100 MLS/HR;  Start 2/16/19 at 12:00


Desmopressin Acetate (Ddavp) 2 mcg BID IV  Last administered on 2/17/19at 08:29;


Admin Dose 2 MCG;  Start 2/17/19 at 09:00


Sodium Chloride 1,000 ml @  100 mls/hr Q10H IV  Last administered on 2/17/19at 


08:28; Admin Dose 100 MLS/HR;  Start 2/17/19 at 07:30


Midazolam HCl 50 ml @ 1 mls/hr TITRATE IV  Last administered on 2/17/19at 10:49;


Admin Dose 4 MLS/HR;  Start 2/17/19 at 10:00





Assessment/Plan


Hospital Course (Demo Recall)


1.Hypotension- BP now maintained. 


2.Resp failure s/p re-intubation - pulm team follows. 


3.h/o cardiac arrest - etiology unclear - con't supportive RX now. Stable now - 


of tele. No cardiac intervention currently planned. 


4.Pneumoperitoneum - self d/c chest tube  - stable SOB now.  Con't Rx. NO new 


fevers. 


5.Positive troponin-now trended neg - will monitor clinically for now. No 


intervention planned.  NO CP noted. 


6.renal failure - good urine output. 


7.Leukocytosis - on anti-Bx, con't med rx.  On anti-Bx. 


8. anemia - H/H stable - no bleeding noted. 


9, Thrombocytopenia - no active bleeds noted. 


10 DVT - low plts - defer to hematology for anti-coagulation. 


11. Bardy - sinus jose - no indication for pacer now.











HARDY GUZMÁN MD                 Feb 17, 2019 13:27

## 2019-02-18 VITALS — SYSTOLIC BLOOD PRESSURE: 87 MMHG | DIASTOLIC BLOOD PRESSURE: 52 MMHG | HEART RATE: 99 BPM | RESPIRATION RATE: 16 BRPM

## 2019-02-18 VITALS — DIASTOLIC BLOOD PRESSURE: 66 MMHG | RESPIRATION RATE: 19 BRPM | SYSTOLIC BLOOD PRESSURE: 94 MMHG | HEART RATE: 106 BPM

## 2019-02-18 VITALS — SYSTOLIC BLOOD PRESSURE: 88 MMHG | RESPIRATION RATE: 17 BRPM | DIASTOLIC BLOOD PRESSURE: 62 MMHG | HEART RATE: 99 BPM

## 2019-02-18 VITALS — HEART RATE: 102 BPM | SYSTOLIC BLOOD PRESSURE: 93 MMHG | DIASTOLIC BLOOD PRESSURE: 62 MMHG | RESPIRATION RATE: 18 BRPM

## 2019-02-18 VITALS — HEART RATE: 103 BPM | RESPIRATION RATE: 17 BRPM | SYSTOLIC BLOOD PRESSURE: 101 MMHG | DIASTOLIC BLOOD PRESSURE: 71 MMHG

## 2019-02-18 VITALS — SYSTOLIC BLOOD PRESSURE: 96 MMHG | HEART RATE: 104 BPM | DIASTOLIC BLOOD PRESSURE: 71 MMHG | RESPIRATION RATE: 16 BRPM

## 2019-02-18 VITALS — RESPIRATION RATE: 16 BRPM | SYSTOLIC BLOOD PRESSURE: 85 MMHG | HEART RATE: 101 BPM | DIASTOLIC BLOOD PRESSURE: 57 MMHG

## 2019-02-18 VITALS — SYSTOLIC BLOOD PRESSURE: 91 MMHG | RESPIRATION RATE: 16 BRPM | HEART RATE: 99 BPM | DIASTOLIC BLOOD PRESSURE: 66 MMHG

## 2019-02-18 VITALS — RESPIRATION RATE: 20 BRPM

## 2019-02-18 VITALS — RESPIRATION RATE: 16 BRPM | HEART RATE: 102 BPM | DIASTOLIC BLOOD PRESSURE: 46 MMHG | SYSTOLIC BLOOD PRESSURE: 85 MMHG

## 2019-02-18 VITALS — DIASTOLIC BLOOD PRESSURE: 69 MMHG | HEART RATE: 96 BPM | RESPIRATION RATE: 21 BRPM | SYSTOLIC BLOOD PRESSURE: 88 MMHG

## 2019-02-18 VITALS — HEART RATE: 104 BPM | DIASTOLIC BLOOD PRESSURE: 60 MMHG | RESPIRATION RATE: 17 BRPM | SYSTOLIC BLOOD PRESSURE: 80 MMHG

## 2019-02-18 VITALS — RESPIRATION RATE: 16 BRPM | HEART RATE: 101 BPM | SYSTOLIC BLOOD PRESSURE: 108 MMHG | DIASTOLIC BLOOD PRESSURE: 74 MMHG

## 2019-02-18 VITALS — RESPIRATION RATE: 21 BRPM | HEART RATE: 103 BPM | SYSTOLIC BLOOD PRESSURE: 103 MMHG | DIASTOLIC BLOOD PRESSURE: 67 MMHG

## 2019-02-18 VITALS — HEART RATE: 113 BPM | SYSTOLIC BLOOD PRESSURE: 97 MMHG | DIASTOLIC BLOOD PRESSURE: 70 MMHG | RESPIRATION RATE: 20 BRPM

## 2019-02-18 VITALS — SYSTOLIC BLOOD PRESSURE: 118 MMHG | DIASTOLIC BLOOD PRESSURE: 78 MMHG | HEART RATE: 103 BPM | RESPIRATION RATE: 33 BRPM

## 2019-02-18 VITALS — SYSTOLIC BLOOD PRESSURE: 97 MMHG | RESPIRATION RATE: 18 BRPM | HEART RATE: 103 BPM | DIASTOLIC BLOOD PRESSURE: 67 MMHG

## 2019-02-18 VITALS — RESPIRATION RATE: 17 BRPM

## 2019-02-18 VITALS — RESPIRATION RATE: 16 BRPM

## 2019-02-18 VITALS — SYSTOLIC BLOOD PRESSURE: 88 MMHG | HEART RATE: 101 BPM | DIASTOLIC BLOOD PRESSURE: 71 MMHG | RESPIRATION RATE: 18 BRPM

## 2019-02-18 VITALS — RESPIRATION RATE: 16 BRPM | HEART RATE: 103 BPM | DIASTOLIC BLOOD PRESSURE: 63 MMHG | SYSTOLIC BLOOD PRESSURE: 102 MMHG

## 2019-02-18 VITALS — RESPIRATION RATE: 17 BRPM | HEART RATE: 103 BPM | SYSTOLIC BLOOD PRESSURE: 97 MMHG | DIASTOLIC BLOOD PRESSURE: 74 MMHG

## 2019-02-18 VITALS — RESPIRATION RATE: 19 BRPM

## 2019-02-18 VITALS — SYSTOLIC BLOOD PRESSURE: 81 MMHG | DIASTOLIC BLOOD PRESSURE: 64 MMHG | HEART RATE: 109 BPM | RESPIRATION RATE: 18 BRPM

## 2019-02-18 VITALS — HEART RATE: 100 BPM | RESPIRATION RATE: 18 BRPM | SYSTOLIC BLOOD PRESSURE: 92 MMHG | DIASTOLIC BLOOD PRESSURE: 65 MMHG

## 2019-02-18 VITALS — SYSTOLIC BLOOD PRESSURE: 79 MMHG | DIASTOLIC BLOOD PRESSURE: 59 MMHG | RESPIRATION RATE: 16 BRPM | HEART RATE: 87 BPM

## 2019-02-18 VITALS — HEART RATE: 103 BPM | RESPIRATION RATE: 19 BRPM | DIASTOLIC BLOOD PRESSURE: 61 MMHG | SYSTOLIC BLOOD PRESSURE: 89 MMHG

## 2019-02-18 VITALS — RESPIRATION RATE: 18 BRPM

## 2019-02-18 VITALS — RESPIRATION RATE: 25 BRPM | DIASTOLIC BLOOD PRESSURE: 76 MMHG | SYSTOLIC BLOOD PRESSURE: 97 MMHG | HEART RATE: 108 BPM

## 2019-02-18 VITALS — DIASTOLIC BLOOD PRESSURE: 59 MMHG | SYSTOLIC BLOOD PRESSURE: 88 MMHG | HEART RATE: 103 BPM | RESPIRATION RATE: 16 BRPM

## 2019-02-18 VITALS — DIASTOLIC BLOOD PRESSURE: 59 MMHG | HEART RATE: 92 BPM | RESPIRATION RATE: 16 BRPM | SYSTOLIC BLOOD PRESSURE: 74 MMHG

## 2019-02-18 VITALS — HEART RATE: 102 BPM | RESPIRATION RATE: 20 BRPM | SYSTOLIC BLOOD PRESSURE: 92 MMHG | DIASTOLIC BLOOD PRESSURE: 68 MMHG

## 2019-02-18 VITALS — SYSTOLIC BLOOD PRESSURE: 104 MMHG | DIASTOLIC BLOOD PRESSURE: 72 MMHG | RESPIRATION RATE: 22 BRPM | HEART RATE: 109 BPM

## 2019-02-18 VITALS — RESPIRATION RATE: 33 BRPM | DIASTOLIC BLOOD PRESSURE: 64 MMHG | HEART RATE: 114 BPM | SYSTOLIC BLOOD PRESSURE: 87 MMHG

## 2019-02-18 VITALS — RESPIRATION RATE: 18 BRPM | SYSTOLIC BLOOD PRESSURE: 108 MMHG | HEART RATE: 105 BPM | DIASTOLIC BLOOD PRESSURE: 67 MMHG

## 2019-02-18 VITALS — DIASTOLIC BLOOD PRESSURE: 68 MMHG | SYSTOLIC BLOOD PRESSURE: 97 MMHG | HEART RATE: 103 BPM | RESPIRATION RATE: 16 BRPM

## 2019-02-18 VITALS — DIASTOLIC BLOOD PRESSURE: 68 MMHG | SYSTOLIC BLOOD PRESSURE: 93 MMHG | RESPIRATION RATE: 19 BRPM | HEART RATE: 105 BPM

## 2019-02-18 VITALS — DIASTOLIC BLOOD PRESSURE: 84 MMHG | SYSTOLIC BLOOD PRESSURE: 104 MMHG | RESPIRATION RATE: 26 BRPM | HEART RATE: 106 BPM

## 2019-02-18 VITALS — RESPIRATION RATE: 36 BRPM

## 2019-02-18 VITALS — HEART RATE: 101 BPM | RESPIRATION RATE: 16 BRPM | DIASTOLIC BLOOD PRESSURE: 60 MMHG | SYSTOLIC BLOOD PRESSURE: 83 MMHG

## 2019-02-18 VITALS — RESPIRATION RATE: 16 BRPM | SYSTOLIC BLOOD PRESSURE: 100 MMHG | HEART RATE: 101 BPM | DIASTOLIC BLOOD PRESSURE: 67 MMHG

## 2019-02-18 VITALS — SYSTOLIC BLOOD PRESSURE: 92 MMHG | RESPIRATION RATE: 18 BRPM | DIASTOLIC BLOOD PRESSURE: 62 MMHG | HEART RATE: 103 BPM

## 2019-02-18 LAB
ABNORMAL IP MESSAGE: 1
ADD MAN DIFF?: YES
ANION GAP: 7 (ref 5–13)
ANION GAP: 7 (ref 5–13)
ANISOCYTOSIS: (no result) (ref 0–0)
BAND NEUTROPHILS #M: 0.2 10^3/UL (ref 0–0.6)
BAND NEUTROPHILS % (M): 14 % (ref 0–4)
BASOPHIL #M: 0 10^3/UL (ref 0–0)
BASOPHILS % (M): 1 % (ref 0–2)
BLOOD UREA NITROGEN: 45 MG/DL (ref 7–20)
BLOOD UREA NITROGEN: 55 MG/DL (ref 7–20)
BURR CELLS: (no result) (ref 0–0)
CALCIUM: 8.3 MG/DL (ref 8.4–10.2)
CALCIUM: 8.4 MG/DL (ref 8.4–10.2)
CARBON DIOXIDE: 32 MMOL/L (ref 21–31)
CARBON DIOXIDE: 32 MMOL/L (ref 21–31)
CHLORIDE: 106 MMOL/L (ref 97–110)
CHLORIDE: 109 MMOL/L (ref 97–110)
CREATININE: 0.76 MG/DL (ref 0.61–1.24)
CREATININE: 0.86 MG/DL (ref 0.61–1.24)
EOSINOPHILS % (M): 1 % (ref 0–7)
ERYTHROBLAST% (NRBC) (M): 1 % (ref 0–0)
GIANT THROMBO% (M): 1 % (ref 0–0)
GLUCOSE: 202 MG/DL (ref 70–220)
GLUCOSE: 229 MG/DL (ref 70–220)
HEMATOCRIT: 27.7 % (ref 42–52)
HEMOGLOBIN: 8.3 G/DL (ref 14–18)
IMMATURE GRANS #M: 0 10^3/UL (ref 0–0.03)
IMMATURE GRANS % (M): 0 % (ref 0–0.43)
LYMPHOCYTES #M: 0.2 10^3/UL (ref 0.8–2.9)
LYMPHOCYTES % (M): 10 % (ref 15–51)
MAGNESIUM: 2.5 MG/DL (ref 1.7–2.5)
MEAN CORPUSCULAR HEMOGLOBIN: 28.2 PG (ref 29–33)
MEAN CORPUSCULAR HGB CONC: 30 G/DL (ref 32–37)
MEAN CORPUSCULAR VOLUME: 94.2 FL (ref 82–101)
MEAN PLATELET VOLUME: 12 FL (ref 7.4–10.4)
MICROCYTOSIS: (no result) (ref 0–0)
MONOCYTE #M: 0 10^3/UL (ref 0.3–0.9)
MONOCYTES % (M): 3 % (ref 0–11)
NUCLEATED RED BLOOD CELLS%: 0 /100WBC (ref 0–0)
OVALOCYTES: (no result) (ref 0–0)
PATH REVIEW?: YES
PHOSPHORUS: 2.3 MG/DL (ref 2.5–4.9)
PLATELET COUNT: 38 10^3/UL (ref 140–415)
PLATELET ESTIMATE: (no result)
POIKILOCYTOSIS: (no result) (ref 0–0)
POLYCHROMASIA: (no result) (ref 0–0)
POSITIVE DIFF: (no result)
POTASSIUM: 4.6 MMOL/L (ref 3.5–5.1)
POTASSIUM: 5 MMOL/L (ref 3.5–5.1)
RED BLOOD COUNT: 2.94 10^6/UL (ref 4.7–6.1)
RED CELL DISTRIBUTION WIDTH: 17.1 % (ref 11.5–14.5)
SEG NEUT #M: 1.4 10^3/UL (ref 1.6–7.5)
SEGMENTED NEUTROPHILS (M) %: 71 % (ref 39–77)
SMUDGE%M: 3 % (ref 0–0)
SODIUM: 145 MMOL/L (ref 135–144)
SODIUM: 148 MMOL/L (ref 135–144)
WHITE BLOOD COUNT: 2 10^3/UL (ref 4.8–10.8)

## 2019-02-18 RX ADMIN — ALBUTEROL SULFATE SCH PUFF: 90 AEROSOL, METERED RESPIRATORY (INHALATION) at 19:20

## 2019-02-18 RX ADMIN — ALBUTEROL SULFATE 1 PUFF: 90 AEROSOL, METERED RESPIRATORY (INHALATION) at 09:19

## 2019-02-18 RX ADMIN — ALBUTEROL SULFATE SCH PUFF: 90 AEROSOL, METERED RESPIRATORY (INHALATION) at 09:19

## 2019-02-18 RX ADMIN — DESMOPRESSIN ACETATE SCH MCG: 4 SOLUTION INTRAVENOUS at 20:53

## 2019-02-18 RX ADMIN — MIDAZOLAM HYDROCHLORIDE SCH MLS/HR: 5 INJECTION, SOLUTION INTRAMUSCULAR; INTRAVENOUS at 17:11

## 2019-02-18 RX ADMIN — ALBUTEROL SULFATE 1 PUFF: 90 AEROSOL, METERED RESPIRATORY (INHALATION) at 01:03

## 2019-02-18 RX ADMIN — ALBUTEROL SULFATE 1 PUFF: 90 AEROSOL, METERED RESPIRATORY (INHALATION) at 19:20

## 2019-02-18 RX ADMIN — PROPOFOL 1 MLS/HR: 10 INJECTION, EMULSION INTRAVENOUS at 03:30

## 2019-02-18 RX ADMIN — DESMOPRESSIN ACETATE SCH MCG: 4 SOLUTION INTRAVENOUS at 08:45

## 2019-02-18 RX ADMIN — LEVOFLOXACIN SCH MLS/HR: 500 INJECTION, SOLUTION INTRAVENOUS at 11:30

## 2019-02-18 RX ADMIN — CALCIUM GLUCONATE SCH MLS/HR: 94 INJECTION, SOLUTION INTRAVENOUS at 07:19

## 2019-02-18 RX ADMIN — DESMOPRESSIN ACETATE 1 MCG: 4 SOLUTION INTRAVENOUS at 08:45

## 2019-02-18 RX ADMIN — HYDROCHLOROTHIAZIDE SCH MG: 25 TABLET ORAL at 08:59

## 2019-02-18 RX ADMIN — METHYLPREDNISOLONE SODIUM SUCCINATE 1 MG: 40 INJECTION, POWDER, FOR SOLUTION INTRAMUSCULAR; INTRAVENOUS at 05:11

## 2019-02-18 RX ADMIN — RISPERIDONE 1 MG: 1 TABLET ORAL at 08:45

## 2019-02-18 RX ADMIN — METHYLPREDNISOLONE SODIUM SUCCINATE 1 MG: 40 INJECTION, POWDER, FOR SOLUTION INTRAMUSCULAR; INTRAVENOUS at 23:39

## 2019-02-18 RX ADMIN — OXYCODONE HYDROCHLORIDE AND ACETAMINOPHEN SCH MG: 500 TABLET ORAL at 08:45

## 2019-02-18 RX ADMIN — FAMOTIDINE 1 MG: 20 TABLET ORAL at 08:44

## 2019-02-18 RX ADMIN — POTASSIUM ACETATE 1 MLS/HR: 3.93 INJECTION, SOLUTION, CONCENTRATE INTRAVENOUS at 17:11

## 2019-02-18 RX ADMIN — DIVALPROEX SODIUM 1 MG: 125 CAPSULE, COATED PELLETS ORAL at 12:04

## 2019-02-18 RX ADMIN — RISPERIDONE 1 MG: 1 TABLET ORAL at 20:53

## 2019-02-18 RX ADMIN — IPRATROPIUM BROMIDE 1 PUFF: 17 AEROSOL, METERED RESPIRATORY (INHALATION) at 19:20

## 2019-02-18 RX ADMIN — METHYLPREDNISOLONE SODIUM SUCCINATE 1 MG: 40 INJECTION, POWDER, FOR SOLUTION INTRAMUSCULAR; INTRAVENOUS at 11:30

## 2019-02-18 RX ADMIN — LEVOFLOXACIN 1 MLS/HR: 500 INJECTION, SOLUTION INTRAVENOUS at 11:30

## 2019-02-18 RX ADMIN — CALCIUM GLUCONATE SCH MLS/HR: 94 INJECTION, SOLUTION INTRAVENOUS at 17:11

## 2019-02-18 RX ADMIN — PROPOFOL SCH MLS/HR: 10 INJECTION, EMULSION INTRAVENOUS at 03:30

## 2019-02-18 RX ADMIN — ASPIRIN 81 MG SCH MG: 81 TABLET ORAL at 08:45

## 2019-02-18 RX ADMIN — FAMOTIDINE 1 MG: 20 TABLET ORAL at 20:54

## 2019-02-18 RX ADMIN — IPRATROPIUM BROMIDE 1 PUFF: 17 AEROSOL, METERED RESPIRATORY (INHALATION) at 01:03

## 2019-02-18 RX ADMIN — DIVALPROEX SODIUM 1 MG: 125 CAPSULE, COATED PELLETS ORAL at 20:55

## 2019-02-18 RX ADMIN — DIVALPROEX SODIUM 1 MG: 125 CAPSULE, COATED PELLETS ORAL at 11:31

## 2019-02-18 RX ADMIN — OXYCODONE HYDROCHLORIDE AND ACETAMINOPHEN 1 MG: 500 TABLET ORAL at 08:45

## 2019-02-18 RX ADMIN — ALBUTEROL SULFATE SCH PUFF: 90 AEROSOL, METERED RESPIRATORY (INHALATION) at 01:03

## 2019-02-18 RX ADMIN — DIVALPROEX SODIUM SCH MG: 125 CAPSULE, COATED PELLETS ORAL at 12:04

## 2019-02-18 RX ADMIN — FAMOTIDINE SCH MG: 20 TABLET ORAL at 08:44

## 2019-02-18 RX ADMIN — DESMOPRESSIN ACETATE 1 MCG: 4 SOLUTION INTRAVENOUS at 20:53

## 2019-02-18 RX ADMIN — PROPOFOL 1 MLS/HR: 10 INJECTION, EMULSION INTRAVENOUS at 15:30

## 2019-02-18 RX ADMIN — DIVALPROEX SODIUM 1 MG: 125 CAPSULE, COATED PELLETS ORAL at 05:11

## 2019-02-18 RX ADMIN — DIVALPROEX SODIUM SCH MG: 125 CAPSULE, COATED PELLETS ORAL at 20:55

## 2019-02-18 RX ADMIN — HYDROCHLOROTHIAZIDE 1 MG: 25 TABLET ORAL at 08:59

## 2019-02-18 RX ADMIN — DIVALPROEX SODIUM SCH MG: 125 CAPSULE, COATED PELLETS ORAL at 11:31

## 2019-02-18 RX ADMIN — IPRATROPIUM BROMIDE 1 PUFF: 17 AEROSOL, METERED RESPIRATORY (INHALATION) at 13:45

## 2019-02-18 RX ADMIN — ALBUTEROL SULFATE SCH PUFF: 90 AEROSOL, METERED RESPIRATORY (INHALATION) at 13:45

## 2019-02-18 RX ADMIN — CASTOR OIL AND BALSAM, PERU 1 APPLIC: 788; 87 OINTMENT TOPICAL at 08:45

## 2019-02-18 RX ADMIN — DIVALPROEX SODIUM SCH MG: 125 CAPSULE, COATED PELLETS ORAL at 05:11

## 2019-02-18 RX ADMIN — POTASSIUM ACETATE 1 MLS/HR: 3.93 INJECTION, SOLUTION, CONCENTRATE INTRAVENOUS at 07:19

## 2019-02-18 RX ADMIN — MIDAZOLAM HYDROCHLORIDE 1 MLS/HR: 5 INJECTION, SOLUTION INTRAMUSCULAR; INTRAVENOUS at 17:11

## 2019-02-18 RX ADMIN — METHYLPREDNISOLONE SODIUM SUCCINATE 1 MG: 40 INJECTION, POWDER, FOR SOLUTION INTRAMUSCULAR; INTRAVENOUS at 00:12

## 2019-02-18 RX ADMIN — ASPIRIN 81 MG CHEWABLE TABLET 1 MG: 81 TABLET CHEWABLE at 08:45

## 2019-02-18 RX ADMIN — ALBUTEROL SULFATE 1 PUFF: 90 AEROSOL, METERED RESPIRATORY (INHALATION) at 13:45

## 2019-02-18 RX ADMIN — IPRATROPIUM BROMIDE 1 PUFF: 17 AEROSOL, METERED RESPIRATORY (INHALATION) at 09:19

## 2019-02-18 RX ADMIN — FAMOTIDINE SCH MG: 20 TABLET ORAL at 20:54

## 2019-02-18 RX ADMIN — METHYLPREDNISOLONE SODIUM SUCCINATE 1 MG: 40 INJECTION, POWDER, FOR SOLUTION INTRAMUSCULAR; INTRAVENOUS at 17:05

## 2019-02-18 RX ADMIN — PROPOFOL SCH MLS/HR: 10 INJECTION, EMULSION INTRAVENOUS at 15:30

## 2019-02-18 NOTE — CONS
Consult Date/Type/Reason


Admit Date/Time


Jan 22, 2019 at 00:03


Initial Consult Date


1/22/19


Type of Consult


Pulmonary


Requesting Provider:  DONALDO CARRILLO MD


Date/Time of Note


DATE: 2/18/19 


TIME: 11:13





Subjective


Patient is failed multiple weaning trials.  Agonal respiration of sedation.  


Moderate secretions.





Objective





Vital Signs


  Date      Temp  Pulse  Resp  B/P (MAP)   Pulse Ox  O2          O2 Flow    FiO2


Time                                                 Delivery    Rate


   2/18/19          105    19  93/68 (76)        96


     10:00


   2/18/19                                                                    35


     09:15


   2/18/19                                           Mechanical


     09:00                                           Ventilator


   2/18/19  99.0


     08:00








Intake and Output





2/17/19 2/17/19 2/18/19





1515:00


23:00


07:00





IntakeIntake Total


1685.50 ml


1803 ml


1912 ml





OutputOutput Total


1270 ml


1365 ml


740 ml





BalanceBalance


415.50 ml


438 ml


1172 ml











Exam


GENERAL: Frail elderly gentleman, on mechanical ventilation orally intubated


VITAL SIGNS:  per chart


NECK:  Supple.  No JVD or lymphadenopathy.


CARDIAC EXAM:  S1, S2. No added sounds or murmurs.


CHEST: Diminished air entry bilaterally


ABDOMEN:  Soft, nontender.  No guarding or rebound.


EXTREMITIES:  No cyanosis, clubbing or edema.


NEUROLOGIC:  Generalized weakness.





Vent Setting


Ventilator Support Mode:  AC, VC plus


Fraction of Inspired Oxygen pe:  35


Positive End Expiratory Pressu:  5.0





Results/Medications


Result Diagram:  


2/18/19 0449                                                                    


           2/18/19 0449





Results 24 hrs





Laboratory Tests


Test
                                2/17/19
13:49  2/17/19
18:20  2/18/19
04:49


Sodium Level                                151  H                        148  H


Potassium Level                              4.4                           4.6


Chloride Level                              112  H                         109


Carbon Dioxide Level                         32  H                         32  H


Anion Gap                                      7                             7


Blood Urea Nitrogen                          44  H                         45  H


Creatinine                                  0.77                          0.76


Est Glomerular Filtrat Rate
mL/min   > 60  
        
              > 60  



Glucose Level                                187                           202


Calcium Level                                8.6                           8.4


Urine Color                                         YELLOW


Urine Clarity                                       CLEAR


Urine pH                                                    5.0


Urine Specific Gravity                                    1.006


Urine Ketones                                       NEGATIVE


Urine Nitrite                                       NEGATIVE


Urine Bilirubin                                     NEGATIVE


Urine Urobilinogen                                  NEGATIVE


Urine Leukocyte Esterase                            NEGATIVE


Urine Hemoglobin                                    NEGATIVE


Urine Osmolality                                            284


Urine Random Creatinine                             < 12.40  L


Urine Random Sodium                                         27  L


Urine Glucose                                               3+  H


Urine Total Protein                                       34.0  H


White Blood Count                                                         2.0  L


Red Blood Count                                                          2.94  L


Hemoglobin                                                                8.3  L


Hematocrit                                                               27.7  L


Mean Corpuscular Volume                                                   94.2


Mean Corpuscular Hemoglobin                                              28.2  L


Mean Corpuscular Hemoglobin
Concent  
              
                   30.0  L



Red Cell Distribution Width                                              17.1  H


Platelet Count                                                             38  L


Mean Platelet Volume                                                     12.0  H


Immature Granulocytes %                                                 0.000  L


Neutrophils %


Lymphocytes %


Monocytes %


Eosinophils %


Basophils %


Nucleated Red Blood Cells %                                                0.0


Immature Granulocytes #                                                  0.000


Neutrophils #


Lymphocytes #


Monocytes #


Eosinophils #


Basophils #


Nucleated Red Blood Cells #


Phosphorus Level                                                          2.3  L


Magnesium Level                                                            2.5





Medications





Current Medications


Ondansetron HCl (Zofran Inj) 4 mg Q6H  PRN IV NAUSEA AND/OR VOMITING Last 


administered on 2/1/19at 18:46; Admin Dose 4 MG;  Start 1/21/19 at 23:30


Acetaminophen (Tylenol Supp) 650 mg Q4H  PRN OR PAIN LEVEL 1-3 OR FEVER;  Start 


1/21/19 at 23:30


Aspirin (Aspirin) 81 mg DAILY PO  Last administered on 2/18/19at 08:45; Admin 


Dose 81 MG;  Start 1/24/19 at 09:00


Famotidine (Pepcid) 20 mg Q12 PO  Last administered on 2/18/19at 08:44; Admin 


Dose 20 MG;  Start 1/24/19 at 21:00


Zolpidem Tartrate (Ambien) 5 mg HS  PRN PO INSOMNIA Last administered on 


2/5/19at 20:31; Admin Dose 5 MG;  Start 1/27/19 at 00:00


Guaifenesin/ Dextromethorphan (Robitussin Dm Liquid Cup) 5 ml Q6H  PRN PO COUGH 


Last administered on 2/10/19at 05:16; Admin Dose 5 ML;  Start 1/27/19 at 20:00


Morphine Sulfate (morphine) 6 mg Q4H  PRN PO SEVERE PAIN LEVEL 7-10 Last 


administered on 2/16/19at 08:35; Admin Dose 6 MG;  Start 1/30/19 at 21:30


Albuterol/ Ipratropium (Duoneb) 3 ml Q3H RESP THERAPY  PRN HHN SHORTNESS OF 


BREATH Last administered on 2/7/19at 18:47; Admin Dose 3 ML;  Start 1/31/19 at 2


0:00


Methylprednisolone Sodium Succinate (Solu-Medrol) 40 mg Q6 IV  Last administered


on 2/18/19at 05:11; Admin Dose 40 MG;  Start 2/2/19 at 12:00


Alteplase, Recombinant (Cathflo (Activase)) 2 mg MAY REPEAT X1 PRN CATHETER IF 


CATHETER REMAINS OCCULUDED;  Start 2/2/19 at 11:00


Lorazepam (Ativan) 1 mg Q6H  PRN IV PSYCHOSIS Last administered on 2/15/19at 


02:58; Admin Dose 1 MG;  Start 2/7/19 at 02:00


Haloperidol (Haldol) 2 mg PRN  PRN IM AGITATION Last administered on 2/12/19at 


00:44; Admin Dose 2 MG;  Start 2/8/19 at 06:30


Risperidone (Risperdal) 2 mg BID PO  Last administered on 2/18/19at 08:45; Admin


Dose 2 MG;  Start 2/9/19 at 21:00


Divalproex Sodium (Depakote Sprinkle) 250 mg Q8H PO  Last administered on 


2/18/19at 05:11; Admin Dose 250 MG;  Start 2/10/19 at 21:00


Propofol 100 ml @  1.875 mls/ hr Q12H IV  Last administered on 2/15/19at 01:14; 


Admin Dose 7.5 MLS/HR;  Start 2/13/19 at 15:30


Ascorbic Acid (Vitamin C) 500 mg DAILY GTB  Last administered on 2/18/19at 


08:45; Admin Dose 500 MG;  Start 2/15/19 at 10:30


Phenylephrine HCl 80 mg/Dextrose 250 ml @  18.75 mls/ hr TITRATE IV  Last 


administered on 2/16/19at 21:39; Admin Dose 7.5 MLS/HR;  Start 2/16/19 at 01:00


Albuterol (Ventolin Hfa) 4 puff Q6H RESP  THERAPY INH  Last administered on 


2/18/19at 09:19; Admin Dose 4 PUFF;  Start 2/16/19 at 14:00


Ipratropium Bromide (Atrovent Hfa) 4 puff Q6H RESP  THERAPY INH  Last 


administered on 2/18/19at 09:19; Admin Dose 4 PUFF;  Start 2/16/19 at 14:00


Levofloxacin/ Dextrose 100 ml @  100 mls/hr Q24H IVPB  Last administered on 


2/17/19at 13:40; Admin Dose 100 MLS/HR;  Start 2/16/19 at 12:00


Desmopressin Acetate (Ddavp) 2 mcg BID IV  Last administered on 2/18/19at 08:45;


Admin Dose 2 MCG;  Start 2/17/19 at 09:00


Sodium Chloride 1,000 ml @  100 mls/hr Q10H IV  Last administered on 2/18/19at 


07:19; Admin Dose 100 MLS/HR;  Start 2/17/19 at 07:30


Midazolam HCl 50 ml @ 1 mls/hr TITRATE IV  Last administered on 2/17/19at 23:49;


Admin Dose 5 MLS/HR;  Start 2/17/19 at 10:00


Hydrochlorothiazide (Hydrochlorothiazide) 25 mg DAILY GTB ;  Start 2/18/19 at 


09:00





Assessment/Plan


Hospital Course (Demo Recall)


iMP: 


1.  Acute on chronic hypoxic and hypercapnic respiratory failure--worse 


overnight with increased mucus plugging.  Currently not able to liberate from 


mechanical ventilation


3.  Encephalopathy toxic metabolic resolving


4.  Advanced COPD


5.  Hypernatremia likely free water deficit


6.  Thrombocytopenia 


7.  Anemia 





RECS:


1.  Failed multiple weaning trials.  Continue mechanical ventilation.  Patient 


has an advanced directive that states he does not want tracheostomy.  D POA is 


at bedside today.  We discussed goals of care and long-term mechanical 


ventilation is not consistent with patient's wishes.  In that regard I have 


requested [facility] eval as this is the patient's third intubation during 


this admission and he has consistently failed weaning trials.  We will attempt 


one for the weaning trial with the hope of safe extubation and no reintubation 


if possible.


2.  Continue tube feeding as tolerated


3.  Pulmonary toilet


4.  Increase free water per nephrology





Critical care time 40 minutes.


[facility] eval. 


Code status DNR MIMI BURDEN [facility]     Feb 18, 2019 11:17

## 2019-02-18 NOTE — CONS
Assessment/Plan


Assessment/Plan


Hospital Course (Demo Recall)


IMP:


1.Hypotension-Now improved and off mulu.. NL EF by echo this admit


2.resp failure s/p intubation


3.cardiac arrest


4.pneumoperitoneum-resolved


5.Positive troponin-now trended neg


6.renal failure-improved


7.Leukocytosis


8. anemia


9, Thrombocytopenia-ongoing some worsening


10.Resp failure-hypercapnic s/p intubation. s/p extubation but now transferred 


back to ICU for increasing resp distress


11.PNA-by cxr


12.PLeual effusion


14. dysphagia s/p G tube





Recc:


-IN ICU


-Continue asa


-continue steroids/bronchodilators


-Continue risperdal


-follow MS closely 


-Follow volume status clsoely


-Follow resp status closely


-Follow BP closely and would consider d/c HCTZ


-Now DNR





Consultation Date/Type/Reason


Admit Date/Time


Jan 22, 2019 at 00:03


Initial Consult Date


1/22/19


Type of Consult


Cardiology


Reason for Consultation


cardiac arrest


Requesting Provider:  DONALDO CARRILLO MD


Date/Time of Note


DATE: 2/18/19 


TIME: 14:02





Exam/Review of Systems


Vital Signs


Vitals





Vital Signs


  Date      Temp  Pulse  Resp  B/P (MAP)   Pulse Ox  O2          O2 Flow    FiO2


Time                                                 Delivery    Rate


   2/18/19          112    20                    95                           40


     13:27


   2/18/19                     92/62 (72)            Mechanical


     12:00                                           Ventilator


   2/18/19  99.0


     08:00








Intake and Output





2/17/19 2/17/19 2/18/19





1515:00


23:00


07:00





IntakeIntake Total


1685.50 ml


1803 ml


1912 ml





OutputOutput Total


1270 ml


1365 ml


740 ml





BalanceBalance


415.50 ml


438 ml


1172 ml














Exam


Exam


Review of Systems:


CONSTITUTIONAL:  No fevers, chills.


PULMONARY: intubated


CARDIOVASCULAR: No chest pain/palpitations


GASTROINTESTINAL:  No nausea/vomiting.


GENITOURINARY:  No hematuria/dysuria.


MUSCULOSKELETAL:  No myagias/arthalgias.


PSYCHIATRIC:  The patient denies depression.


NEUROLOGIC:   No weakness


Constitutional:  other (intubated)


Psych:  no complaints


Head:  normocephalic


ENMT:  mucosa pink and moist, intubated


Neck:  supple, jvd (9 cm water)


Respiratory:  diminished breath sounds


Cardiovascular:  regular rate and rhythm (tachycardic)


Gastrointestinal:  soft, non-tender


Musculoskeletal:  muscle tone (normal)


Extremities:  edema (none)





Labs


Result Diagram:  


2/18/19 0449 2/18/19 0449





Results 24hrs





Laboratory Tests


      Test
                                2/17/19
18:20    2/18/19
04:49


      Urine Color                          YELLOW


      Urine Clarity                        CLEAR


      Urine pH                                     5.0


      Urine Specific Gravity                     1.006


      Urine Ketones                        NEGATIVE


      Urine Nitrite                        NEGATIVE


      Urine Bilirubin                      NEGATIVE


      Urine Urobilinogen                   NEGATIVE


      Urine Leukocyte Esterase             NEGATIVE


      Urine Hemoglobin                     NEGATIVE


      Urine Osmolality                             284


      Urine Random Creatinine              < 12.40  L


      Urine Random Sodium                          27  L


      Urine Glucose                                3+  H


      Urine Total Protein                        34.0  H


      White Blood Count                                            2.0  L


      Red Blood Count                                             2.94  L


      Hemoglobin                                                   8.3  L


      Hematocrit                                                  27.7  L


      Mean Corpuscular Volume                                      94.2


      Mean Corpuscular Hemoglobin                                 28.2  L


      Mean Corpuscular Hemoglobin
Concent  
                     30.0  L



      Red Cell Distribution Width                                 17.1  H


      Platelet Count                                                38  L


      Mean Platelet Volume                                        12.0  H


      Immature Granulocytes %                                    0.000  L


      Neutrophils %


      Segmented Neutrophils %
(Manual)     
                        71  



      Band Neutrophils % (Manual)                                   14  H


      Lymphocytes %


      Lymphocytes % (Manual)                                        10  L


      Monocytes %


      Monocytes % (Manual)                                            3


      Eosinophils %


      Eosinophils % (Manual)                                          1


      Basophils %


      Basophils % (Manual)                                            1


      Nucleated Red Blood Cells %                                    1  H


      Immature Granulocytes #                                     0.000


      Neutrophils #


      Neutrophils # (Manual)                                       1.4  L


      Band Neutrophils #                                            0.2


      Lymphocytes (Manual)                                         0.2  L


      Lymphocytes #


      Monocytes #


      Monocytes # (Manual)                                         0.0  L


      Eosinophils #


      Basophils #


      Basophils # (Manual)                                          0.0


      Nucleated Red Blood Cells #


      Pathologist Review
(Hematology)      
              YES  



      Platelet Estimate                                   SIG DECREASED


      Giant Platelets                                                1  H


      Polychromasia                                                  1+


      Poikilocytosis                                                 1+


      Anisocytosis                                                   1+


      Microcytosis                                                   1+


      Ovalocytes                                                     1+


      Sodium Level                                                 148  H


      Potassium Level                                               4.6


      Chloride Level                                                109


      Carbon Dioxide Level                                          32  H


      Anion Gap                                                       7


      Blood Urea Nitrogen                                           45  H


      Creatinine                                                   0.76


      Est Glomerular Filtrat Rate
mL/min   
              > 60  



      Glucose Level                                                 202


      Calcium Level                                                 8.4


      Phosphorus Level                                             2.3  L


      Magnesium Level                                               2.5








Medications


Medications





Current Medications


Ondansetron HCl (Zofran Inj) 4 mg Q6H  PRN IV NAUSEA AND/OR VOMITING Last 


administered on 2/1/19at 18:46; Admin Dose 4 MG;  Start 1/21/19 at 23:30


Acetaminophen (Tylenol Supp) 650 mg Q4H  PRN SD PAIN LEVEL 1-3 OR FEVER;  Start 


1/21/19 at 23:30


Aspirin (Aspirin) 81 mg DAILY PO  Last administered on 2/18/19at 08:45; Admin 


Dose 81 MG;  Start 1/24/19 at 09:00


Famotidine (Pepcid) 20 mg Q12 PO  Last administered on 2/18/19at 08:44; Admin 


Dose 20 MG;  Start 1/24/19 at 21:00


Zolpidem Tartrate (Ambien) 5 mg HS  PRN PO INSOMNIA Last administered on 


2/5/19at 20:31; Admin Dose 5 MG;  Start 1/27/19 at 00:00


Guaifenesin/ Dextromethorphan (Robitussin Dm Liquid Cup) 5 ml Q6H  PRN PO COUGH 


Last administered on 2/10/19at 05:16; Admin Dose 5 ML;  Start 1/27/19 at 20:00


Morphine Sulfate (morphine) 6 mg Q4H  PRN PO SEVERE PAIN LEVEL 7-10 Last 


administered on 2/16/19at 08:35; Admin Dose 6 MG;  Start 1/30/19 at 21:30


Albuterol/ Ipratropium (Duoneb) 3 ml Q3H RESP THERAPY  PRN HHN SHORTNESS OF 


BREATH Last administered on 2/7/19at 18:47; Admin Dose 3 ML;  Start 1/31/19 at 


20:00


Methylprednisolone Sodium Succinate (Solu-Medrol) 40 mg Q6 IV  Last administered


on 2/18/19at 11:30; Admin Dose 40 MG;  Start 2/2/19 at 12:00


Alteplase, Recombinant (Cathflo (Activase)) 2 mg MAY REPEAT X1 PRN CATHETER IF 


CATHETER REMAINS OCCULUDED;  Start 2/2/19 at 11:00


Lorazepam (Ativan) 1 mg Q6H  PRN IV PSYCHOSIS Last administered on 2/15/19at 


02:58; Admin Dose 1 MG;  Start 2/7/19 at 02:00


Haloperidol (Haldol) 2 mg PRN  PRN IM AGITATION Last administered on 2/12/19at 


00:44; Admin Dose 2 MG;  Start 2/8/19 at 06:30


Risperidone (Risperdal) 2 mg BID PO  Last administered on 2/18/19at 08:45; Admin


Dose 2 MG;  Start 2/9/19 at 21:00


Divalproex Sodium (Depakote Sprinkle) 250 mg Q8H PO  Last administered on 


2/18/19at 12:04; Admin Dose 250 MG;  Start 2/10/19 at 21:00


Propofol 100 ml @  1.875 mls/ hr Q12H IV  Last administered on 2/15/19at 01:14; 


Admin Dose 7.5 MLS/HR;  Start 2/13/19 at 15:30


Ascorbic Acid (Vitamin C) 500 mg DAILY GTB  Last administered on 2/18/19at 


08:45; Admin Dose 500 MG;  Start 2/15/19 at 10:30


Phenylephrine HCl 80 mg/Dextrose 250 ml @  18.75 mls/ hr TITRATE IV  Last 


administered on 2/16/19at 21:39; Admin Dose 7.5 MLS/HR;  Start 2/16/19 at 01:00


Albuterol (Ventolin Hfa) 4 puff Q6H RESP  THERAPY INH  Last administered on 


2/18/19at 13:45; Admin Dose 4 PUFF;  Start 2/16/19 at 14:00


Ipratropium Bromide (Atrovent Hfa) 4 puff Q6H RESP  THERAPY INH  Last adm


inistered on 2/18/19at 13:45; Admin Dose 4 PUFF;  Start 2/16/19 at 14:00


Levofloxacin/ Dextrose 100 ml @  100 mls/hr Q24H IVPB  Last administered on 


2/18/19at 11:30; Admin Dose 100 MLS/HR;  Start 2/16/19 at 12:00


Desmopressin Acetate (Ddavp) 2 mcg BID IV  Last administered on 2/18/19at 08:45;


Admin Dose 2 MCG;  Start 2/17/19 at 09:00


Sodium Chloride 1,000 ml @  100 mls/hr Q10H IV  Last administered on 2/18/19at 


07:19; Admin Dose 100 MLS/HR;  Start 2/17/19 at 07:30


Midazolam HCl 50 ml @ 1 mls/hr TITRATE IV  Last administered on 2/17/19at 23:49;


Admin Dose 5 MLS/HR;  Start 2/17/19 at 10:00


Hydrochlorothiazide (Hydrochlorothiazide) 25 mg DAILY GTB ;  Start 2/18/19 at 


09:00











CARISA IZAGUIRRE               Feb 18, 2019 14:05

## 2019-02-18 NOTE — CONS
Assessment/Plan


Assessment/Plan


Hospital Course (Demo Recall)


ID PROGRESS NOTE


CURRENT ABX: DAY #  => Levaquin #3 


2/18/19 0449                                                                    


           2/18/19 0449


24H INTERVAL SUMMARY


* Not much change from yesterday, Cachectic-frail  69 yo M  -- failed CPAP 


  trials


* Orally intubated, non-communicative, TMax 99.8 on 2/16/18 


* 02/15/19 CXR: Similar appearance of right greater than left basilar 


  infiltrates. Small bilateral pleural effusions unchanged.


* Indwelling: Endotracheal tube PEG Blake


MICRO/OTHER


* BCx (-) 


* 02/14/19 RESP CX:  RESPIRATORY CULTURE  Final  


     Organism 1                     ENTEROBACTER CLOACAE


        QUANTITY                    3+





                                    E CLOACAE        


                                    M.I.C.    RX     


                                    --------- ---    


     CEFAZOLIN                                 R     


     CEFOTAXIME                                S     


     CIPROFLOXACIN                  <=0.25     S     


     GENTAMICIN                     <=1        S     


     LEVOFLOXACIN                   <=0.12     S     


     TOBRAMYCIN                     <=1        S     


     TRIMETHOPRIM/SULFAMETHOXAZOLE  <=20       S     





---------------------------------------------------





PHYSICAL EXAMINATION:


GENERAL: Afebrile, VSS,cachexia, non-verbal 


HEENT: AT, NC, anicteric, edentulous, orally intubated 


NECK:  Supple, trach midline 


CHEST: Equal chest rise bilaterally== Vented 


HEART:  Pulse RRR


ABDOMEN:  Soft / NT 


EXTREMITIES:  Warm, dry, muscle wasting 


SKIN: No rash, no diaphoresis 





ID ASSESSMENT


69 yo M admit with: 


1. SIRS w/low grade temps, rising WBC 


2. Respiratory failure due to recurrent Aspiration pneumonia


* Dysphagia/peg with ongoing aspiration


* 02/15/19 CXR: Similar appearance of right greater than left basilar 


  infiltrates. Small bilateral pleural effusions unchanged.


4.  Pneumoperitoneum of unclear etiology, no perforation per surgical note, 


status post chest tube


5.  Dementia


5.  Anemia with progressive thrombocytopenia


6.  History of non-ST elevation MI


7.  Status post cardiac arrest


8.  RIJ TLC


(-)MRSA Nares 


ABX ALLERGIES: KNDA 


INVASIVES: R-IJ/TLC, ETT, PEG, FC 


CURRENT ABX: DAY #  =>  Levaquin #3





ID RECOMMENDATIONS/PLAN:  


1. Continue on  Levaquin 500mg IV daily for HCAP -> ASP PNA Enterobacter = VAP 


2. Trach? 





.





Consultation Date/Type/Reason


Admit Date/Time


Jan 22, 2019 at 00:03


Initial Consult Date


1/22/19


Requesting Provider:  DONALDO CARRILLO MD


Date/Time of Note


DATE: 2/18/19 


TIME: 09:21





Exam/Review of Systems


Exam


Vitals





Vital Signs


  Date      Temp  Pulse  Resp  B/P (MAP)   Pulse Ox  O2          O2 Flow    FiO2


Time                                                 Delivery    Rate


   2/18/19          108    16                    94                           35


     07:24


   2/18/19                     97/76 (83)            Mechanical


     07:00                                           Ventilator


   2/18/19  97.8


     04:00








Intake and Output





2/17/19 2/17/19 2/18/19





1515:00


23:00


07:00





IntakeIntake Total


1685.50 ml


1803 ml


1852 ml





OutputOutput Total


1270 ml


1365 ml


650 ml





BalanceBalance


415.50 ml


438 ml


1202 ml














Results


Result Diagram:  


2/18/19 0449                                                                    


           2/18/19 0449





Results 24hrs





Laboratory Tests


Test
                                2/17/19
13:49  2/17/19
18:20  2/18/19
04:49


Sodium Level                                151  H                        148  H


Potassium Level                              4.4                           4.6


Chloride Level                              112  H                         109


Carbon Dioxide Level                         32  H                         32  H


Anion Gap                                      7                             7


Blood Urea Nitrogen                          44  H                         45  H


Creatinine                                  0.77                          0.76


Est Glomerular Filtrat Rate
mL/min   > 60  
        
              > 60  



Glucose Level                                187                           202


Calcium Level                                8.6                           8.4


Urine Color                                         YELLOW


Urine Clarity                                       CLEAR


Urine pH                                                    5.0


Urine Specific Gravity                                    1.006


Urine Ketones                                       NEGATIVE


Urine Nitrite                                       NEGATIVE


Urine Bilirubin                                     NEGATIVE


Urine Urobilinogen                                  NEGATIVE


Urine Leukocyte Esterase                            NEGATIVE


Urine Hemoglobin                                    NEGATIVE


Urine Osmolality                                            284


Urine Random Creatinine                             < 12.40  L


Urine Random Sodium                                         27  L


Urine Glucose                                               3+  H


Urine Total Protein                                       34.0  H


White Blood Count                                                         2.0  L


Red Blood Count                                                          2.94  L


Hemoglobin                                                                8.3  L


Hematocrit                                                               27.7  L


Mean Corpuscular Volume                                                   94.2


Mean Corpuscular Hemoglobin                                              28.2  L


Mean Corpuscular Hemoglobin
Concent  
              
                   30.0  L



Red Cell Distribution Width                                              17.1  H


Platelet Count                                                             38  L


Mean Platelet Volume                                                     12.0  H


Immature Granulocytes %                                                 0.000  L


Neutrophils %


Lymphocytes %


Monocytes %


Eosinophils %


Basophils %


Nucleated Red Blood Cells %                                                0.0


Immature Granulocytes #                                                  0.000


Neutrophils #


Lymphocytes #


Monocytes #


Eosinophils #


Basophils #


Nucleated Red Blood Cells #


Phosphorus Level                                                          2.3  L


Magnesium Level                                                            2.5








Medications


Medication





Current Medications


Ondansetron HCl (Zofran Inj) 4 mg Q6H  PRN IV NAUSEA AND/OR VOMITING Last 


administered on 2/1/19at 18:46; Admin Dose 4 MG;  Start 1/21/19 at 23:30


Acetaminophen (Tylenol Supp) 650 mg Q4H  PRN MO PAIN LEVEL 1-3 OR FEVER;  Start 


1/21/19 at 23:30


Aspirin (Aspirin) 81 mg DAILY PO  Last administered on 2/18/19at 08:45; Admin 


Dose 81 MG;  Start 1/24/19 at 09:00


Famotidine (Pepcid) 20 mg Q12 PO  Last administered on 2/18/19at 08:44; Admin 


Dose 20 MG;  Start 1/24/19 at 21:00


Zolpidem Tartrate (Ambien) 5 mg HS  PRN PO INSOMNIA Last administered on 


2/5/19at 20:31; Admin Dose 5 MG;  Start 1/27/19 at 00:00


Guaifenesin/ Dextromethorphan (Robitussin Dm Liquid Cup) 5 ml Q6H  PRN PO COUGH 


Last administered on 2/10/19at 05:16; Admin Dose 5 ML;  Start 1/27/19 at 20:00


Morphine Sulfate (morphine) 6 mg Q4H  PRN PO SEVERE PAIN LEVEL 7-10 Last ad


ministered on 2/16/19at 08:35; Admin Dose 6 MG;  Start 1/30/19 at 21:30


Albuterol/ Ipratropium (Duoneb) 3 ml Q3H RESP THERAPY  PRN HHN SHORTNESS OF 


BREATH Last administered on 2/7/19at 18:47; Admin Dose 3 ML;  Start 1/31/19 at 


20:00


Methylprednisolone Sodium Succinate (Solu-Medrol) 40 mg Q6 IV  Last administered


on 2/18/19at 05:11; Admin Dose 40 MG;  Start 2/2/19 at 12:00


Alteplase, Recombinant (Cathflo (Activase)) 2 mg MAY REPEAT X1 PRN CATHETER IF 


CATHETER REMAINS OCCULUDED;  Start 2/2/19 at 11:00


Lorazepam (Ativan) 1 mg Q6H  PRN IV PSYCHOSIS Last administered on 2/15/19at 


02:58; Admin Dose 1 MG;  Start 2/7/19 at 02:00


Haloperidol (Haldol) 2 mg PRN  PRN IM AGITATION Last administered on 2/12/19at 


00:44; Admin Dose 2 MG;  Start 2/8/19 at 06:30


Risperidone (Risperdal) 2 mg BID PO  Last administered on 2/18/19at 08:45; Admin


Dose 2 MG;  Start 2/9/19 at 21:00


Divalproex Sodium (Depakote Sprinkle) 250 mg Q8H PO  Last administered on 


2/18/19at 05:11; Admin Dose 250 MG;  Start 2/10/19 at 21:00


Propofol 100 ml @  1.875 mls/ hr Q12H IV  Last administered on 2/15/19at 01:14; 


Admin Dose 7.5 MLS/HR;  Start 2/13/19 at 15:30


Ascorbic Acid (Vitamin C) 500 mg DAILY GTB  Last administered on 2/18/19at 


08:45; Admin Dose 500 MG;  Start 2/15/19 at 10:30


Phenylephrine HCl 80 mg/Dextrose 250 ml @  18.75 mls/ hr TITRATE IV  Last 


administered on 2/16/19at 21:39; Admin Dose 7.5 MLS/HR;  Start 2/16/19 at 01:00


Albuterol (Ventolin Hfa) 4 puff Q6H RESP  THERAPY INH  Last administered on 


2/18/19at 09:19; Admin Dose 4 PUFF;  Start 2/16/19 at 14:00


Ipratropium Bromide (Atrovent Hfa) 4 puff Q6H RESP  THERAPY INH  Last 


administered on 2/18/19at 09:19; Admin Dose 4 PUFF;  Start 2/16/19 at 14:00


Levofloxacin/ Dextrose 100 ml @  100 mls/hr Q24H IVPB  Last administered on 


2/17/19at 13:40; Admin Dose 100 MLS/HR;  Start 2/16/19 at 12:00


Desmopressin Acetate (Ddavp) 2 mcg BID IV  Last administered on 2/18/19at 08:45;


Admin Dose 2 MCG;  Start 2/17/19 at 09:00


Sodium Chloride 1,000 ml @  100 mls/hr Q10H IV  Last administered on 2/18/19at 


07:19; Admin Dose 100 MLS/HR;  Start 2/17/19 at 07:30


Midazolam HCl 50 ml @ 1 mls/hr TITRATE IV  Last administered on 2/17/19at 23:49;


Admin Dose 5 MLS/HR;  Start 2/17/19 at 10:00


Hydrochlorothiazide (Hydrochlorothiazide) 25 mg DAILY GTB ;  Start 2/18/19 at 


09:00











DELL GALLO NP              Feb 18, 2019 09:23

## 2019-02-18 NOTE — PN
Date/Time of Note


Date/Time of Note


DATE: 2/18/19 


TIME: 16:30





Assessment/Plan


VTE Prophylaxis


Risk score (from [facility])>0 risk:  14


SCD applied (from [facility]):  Yes


Pharmacological prophylaxis:  NA/contraindicated


Pharm contraindication:  thrombocytopenia





Lines/Catheters


IV Catheter Type (from [facility]):  Central Line


Central line still needed:  Yes


Urinary Cath still in place:  Yes


Reason Cath still needed:  urinary retention





Assessment/Plan


Hospital Course


Patient continues on ventilatory support, failed CPAP trial, sedated on Versed 


drip.  Dr. English is met with PDOF for the patient, patient is made DNR.  


patient is undergoing evaluation for possible hospice.


Assessment/Plan


-Hypoxic respiratory failure requiring mechanical ventilation, extubated and now


intubated for elective PEG placement.  Dr. English is following in pulmonology 


consultation.


-Dysphagia.  S/p G-tube placement by Dr. Sanchez.


-Sepsis with shock, resolving.  Dr. Dreyer is following in infection disease 


consultation.


-Status post cardiac arrest.   Dr. Jon is following in cardiology 


consultation.


-Left-sided pneumothorax, status post left side chest tube placement, s/p self 


d/c CT.


-S/p pneumoperitoneum most likely due to cardiac arrest, resolved. Dr. Lai is 


following in general surgery consultation. 


-Left axillary and brachial DVT, LUE swelling subsided, was on Lovenox which is 


currently held due to thrombocytopenia


-Left lower lobe pneumonia


-Severe emphysema


-NATALIE with possible chronic kidney disease. Dr Hanson is following in nephrology


consultation.


-Hypernatremia, continue free water via G-tube, IV fluids with dextrose.


-Schizoaffective disorder depressed type.  Psychiatric consult is appreciated, 


continue Risperdal Depakote


-DNR status





Critical care time spent is 35 minutes.


Further recommendations based on clinical course.  Plan of care discussed with 


Dr. Miller.


Result Diagram:  


2/18/19 0449                                                                    


           2/18/19 1413





Results 24hrs





Laboratory Tests


Test
                              2/17/19
18:20    2/18/19
04:49  2/18/19
14:13


Urine Color                        YELLOW


Urine Clarity                      CLEAR


Urine pH                                   5.0


Urine Specific Gravity                   1.006


Urine Ketones                      NEGATIVE


Urine Nitrite                      NEGATIVE


Urine Bilirubin                    NEGATIVE


Urine Urobilinogen                 NEGATIVE


Urine Leukocyte Esterase           NEGATIVE


Urine Hemoglobin                   NEGATIVE


Urine Osmolality                           284


Urine Random Creatinine            < 12.40  L


Urine Random Sodium                        27  L


Urine Glucose                              3+  H


Urine Total Protein                      34.0  H


White Blood Count                                          2.0  L


Red Blood Count                                           2.94  L


Hemoglobin                                                 8.3  L


Hematocrit                                                27.7  L


Mean Corpuscular Volume                                    94.2


Mean Corpuscular Hemoglobin                               28.2  L


Mean Corpuscular                   
                     30.0  L
  



Hemoglobin
Concent


Red Cell Distribution Width                               17.1  H


Platelet Count                                              38  L


Mean Platelet Volume                                      12.0  H


Immature Granulocytes %                                  0.000  L


Neutrophils %


Segmented Neutrophils %
(Manual)   
                        71  
  



Band Neutrophils % (Manual)                                 14  H


Lymphocytes %


Lymphocytes % (Manual)                                      10  L


Monocytes %


Monocytes % (Manual)                                          3


Eosinophils %


Eosinophils % (Manual)                                        1


Basophils %


Basophils % (Manual)                                          1


Nucleated Red Blood Cells %                                  1  H


Immature Granulocytes #                                   0.000


Neutrophils #


Neutrophils # (Manual)                                     1.4  L


Band Neutrophils #                                          0.2


Lymphocytes (Manual)                                       0.2  L


Lymphocytes #


Monocytes #


Monocytes # (Manual)                                       0.0  L


Eosinophils #


Basophils #


Basophils # (Manual)                                        0.0


Nucleated Red Blood Cells #


Pathologist Review
(Hematology)    
              YES  
           



Platelet Estimate                                 SIG DECREASED


Giant Platelets                                              1  H


Polychromasia                                                1+


Poikilocytosis                                               1+


Anisocytosis                                                 1+


Microcytosis                                                 1+


Ovalocytes                                                   1+


Sodium Level                                               148  H         145  H


Potassium Level                                             4.6            5.0


Chloride Level                                              109            106


Carbon Dioxide Level                                        32  H          32  H


Anion Gap                                                     7              7


Blood Urea Nitrogen                                         45  H          55  H


Creatinine                                                 0.76           0.86


Est Glomerular Filtrat             
              > 60  
          > 60  



Rate
mL/min


Glucose Level                                               202           229  H


Calcium Level                                               8.4           8.3  L


Phosphorus Level                                           2.3  L


Magnesium Level                                             2.5








Subjective


24 Hr Interval Summary


Free Text/Dictation


Constitutional:  sedated


Respiratory:  diminished breath sounds


Cardiovascular:  regular rate and rhythm


Gastrointestinal:  soft, non-tender


Musculoskeletal:  nl extremities to inspection


Extremities:  normal pulses


Neurological:  nl mental status





Exam/Review of Systems


Exam


Vitals





Vital Signs


  Date      Temp  Pulse  Resp  B/P (MAP)   Pulse Ox  O2          O2 Flow    FiO2


Time                                                 Delivery    Rate


   2/18/19          113


     16:00


   2/18/19  98.5           20  97/70 (79)        98  Mechanical


     16:00                                           Ventilator


   2/18/19                                                                    40


     15:40








Intake and Output





2/17/19 2/17/19 2/18/19





1515:00


23:00


07:00





IntakeIntake Total


1685.50 ml


1803 ml


1912 ml





OutputOutput Total


1270 ml


1365 ml


740 ml





BalanceBalance


415.50 ml


438 ml


1172 ml














Results


Results 24hrs





Laboratory Tests


Test
                              2/17/19
18:20    2/18/19
04:49  2/18/19
14:13


Urine Color                        YELLOW


Urine Clarity                      CLEAR


Urine pH                                   5.0


Urine Specific Gravity                   1.006


Urine Ketones                      NEGATIVE


Urine Nitrite                      NEGATIVE


Urine Bilirubin                    NEGATIVE


Urine Urobilinogen                 NEGATIVE


Urine Leukocyte Esterase           NEGATIVE


Urine Hemoglobin                   NEGATIVE


Urine Osmolality                           284


Urine Random Creatinine            < 12.40  L


Urine Random Sodium                        27  L


Urine Glucose                              3+  H


Urine Total Protein                      34.0  H


White Blood Count                                          2.0  L


Red Blood Count                                           2.94  L


Hemoglobin                                                 8.3  L


Hematocrit                                                27.7  L


Mean Corpuscular Volume                                    94.2


Mean Corpuscular Hemoglobin                               28.2  L


Mean Corpuscular                   
                     30.0  L
  



Hemoglobin
Concent


Red Cell Distribution Width                               17.1  H


Platelet Count                                              38  L


Mean Platelet Volume                                      12.0  H


Immature Granulocytes %                                  0.000  L


Neutrophils %


Segmented Neutrophils %
(Manual)   
                        71  
  



Band Neutrophils % (Manual)                                 14  H


Lymphocytes %


Lymphocytes % (Manual)                                      10  L


Monocytes %


Monocytes % (Manual)                                          3


Eosinophils %


Eosinophils % (Manual)                                        1


Basophils %


Basophils % (Manual)                                          1


Nucleated Red Blood Cells %                                  1  H


Immature Granulocytes #                                   0.000


Neutrophils #


Neutrophils # (Manual)                                     1.4  L


Band Neutrophils #                                          0.2


Lymphocytes (Manual)                                       0.2  L


Lymphocytes #


Monocytes #


Monocytes # (Manual)                                       0.0  L


Eosinophils #


Basophils #


Basophils # (Manual)                                        0.0


Nucleated Red Blood Cells #


Pathologist Review
(Hematology)    
              YES  
           



Platelet Estimate                                 SIG DECREASED


Giant Platelets                                              1  H


Polychromasia                                                1+


Poikilocytosis                                               1+


Anisocytosis                                                 1+


Microcytosis                                                 1+


Ovalocytes                                                   1+


Sodium Level                                               148  H         145  H


Potassium Level                                             4.6            5.0


Chloride Level                                              109            106


Carbon Dioxide Level                                        32  H          32  H


Anion Gap                                                     7              7


Blood Urea Nitrogen                                         45  H          55  H


Creatinine                                                 0.76           0.86


Est Glomerular Filtrat             
              > 60  
          > 60  



Rate
mL/min


Glucose Level                                               202           229  H


Calcium Level                                               8.4           8.3  L


Phosphorus Level                                           2.3  L


Magnesium Level                                             2.5








Medications


Medication





Current Medications


Ondansetron HCl (Zofran Inj) 4 mg Q6H  PRN IV NAUSEA AND/OR VOMITING Last 


administered on 2/1/19at 18:46; Admin Dose 4 MG;  Start 1/21/19 at 23:30


Acetaminophen (Tylenol Supp) 650 mg Q4H  PRN [place] PAIN LEVEL 1-3 OR FEVER;  Start 


1/21/19 at 23:30


Aspirin (Aspirin) 81 mg DAILY PO  Last administered on 2/18/19at 08:45; Admin 


Dose 81 MG;  Start 1/24/19 at 09:00


Famotidine (Pepcid) 20 mg Q12 PO  Last administered on 2/18/19at 08:44; Admin 


Dose 20 MG;  Start 1/24/19 at 21:00


Zolpidem Tartrate (Ambien) 5 mg HS  PRN PO INSOMNIA Last administered on 2 /5/19at 20:31; Admin Dose 5 MG;  Start 1/27/19 at 00:00


Guaifenesin/ Dextromethorphan (Robitussin Dm Liquid Cup) 5 ml Q6H  PRN PO COUGH 


Last administered on 2/10/19at 05:16; Admin Dose 5 ML;  Start 1/27/19 at 20:00


Morphine Sulfate (morphine) 6 mg Q4H  PRN PO SEVERE PAIN LEVEL 7-10 Last 


administered on 2/16/19at 08:35; Admin Dose 6 MG;  Start 1/30/19 at 21:30


Albuterol/ Ipratropium (Duoneb) 3 ml Q3H RESP THERAPY  PRN HHN SHORTNESS OF 


BREATH Last administered on 2/7/19at 18:47; Admin Dose 3 ML;  Start 1/31/19 at 


20:00


Methylprednisolone Sodium Succinate (Solu-Medrol) 40 mg Q6 IV  Last administered


on 2/18/19at 11:30; Admin Dose 40 MG;  Start 2/2/19 at 12:00


Alteplase, Recombinant (Cathflo (Activase)) 2 mg MAY REPEAT X1 PRN CATHETER IF 


CATHETER REMAINS OCCULUDED;  Start 2/2/19 at 11:00


Lorazepam (Ativan) 1 mg Q6H  PRN IV PSYCHOSIS Last administered on 2/15/19at 


02:58; Admin Dose 1 MG;  Start 2/7/19 at 02:00


Haloperidol (Haldol) 2 mg PRN  PRN IM AGITATION Last administered on 2/12/19at 


00:44; Admin Dose 2 MG;  Start 2/8/19 at 06:30


Risperidone (Risperdal) 2 mg BID PO  Last administered on 2/18/19at 08:45; Admin


Dose 2 MG;  Start 2/9/19 at 21:00


Divalproex Sodium (Depakote Sprinkle) 250 mg Q8H PO  Last administered on 


2/18/19at 12:04; Admin Dose 250 MG;  Start 2/10/19 at 21:00


Propofol 100 ml @  1.875 mls/ hr Q12H IV  Last administered on 2/15/19at 01:14; 


Admin Dose 7.5 MLS/HR;  Start 2/13/19 at 15:30


Ascorbic Acid (Vitamin C) 500 mg DAILY GTB  Last administered on 2/18/19at 08:4


5; Admin Dose 500 MG;  Start 2/15/19 at 10:30


Phenylephrine HCl 80 mg/Dextrose 250 ml @  18.75 mls/ hr TITRATE IV  Last 


administered on 2/16/19at 21:39; Admin Dose 7.5 MLS/HR;  Start 2/16/19 at 01:00


Albuterol (Ventolin Hfa) 4 puff Q6H RESP  THERAPY INH  Last administered on 


2/18/19at 13:45; Admin Dose 4 PUFF;  Start 2/16/19 at 14:00


Ipratropium Bromide (Atrovent Hfa) 4 puff Q6H RESP  THERAPY INH  Last 


administered on 2/18/19at 13:45; Admin Dose 4 PUFF;  Start 2/16/19 at 14:00


Levofloxacin/ Dextrose 100 ml @  100 mls/hr Q24H IVPB  Last administered on 


2/18/19at 11:30; Admin Dose 100 MLS/HR;  Start 2/16/19 at 12:00


Desmopressin Acetate (Ddavp) 2 mcg BID IV  Last administered on 2/18/19at 08:45;


Admin Dose 2 MCG;  Start 2/17/19 at 09:00


Sodium Chloride 1,000 ml @  100 mls/hr Q10H IV  Last administered on 2/18/19at 


07:19; Admin Dose 100 MLS/HR;  Start 2/17/19 at 07:30


Midazolam HCl 50 ml @ 1 mls/hr TITRATE IV  Last administered on 2/17/19at 23:49;


Admin Dose 5 MLS/HR;  Start 2/17/19 at 10:00


Hydrochlorothiazide (Hydrochlorothiazide) 25 mg DAILY GTB ;  Start 2/18/19 at 


09:00











EVARISTO BELTRAN             Feb 18, 2019 16:38

## 2019-02-19 VITALS — SYSTOLIC BLOOD PRESSURE: 94 MMHG | RESPIRATION RATE: 19 BRPM | HEART RATE: 84 BPM | DIASTOLIC BLOOD PRESSURE: 63 MMHG

## 2019-02-19 VITALS — RESPIRATION RATE: 16 BRPM | SYSTOLIC BLOOD PRESSURE: 87 MMHG | HEART RATE: 80 BPM | DIASTOLIC BLOOD PRESSURE: 59 MMHG

## 2019-02-19 VITALS — RESPIRATION RATE: 16 BRPM | HEART RATE: 82 BPM

## 2019-02-19 VITALS — SYSTOLIC BLOOD PRESSURE: 83 MMHG | RESPIRATION RATE: 16 BRPM | DIASTOLIC BLOOD PRESSURE: 60 MMHG | HEART RATE: 87 BPM

## 2019-02-19 VITALS — HEART RATE: 91 BPM | RESPIRATION RATE: 19 BRPM | DIASTOLIC BLOOD PRESSURE: 67 MMHG | SYSTOLIC BLOOD PRESSURE: 103 MMHG

## 2019-02-19 VITALS — HEART RATE: 101 BPM | RESPIRATION RATE: 19 BRPM

## 2019-02-19 VITALS — RESPIRATION RATE: 18 BRPM

## 2019-02-19 VITALS — RESPIRATION RATE: 18 BRPM | DIASTOLIC BLOOD PRESSURE: 69 MMHG | HEART RATE: 100 BPM | SYSTOLIC BLOOD PRESSURE: 100 MMHG

## 2019-02-19 VITALS — DIASTOLIC BLOOD PRESSURE: 70 MMHG | HEART RATE: 90 BPM | RESPIRATION RATE: 18 BRPM | SYSTOLIC BLOOD PRESSURE: 93 MMHG

## 2019-02-19 VITALS — HEART RATE: 85 BPM | SYSTOLIC BLOOD PRESSURE: 93 MMHG | RESPIRATION RATE: 16 BRPM | DIASTOLIC BLOOD PRESSURE: 65 MMHG

## 2019-02-19 VITALS — HEART RATE: 95 BPM | DIASTOLIC BLOOD PRESSURE: 63 MMHG | RESPIRATION RATE: 25 BRPM | SYSTOLIC BLOOD PRESSURE: 111 MMHG

## 2019-02-19 VITALS — HEART RATE: 82 BPM | DIASTOLIC BLOOD PRESSURE: 56 MMHG | SYSTOLIC BLOOD PRESSURE: 80 MMHG | RESPIRATION RATE: 16 BRPM

## 2019-02-19 VITALS — SYSTOLIC BLOOD PRESSURE: 99 MMHG | DIASTOLIC BLOOD PRESSURE: 68 MMHG | RESPIRATION RATE: 23 BRPM | HEART RATE: 96 BPM

## 2019-02-19 VITALS — RESPIRATION RATE: 18 BRPM | HEART RATE: 97 BPM | SYSTOLIC BLOOD PRESSURE: 93 MMHG | DIASTOLIC BLOOD PRESSURE: 77 MMHG

## 2019-02-19 VITALS — RESPIRATION RATE: 21 BRPM | SYSTOLIC BLOOD PRESSURE: 100 MMHG | DIASTOLIC BLOOD PRESSURE: 73 MMHG | HEART RATE: 90 BPM

## 2019-02-19 VITALS — DIASTOLIC BLOOD PRESSURE: 71 MMHG | SYSTOLIC BLOOD PRESSURE: 99 MMHG | RESPIRATION RATE: 17 BRPM | HEART RATE: 93 BPM

## 2019-02-19 VITALS — SYSTOLIC BLOOD PRESSURE: 80 MMHG | DIASTOLIC BLOOD PRESSURE: 62 MMHG | RESPIRATION RATE: 16 BRPM | HEART RATE: 81 BPM

## 2019-02-19 VITALS — RESPIRATION RATE: 16 BRPM | DIASTOLIC BLOOD PRESSURE: 69 MMHG | SYSTOLIC BLOOD PRESSURE: 100 MMHG | HEART RATE: 86 BPM

## 2019-02-19 VITALS — DIASTOLIC BLOOD PRESSURE: 65 MMHG | SYSTOLIC BLOOD PRESSURE: 102 MMHG | HEART RATE: 100 BPM | RESPIRATION RATE: 36 BRPM

## 2019-02-19 VITALS — DIASTOLIC BLOOD PRESSURE: 72 MMHG | RESPIRATION RATE: 17 BRPM | HEART RATE: 87 BPM | SYSTOLIC BLOOD PRESSURE: 96 MMHG

## 2019-02-19 VITALS — DIASTOLIC BLOOD PRESSURE: 70 MMHG | HEART RATE: 94 BPM | RESPIRATION RATE: 24 BRPM | SYSTOLIC BLOOD PRESSURE: 104 MMHG

## 2019-02-19 VITALS — HEART RATE: 83 BPM | DIASTOLIC BLOOD PRESSURE: 60 MMHG | SYSTOLIC BLOOD PRESSURE: 87 MMHG | RESPIRATION RATE: 16 BRPM

## 2019-02-19 VITALS — DIASTOLIC BLOOD PRESSURE: 75 MMHG | SYSTOLIC BLOOD PRESSURE: 103 MMHG | RESPIRATION RATE: 16 BRPM | HEART RATE: 89 BPM

## 2019-02-19 VITALS — SYSTOLIC BLOOD PRESSURE: 106 MMHG | RESPIRATION RATE: 19 BRPM | DIASTOLIC BLOOD PRESSURE: 47 MMHG | HEART RATE: 83 BPM

## 2019-02-19 VITALS — HEART RATE: 90 BPM | RESPIRATION RATE: 23 BRPM | DIASTOLIC BLOOD PRESSURE: 70 MMHG | SYSTOLIC BLOOD PRESSURE: 98 MMHG

## 2019-02-19 VITALS — RESPIRATION RATE: 16 BRPM

## 2019-02-19 VITALS — RESPIRATION RATE: 19 BRPM

## 2019-02-19 VITALS — RESPIRATION RATE: 17 BRPM

## 2019-02-19 LAB
ABNORMAL IP MESSAGE: 1
ABNORMAL IP MESSAGE: 1
ADD MAN DIFF?: NO
ADD MAN DIFF?: NO
ANION GAP: -3 (ref 5–13)
BASOPHIL #: 0 10^3/UL (ref 0–0.1)
BASOPHIL #: 0 10^3/UL (ref 0–0.1)
BASOPHILS %: 0 % (ref 0–2)
BASOPHILS %: 0 % (ref 0–2)
BLOOD UREA NITROGEN: 68 MG/DL (ref 7–20)
CALCIUM: 8 MG/DL (ref 8.4–10.2)
CARBON DIOXIDE: 35 MMOL/L (ref 21–31)
CHLORIDE: 110 MMOL/L (ref 97–110)
CREATININE: 0.78 MG/DL (ref 0.61–1.24)
EOSINOPHILS #: 0 10^3/UL (ref 0–0.5)
EOSINOPHILS #: 0 10^3/UL (ref 0–0.5)
EOSINOPHILS %: 0 % (ref 0–7)
EOSINOPHILS %: 0 % (ref 0–7)
GLUCOSE: 217 MG/DL (ref 70–220)
HEMATOCRIT: 23 % (ref 42–52)
HEMATOCRIT: 24 % (ref 42–52)
HEMOGLOBIN: 7 G/DL (ref 14–18)
HEMOGLOBIN: 7.2 G/DL (ref 14–18)
IMMATURE GRANS #M: 0.01 10^3/UL (ref 0–0.03)
IMMATURE GRANS #M: 0.02 10^3/UL (ref 0–0.03)
IMMATURE GRANS % (M): 0.4 % (ref 0–0.43)
IMMATURE GRANS % (M): 0.8 % (ref 0–0.43)
LYMPHOCYTES #: 0.2 10^3/UL (ref 0.8–2.9)
LYMPHOCYTES #: 0.2 10^3/UL (ref 0.8–2.9)
LYMPHOCYTES %: 7.3 % (ref 15–51)
LYMPHOCYTES %: 7.6 % (ref 15–51)
MAGNESIUM: 2.6 MG/DL (ref 1.7–2.5)
MEAN CORPUSCULAR HEMOGLOBIN: 27.8 PG (ref 29–33)
MEAN CORPUSCULAR HEMOGLOBIN: 28.7 PG (ref 29–33)
MEAN CORPUSCULAR HGB CONC: 30 G/DL (ref 32–37)
MEAN CORPUSCULAR HGB CONC: 30.4 G/DL (ref 32–37)
MEAN CORPUSCULAR VOLUME: 92.7 FL (ref 82–101)
MEAN CORPUSCULAR VOLUME: 94.3 FL (ref 82–101)
MEAN PLATELET VOLUME: 12.4 FL (ref 7.4–10.4)
MEAN PLATELET VOLUME: 12.5 FL (ref 7.4–10.4)
MICROALBUMIN/CREATININE RATIO: 67 (ref ?–30)
MICROALBUMIN: 1.4 MG/DL
MONOCYTE #: 0.1 10^3/UL (ref 0.3–0.9)
MONOCYTE #: 0.1 10^3/UL (ref 0.3–0.9)
MONOCYTES %: 2.4 % (ref 0–11)
MONOCYTES %: 2.9 % (ref 0–11)
NEUTROPHIL #: 2.1 10^3/UL (ref 1.6–7.5)
NEUTROPHIL #: 2.2 10^3/UL (ref 1.6–7.5)
NEUTROPHILS %: 88.7 % (ref 39–77)
NEUTROPHILS %: 89.9 % (ref 39–77)
NUCLEATED RED BLOOD CELLS #: 0 10^3/UL (ref 0–0)
NUCLEATED RED BLOOD CELLS #: 0 10^3/UL (ref 0–0)
NUCLEATED RED BLOOD CELLS%: 0 /100WBC (ref 0–0)
NUCLEATED RED BLOOD CELLS%: 0.8 /100WBC (ref 0–0)
PHOSPHORUS: 2.3 MG/DL (ref 2.5–4.9)
PLATELET COUNT: 36 10^3/UL (ref 140–415)
PLATELET COUNT: 37 10^3/UL (ref 140–415)
POSITIVE DIFF: (no result)
POSITIVE DIFF: (no result)
POTASSIUM: 5 MMOL/L (ref 3.5–5.1)
RED BLOOD COUNT: 2.44 10^6/UL (ref 4.7–6.1)
RED BLOOD COUNT: 2.59 10^6/UL (ref 4.7–6.1)
RED CELL DISTRIBUTION WIDTH: 16.6 % (ref 11.5–14.5)
RED CELL DISTRIBUTION WIDTH: 16.7 % (ref 11.5–14.5)
SODIUM: 142 MMOL/L (ref 135–144)
WHITE BLOOD COUNT: 2.4 10^3/UL (ref 4.8–10.8)
WHITE BLOOD COUNT: 2.5 10^3/UL (ref 4.8–10.8)

## 2019-02-19 RX ADMIN — LEVOFLOXACIN SCH MLS/HR: 500 INJECTION, SOLUTION INTRAVENOUS at 11:08

## 2019-02-19 RX ADMIN — LEVOFLOXACIN 1 MLS/HR: 500 INJECTION, SOLUTION INTRAVENOUS at 11:08

## 2019-02-19 RX ADMIN — ALBUTEROL SULFATE 1 PUFF: 90 AEROSOL, METERED RESPIRATORY (INHALATION) at 01:54

## 2019-02-19 RX ADMIN — ALBUTEROL SULFATE SCH PUFF: 90 AEROSOL, METERED RESPIRATORY (INHALATION) at 08:32

## 2019-02-19 RX ADMIN — ASPIRIN 81 MG SCH MG: 81 TABLET ORAL at 08:20

## 2019-02-19 RX ADMIN — FAMOTIDINE SCH MG: 20 TABLET ORAL at 08:20

## 2019-02-19 RX ADMIN — HYDROCHLOROTHIAZIDE 1 MG: 25 TABLET ORAL at 08:20

## 2019-02-19 RX ADMIN — OXYCODONE HYDROCHLORIDE AND ACETAMINOPHEN SCH MG: 500 TABLET ORAL at 08:20

## 2019-02-19 RX ADMIN — DIVALPROEX SODIUM SCH MG: 125 CAPSULE, COATED PELLETS ORAL at 12:03

## 2019-02-19 RX ADMIN — Medication 1 MLS/HR: at 14:33

## 2019-02-19 RX ADMIN — IPRATROPIUM BROMIDE 1 PUFF: 17 AEROSOL, METERED RESPIRATORY (INHALATION) at 01:54

## 2019-02-19 RX ADMIN — ALBUTEROL SULFATE 1 PUFF: 90 AEROSOL, METERED RESPIRATORY (INHALATION) at 08:32

## 2019-02-19 RX ADMIN — PROPOFOL SCH MLS/HR: 10 INJECTION, EMULSION INTRAVENOUS at 03:30

## 2019-02-19 RX ADMIN — ASPIRIN 81 MG CHEWABLE TABLET 1 MG: 81 TABLET CHEWABLE at 08:20

## 2019-02-19 RX ADMIN — POTASSIUM ACETATE 1 MLS/HR: 3.93 INJECTION, SOLUTION, CONCENTRATE INTRAVENOUS at 03:49

## 2019-02-19 RX ADMIN — IPRATROPIUM BROMIDE 1 PUFF: 17 AEROSOL, METERED RESPIRATORY (INHALATION) at 08:32

## 2019-02-19 RX ADMIN — PROPOFOL 1 MLS/HR: 10 INJECTION, EMULSION INTRAVENOUS at 03:30

## 2019-02-19 RX ADMIN — METHYLPREDNISOLONE SODIUM SUCCINATE 1 MG: 40 INJECTION, POWDER, FOR SOLUTION INTRAMUSCULAR; INTRAVENOUS at 05:10

## 2019-02-19 RX ADMIN — LORAZEPAM 1 MG: 2 INJECTION, SOLUTION INTRAMUSCULAR; INTRAVENOUS at 15:32

## 2019-02-19 RX ADMIN — OXYCODONE HYDROCHLORIDE AND ACETAMINOPHEN 1 MG: 500 TABLET ORAL at 08:20

## 2019-02-19 RX ADMIN — METHYLPREDNISOLONE SODIUM SUCCINATE 1 MG: 40 INJECTION, POWDER, FOR SOLUTION INTRAMUSCULAR; INTRAVENOUS at 11:08

## 2019-02-19 RX ADMIN — CALCIUM GLUCONATE SCH MLS/HR: 94 INJECTION, SOLUTION INTRAVENOUS at 03:49

## 2019-02-19 RX ADMIN — DIVALPROEX SODIUM 1 MG: 125 CAPSULE, COATED PELLETS ORAL at 12:03

## 2019-02-19 RX ADMIN — DIVALPROEX SODIUM 1 MG: 125 CAPSULE, COATED PELLETS ORAL at 05:10

## 2019-02-19 RX ADMIN — HYDROCHLOROTHIAZIDE SCH MG: 25 TABLET ORAL at 08:20

## 2019-02-19 RX ADMIN — RISPERIDONE 1 MG: 1 TABLET ORAL at 08:20

## 2019-02-19 RX ADMIN — FAMOTIDINE 1 MG: 20 TABLET ORAL at 08:20

## 2019-02-19 RX ADMIN — ATROPINE SULFATE 1 DROP: 10 SOLUTION/ DROPS OPHTHALMIC at 16:07

## 2019-02-19 RX ADMIN — ALBUTEROL SULFATE SCH PUFF: 90 AEROSOL, METERED RESPIRATORY (INHALATION) at 01:54

## 2019-02-19 RX ADMIN — CASTOR OIL AND BALSAM, PERU 1 APPLIC: 788; 87 OINTMENT TOPICAL at 08:21

## 2019-02-19 RX ADMIN — DIVALPROEX SODIUM SCH MG: 125 CAPSULE, COATED PELLETS ORAL at 05:10

## 2019-02-19 RX ADMIN — DOCUSATE SODIUM AND SENNOSIDES 1 TAB: 8.6; 5 TABLET, FILM COATED ORAL at 12:03

## 2019-02-19 NOTE — PN
DATE:  02/19/2019

 

 

SUBJECTIVE:  The patient is seriously ill on full ventilatory support.  No other acute events noted. 
 No hemoptysis, hematemesis, or hematochezia.

 

OBJECTIVE:

VITAL SIGNS:  Blood pressure is 93/70, respirations 18, pulse 90, temperature 97.8.

HEENT:  Head is normocephalic.

NECK:  Supple.

HEART:  Regular rate.

LUNGS:  Show diminished breath sounds at the base.

ABDOMEN:  Soft, nontender to palpation without rebound or guarding.

EXTREMITIES:  Negative for clubbing, cyanosis.  Trace edema.

DERMATOLOGIC:  No rashes.

MUSCULOSKELETAL:  No joint effusion.

NEUROLOGIC:  No change in exam.

 

MEDICATIONS:  Reviewed.

 

LABORATORY DATA:  Shows sodium 142, potassium 5.0, BUN 16, creatinine 0.78, phosphorus 2.3, calcium 8
.0.  White count 2.4, hemoglobin 7.0, platelet count is 37.

 

ASSESSMENT AND PLAN:

1.  Hypernatremia.  Etiology is secondary to diabetes insipidus likely nephrogenic.  The patient was 
given a trial of desmopressin without any significant improvement in urine osmolarity.  The patient h
as since been started on hydrochlorothiazide to help increase free water absorption in the proximal t
ubules.  We would otherwise continue aggressive free water flushes.  We will discontinue hypertonic f
luids.  The patient's sodium levels have been improving.  We will monitor closely.

2.  Nonoliguric acute kidney injury with unknown baseline creatinine.  Etiology is secondary to hemod
ynamics, acute tubular necrosis.  Renal function is stabilized.  Continue to monitor.

3.  Metabolic alkalosis with respiratory compensation.  Continue to monitor.

4.  Anemia.  Continue to monitor hemoglobin and hematocrit levels.

5.  Mineral bone disorder, monitor calcium and phosphorus levels.

6.  Diastolic heart failure.  The patient is currently compensated.  Continue to monitor.

7.  Ventilator-dependent respiratory failure.  Vent settings and ABG was reviewed.  Continue current 
medical management.  Continue current vent settings.  Follow up with pulmonary.

8.  Sepsis status post shock secondary to pneumonia.  Continue medical management.  Continue IV antib
iotics.

9.  Acute encephalopathy, etiology is toxic metabolic.

10.  Pleural effusion status post thoracentesis.

11.  Deep vein thrombosis.  Continue medical management.

12.  Status post cardiac arrest.

13.  Status post pneumothorax.

14.  Status post pneumoperitoneum.

 

Please note, I spent over 30 minutes of critical care time with this patient.

 

 

Dictated By: MARYAN CHAMPAGNE DO

 

NR/NTS

DD:    02/19/2019 07:28:29

DT:    02/19/2019 07:59:26

Conf#: [id number]

DID#:  [id number]

CC: DONALDO CARRILLO MD; MAURI GRIGSBY MD; KARLO DUDLEY NP;*EndCC*

## 2019-02-19 NOTE — CONS
Consult Date/Type/Reason


Admit Date/Time


Jan 22, 2019 at 00:03


Initial Consult Date


1/22/19


Type of Consult


Pulmonary


Requesting Provider:  DONALDO CARRILLO MD


Date/Time of Note


DATE: 2/19/19 


TIME: 10:14





Subjective


Patient remains on mechanical ventilation currently sedated.  Agonal respiration


off sedation.





Objective





Vital Signs


  Date      Temp  Pulse  Resp  B/P (MAP)   Pulse Ox  O2          O2 Flow    FiO2


Time                                                 Delivery    Rate


   2/19/19           93    17  99/71 (80)       100  Mechanical


     09:00                                           Ventilator


   2/19/19  98.4


     08:00


   2/19/19                                                                    40


     08:00








Intake and Output





2/18/19 2/18/19 2/19/19





1515:00


23:00


07:00





IntakeIntake Total


2000 ml


1904 ml


1698 ml





OutputOutput Total


475 ml


530 ml


715 ml





BalanceBalance


1525 ml


1374 ml


983 ml











Exam


GENERAL: Frail elderly gentleman, on mechanical ventilation orally intubated


VITAL SIGNS:  per chart


NECK:  Supple.  No JVD or lymphadenopathy.


CARDIAC EXAM:  S1, S2. No added sounds or murmurs.


CHEST: Diminished air entry bilaterally


ABDOMEN:  Soft, nontender.  No guarding or rebound.


EXTREMITIES:  No cyanosis, clubbing or edema.


NEUROLOGIC:  Generalized weakness.





Vent Setting


Ventilator Support Mode:  AC


Fraction of Inspired Oxygen pe:  40


Positive End Expiratory Pressu:  5.0





Results/Medications


Result Diagram:  


2/19/19 0812                                                                    


           2/19/19 0500





Results 24 hrs





Laboratory Tests


Test
                                2/18/19
14:13  2/19/19
05:00  2/19/19
08:12


Sodium Level                                145  H          142


Potassium Level                              5.0            5.0


Chloride Level                               106            110


Carbon Dioxide Level                         32  H          35  H


Anion Gap                                      7            -3  L


Blood Urea Nitrogen                          55  H          68  H


Creatinine                                  0.86           0.78


Est Glomerular Filtrat Rate
mL/min   > 60  
        > 60  
        



Glucose Level                               229  H          217


Calcium Level                               8.3  L         8.0  L


White Blood Count                                          2.4  L         2.5  L


Red Blood Count                                           2.44  L        2.59  L


Hemoglobin                                                 7.0  L         7.2  L


Hematocrit                                                23.0  L        24.0  L


Mean Corpuscular Volume                                    94.3           92.7


Mean Corpuscular Hemoglobin                               28.7  L        27.8  L


Mean Corpuscular Hemoglobin
Concent  
                   30.4  L
       30.0  L



Red Cell Distribution Width                               16.6  H        16.7  H


Platelet Count                                              37  L          36  L


Mean Platelet Volume                                      12.5  H        12.4  H


Immature Granulocytes %                                  0.800  H        0.400


Neutrophils %                                             88.7  H        89.9  H


Lymphocytes %                                              7.6  L         7.3  L


Monocytes %                                                 2.9            2.4


Eosinophils %                                               0.0            0.0


Basophils %                                                 0.0            0.0


Nucleated Red Blood Cells %                                0.8  H          0.0


Immature Granulocytes #                                   0.020          0.010


Neutrophils #                                               2.1            2.2


Lymphocytes #                                              0.2  L         0.2  L


Monocytes #                                                0.1  L         0.1  L


Eosinophils #                                               0.0            0.0


Basophils #                                                 0.0            0.0


Nucleated Red Blood Cells #                                 0.0            0.0


Phosphorus Level                                           2.3  L


Magnesium Level                                            2.6  H





Medications





Current Medications


Ondansetron HCl (Zofran Inj) 4 mg Q6H  PRN IV NAUSEA AND/OR VOMITING Last 


administered on 2/1/19at 18:46; Admin Dose 4 MG;  Start 1/21/19 at 23:30


Acetaminophen (Tylenol Supp) 650 mg Q4H  PRN AK PAIN LEVEL 1-3 OR FEVER;  Start 


1/21/19 at 23:30


Aspirin (Aspirin) 81 mg DAILY PO  Last administered on 2/19/19at 08:20; Admin 


Dose 81 MG;  Start 1/24/19 at 09:00


Famotidine (Pepcid) 20 mg Q12 PO  Last administered on 2/19/19at 08:20; Admin 


Dose 20 MG;  Start 1/24/19 at 21:00


Zolpidem Tartrate (Ambien) 5 mg HS  PRN PO INSOMNIA Last administered on 


2/5/19at 20:31; Admin Dose 5 MG;  Start 1/27/19 at 00:00


Guaifenesin/ Dextromethorphan (Robitussin Dm Liquid Cup) 5 ml Q6H  PRN PO COUGH 


Last administered on 2/10/19at 05:16; Admin Dose 5 ML;  Start 1/27/19 at 20:00


Morphine Sulfate (morphine) 6 mg Q4H  PRN PO SEVERE PAIN LEVEL 7-10 Last 


administered on 2/16/19at 08:35; Admin Dose 6 MG;  Start 1/30/19 at 21:30


Albuterol/ Ipratropium (Duoneb) 3 ml Q3H RESP THERAPY  PRN HHN SHORTNESS OF 


BREATH Last administered on 2/7/19at 18:47; Admin Dose 3 ML;  Start 1/31/19 at 


20:00


Methylprednisolone Sodium Succinate (Solu-Medrol) 40 mg Q6 IV  Last administered


on 2/19/19at 05:10; Admin Dose 40 MG;  Start 2/2/19 at 12:00


Alteplase, Recombinant (Cathflo (Activase)) 2 mg MAY REPEAT X1 PRN CATHETER IF 


CATHETER REMAINS OCCULUDED;  Start 2/2/19 at 11:00


Lorazepam (Ativan) 1 mg Q6H  PRN IV PSYCHOSIS Last administered on 2/15/19at 


02:58; Admin Dose 1 MG;  Start 2/7/19 at 02:00


Haloperidol (Haldol) 2 mg PRN  PRN IM AGITATION Last administered on 2/12/19at 


00:44; Admin Dose 2 MG;  Start 2/8/19 at 06:30


Risperidone (Risperdal) 2 mg BID PO  Last administered on 2/19/19at 08:20; Admin


Dose 2 MG;  Start 2/9/19 at 21:00


Divalproex Sodium (Depakote Sprinkle) 250 mg Q8H PO  Last administered on 


2/19/19at 05:10; Admin Dose 250 MG;  Start 2/10/19 at 21:00


Propofol 100 ml @  1.875 mls/ hr Q12H IV  Last administered on 2/15/19at 01:14; 


Admin Dose 7.5 MLS/HR;  Start 2/13/19 at 15:30


Ascorbic Acid (Vitamin C) 500 mg DAILY GTB  Last administered on 2/19/19at 


08:20; Admin Dose 500 MG;  Start 2/15/19 at 10:30


Phenylephrine HCl 80 mg/Dextrose 250 ml @  18.75 mls/ hr TITRATE IV  Last admini


stered on 2/16/19at 21:39; Admin Dose 7.5 MLS/HR;  Start 2/16/19 at 01:00


Albuterol (Ventolin Hfa) 4 puff Q6H RESP  THERAPY INH  Last administered on 


2/19/19at 08:32; Admin Dose 4 PUFF;  Start 2/16/19 at 14:00


Ipratropium Bromide (Atrovent Hfa) 4 puff Q6H RESP  THERAPY INH  Last 


administered on 2/19/19at 08:32; Admin Dose 4 PUFF;  Start 2/16/19 at 14:00


Levofloxacin/ Dextrose 100 ml @  100 mls/hr Q24H IVPB  Last administered on 


2/18/19at 11:30; Admin Dose 100 MLS/HR;  Start 2/16/19 at 12:00


Midazolam HCl 50 ml @ 1 mls/hr TITRATE IV  Last administered on 2/18/19at 17:11;


Admin Dose 4 MLS/HR;  Start 2/17/19 at 10:00


Hydrochlorothiazide (Hydrochlorothiazide) 25 mg DAILY GTB ;  Start 2/18/19 at 


09:00





Assessment/Plan


Hospital Course (Demo Recall)


iMP: 


1.  Acute on chronic hypoxic and hypercapnic respiratory failure--worse 


overnight with increased mucus plugging.  Currently not able to liberate from 


mechanical ventilation


3.  Encephalopathy toxic metabolic resolving


4.  Advanced COPD


5.  Hypernatremia likely free water deficit


6.  Thrombocytopenia 


7.  Anemia 





RECS:


1.  Failed multiple weaning trials.  Transition to comfort care today.  


Appreciate hospice eval.








Critical care time 40 minutes.





Code status DNR DNI











MIMI ZAVALA MD, [facility]     Feb 19, 2019 10:15

## 2019-02-19 NOTE — CONS
Assessment/Plan


Assessment/Plan


Hospital Course (Demo Recall)


ID PROGRESS NOTE


CURRENT ABX: DAY #  => Levaquin #4


2/19/19 0812                                                                    


           2/19/19 0500


24H INTERVAL SUMMARY


* Critically ill 69 yo M who is DNR status - yet currently intubated and sedated


  on the Vent -- failed weaning trials due to agonal breathing off sedation -- 


  Poor prognosis with poor quality of life ? Plan for trach is unclear ? 


* No fevers with pancytopenia 


* Indwelling: Endotracheal tube PEG Blake


MICRO/OTHER


* BCx (-) 


* 02/14/19 RESP CX:  RESPIRATORY CULTURE  Final  


     Organism 1                     ENTEROBACTER CLOACAE


        QUANTITY                    3+





                                    E CLOACAE        


                                    M.I.C.    RX     


                                    --------- ---    


     CEFAZOLIN                                 R     


     CEFOTAXIME                                S     


     CIPROFLOXACIN                  <=0.25     S     


     GENTAMICIN                     <=1        S     


     LEVOFLOXACIN                   <=0.12     S     


     TOBRAMYCIN                     <=1        S     


     TRIMETHOPRIM/SULFAMETHOXAZOLE  <=20       S     





---------------------------------------------------





PHYSICAL EXAMINATION:


GENERAL: Afebrile, VSS,cachexia, non-verbal 


HEENT: AT, NC, anicteric, edentulous, orally intubated 


NECK:  Supple, trach midline 


CHEST: Equal chest rise bilaterally== Vented 


HEART:  Pulse RRR


ABDOMEN:  Soft / NT 


EXTREMITIES:  Warm, dry, muscle wasting 


SKIN: No rash, no diaphoresis 





ID ASSESSMENT


69 yo M admit with: 


1. SIRS w/low grade temps, rising WBC 


2. Respiratory failure due to recurrent Aspiration pneumonia


* Dysphagia/peg with ongoing aspiration


* 02/15/19 CXR: Similar appearance of right greater than left basilar i


  nfiltrates. Small bilateral pleural effusions unchanged.


4.  Pneumoperitoneum of unclear etiology, no perforation per surgical note, 


status post chest tube


5.  Dementia


5.  Anemia with progressive thrombocytopenia


6.  History of non-ST elevation MI


7.  Status post cardiac arrest


8.  RIJ TLC


(-)MRSA Nares 


ABX ALLERGIES: KNDA 


INVASIVES: R-IJ/TLC, ETT, PEG, FC 


CURRENT ABX: DAY #  =>  Levaquin #4





ID RECOMMENDATIONS/PLAN:  


1. Continue on  Levaquin 500mg IV daily for HCAP -> ASP PNA Enterobacter = VAP 








.





Consultation Date/Type/Reason


Admit Date/Time


Jan 22, 2019 at 00:03


Initial Consult Date


1/22/19


Requesting Provider:  DONALDO CARRILLO MD


Date/Time of Note


DATE: 2/19/19 


TIME: 10:49





Exam/Review of Systems


Exam


Vitals





Vital Signs


  Date      Temp  Pulse  Resp  B/P (MAP)   Pulse Ox  O2          O2 Flow    FiO2


Time                                                 Delivery    Rate


   2/19/19           93    17  99/71 (80)       100  Mechanical


     09:00                                           Ventilator


   2/19/19  98.4


     08:00


   2/19/19                                                                    40


     08:00








Intake and Output





2/18/19 2/18/19 2/19/19





1515:00


23:00


07:00





IntakeIntake Total


2000 ml


1904 ml


1758 ml





OutputOutput Total


475 ml


530 ml


825 ml





BalanceBalance


1525 ml


1374 ml


933 ml














Results


Result Diagram:  


2/19/19 0812                                                                    


           2/19/19 0500





Results 24hrs





Laboratory Tests


Test
                                2/18/19
14:13  2/19/19
05:00  2/19/19
08:12


Sodium Level                                145  H          142


Potassium Level                              5.0            5.0


Chloride Level                               106            110


Carbon Dioxide Level                         32  H          35  H


Anion Gap                                      7            -3  L


Blood Urea Nitrogen                          55  H          68  H


Creatinine                                  0.86           0.78


Est Glomerular Filtrat Rate
mL/min   > 60  
        > 60  
        



Glucose Level                               229  H          217


Calcium Level                               8.3  L         8.0  L


White Blood Count                                          2.4  L         2.5  L


Red Blood Count                                           2.44  L        2.59  L


Hemoglobin                                                 7.0  L         7.2  L


Hematocrit                                                23.0  L        24.0  L


Mean Corpuscular Volume                                    94.3           92.7


Mean Corpuscular Hemoglobin                               28.7  L        27.8  L


Mean Corpuscular Hemoglobin
Concent  
                   30.4  L
       30.0  L



Red Cell Distribution Width                               16.6  H        16.7  H


Platelet Count                                              37  L          36  L


Mean Platelet Volume                                      12.5  H        12.4  H


Immature Granulocytes %                                  0.800  H        0.400


Neutrophils %                                             88.7  H        89.9  H


Lymphocytes %                                              7.6  L         7.3  L


Monocytes %                                                 2.9            2.4


Eosinophils %                                               0.0            0.0


Basophils %                                                 0.0            0.0


Nucleated Red Blood Cells %                                0.8  H          0.0


Immature Granulocytes #                                   0.020          0.010


Neutrophils #                                               2.1            2.2


Lymphocytes #                                              0.2  L         0.2  L


Monocytes #                                                0.1  L         0.1  L


Eosinophils #                                               0.0            0.0


Basophils #                                                 0.0            0.0


Nucleated Red Blood Cells #                                 0.0            0.0


Phosphorus Level                                           2.3  L


Magnesium Level                                            2.6  H








Medications


Medication





Current Medications


Ondansetron HCl (Zofran Inj) 4 mg Q6H  PRN IV NAUSEA AND/OR VOMITING Last 


administered on 2/1/19at 18:46; Admin Dose 4 MG;  Start 1/21/19 at 23:30


Acetaminophen (Tylenol Supp) 650 mg Q4H  PRN [place] PAIN LEVEL 1-3 OR FEVER;  Start 


1/21/19 at 23:30


Aspirin (Aspirin) 81 mg DAILY PO  Last administered on 2/19/19at 08:20; Admin 


Dose 81 MG;  Start 1/24/19 at 09:00


Famotidine (Pepcid) 20 mg Q12 PO  Last administered on 2/19/19at 08:20; Admin 


Dose 20 MG;  Start 1/24/19 at 21:00


Zolpidem Tartrate (Ambien) 5 mg HS  PRN PO INSOMNIA Last administered on 


2/5/19at 20:31; Admin Dose 5 MG;  Start 1/27/19 at 00:00


Guaifenesin/ Dextromethorphan (Robitussin Dm Liquid Cup) 5 ml Q6H  PRN PO COUGH 


Last administered on 2/10/19at 05:16; Admin Dose 5 ML;  Start 1/27/19 at 20:00


Morphine Sulfate (morphine) 6 mg Q4H  PRN PO SEVERE PAIN LEVEL 7-10 Last 


administered on 2/16/19at 08:35; Admin Dose 6 MG;  Start 1/30/19 at 21:30


Albuterol/ Ipratropium (Duoneb) 3 ml Q3H RESP THERAPY  PRN HHN SHORTNESS OF 


BREATH Last administered on 2/7/19at 18:47; Admin Dose 3 ML;  Start 1/31/19 at 


20:00


Methylprednisolone Sodium Succinate (Solu-Medrol) 40 mg Q6 IV  Last administered


on 2/19/19at 05:10; Admin Dose 40 MG;  Start 2/2/19 at 12:00


Alteplase, Recombinant (Cathflo (Activase)) 2 mg MAY REPEAT X1 PRN CATHETER IF 


CATHETER REMAINS OCCULUDED;  Start 2/2/19 at 11:00


Lorazepam (Ativan) 1 mg Q6H  PRN IV PSYCHOSIS Last administered on 2/15/19at 


02:58; Admin Dose 1 MG;  Start 2/7/19 at 02:00


Haloperidol (Haldol) 2 mg PRN  PRN IM AGITATION Last administered on 2/12/19at 


00:44; Admin Dose 2 MG;  Start 2/8/19 at 06:30


Risperidone (Risperdal) 2 mg BID PO  Last administered on 2/19/19at 08:20; Admin


Dose 2 MG;  Start 2/9/19 at 21:00


Divalproex Sodium (Depakote Sprinkle) 250 mg Q8H PO  Last administered on 


2/19/19at 05:10; Admin Dose 250 MG;  Start 2/10/19 at 21:00


Propofol 100 ml @  1.875 mls/ hr Q12H IV  Last administered on 2/15/19at 01:14; 


Admin Dose 7.5 MLS/HR;  Start 2/13/19 at 15:30


Ascorbic Acid (Vitamin C) 500 mg DAILY GTB  Last administered on 2/19/19at 


08:20; Admin Dose 500 MG;  Start 2/15/19 at 10:30


Phenylephrine HCl 80 mg/Dextrose 250 ml @  18.75 mls/ hr TITRATE IV  Last 


administered on 2/16/19at 21:39; Admin Dose 7.5 MLS/HR;  Start 2/16/19 at 01:00


Albuterol (Ventolin Hfa) 4 puff Q6H RESP  THERAPY INH  Last administered on 


2/19/19at 08:32; Admin Dose 4 PUFF;  Start 2/16/19 at 14:00


Ipratropium Bromide (Atrovent Hfa) 4 puff Q6H RESP  THERAPY INH  Last 


administered on 2/19/19at 08:32; Admin Dose 4 PUFF;  Start 2/16/19 at 14:00


Levofloxacin/ Dextrose 100 ml @  100 mls/hr Q24H IVPB  Last administered on 


2/18/19at 11:30; Admin Dose 100 MLS/HR;  Start 2/16/19 at 12:00


Midazolam HCl 50 ml @ 1 mls/hr TITRATE IV  Last administered on 2/18/19at 17:11;


Admin Dose 4 MLS/HR;  Start 2/17/19 at 10:00


Hydrochlorothiazide (Hydrochlorothiazide) 25 mg DAILY GTB ;  Start 2/18/19 at 


09:00











DELL GALLO NP              Feb 19, 2019 10:56

## 2019-02-20 NOTE — DES
DATE OF ADMISSION: 01/22/2019

DATE OF DEATH:     02/19/2019

 

DATE OF EXPIRATION:  02/19/2019.

 

TIME OF DEATH:  1640.

 

CAUSE OF DEATH:  Acute respiratory failure secondary to chronic obstructive pulmonary disease.

 

COMORBID:

1.  Bipolar disorder.

2.  Seizure disorder.

3.  Possible atherosclerotic heart disease with history of cardiac arrest and positive troponin.

 

REASON FOR ADMISSION AND HOSPITAL COURSE:  The patient was a 68-year-old gentleman with a history of 
severe COPD who was brought into hospital a few weeks ago due to shortness of breath.  The patient wa
s diagnosed with acute respiratory failure.  The patient also had underlying severe COPD.  The patien
t had cardiopulmonary arrest and was intubated and also had chest compression.  The patient subsequen
tly developed  pneumothorax and also pneumoperitoneum.  The patient had improved and was extubated an
d was transferred out of ICU.  However, patient again became progressively more short of breath and w
as transferred to ICU after he developed a fever, septic shock and recurrent respiratory failure.  Th
e patient had healthcare-acquired pneumonia and was treated with broad spectrum IV antibiotic.  The p
atient was seen by Dr. English from pulmonary standpoint, [facility], Dr. Grande from cardiac standpoint, Dr. Hanson from internal medicine standpoint.  The patient h
ad developed progressive acute kidney injury and his BUN from 30 on 02/01/2019 had climbed up to 68. 
 The patient's x-ray revealed bilateral infiltrates.  The patient also was requiring vasopressor and 
continued to require ventilatory support and could not be weaned off.  The patient had agonal breathi
ng off sedation.  A family conference was arranged and patient's DPANic, decided to put him on h
ospice care.  The patient was terminally extubated.  The patient was given morphine and Ativan boluse
s prior to extubation and was started on morphine drip.  The patient rapidly declined and was noted t
o be unresponsive to all stimuli.  The patient was pulseless, BP less, and there was no respiratory m
ovement.  The patient was pronounced dead at 1640 on 02/19/2019.  The patient was continued on morphi
ne drip after extubation as he was having difficulty in breathing.  Case was discussed with the [facility]'s nurse, Sameera, and with desk nurse, Stephany.

 

 

Dictated By: DONALDO TOMLIN/PEREZ

DD:    02/20/2019 20:38:18

DT:    02/20/2019 21:27:16

Conf#: [id number]

DID#:  [id number]

## 2019-09-09 NOTE — PN
DATE:  01/28/2019

 

 

SUBJECTIVE:  The patient is stable, no events overnight.  No fevers, chills, nausea, or vomiting.

 

OBJECTIVE:

VITAL SIGNS:  Blood pressure is 139/83, respirations 20, pulse 63, temperature 98.3.

HEENT:  Head is normocephalic.

NECK:  Supple.

HEART:  Regular rate.

LUNGS:  Show diminished breath sounds at the base.

ABDOMEN:  Soft, nontender to palpation without rebound or guarding.

EXTREMITIES:  Negative for clubbing, cyanosis, no edema.

DERMATOLOGIC:  No rashes.

MUSCULOSKELETAL:  No joint effusion.

NEUROLOGIC:  No change in exam.

 

MEDICATIONS:  Reviewed.

 

LABORATORY DATA:  Shows sodium 145, potassium 4.3, chloride is 103, BUN 26, creatinine 0.73.  White c
ount 3.4, hemoglobin 10.3, platelet count is 104.

 

ASSESSMENT AND PLAN:

1.  Nonoliguric acute kidney injury with unknown baseline creatinine.  Etiology of acute kidney injur
y is secondary to hemodynamics.  Renal function is improved.  Continue current treatment plans, suppo
rtive care, renally dose all medicines.

2.  Hypernatremia.  The patient's sodium levels remain elevated but stable.  We will continue to enco
urage free water intake.

3.  Anemia.  Continue to monitor hemoglobin and hematocrit levels.

4.  Mineral bone disorder.  Monitor calcium and phosphorus levels.

5.  Status post respiratory failure.

6.  Sepsis, status post shock.  The patient is completing antibiotic course.

7.  Status post cardiac arrest.

8.  Encephalopathy, resolving.

9.  Status post pneumothorax.

 

 

Dictated By: MARYAN CHAMPAGNE DO

 

NR/NTS

DD:    01/28/2019 08:08:34

DT:    01/28/2019 08:42:34

Conf#: [id number]

DID#:  [id number]

CC: MAURI GRIGSBY MD; DONALDO CARRILLO MD; MIMI ZAVALA MD;*EndCC* Surgical consult called by ER (doubt cholecystitis)

## 2021-02-25 NOTE — NUR
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 She has transferred her care to me as Dr Haney has retired.      Post treatment PET CT in January 2017 was noted to have complete metabolic response.  Clinical exam also was without evidence of disease. Admitted to [facility] through the ED with severe sepsis., beginning of September 2019. She was treated for pneumonia, and CT imaging of the chest revealed multiple new pulmonary nodules in the right lung base and tree-in-bud infiltrative changes in the left lung base.  There was  some suspicion for metastatic disease based on these nodule findings. She then had a PET CT scan, which did have some uptake in these nodular pulmonary areas, though some of the nodules appeared to be slightly smaller in size than the prior CT scan.  [facility] was seen [facility].     A summary of recent studies includes:  9/1/19 CT chest/abd/pelvis:  1. Multiple new pulmonary nodules in the right lung base and new  tree-in-bud type infiltrative changes at the left lung base. Several nodules are prominent measuring between 1 and 1.5 cm.  9/18/19 PET: 1. Numerous pulmonary nodules as above, which are either new or progressed from previous CT dated 9/5/2018, suspicious for malignancy with inflammatory process felt to be less likely. If tissue sampling is desired, the subpleural nodule in the right lower lobe on image 132 is the most FDG avid. 2. Tiny indeterminate focus of increased uptake in the anterior mediastinum correlating with an area of soft tissue density. This is more prominent than on previous PET/CT from 2017 though there was a small amount of soft tissue in this region on diagnostic CT from 2018. Findings could relate to  a small region of residual thymic  MS RN NOTE - Pain



Patient requested pain medication for 8/10 back pain s/p lumbar compression fracture. PO 
Percocet 5/325mg administered as ordered for pain relief. Will continue to monitor. tissue though given previously demonstrated metastatic disease in the superior mediastinum, underlying lesion cannot be excluded.      She underwent a biopsy of a lung nodule in 11/2019 and this was benign and she had been on surveillance since that time.She had a CT scan in 1/2020 that actually showed decrease in size of right lower lung nodules.  I saw her October 5 and we reviewed CT scan that showed worsening of pleural base nodules.  Therefore we obtained a no other biopsy of a right lung nodule and she had a PET scan.  Saw her in clinic on November 23rd and reviewed the results.  The PET scan showed multiple areas of metastatic disease within the right lung and pleura but also abdominal lymphadenopathy.  A biopsy confirmed squamous cell carcinoma.  Because the disease is in multiple locations goal of treatment is to slow the disease but not cure.  She has a high expression of PDL1 therefore has been on single agent Keytruda.    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    She is here for review of recent scans and ongoing Keytruda treatment. She is tolerating this well without significant diarrhea, nausea, skin rash. She is still complaining of pain in the right arm and shoulder and on further questioning this predated the right IJ thrombus. She says that she had issues in the left arm and had surgery that helped (?rotator cuff?).  Nursing note reviewed and accepted.  PHYSICAL EXAMINATION:  Visit Vitals  /68 (BP Location: RUE - Right upper extremity, Patient Position: Sitting, Cuff Size: Regular)   Pulse 74   Temp 97.8 °F (36.6 °C) (Oral)   Resp 18   Ht 5' 2\" (1.575 m)   Wt 49.8 kg   LMP  (LMP Unknown)   SpO2 95%   BMI 20.08 kg/m²     No oral lesions  No palpable nodes in the neck, no right neck swelling  Thickening of skin bilaterally due to radiation  No LE edema  Regular rate and  rhythm  Clear to auscultation bilaterally  Has a hard time raising her right arm beyond a certain level due to shoulder pain    LABORATORY DATA:  reviewed    10/2020  Cytologic Interpretation   A: Lung, right lower lobe, core biopsy:  - Non-small cell carcinoma; consistent with poorly differentiated squamous cell carcinoma (See Comment       PD-L1 22C3 (KEYTRUDA) Non-Small Cell Lung Cancer Immunohistochemical Staining Results:   Block: A2      HIGH EXPRESSION - Tumor Proportion Score (TPS): 90%  Intensity: 3+     Reference Ranges:  High Expression >/= 50% TPS  Expressed 1-49% TPS  No Expression <1% TPS    PET 11/2020  IMPRESSION:     1.  Biopsy-proven squamous cell carcinoma is metastatic throughout the  right lung involving the pleural/subpleural surfaces. There is suspected  pleural extension and questionable intercostal extension versus intercostal  mass effect. Metastases to the right hilar, carinal, retrocrural and  prevascular lymph nodes.     2.  Focus of increased uptake adjacent to the left hepatic lobe is possibly  representative of a celiac or perigastric lymph node. Also, right  retroperitoneal avid node. These are suspicious for upper abdomen megan  metastases.     3.  FDG uptake from recent tooth extractions. Palate uptake may be  inflammatory due to recent oral procedure.      CTSCANS 2/2021  IMPRESSION:          1. A significant interval improvement has occurred since the previous exam.  The current study now shows numerous small pleural nodules scattered  throughout the right hemithorax which have undergone a significant  reduction in size since the previous exam. Please see above discussion for  specifics sizes and interval changes that of occurred. No pleural effusion  accompanies the metastatic nodular pleural disease. A single residual  anterior superior mediastinal metastatic lesion is present measuring 5 x 4  mm which has also decreased in size. This lesion corresponds to a focus  of  hypermetabolic activity on a previous PET study.  2. No metastatic disease to the abdomen/pelvis.    CT NECK 2.2021    IMPRESSION:  1. There is a stable occlusive thrombus within the inferior one-third  aspect of the right internal jugular vein. A right internal jugular central  catheter remains in place.     2. There is no cervical lymphadenopathy.     3. There is a stable small ill-defined soft tissue density in the  pretracheal space at the level of the thoracic inlet which was not FDG avid  on the PET/CT from 11/13/2020.     CT 9/2020  IMPRESSION:  Interval worsening of innumerable pleural/subpleural pulmonary nodules.  Reference nodules are described above. Given the history of malignancy,  findings are worrisome for metastatic disease. Further management per the  patient's care team.            IMPRESSION AND PLAN:  SCC, unknown primary head and neck - the patient is 4 years out from chemotherapy and radiation.  Unfortunately  the PET scan which showed multiple areas of metastatic disease mainly within the right lung and pleura but also abdominal lymphadenopathy as well.  Biopsy confirmed squamous cell carcinoma.  She has now had 4 cycles of Keytruda with no side effects. Scans show excellent results and she will continue on Keytruda.    Right IJ thrombus - switched to a DOAC    Right arm complaints - I suspect that this is not related to her cancer and appears to be an orthopedic issue. It really bothers her though and I suspect she should see an Orthopedic Surgeon. I suggested she talk with her PCP        Miya Moreno MD

## 2022-02-22 NOTE — HP
DATE OF ADMISSION: 01/22/2019

 

CHIEF COMPLAINT:  Shortness of breath.

 

HISTORY OF PRESENT ILLNESS:  The patient is a 68-year-old gentleman who was recently admitted at [facility] due to acute respiratory failure and has left-sided pneumothorax status post
 chest tube placement.  The patient also at that time was noted to have pneumoperitoneum, although it
 was probably due to pneumothorax.  The patient also has history of severe COPD and was brought initi
[facility] from [facility] care for respiratory distress on 01/21/2019.  The patient as mentioned above was inci
dentally noted to have pneumoperitoneum which was likely from trauma of chest compression because he 
also has a cardiac arrest.  The patient was treated conservatively and was transferred to the floor; 
however got worse again, went into sepsis with septic shock and acute respiratory failure and needed 
ventilator support.  The patient for the last few days had been doing poorly and could not be weaned 
off and failed multiple weaning trials.  The patient has agonal respiration when he is off sedation. 
 The patient continues to have moderate secretions.  The patient also developed pancytopenia with WBC
 count of 2 yesterday along with hemoglobin of 8.3 and platelets 38,000.  The patient's DPOA, Nic,
 was held.  DPOA reported that patient had expressed his wishes that it is okay to be intubated; mohr
macey he did not want to be permanently on life support.  The patient was made DNR and hospice referral
 was made since the patient would have needed tracheostomy for further care.  The patient was admitte
d today under hospice at 11:00 and plan is to compassionate extubation and start morphine drip.

 

REVIEW OF SYSTEMS:  Limited since the patient was intubated.  There was no fever today.  No reported 
bleeding from any site.  No reported seizure.  No reported hematuria.

 

PAST MEDICAL HISTORY:  As stated above.  In addition, the patient also has history of bipolar disorde
r, benign prostatic hypertrophy, seizure disorder and dyslipidemia.  The patient lived in a board car
e prior to admission at [facility].

 

FAMILY HISTORY:  Could not be obtained.

 

SOCIAL HISTORY:  Could not be obtained, although the patient does have history of COPD and may have b
een a smoker in the past.

 

DIAGNOSTIC DATA:  The patient's CT of the abdomen revealed extensive emphysematous changes.

 

PHYSICAL EXAMINATION:

GENERAL:  Revealed the patient to be lethargic.

VITAL SIGNS:  This morning:  Temperature 98.8, pulse 97, respirations 18, blood pressure 93/77.  The 
patient is saturating 99% on mechanical ventilator, FiO2 of 30%.

HEENT:  Atraumatic and normocephalic.  Conjunctivae and lids are normal.  Nose and ears are normal ex
ternally.

NECK:  No mass, no JVD.

CHEST:  Diminished air entry bilaterally.

CARDIOVASCULAR:  S1, S2 normal, no murmur.

ABDOMEN:  Soft, nondistended, nontender, no palpable mass.

EXTREMITIES:  No edema, clubbing, cyanosis.

NEUROLOGIC:  The patient is lethargic.  No useful communication possible.

 

LABORATORY DATA:  Done this morning, WBC 2.5, hemoglobin 7.2, platelets 36.  Sodium 142, potassium 5,
 BUN 68, creatinine 0.7, glucose 217, magnesium 2.6.

 

IMPRESSION:

1.  Acute respiratory failure.

2.  Chronic obstructive pulmonary disease.

3.  Sepsis due to recurrent aspiration pneumonia.

4.  History of pneumothorax status post chest tube.

5.  Bipolar disorder.

6.  Seizure disorder.

7.  Possible atherosclerotic heart disease with history of cardiac arrest with positive troponin upon
 presentation.

 

PLAN:  The patient will be terminally extubated.  The patient will be given IV morphine 5 mg and IV A
tivan 2 mg prior to terminal extubation and will be started on morphine drip at 1 mg an hour.  The pa
tient will also be started on IV Zofran 4 mg q.6 p.r.n. for vomiting, atropine drops 2 drops q.4 p.r.
n. for secretions and IV Ativan 1 mg hours p.r.n. for anxiety and seizures.  Plan of care was discuss
ed with the patient's nurse, Sameera as well as [facility] nurse, Stephany.  The patient is appropriate for [facility]
 level of care.

 

 

Dictated By: DONALDO CARRILLO MD

 

AB/NTS

DD:    02/19/2019 17:39:58

DT:    02/19/2019 18:06:06

Conf#: [id number]

DID#:  [id number]

CC: MAURI GRGISBY MD; KARLO DUDLEY NP;*EndCC*
Date/Time of Note


Date/Time of Note


DATE: 1/22/19 


TIME: 12:43





Assessment/Plan


VTE Prophylaxis


Risk score (from [facility])>0 risk:  9


SCD applied (from [facility]):  Yes


Pharmacological prophylaxis:  LMWH





Lines/Catheters


IV Catheter Type (from [facility]):  Central Line


Central line still needed:  Yes


Urinary Cath still in place:  Yes


Reason Cath still needed:  urinary retention





Assessment/Plan


Assessment/Plan


-Sepsis with shock, continue broad-spectrum antibiotics, IV fluids, pressors, 


ICU care.  Dr. Dreyer is asked to see patient in infection disease consultation.


-Status post cardiac arrest, patient is currently on dopamine drip, will obtain 


2D echo.  Dr. Rivas is asked to see patient in cardiology consultation.


-Left-sided pneumothorax, status post left side chest tube placement.


-Acute respiratory failure, continue ventilatory support bronchodilators.  Dr. Lim is following in pulmonology consultation.


-Pneumoperitoneum per CT of the abdomen, possible perforated viscus.  Dr. Lai 


is following in general surgery consultation. 


-Left lower lobe pneumonia


-Severe emphysema





Further recommendations based on clinical course.  Plan of care discussed with 


Dr. Miller.


Result Diagram:  


1/22/19 0218                                                                    


           1/22/19 0218





Results 24hrs





Laboratory Tests


Test
              1/21/19
18:25  1/21/19
19:13    1/21/19
19:23   1/21/19
20:30


White Blood              20.2  H


Count


Red Blood Count           4.83


Hemoglobin               13.5  L


Hematocrit                46.5


Mean                      96.3


Corpuscular


Volume


Mean                     28.0  L


Corpuscular


Hemoglobin


Mean                    29.0  L
  
              
                



Corpuscular


Hemoglobin
Conc


ent


Red Cell                 15.2  H


Distribution


Width


Platelet Count            124  L


Mean Platelet            11.6  H


Volume


Immature                2.700  H


Granulocytes %


Neutrophils %            87.3  H


Lymphocytes %             4.8  L


Monocytes %                4.8


Eosinophils %              0.0


Basophils %                0.4


Nucleated Red             0.1  H


Blood Cells %


Immature                0.540  H


Granulocytes #


Neutrophils #            17.7  H


Lymphocytes #              1.0


Monocytes #               1.0  H


Eosinophils #              0.0


Basophils #                0.1


Nucleated Red              0.0


Blood Cells #


Prothrombin               12.4


Time


Prothrombin                1.0


Time Ratio


INR                      0.91  
  
              
                



International


Normalized
Rati


o


Activated               40.8  H
  
              
                



Partial
Thrombo


plast Time


Sodium Level               136


Potassium Level           5.4  H


Chloride Level             94  L


Carbon Dioxide             34  H


Level


Anion Gap                    8


Blood Urea                 38  H


Nitrogen


Creatinine               1.68  H


Est Glomerular            41  L
  
              
                



Filtrat


Rate
mL/min


Glucose Level              173


POC Venous                 2.0                             2.0


Lactate


Calcium Level              9.4


Total Bilirubin           0.0  L


Direct                    0.00


Bilirubin


Indirect                   0.0


Bilirubin


Aspartate Amino          173  H
  
              
                



Transf
(AST/SGO


T)


Alanine                  122  H
  
              
                



Aminotransferas


e
(ALT/SGPT)


Alkaline                    86


Phosphatase


Troponin I             0.248  *H


Total Protein              7.8


Albumin                    4.0


Globulin                 3.80  H


Albumin/Globuli           1.05


n Ratio


Urine Color                       TEVIN


Urine Clarity                     CLOUDY  A


Urine pH                                  5.0


Urine Specific                          1.016


Gravity


Urine Ketones                     NEGATIVE


Urine Nitrite                     NEGATIVE


Urine Bilirubin                   NEGATIVE


Urine                                     1+  H


Urobilinogen


Urine Leukocyte                           1+  H


Esterase


Urine                                       4


Microscopic RBC


Urine                                     33  H


Microscopic WBC


Urine Amorphous                   MANY  A


Crystals


Urine Bacteria                    FEW  A


Urine Hyaline                     FEW  A


Casts


Urine Mucus                       FEW  A


Urine                             NEGATIVE


Hemoglobin


Urine Glucose                     NEGATIVE


Urine Total                               2+  H


Protein


Blood Gas        
                
              
                Blood


Specimen                                                          arterial



Source



Arterial Blood   
                
              
                1/21/2019
10:0


Date Drawn
                                                              0:42 PM


Arterial Blood   
                
              
                    7.203  *L



pH


(Temp
corrected


)


Arterial Blood   
                
              
                      74.7  H



pCO2


(Temp
correct)


Arterial Blood   
                
              
                     156.9  H



pO2


(Temp
corrected


)


Arterial Blood                                                           28.7  H


HCO3


Arterial Blood                                                            -1.1


Base Excess


Arterial Blood   
                
              
                      98.9  H



Oxygen
Saturati


on


Antoine Test                                                        N/A


Arterial Blood   
                
              
                Right


Gas                                                               Brachial



Puncture
Site


Arterial         
                
              
                        2.3  



Blood
Carboxyhe


moglobin


Arterial Blood                                                             0.1


Methemoglobin


Blood Gas A-a                                                           481.4  H


O2 Differential


Oxyhemoglobin                                                             96.5


Percent


Blood Gas                                                                 37.0


Temperature


Blood Gas                                                                 16.0


Respiration


Rate


Blood Gas        
                
              
                         16  



Actual


Respiration
Rat


e


Blood Gas                                                         VENT - AC


Modality


FiO2                                                                     100.0


Blood Gas Tidal                                                          500.0


Volume


Blood Gas        
                
              
                N [id number]


Critical Value                                                    MD



Read
Back


Blood Gas                                                         UP


Notified Whom


Blood Gas        
                
              
                1/21/2019
10:1


Notified Time
                                                           4:35 PM


Test
              1/22/19
00:50  1/22/19
02:18    1/22/19
10:31  



Blood Gas        Blood arterial
  
              Blood arterial
  



Specimen


Source



Arterial Blood   1/22/2019
1:15:  
              1/22/2019
10:54  



Date Drawn
                59 AM                          :53 AM


Arterial Blood          7.362  
  
                     7.392  
  



pH


(Temp
corrected


)


Arterial Blood          47.1  H
  
                      44.3  
  



pCO2


(Temp
correct)


Arterial Blood         41.9  *L
  
                    264.4  H
  



pO2


(Temp
corrected


)


Arterial Blood           26.1  H                         26.3  H


HCO3


Arterial Blood             0.2                             1.1


Base Excess


Arterial Blood          77.2  L
  
                     99.7  H
  



Oxygen
Saturati


on


Antoine Test       ACCEPTAB                        ACCEPTAB


Arterial Blood   Right Radial  
  
              Right Radial  
  



Gas


Puncture
Site


Arterial                  1.6  
  
                       0.8  
  



Blood
Carboxyhe


moglobin


Arterial Blood               0                             0.3


Methemoglobin


Blood Gas A-a           624.0  H                        404.3  H


O2 Differential


Oxyhemoglobin            76.0  L                          98.6


Percent


Blood Gas                 37.0                            37.0


Temperature


Blood Gas                 26.0                            26.0


Respiration


Rate


Blood Gas                  26  
  
                        26  
  



Actual


Respiration
Rat


e


Blood Gas        VENT - AC                       VENT - AC


Modality


FiO2                     100.0                           100.0


Blood Gas Tidal          500.0                           500.0


Volume


Blood Gas Low              5.0                             5.0


PEEP Setting


Blood Gas        D NATALI TINSLEY    
              
                



Critical Value   



Read
Back


Blood Gas        UP                              TM


Notified Whom


Blood Gas        1/22/2019
1:30:  
              1/22/2019
11:12  



Notified Time
             16 AM                          :36 AM


White Blood                             21.3  H


Count


Red Blood Count                         4.33  L


Hemoglobin                              12.1  L


Hematocrit                              40.0  L


Mean                                     92.4


Corpuscular


Volume


Mean                                    27.9  L


Corpuscular


Hemoglobin


Mean             
                     30.3  L
  
                



Corpuscular


Hemoglobin
Conc


ent


Red Cell                                15.4  H


Distribution


Width


Platelet Count                           138  L


Mean Platelet                           11.0  H


Volume


Immature                               1.000  H


Granulocytes %


Neutrophils %                           81.6  H


Lymphocytes %                            8.8  L


Monocytes %                               8.5


Eosinophils %                             0.0


Basophils %                               0.1


Nucleated Red                            0.4  H


Blood Cells %


Immature                               0.220  H


Granulocytes #


Neutrophils #                           17.3  H


Lymphocytes #                             1.9


Monocytes #                              1.8  H


Eosinophils #                             0.0


Basophils #                               0.0


Nucleated Red                            0.1  H


Blood Cells #


Sodium Level                              139


Potassium Level                           4.7


Chloride Level                            103


Carbon Dioxide                             30


Level


Anion Gap                                   6


Blood Urea                                41  H


Nitrogen


Creatinine                              1.47  H


Est Glomerular   
                       48  L
  
                



Filtrat


Rate
mL/min


Glucose Level                             138


Hemoglobin A1c                            5.6


Lactic Acid                             2.6  *H


Level


Calcium Level                             9.2








HPI/ROS


Admit Date/Time


Admit Date/Time


Jan 22, 2019 at 00:03





Hx of Present Illness


The patient is a 68-year-old male who was recently discharged home from [facility] where he was treated for COPD bipolar disorder BPH depression


seizure disorder and hyperlipidemia.  [facility] for altered dimensions status and hypoxia.  In the emergency room 


patient developed cardiac arrest requiring CPR and intubation.  Patient also had


a chest tube placed on the left side.  Patient underwent CT imaging of the 


abdomen and pelvis which revealed pneumoperitoneum with possibly perforated 


viscus.  Dr. Lai was consulted in general surgery consultation.  Patient is 


currently in intensive care unit, sedated on propofol drip, patient is also on 


Levophed and dopamine for hemodynamic support.  Patient is able to follow simple


commands when propofol  is held per RN,however, patient cannot provide any 


history and most of the history was obtained from medical records and talking to


nursing staff.





ROS


Unable to obtain due to patient condition





PMH/Family/Social


Past Medical History


Unable to obtain due to patient's condition per skilled nursing facility records


patient was treated for COPD BPH major depressive disorder bipolar disorder 


hyperlipidemia seizure disorder


Medications





Current Medications


Sodium Chloride 1,000 ml @  100 mls/hr Q10H IV  Last administered on 1/22/19at 


06:08; Admin Dose 100 MLS/HR;  Start 1/21/19 at 23:03


Ondansetron HCl (Zofran Inj) 4 mg Q6H  PRN IV NAUSEA AND/OR VOMITING;  Start 


1/21/19 at 23:30


Acetaminophen (Tylenol Supp) 650 mg Q4H  PRN [place] PAIN LEVEL 1-3 OR FEVER;  Start 


1/21/19 at 23:30


Morphine Sulfate (morphine) 2 mg Q4H  PRN IV PAIN LEVEL 7-10;  Start 1/21/19 at 


23:30


Famotidine (Pepcid Iv) 20 mg Q12 IV  Last administered on 1/22/19at 08:24; Admin


Dose 20 MG;  Start 1/22/19 at 09:00


Enoxaparin Sodium (Lovenox) 30 mg DAILY SC ;  Start 1/22/19 at 09:00


Piperacillin Sod/ Tazobactam Sod 100 ml @  200 mls/hr Q6 IVPB  Last administered


on 1/22/19at 11:31; Admin Dose 200 MLS/HR;  Start 1/22/19 at 00:00


Norepinephrine 250 ml @  1.875 mls/ hr TITRATE IV  Last administered on 


1/22/19at 09:48; Admin Dose 37.5 MLS/HR;  Start 1/22/19 at 00:30


Dopamine HCl/ Dextrose 250 ml @  5.625 mls/ hr TITRATE IV  Last administered on 


1/22/19at 01:58; Admin Dose 5.625 MLS/HR;  Start 1/22/19 at 02:00


Influenza Virus Vaccine Quadrival (Fluzone) 0.5 ml ONCE ONCE IM* ;  Start 


1/23/19 at 10:00;  Stop 1/23/19 at 10:01


Propofol 100 ml @  1.875 mls/ hr Q12H IV  Last administered on 1/22/19at 06:20; 


Admin Dose 9.375 MLS/HR;  Start 1/22/19 at 06:30


Coded Allergies:  


     No Known Allergy (Unverified , 1/21/19)





Past Surgical History


Unable to obtain due to patient's condition





Social History


Unable to obtain due to patient's condition


Smoking Status:  Unknown if ever smoked





Exam/Review of Systems


Vital Signs


Vitals





Vital Signs


  Date      Temp  Pulse  Resp  B/P (MAP)   Pulse Ox  O2          O2 Flow    FiO2


Time                                                 Delivery    Rate


   1/22/19           71    26      115/77        99  Mechanical


     12:00                           (90)            Ventilator


   1/22/19                                                                    60


     12:00


   1/22/19  97.6


     08:00


   1/21/19                                                              15


     18:34








Intake and Output





1/21/19 1/21/19 1/22/19





1515:00


23:00


07:00





IntakeIntake Total


745.625 ml





OutputOutput Total


435 ml





BalanceBalance


310.625 ml














Exam


Constitutional:  frail, other (Sedated on propofol)


Head:  normocephalic


Eyes:  other (Right eye blind, left eye PERRLA)


Neck:  supple


Respiratory:  diminished breath sounds, other (Left chest tube)


Cardiovascular:  regular rate and rhythm


Gastrointestinal:  soft, other (Absent bowel sounds to)


Genitourinary - Male:  other (Blake catheter)


Musculoskeletal:  nl extremities to inspection


Extremities:  normal pulses


Neurological:  other (sedated)


Skin:  nl EVARISTO Quintana             Jan 22, 2019 12:54
no